# Patient Record
Sex: FEMALE | Race: WHITE | NOT HISPANIC OR LATINO | Employment: OTHER | URBAN - METROPOLITAN AREA
[De-identification: names, ages, dates, MRNs, and addresses within clinical notes are randomized per-mention and may not be internally consistent; named-entity substitution may affect disease eponyms.]

---

## 2017-01-05 ENCOUNTER — OFFICE VISIT (OUTPATIENT)
Dept: URGENT CARE | Facility: URGENT CARE | Age: 57
End: 2017-01-05
Payer: COMMERCIAL

## 2017-01-05 ENCOUNTER — RADIANT APPOINTMENT (OUTPATIENT)
Dept: GENERAL RADIOLOGY | Facility: CLINIC | Age: 57
End: 2017-01-05
Attending: FAMILY MEDICINE
Payer: COMMERCIAL

## 2017-01-05 VITALS
BODY MASS INDEX: 35.79 KG/M2 | HEART RATE: 75 BPM | WEIGHT: 194 LBS | TEMPERATURE: 98 F | OXYGEN SATURATION: 98 % | DIASTOLIC BLOOD PRESSURE: 85 MMHG | SYSTOLIC BLOOD PRESSURE: 149 MMHG

## 2017-01-05 DIAGNOSIS — M54.2 CERVICALGIA: ICD-10-CM

## 2017-01-05 DIAGNOSIS — Z87.01 HX OF BACTERIAL PNEUMONIA: ICD-10-CM

## 2017-01-05 DIAGNOSIS — R05.9 COUGH: Primary | ICD-10-CM

## 2017-01-05 PROCEDURE — 71020 XR CHEST 2 VW: CPT

## 2017-01-05 PROCEDURE — 93000 ELECTROCARDIOGRAM COMPLETE: CPT | Performed by: FAMILY MEDICINE

## 2017-01-05 PROCEDURE — 99214 OFFICE O/P EST MOD 30 MIN: CPT | Performed by: FAMILY MEDICINE

## 2017-01-05 RX ORDER — OXYCODONE AND ACETAMINOPHEN 5; 325 MG/1; MG/1
1-2 TABLET ORAL EVERY 4 HOURS PRN
Qty: 15 TABLET | Refills: 0 | Status: SHIPPED | OUTPATIENT
Start: 2017-01-05 | End: 2017-01-10

## 2017-01-05 NOTE — MR AVS SNAPSHOT
"              After Visit Summary   1/5/2017    Margo Inman    MRN: [id number]           Patient Information     Date Of Birth          1960        Visit Information        Provider Department      1/5/2017 3:30 PM Romeo Shelton MD [facility]        Today's Diagnoses     Cough    -  1     Hx of bacterial pneumonia         Cervicalgia           Care Instructions      Neck/Back Pain: General    Both neck and back pain are usually caused by injury to the muscles or ligaments of the spine. Sometimes the disks that separate each bone of the spine may cause pain by pressing on a nearby nerve. Back and neck pain may appear after a sudden twisting/bending force (such as in a car accident), or sometimes after a simple awkward movement. In either case, muscle spasm is often present and adds to the pain.  Acute neck and back pain usually gets better in 1 to 2 weeks. Pain related to disk disease, arthritis in the spinal joints or spinal stenosis (narrowing of the spinal canal) can become chronic and last for months or years.  Back and neck pain are common problems. Most people feel better in 1 or 2 weeks, and most of the rest in 1 to 2 months. Most people can remain active.  Symptoms  People experience and describe pain differently.    Pain can be sharp, stabbing, shooting, aching, cramping, or burning    Movement, standing, bending, lifting, sitting, or walking may worsen the pain    Pain can be localized to one spot or area, or it can be more generalized    Pain can spread or radiate upwards, downwards, to the front, or go down your arms    Muscle spasm may occur.  Cause  Most of the time \"mechanical problems\" with the muscles or spine cause the pain. it is usually caused by an injury, whether known or not, to the muscles or ligaments. While illnesses can cause back pain, it is usually not caused by a serious illness. Pain is usually related to physical activity, whether sports, " exercise, work, or normal activity. Sometimes it can occur without an identifiable cause. This can happen simply by stretching or moving wrong, without noting pain at the time. Other causes include:    Overexertion, lifting, pushing, pulling incorrectly or too aggressively.    Sudden twisting, bending or stretching from an accident (car or fall), or accidental movement.    Poor posture    Poor conditioning, lack of regular exercise    Spinal disc disease or arthritis    Stress    Pregnancy, or illness like appendicitis, bladder or kidney infection, pelvic infections   Home care    For neck pain: Use a comfortable pillow that supports the head and keeps the spine in a neutral position. The position of the head should not be tilted forward or backward.    When in bed, try to find a position of comfort. A firm mattress is best. Try lying flat on your back with pillows under your knees. You can also try lying on your side with your knees bent up towards your chest and a pillow between your knees.    At first, do not try to stretch out the sore spots. If there is a strain, it is not like the good soreness you get after exercising without an injury. In this case, stretching may make it worse.    Avoid prolonged sitting, long car rides or travel. This puts more stress on the lower back than standing or walking.    During the first 24 to 72 hours after an injury, apply an ice pack to the painful area for 20 minutes and then remove it for 20 minutes over a period of 60 to 90 minutes or several times a day. As a safety precaution, do not use a heating pad at bedtime. Sleeping with a heating pad can lead to skin burns or tissue damage.    Ice and heat therapies can be alternated. Talk with your health care provider about the best treatment for your back or neck pain.    Therapeutic massage can help relax the back and neck muscles without stretching them.    Be aware of safe lifting methods and do not lift anything over 15  pounds until all the pain is gone.  Medications  Talk to your health care provider before using medications, especially if you have other medical problems or are taking other medicines.    You may use acetaminophen (such as Tylenol) or ibuprofen (such as Advil or Motrin) to control pain, unless another pain medicine was prescribed. If you have chronic conditions like diabetes, liver or kidney disease, stomach ulcers,  gastrointestinal bleeding, or are taking blood thinner medications.    Be careful if you are given pain medicines, narcotics, or medication for muscle spasm. They can cause drowsiness, and can affect your coordination, reflexes, and judgment. Do not drive or operate heavy machinery.  Follow-up care  Follow up with your health care provider if your symptoms do not start to improve after one week. Physical therapy or further tests may be needed.  If X-rays were taken, they will be reviewed by a radiologist. You will be notified of any new findings that may affect your care.  Call 911  Seek emergency medical care if any of the following occur:    Trouble breathing    Confusion    Very drowsy or trouble awakening    Fainting or loss of consciousness    Rapid or very slow heart rate    Loss of bowel or bladder control  When to seek medical care  Get prompt medical attention if any of the following occur:    Pain becomes worse or spreads into your arms or legs    Weakness, numbness or pain in one or both arms or legs    Numbness in the groin area    Difficulty walking    Fever of 100.4 F (38 C) or higher, or as directed by your healthcare provider    0633-9993 [employer]. [address] [place] [address]. All rights reserved. This information is not intended as a substitute for professional medical care. Always follow your healthcare professional's instructions.        Pinched Nerve in the Neck  A pinched nerve in the neck (cervical radiculopathy) is caused when the nerve that goes  from the spinal cord to the arm is irritated or has pressure on it. This may be caused by a bulging spinal disk. A spinal disk is the cushion between each spinal bone. Or it may be caused by a narrowing of the spinal joint because of arthritis.    A pinched nerve can cause numbness, tingling, deep aching, or electrical shooting pain from the side of the neck all the way down to the fingers on one side.  A pinched nerve may begin after a sudden turning or bending force (such as in a car accident) or after a simple awkward movement. In either case, muscle spasm is commonly present and adds to the pain.  Home care  Follow these guidelines when caring for yourself at home:    Rest and relax the muscles. Use a comfortable pillow that supports your head and keeps your spine in a natural (neutral) position. Your head shouldn t be tilted forward or backward. A rolled-up towel may help for a custom fit. When standing or sitting, keep your neck in line with your body. Keep your head up and shoulders down. Stay away from activities that require you to move your neck a lot.    You can use heat and massage to help ease the pain. Take a hot shower or bath, or use a heating pad. You can also use a cold pack for relief. You can make a cold pack by wrapping a plastic bag of crushed or cubed ice in a thin towel. Try both heat and cold, and use the method that feels best. Do this for 20 minutes several times a day.    You may use acetaminophen or ibuprofen to control pain, unless another pain medicine was prescribed. If you have chronic liver or kidney disease, talk with your health care provider before using these medicines. Also talk with your provider if you ve had a stomach ulcer or GI bleeding.    Reduce stress. Stress can make it longer for your pain to go away.    Do any exercises or stretches that were given to you as part of your discharge plan.    Wear a soft collar, if prescribed.    You may need surgery for a more serious  injury.  Follow-up care  Follow up with your health care provider, or as advised, if you don t start to get better after 1 week. You may need more tests. Tell your provider about any fever, chills, or weight loss.  If X-rays were taken, a radiologist will look at them. You will be told of any new findings that may affect your care.  When to seek medical advice  Call your health care provider right away if any of these occur:    Pain becomes worse even after taking prescribed pain medicine    Weakness in the arm    Numbness in the arm gets worse    Trouble breathing or swallowing       4490-5991 [employer]. [address] [place] [address]. All rights reserved. This information is not intended as a substitute for professional medical care. Always follow your healthcare professional's instructions.              Follow-ups after your visit        Your next 10 appointments already scheduled     Rahul 10, 2017 11:40 AM   Office Visit with Jordana Naqvi PA-C   [facility]    [address] [place] [id number] [phone]           Bring a current list of meds and any records pertaining to this visit.  For Physicals, please bring immunization records and any forms needing to be filled out.  Please arrive 10 minutes early to complete paperwork.              Who to contact     If you have questions or need follow up information about today's clinic visit or your schedule please contact [facility] directly at [phone].  Normal or non-critical lab and imaging results will be communicated to you by MyChart, letter or phone within 4 business days after the clinic has received the results. If you do not hear from us within 7 days, please contact the clinic through MyChart or phone. If you have a critical or abnormal lab result, we will notify you by phone as soon as possible.  Submit refill requests  "through [employer] or call your pharmacy and they will forward the refill request to us. Please allow 3 business days for your refill to be completed.          Additional Information About Your Visit        [employer] Information     [employer] lets you send messages to your doctor, view your test results, renew your prescriptions, schedule appointments and more. To sign up, go to [email or web address] . Click on \"Log in\" on the left side of the screen, which will take you to the Welcome page. Then click on \"Sign up Now\" on the right side of the page.     You will be asked to enter the access code listed below, as well as some personal information. Please follow the directions to create your username and password.     Your access code is: [email or web address]  Expires: 2017 11:34 AM     Your access code will  in 90 days. If you need help or a new code, please call your [facility] clinic or [phone].        Care EveryWhere ID     This is your Care EveryWhere ID. This could be used by other organizations to access your [facility] medical records  [id number]        Your Vitals Were     Pulse Temperature Pulse Oximetry Last Period          75 98  F (36.7  C) (Oral) 98% 2011         Blood Pressure from Last 3 Encounters:   17 149/85   11/15/16 152/92   16 146/90    Weight from Last 3 Encounters:   17 194 lb (87.998 kg)   11/15/16 198 lb (89.812 kg)   16 200 lb (90.719 kg)              We Performed the Following     EKG 12-lead complete w/read - Clinics     XR Chest 2 Views          Today's Medication Changes          These changes are accurate as of: 17  5:25 PM.  If you have any questions, ask your nurse or doctor.               Start taking these medicines.        Dose/Directions    oxyCODONE-acetaminophen 5-325 MG per tablet   Commonly known as:  PERCOCET   Used for:  Cervicalgia   Started by:  Romeo Shelton MD        Dose:  1-2 tablet   Take 1-2 tablets by mouth every 4 hours " as needed for moderate to severe pain   Quantity:  15 tablet   Refills:  0            Where to get your medicines      Some of these will need a paper prescription and others can be bought over the counter.  Ask your nurse if you have questions.     Bring a paper prescription for each of these medications    - oxyCODONE-acetaminophen 5-325 MG per tablet             Primary Care Provider Office Phone # Fax #    Jordana Naqvi PA-C [phone]       [facility] [address]  [facility] [address]        Thank you!     Thank you for choosing [facility]  for your care. Our goal is always to provide you with excellent care. Hearing back from our patients is one way we can continue to improve our services. Please take a few minutes to complete the written survey that you may receive in the mail after your visit with us. Thank you!             Your Updated Medication List - Protect others around you: Learn how to safely use, store and throw away your medicines at www.disposemymeds.org.          This list is accurate as of: 1/5/17  5:25 PM.  Always use your most recent med list.                   Brand Name Dispense Instructions for use    acetaminophen 500 MG tablet    TYLENOL     Take 500-1,000 mg by mouth every 6 hours as needed.       atorvastatin 80 MG tablet    LIPITOR    90 tablet    TAKE 1 TABLET BY MOUTH ONCE DAILY       butalbital-acetaminophen-caffeine -40 MG per tablet    FIORICET/ESGIC    28 tablet    Take 1 tablet by mouth every 4 hours as needed       * cyclobenzaprine 10 MG tablet    FLEXERIL    90 tablet    TAKE 1 TABLET BY MOUTH AT BEDTIME AS NEEDED FOR MUSCLE SPASMS       * cyclobenzaprine 10 MG tablet    FLEXERIL    90 tablet    TAKE 1 TABLET BY MOUTH AT BEDTIME AS NEEDED FOR MUSCLE SPASMS       diclofenac 75 MG EC tablet    VOLTAREN    60 tablet    Take 1 tablet (75 mg) by mouth 2 times daily       oxybutynin 5 MG 24 hr tablet     DITROPAN-XL    30 tablet    Take 2 tablets (10 mg) by mouth daily       oxyCODONE-acetaminophen 5-325 MG per tablet    PERCOCET    15 tablet    Take 1-2 tablets by mouth every 4 hours as needed for moderate to severe pain       UNABLE TO FIND      MEDICATION NAME: e-cigarette       varenicline 0.5 MG X 11 & 1 MG X 42 tablet    CHANTIX STARTING MONTH EVANGELINA    53 tablet    Take 0.5 mg tab daily for 3 days, then 0.5 mg tab twice daily for 4 days, then 1 mg twice daily.       * Notice:  This list has 2 medication(s) that are the same as other medications prescribed for you. Read the directions carefully, and ask your doctor or other care provider to review them with you.

## 2017-01-05 NOTE — PROGRESS NOTES
Some of this note was populated by a medical assistant.      SUBJECTIVE:                                                    Margo Inman is a 56 year old female who presents to clinic today for the following health issues:    Musculoskeletal problem/pain      Duration: 2 days ago    Description  Location: Both shoulder blades and lower neck pain with motion.     Intensity:  8/10    Accompanying signs and symptoms: radiation of pain to right arm, numbness, tingling, warmth and swelling    History  Previous similar problem: no   Previous evaluation:  none    Precipitating or alleviating factors:  Trauma or overuse: no   Aggravating factors include: When moving    Therapies tried and outcome: Tylenol       Problem list and histories reviewed & adjusted, as indicated.  Additional history: as documented    Patient Active Problem List   Diagnosis     Hyperlipidemia LDL goal <130     Pseudogout     OA (OSTEOARTHRITIS) OF KNEE - bilateral     RLS (restless legs syndrome)     S/P total knee arthroplasty juan     Acute posthemorrhagic anemia     Muscle spasm     CTS (carpal tunnel syndrome) - bilateral     S/P carpal tunnel release     Trigger thumb     Elevated blood pressure reading without diagnosis of hypertension     Tobacco dependency     Microscopic hematuria     Neck pain     Tension headache     Past Surgical History   Procedure Laterality Date     Orthopedic surgery       Appendectomy       Gyn surgery       Ent surgery       Tonsils       Ectopic pregnancy surgery       Hc knee scope,med/lat menisectomy  9/16/11     left, with partial medial menisectomy ONLY     C part remv femur/prox tib/fib  9/16/11     left, open lateral patellar bone spur excision     Arthroscopy knee  9/16/2011     Procedure:ARTHROSCOPY KNEE; left knee arthroscopy with debridement, open lateral patellar spur excision; Surgeon:LUIS A MONTOYA; Location:[facility] OR     C total knee arthroplasty  1/17/14     Bilateral     Hc revise median  n/carpal tunnel surg Left 7/11/14     PRIMARY - not revision     Hc incision tendon sheath finger Left 7/11/14     Thumb TF release     Release carpal tunnel  7/11/2014     Procedure: RELEASE CARPAL TUNNEL;  Surgeon: Rg Franco MD;  Location: [facility] OR     Release trigger finger  7/11/2014     Procedure: RELEASE TRIGGER FINGER;  Surgeon: Rg Franco MD;  Location: [facility] OR     Release carpal tunnel Right 11/7/2014     Procedure: RELEASE CARPAL TUNNEL;  Surgeon: Rg Franco MD;  Location: [facility] OR     Release trigger finger Right 11/7/2014     Procedure: RELEASE TRIGGER FINGER;  Surgeon: Rg Franco MD;  Location: [facility] OR     Colonoscopy N/A 2/16/2016     Procedure: COLONOSCOPY;  Surgeon: Belem Khalil MD;  Location: [facility] OR     Colonoscopy with co2 insufflation N/A 2/16/2016     Procedure: COLONOSCOPY WITH CO2 INSUFFLATION;  Surgeon: Belem Khalil MD;  Location: [facility]     Colonoscopy N/A 2/16/2016     Procedure: COMBINED COLONOSCOPY, SINGLE OR MULTIPLE BIOPSY/POLYPECTOMY BY BIOPSY;  Surgeon: Belem Khalil MD;  Location: [facility] OR     Cystoscopy  2016     microscopic hematuria       Social History   Substance Use Topics     Smoking status: Current Every Day Smoker -- 1.00 packs/day for 15 years     Types: Cigarettes     Smokeless tobacco: Never Used     Alcohol Use: Yes      Comment: socially     Family History   Problem Relation Age of Onset     Breast Cancer Maternal Grandmother      Cancer - colorectal Maternal Grandfather      Colon Cancer Maternal Grandfather      C.A.D. Father      C.A.D. Paternal Grandfather      Ovarian Cancer Maternal Grandmother      Prostate Cancer Maternal Grandfather          Current Outpatient Prescriptions   Medication Sig Dispense Refill     cyclobenzaprine (FLEXERIL) 10 MG tablet TAKE 1 TABLET BY MOUTH AT BEDTIME AS NEEDED FOR MUSCLE SPASMS 90 tablet 0     butalbital-acetaminophen-caffeine (FIORICET, ESGIC)  -40 MG per tablet Take 1 tablet by mouth every 4 hours as needed 28 tablet 1     diclofenac (VOLTAREN) 75 MG EC tablet Take 1 tablet (75 mg) by mouth 2 times daily 60 tablet 11     oxybutynin (DITROPAN-XL) 5 MG 24 hr tablet Take 2 tablets (10 mg) by mouth daily 30 tablet 1     atorvastatin (LIPITOR) 80 MG tablet TAKE 1 TABLET BY MOUTH ONCE DAILY 90 tablet 3     cyclobenzaprine (FLEXERIL) 10 MG tablet TAKE 1 TABLET BY MOUTH AT BEDTIME AS NEEDED FOR MUSCLE SPASMS 90 tablet 0     varenicline (CHANTIX STARTING MONTH EVANGELINA) 0.5 MG X 11 & 1 MG X 42 tablet Take 0.5 mg tab daily for 3 days, then 0.5 mg tab twice daily for 4 days, then 1 mg twice daily. 53 tablet 0     UNABLE TO FIND MEDICATION NAME: e-cigarette       acetaminophen (TYLENOL) 500 MG tablet Take 500-1,000 mg by mouth every 6 hours as needed.       Allergies   Allergen Reactions     Bee Anaphylaxis     Erythromycin Hives     Wasps [Hornets] Anaphylaxis       ROS:  Constitutional, HEENT, cardiovascular, pulmonary, gi and gu systems are negative, except as otherwise noted.    OBJECTIVE:                                                    /85 mmHg  Pulse 75  Temp(Src) 98  F (36.7  C) (Oral)  Wt 194 lb (87.998 kg)  SpO2 98%  LMP 09/16/2011  Body mass index is 35.79 kg/(m^2).  GENERAL: healthy, alert and no distress  NECK: no adenopathy, no asymmetry, masses, or scars and thyroid normal to palpation  RESP: lungs clear to auscultation - no rales, rhonchi or wheezes  CV: regular rate and rhythm, normal S1 S2, no S3 or S4, no murmur, click or rub, no peripheral edema and peripheral pulses strong  ABDOMEN: soft, nontender, no hepatosplenomegaly, no masses and bowel sounds normal  MS: no gross musculoskeletal defects noted, noted slightly weaker right sided  strength and limited rotation of the neck in all planes including extension and flexion. Forward elevation and abduction limited to 90 degrees secondary to pain in the trapezius area and neck  posteriorly   CN 2-12 intact without focal deficits.     Results for orders placed or performed in visit on 01/05/17   XR Chest 2 Views    Narrative    CHEST TWO VIEWS 1/5/2017 5:05 PM     HISTORY: Cough, Personal history of pneumonia (recurrent)    COMPARISON: 11/3/2014    FINDINGS: There are no acute infiltrates. The cardiac silhouette is  not enlarged. Pulmonary vasculature is unremarkable.      Impression    IMPRESSION: No acute disease.    PO LOYD MD         EKG: normal axis and intervals. Rate 75. Without ST changes.  ASSESSMENT/PLAN:                                                        ICD-10-CM    1. Cough R05 EKG 12-lead complete w/read - Clinics     XR Chest 2 Views   2. Hx of bacterial pneumonia Z87.01 EKG 12-lead complete w/read - Clinics     XR Chest 2 Views   3. Cervicalgia M54.2 oxyCODONE-acetaminophen (PERCOCET) 5-325 MG per tablet      PLAN  Emphasized the importance of smoking cessation and association between smoking and neck and back pain.   Indications for immediate trip to the ER emphasized.   Patient educational/instructional material provided including reasons for follow-up   The patient indicates understanding of these issues and agrees with the plan.  Romeo Shelton MD      [facility]

## 2017-01-05 NOTE — PATIENT INSTRUCTIONS
"  Neck/Back Pain: General    Both neck and back pain are usually caused by injury to the muscles or ligaments of the spine. Sometimes the disks that separate each bone of the spine may cause pain by pressing on a nearby nerve. Back and neck pain may appear after a sudden twisting/bending force (such as in a car accident), or sometimes after a simple awkward movement. In either case, muscle spasm is often present and adds to the pain.  Acute neck and back pain usually gets better in 1 to 2 weeks. Pain related to disk disease, arthritis in the spinal joints or spinal stenosis (narrowing of the spinal canal) can become chronic and last for months or years.  Back and neck pain are common problems. Most people feel better in 1 or 2 weeks, and most of the rest in 1 to 2 months. Most people can remain active.  Symptoms  People experience and describe pain differently.    Pain can be sharp, stabbing, shooting, aching, cramping, or burning    Movement, standing, bending, lifting, sitting, or walking may worsen the pain    Pain can be localized to one spot or area, or it can be more generalized    Pain can spread or radiate upwards, downwards, to the front, or go down your arms    Muscle spasm may occur.  Cause  Most of the time \"mechanical problems\" with the muscles or spine cause the pain. it is usually caused by an injury, whether known or not, to the muscles or ligaments. While illnesses can cause back pain, it is usually not caused by a serious illness. Pain is usually related to physical activity, whether sports, exercise, work, or normal activity. Sometimes it can occur without an identifiable cause. This can happen simply by stretching or moving wrong, without noting pain at the time. Other causes include:    Overexertion, lifting, pushing, pulling incorrectly or too aggressively.    Sudden twisting, bending or stretching from an accident (car or fall), or accidental movement.    Poor posture    Poor conditioning, " lack of regular exercise    Spinal disc disease or arthritis    Stress    Pregnancy, or illness like appendicitis, bladder or kidney infection, pelvic infections   Home care    For neck pain: Use a comfortable pillow that supports the head and keeps the spine in a neutral position. The position of the head should not be tilted forward or backward.    When in bed, try to find a position of comfort. A firm mattress is best. Try lying flat on your back with pillows under your knees. You can also try lying on your side with your knees bent up towards your chest and a pillow between your knees.    At first, do not try to stretch out the sore spots. If there is a strain, it is not like the good soreness you get after exercising without an injury. In this case, stretching may make it worse.    Avoid prolonged sitting, long car rides or travel. This puts more stress on the lower back than standing or walking.    During the first 24 to 72 hours after an injury, apply an ice pack to the painful area for 20 minutes and then remove it for 20 minutes over a period of 60 to 90 minutes or several times a day. As a safety precaution, do not use a heating pad at bedtime. Sleeping with a heating pad can lead to skin burns or tissue damage.    Ice and heat therapies can be alternated. Talk with your health care provider about the best treatment for your back or neck pain.    Therapeutic massage can help relax the back and neck muscles without stretching them.    Be aware of safe lifting methods and do not lift anything over 15 pounds until all the pain is gone.  Medications  Talk to your health care provider before using medications, especially if you have other medical problems or are taking other medicines.    You may use acetaminophen (such as Tylenol) or ibuprofen (such as Advil or Motrin) to control pain, unless another pain medicine was prescribed. If you have chronic conditions like diabetes, liver or kidney disease, stomach  ulcers,  gastrointestinal bleeding, or are taking blood thinner medications.    Be careful if you are given pain medicines, narcotics, or medication for muscle spasm. They can cause drowsiness, and can affect your coordination, reflexes, and judgment. Do not drive or operate heavy machinery.  Follow-up care  Follow up with your health care provider if your symptoms do not start to improve after one week. Physical therapy or further tests may be needed.  If X-rays were taken, they will be reviewed by a radiologist. You will be notified of any new findings that may affect your care.  Call 911  Seek emergency medical care if any of the following occur:    Trouble breathing    Confusion    Very drowsy or trouble awakening    Fainting or loss of consciousness    Rapid or very slow heart rate    Loss of bowel or bladder control  When to seek medical care  Get prompt medical attention if any of the following occur:    Pain becomes worse or spreads into your arms or legs    Weakness, numbness or pain in one or both arms or legs    Numbness in the groin area    Difficulty walking    Fever of 100.4 F (38 C) or higher, or as directed by your healthcare provider    2101-1533 [employer]. [address] [place] [address]. All rights reserved. This information is not intended as a substitute for professional medical care. Always follow your healthcare professional's instructions.        Pinched Nerve in the Neck  A pinched nerve in the neck (cervical radiculopathy) is caused when the nerve that goes from the spinal cord to the arm is irritated or has pressure on it. This may be caused by a bulging spinal disk. A spinal disk is the cushion between each spinal bone. Or it may be caused by a narrowing of the spinal joint because of arthritis.    A pinched nerve can cause numbness, tingling, deep aching, or electrical shooting pain from the side of the neck all the way down to the fingers on one side.  A  pinched nerve may begin after a sudden turning or bending force (such as in a car accident) or after a simple awkward movement. In either case, muscle spasm is commonly present and adds to the pain.  Home care  Follow these guidelines when caring for yourself at home:    Rest and relax the muscles. Use a comfortable pillow that supports your head and keeps your spine in a natural (neutral) position. Your head shouldn t be tilted forward or backward. A rolled-up towel may help for a custom fit. When standing or sitting, keep your neck in line with your body. Keep your head up and shoulders down. Stay away from activities that require you to move your neck a lot.    You can use heat and massage to help ease the pain. Take a hot shower or bath, or use a heating pad. You can also use a cold pack for relief. You can make a cold pack by wrapping a plastic bag of crushed or cubed ice in a thin towel. Try both heat and cold, and use the method that feels best. Do this for 20 minutes several times a day.    You may use acetaminophen or ibuprofen to control pain, unless another pain medicine was prescribed. If you have chronic liver or kidney disease, talk with your health care provider before using these medicines. Also talk with your provider if you ve had a stomach ulcer or GI bleeding.    Reduce stress. Stress can make it longer for your pain to go away.    Do any exercises or stretches that were given to you as part of your discharge plan.    Wear a soft collar, if prescribed.    You may need surgery for a more serious injury.  Follow-up care  Follow up with your health care provider, or as advised, if you don t start to get better after 1 week. You may need more tests. Tell your provider about any fever, chills, or weight loss.  If X-rays were taken, a radiologist will look at them. You will be told of any new findings that may affect your care.  When to seek medical advice  Call your health care provider right away if  any of these occur:    Pain becomes worse even after taking prescribed pain medicine    Weakness in the arm    Numbness in the arm gets worse    Trouble breathing or swallowing       2955-8111 [employer]. [address] [place] [address], [place] [address]. All rights reserved. This information is not intended as a substitute for professional medical care. Always follow your healthcare professional's instructions.

## 2017-01-05 NOTE — NURSING NOTE
"Chief Complaint   Patient presents with     Shoulder Pain     Both shoulder blades radiate to the right arm        Initial /85 mmHg  Pulse 75  Temp(Src) 98  F (36.7  C) (Oral)  Wt 194 lb (87.998 kg)  SpO2 98%  LMP 09/16/2011 Estimated body mass index is 35.79 kg/(m^2) as calculated from the following:    Height as of 11/15/16: 5' 1.75\" (1.568 m).    Weight as of this encounter: 194 lb (87.998 kg).  BP completed using cuff size: regular  Deo Armando/MA        "

## 2017-01-10 ENCOUNTER — OFFICE VISIT (OUTPATIENT)
Dept: FAMILY MEDICINE | Facility: CLINIC | Age: 57
End: 2017-01-10
Payer: COMMERCIAL

## 2017-01-10 ENCOUNTER — RESULT FOLLOW UP (OUTPATIENT)
Dept: FAMILY MEDICINE | Facility: CLINIC | Age: 57
End: 2017-01-10

## 2017-01-10 VITALS
RESPIRATION RATE: 18 BRPM | HEART RATE: 84 BPM | SYSTOLIC BLOOD PRESSURE: 136 MMHG | DIASTOLIC BLOOD PRESSURE: 84 MMHG | WEIGHT: 198 LBS | OXYGEN SATURATION: 98 % | HEIGHT: 62 IN | BODY MASS INDEX: 36.44 KG/M2 | TEMPERATURE: 97 F

## 2017-01-10 DIAGNOSIS — G25.81 RESTLESS LEGS SYNDROME (RLS): ICD-10-CM

## 2017-01-10 DIAGNOSIS — Z00.01 ENCOUNTER FOR ROUTINE ADULT PHYSICAL EXAM WITH ABNORMAL FINDINGS: Primary | ICD-10-CM

## 2017-01-10 DIAGNOSIS — F17.200 TOBACCO DEPENDENCE SYNDROME: ICD-10-CM

## 2017-01-10 DIAGNOSIS — R87.810 CERVICAL HIGH RISK HPV (HUMAN PAPILLOMAVIRUS) TEST POSITIVE: Primary | ICD-10-CM

## 2017-01-10 DIAGNOSIS — M54.12 CERVICAL RADICULOPATHY: ICD-10-CM

## 2017-01-10 DIAGNOSIS — E78.5 HYPERLIPIDEMIA LDL GOAL <130: ICD-10-CM

## 2017-01-10 DIAGNOSIS — G44.209 TENSION HEADACHE: ICD-10-CM

## 2017-01-10 DIAGNOSIS — Z11.59 NEED FOR HEPATITIS C SCREENING TEST: ICD-10-CM

## 2017-01-10 LAB
ALBUMIN SERPL-MCNC: 4 G/DL (ref 3.4–5)
ALP SERPL-CCNC: 106 U/L (ref 40–150)
ALT SERPL W P-5'-P-CCNC: 46 U/L (ref 0–50)
ANION GAP SERPL CALCULATED.3IONS-SCNC: 6 MMOL/L (ref 3–14)
AST SERPL W P-5'-P-CCNC: 28 U/L (ref 0–45)
BILIRUB SERPL-MCNC: 0.2 MG/DL (ref 0.2–1.3)
BUN SERPL-MCNC: 11 MG/DL (ref 7–30)
CALCIUM SERPL-MCNC: 9.2 MG/DL (ref 8.5–10.1)
CHLORIDE SERPL-SCNC: 105 MMOL/L (ref 94–109)
CHOLEST SERPL-MCNC: 190 MG/DL
CO2 SERPL-SCNC: 29 MMOL/L (ref 20–32)
CREAT SERPL-MCNC: 0.56 MG/DL (ref 0.52–1.04)
ERYTHROCYTE [DISTWIDTH] IN BLOOD BY AUTOMATED COUNT: 12.9 % (ref 10–15)
GFR SERPL CREATININE-BSD FRML MDRD: ABNORMAL ML/MIN/1.7M2
GLUCOSE SERPL-MCNC: 106 MG/DL (ref 70–99)
HCT VFR BLD AUTO: 42.7 % (ref 35–47)
HDLC SERPL-MCNC: 46 MG/DL
HGB BLD-MCNC: 13.8 G/DL (ref 11.7–15.7)
LDLC SERPL CALC-MCNC: 102 MG/DL
MCH RBC QN AUTO: 31.7 PG (ref 26.5–33)
MCHC RBC AUTO-ENTMCNC: 32.3 G/DL (ref 31.5–36.5)
MCV RBC AUTO: 98 FL (ref 78–100)
NONHDLC SERPL-MCNC: 144 MG/DL
PLATELET # BLD AUTO: 277 10E9/L (ref 150–450)
POTASSIUM SERPL-SCNC: 4.3 MMOL/L (ref 3.4–5.3)
PROT SERPL-MCNC: 7.6 G/DL (ref 6.8–8.8)
RBC # BLD AUTO: 4.35 10E12/L (ref 3.8–5.2)
SODIUM SERPL-SCNC: 140 MMOL/L (ref 133–144)
TRIGL SERPL-MCNC: 210 MG/DL
TSH SERPL DL<=0.005 MIU/L-ACNC: 1.21 MU/L (ref 0.4–4)
WBC # BLD AUTO: 7.5 10E9/L (ref 4–11)

## 2017-01-10 PROCEDURE — 80053 COMPREHEN METABOLIC PANEL: CPT | Performed by: PHYSICIAN ASSISTANT

## 2017-01-10 PROCEDURE — 36415 COLL VENOUS BLD VENIPUNCTURE: CPT | Performed by: PHYSICIAN ASSISTANT

## 2017-01-10 PROCEDURE — G0145 SCR C/V CYTO,THINLAYER,RESCR: HCPCS | Performed by: PHYSICIAN ASSISTANT

## 2017-01-10 PROCEDURE — 86803 HEPATITIS C AB TEST: CPT | Performed by: PHYSICIAN ASSISTANT

## 2017-01-10 PROCEDURE — 99213 OFFICE O/P EST LOW 20 MIN: CPT | Mod: 25 | Performed by: PHYSICIAN ASSISTANT

## 2017-01-10 PROCEDURE — 80061 LIPID PANEL: CPT | Performed by: PHYSICIAN ASSISTANT

## 2017-01-10 PROCEDURE — 87624 HPV HI-RISK TYP POOLED RSLT: CPT | Performed by: PHYSICIAN ASSISTANT

## 2017-01-10 PROCEDURE — 99396 PREV VISIT EST AGE 40-64: CPT | Performed by: PHYSICIAN ASSISTANT

## 2017-01-10 PROCEDURE — 85027 COMPLETE CBC AUTOMATED: CPT | Performed by: PHYSICIAN ASSISTANT

## 2017-01-10 PROCEDURE — 84443 ASSAY THYROID STIM HORMONE: CPT | Performed by: PHYSICIAN ASSISTANT

## 2017-01-10 RX ORDER — BUTALBITAL, ACETAMINOPHEN AND CAFFEINE 50; 325; 40 MG/1; MG/1; MG/1
1 TABLET ORAL EVERY 4 HOURS PRN
Qty: 28 TABLET | Refills: 1 | Status: SHIPPED | OUTPATIENT
Start: 2017-01-10 | End: 2017-08-03

## 2017-01-10 RX ORDER — VARENICLINE TARTRATE 1 MG/1
1 TABLET, FILM COATED ORAL 2 TIMES DAILY
Qty: 56 TABLET | Refills: 4 | Status: SHIPPED | OUTPATIENT
Start: 2017-01-10 | End: 2018-06-27

## 2017-01-10 RX ORDER — OXYCODONE AND ACETAMINOPHEN 5; 325 MG/1; MG/1
1-2 TABLET ORAL EVERY 4 HOURS PRN
Qty: 30 TABLET | Refills: 0 | Status: SHIPPED | OUTPATIENT
Start: 2017-01-10 | End: 2017-01-26

## 2017-01-10 RX ORDER — CYCLOBENZAPRINE HCL 10 MG
TABLET ORAL
Qty: 90 TABLET | Refills: 0 | Status: ON HOLD | OUTPATIENT
Start: 2017-01-10 | End: 2017-04-05

## 2017-01-10 ASSESSMENT — PAIN SCALES - GENERAL: PAINLEVEL: SEVERE PAIN (7)

## 2017-01-10 NOTE — LETTER
December 28, 2017      Margo Tracy Storm  [address] [address] [facility] [address]    Dear ,      At [facility], your health and wellness is our primary concern. That is why we are following up on a positive high risk HPV test from 1/10/17. Your provider had recommended that you have a Pap smear and HPV test completed by 1/10/18. Our records do not show that this has been scheduled.     It is important to complete the follow up that your provider has suggested for you to ensure that there are no worsening changes which may, over time, develop into cancer.      Please contact our office at  [phone] to schedule an appointment for a Pap smear and HPV test at your earliest convenience. If you have questions or concerns, please call the clinic and we will be happy to assist you.    If you have completed the tests outside of [facility], please have the results forwarded to our office. We will update the chart for your primary Physician to review before your next annual physical.     Thank you for choosing [facility]!    Sincerely,      Jordana Naqvi PA-C/natanael

## 2017-01-10 NOTE — Clinical Note
[facility]  [address] [address] [place] [address]  [phone]             January 11, 2017    Margo Inman  [address] [address] [facility] [address]          Dear Margo,    Your recent labs were stable.  Your cholesterol levels were a little elevated but stable.  Blood sugar levels were also a little elevated, but this was not a fasting specimen.  No medications are needed at this time.  I encourage you to focus on smoking cessation as well as a healthy diet and regular exercise.  Please follow instructions as discussed at our last office visit.  If you have any questions, please feel free to contact our office. Enclosed are the results.  Results for orders placed or performed in visit on 01/10/17   Hepatitis C Screen Reflex to HCV RNA Quant and Genotype   Result Value Ref Range    Hepatitis C Antibody  NR     Nonreactive   Assay performance characteristics have not been established for newborns,   infants, and children     CBC with platelets   Result Value Ref Range    WBC 7.5 4.0 - 11.0 10e9/L    RBC Count 4.35 3.8 - 5.2 10e12/L    Hemoglobin 13.8 11.7 - 15.7 g/dL    Hematocrit 42.7 35.0 - 47.0 %    MCV 98 78 - 100 fl    MCH 31.7 26.5 - 33.0 pg    MCHC 32.3 31.5 - 36.5 g/dL    RDW 12.9 10.0 - 15.0 %    Platelet Count 277 150 - 450 10e9/L   Comprehensive metabolic panel   Result Value Ref Range    Sodium 140 133 - 144 mmol/L    Potassium 4.3 3.4 - 5.3 mmol/L    Chloride 105 94 - 109 mmol/L    Carbon Dioxide 29 20 - 32 mmol/L    Anion Gap 6 3 - 14 mmol/L    Glucose 106 (H) 70 - 99 mg/dL    Urea Nitrogen 11 7 - 30 mg/dL    Creatinine 0.56 0.52 - 1.04 mg/dL    GFR Estimate >90  Non  GFR Calc   >60 mL/min/1.7m2    GFR Estimate If Black >90   GFR Calc   >60 mL/min/1.7m2    Calcium 9.2 8.5 - 10.1 mg/dL    Bilirubin Total 0.2 0.2 - 1.3 mg/dL    Albumin 4.0 3.4 - 5.0 g/dL    Protein Total 7.6 6.8 - 8.8 g/dL    Alkaline Phosphatase 106  40 - 150 U/L    ALT 46 0 - 50 U/L    AST 28 0 - 45 U/L   TSH with free T4 reflex   Result Value Ref Range    TSH 1.21 0.40 - 4.00 mU/L   Lipid panel reflex to direct LDL   Result Value Ref Range    Cholesterol 190 <200 mg/dL    Triglycerides 210 (H) <150 mg/dL    HDL Cholesterol 46 (L) >49 mg/dL    LDL Cholesterol Calculated 102 (H) <100 mg/dL    Non HDL Cholesterol 144 (H) <130 mg/dL       Thanks,    Jordana Naqvi PA-C/wilbertxm

## 2017-01-10 NOTE — PROGRESS NOTES
SUBJECTIVE:     CC: Margo Inman is an 56 year old woman who presents for preventive health visit.     Healthy Habits:    Do you get at least three servings of calcium containing foods daily (dairy, green leafy vegetables, etc.)? yes    Amount of exercise or daily activities, outside of work: doing PT exercises    Problems taking medications regularly No    Medication side effects: No    Have you had an eye exam in the past two years? yes    Do you see a dentist twice per year? yes    Do you have sleep apnea, excessive snoring or daytime drowsiness?no      Neck and right arm pain - ongoing problem with mainly neck pain.  Diffuse right arm pain started last week and is worsening.  Feels weak, especially with  strength.  Notes tingling sensation throughout.  Previously has had CTS with release on the right side.      Tension headaches - fluctuates in severity, frequency.  Worse with recent worsening of neck pain.    Tobacco dependency - previously tried chantix but only used for month due to life changes that made quitting difficult.  She denies any side effects and would like to retry the medication.  Currently approx 1 ppd.    Hyperlipidemia - stable.  Tolerating meds without side effects.     RLS - stable with flexeril.      Today's PHQ-2 Score:   PHQ-2 ( 1999 Pfizer) 1/10/2017 5/26/2016   Q1: Little interest or pleasure in doing things 0 0   Q2: Feeling down, depressed or hopeless 0 0   PHQ-2 Score 0 0       Abuse: Current or Past(Physical, Sexual or Emotional)- No  Do you feel safe in your environment - Yes    Social History   Substance Use Topics     Smoking status: Current Every Day Smoker -- 1.00 packs/day for 15 years     Types: Cigarettes     Smokeless tobacco: Never Used     Alcohol Use: Yes      Comment: socially     The patient does not drink >3 drinks per day nor >7 drinks per week.    Recent Labs   Lab Test  05/26/16   1406  05/20/15   0804  03/06/15   1006   CHOL  189  167  274*   HDL   40*  53  61   LDL  108*  87  178*   TRIG  203*  134  176*   CHOLHDLRATIO   --   3.2  4.5   NHDL  149*   --    --        Reviewed orders with patient.  Reviewed health maintenance and updated orders accordingly - Yes    Mammo Decision Support:  Patient over age 50, mutual decision to screen reflected in health maintenance.  No breast changes  Pertinent mammograms are reviewed under the imaging tab.    Menstrual History  History of abnormal Pap smear: NO - age 30-65 PAP every 5 years with negative HPV co-testing recommended  LMP: postmenopausal  No vaginal complaints    All Histories reviewed and updated in Epic.      Patient Active Problem List   Diagnosis     Hyperlipidemia LDL goal <130     Pseudogout     OA (OSTEOARTHRITIS) OF KNEE - bilateral     RLS (restless legs syndrome)     S/P total knee arthroplasty juan     Acute posthemorrhagic anemia     Muscle spasm     CTS (carpal tunnel syndrome) - bilateral     S/P carpal tunnel release     Trigger thumb     Elevated blood pressure reading without diagnosis of hypertension     Tobacco dependency     Microscopic hematuria     Neck pain     Tension headache     Past Surgical History   Procedure Laterality Date     Orthopedic surgery       Appendectomy       Gyn surgery       Ent surgery       Tonsils       Ectopic pregnancy surgery       Hc knee scope,med/lat menisectomy  9/16/11     left, with partial medial menisectomy ONLY     C part remv femur/prox tib/fib  9/16/11     left, open lateral patellar bone spur excision     Arthroscopy knee  9/16/2011     Procedure:ARTHROSCOPY KNEE; left knee arthroscopy with debridement, open lateral patellar spur excision; Surgeon:LUIS A MONTOYA; Location:[facility] OR     C total knee arthroplasty  1/17/14     Bilateral     Hc revise median n/carpal tunnel surg Left 7/11/14     PRIMARY - not revision     Hc incision tendon sheath finger Left 7/11/14     Thumb TF release     Release carpal tunnel  7/11/2014     Procedure: RELEASE CARPAL  TUNNEL;  Surgeon: Rg Franco MD;  Location: [facility] OR     Release trigger finger  7/11/2014     Procedure: RELEASE TRIGGER FINGER;  Surgeon: Rg Franco MD;  Location: [facility] OR     Release carpal tunnel Right 11/7/2014     Procedure: RELEASE CARPAL TUNNEL;  Surgeon: Rg Fracno MD;  Location: [facility]     Release trigger finger Right 11/7/2014     Procedure: RELEASE TRIGGER FINGER;  Surgeon: Rg Franco MD;  Location: [facility] OR     Colonoscopy N/A 2/16/2016     Procedure: COLONOSCOPY;  Surgeon: Belem Khalil MD;  Location: [facility]     Colonoscopy with co2 insufflation N/A 2/16/2016     Procedure: COLONOSCOPY WITH CO2 INSUFFLATION;  Surgeon: Belem Khalil MD;  Location: [facility]     Colonoscopy N/A 2/16/2016     Procedure: COMBINED COLONOSCOPY, SINGLE OR MULTIPLE BIOPSY/POLYPECTOMY BY BIOPSY;  Surgeon: Belem Khalil MD;  Location: [facility]     Cystoscopy  2016     microscopic hematuria       Social History   Substance Use Topics     Smoking status: Current Every Day Smoker -- 1.00 packs/day for 15 years     Types: Cigarettes     Smokeless tobacco: Never Used     Alcohol Use: Yes      Comment: socially     Family History   Problem Relation Age of Onset     Breast Cancer Maternal Grandmother      Cancer - colorectal Maternal Grandfather      Colon Cancer Maternal Grandfather      C.A.D. Father      C.A.D. Paternal Grandfather      Ovarian Cancer Maternal Grandmother      Prostate Cancer Maternal Grandfather          Current Outpatient Prescriptions   Medication Sig Dispense Refill     butalbital-acetaminophen-caffeine (FIORICET/ESGIC) -40 MG per tablet Take 1 tablet by mouth every 4 hours as needed 28 tablet 1     cyclobenzaprine (FLEXERIL) 10 MG tablet TAKE 1 TABLET BY MOUTH AT BEDTIME AS NEEDED FOR MUSCLE SPASMS 90 tablet 0     varenicline (CHANTIX STARTING MONTH PAK) 0.5 MG X 11 & 1 MG X 42 tablet Take 0.5 mg tab daily for 3 days, then 0.5 mg  "tab twice daily for 4 days, then 1 mg twice daily. 53 tablet 0     varenicline (CHANTIX) 1 MG tablet Take 1 tablet (1 mg) by mouth 2 times daily 56 tablet 4     oxyCODONE-acetaminophen (PERCOCET) 5-325 MG per tablet Take 1-2 tablets by mouth every 4 hours as needed for moderate to severe pain 30 tablet 0     diclofenac (VOLTAREN) 75 MG EC tablet Take 1 tablet (75 mg) by mouth 2 times daily 60 tablet 11     oxybutynin (DITROPAN-XL) 5 MG 24 hr tablet Take 2 tablets (10 mg) by mouth daily 30 tablet 1     atorvastatin (LIPITOR) 80 MG tablet TAKE 1 TABLET BY MOUTH ONCE DAILY 90 tablet 3     UNABLE TO FIND MEDICATION NAME: e-cigarette       acetaminophen (TYLENOL) 500 MG tablet Take 500-1,000 mg by mouth every 6 hours as needed.       Allergies   Allergen Reactions     Bee Anaphylaxis     Erythromycin Hives     Wasps [Hornets] Anaphylaxis       ROS:  C: NEGATIVE for fever, chills, change in weight  I: NEGATIVE for worrisome rashes, moles or lesions  E: NEGATIVE for vision changes or irritation  ENT: NEGATIVE for ear, mouth and throat problems  R: NEGATIVE for significant cough or SOB  B: NEGATIVE for masses, tenderness or discharge  CV: NEGATIVE for chest pain, palpitations or peripheral edema  GI: NEGATIVE for nausea, abdominal pain, heartburn, or change in bowel habits  : NEGATIVE for unusual urinary or vaginal symptoms. No vaginal bleeding.  M: POSITIVE for neck pain with right sided radicular symptoms, RLS  N: NEGATIVE for weakness, dizziness or paresthesias.  POSITIVE for headaches  P: NEGATIVE for changes in mood or affect. POSITIVE for tobacco dependency    OBJECTIVE:     /84 mmHg  Pulse 84  Temp(Src) 97  F (36.1  C) (Oral)  Resp 18  Ht 5' 1.75\" (1.568 m)  Wt 198 lb (89.812 kg)  BMI 36.53 kg/m2  SpO2 98%  LMP 09/16/2011  Breastfeeding? No     EXAM:  GENERAL:  alert and no distress  EYES: Eyes grossly normal to inspection, PERRL and conjunctivae and sclerae normal  HENT: ear canals and TM's normal, " nose and mouth without ulcers or lesions  NECK: no adenopathy, no asymmetry, masses, or scars and thyroid normal to palpation  RESP: lungs clear to auscultation - no rales, rhonchi or wheezes  BREAST: normal without masses, tenderness or nipple discharge and no palpable axillary masses or adenopathy  CV: regular rate and rhythm, normal S1 S2, no S3 or S4, no murmur, click or rub, no peripheral edema and peripheral pulses strong  ABDOMEN: soft, nontender, no hepatosplenomegaly, no masses and bowel sounds normal   (female): normal female external genitalia, normal urethral meatus, vaginal mucosa pink, moist, well rugated, and normal cervix/adnexa/uterus without masses or discharge.  Pap collected.   MS: neck      No swelling, bruising, erythema, atrophy or deformity      Tender cervical spine and musculature on right      Range of motion of the cervical spine is slightly diminished throughout      Strength is 4+/5 without focal deficit on the right, 5/5 on the left      Distal motor and sensory exam is intact      Reflexes are 2+ and symmetrical      Pulses are 2+ and symmetrical      Spurling test negative  SKIN: no suspicious lesions or rashes  NEURO: Normal strength and tone, mentation intact and speech normal  PSYCH: mentation appears normal, affect normal/bright    ASSESSMENT/PLAN:     (Z00.01) Routine general medical examination at a health care facility with abnormal findings  (primary encounter diagnosis)  Plan: Pap imaged thin layer screen with HPV -         recommended age 30 - 65 years (select HPV order        below), HPV High Risk Types DNA Cervical, CBC         with platelets, Comprehensive metabolic panel,         TSH with free T4 reflex, Lipid panel reflex to         direct LDL        Health maintenance up-to-date.  Reviewed USPTF recommendations.    (M54.12) Cervical radiculopathy  Comment: right  Plan: MR Cervical Spine w/o Contrast,         oxyCODONE-acetaminophen (PERCOCET) 5-325 MG per         "Tablet - limit to bid          (G25.81) Restless legs syndrome (RLS)  Comment: stable  Plan: cyclobenzaprine (FLEXERIL) 10 MG tablet          (E78.5) Hyperlipidemia LDL goal <130  Comment: stable  Plan: Continue medications the same - has refills    (F17.200) Tobacco dependence syndrome  Plan: varenicline (CHANTIX STARTING MONTH EVANGELINA) 0.5 MG        X 11 & 1 MG X 42 tablet, varenicline (CHANTIX)         1 MG tablet        Reviewed risks and benefits.  Has tolerated meds in past.  No depression or suicidal attempts.    (G44.209) Tension headache  Comment: stable  Plan: butalbital-acetaminophen-caffeine         (FIORICET/ESGIC) -40 MG per tablet          (Z11.59) Need for hepatitis C screening test  Plan: Hepatitis C Screen Reflex to HCV RNA Quant and         Genotype      COUNSELING:   Reviewed preventive health counseling, as reflected in patient instructions  Special attention given to:        Regular exercise       Healthy diet/nutrition       Vision screening    BP Screening:   Last 3 BP Readings:    BP Readings from Last 3 Encounters:   01/10/17 136/84   01/05/17 149/85   11/15/16 152/92       The following was recommended to the patient:  Re-screen BP within a year and recommended lifestyle modifications       reports that she has been smoking Cigarettes.  She has a 15 pack-year smoking history. She has never used smokeless tobacco.    Estimated body mass index is 36.53 kg/(m^2) as calculated from the following:    Height as of this encounter: 5' 1.75\" (1.568 m).    Weight as of this encounter: 198 lb (89.812 kg).   Weight management plan: Discussed healthy diet and exercise guidelines and patient will follow up in 12 months in clinic to re-evaluate.    Counseling Resources:  ATP IV Guidelines  Pooled Cohorts Equation Calculator  Breast Cancer Risk Calculator  FRAX Risk Assessment  ICSI Preventive Guidelines  Dietary Guidelines for Americans, 2010  USDA's MyPlate  ASA Prophylaxis  Lung CA " Screening    Jordana Naqvi PA-C  [facility]

## 2017-01-10 NOTE — PATIENT INSTRUCTIONS
How to contact your care team: [phone] Pharmacy [phone]   OLIVIA RAMOS MD KATYA GEORGIEV, PA-C CHRIS JONES, PA-C NAM HO, MD JONATHAN BATES, MD ARVIN VOCAL, MD    Clinic hours M-Th 7am-7pm Fri 7am-5pm.   Urgent care M-F 11am-9pm  Sat/Sun 9am-5pm.   Pharmacy   Mon-Th:  8:00am-8pm   Fri:  8:00am-6:00pm  Sat/Sun  8:00am-5:00 pm       Preventive Health Recommendations  Female Ages 50 - 64    Yearly exam: See your health care provider every year in order to  o Review health changes.   o Discuss preventive care.    o Review your medicines if your doctor has prescribed any.      Get a Pap test every three years (unless you have an abnormal result and your provider advises testing more often).    If you get Pap tests with HPV test, you only need to test every 5 years, unless you have an abnormal result.     You do not need a Pap test if your uterus was removed (hysterectomy) and you have not had cancer.    You should be tested each year for STDs (sexually transmitted diseases) if you're at risk.     Have a mammogram every 1 to 2 years.    Have a colonoscopy at age 50, or have a yearly FIT test (stool test). These exams screen for colon cancer.      Have a cholesterol test every 5 years, or more often if advised.    Have a diabetes test (fasting glucose) every three years. If you are at risk for diabetes, you should have this test more often.     If you are at risk for osteoporosis (brittle bone disease), think about having a bone density scan (DEXA).    Shots: Get a flu shot each year. Get a tetanus shot every 10 years.    Nutrition:     Eat at least 5 servings of fruits and vegetables each day.    Eat whole-grain bread, whole-wheat pasta and brown rice instead of white grains and rice.    Talk to your provider about Calcium and Vitamin D.     Lifestyle    Exercise at least 150 minutes a week (30 minutes a day, 5 days a week). This will help you control your weight and prevent  disease.    Limit alcohol to one drink per day.    No smoking.     Wear sunscreen to prevent skin cancer.     See your dentist every six months for an exam and cleaning.    See your eye doctor every 1 to 2 years.

## 2017-01-10 NOTE — LETTER
October 4, 2018      Margo Tracy Storm  [address] [facility] [address]    Dear ,      At [facility], your health and wellness is our primary concern. That is why we are following up on a colposcopy from 4/18/18. Your provider had recommended that you have a Pap smear and HPV test completed by 10/18/18. Our records do not show that this has been scheduled.    It is important to complete the follow up that your provider has suggested for you to ensure that there are no worsening changes which may, over time, develop into cancer.      Please contact our office at  [phone] to schedule an appointment for a Pap smear and HPV test at your earliest convenience. If you have questions or concerns, please call the clinic and we will be happy to assist you.    If you have completed the tests outside of [facility], please have the results forwarded to our office. We will update the chart for your primary Physician to review before your next annual physical.     Thank you for choosing [facility]!    Sincerely,      Jordana Naqvi PA-C/natanael

## 2017-01-10 NOTE — NURSING NOTE
"Chief Complaint   Patient presents with     Physical       Initial /84 mmHg  Pulse 84  Temp(Src) 97  F (36.1  C) (Oral)  Resp 18  Ht 5' 1.75\" (1.568 m)  Wt 198 lb (89.812 kg)  BMI 36.53 kg/m2  SpO2 98%  LMP 09/16/2011  Breastfeeding? No Estimated body mass index is 36.53 kg/(m^2) as calculated from the following:    Height as of this encounter: 5' 1.75\" (1.568 m).    Weight as of this encounter: 198 lb (89.812 kg).  BP completed using cuff size: nasrin Rinaldi MA       "

## 2017-01-10 NOTE — MR AVS SNAPSHOT
After Visit Summary   1/10/2017    Margo Inman    MRN: [id number]           Patient Information     Date Of Birth          1960        Visit Information        Provider Department      1/10/2017 11:40 AM Jordana Naqvi PA-C [facility]        Today's Diagnoses     Routine general medical examination at a health care facility    -  1     Cervical radiculopathy         Restless legs syndrome (RLS)         Hyperlipidemia LDL goal <130         Tobacco dependence syndrome         Tension headache         Need for hepatitis C screening test           Care Instructions    How to contact your care team: [phone] Pharmacy [phone]   OLIVIA RAMOS MD KATYA GEORGIEV, PA-C CHRIS JONES, PA-C NAM HO, MD JONATHAN BATES, MD ARVIN VOCAL, MD    Clinic hours M-Th 7am-7pm Fri 7am-5pm.   Urgent care M-F 11am-9pm  Sat/Sun 9am-5pm.   Pharmacy   Mon-Th:  8:00am-8pm   Fri:  8:00am-6:00pm  Sat/Sun  8:00am-5:00 pm       Preventive Health Recommendations  Female Ages 50 - 64    Yearly exam: See your health care provider every year in order to  o Review health changes.   o Discuss preventive care.    o Review your medicines if your doctor has prescribed any.      Get a Pap test every three years (unless you have an abnormal result and your provider advises testing more often).    If you get Pap tests with HPV test, you only need to test every 5 years, unless you have an abnormal result.     You do not need a Pap test if your uterus was removed (hysterectomy) and you have not had cancer.    You should be tested each year for STDs (sexually transmitted diseases) if you're at risk.     Have a mammogram every 1 to 2 years.    Have a colonoscopy at age 50, or have a yearly FIT test (stool test). These exams screen for colon cancer.      Have a cholesterol test every 5 years, or more often if advised.    Have a diabetes test (fasting glucose) every three  years. If you are at risk for diabetes, you should have this test more often.     If you are at risk for osteoporosis (brittle bone disease), think about having a bone density scan (DEXA).    Shots: Get a flu shot each year. Get a tetanus shot every 10 years.    Nutrition:     Eat at least 5 servings of fruits and vegetables each day.    Eat whole-grain bread, whole-wheat pasta and brown rice instead of white grains and rice.    Talk to your provider about Calcium and Vitamin D.     Lifestyle    Exercise at least 150 minutes a week (30 minutes a day, 5 days a week). This will help you control your weight and prevent disease.    Limit alcohol to one drink per day.    No smoking.     Wear sunscreen to prevent skin cancer.     See your dentist every six months for an exam and cleaning.    See your eye doctor every 1 to 2 years.            Follow-ups after your visit        Future tests that were ordered for you today     Open Future Orders        Priority Expected Expires Ordered    MR Cervical Spine w/o Contrast Routine  1/10/2018 1/10/2017            Who to contact     If you have questions or need follow up information about today's clinic visit or your schedule please contact [facility] directly at [phone].  Normal or non-critical lab and imaging results will be communicated to you by MyChart, letter or phone within 4 business days after the clinic has received the results. If you do not hear from us within 7 days, please contact the clinic through [facility] or phone. If you have a critical or abnormal lab result, we will notify you by phone as soon as possible.  Submit refill requests through [facility] or call your pharmacy and they will forward the refill request to us. Please allow 3 business days for your refill to be completed.          Additional Information About Your Visit        [facility] Information     [facility] lets you send messages to your doctor, view your test results, renew your  "prescriptions, schedule appointments and more. To sign up, go to [email or web address] . Click on \"Log in\" on the left side of the screen, which will take you to the Welcome page. Then click on \"Sign up Now\" on the right side of the page.     You will be asked to enter the access code listed below, as well as some personal information. Please follow the directions to create your username and password.     Your access code is: [email or web address]  Expires: 2017 11:34 AM     Your access code will  in 90 days. If you need help or a new code, please call your [facility] clinic or [phone].        Care EveryWhere ID     This is your Care EveryWhere ID. This could be used by other organizations to access your [facility] medical records  [id number]        Your Vitals Were     Pulse Temperature Respirations    84 97  F (36.1  C) (Oral) 18    Height BMI (Body Mass Index) Pulse Oximetry    5' 1.75\" (1.568 m) 36.53 kg/m2 98%    Last Period Breastfeeding?       2011 No        Blood Pressure from Last 3 Encounters:   01/10/17 136/84   17 149/85   11/15/16 152/92    Weight from Last 3 Encounters:   01/10/17 198 lb (89.812 kg)   17 194 lb (87.998 kg)   11/15/16 198 lb (89.812 kg)              We Performed the Following     CBC with platelets     Comprehensive metabolic panel     Hepatitis C Screen Reflex to HCV RNA Quant and Genotype     HPV High Risk Types DNA Cervical     Lipid panel reflex to direct LDL     Pap imaged thin layer screen with HPV - recommended age 30 - 65 years (select HPV order below)     TSH with free T4 reflex          Today's Medication Changes          These changes are accurate as of: 1/10/17 12:21 PM.  If you have any questions, ask your nurse or doctor.               These medicines have changed or have updated prescriptions.        Dose/Directions    * varenicline 0.5 MG X 11 & 1 MG X 42 tablet   Commonly known as:  CHANTIX STARTING MONTH EVANGELINA   This may have changed:  Another " medication with the same name was added. Make sure you understand how and when to take each.   Used for:  Tobacco dependence syndrome   Changed by:  Jordana Naqvi PA-C        Take 0.5 mg tab daily for 3 days, then 0.5 mg tab twice daily for 4 days, then 1 mg twice daily.   Quantity:  53 tablet   Refills:  0       * varenicline 1 MG tablet   Commonly known as:  CHANTIX   This may have changed:  You were already taking a medication with the same name, and this prescription was added. Make sure you understand how and when to take each.   Used for:  Tobacco dependence syndrome   Changed by:  Jordana Naqvi PA-C        Dose:  1 mg   Take 1 tablet (1 mg) by mouth 2 times daily   Quantity:  56 tablet   Refills:  4       * Notice:  This list has 2 medication(s) that are the same as other medications prescribed for you. Read the directions carefully, and ask your doctor or other care provider to review them with you.         Where to get your medicines      These medications were sent to [facility] [address] [place] [address] [place]  [address] [place] AT [facility] [address] [place] [address]     Phone:  [phone]    - cyclobenzaprine 10 MG tablet  - varenicline 0.5 MG X 11 & 1 MG X 42 tablet  - varenicline 1 MG tablet      Some of these will need a paper prescription and others can be bought over the counter.  Ask your nurse if you have questions.     Bring a paper prescription for each of these medications    - butalbital-acetaminophen-caffeine -40 MG per tablet  - oxyCODONE-acetaminophen 5-325 MG per tablet             Primary Care Provider Office Phone # Fax #    Jordana Naqvi PA-C [phone]       [facility] [address] [place]  [facility] [address]        Thank you!     Thank you for choosing [facility]  for your care. Our goal is always to provide you with excellent care. Hearing back from our patients is  one way we can continue to improve our services. Please take a few minutes to complete the written survey that you may receive in the mail after your visit with us. Thank you!             Your Updated Medication List - Protect others around you: Learn how to safely use, store and throw away your medicines at www.disposemymeds.org.          This list is accurate as of: 1/10/17 12:21 PM.  Always use your most recent med list.                   Brand Name Dispense Instructions for use    acetaminophen 500 MG tablet    TYLENOL     Take 500-1,000 mg by mouth every 6 hours as needed.       atorvastatin 80 MG tablet    LIPITOR    90 tablet    TAKE 1 TABLET BY MOUTH ONCE DAILY       butalbital-acetaminophen-caffeine -40 MG per tablet    FIORICET/ESGIC    28 tablet    Take 1 tablet by mouth every 4 hours as needed       cyclobenzaprine 10 MG tablet    FLEXERIL    90 tablet    TAKE 1 TABLET BY MOUTH AT BEDTIME AS NEEDED FOR MUSCLE SPASMS       diclofenac 75 MG EC tablet    VOLTAREN    60 tablet    Take 1 tablet (75 mg) by mouth 2 times daily       oxybutynin 5 MG 24 hr tablet    DITROPAN-XL    30 tablet    Take 2 tablets (10 mg) by mouth daily       oxyCODONE-acetaminophen 5-325 MG per tablet    PERCOCET    30 tablet    Take 1-2 tablets by mouth every 4 hours as needed for moderate to severe pain       UNABLE TO FIND      MEDICATION NAME: e-cigarette       * varenicline 0.5 MG X 11 & 1 MG X 42 tablet    CHANTIX STARTING MONTH EVANGELINA    53 tablet    Take 0.5 mg tab daily for 3 days, then 0.5 mg tab twice daily for 4 days, then 1 mg twice daily.       * varenicline 1 MG tablet    CHANTIX    56 tablet    Take 1 tablet (1 mg) by mouth 2 times daily       * Notice:  This list has 2 medication(s) that are the same as other medications prescribed for you. Read the directions carefully, and ask your doctor or other care provider to review them with you.

## 2017-01-11 LAB — HCV AB SERPL QL IA: NORMAL

## 2017-01-11 NOTE — PROGRESS NOTES
Quick Note:    Letter sent to patients home address with results.  Lionel Block,  [facility]   ______

## 2017-01-11 NOTE — PROGRESS NOTES
Quick Note:    Please mail results to patient with the following message:    Dear Margo,    Your recent labs were stable. Your cholesterol levels were a little elevated but stable. Blood sugar levels were also a little elevated, but this was not a fasting specimen. No medications are needed at this time. I encourage you to focus on smoking cessation as well as a healthy diet and regular exercise. Please follow instructions as discussed at our last office visit. If you have any questions, please feel free to contact our office.    Thanks,    Jordana Naqvi PA-C    Your care team's suggested websites for health information:  Www.[employer].org : Up to date and easily searchable information on multiple topics.  Www.medlineplus.gov : medication info, interactive tutorials, watch real surgeries online    Www.familydoctor.org : good info from the Academy of Family Physicians  Www.cdc.gov : public health info, travel advisories, epidemics (H1N1)  Www.aap.org : children's health info, normal development, vaccinations  Www.health.[facility].[place].us : [place] dept of   jacob, public health issues in [place], N1N1    How to contact your care team: [facility] [phone] Pharmacy [phone]  Dr. Los Razo, Dr. Torres Wu, Dr. Brisa Brian, Dr. Nena Dudley, Jordana Naqvi PA-C.  Clinic hours  M-Th 7am-7pm  Fri 7am-6pm.   Urgent care M-F 12am-8pm,  Sat/sun 9am-5pm.  Pharmacy M-F 8:30am-8pm Sat/sun 9am-5pm.     ______

## 2017-01-12 ENCOUNTER — RADIANT APPOINTMENT (OUTPATIENT)
Dept: MRI IMAGING | Facility: CLINIC | Age: 57
End: 2017-01-12
Attending: PHYSICIAN ASSISTANT
Payer: COMMERCIAL

## 2017-01-12 DIAGNOSIS — M48.02 CERVICAL STENOSIS OF SPINAL CANAL: ICD-10-CM

## 2017-01-12 DIAGNOSIS — M54.12 CERVICAL RADICULOPATHY: Primary | ICD-10-CM

## 2017-01-12 LAB
COPATH REPORT: NORMAL
PAP: NORMAL

## 2017-01-12 PROCEDURE — 72141 MRI NECK SPINE W/O DYE: CPT | Performed by: RADIOLOGY

## 2017-01-16 NOTE — PROGRESS NOTES
Quick Note:    Please call the patient and discuss the folllowing test results. Findings at multiple cervical levels. I think it would be best for her to see a spine specialist to start off, since she is having some significant symptoms in her right arm and hand and she has such widespread findings, I'm not sure what level to start injecting or if this would even be the first best approach. Call: [phone] to schedule an appt.    Jordana Naqvi PA-C    ______

## 2017-01-17 ENCOUNTER — OFFICE VISIT (OUTPATIENT)
Dept: NEUROSURGERY | Facility: CLINIC | Age: 57
End: 2017-01-17
Payer: COMMERCIAL

## 2017-01-17 VITALS
HEIGHT: 62 IN | SYSTOLIC BLOOD PRESSURE: 166 MMHG | BODY MASS INDEX: 36.18 KG/M2 | TEMPERATURE: 97.8 F | WEIGHT: 196.6 LBS | HEART RATE: 84 BPM | DIASTOLIC BLOOD PRESSURE: 96 MMHG | OXYGEN SATURATION: 96 % | RESPIRATION RATE: 16 BRPM

## 2017-01-17 DIAGNOSIS — M54.12 CERVICAL RADICULOPATHY: Primary | ICD-10-CM

## 2017-01-17 PROCEDURE — 99203 OFFICE O/P NEW LOW 30 MIN: CPT | Performed by: NURSE PRACTITIONER

## 2017-01-17 RX ORDER — METHYLPREDNISOLONE 4 MG
TABLET, DOSE PACK ORAL
Qty: 21 TABLET | Refills: 0 | Status: SHIPPED | OUTPATIENT
Start: 2017-01-17 | End: 2017-02-16

## 2017-01-17 ASSESSMENT — PAIN SCALES - GENERAL: PAINLEVEL: SEVERE PAIN (6)

## 2017-01-17 NOTE — PROGRESS NOTES
"Dr. Joseph Klein  [facility]  Neurosurgery Clinic Visit        CC: Neck pain    Primary care Provider: Jordana Naqvi      Reason For Visit:   I was asked by Jordana Naqvi PA-C to consult on the patient for neck and arm pain.      HPI: Margo Inman is a 57 year old female with a history of neck pain for several years, \"Off and on.\"  She states the past three months the neck pain has increased and become more constant.  She states she started getting migraines, \"And I used to have them years ago.\"  She feels that the increased neck pain has triggered her migraines.  Her neck pain is a constant dull pain right>left.  She states she started having right arm pain about 2 weeks ago.  She has radiating into the right tricep and lateral arm and into the hand.  She has constant numbness and tingling in the entire right hand.  She states she has had weakness to the RUE and has dropped objects.  She states she has similar symptoms to the LUE, but states they are minimal compared to the right.  Her pain is aggravated with turning her head left and right, pushing and pulling or lifting with the RUE.  She takes Percocet with some relief. She has a h/o CTS with previous release surgery bilaterally.  She had PT for neck pain with minimal relief.  She has not had previous injections.  She denies changes to bowel or bladder.    Pain at its worst 9  Pain right now:  6    Past Medical History   Diagnosis Date     Arthritis        Past Medical History reviewed with patient during visit.    Past Surgical History   Procedure Laterality Date     Orthopedic surgery       Appendectomy       Gyn surgery       Ent surgery       Tonsils       Ectopic pregnancy surgery       Hc knee scope,med/lat menisectomy  9/16/11     left, with partial medial menisectomy ONLY     C part remv femur/prox tib/fib  9/16/11     left, open lateral patellar bone spur excision     Arthroscopy knee  9/16/2011     " Procedure:ARTHROSCOPY KNEE; left knee arthroscopy with debridement, open lateral patellar spur excision; Surgeon:RG MONTOYA; Location:[facility] OR     C total knee arthroplasty  1/17/14     Bilateral     Hc revise median n/carpal tunnel surg Left 7/11/14     PRIMARY - not revision     Hc incision tendon sheath finger Left 7/11/14     Thumb TF release     Release carpal tunnel  7/11/2014     Procedure: RELEASE CARPAL TUNNEL;  Surgeon: Rg Montoya MD;  Location: [facility] OR     Release trigger finger  7/11/2014     Procedure: RELEASE TRIGGER FINGER;  Surgeon: Rg Montoya MD;  Location: [facility]     Release carpal tunnel Right 11/7/2014     Procedure: RELEASE CARPAL TUNNEL;  Surgeon: Rg Montoya MD;  Location: [facility]     Release trigger finger Right 11/7/2014     Procedure: RELEASE TRIGGER FINGER;  Surgeon: Rg Motnoya MD;  Location: [facility]     Colonoscopy N/A 2/16/2016     Procedure: COLONOSCOPY;  Surgeon: Belem Khalil MD;  Location: [facility]     Colonoscopy with co2 insufflation N/A 2/16/2016     Procedure: COLONOSCOPY WITH CO2 INSUFFLATION;  Surgeon: Belem Khalil MD;  Location: [facility]     Colonoscopy N/A 2/16/2016     Procedure: COMBINED COLONOSCOPY, SINGLE OR MULTIPLE BIOPSY/POLYPECTOMY BY BIOPSY;  Surgeon: Belem Khalil MD;  Location: [facility] OR     Cystoscopy  2016     microscopic hematuria     Past Surgical History reviewed with patient during visit.    Current Outpatient Prescriptions   Medication     butalbital-acetaminophen-caffeine (FIORICET/ESGIC) -40 MG per tablet     cyclobenzaprine (FLEXERIL) 10 MG tablet     varenicline (CHANTIX STARTING MONTH PAK) 0.5 MG X 11 & 1 MG X 42 tablet     varenicline (CHANTIX) 1 MG tablet     oxyCODONE-acetaminophen (PERCOCET) 5-325 MG per tablet     diclofenac (VOLTAREN) 75 MG EC tablet     oxybutynin (DITROPAN-XL) 5 MG 24 hr tablet     atorvastatin (LIPITOR) 80 MG tablet     UNABLE TO FIND      "acetaminophen (TYLENOL) 500 MG tablet     No current facility-administered medications for this visit.       Allergies   Allergen Reactions     Bee Anaphylaxis     Erythromycin Hives     Wasps [Hornets] Anaphylaxis       Social History     Social History     Marital Status:      Spouse Name: N/A     Number of Children: N/A     Years of Education: N/A     Social History Main Topics     Smoking status: Current Every Day Smoker -- 1.00 packs/day for 15 years     Types: Cigarettes     Smokeless tobacco: Never Used     Alcohol Use: Yes      Comment: socially     Drug Use: No     Sexual Activity:     Partners: Male     Other Topics Concern     None     Social History Narrative       Family History   Problem Relation Age of Onset     Breast Cancer Maternal Grandmother      Cancer - colorectal Maternal Grandfather      Colon Cancer Maternal Grandfather      C.A.D. Father      C.A.D. Paternal Grandfather      Ovarian Cancer Maternal Grandmother      Prostate Cancer Maternal Grandfather          Review Of Systems  ROS: 10 point ROS neg other than the symptoms noted above in the HPI.    Vital Signs: /96 mmHg  Pulse 84  Temp(Src) 97.8  F (36.6  C) (Oral)  Resp 16  Ht 5' 1.75\" (1.568 m)  Wt 196 lb 9.6 oz (89.177 kg)  BMI 36.27 kg/m2  SpO2 96%  LMP 09/16/2011    Examination:  Constitutional:  Alert, well nourished, NAD.  HEENT: Normocephalic, atraumatic.   Pulm:  Without shortness of breath   CV:  No pitting edema of BLE.    Neurological:  Awake  Alert  Oriented x 3  Speech clear  Cranial nerves II - XII intact  PERRL  EOMI    Motor exam   Shoulder Abduction:  Right: 4/5   Left:  5/5  Biceps:                      Right:  4/5   Left:  5/5  Triceps:                     Right:  4/5   Left:  5/5  Wrist Extensors:       Right:  4/5   Left:  5/5  Wrist Flexors:           Right:  4/5   Left:  5/5  Intrinsics:                   Right:  4/5   Left:  5/5  Hip Flexor:                Right: 5/5  Left:  5/5  Hip Adductor: " "            Right:  5/5  Left:  5/5  Hip Abductor:             Right:  5/5  Left:  5/5  Gastroc Soleus:        Right:  5/5  Left:  5/5  Tib/Ant:                      Right:  5/5  Left:  5/5  EHL:                          Right:  5/5  Left:  5/5   Sensation normal to bilateral upper and lower extremities  Clonus negative bilaterally  DTRs 0 equally    Gait: Able to stand from a seated position. Normal non-antalgic, non-myelopathic gait.  Able to heel/toe walk without loss of balance  Cervical examination reveals limited ROM due to pain.  Tenderness to palpation of the cervical spine and paraspinous muscles bilaterally, right>left.    Lumbar examination reveals no tenderness of the spine or paraspinous muscles.  Hip height is symmetrical. Negative SI joint, sciatic notch or greater trochanteric tenderness to palpation bilaterally.  Straight leg raise is negative bilaterally.      Imaging: Cervical MRI 1/12/17:  C2-3: .No spinal canal or neural foraminal stenosis.     C3-4: Uncinate hypertrophy. Facet degenerative change. No spinal canal  narrowing. Mild to moderate bilateral neural foraminal stenosis     C4-5: Uncinate hypertrophy, facet degenerative change. No spinal canal  narrowing. Moderate right neuroforaminal stenosis and mild left neural  foraminal stenosis.     C5-6: Uncinate hypertrophy, facet degenerative changes. No spinalcanal  narrowing. Moderate left neural foraminal stenosis.     C6-7:  Uncinate hypertrophy, disc osteophytic complex bulge. Facet  degenerative changes. Mild spinal canal narrowing. Moderate left  neural foraminal  stenosis.     C7-T1: Uncinate hypertrophy. No spinal canal or neural foraminal  stenosis.      No abnormality of the paraspinous soft tissues.       Assessment/Plan:   Cervical DDD  Cervical Radiculopathy    Margo Inman is a 57 year old female with a history of neck pain for several years, \"Off and on.\"  She states the past three months the neck pain has increased and " "become more constant.  She states she started getting migraines, \"And I used to have them years ago.\"  She feels that the increased neck pain has triggered her migraines.  Her neck pain is a constant dull pain right>left.  She states she started having right arm pain about 2 weeks ago.  She has radiating into the right tricep and lateral arm and into the hand.  She has constant numbness and tingling in the entire right hand.  She states she notices weakness throughout the RUE, on exam 4/5 strength on the right.  She states she has similar left sided symptoms, however, not as pronounced.  We reviewed her MRI results today and discussed starting a medrol dose pack.  We also are going to order a DAV, C5/6 vs C6/7; not sure how soon she will be able to get in for that appointment.  She was advised that should her symptoms worsen, including weakness and/or pain she should contact us.  She is aggreeable.    Patient Instructions   Schedule cervical epidural steroid injection.  Someone will contact you.  Start medrol dose pack.  Please contact the clinic if pain persists at [phone].        Aline Kenyon [facility]  [facility]  [address] [address]    Tel [phone]  Pager [phone]      "
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
## 2017-01-17 NOTE — MR AVS SNAPSHOT
After Visit Summary   1/17/2017    Margo Inman    MRN: [id number]           Patient Information     Date Of Birth          1960        Visit Information        Provider Department      1/17/2017 9:40 AM Aline Kenyon NP [facility]        Today's Diagnoses     Cervical radiculopathy    -  1       Care Instructions    Schedule cervical epidural steroid injection.  Someone will contact you.  Start medrol dose pack.  Please contact the clinic if pain persists at [phone].          Follow-ups after your visit        Additional Services     PAIN MANAGEMENT CENTER ([place]) REFERRAL       Your provider has referred you to the [facility].    Reason for Referral: Procedure or injection only - patient will be contacted within 24 hrs to schedule: Epidural Steroid (interlaminar approach): Cervical, C6-7    Please complete the following questions:    What is your diagnosis for the patient's pain? Cervical radiculopathy    Do you have any specific questions for the pain specialist? No    Are there any red flags that may impact the assessment or management of the patient? None    **ANY DIAGNOSTIC TESTS THAT ARE NOT IN EPIC SHOULD BE SENT TO THE PAIN CENTER**    Please note the Pre-Op Pain Consult must be scheduled 2-3 weeks prior to the patient's surgery.  Patient's trying to schedule within 2 weeks of surgery may not be accommodated.     Pre-Op Pain Consults are only good for 30 days.    REGARDING OPIOID MEDICATIONS:  We will always address appropriateness of opioid pain medications, but we generally will not automatically take on a prescribing role. When we do take on prescribing of opioids for chronic pain, it is in collaboration with the referring physician for an intermediate period of time (months), with an expectation that the primary physician or provider will assume the prescribing role if medications are effective at stable doses with demonstrated  "compliance.  Therefore, please do not assume that your prescribing responsibilities end on the day of pain clinic consultation.  Is this agreeable to you? YES    For any questions, contact the [facility] at [phone].    Please be aware that coverage of these services is subject to the terms and limitations of your health insurance plan.  Call member services at your health plan with any benefit or coverage questions.      Please bring the following with you to your appointment:    (1) Any X-Rays, CTs or MRIs which have been performed.  Contact the facility where they were done to arrange for  prior to your scheduled appointment.    (2) List of current medications   (3) This referral request   (4) Any documents/labs given to you for this referral                  Who to contact     If you have questions or need follow up information about today's clinic visit or your schedule please contact [facility] directly at [phone].  Normal or non-critical lab and imaging results will be communicated to you by MyChart, letter or phone within 4 business days after the clinic has received the results. If you do not hear from us within 7 days, please contact the clinic through MyChart or phone. If you have a critical or abnormal lab result, we will notify you by phone as soon as possible.  Submit refill requests through [employer] or call your pharmacy and they will forward the refill request to us. Please allow 3 business days for your refill to be completed.          Additional Information About Your Visit        [employer] Information     [employer] lets you send messages to your doctor, view your test results, renew your prescriptions, schedule appointments and more. To sign up, go to [email or web address] . Click on \"Log in\" on the left side of the screen, which will take you to the Welcome page. Then click on \"Sign up Now\" on the right side of the page.     You will be asked to " "enter the access code listed below, as well as some personal information. Please follow the directions to create your username and password.     Your access code is: [email or web address]  Expires: 2017 11:34 AM     Your access code will  in 90 days. If you need help or a new code, please call your [facility] clinic or [phone].        Care EveryWhere ID     This is your Care EveryWhere ID. This could be used by other organizations to access your [facility] medical records  [id number]        Your Vitals Were     Pulse Temperature Respirations Height BMI (Body Mass Index) Pulse Oximetry    84 97.8  F (36.6  C) (Oral) 16 5' 1.75\" (1.568 m) 36.27 kg/m2 96%    Last Period                   2011            Blood Pressure from Last 3 Encounters:   17 166/96   01/10/17 136/84   17 149/85    Weight from Last 3 Encounters:   17 196 lb 9.6 oz (89.177 kg)   01/10/17 198 lb (89.812 kg)   17 194 lb (87.998 kg)              We Performed the Following     PAIN MANAGEMENT CENTER ([place]) REFERRAL          Today's Medication Changes          These changes are accurate as of: 17 10:06 AM.  If you have any questions, ask your nurse or doctor.               Start taking these medicines.        Dose/Directions    methylPREDNISolone 4 MG tablet   Commonly known as:  MEDROL DOSEPAK   Used for:  Cervical radiculopathy   Started by:  Aline Kenyon, NP        Follow package instructions   Quantity:  21 tablet   Refills:  0            Where to get your medicines      These medications were sent to [id number]  [address] [place] AT [facility] [address]  [address] [place] [address]     Phone:  [phone]    - methylPREDNISolone 4 MG tablet             Primary Care Provider Office Phone # Fax #    Jordana Naqvi PA-C [phone]       [facility] [address]  [facility] [address]        Thank you!     " Thank you for choosing [facility]  for your care. Our goal is always to provide you with excellent care. Hearing back from our patients is one way we can continue to improve our services. Please take a few minutes to complete the written survey that you may receive in the mail after your visit with us. Thank you!             Your Updated Medication List - Protect others around you: Learn how to safely use, store and throw away your medicines at www.disposemymeds.org.          This list is accurate as of: 1/17/17 10:06 AM.  Always use your most recent med list.                   Brand Name Dispense Instructions for use    acetaminophen 500 MG tablet    TYLENOL     Take 500-1,000 mg by mouth every 6 hours as needed.       atorvastatin 80 MG tablet    LIPITOR    90 tablet    TAKE 1 TABLET BY MOUTH ONCE DAILY       butalbital-acetaminophen-caffeine -40 MG per tablet    FIORICET/ESGIC    28 tablet    Take 1 tablet by mouth every 4 hours as needed       cyclobenzaprine 10 MG tablet    FLEXERIL    90 tablet    TAKE 1 TABLET BY MOUTH AT BEDTIME AS NEEDED FOR MUSCLE SPASMS       diclofenac 75 MG EC tablet    VOLTAREN    60 tablet    Take 1 tablet (75 mg) by mouth 2 times daily       methylPREDNISolone 4 MG tablet    MEDROL DOSEPAK    21 tablet    Follow package instructions       oxybutynin 5 MG 24 hr tablet    DITROPAN-XL    30 tablet    Take 2 tablets (10 mg) by mouth daily       oxyCODONE-acetaminophen 5-325 MG per tablet    PERCOCET    30 tablet    Take 1-2 tablets by mouth every 4 hours as needed for moderate to severe pain       UNABLE TO FIND      MEDICATION NAME: e-cigarette       * varenicline 0.5 MG X 11 & 1 MG X 42 tablet    CHANTIX STARTING MONTH EVANGELINA    53 tablet    Take 0.5 mg tab daily for 3 days, then 0.5 mg tab twice daily for 4 days, then 1 mg twice daily.       * varenicline 1 MG tablet    CHANTIX    56 tablet    Take 1 tablet (1 mg) by mouth 2 times daily       * Notice:  This list has  2 medication(s) that are the same as other medications prescribed for you. Read the directions carefully, and ask your doctor or other care provider to review them with you.

## 2017-01-17 NOTE — PATIENT INSTRUCTIONS
Schedule cervical epidural steroid injection.  Someone will contact you.  Start medrol dose pack.  Please contact the clinic if pain persists at [phone].

## 2017-01-18 ENCOUNTER — TELEPHONE (OUTPATIENT)
Dept: PALLIATIVE MEDICINE | Facility: CLINIC | Age: 57
End: 2017-01-18

## 2017-01-18 NOTE — TELEPHONE ENCOUNTER
Pre-screening questions for Radiology Injections:    Injection to be done at which interventional clinic site? [facility]    Procedure ordered by Dr. Kenyon    Procedure ordered? Cervical Epidural Steroid Injection    What insurance would patient like us to bill for this procedure? [employer]      [employer]'s comp-Any injection DO NOT SCHEDULE and route to Janae Mcdermott.      [employer] insurance - If scheduling an SI joint injection DO NOT SCHEDULE and route Janae Mcdermott.    [employer]- MBB's must be scheduled at LEAST two weeks apart      [employer] - Any injection besides hip/shoulder/knee joint DO NOT SCHEDULE and route to Janae Mcdermott. She will obtain PA and call pt back to schedule procedure or notify pt of denial.     Is an  needed? No     Patient has a drive home? (mandatory) YES:      Is patient taking any blood thinners (plavix, coumadin, jantoven, warfarin, heparin, pradaxa or dabigatran )? No   (If so, do not schedule, contact RN and/or MD)     Is patient taking any aspirin products? No   (If more than 325mg/day do not schedule; Contact RN/MD. For all non-cervical interventional procedures if patient is taking MORE than 325mg/day, limit aspirin to 81-325mg/day x 1 week. No hold required day of procedure.  For CERVICAL procedures, hold all aspirin products for 6 days.)      Does the patient have a bleeding or clotting disorder? No   (If yes, okay to schedule, but contact RN/MD).  **For any patients with platelet count <100, must be forwarded to provider**    Is patient diabetic?  No  If YES, have them bring their glucometer.    Does patient have an active infection or treated for one within the past week? No     Is patient currently taking any antibiotics?  No   For patients on chronic, preventative, or prophylactic antibiotics, procedures can be scheduled.   For patients on antibiotics for active or recent infection:  Shae Ramsey, [place]-antibiotic course  must have been completed for 4 days  Beti Hull-antibiotic course must have been completed for 7 days    Is patient currently taking any steroid medications? (i.e. Prednisone, Medrol)  Yes - Medrol Finish 1/23   For patients on steroid medications:  Shae Ramsey [place]-steroid course must have been completed for 4 days  Beti Hull-steroid course must have been completed for 7 days  Review with patient:  If you are started on any steroids or antibiotics between now and your appointment, you must contact us because it may affect our ability to perform your procedure Informed    Is patient actively being treated for cancer or immunocompromised, including the spleen having been removed? No  **For Dr. Olson patients without spleens should have the chart sent to her**  (If YES, do NOT schedule and route to RN)    Are you able to get on and off an exam table with minimal or no assistance? Yes  (If NO, do NOT schedule and route to RN)  Are you able to roll over and lay on your stomach with minimal or no assistance? Yes  (If NO, do NOT schedule and route to RN)         Any allergies to contrast dye, iodine, shellfish, or numbing and steroid medications? No  (If so, inform nursing and note in scheduling comments.)    Allergies: Bee; Erythromycin; and Wasps      Any chance of pregnancy?NO    Has the patient had a flu shot or any other vaccinations within 7 days before or after the procedure.  No       Does patient have an MRI/CT?  YES: MRI   (SI joint, hip injections, lumbar sympathetic blocks, and stellate ganglion blocks do not require an MRI)    If so, was it done at [facility]? Yes      If not, where was it done? N/A     Was the MRI done w/in the last 3 years?  Yes   If MRI was not done at [facility], [facility] or [facility] do NOT schedule. Route to nursing.  (If pt has disc the injection can be scheduled but pt has to bring disc to appt. If they show up w/out disc the injection cannot be  done)    Reminders (please tell patient if applicable):       Instructed pt to arrive 30 minutes early for IV start if this is for a cervical procedure, ALL sympathetic (stellate ganglion, hypogastric, or lumbar sympathetic block) and all sedation procedures (RFA, spinal cord stimulation trials).  YES: Informed    -IVs are not routinely placed for [place] and [place] cervical case       If NPO for sedation, informed patient that it is okay to take medications with sips of water (except if they are to hold blood thinners).  YES: Informed   *DO take blood pressure medication if it is prescribed*      If this is for a cervical MEENAKSHI, informed patient that aspirin needs to be held for 6 days.   YES: Informed      Do not schedule procedures requiring IV placement in the first appointment of the day or first appointment after lunch         For patients 85 or older we recommend having an adult stay w/ them for the remainder of the day.         Does the patient have any questions?  NO      Elsa Goff  [facility]

## 2017-01-18 NOTE — TELEPHONE ENCOUNTER
Left VM for patient to schedule a DAV.      Janae CHRISTIANSEN    [facility]

## 2017-01-19 PROBLEM — R87.810 CERVICAL HIGH RISK HPV (HUMAN PAPILLOMAVIRUS) TEST POSITIVE: Status: ACTIVE | Noted: 2017-01-10

## 2017-01-19 LAB
FINAL DIAGNOSIS: ABNORMAL
HPV HR 12 DNA CVX QL NAA+PROBE: POSITIVE
HPV16 DNA SPEC QL NAA+PROBE: NEGATIVE
HPV18 DNA SPEC QL NAA+PROBE: NEGATIVE
SPECIMEN DESCRIPTION: ABNORMAL

## 2017-01-19 NOTE — PROGRESS NOTES
2000, 2003, and 2009 NIL paps. in Care Everywhere.  9/5/13 NIL pap  1/10/17 NIL pap/+ HR HPV (not 16 or 18). Plan: cotest in 1 year, due by 1/10/18  12/28/17 Cotest reminder letter sent (rlm)  3/29/18 LSIL pap, + HR HPV (not 16 or 18). Plan: colp bef 6/29/18 4/9/18 Pt. Notified.   4/18/18 [place] bx: negative. Plan: cotest in 6 mo, per provider.   4/27/18 letter mailed by provider's team.   10/4/18 Cotest reminder letter sent (rlm)  10/25/18 Reminder call to pt, pt will call to schedule (rlm)  11/9/18 Patient is lost to follow-up. Routed to provider as FYI. (rlm)

## 2017-01-26 ENCOUNTER — TELEPHONE (OUTPATIENT)
Dept: FAMILY MEDICINE | Facility: CLINIC | Age: 57
End: 2017-01-26

## 2017-01-26 DIAGNOSIS — M54.12 CERVICAL RADICULOPATHY: Primary | ICD-10-CM

## 2017-01-26 RX ORDER — OXYCODONE AND ACETAMINOPHEN 5; 325 MG/1; MG/1
1-2 TABLET ORAL EVERY 4 HOURS PRN
Qty: 30 TABLET | Refills: 0 | Status: SHIPPED | OUTPATIENT
Start: 2017-01-26 | End: 2017-02-20

## 2017-01-26 NOTE — TELEPHONE ENCOUNTER
Reason for Call:  Other call back    Detailed comments: Patient called wanting to f/u on medication refill as she is leaving out of town tomorrow, Friday, 01/20/2017 @ 9:30am.//Informed patient that refill has been submitted, but we're still wanting for PCP to review the request and PCP isn't in office today, but will be back tomorrow.//Patient is asking if another physician can review refill request.//Please advise. Thank you.    Phone Number Patient can be reached at: Cell number on file:    Telephone Information:   Mobile [phone]       Best Time: Anytime    Can we leave a detailed message on this number? YES    Call taken on 1/26/2017 at 4:25 PM by Jb Galindo

## 2017-01-26 NOTE — TELEPHONE ENCOUNTER
Sent request to OLIVIA Naqvi; Roc GALVAN and now Dr Razo to advise.    Dana Begum RN, [facility]

## 2017-01-26 NOTE — TELEPHONE ENCOUNTER
Reason for Call:  Medication or medication refill:    Do you use a [facility]?  Name of the pharmacy and phone number for the current request:  [facility]     Name of the medication requested: Percocet 5-325 mg     Other request: Leaving town tomorrow and is down to 2 last pills so sending HIGH PRIORITY message    Can we leave a detailed message on this number? YES    Phone number patient can be reached at: [phone]    Best Time: any    Call taken on 1/26/2017 at 10:05 AM by Lauren Garcia

## 2017-01-26 NOTE — TELEPHONE ENCOUNTER
Percocet 5-325 mg      Last Written Prescription Date: 1/10/17  Last Fill Quantity: 30,  # refills: 0   Last Office Visit with [facility], [facility] or [facility] prescribing provider: 1/10/17    Dana Begum RN, [facility]

## 2017-01-29 NOTE — Clinical Note
[facility] Emergency Department    [address] [place] [address]    Phone:  [phone]    Fax:  [phone]                                       Otis Ramey   MRN: [id number]    Department:  [facility] Emergency Department   Date of Visit:  1/29/2017           After Visit Summary Signature Page     I have received my discharge instructions, and my questions have been answered. I have discussed any challenges I see with this plan with the nurse or doctor.    ..........................................................................................................................................  Patient/Patient Representative Signature      ..........................................................................................................................................  Patient Representative Print Name and Relationship to Patient    ..................................................               ................................................  Date                                            Time    ..........................................................................................................................................  Reviewed by Signature/Title    ...................................................              ..............................................  Date                                                            Time           [address] [address]  [phone]      January 20, 2017    Margo Inman  [address] [address]    Dear Margo,  This letter is in regards to the PAP smear you had done on 1/10/17. Your PAP test result is normal.  Your HPV (Human Papilloma Virus) result showed evidence of a high risk type of HPV. This strain of HPV can sometimes be linked with the possible future development of cervical cancer. Over 50% of the population has some form of HPV at any given time. Your own immune system will often clear this virus. Getting plenty of rest, eating a nutritious, well balanced diet and smoking cessation are all ways that can help boost your immune system and help clear HPV.  Please return in 1 year for your next pap smear & HPV test.  If you have additional questions regarding this result, please call me at [phone].    Sincerely,  Jordana Naqvi PA-C/natanael

## 2017-02-16 ENCOUNTER — RADIOLOGY INJECTION OFFICE VISIT (OUTPATIENT)
Dept: PALLIATIVE MEDICINE | Facility: CLINIC | Age: 57
End: 2017-02-16
Payer: COMMERCIAL

## 2017-02-16 ENCOUNTER — RADIANT APPOINTMENT (OUTPATIENT)
Dept: RADIOLOGY | Facility: CLINIC | Age: 57
End: 2017-02-16
Attending: ANESTHESIOLOGY
Payer: COMMERCIAL

## 2017-02-16 VITALS — DIASTOLIC BLOOD PRESSURE: 100 MMHG | SYSTOLIC BLOOD PRESSURE: 144 MMHG | OXYGEN SATURATION: 97 % | HEART RATE: 91 BPM

## 2017-02-16 DIAGNOSIS — M50.10 HERNIATION OF CERVICAL INTERVERTEBRAL DISC WITH RADICULOPATHY: ICD-10-CM

## 2017-02-16 DIAGNOSIS — M54.12 CERVICAL RADICULOPATHY: ICD-10-CM

## 2017-02-16 DIAGNOSIS — M54.12 CERVICAL RADICULOPATHY: Primary | ICD-10-CM

## 2017-02-16 PROCEDURE — 62321 NJX INTERLAMINAR CRV/THRC: CPT | Performed by: ANESTHESIOLOGY

## 2017-02-16 ASSESSMENT — PAIN SCALES - GENERAL: PAINLEVEL: SEVERE PAIN (7)

## 2017-02-16 NOTE — PROGRESS NOTES
Pre procedure Diagnosis: cervical radiculopathy, cervical disc herniation C6-7  Post procedure Diagnosis: Same  Procedure performed: cervical interlaminar epidural steroid injection C7-T1, fluoroscopically guided, contrast controlled  Indication:  therapeutic for pain  Anesthesia: local  Complications: none  Operators: Elbert Quijano MD     Indications:   Margo Inman is a 57 year old female was sent by JOSE R Breen for cervical epidural steroid injection.  The patient has a history of chronic neck pain with pain radiating down the right upper extremity associated with numbness, tingling, and weakness in the hand.  Examination shows cervical tenderness to palpation, decreased cervical range of motion and she holds her right arm in a splinted position at her side due to discomfort.  Spurling's was not performed.  She has tried conservative treatment including physical therapy, medications, activity modifications.    MRI cervical spine was done on 1/12/17 which showed   Findings:  The cervical vertebrae are normally aligned. Cervical straightening. Disc desiccation involving the cervical spine There is mild disc height narrowing at C6-C7. There is normal signal within and normal contour of the cervical spinal cord. Lying cerebellar tonsils. The findings on a level by level basis are as follows:     C2-3: .No spinal canal or neural foraminal stenosis.     C3-4: Uncinate hypertrophy. Facet degenerative change. No spinal canal narrowing. Mild to moderate bilateral neural foraminal stenosis     C4-5: Uncinate hypertrophy, facet degenerative change. No spinal canal narrowing. Moderate right neuroforaminal stenosis and mild left neural foraminal stenosis.     C5-6: Uncinate hypertrophy, facet degenerative changes. No spinalcanal narrowing. Moderate left neural foraminal stenosis.     C6-7: Uncinate hypertrophy, disc osteophytic complex bulge. Facet degenerative changes. Mild spinal canal narrowing.  Moderate left neural foraminal stenosis.     C7-T1: Uncinate hypertrophy. No spinal canal or neural foraminal stenosis.      No abnormality of the paraspinous soft tissues.        Impression:      Multilevel cervical spondylosis with uncinate hypertrophy, facet  degenerative change, neuroforaminal narrowing at multiple levels as described, most prominent at C6-C7 level where there is mild spinal canal stenosis.     Options/alternatives, benefits and risks were discussed with the patient including but not limited to bleeding, infection, no pain relief, tissue trauma, exposure to radiation, reaction to medications, spinal cord injury, dural puncture, weakness, numbness and headache.  Questions were answered to her satisfaction and she wishes to proceed. Voluntary informed consent was obtained and signed.     Vitals were reviewed: Yes  BP (!) 144/100  Pulse 91  LMP 09/16/2011  SpO2 97%  Allergies were reviewed:  Yes   Medications were reviewed:  Yes   Pre-procedure pain score: 8/10    Procedure:  The patient's medical history, medications, and allergies were reviewed and reconciled.  After obtaining signed informed consent, the patient was brought into the procedure suite and was placed in a prone position on the procedure table.   A Pause for the Cause was performed.  Patient was prepped and draped in the usual sterile fashion.     The C7-T1 interspace was identified with AP fluoroscopy.  A total of 3 ml of 1% lidocaine was used to anesthetize the skin and subcutaneous tissues for a midline interlaminar approach at C7-T1.    A 20 gauge 3.5 inch Touhy needle was advanced utilizing intermittent AP and Lateral fluoroscopy and saline for loss of resistance.  The epidural space was encountered on the first pass without difficulties.  Aspiration for blood and CSF was negative.  Needle position was verified by injecting 1.5 ml of Omnipaque 300 utilizing real-time fluoroscopy that showed good needle placement and epidural  spread without signs of intravascular or intrathecal uptake.  Omnipaque wasted:  8.5 ml.    Then, after repeated negative aspiration for blood or CSF, a test dose was performed by injecting 1 ml of Lidocaine 1% with epinephrine into the epidural space which was negative for heart rate of blood pressure changes, tinnitus, metallic taste, paresthesias, or other complaints.    Then, after repeated negative aspiration for blood or CSF, a combination of Depomedrol 40 mg, Dexamethasone 5 mg, Bupivicaine 0.5% 1 ml, diluted with 2.5 ml of normal saline to a total injectate volume of 5 ml was injected into the epidural space in a slow and incremental fashion and the needle was restyletted and withdrawn.  All injected medications were preservative free.    The injection site was cleaned and a sterile dressing was applied.    The patient tolerated the procedure well without complications and was taken to the recovery room for continued observation.    Images were saved to PACS.    Post-procedure pain score: 4/10  Follow-up includes:   -f/u phone call in one week  -f/u with referring provider    Elbert Quijano MD  [facility]

## 2017-02-16 NOTE — NURSING NOTE
"    Chief Complaint   Patient presents with     Pain       Initial BP (!) 145/93  Pulse 92  LMP 09/16/2011 Estimated body mass index is 36.25 kg/(m^2) as calculated from the following:    Height as of 1/17/17: 1.568 m (5' 1.75\").    Weight as of 1/17/17: 89.2 kg (196 lb 9.6 oz).  Medication Reconciliation: complete     Injection intake:    If this procedure is requiring IV sedation has patient been NPO for 6  Hours? NA    Is patient on coumadin, plavix or other prescribed blood thinner?   No    If patient is on coumadin was it held for 5 days?   NA    If patient is on plavix was it held for 7 days?    NA     Does patient take aspirin?  No    If this is for a cervical procedure and patient is on aspirin has it been held for 6 days?   NA    Any allergies to contrast dye, iodine, steroid and/or numbing medications?  NO    Is patient currently taking antibiotics or have an active infection?  NO    Does patient have a ? Yes       Is patient pregnant or breastfeeding?  NO    Are the vital signs normal?  Yes    She Ochoa CMA ([facility])        "

## 2017-02-16 NOTE — MR AVS SNAPSHOT
After Visit Summary   2/16/2017    Margo Inman    MRN: [id number]           Patient Information     Date Of Birth          1960        Visit Information        Provider Department      2/16/2017 1:30 PM Elbert Uribe MD [facility]        Care Instructions        [facility]   Procedure Discharge Instructions    Today you saw:    Dr. Elbert Quijano      You had an:  Epidural steroid injection   sacro-iliac joint injection   facet joint injection  hip injection  piriformis injection     Medications used:  Lidocaine   Bupivacaine   Dexamethasone Depo-medrol  Omnipaque  Ropivicaine   Kenalog   Omniscan   Normal saline          If you have received sedation before, during, or after your procedure, for the next 24 hours you shall NOT:   -Drive  -Operate machinery  -Drink alcohol  -Sign any legal documents    Be cautious when walking. Numbness and/or weakness in the lower extremities may occur up to 6-8 hours after the procedure due to effect of the local anesthetic    Do not drive for 6 hours. The effect of the local anesthetic could slow your reflexes.     You may resume your regular activities after 24 hours    Avoid strenuous activity for the first 24 hours    You may shower, however avoid swimming, tub baths or hot tubs for 24 hours following your procedure    You may have a mild to moderate increase in pain for several days following the injection.    It may take up to 14 days for the steroid medication to start working although you may feel the effect as early as a few days after the procedure.       You may use ice packs for 10-15 minutes, 3 to 4 times a day at the injection site for comfort    Do not use heat to painful areas for 6 to 8 hours. This will give the local anesthetic time to wear off and prevent you from accidentally burning your skin.     You may use anti-inflammatory medications (such as Ibuprofen or Aleve or Advil) or Tylenol  "for pain control if necessary    If you were fasting, you may resume your normal diet and medications after the procedure    If you have diabetes, check your blood sugar more frequently than usual as your blood sugar may be higher than normal for 10-14 days following a steroid injection. Contact your doctor who manages your diabetes if your blood sugar is higher than usual    If you experience any of the following, call the pain center nursing line during work hours at [phone] or the after hours provider line at [phone]:  -Fever over 100 degree F  -Swelling, bleeding, redness, drainage, warmth at the injection site  -Progressive weakness or numbness in your legs or arms  -Loss of bowel or bladder function  -Unusual headache that is not relieved by Tylenol  -Unusual new onset of pain that is not improving      Phone #s:  Appointment line: [phone];  Nurse line: [phone]            Follow-ups after your visit        Who to contact     If you have questions or need follow up information about today's clinic visit or your schedule please contact [facility] directly at [phone].  Normal or non-critical lab and imaging results will be communicated to you by [employer], letter or phone within 4 business days after the clinic has received the results. If you do not hear from us within 7 days, please contact the clinic through [employer] or phone. If you have a critical or abnormal lab result, we will notify you by phone as soon as possible.  Submit refill requests through [employer] or call your pharmacy and they will forward the refill request to us. Please allow 3 business days for your refill to be completed.          Additional Information About Your Visit        [employer] Information     [employer] lets you send messages to your doctor, view your test results, renew your prescriptions, schedule appointments and more. To sign up, go to [email or web address] . Click on \"Log in\" on the left side of " "the screen, which will take you to the Welcome page. Then click on \"Sign up Now\" on the right side of the page.     You will be asked to enter the access code listed below, as well as some personal information. Please follow the directions to create your username and password.     Your access code is: [email or web address]  Expires: 2017  2:23 PM     Your access code will  in 90 days. If you need help or a new code, please call your [place] clinic or [phone].        Care EveryWhere ID     This is your Care EveryWhere ID. This could be used by other organizations to access your [facility] [place] medical records  [id number]        Your Vitals Were     Pulse Last Period Pulse Oximetry             91 2011 97%          Blood Pressure from Last 3 Encounters:   17 (!) 144/100   17 (!) 166/96   01/10/17 136/84    Weight from Last 3 Encounters:   17 89.2 kg (196 lb 9.6 oz)   01/10/17 89.8 kg (198 lb)   17 88 kg (194 lb)              Today, you had the following     No orders found for display       Primary Care Provider Office Phone # Fax #    Jordana Naqvi PA-C [phone]       [facility] [address]  [place] [address]        Thank you!     Thank you for choosing [facility]  for your care. Our goal is always to provide you with excellent care. Hearing back from our patients is one way we can continue to improve our services. Please take a few minutes to complete the written survey that you may receive in the mail after your visit with us. Thank you!             Your Updated Medication List - Protect others around you: Learn how to safely use, store and throw away your medicines at www.disposemymeds.org.          This list is accurate as of: 17  2:23 PM.  Always use your most recent med list.                   Brand Name Dispense Instructions for use    acetaminophen 500 MG tablet    TYLENOL     Take 500-1,000 mg by mouth every 6 " hours as needed.       atorvastatin 80 MG tablet    LIPITOR    90 tablet    TAKE 1 TABLET BY MOUTH ONCE DAILY       butalbital-acetaminophen-caffeine -40 MG per tablet    FIORICET/ESGIC    28 tablet    Take 1 tablet by mouth every 4 hours as needed       cyclobenzaprine 10 MG tablet    FLEXERIL    90 tablet    TAKE 1 TABLET BY MOUTH AT BEDTIME AS NEEDED FOR MUSCLE SPASMS       diclofenac 75 MG EC tablet    VOLTAREN    60 tablet    Take 1 tablet (75 mg) by mouth 2 times daily       oxybutynin 5 MG 24 hr tablet    DITROPAN-XL    30 tablet    Take 2 tablets (10 mg) by mouth daily       oxyCODONE-acetaminophen 5-325 MG per tablet    PERCOCET    30 tablet    Take 1-2 tablets by mouth every 4 hours as needed for moderate to severe pain       UNABLE TO FIND      MEDICATION NAME: e-cigarette       * varenicline 0.5 MG X 11 & 1 MG X 42 tablet    CHANTIX STARTING MONTH EVANGELINA    53 tablet    Take 0.5 mg tab daily for 3 days, then 0.5 mg tab twice daily for 4 days, then 1 mg twice daily.       * varenicline 1 MG tablet    CHANTIX    56 tablet    Take 1 tablet (1 mg) by mouth 2 times daily       * Notice:  This list has 2 medication(s) that are the same as other medications prescribed for you. Read the directions carefully, and ask your doctor or other care provider to review them with you.

## 2017-02-16 NOTE — PATIENT INSTRUCTIONS
[facility]   Procedure Discharge Instructions    Today you saw:    Dr. Elbert Quijano      You had an:  Epidural steroid injection   sacro-iliac joint injection   facet joint injection  hip injection  piriformis injection     Medications used:  Lidocaine   Bupivacaine   Dexamethasone Depo-medrol  Omnipaque  Ropivicaine   Kenalog   Omniscan   Normal saline          If you have received sedation before, during, or after your procedure, for the next 24 hours you shall NOT:   -Drive  -Operate machinery  -Drink alcohol  -Sign any legal documents    Be cautious when walking. Numbness and/or weakness in the lower extremities may occur up to 6-8 hours after the procedure due to effect of the local anesthetic    Do not drive for 6 hours. The effect of the local anesthetic could slow your reflexes.     You may resume your regular activities after 24 hours    Avoid strenuous activity for the first 24 hours    You may shower, however avoid swimming, tub baths or hot tubs for 24 hours following your procedure    You may have a mild to moderate increase in pain for several days following the injection.    It may take up to 14 days for the steroid medication to start working although you may feel the effect as early as a few days after the procedure.       You may use ice packs for 10-15 minutes, 3 to 4 times a day at the injection site for comfort    Do not use heat to painful areas for 6 to 8 hours. This will give the local anesthetic time to wear off and prevent you from accidentally burning your skin.     You may use anti-inflammatory medications (such as Ibuprofen or Aleve or Advil) or Tylenol for pain control if necessary    If you were fasting, you may resume your normal diet and medications after the procedure    If you have diabetes, check your blood sugar more frequently than usual as your blood sugar may be higher than normal for 10-14 days following a steroid injection. Contact your doctor  who manages your diabetes if your blood sugar is higher than usual    If you experience any of the following, call the pain center nursing line during work hours at [phone] or the after hours provider line at [phone]:  -Fever over 100 degree F  -Swelling, bleeding, redness, drainage, warmth at the injection site  -Progressive weakness or numbness in your legs or arms  -Loss of bowel or bladder function  -Unusual headache that is not relieved by Tylenol  -Unusual new onset of pain that is not improving      Phone #s:  Appointment line: [phone];  Nurse line: [phone]

## 2017-02-20 DIAGNOSIS — M54.12 CERVICAL RADICULOPATHY: ICD-10-CM

## 2017-02-20 NOTE — TELEPHONE ENCOUNTER
Reason for Call:  Medication or medication refill:    Do you use a [facility]?  Name of the pharmacy and phone number for the current request:  Pt will come to clinic to  hard copy of Rx    Name of the medication requested: Oxycodone-acetaminophen (PERCOCET) 5-325 MG per tablet    Can we leave a detailed message on this number? YES    Phone number patient can be reached at: Home number on file [phone] (home)    Best Time: Anytime    Call taken on 2/20/2017 at 12:10 PM by Brett Acuña

## 2017-02-21 RX ORDER — OXYCODONE AND ACETAMINOPHEN 5; 325 MG/1; MG/1
1-2 TABLET ORAL EVERY 4 HOURS PRN
Qty: 30 TABLET | Refills: 0 | Status: SHIPPED | OUTPATIENT
Start: 2017-02-21 | End: 2017-03-14

## 2017-02-21 NOTE — TELEPHONE ENCOUNTER
Written rx will be deliver to the  this afternoon for pickup after 12:30p.  Lionel Block,  [facility]    Please call patient to let them know the above info.  And remind the person who is picking up to bring their photo ID.  Lionel Block,  [facility]     FYI:  Anything completed after 2:00p will not be delivered until the next business day after 11a.   Lionel Block,  [facility].

## 2017-02-21 NOTE — TELEPHONE ENCOUNTER
Percocet      Last Written Prescription Date: 01/26/17  Last Fill Quantity: 30,  # refills: 0   Last Office Visit with [facility], [facility] or [facility] prescribing provider: 01/10/17    Routing refill request to provider for review/approval because:  Drug not on the [facility] refill protocol   Patricia David RN

## 2017-02-24 ENCOUNTER — TELEPHONE (OUTPATIENT)
Dept: PALLIATIVE MEDICINE | Facility: CLINIC | Age: 57
End: 2017-02-24

## 2017-02-24 NOTE — TELEPHONE ENCOUNTER
Patient had a cervical interlaminar epidural steroid injection C7-T1, fluoroscopically guided, contrast controlled, on 2/19/17.  Called patient for an update.      Pt reported the following details:  Not experiencing as much improvement as hoped for, but pain level is now down to a five. Still experiencing numbness in arm, will follow up with primary care provider about this issue. Reported she is feeling well overall.  Told patient that the information will be forwarded to her provider.  Also explained that, if a steroid medication was used, it could take up to 14 days to feel the full effect and if pt has any further questions or concerns pt should call the nurse line at [phone].

## 2017-03-14 ENCOUNTER — OFFICE VISIT (OUTPATIENT)
Dept: NEUROSURGERY | Facility: CLINIC | Age: 57
End: 2017-03-14
Payer: COMMERCIAL

## 2017-03-14 VITALS
HEIGHT: 62 IN | DIASTOLIC BLOOD PRESSURE: 93 MMHG | BODY MASS INDEX: 35.7 KG/M2 | SYSTOLIC BLOOD PRESSURE: 159 MMHG | RESPIRATION RATE: 18 BRPM | WEIGHT: 194 LBS | OXYGEN SATURATION: 96 % | HEART RATE: 91 BPM

## 2017-03-14 DIAGNOSIS — M54.12 CERVICAL RADICULOPATHY: ICD-10-CM

## 2017-03-14 PROCEDURE — 99213 OFFICE O/P EST LOW 20 MIN: CPT | Performed by: NEUROLOGICAL SURGERY

## 2017-03-14 RX ORDER — OXYCODONE AND ACETAMINOPHEN 5; 325 MG/1; MG/1
1-2 TABLET ORAL EVERY 4 HOURS PRN
Qty: 50 TABLET | Refills: 0 | Status: ON HOLD | OUTPATIENT
Start: 2017-03-14 | End: 2017-04-05

## 2017-03-14 RX ORDER — OXYCODONE AND ACETAMINOPHEN 5; 325 MG/1; MG/1
1-2 TABLET ORAL EVERY 4 HOURS PRN
Qty: 50 TABLET | Refills: 0 | Status: SHIPPED | OUTPATIENT
Start: 2017-03-14 | End: 2017-03-14

## 2017-03-14 ASSESSMENT — PAIN SCALES - GENERAL: PAINLEVEL: MODERATE PAIN (5)

## 2017-03-14 NOTE — MR AVS SNAPSHOT
"              After Visit Summary   3/14/2017    Margo Inman    MRN: [id number]           Patient Information     Date Of Birth          1960        Visit Information        Provider Department      3/14/2017 3:00 PM Joseph Klein MD [facility]        Care Instructions    I recommend C6-7 ACDF surgery. You will be receiving a call from my RN, Mayra DUPONT, to give you instructions and education for the surgery. You will also receive a call to schedule the surgery.  Please call the clinic with any questions. [phone]          Follow-ups after your visit        Who to contact     If you have questions or need follow up information about today's clinic visit or your schedule please contact [facility] directly at [phone].  Normal or non-critical lab and imaging results will be communicated to you by MyChart, letter or phone within 4 business days after the clinic has received the results. If you do not hear from us within 7 days, please contact the clinic through MyChart or phone. If you have a critical or abnormal lab result, we will notify you by phone as soon as possible.  Submit refill requests through [employer] or call your pharmacy and they will forward the refill request to us. Please allow 3 business days for your refill to be completed.          Additional Information About Your Visit        MyChart Information     [employer] lets you send messages to your doctor, view your test results, renew your prescriptions, schedule appointments and more. To sign up, go to [email or web address] . Click on \"Log in\" on the left side of the screen, which will take you to the Welcome page. Then click on \"Sign up Now\" on the right side of the page.     You will be asked to enter the access code listed below, as well as some personal information. Please follow the directions to create your username and password.     Your access code is: [email or web address]  Expires: 5/17/2017  " "3:23 PM     Your access code will  in 90 days. If you need help or a new code, please call your [facility] or [phone].        Care EveryWhere ID     This is your Care EveryWhere ID. This could be used by other organizations to access your [facility] medical records  [id number]        Your Vitals Were     Pulse Respirations Height Last Period Pulse Oximetry BMI (Body Mass Index)    91 18 5' 1.75\" (1.568 m) 2011 96% 35.77 kg/m2       Blood Pressure from Last 3 Encounters:   17 (!) 159/93   17 (!) 144/100   17 (!) 166/96    Weight from Last 3 Encounters:   17 194 lb (88 kg)   17 196 lb 9.6 oz (89.2 kg)   01/10/17 198 lb (89.8 kg)              Today, you had the following     No orders found for display       Primary Care Provider Office Phone # Fax #    Jordana Naqvi PA-C [phone]       [facility] [address] [facility] [address]        Thank you!     Thank you for choosing [facility]  for your care. Our goal is always to provide you with excellent care. Hearing back from our patients is one way we can continue to improve our services. Please take a few minutes to complete the written survey that you may receive in the mail after your visit with us. Thank you!             Your Updated Medication List - Protect others around you: Learn how to safely use, store and throw away your medicines at www.disposemymeds.org.          This list is accurate as of: 3/14/17  3:45 PM.  Always use your most recent med list.                   Brand Name Dispense Instructions for use    acetaminophen 500 MG tablet    TYLENOL     Take 500-1,000 mg by mouth every 6 hours as needed.       atorvastatin 80 MG tablet    LIPITOR    90 tablet    TAKE 1 TABLET BY MOUTH ONCE DAILY       butalbital-acetaminophen-caffeine -40 MG per tablet    FIORICET/ESGIC    28 tablet    Take 1 tablet by mouth every 4 hours as needed       " cyclobenzaprine 10 MG tablet    FLEXERIL    90 tablet    TAKE 1 TABLET BY MOUTH AT BEDTIME AS NEEDED FOR MUSCLE SPASMS       diclofenac 75 MG EC tablet    VOLTAREN    60 tablet    Take 1 tablet (75 mg) by mouth 2 times daily       oxybutynin 5 MG 24 hr tablet    DITROPAN-XL    30 tablet    Take 2 tablets (10 mg) by mouth daily       oxyCODONE-acetaminophen 5-325 MG per tablet    PERCOCET    30 tablet    Take 1-2 tablets by mouth every 4 hours as needed for moderate to severe pain       * varenicline 0.5 MG X 11 & 1 MG X 42 tablet    CHANTIX STARTING MONTH EVANGELINA    53 tablet    Take 0.5 mg tab daily for 3 days, then 0.5 mg tab twice daily for 4 days, then 1 mg twice daily.       * varenicline 1 MG tablet    CHANTIX    56 tablet    Take 1 tablet (1 mg) by mouth 2 times daily       * Notice:  This list has 2 medication(s) that are the same as other medications prescribed for you. Read the directions carefully, and ask your doctor or other care provider to review them with you.

## 2017-03-14 NOTE — PROGRESS NOTES
Neurosurgery Follow Up Clinic Visit      Margo Inman returns for follow up visit regarding her neck and RUE pain and weakness    Currently the patient describes having increasing pain in her neck and RUE in the C7 distribution. She has failed PT and MEENAKSHI and has noticed increased weakness in her  and triceps on the right. She also has migraine headaches.    Exam is stable  She has a positive Spurling's sign and weakness in her right triceps and     We reviewed the MRI cervical spine and she has C6-7 stenosis      Plan:   I have recommended a C6-7 ACDF. I discussed with the patient the risk of surgery to include, but, not be limited to; dysphagia, hoarseness, nerve injury, pseudoarthrosis, failure of hardware, failure of improvement of symptoms, CSF leak,  infection, post op hematoma, the need for recurrent surgery, paralysis, coma and death.

## 2017-03-14 NOTE — NURSING NOTE
"Margo Inman is a 57 year old female who presents for:  Chief Complaint   Patient presents with     Neurologic Problem     neck pain, failed injection 2/16/17 MEENAKSHI cervical, MR cervical 1/12/17, numbness and tingling of right arm        Initial Vitals:  BP (!) 159/93 (BP Location: Right arm, Patient Position: Chair, Cuff Size: Adult Regular)  Pulse 91  Resp 18  Ht 5' 1.75\" (1.568 m)  Wt 194 lb (88 kg)  LMP 09/16/2011  SpO2 96%  BMI 35.77 kg/m2 Estimated body mass index is 35.77 kg/(m^2) as calculated from the following:    Height as of this encounter: 5' 1.75\" (1.568 m).    Weight as of this encounter: 194 lb (88 kg).. Body surface area is 1.96 meters squared. BP completed using cuff size: regular  Moderate Pain (5)    Do you feel safe in your environment?  Yes  Do you need any refills today? Yes    Nursing Comments: would like percocet refill      5 minutes nursing intake time  Rosalinda Dumont      "

## 2017-03-14 NOTE — PATIENT INSTRUCTIONS
I recommend C6-7 ACDF surgery. You will be receiving a call from my RN, Mayra DUPONT, to give you instructions and education for the surgery. You will also receive a call to schedule the surgery.  Please call the clinic with any questions. [phone]

## 2017-03-20 ENCOUNTER — OFFICE VISIT (OUTPATIENT)
Dept: FAMILY MEDICINE | Facility: CLINIC | Age: 57
End: 2017-03-20
Payer: COMMERCIAL

## 2017-03-20 VITALS
HEIGHT: 62 IN | TEMPERATURE: 97.8 F | HEART RATE: 86 BPM | WEIGHT: 193.4 LBS | SYSTOLIC BLOOD PRESSURE: 123 MMHG | DIASTOLIC BLOOD PRESSURE: 78 MMHG | OXYGEN SATURATION: 97 % | BODY MASS INDEX: 35.59 KG/M2

## 2017-03-20 DIAGNOSIS — M54.12 RADICULOPATHY OF CERVICAL SPINE: ICD-10-CM

## 2017-03-20 DIAGNOSIS — R03.0 ELEVATED BLOOD PRESSURE READING WITHOUT DIAGNOSIS OF HYPERTENSION: ICD-10-CM

## 2017-03-20 DIAGNOSIS — E78.5 HYPERLIPIDEMIA LDL GOAL <130: ICD-10-CM

## 2017-03-20 DIAGNOSIS — Z01.818 PREOP GENERAL PHYSICAL EXAM: Primary | ICD-10-CM

## 2017-03-20 DIAGNOSIS — F17.200 NEEDS SMOKING CESSATION EDUCATION: ICD-10-CM

## 2017-03-20 LAB
CREAT SERPL-MCNC: 0.58 MG/DL (ref 0.52–1.04)
GFR SERPL CREATININE-BSD FRML MDRD: NORMAL ML/MIN/1.7M2
HGB BLD-MCNC: 13.9 G/DL (ref 11.7–15.7)

## 2017-03-20 PROCEDURE — 99214 OFFICE O/P EST MOD 30 MIN: CPT | Performed by: NURSE PRACTITIONER

## 2017-03-20 PROCEDURE — 85018 HEMOGLOBIN: CPT | Performed by: NURSE PRACTITIONER

## 2017-03-20 PROCEDURE — 82565 ASSAY OF CREATININE: CPT | Performed by: NURSE PRACTITIONER

## 2017-03-20 PROCEDURE — 36415 COLL VENOUS BLD VENIPUNCTURE: CPT | Performed by: NURSE PRACTITIONER

## 2017-03-20 NOTE — PATIENT INSTRUCTIONS
Based on your medical history and these are the current health maintenance or preventive care services that you are due for (some may have been done at this visit)  Health Maintenance Due   Topic Date Due     ADVANCE DIRECTIVE PLANNING Q5 YRS (NO INBASKET)  01/12/1980         At [facility], we strive to deliver an exceptional experience to you, every time we see you.    If you receive a survey in the mail, please send us back your thoughts. We really do value your feedback.    Your care team's suggested websites for health information:  Www.[facility].org : Up to date and easily searchable information on multiple topics.  Www.medlineplus.gov : medication info, interactive tutorials, watch real surgeries online  Www.familydoctor.org : good info from the Academy of Family Physicians  Www.cdc.gov : public health info, travel advisories, epidemics (H1N1)  Www.aap.org : children's health info, normal development, vaccinations  Www.health.[facility].[place].us : [place] dept of health, public health issues in [place], N1N1    How to contact your care team:   Team Mary/Say [phone]         Pharmacy [phone]    Dr. Koehler, Sandrita Luna PA-C, Dr. Dudley, Nan ODELL CNP, Mei Castellon PA-C, Dr. Chavis, and JOSE R Yuen CNP    Team RNs: Shawna & Dana      Clinic hours  M-Th 7 am-7 pm   Fri 7 am-5 pm.   Urgent care M-F 11 am-9 pm,   Sat/Sun 9 am-5 pm.  Pharmacy M-Th 8 am-8 pm Fri 8 am-6 pm  Sat/Sun 9 am-5 pm.     All password changes, disabled accounts, or ID changes in [employer]/MyHealth will be done by our Access Services Department.    If you need help with your account or password, call: [phone]. Clinic staff no longer has the ability to change passwords.     Before Your Surgery      Call your surgeon if there is any change in your health. This includes signs of a cold or flu (such as a sore throat, runny nose, cough, rash or fever).    Do not smoke, drink alcohol or take  over the counter medicine (unless your surgeon or primary care doctor tells you to) for the 24 hours before and after surgery.    If you take prescribed drugs: Follow your doctor s orders about which medicines to take and which to stop until after surgery.    Eating and drinking prior to surgery: follow the instructions from your surgeon    Take a shower or bath the night before surgery. Use the soap your surgeon gave you to gently clean your skin. If you do not have soap from your surgeon, use your regular soap. Do not shave or scrub the surgery site.  Wear clean pajamas and have clean sheets on your bed.

## 2017-03-20 NOTE — PROGRESS NOTES
[address] [address]  [phone]  Dept: [phone]    PRE-OP EVALUATION:  Today's date: 3/20/2017    Margo Imnan (: 1960) presents for pre-operative evaluation assessment as requested by Joseph Best.  She requires evaluation and anesthesia risk assessment prior to undergoing surgery/procedure for treatment of C6-C7 anterior cervical discectomy and fusion .  Proposed procedure: COMBINED DECOMPRESSION, FUSION CERVICAL ANTERIOR ONE LEVEL    Date of Surgery/ Procedure: 2017  Time of Surgery/ Procedure: 1:50PM  Hospital/Surgical Facility: [facility]   Fax number for surgical facility: [phone]  Primary Physician: Jordana Naqvi  Type of Anesthesia Anticipated: General    Patient has a Health Care Directive or Living Will:  NO    1. NO - Do you have a history of heart attack, stroke, stent, bypass or surgery on an artery in the head, neck, heart or legs?  2. YES - Do you ever have any pain or discomfort in your chest? Has history of pleuritic chest pain worse with deep breath, cough, and movement, not currently having pain  3. NO - Do you have a history of  Heart Failure?  4. NO - Are you troubled by shortness of breath when: walking on the level, up a slight hill or at night?  5. NO - Do you currently have a cold, bronchitis or other respiratory infection?  6. NO - Do you have a cough, shortness of breath or wheezing?  7. YES- Do you sometimes get pains in the calves of your legs when you walk? Since she had knee replacements  8. Unknown - Do you or anyone in your family have previous history of blood clots?  9. NO - Do you or does anyone in your family have a serious bleeding problem such as prolonged bleeding following surgeries or cuts?  10. YES - Have you ever had problems with anemia or been told to take iron pills? Anemia when still menstruating  11. NO - Have you had any abnormal  blood loss such as black, tarry or bloody stools, or abnormal vaginal bleeding?  12. YES - Have you ever had a blood transfusion?  13. YES- Have you or any of your relatives ever had problems with anesthesia? Takes a long time to wake up/nausea  14. NO - Do you have sleep apnea, excessive snoring or daytime drowsiness?  15. NO - Do you have any prosthetic heart valves?  16. YES - Do you have prosthetic joints? Bilateral knees  17. NO - Is there any chance that you may be pregnant?      HPI:                                                      Brief HPI related to upcoming procedure: Patient has chronic neck pain, RUE pain and weakness.  She is scheduled for  C6-C7 anterior cervical discectomy and fusion 3/30/17.      See problem list for active medical problems.  Problems all longstanding and stable, except as noted/documented.  See ROS for pertinent symptoms related to these conditions.                                                                                                  .  HYPERLIPIDEMIA - Patient has a long history of significant Hyperlipidemia requiring medication for treatment with recent good control. Patient reports no problems or side effects with the medication.                                                                                                                                           SMOKER- patient currently smoking 1/2 ppd, started Chantix last week, is highly motivated to quit, 15 pk year history.           .    MEDICAL HISTORY:                                                      Patient Active Problem List    Diagnosis Date Noted     Radiculopathy of cervical spine 03/20/2017     Priority: Medium     Cervical high risk HPV (human papillomavirus) test positive 01/10/2017     Priority: Medium     2000, 2003, and 2009 NIL paps. in Care Everywhere.  9/5/13 NIL pap  1/10/17 NIL pap/+ HR HPV (not 16 or 18). Plan: cotest in 1 year, due by 1/10/18       Neck pain 12/09/2016      Priority: Medium     Tension headache 12/09/2016     Priority: Medium     Microscopic hematuria 06/02/2016     Priority: Medium     Recommend f/u with primary MD for UA, blood pressure, and urine cytology at 6, 12, 24, and 36 months from this time.  If negative for 3 years then no further monitoring needed.      Refer back to urology for any of the following:              -cytology is suspicious or positive              -gross hematuria              -irritative voiding symptoms with evidence of infection/ worsening dysuria or urgency.     If persistent microscopic hematuria WITH: hypertension, proteinuria, or glomerular/dysmorphic RBCs in urine then evaluate for primary renal disease/refer instead to nephrology.        Tobacco dependency 03/06/2015     Priority: Medium     Elevated blood pressure reading without diagnosis of hypertension 11/03/2014     Priority: Medium     S/P carpal tunnel release 07/22/2014     Priority: Medium     Trigger thumb 07/22/2014     Priority: Medium     CTS (carpal tunnel syndrome) - bilateral 05/27/2014     Priority: Medium     S/P total knee arthroplasty juan 01/23/2014     Priority: Medium     Acute posthemorrhagic anemia 01/23/2014     Priority: Medium     Muscle spasm 01/23/2014     Priority: Medium     RLS (restless legs syndrome) 01/22/2012     Priority: Medium     Pseudogout 05/05/2011     Priority: Medium     OA (OSTEOARTHRITIS) OF KNEE - bilateral 05/05/2011     Priority: Medium     Hyperlipidemia LDL goal <130 10/31/2010     Priority: Medium      Past Medical History   Diagnosis Date     Arthritis      Cervical high risk HPV (human papillomavirus) test positive 1/10/2017     1/10/17 NIL pap/+ HR HPV (not 16 or 18). Plan: cotest in 1 year, due by 1/10/18      Past Surgical History   Procedure Laterality Date     Orthopedic surgery       Appendectomy       Gyn surgery       Ent surgery       Tonsils       Ectopic pregnancy surgery       Hc knee scope,med/lat menisectomy  9/16/11      left, with partial medial menisectomy ONLY     C part remv femur/prox tib/fib  9/16/11     left, open lateral patellar bone spur excision     Arthroscopy knee  9/16/2011     Procedure:ARTHROSCOPY KNEE; left knee arthroscopy with debridement, open lateral patellar spur excision; Surgeon:RG MONTOYA; Location:[facility] OR     C total knee arthroplasty  1/17/14     Bilateral     Hc revise median n/carpal tunnel surg Left 7/11/14     PRIMARY - not revision     Hc incision tendon sheath finger Left 7/11/14     Thumb TF release     Release carpal tunnel  7/11/2014     Procedure: RELEASE CARPAL TUNNEL;  Surgeon: Rg Montoya MD;  Location: [facility] OR     Release trigger finger  7/11/2014     Procedure: RELEASE TRIGGER FINGER;  Surgeon: Rg Montoya MD;  Location: [facility] OR     Release carpal tunnel Right 11/7/2014     Procedure: RELEASE CARPAL TUNNEL;  Surgeon: Rg Montoya MD;  Location: [facility] OR     Release trigger finger Right 11/7/2014     Procedure: RELEASE TRIGGER FINGER;  Surgeon: Rg Montoya MD;  Location: [facility] OR     Colonoscopy N/A 2/16/2016     Procedure: COLONOSCOPY;  Surgeon: Belem Khalil MD;  Location: [facility] OR     Colonoscopy with co2 insufflation N/A 2/16/2016     Procedure: COLONOSCOPY WITH CO2 INSUFFLATION;  Surgeon: Belem Khalil MD;  Location: [facility] OR     Colonoscopy N/A 2/16/2016     Procedure: COMBINED COLONOSCOPY, SINGLE OR MULTIPLE BIOPSY/POLYPECTOMY BY BIOPSY;  Surgeon: Belem Khalil MD;  Location: [facility] OR     Cystoscopy  2016     microscopic hematuria     Current Outpatient Prescriptions   Medication Sig Dispense Refill     oxyCODONE-acetaminophen (PERCOCET) 5-325 MG per tablet Take 1-2 tablets by mouth every 4 hours as needed for moderate to severe pain 50 tablet 0     butalbital-acetaminophen-caffeine (FIORICET/ESGIC) -40 MG per tablet Take 1 tablet by mouth every 4 hours as needed 28 tablet 1     cyclobenzaprine  "(FLEXERIL) 10 MG tablet TAKE 1 TABLET BY MOUTH AT BEDTIME AS NEEDED FOR MUSCLE SPASMS 90 tablet 0     varenicline (CHANTIX STARTING MONTH EVANGELINA) 0.5 MG X 11 & 1 MG X 42 tablet Take 0.5 mg tab daily for 3 days, then 0.5 mg tab twice daily for 4 days, then 1 mg twice daily. 53 tablet 0     varenicline (CHANTIX) 1 MG tablet Take 1 tablet (1 mg) by mouth 2 times daily 56 tablet 4     diclofenac (VOLTAREN) 75 MG EC tablet Take 1 tablet (75 mg) by mouth 2 times daily 60 tablet 11     oxybutynin (DITROPAN-XL) 5 MG 24 hr tablet Take 2 tablets (10 mg) by mouth daily 30 tablet 1     atorvastatin (LIPITOR) 80 MG tablet TAKE 1 TABLET BY MOUTH ONCE DAILY 90 tablet 3     acetaminophen (TYLENOL) 500 MG tablet Take 500-1,000 mg by mouth every 6 hours as needed.       OTC products: None, except as noted above    Allergies   Allergen Reactions     Bee Anaphylaxis     Erythromycin Hives     Wasps [Hornets] Anaphylaxis      Latex Allergy: NO    Social History   Substance Use Topics     Smoking status: Current Every Day Smoker     Packs/day: 1.00     Years: 15.00     Types: Cigarettes     Smokeless tobacco: Never Used     Alcohol use Yes      Comment: socially     History   Drug Use No       REVIEW OF SYSTEMS:                                                    Constitutional, HEENT, cardiovascular, pulmonary, gi and gu systems are negative, except as otherwise noted.    EXAM:                                                    /78 (BP Location: Left arm, Patient Position: Chair, Cuff Size: Adult Regular)  Pulse 86  Temp 97.8  F (36.6  C) (Oral)  Ht 5' 1.75\" (1.568 m)  Wt 193 lb 6.4 oz (87.7 kg)  LMP 09/16/2011  SpO2 97%  BMI 35.66 kg/m2    GENERAL APPEARANCE: healthy, alert and no distress     EYES: EOMI, PERRL     HENT: ear canals and TM's normal and nose and mouth without ulcers or lesions     NECK: no adenopathy, no asymmetry, masses, or scars and thyroid normal to palpation     RESP: lungs clear to auscultation - no rales, " rhonchi or wheezes     CV: regular rates and rhythm, normal S1 S2, no S3 or S4 and no murmur, click or rub     ABDOMEN:  soft, nontender, no HSM or masses and bowel sounds normal     MS: extremities normal- no gross deformities noted, no evidence of inflammation in joints, FROM in all extremities.     SKIN: no suspicious lesions or rashes     NEURO: Normal strength and tone, sensory exam grossly normal, mentation intact and speech normal     PSYCH: mentation appears normal. and affect normal/bright     LYMPHATICS: No axillary, cervical, or supraclavicular nodes    DIAGNOSTICS:                                                    EKG done 1/5/17: appears normal, NSR, normal axis, normal intervals, no acute ST/T changes c/w ischemia, no LVH by voltage criteria, unchanged from previous tracings  Hemoglobin (indicated for history of anemia or procedure with significant blood loss such as tonsillectomy, major intraperitoneal surgery, vascular surgery, major spine surgery, total joint replacement)  Serum Creatinine    Recent Labs   Lab Test  01/10/17   1243  05/26/16   1406   01/10/14   1152   HGB  13.8  14.2   --   13.6   PLT  277  311   --   320   INR   --    --    --   0.90   NA  140  138   < >  138   POTASSIUM  4.3  4.0   < >  4.5   CR  0.56  0.58   < >  0.59    < > = values in this interval not displayed.      Component      Latest Ref Rng & Units 3/20/2017   Creatinine      0.52 - 1.04 mg/dL 0.58   GFR Estimate      >60 mL/min/1.7m2 >90 . . .   GFR Estimate If Black      >60 mL/min/1.7m2 >90 . . .   Hemoglobin      11.7 - 15.7 g/dL 13.9       IMPRESSION:                                                    Reason for surgery/procedure: chronic neck pain, RUE pain and weakness/Proposed procedure: COMBINED DECOMPRESSION, FUSION CERVICAL ANTERIOR ONE LEVEL  Diagnosis/reason for consult: preoperative physical    The proposed surgical procedure is considered INTERMEDIATE risk.    REVISED CARDIAC RISK INDEX  The patient has  the following serious cardiovascular risks for perioperative complications such as (MI, PE, VFib and 3  AV Block):  No serious cardiac risks  INTERPRETATION: 0 risks: Class I (very low risk - 0.4% complication rate)    The patient has the following additional risks for perioperative complications:  No identified additional risks  The 10-year ASCVD risk score (Christina CLARKE Jr., et al., 2013) is: 5%    Values used to calculate the score:      Age: 57 years      Sex: Female      Is Non- : No      Diabetic: No      Tobacco smoker: Yes      Systolic Blood Pressure: 123 mmHg      Is Prescribed Antihypertensives: No      HDL Cholesterol: 46 mg/dL      Total Cholesterol: 190 mg/dL      ICD-10-CM    1. Preop general physical exam Z01.818 Hemoglobin     Creatinine   2. Radiculopathy of cervical spine M54.12    3. Hyperlipidemia LDL goal <130 E78.5    4. Elevated blood pressure reading without diagnosis of hypertension R03.0    5. Needs smoking cessation education F17.200 NOVU Referral Smoking Cessation       RECOMMENDATIONS:                                                      --Consult hospital rounder / IM to assist post-op medical management if nexessary    --Patient is to take all scheduled medications on the day of surgery EXCEPT for modifications listed below.    APPROVAL GIVEN to proceed with proposed procedure, without further diagnostic evaluation       Signed Electronically by: JOSE R Sarabia CNP    Copy of this evaluation report is provided to requesting physician.    [facility] Preop Guidelines

## 2017-03-20 NOTE — NURSING NOTE
"Chief Complaint   Patient presents with     Pre-Op Exam       Initial /78 (BP Location: Left arm, Patient Position: Chair, Cuff Size: Adult Regular)  Pulse 86  Temp 97.8  F (36.6  C) (Oral)  Ht 5' 1.75\" (1.568 m)  Wt 193 lb 6.4 oz (87.7 kg)  LMP 09/16/2011  SpO2 97%  BMI 35.66 kg/m2 Estimated body mass index is 35.66 kg/(m^2) as calculated from the following:    Height as of this encounter: 5' 1.75\" (1.568 m).    Weight as of this encounter: 193 lb 6.4 oz (87.7 kg).  Medication Reconciliation: complete   Dory Myrick MA      "

## 2017-03-20 NOTE — MR AVS SNAPSHOT
After Visit Summary   3/20/2017    Margo Inman    MRN: [id number]           Patient Information     Date Of Birth          1960        Visit Information        Provider Department      3/20/2017 1:20 PM Belem Dumont APRN CNP [facility]        Today's Diagnoses     Preop general physical exam    -  1    Radiculopathy of cervical spine        Needs smoking cessation education          Care Instructions    Based on your medical history and these are the current health maintenance or preventive care services that you are due for (some may have been done at this visit)  Health Maintenance Due   Topic Date Due     ADVANCE DIRECTIVE PLANNING Q5 YRS (NO INBASKET)  01/12/1980         At [facility], we strive to deliver an exceptional experience to you, every time we see you.    If you receive a survey in the mail, please send us back your thoughts. We really do value your feedback.    Your care team's suggested websites for health information:  Www.TheStreet.MySiteApp : Up to date and easily searchable information on multiple topics.  Www.medlineplus.gov : medication info, interactive tutorials, watch real surgeries online  Www.familydoctor.org : good info from the Academy of Family Physicians  Www.cdc.gov : public health info, travel advisories, epidemics (H1N1)  Www.aap.org : children's health info, normal development, vaccinations  Www.health.[facility].[place].us : [place] dept of health, public health issues in [place], N1N1    How to contact your care team:   Team Mary/Say [phone]         Pharmacy [phone]    Dr. Koehler, Sandrita Luna PA-C, Dr. Dudley, Nan Kovacs APRN CNP, Mei Castellon PA-C, Dr. Chavis, and JOSE R Yuen CNP    Team RNs: Shawna & Dana      Clinic hours  M-Th 7 am-7 pm   Fri 7 am-5 pm.   Urgent care M-F 11 am-9 pm,   Sat/Sun 9 am-5 pm.  Pharmacy M-Th 8 am-8 pm Fri 8 am-6 pm  Sat/Sun 9 am-5 pm.     All password  changes, disabled accounts, or ID changes in [employer]/MyHealth will be done by our Access Services Department.    If you need help with your account or password, call: [phone]. Clinic staff no longer has the ability to change passwords.     Before Your Surgery      Call your surgeon if there is any change in your health. This includes signs of a cold or flu (such as a sore throat, runny nose, cough, rash or fever).    Do not smoke, drink alcohol or take over the counter medicine (unless your surgeon or primary care doctor tells you to) for the 24 hours before and after surgery.    If you take prescribed drugs: Follow your doctor s orders about which medicines to take and which to stop until after surgery.    Eating and drinking prior to surgery: follow the instructions from your surgeon    Take a shower or bath the night before surgery. Use the soap your surgeon gave you to gently clean your skin. If you do not have soap from your surgeon, use your regular soap. Do not shave or scrub the surgery site.  Wear clean pajamas and have clean sheets on your bed.         Follow-ups after your visit        Additional Services     [facility] Referral Smoking Cessation       [place] online at [email or web address]                  Your next 10 appointments already scheduled     Mar 30, 2017   Procedure with Joseph Klein MD   [facility] (--)    [address] [facility] [id number] [phone]              Who to contact     If you have questions or need follow up information about today's clinic visit or your schedule please contact [facility] FEMI LOPEZ directly at [phone].  Normal or non-critical lab and imaging results will be communicated to you by MyChart, letter or phone within 4 business days after the clinic has received the results. If you do not hear from us within 7 days, please contact the clinic through MyChart or phone. If you have a critical or  "abnormal lab result, we will notify you by phone as soon as possible.  Submit refill requests through [employer] or call your pharmacy and they will forward the refill request to us. Please allow 3 business days for your refill to be completed.          Additional Information About Your Visit        MyChart Information     [employer] lets you send messages to your doctor, view your test results, renew your prescriptions, schedule appointments and more. To sign up, go to [email or web address] . Click on \"Log in\" on the left side of the screen, which will take you to the Welcome page. Then click on \"Sign up Now\" on the right side of the page.     You will be asked to enter the access code listed below, as well as some personal information. Please follow the directions to create your username and password.     Your access code is: [email or web address]  Expires: 2017  3:23 PM     Your access code will  in 90 days. If you need help or a new code, please call your [facility] clinic or [phone].        Care EveryWhere ID     This is your Care EveryWhere ID. This could be used by other organizations to access your [facility] medical records  [id number]        Your Vitals Were     Pulse Temperature Height Last Period Pulse Oximetry BMI (Body Mass Index)    86 97.8  F (36.6  C) (Oral) 5' 1.75\" (1.568 m) 2011 97% 35.66 kg/m2       Blood Pressure from Last 3 Encounters:   17 123/78   17 (!) 159/93   17 (!) 144/100    Weight from Last 3 Encounters:   17 193 lb 6.4 oz (87.7 kg)   17 194 lb (88 kg)   17 196 lb 9.6 oz (89.2 kg)              We Performed the Following     Hemoglobin     [facility] Referral Smoking Cessation        Primary Care Provider Office Phone # Fax #    Jordana Naqvi PA-C [phone]       [facility] [address]  [facility] [address]        Thank you!     Thank you for choosing [facility]  for your care. Our " goal is always to provide you with excellent care. Hearing back from our patients is one way we can continue to improve our services. Please take a few minutes to complete the written survey that you may receive in the mail after your visit with us. Thank you!             Your Updated Medication List - Protect others around you: Learn how to safely use, store and throw away your medicines at www.disposemymeds.org.          This list is accurate as of: 3/20/17  2:06 PM.  Always use your most recent med list.                   Brand Name Dispense Instructions for use    acetaminophen 500 MG tablet    TYLENOL     Take 500-1,000 mg by mouth every 6 hours as needed.       atorvastatin 80 MG tablet    LIPITOR    90 tablet    TAKE 1 TABLET BY MOUTH ONCE DAILY       butalbital-acetaminophen-caffeine -40 MG per tablet    FIORICET/ESGIC    28 tablet    Take 1 tablet by mouth every 4 hours as needed       cyclobenzaprine 10 MG tablet    FLEXERIL    90 tablet    TAKE 1 TABLET BY MOUTH AT BEDTIME AS NEEDED FOR MUSCLE SPASMS       diclofenac 75 MG EC tablet    VOLTAREN    60 tablet    Take 1 tablet (75 mg) by mouth 2 times daily       oxybutynin 5 MG 24 hr tablet    DITROPAN-XL    30 tablet    Take 2 tablets (10 mg) by mouth daily       oxyCODONE-acetaminophen 5-325 MG per tablet    PERCOCET    50 tablet    Take 1-2 tablets by mouth every 4 hours as needed for moderate to severe pain       * varenicline 0.5 MG X 11 & 1 MG X 42 tablet    CHANTIX STARTING MONTH EVANGELINA    53 tablet    Take 0.5 mg tab daily for 3 days, then 0.5 mg tab twice daily for 4 days, then 1 mg twice daily.       * varenicline 1 MG tablet    CHANTIX    56 tablet    Take 1 tablet (1 mg) by mouth 2 times daily       * Notice:  This list has 2 medication(s) that are the same as other medications prescribed for you. Read the directions carefully, and ask your doctor or other care provider to review them with you.

## 2017-03-20 NOTE — LETTER
[facility]  [address] [address] [address]  [phone]    March 21, 2017    Margo Inman  [address] [address] [address]    Ephraim Aragon,     Porter had a normal hemoglobin and Creatinine.     Belem Dumont APRN, CNP/smj    Results for orders placed or performed in visit on 03/20/17   Hemoglobin   Result Value Ref Range    Hemoglobin 13.9 11.7 - 15.7 g/dL   Creatinine   Result Value Ref Range    Creatinine 0.58 0.52 - 1.04 mg/dL    GFR Estimate >90  Non  GFR Calc   >60 mL/min/1.7m2    GFR Estimate If Black >90   GFR Calc   >60 mL/min/1.7m2

## 2017-03-22 NOTE — PLAN OF CARE
Brianna in infection prevention called to report that pt has had 2 negative nasal swab for MRSA in 2006 and 2007. No need for contact isolation or Vancomycin.

## 2017-03-24 ENCOUNTER — TELEPHONE (OUTPATIENT)
Dept: NEUROSURGERY | Facility: CLINIC | Age: 57
End: 2017-03-24

## 2017-03-24 NOTE — PATIENT INSTRUCTIONS
-Surgery scheduled at [facility] on 3/30/17 for C6-7 ACDF (anterior cervical discectomy and fusion)  -Surgical risks: blood clots in the leg or lung, problems urinating, nerve damage, drainage from the incision, infection,stiffness  - Pre-operative physical with primary care physician within 30 days of surgical date. Pre op completed 3/20/17.   -1 night hospitalization.   -Stop all solid foods 8 hours before surgery.  -Keep drinking clear liquids until 4 hours before surgery. Clear liquids include water, clear juice, black coffee, or clear tea without milk, Gatorade, clear soda.   - Discontinue Aspirin, NSAIDs (Advil/Ibuprofen, Naproxen,Nuprin,Relafen/Nabumetone, Diclofenac,Meloxicam, Aleve, Celebrex) x 7 days prior to surgical date.  - May try tylenol for pain 1000 mg three times per day for pain  -Do not begin taking Non-Steroidal Anti-Inflammatory Drugs or NSAIDs (Advil/ibuprofen, Naproxen,Nuprin, Relafen/Nabumetone, Diclofenac,Meloxicam, Aleve, Celebrex, Aspirin, etc.) until 12 weeks after surgery. May cause bleeding and interfere with bone healing.  - Post operative incisional pain x 1-2 weeks which will require pain medications and muscle relaxants. You will receive medication upon discharge.  -Do NOT drive while taking narcotic pain medication.  -Post operative incision care- Watch for signs of infection: redness, swelling, warmth, drainage, and fever of 101 degrees or higher. [facility] [phone].  -No submerging incision in water such as pools, hot tubs,baths for at least 8 weeks or until incision is healed. Showers are fine.   - Post operative activity limitations: 6-8 weeks, no lifting > 10 pounds, no bending, twisting, or overhead reaching.  -Orthotics will fit you for a brace in the hospital.Cervical fusion: wear soft collar for up to 2 weeks as needed for comfort.  - Follow up appointments: 6 week post op follow up visit with Nurse practitioner and x-ray prior to appointment.Please  call to schedule follow up appointment at [phone].  -If you are currently employed, you will need to be off work for 4-6 weeks for post op recovery and healing.

## 2017-03-24 NOTE — PHARMACY-ADMISSION MEDICATION HISTORY
Med rec completed by pre admitting EDEN MONTEIRO      Prior to Admission medications    Medication Sig Last Dose Taking? Auth Provider   oxyCODONE-acetaminophen (PERCOCET) 5-325 MG per tablet Take 1-2 tablets by mouth every 4 hours as needed for moderate to severe pain  Yes Joseph Klein MD   butalbital-acetaminophen-caffeine (FIORICET/ESGIC) -40 MG per tablet Take 1 tablet by mouth every 4 hours as needed  Yes Jordana Naqvi PA-C   cyclobenzaprine (FLEXERIL) 10 MG tablet TAKE 1 TABLET BY MOUTH AT BEDTIME AS NEEDED FOR MUSCLE SPASMS  Yes Jordana Naqvi PA-C   varenicline (CHANTIX STARTING MONTH PAK) 0.5 MG X 11 & 1 MG X 42 tablet Take 0.5 mg tab daily for 3 days, then 0.5 mg tab twice daily for 4 days, then 1 mg twice daily.  Yes Jordana Naqvi PA-C   varenicline (CHANTIX) 1 MG tablet Take 1 tablet (1 mg) by mouth 2 times daily  Yes Jordana Naqvi PA-C   diclofenac (VOLTAREN) 75 MG EC tablet Take 1 tablet (75 mg) by mouth 2 times daily  Yes Rg Franco MD   oxybutynin (DITROPAN-XL) 5 MG 24 hr tablet Take 2 tablets (10 mg) by mouth daily  Yes Jordana Tavarez PA-C   atorvastatin (LIPITOR) 80 MG tablet TAKE 1 TABLET BY MOUTH ONCE DAILY  Yes Jordana Naqvi PA-C   acetaminophen (TYLENOL) 500 MG tablet Take 500-1,000 mg by mouth every 6 hours as needed.  Yes Reported, Patient

## 2017-03-24 NOTE — TELEPHONE ENCOUNTER
Pre-operative Education    Education included but not limited to:  - Pre-operative physical with primary care physician within 30 days of surgical date.Pre op completed 3/20/17 at [facility].    - Pre-operative clearance (cardiology, hematology, etc).   - Discontinue Aspirin, NSAIDs, Diclofenac x 7 days prior to surgical date.   -May try tylenol for pain 1000 mg three times per day for pain  -Do not begin taking Non-Steroidal Anti-Inflammatory Drugs or NSAIDs (Advil,Motrin, Ibuprofen,Nuprin, Diclofenac,Meloxicam, Aleve, Celebrex, Aspirin, etc.) until 12 weeks after surgery if you had a fusion. May cause bleeding and interfere with bone healing.  -Patient is a current smoker. She is actively trying to quit.Patient educated on the importance of smoking cessation and how smoking inhibits healing. Patient received handout of resources to quit smoking. Patient was offered prescription for smoking cessation. Patient is on chantix.  -Forms to be completed: none per patient   -Surgical risks: blood clots in the leg or lung, problems urinating, nerve damage, drainage from the incision, infection,stiffness  -Preparation timeline  - When to start NPO            -Special bathing procedure.Patient received Hibiclens and showering instruction sheet.   Hospital stay:  Checking in  The surgery itself   Recovery room  - Transition to the hospital room where you will begin your recovery  - Managing pain   -  1 night hospitalization  - Post operative incisional pain x 1-2 weeks which will require pain medications and muscle relaxants.   -Do NOT drive while taking narcotic pain medication.  -Post operative incision care-Keep your incision clean and dry at all times. OK to remove dressing on postop day 2. OK to shower on postop day 3 and allow water to run over incision, pat dry after shower. No bathing, swimming or submerging in water. Call immediately or come to ED if any drainage occurs, or you develop new pain.  Watch for signs of infection: redness, swelling, warmth, drainage, and fever of 101 degrees or higher. Notify clinic.   - Post operative activity limitations: 6-8 weeks, no lifting > 10 pounds, no bending, twisting, or overhead reaching.  -Orthotics will fit you for a brace in the hospital.Cervical fusion: wear soft collar for up to 2 weeks as needed for comfort.  - Follow up appointments in 6 weeks with Nurse Practitioner with X-ray prior. Please call to schedule follow up appointment at [phone].   - Spine Education book was also given to the patient for further review.  Patient instruction sheet emailed to patient at [email or web address] on 3/24/17.      Patient verbalized understanding of above instructions. All questions were answered to the best of my ability and the patient's satisfaction. Patient advised to call with any additional questions or concerns.  Mayra Gilliam RN

## 2017-03-27 NOTE — H&P (VIEW-ONLY)
[address] [address]  [phone]  Dept: [phone]    PRE-OP EVALUATION:  Today's date: 3/20/2017    Margo Inman (: 1960) presents for pre-operative evaluation assessment as requested by Joseph Best.  She requires evaluation and anesthesia risk assessment prior to undergoing surgery/procedure for treatment of C6-C7 anterior cervical discectomy and fusion .  Proposed procedure: COMBINED DECOMPRESSION, FUSION CERVICAL ANTERIOR ONE LEVEL    Date of Surgery/ Procedure: 2017  Time of Surgery/ Procedure: 1:50PM  Hospital/Surgical Facility: [facility]   Fax number for surgical facility: [phone]  Primary Physician: Jordana Naqvi  Type of Anesthesia Anticipated: General    Patient has a Health Care Directive or Living Will:  NO    1. NO - Do you have a history of heart attack, stroke, stent, bypass or surgery on an artery in the head, neck, heart or legs?  2. YES - Do you ever have any pain or discomfort in your chest? Has history of pleuritic chest pain worse with deep breath, cough, and movement, not currently having pain  3. NO - Do you have a history of  Heart Failure?  4. NO - Are you troubled by shortness of breath when: walking on the level, up a slight hill or at night?  5. NO - Do you currently have a cold, bronchitis or other respiratory infection?  6. NO - Do you have a cough, shortness of breath or wheezing?  7. YES- Do you sometimes get pains in the calves of your legs when you walk? Since she had knee replacements  8. Unknown - Do you or anyone in your family have previous history of blood clots?  9. NO - Do you or does anyone in your family have a serious bleeding problem such as prolonged bleeding following surgeries or cuts?  10. YES - Have you ever had problems with anemia or been told to take iron pills? Anemia when still menstruating  11. NO - Have you had any abnormal  blood loss such as black, tarry or bloody stools, or abnormal vaginal bleeding?  12. YES - Have you ever had a blood transfusion?  13. YES- Have you or any of your relatives ever had problems with anesthesia? Takes a long time to wake up/nausea  14. NO - Do you have sleep apnea, excessive snoring or daytime drowsiness?  15. NO - Do you have any prosthetic heart valves?  16. YES - Do you have prosthetic joints? Bilateral knees  17. NO - Is there any chance that you may be pregnant?      HPI:                                                      Brief HPI related to upcoming procedure: Patient has chronic neck pain, RUE pain and weakness.  She is scheduled for  C6-C7 anterior cervical discectomy and fusion 3/30/17.      See problem list for active medical problems.  Problems all longstanding and stable, except as noted/documented.  See ROS for pertinent symptoms related to these conditions.                                                                                                  .  HYPERLIPIDEMIA - Patient has a long history of significant Hyperlipidemia requiring medication for treatment with recent good control. Patient reports no problems or side effects with the medication.                                                                                                                                           SMOKER- patient currently smoking 1/2 ppd, started Chantix last week, is highly motivated to quit, 15 pk year history.           .    MEDICAL HISTORY:                                                      Patient Active Problem List    Diagnosis Date Noted     Radiculopathy of cervical spine 03/20/2017     Priority: Medium     Cervical high risk HPV (human papillomavirus) test positive 01/10/2017     Priority: Medium     2000, 2003, and 2009 NIL paps. in Care Everywhere.  9/5/13 NIL pap  1/10/17 NIL pap/+ HR HPV (not 16 or 18). Plan: cotest in 1 year, due by 1/10/18       Neck pain 12/09/2016      Priority: Medium     Tension headache 12/09/2016     Priority: Medium     Microscopic hematuria 06/02/2016     Priority: Medium     Recommend f/u with primary MD for UA, blood pressure, and urine cytology at 6, 12, 24, and 36 months from this time.  If negative for 3 years then no further monitoring needed.      Refer back to urology for any of the following:              -cytology is suspicious or positive              -gross hematuria              -irritative voiding symptoms with evidence of infection/ worsening dysuria or urgency.     If persistent microscopic hematuria WITH: hypertension, proteinuria, or glomerular/dysmorphic RBCs in urine then evaluate for primary renal disease/refer instead to nephrology.        Tobacco dependency 03/06/2015     Priority: Medium     Elevated blood pressure reading without diagnosis of hypertension 11/03/2014     Priority: Medium     S/P carpal tunnel release 07/22/2014     Priority: Medium     Trigger thumb 07/22/2014     Priority: Medium     CTS (carpal tunnel syndrome) - bilateral 05/27/2014     Priority: Medium     S/P total knee arthroplasty juan 01/23/2014     Priority: Medium     Acute posthemorrhagic anemia 01/23/2014     Priority: Medium     Muscle spasm 01/23/2014     Priority: Medium     RLS (restless legs syndrome) 01/22/2012     Priority: Medium     Pseudogout 05/05/2011     Priority: Medium     OA (OSTEOARTHRITIS) OF KNEE - bilateral 05/05/2011     Priority: Medium     Hyperlipidemia LDL goal <130 10/31/2010     Priority: Medium      Past Medical History   Diagnosis Date     Arthritis      Cervical high risk HPV (human papillomavirus) test positive 1/10/2017     1/10/17 NIL pap/+ HR HPV (not 16 or 18). Plan: cotest in 1 year, due by 1/10/18      Past Surgical History   Procedure Laterality Date     Orthopedic surgery       Appendectomy       Gyn surgery       Ent surgery       Tonsils       Ectopic pregnancy surgery       Hc knee scope,med/lat menisectomy  9/16/11      left, with partial medial menisectomy ONLY     C part remv femur/prox tib/fib  9/16/11     left, open lateral patellar bone spur excision     Arthroscopy knee  9/16/2011     Procedure:ARTHROSCOPY KNEE; left knee arthroscopy with debridement, open lateral patellar spur excision; Surgeon:RG MONTOYA; Location:[facility] OR     C total knee arthroplasty  1/17/14     Bilateral     Hc revise median n/carpal tunnel surg Left 7/11/14     PRIMARY - not revision     Hc incision tendon sheath finger Left 7/11/14     Thumb TF release     Release carpal tunnel  7/11/2014     Procedure: RELEASE CARPAL TUNNEL;  Surgeon: Rg Montoya MD;  Location: [facility]     Release trigger finger  7/11/2014     Procedure: RELEASE TRIGGER FINGER;  Surgeon: Rg Montoya MD;  Location: [facility]     Release carpal tunnel Right 11/7/2014     Procedure: RELEASE CARPAL TUNNEL;  Surgeon: Rg Montoya MD;  Location: [facility]     Release trigger finger Right 11/7/2014     Procedure: RELEASE TRIGGER FINGER;  Surgeon: Rg Montoya MD;  Location: [facility]     Colonoscopy N/A 2/16/2016     Procedure: COLONOSCOPY;  Surgeon: Belem Khalil MD;  Location: [facility]     Colonoscopy with co2 insufflation N/A 2/16/2016     Procedure: COLONOSCOPY WITH CO2 INSUFFLATION;  Surgeon: Belem Khalil MD;  Location: [facility]     Colonoscopy N/A 2/16/2016     Procedure: COMBINED COLONOSCOPY, SINGLE OR MULTIPLE BIOPSY/POLYPECTOMY BY BIOPSY;  Surgeon: Belem Khalil MD;  Location: [facility]     Cystoscopy  2016     microscopic hematuria     Current Outpatient Prescriptions   Medication Sig Dispense Refill     oxyCODONE-acetaminophen (PERCOCET) 5-325 MG per tablet Take 1-2 tablets by mouth every 4 hours as needed for moderate to severe pain 50 tablet 0     butalbital-acetaminophen-caffeine (FIORICET/ESGIC) -40 MG per tablet Take 1 tablet by mouth every 4 hours as needed 28 tablet 1     cyclobenzaprine  "(FLEXERIL) 10 MG tablet TAKE 1 TABLET BY MOUTH AT BEDTIME AS NEEDED FOR MUSCLE SPASMS 90 tablet 0     varenicline (CHANTIX STARTING MONTH EVANGELINA) 0.5 MG X 11 & 1 MG X 42 tablet Take 0.5 mg tab daily for 3 days, then 0.5 mg tab twice daily for 4 days, then 1 mg twice daily. 53 tablet 0     varenicline (CHANTIX) 1 MG tablet Take 1 tablet (1 mg) by mouth 2 times daily 56 tablet 4     diclofenac (VOLTAREN) 75 MG EC tablet Take 1 tablet (75 mg) by mouth 2 times daily 60 tablet 11     oxybutynin (DITROPAN-XL) 5 MG 24 hr tablet Take 2 tablets (10 mg) by mouth daily 30 tablet 1     atorvastatin (LIPITOR) 80 MG tablet TAKE 1 TABLET BY MOUTH ONCE DAILY 90 tablet 3     acetaminophen (TYLENOL) 500 MG tablet Take 500-1,000 mg by mouth every 6 hours as needed.       OTC products: None, except as noted above    Allergies   Allergen Reactions     Bee Anaphylaxis     Erythromycin Hives     Wasps [Hornets] Anaphylaxis      Latex Allergy: NO    Social History   Substance Use Topics     Smoking status: Current Every Day Smoker     Packs/day: 1.00     Years: 15.00     Types: Cigarettes     Smokeless tobacco: Never Used     Alcohol use Yes      Comment: socially     History   Drug Use No       REVIEW OF SYSTEMS:                                                    Constitutional, HEENT, cardiovascular, pulmonary, gi and gu systems are negative, except as otherwise noted.    EXAM:                                                    /78 (BP Location: Left arm, Patient Position: Chair, Cuff Size: Adult Regular)  Pulse 86  Temp 97.8  F (36.6  C) (Oral)  Ht 5' 1.75\" (1.568 m)  Wt 193 lb 6.4 oz (87.7 kg)  LMP 09/16/2011  SpO2 97%  BMI 35.66 kg/m2    GENERAL APPEARANCE: healthy, alert and no distress     EYES: EOMI, PERRL     HENT: ear canals and TM's normal and nose and mouth without ulcers or lesions     NECK: no adenopathy, no asymmetry, masses, or scars and thyroid normal to palpation     RESP: lungs clear to auscultation - no rales, " rhonchi or wheezes     CV: regular rates and rhythm, normal S1 S2, no S3 or S4 and no murmur, click or rub     ABDOMEN:  soft, nontender, no HSM or masses and bowel sounds normal     MS: extremities normal- no gross deformities noted, no evidence of inflammation in joints, FROM in all extremities.     SKIN: no suspicious lesions or rashes     NEURO: Normal strength and tone, sensory exam grossly normal, mentation intact and speech normal     PSYCH: mentation appears normal. and affect normal/bright     LYMPHATICS: No axillary, cervical, or supraclavicular nodes    DIAGNOSTICS:                                                    EKG done 1/5/17: appears normal, NSR, normal axis, normal intervals, no acute ST/T changes c/w ischemia, no LVH by voltage criteria, unchanged from previous tracings  Hemoglobin (indicated for history of anemia or procedure with significant blood loss such as tonsillectomy, major intraperitoneal surgery, vascular surgery, major spine surgery, total joint replacement)  Serum Creatinine    Recent Labs   Lab Test  01/10/17   1243  05/26/16   1406   01/10/14   1152   HGB  13.8  14.2   --   13.6   PLT  277  311   --   320   INR   --    --    --   0.90   NA  140  138   < >  138   POTASSIUM  4.3  4.0   < >  4.5   CR  0.56  0.58   < >  0.59    < > = values in this interval not displayed.      Component      Latest Ref Rng & Units 3/20/2017   Creatinine      0.52 - 1.04 mg/dL 0.58   GFR Estimate      >60 mL/min/1.7m2 >90 . . .   GFR Estimate If Black      >60 mL/min/1.7m2 >90 . . .   Hemoglobin      11.7 - 15.7 g/dL 13.9       IMPRESSION:                                                    Reason for surgery/procedure: chronic neck pain, RUE pain and weakness/Proposed procedure: COMBINED DECOMPRESSION, FUSION CERVICAL ANTERIOR ONE LEVEL  Diagnosis/reason for consult: preoperative physical    The proposed surgical procedure is considered INTERMEDIATE risk.    REVISED CARDIAC RISK INDEX  The patient has  the following serious cardiovascular risks for perioperative complications such as (MI, PE, VFib and 3  AV Block):  No serious cardiac risks  INTERPRETATION: 0 risks: Class I (very low risk - 0.4% complication rate)    The patient has the following additional risks for perioperative complications:  No identified additional risks  The 10-year ASCVD risk score (Christina CLARKE Jr., et al., 2013) is: 5%    Values used to calculate the score:      Age: 57 years      Sex: Female      Is Non- : No      Diabetic: No      Tobacco smoker: Yes      Systolic Blood Pressure: 123 mmHg      Is Prescribed Antihypertensives: No      HDL Cholesterol: 46 mg/dL      Total Cholesterol: 190 mg/dL      ICD-10-CM    1. Preop general physical exam Z01.818 Hemoglobin     Creatinine   2. Radiculopathy of cervical spine M54.12    3. Hyperlipidemia LDL goal <130 E78.5    4. Elevated blood pressure reading without diagnosis of hypertension R03.0    5. Needs smoking cessation education F17.200 NOVU Referral Smoking Cessation       RECOMMENDATIONS:                                                      --Consult hospital rounder / IM to assist post-op medical management if nexessary    --Patient is to take all scheduled medications on the day of surgery EXCEPT for modifications listed below.    APPROVAL GIVEN to proceed with proposed procedure, without further diagnostic evaluation       Signed Electronically by: JOSE R Sarabia CNP    Copy of this evaluation report is provided to requesting physician.    [facility] Preop Guidelines

## 2017-03-30 ENCOUNTER — ANESTHESIA (OUTPATIENT)
Dept: SURGERY | Facility: CLINIC | Age: 57
DRG: 028 | End: 2017-03-30
Payer: COMMERCIAL

## 2017-03-30 ENCOUNTER — SURGERY (OUTPATIENT)
Age: 57
End: 2017-03-30

## 2017-03-30 ENCOUNTER — APPOINTMENT (OUTPATIENT)
Dept: GENERAL RADIOLOGY | Facility: CLINIC | Age: 57
DRG: 028 | End: 2017-03-30
Attending: NEUROLOGICAL SURGERY
Payer: COMMERCIAL

## 2017-03-30 ENCOUNTER — ANESTHESIA EVENT (OUTPATIENT)
Dept: SURGERY | Facility: CLINIC | Age: 57
DRG: 028 | End: 2017-03-30
Payer: COMMERCIAL

## 2017-03-30 ENCOUNTER — HOSPITAL ENCOUNTER (INPATIENT)
Facility: CLINIC | Age: 57
LOS: 8 days | Discharge: HOME | DRG: 028 | End: 2017-04-07
Attending: NEUROLOGICAL SURGERY | Admitting: NEUROLOGICAL SURGERY
Payer: COMMERCIAL

## 2017-03-30 DIAGNOSIS — J40 BRONCHITIS: ICD-10-CM

## 2017-03-30 DIAGNOSIS — Z98.1 S/P CERVICAL SPINAL FUSION: Primary | ICD-10-CM

## 2017-03-30 LAB
ABO + RH BLD: NORMAL
ABO + RH BLD: NORMAL
BLD GP AB SCN SERPL QL: NORMAL
BLOOD BANK CMNT PATIENT-IMP: NORMAL
HGB BLD-MCNC: 13.6 G/DL (ref 11.7–15.7)
POTASSIUM SERPL-SCNC: 3.4 MMOL/L (ref 3.4–5.3)
SPECIMEN EXP DATE BLD: NORMAL

## 2017-03-30 PROCEDURE — 25000128 H RX IP 250 OP 636: Performed by: ANESTHESIOLOGY

## 2017-03-30 PROCEDURE — 25000300 ZZH OR RX SURGIFLO HEMOSTATIC MATRIX 10ML 199102S OPNP: Performed by: NEUROLOGICAL SURGERY

## 2017-03-30 PROCEDURE — 22853 INSJ BIOMECHANICAL DEVICE: CPT | Performed by: NEUROLOGICAL SURGERY

## 2017-03-30 PROCEDURE — 40000306 ZZH STATISTIC PRE PROC ASSESS II: Performed by: NEUROLOGICAL SURGERY

## 2017-03-30 PROCEDURE — C1762 CONN TISS, HUMAN(INC FASCIA): HCPCS | Performed by: NEUROLOGICAL SURGERY

## 2017-03-30 PROCEDURE — 86850 RBC ANTIBODY SCREEN: CPT | Performed by: ANESTHESIOLOGY

## 2017-03-30 PROCEDURE — 85018 HEMOGLOBIN: CPT | Performed by: ANESTHESIOLOGY

## 2017-03-30 PROCEDURE — 0RG10A0 FUSION OF CERVICAL VERTEBRAL JOINT WITH INTERBODY FUSION DEVICE, ANTERIOR APPROACH, ANTERIOR COLUMN, OPEN APPROACH: ICD-10-PCS | Performed by: NEUROLOGICAL SURGERY

## 2017-03-30 PROCEDURE — 25000125 ZZHC RX 250: Performed by: NURSE ANESTHETIST, CERTIFIED REGISTERED

## 2017-03-30 PROCEDURE — 86901 BLOOD TYPING SEROLOGIC RH(D): CPT | Performed by: ANESTHESIOLOGY

## 2017-03-30 PROCEDURE — 40000277 XR SURGERY CARM FLUORO LESS THAN 5 MIN W STILLS: Mod: TC

## 2017-03-30 PROCEDURE — 01N10ZZ RELEASE CERVICAL NERVE, OPEN APPROACH: ICD-10-PCS | Performed by: NEUROLOGICAL SURGERY

## 2017-03-30 PROCEDURE — 25000125 ZZHC RX 250: Performed by: ANESTHESIOLOGY

## 2017-03-30 PROCEDURE — 36000069 ZZH SURGERY LEVEL 5 EA 15 ADDTL MIN: Performed by: NEUROLOGICAL SURGERY

## 2017-03-30 PROCEDURE — 25000128 H RX IP 250 OP 636: Performed by: NEUROLOGICAL SURGERY

## 2017-03-30 PROCEDURE — 12000007 ZZH R&B INTERMEDIATE

## 2017-03-30 PROCEDURE — 25000132 ZZH RX MED GY IP 250 OP 250 PS 637: Performed by: HOSPITALIST

## 2017-03-30 PROCEDURE — 0RB30ZZ EXCISION OF CERVICAL VERTEBRAL DISC, OPEN APPROACH: ICD-10-PCS | Performed by: NEUROLOGICAL SURGERY

## 2017-03-30 PROCEDURE — 25000125 ZZHC RX 250: Performed by: NEUROLOGICAL SURGERY

## 2017-03-30 PROCEDURE — 25000132 ZZH RX MED GY IP 250 OP 250 PS 637: Performed by: NEUROLOGICAL SURGERY

## 2017-03-30 PROCEDURE — 27810322 ZZHC OR SPINE - CAGE/SPACER/DISK/CORD/CONNECTOR OPNP: Performed by: NEUROLOGICAL SURGERY

## 2017-03-30 PROCEDURE — 25000128 H RX IP 250 OP 636: Performed by: NURSE ANESTHETIST, CERTIFIED REGISTERED

## 2017-03-30 PROCEDURE — 84132 ASSAY OF SERUM POTASSIUM: CPT | Performed by: ANESTHESIOLOGY

## 2017-03-30 PROCEDURE — 37000009 ZZH ANESTHESIA TECHNICAL FEE, EACH ADDTL 15 MIN: Performed by: NEUROLOGICAL SURGERY

## 2017-03-30 PROCEDURE — 86900 BLOOD TYPING SEROLOGIC ABO: CPT | Performed by: ANESTHESIOLOGY

## 2017-03-30 PROCEDURE — 37000008 ZZH ANESTHESIA TECHNICAL FEE, 1ST 30 MIN: Performed by: NEUROLOGICAL SURGERY

## 2017-03-30 PROCEDURE — 71000013 ZZH RECOVERY PHASE 1 LEVEL 1 EA ADDTL HR: Performed by: NEUROLOGICAL SURGERY

## 2017-03-30 PROCEDURE — 36415 COLL VENOUS BLD VENIPUNCTURE: CPT | Performed by: ANESTHESIOLOGY

## 2017-03-30 PROCEDURE — 25800025 ZZH RX 258: Performed by: NEUROLOGICAL SURGERY

## 2017-03-30 PROCEDURE — 22845 INSERT SPINE FIXATION DEVICE: CPT | Performed by: NEUROLOGICAL SURGERY

## 2017-03-30 PROCEDURE — 25000566 ZZH SEVOFLURANE, EA 15 MIN: Performed by: NEUROLOGICAL SURGERY

## 2017-03-30 PROCEDURE — 27210794 ZZH OR GENERAL SUPPLY STERILE: Performed by: NEUROLOGICAL SURGERY

## 2017-03-30 PROCEDURE — 25800025 ZZH RX 258: Performed by: ANESTHESIOLOGY

## 2017-03-30 PROCEDURE — 36000071 ZZH SURGERY LEVEL 5 W FLUORO 1ST 30 MIN: Performed by: NEUROLOGICAL SURGERY

## 2017-03-30 PROCEDURE — 71000012 ZZH RECOVERY PHASE 1 LEVEL 1 FIRST HR: Performed by: NEUROLOGICAL SURGERY

## 2017-03-30 PROCEDURE — 27210995 ZZH RX 272: Performed by: NEUROLOGICAL SURGERY

## 2017-03-30 PROCEDURE — 22551 ARTHRD ANT NTRBDY CERVICAL: CPT | Performed by: NEUROLOGICAL SURGERY

## 2017-03-30 DEVICE — GRAFT BONE PUTTY DBX 05ML 038050: Type: IMPLANTABLE DEVICE | Site: SPINE CERVICAL | Status: FUNCTIONAL

## 2017-03-30 DEVICE — IMPLANTABLE DEVICE: Type: IMPLANTABLE DEVICE | Site: SPINE CERVICAL | Status: FUNCTIONAL

## 2017-03-30 RX ORDER — CEFAZOLIN SODIUM 1 G/3ML
1 INJECTION, POWDER, FOR SOLUTION INTRAMUSCULAR; INTRAVENOUS SEE ADMIN INSTRUCTIONS
Status: DISCONTINUED | OUTPATIENT
Start: 2017-03-30 | End: 2017-03-30 | Stop reason: HOSPADM

## 2017-03-30 RX ORDER — BUPIVACAINE HYDROCHLORIDE AND EPINEPHRINE 5; 5 MG/ML; UG/ML
INJECTION, SOLUTION PERINEURAL PRN
Status: DISCONTINUED | OUTPATIENT
Start: 2017-03-30 | End: 2017-03-30 | Stop reason: HOSPADM

## 2017-03-30 RX ORDER — ONDANSETRON 2 MG/ML
4 INJECTION INTRAMUSCULAR; INTRAVENOUS EVERY 30 MIN PRN
Status: DISCONTINUED | OUTPATIENT
Start: 2017-03-30 | End: 2017-03-30 | Stop reason: HOSPADM

## 2017-03-30 RX ORDER — HYDROMORPHONE HYDROCHLORIDE 2 MG/1
2-4 TABLET ORAL
Status: DISCONTINUED | OUTPATIENT
Start: 2017-03-30 | End: 2017-04-07 | Stop reason: HOSPADM

## 2017-03-30 RX ORDER — OXYBUTYNIN CHLORIDE 5 MG/1
10 TABLET, EXTENDED RELEASE ORAL DAILY
Status: DISCONTINUED | OUTPATIENT
Start: 2017-03-30 | End: 2017-04-07 | Stop reason: HOSPADM

## 2017-03-30 RX ORDER — GLYCOPYRROLATE 0.2 MG/ML
INJECTION, SOLUTION INTRAMUSCULAR; INTRAVENOUS PRN
Status: DISCONTINUED | OUTPATIENT
Start: 2017-03-30 | End: 2017-03-30

## 2017-03-30 RX ORDER — DIAZEPAM 5 MG
5 TABLET ORAL EVERY 6 HOURS PRN
Status: DISCONTINUED | OUTPATIENT
Start: 2017-03-30 | End: 2017-04-07 | Stop reason: HOSPADM

## 2017-03-30 RX ORDER — OXYCODONE HYDROCHLORIDE 5 MG/1
5-10 TABLET ORAL
Status: DISCONTINUED | OUTPATIENT
Start: 2017-03-30 | End: 2017-04-07 | Stop reason: HOSPADM

## 2017-03-30 RX ORDER — DEXAMETHASONE SODIUM PHOSPHATE 4 MG/ML
INJECTION, SOLUTION INTRA-ARTICULAR; INTRALESIONAL; INTRAMUSCULAR; INTRAVENOUS; SOFT TISSUE PRN
Status: DISCONTINUED | OUTPATIENT
Start: 2017-03-30 | End: 2017-03-30

## 2017-03-30 RX ORDER — METOCLOPRAMIDE 5 MG/1
10 TABLET ORAL EVERY 6 HOURS PRN
Status: DISCONTINUED | OUTPATIENT
Start: 2017-03-30 | End: 2017-04-07 | Stop reason: HOSPADM

## 2017-03-30 RX ORDER — ONDANSETRON 4 MG/1
4 TABLET, ORALLY DISINTEGRATING ORAL EVERY 30 MIN PRN
Status: DISCONTINUED | OUTPATIENT
Start: 2017-03-30 | End: 2017-03-30 | Stop reason: HOSPADM

## 2017-03-30 RX ORDER — OXYCODONE AND ACETAMINOPHEN 5; 325 MG/1; MG/1
1-2 TABLET ORAL EVERY 4 HOURS PRN
Status: DISCONTINUED | OUTPATIENT
Start: 2017-03-30 | End: 2017-04-07 | Stop reason: HOSPADM

## 2017-03-30 RX ORDER — HYDROMORPHONE HYDROCHLORIDE 1 MG/ML
.3-.5 INJECTION, SOLUTION INTRAMUSCULAR; INTRAVENOUS; SUBCUTANEOUS
Status: DISCONTINUED | OUTPATIENT
Start: 2017-03-30 | End: 2017-04-07 | Stop reason: HOSPADM

## 2017-03-30 RX ORDER — PROCHLORPERAZINE MALEATE 5 MG
5-10 TABLET ORAL EVERY 6 HOURS PRN
Status: DISCONTINUED | OUTPATIENT
Start: 2017-03-30 | End: 2017-04-07 | Stop reason: HOSPADM

## 2017-03-30 RX ORDER — ONDANSETRON 2 MG/ML
4 INJECTION INTRAMUSCULAR; INTRAVENOUS EVERY 6 HOURS PRN
Status: DISCONTINUED | OUTPATIENT
Start: 2017-03-30 | End: 2017-04-07 | Stop reason: HOSPADM

## 2017-03-30 RX ORDER — PROPOFOL 10 MG/ML
INJECTION, EMULSION INTRAVENOUS PRN
Status: DISCONTINUED | OUTPATIENT
Start: 2017-03-30 | End: 2017-03-30

## 2017-03-30 RX ORDER — DEXAMETHASONE SODIUM PHOSPHATE 4 MG/ML
4 INJECTION, SOLUTION INTRA-ARTICULAR; INTRALESIONAL; INTRAMUSCULAR; INTRAVENOUS; SOFT TISSUE EVERY 6 HOURS
Status: COMPLETED | OUTPATIENT
Start: 2017-03-30 | End: 2017-03-31

## 2017-03-30 RX ORDER — NALOXONE HYDROCHLORIDE 0.4 MG/ML
.1-.4 INJECTION, SOLUTION INTRAMUSCULAR; INTRAVENOUS; SUBCUTANEOUS
Status: DISCONTINUED | OUTPATIENT
Start: 2017-03-30 | End: 2017-04-07 | Stop reason: HOSPADM

## 2017-03-30 RX ORDER — ATORVASTATIN CALCIUM 40 MG/1
80 TABLET, FILM COATED ORAL DAILY
Status: DISCONTINUED | OUTPATIENT
Start: 2017-03-30 | End: 2017-04-07 | Stop reason: HOSPADM

## 2017-03-30 RX ORDER — NEOSTIGMINE METHYLSULFATE 1 MG/ML
VIAL (ML) INJECTION PRN
Status: DISCONTINUED | OUTPATIENT
Start: 2017-03-30 | End: 2017-03-30

## 2017-03-30 RX ORDER — SODIUM CHLORIDE, SODIUM LACTATE, POTASSIUM CHLORIDE, CALCIUM CHLORIDE 600; 310; 30; 20 MG/100ML; MG/100ML; MG/100ML; MG/100ML
INJECTION, SOLUTION INTRAVENOUS CONTINUOUS
Status: DISCONTINUED | OUTPATIENT
Start: 2017-03-30 | End: 2017-03-30 | Stop reason: HOSPADM

## 2017-03-30 RX ORDER — CEFAZOLIN SODIUM 2 G/100ML
2 INJECTION, SOLUTION INTRAVENOUS EVERY 8 HOURS
Status: DISCONTINUED | OUTPATIENT
Start: 2017-03-30 | End: 2017-03-31

## 2017-03-30 RX ORDER — ACETAMINOPHEN 325 MG/1
975 TABLET ORAL EVERY 8 HOURS
Status: COMPLETED | OUTPATIENT
Start: 2017-03-30 | End: 2017-04-02

## 2017-03-30 RX ORDER — PROPOFOL 10 MG/ML
INJECTION, EMULSION INTRAVENOUS CONTINUOUS PRN
Status: DISCONTINUED | OUTPATIENT
Start: 2017-03-30 | End: 2017-03-30

## 2017-03-30 RX ORDER — CEFAZOLIN SODIUM 2 G/100ML
2 INJECTION, SOLUTION INTRAVENOUS
Status: COMPLETED | OUTPATIENT
Start: 2017-03-30 | End: 2017-03-30

## 2017-03-30 RX ORDER — VARENICLINE TARTRATE 1 MG/1
1 TABLET, FILM COATED ORAL 2 TIMES DAILY
Status: DISCONTINUED | OUTPATIENT
Start: 2017-03-30 | End: 2017-04-07 | Stop reason: HOSPADM

## 2017-03-30 RX ORDER — FENTANYL CITRATE 50 UG/ML
INJECTION, SOLUTION INTRAMUSCULAR; INTRAVENOUS PRN
Status: DISCONTINUED | OUTPATIENT
Start: 2017-03-30 | End: 2017-03-30

## 2017-03-30 RX ORDER — LIDOCAINE 40 MG/G
CREAM TOPICAL
Status: DISCONTINUED | OUTPATIENT
Start: 2017-03-30 | End: 2017-04-07 | Stop reason: HOSPADM

## 2017-03-30 RX ORDER — SODIUM CHLORIDE AND POTASSIUM CHLORIDE 150; 900 MG/100ML; MG/100ML
INJECTION, SOLUTION INTRAVENOUS CONTINUOUS
Status: DISCONTINUED | OUTPATIENT
Start: 2017-03-30 | End: 2017-04-03

## 2017-03-30 RX ORDER — NALOXONE HYDROCHLORIDE 0.4 MG/ML
.1-.4 INJECTION, SOLUTION INTRAMUSCULAR; INTRAVENOUS; SUBCUTANEOUS
Status: DISCONTINUED | OUTPATIENT
Start: 2017-03-30 | End: 2017-03-30 | Stop reason: HOSPADM

## 2017-03-30 RX ORDER — FENTANYL CITRATE 50 UG/ML
25-50 INJECTION, SOLUTION INTRAMUSCULAR; INTRAVENOUS
Status: DISCONTINUED | OUTPATIENT
Start: 2017-03-30 | End: 2017-03-30 | Stop reason: HOSPADM

## 2017-03-30 RX ORDER — METOCLOPRAMIDE HYDROCHLORIDE 5 MG/ML
10 INJECTION INTRAMUSCULAR; INTRAVENOUS EVERY 6 HOURS PRN
Status: DISCONTINUED | OUTPATIENT
Start: 2017-03-30 | End: 2017-04-07 | Stop reason: HOSPADM

## 2017-03-30 RX ORDER — ACETAMINOPHEN 325 MG/1
650 TABLET ORAL EVERY 4 HOURS PRN
Status: DISCONTINUED | OUTPATIENT
Start: 2017-04-02 | End: 2017-03-31

## 2017-03-30 RX ORDER — ACETAMINOPHEN 325 MG/1
650 TABLET ORAL EVERY 4 HOURS PRN
Status: DISCONTINUED | OUTPATIENT
Start: 2017-04-02 | End: 2017-04-07 | Stop reason: HOSPADM

## 2017-03-30 RX ORDER — LIDOCAINE 40 MG/G
CREAM TOPICAL
Status: DISCONTINUED | OUTPATIENT
Start: 2017-03-30 | End: 2017-03-30 | Stop reason: HOSPADM

## 2017-03-30 RX ORDER — ONDANSETRON 2 MG/ML
INJECTION INTRAMUSCULAR; INTRAVENOUS PRN
Status: DISCONTINUED | OUTPATIENT
Start: 2017-03-30 | End: 2017-03-30

## 2017-03-30 RX ORDER — ONDANSETRON 4 MG/1
4 TABLET, ORALLY DISINTEGRATING ORAL EVERY 6 HOURS PRN
Status: DISCONTINUED | OUTPATIENT
Start: 2017-03-30 | End: 2017-04-07 | Stop reason: HOSPADM

## 2017-03-30 RX ORDER — LIDOCAINE HYDROCHLORIDE 10 MG/ML
INJECTION, SOLUTION INFILTRATION; PERINEURAL PRN
Status: DISCONTINUED | OUTPATIENT
Start: 2017-03-30 | End: 2017-03-30

## 2017-03-30 RX ORDER — GUAIFENESIN/DEXTROMETHORPHAN 100-10MG/5
10 SYRUP ORAL EVERY 6 HOURS PRN
Status: DISCONTINUED | OUTPATIENT
Start: 2017-03-30 | End: 2017-04-07 | Stop reason: HOSPADM

## 2017-03-30 RX ORDER — HYDROMORPHONE HYDROCHLORIDE 1 MG/ML
.3-.5 INJECTION, SOLUTION INTRAMUSCULAR; INTRAVENOUS; SUBCUTANEOUS EVERY 10 MIN PRN
Status: DISCONTINUED | OUTPATIENT
Start: 2017-03-30 | End: 2017-03-30 | Stop reason: HOSPADM

## 2017-03-30 RX ORDER — MEPERIDINE HYDROCHLORIDE 25 MG/ML
12.5 INJECTION INTRAMUSCULAR; INTRAVENOUS; SUBCUTANEOUS
Status: DISCONTINUED | OUTPATIENT
Start: 2017-03-30 | End: 2017-03-30 | Stop reason: HOSPADM

## 2017-03-30 RX ORDER — ACETAMINOPHEN 10 MG/ML
1000 INJECTION, SOLUTION INTRAVENOUS ONCE
Status: COMPLETED | OUTPATIENT
Start: 2017-03-30 | End: 2017-03-30

## 2017-03-30 RX ORDER — AMOXICILLIN 250 MG
1-2 CAPSULE ORAL 2 TIMES DAILY
Status: DISCONTINUED | OUTPATIENT
Start: 2017-03-30 | End: 2017-04-07 | Stop reason: HOSPADM

## 2017-03-30 RX ORDER — ACETAMINOPHEN 325 MG/1
975 TABLET ORAL EVERY 8 HOURS
Status: DISCONTINUED | OUTPATIENT
Start: 2017-03-30 | End: 2017-03-30

## 2017-03-30 RX ORDER — BUTALBITAL, ACETAMINOPHEN AND CAFFEINE 50; 325; 40 MG/1; MG/1; MG/1
1 TABLET ORAL EVERY 4 HOURS PRN
Status: DISCONTINUED | OUTPATIENT
Start: 2017-03-30 | End: 2017-04-07 | Stop reason: HOSPADM

## 2017-03-30 RX ADMIN — HYDROMORPHONE HYDROCHLORIDE 4 MG: 2 TABLET ORAL at 19:07

## 2017-03-30 RX ADMIN — DEXAMETHASONE SODIUM PHOSPHATE 4 MG: 4 INJECTION, SOLUTION INTRAMUSCULAR; INTRAVENOUS at 15:57

## 2017-03-30 RX ADMIN — BUPIVACAINE HYDROCHLORIDE AND EPINEPHRINE BITARTRATE 4 ML: 5; .005 INJECTION, SOLUTION EPIDURAL; INTRACAUDAL; PERINEURAL at 12:34

## 2017-03-30 RX ADMIN — ROCURONIUM BROMIDE 35 MG: 10 INJECTION INTRAVENOUS at 10:43

## 2017-03-30 RX ADMIN — OXYCODONE HYDROCHLORIDE 10 MG: 5 TABLET ORAL at 22:01

## 2017-03-30 RX ADMIN — ACETAMINOPHEN 975 MG: 325 TABLET, FILM COATED ORAL at 15:57

## 2017-03-30 RX ADMIN — ROCURONIUM BROMIDE 15 MG: 10 INJECTION INTRAVENOUS at 11:13

## 2017-03-30 RX ADMIN — FENTANYL CITRATE 100 MCG: 50 INJECTION, SOLUTION INTRAMUSCULAR; INTRAVENOUS at 11:18

## 2017-03-30 RX ADMIN — FENTANYL CITRATE 50 MCG: 50 INJECTION INTRAMUSCULAR; INTRAVENOUS at 13:08

## 2017-03-30 RX ADMIN — FENTANYL CITRATE 50 MCG: 50 INJECTION INTRAMUSCULAR; INTRAVENOUS at 13:47

## 2017-03-30 RX ADMIN — PHENYLEPHRINE HYDROCHLORIDE 200 MCG: 10 INJECTION, SOLUTION INTRAMUSCULAR; INTRAVENOUS; SUBCUTANEOUS at 11:09

## 2017-03-30 RX ADMIN — POTASSIUM CHLORIDE AND SODIUM CHLORIDE: 900; 150 INJECTION, SOLUTION INTRAVENOUS at 17:13

## 2017-03-30 RX ADMIN — PHENYLEPHRINE HYDROCHLORIDE 200 MCG: 10 INJECTION, SOLUTION INTRAMUSCULAR; INTRAVENOUS; SUBCUTANEOUS at 11:03

## 2017-03-30 RX ADMIN — LIDOCAINE HYDROCHLORIDE 50 MG: 10 INJECTION, SOLUTION INFILTRATION; PERINEURAL at 10:43

## 2017-03-30 RX ADMIN — PHENYLEPHRINE HYDROCHLORIDE 100 MCG: 10 INJECTION, SOLUTION INTRAMUSCULAR; INTRAVENOUS; SUBCUTANEOUS at 11:49

## 2017-03-30 RX ADMIN — GENTAMICIN SULFATE 500 ML: 40 INJECTION, SOLUTION INTRAMUSCULAR; INTRAVENOUS at 12:35

## 2017-03-30 RX ADMIN — VARENICLINE TARTRATE 1 MG: 1 TABLET, FILM COATED ORAL at 19:08

## 2017-03-30 RX ADMIN — SODIUM CHLORIDE, POTASSIUM CHLORIDE, SODIUM LACTATE AND CALCIUM CHLORIDE: 600; 310; 30; 20 INJECTION, SOLUTION INTRAVENOUS at 09:27

## 2017-03-30 RX ADMIN — THROMBIN, TOPICAL (BOVINE) 5000 UNITS: KIT at 12:34

## 2017-03-30 RX ADMIN — SENNOSIDES AND DOCUSATE SODIUM 1 TABLET: 8.6; 5 TABLET ORAL at 19:07

## 2017-03-30 RX ADMIN — PHENYLEPHRINE HYDROCHLORIDE 200 MCG: 10 INJECTION, SOLUTION INTRAMUSCULAR; INTRAVENOUS; SUBCUTANEOUS at 10:58

## 2017-03-30 RX ADMIN — HYDROMORPHONE HYDROCHLORIDE 0.5 MG: 1 INJECTION, SOLUTION INTRAMUSCULAR; INTRAVENOUS; SUBCUTANEOUS at 20:21

## 2017-03-30 RX ADMIN — CEFAZOLIN SODIUM 2 G: 2 INJECTION, SOLUTION INTRAVENOUS at 10:38

## 2017-03-30 RX ADMIN — HYDROMORPHONE HYDROCHLORIDE 4 MG: 2 TABLET ORAL at 15:57

## 2017-03-30 RX ADMIN — FENTANYL CITRATE 50 MCG: 50 INJECTION INTRAMUSCULAR; INTRAVENOUS at 14:24

## 2017-03-30 RX ADMIN — DEXAMETHASONE SODIUM PHOSPHATE 4 MG: 4 INJECTION, SOLUTION INTRAMUSCULAR; INTRAVENOUS at 20:20

## 2017-03-30 RX ADMIN — PROPOFOL 50 MCG/KG/MIN: 10 INJECTION, EMULSION INTRAVENOUS at 10:49

## 2017-03-30 RX ADMIN — Medication 3 MG: at 12:09

## 2017-03-30 RX ADMIN — Medication 2 LOZENGE: at 20:29

## 2017-03-30 RX ADMIN — SODIUM CHLORIDE, POTASSIUM CHLORIDE, SODIUM LACTATE AND CALCIUM CHLORIDE: 600; 310; 30; 20 INJECTION, SOLUTION INTRAVENOUS at 12:04

## 2017-03-30 RX ADMIN — HYDROMORPHONE HYDROCHLORIDE 0.5 MG: 1 INJECTION, SOLUTION INTRAMUSCULAR; INTRAVENOUS; SUBCUTANEOUS at 17:13

## 2017-03-30 RX ADMIN — FENTANYL CITRATE 150 MCG: 50 INJECTION, SOLUTION INTRAMUSCULAR; INTRAVENOUS at 10:43

## 2017-03-30 RX ADMIN — FENTANYL CITRATE 50 MCG: 50 INJECTION INTRAMUSCULAR; INTRAVENOUS at 13:14

## 2017-03-30 RX ADMIN — HYDROMORPHONE HYDROCHLORIDE 0.5 MG: 1 INJECTION, SOLUTION INTRAMUSCULAR; INTRAVENOUS; SUBCUTANEOUS at 13:15

## 2017-03-30 RX ADMIN — PHENYLEPHRINE HYDROCHLORIDE 100 MCG: 10 INJECTION, SOLUTION INTRAMUSCULAR; INTRAVENOUS; SUBCUTANEOUS at 12:08

## 2017-03-30 RX ADMIN — ONDANSETRON 4 MG: 2 INJECTION INTRAMUSCULAR; INTRAVENOUS at 12:05

## 2017-03-30 RX ADMIN — HYDROMORPHONE HYDROCHLORIDE 0.5 MG: 1 INJECTION, SOLUTION INTRAMUSCULAR; INTRAVENOUS; SUBCUTANEOUS at 13:33

## 2017-03-30 RX ADMIN — MIDAZOLAM HYDROCHLORIDE 2 MG: 1 INJECTION, SOLUTION INTRAMUSCULAR; INTRAVENOUS at 10:37

## 2017-03-30 RX ADMIN — CEFAZOLIN SODIUM 2 G: 2 INJECTION, SOLUTION INTRAVENOUS at 19:07

## 2017-03-30 RX ADMIN — THROMBIN HUMAN 12 ML: 2000 POWDER, FOR SOLUTION TOPICAL at 12:34

## 2017-03-30 RX ADMIN — DEXAMETHASONE SODIUM PHOSPHATE 8 MG: 4 INJECTION, SOLUTION INTRAMUSCULAR; INTRAVENOUS at 10:43

## 2017-03-30 RX ADMIN — GLYCOPYRROLATE 0.6 MG: 0.2 INJECTION, SOLUTION INTRAMUSCULAR; INTRAVENOUS at 12:09

## 2017-03-30 RX ADMIN — SODIUM CHLORIDE, POTASSIUM CHLORIDE, SODIUM LACTATE AND CALCIUM CHLORIDE: 600; 310; 30; 20 INJECTION, SOLUTION INTRAVENOUS at 11:08

## 2017-03-30 RX ADMIN — ATORVASTATIN CALCIUM 80 MG: 40 TABLET, FILM COATED ORAL at 17:13

## 2017-03-30 RX ADMIN — PROPOFOL 200 MG: 10 INJECTION, EMULSION INTRAVENOUS at 10:43

## 2017-03-30 RX ADMIN — HYDROMORPHONE HYDROCHLORIDE 0.5 MG: 1 INJECTION, SOLUTION INTRAMUSCULAR; INTRAVENOUS; SUBCUTANEOUS at 12:01

## 2017-03-30 RX ADMIN — GUAIFENESIN AND DEXTROMETHORPHAN 10 ML: 100; 10 SYRUP ORAL at 23:24

## 2017-03-30 RX ADMIN — PHENYLEPHRINE HYDROCHLORIDE 200 MCG: 10 INJECTION, SOLUTION INTRAMUSCULAR; INTRAVENOUS; SUBCUTANEOUS at 10:53

## 2017-03-30 RX ADMIN — ACETAMINOPHEN 1000 MG: 10 INJECTION, SOLUTION INTRAVENOUS at 13:40

## 2017-03-30 ASSESSMENT — ENCOUNTER SYMPTOMS
DYSRHYTHMIAS: 0
SEIZURES: 0

## 2017-03-30 ASSESSMENT — LIFESTYLE VARIABLES: TOBACCO_USE: 1

## 2017-03-30 NOTE — PLAN OF CARE
Dr. Samuel consulted on 101.2 temporal temperature.  Ofirmev administered.  Incentive spirometry ordered.  No other interventions at this time.

## 2017-03-30 NOTE — OP NOTE
OPERATIVE NOTE      Pre op Diagnosis: C6-7 stenosis with right C7 radiculopathy and triceps weakness    Post op Diagnosis: Same    Procedure:   1. C6-7 anterior cervical discectomy with arthrodesis and placement of a 8 mm Nuvasive Corent PEEK interbody graft with DBM putty placed centrally  2. Placement of a 24 mm Nuvasive Archon anterior cervical plate with four 15 mm screws  3. Use of C-arm and microscope    Surgeon: Joseph Klein MD    Assistant: Tab Emmanuel      Indication for procedure: The patient is a 57 year old female that presented with the above clinical and radiographic findings. After reviewing the patient's imaging studies and examination, the decision was made to proceed with the above procedure. The patient understood the risks and benefits of surgery and wanted to proceed.    Description of Procedure: The patient was seen in the pre op area and the procedure was discussed with the patient once again and all questions were answered. The consent was then signed and the patient's neck was marked with a marker. The patient was then transferred to the operating suite on a stretcher and received general endotracheal anesthesia. She was then placed on the operating table in the supine position with all pressure points padded. A small roll was placed under his shoulders for slight extension. Next, the C-arm fluoroscopy was brought in the operating room for localization of the C6-7 disk space. Next, the patient's neck was then prepped and draped in a normal sterile fashion and a transverse incision was marked along the C6-7 interspace. Next, local anesthesia was placed along the planned incision and a 10 blade scalpel was used to make the incision. The platysma muscle was then identified, undermined and incised with the Metzenbaum scissors. The Weitlaner retractors were used to retract the platysma muscle and the skin edges. A dissection plane was then made with both sharp and blunt dissection medical  to the sternocliedomastoid muscle and the carotid artery down to the prevertebral fascia. The esophagus and trachea were then retracted laterally with the Cloward retractor and the prevertebral fascia was clean with Kitners.  A spinal need was placed in the C6-7 interspace and verified with C-arm fluoroscopy. The longus coli muscles were then elevated with the bovie cautery and the  retractor was placed under the muscle. The C6-7 interspace was incised with the bovie cautery and the disk was then removed with the Midas Juan Francisco drill down to the posterior longitudinal ligament. The ligament was then penetrated with a microball hook and the Kerrison rongeur was used to remove the posterior longitudinal ligament. All foramen were decompressed and the exiting nerve roots were decompressed and verified by with the microball hook. A 8 mm trial was placed in the C6-7 interspace and noted to be the appropriate size, therefore, 8 mm PEEK interbody graft was placed in the C6-7 interspace under fluoroscopic guidance and noted to have an appropriate fit. Next, the size 24 mm anterior cervical plate was secured from C6 to C7 with four 15 mm screws which were locked in placed with the hand held locking bit. The wound was then copiously irrigated and hemostasis was obtained with bipolar cautery, gelfoam and Surgiflo. A PRATIBHA drain was placed in the operative bed and the retractors were removed and there was no bleeding or injury to the carotid artery. The wound was irrigated once again and the platysma muscle was closed with 2-0 vicryl and the skin edges were closed with 3-0 vicryl and dermabond. The wound was dressed appropriately and the patient was then extubated and transferred to recovery.    At the end of the case all counts were correct    Complications: none    Estimated blood loss: 10 ml    IV Fluid: See Anesthesia    The patient received Ancef preoperatively      Joseph Klein MD, MS, FAANS

## 2017-03-30 NOTE — LETTER
Hand-off for Care Transitions to Next Level of Care Provider  Name: Margo Inman  MRN #: [id number]  Reason for Hospitalization:  stenosis, upper extremity weakness  S/P cervical spinal fusion  Admit Date/Time: 3/30/2017  9:01 AM  Discharge Date: 4/7/2017    Reason for Communication Hand-off Referral: Other S/p Cervical Fusion developed post op INFLUENZA    Discharge Plan:  Discharged to: Home with support                   Patient agreeable to post-hospital support suggestions:  Yes  Discharge Plan:             Patient is on new medications:   Yes           MTM follow up recommended: No               Follow-up plan:   Follow-up and recommended labs and tests        [facility] 6 weeks for follow up. Please call the clinic at [phone] to schedule your appointment with Shahana Cantu CNP or Aline Kenyon CNP.   Follow up with PCP in 1 week.   Recommend outpatient PFT in 4 weeks after discussing with her PCP.                Any outstanding tests or procedures:        Radiology & Cardiology Orders     Future Labs/Procedures Complete By Expires    X-ray Cervical spine 2-3 vws  5/15/2017 (Approximate) 6/30/2017              Key Recommendations:  PT PREFERRED TO SCHEDULE HER OWN APPOINTment    PLEASE FOLLOW UP with pt to schedule PCP appointment       Post op cervical fusion developed INFLUENZA B following surgery.  There was concern for underlying COPD in this pt. She was near 02 needs on discharge but was weaned off. She was started on Levaquin PO 4/6 for suspected early PNA.  RECOMMENDATIONS TO FOLLOW UP WITH PCP IN 1 week to assess on going progress and improvement with lung status.   Recommend outpatient PFT in 4 weeks after discussing with her PCP  Conversation has been smoking as a potential set back with her lung status and to notify PCP before 1 week if increase in SOB.           Zahira Avalos

## 2017-03-30 NOTE — ANESTHESIA PREPROCEDURE EVALUATION
Anesthesia Evaluation     . Pt has had prior anesthetic. Type: General    History of anesthetic complications   - PONV        ROS/MED HX    ENT/Pulmonary:     (+)tobacco use, Current use , . .   (-) asthma, sleep apnea, KITTY risk factors and Other pulmonary disease   Neurologic:     (+)neuropathy migraines, other neuro RLS   (-) seizures, CVA and Dementia   Cardiovascular:     (+) Dyslipidemia, hypertension----. : . . . :. .      (-) CAD, arrhythmias and pulmonary hypertension   METS/Exercise Tolerance:     Hematologic:     (+) Anemia, -      Musculoskeletal:   (+) arthritis (S/P B TKA), , , -       GI/Hepatic:        (-) GERD, hiatal hernia and hepatitis   Renal/Genitourinary:      (-) renal disease   Endo:     (+) Obesity, .   (-) Type I DM, Type II DM, thyroid disease and chronic steroid usage   Psychiatric:  - neg psychiatric ROS       Infectious Disease:  - neg infectious disease ROS      (-) Recent Fever, SBE Prophylaxis and MRSA   Malignancy:      - no malignancy   Other:    (+) H/O Chronic Pain,                   Physical Exam      Airway   Mallampati: II  TM distance: >3 FB  Neck ROM: full    Dental     Cardiovascular   Rhythm and rate: regular and normal  (-) no murmur    Pulmonary    breath sounds clear to auscultation    Other findings: Lab Test        03/20/17     01/10/17     05/26/16     01/10/14                       1410          1243          1406          1152          WBC           --          7.5          8.6          9.8           HGB          13.9         13.8         14.2         13.6          MCV           --          98           97           96            PLT           --          277          311          320           INR           --           --           --          0.90           Lab Test        03/20/17     01/10/17     05/26/16     02/05/16                       1410          1243          1406          1445          NA            --          140          138          136            POTASSIUM     --          4.3          4.0          4.3           CHLORIDE      --          105          105          101           CO2           --          29           25           31            BUN           --          11           10           19            CR           0.58         0.56         0.58         0.63          ANIONGAP      --          6            8            4             DOMONIQUE           --          9.2          9.2          9.2           GLC           --          106*         103*         92                          Anesthesia Plan      History & Physical Review  History and physical reviewed and following examination; no interval change.    ASA Status:  3 .    NPO Status:  > 8 hours    Plan for General and ETT with Propofol induction. Maintenance will be Balanced.    PONV prophylaxis:  Ondansetron (or other 5HT-3) and Dexamethasone or Solumedrol  Additional equipment: Videolaryngoscope Propofol gtt + Sevo + 60% O2 please      Postoperative Care  Postoperative pain management:  IV analgesics and Oral pain medications.      Consents  Anesthetic plan, risks, benefits and alternatives discussed with:  Patient.  Use of blood products discussed: Yes.   Use of blood products discussed with Patient.  Consented to blood products.  .                          .

## 2017-03-30 NOTE — ANESTHESIA CARE TRANSFER NOTE
Patient: Margo Inman    Procedure(s):  Anterior cervical decompression and fusion C6-C7 - Wound Class: I-Clean    Diagnosis: stenosis, upper extremity weakness  Diagnosis Additional Information: No value filed.    Anesthesia Type:   General, ETT     Note:  Airway :Face Mask  Patient transferred to:PACU        Vitals: (Last set prior to Anesthesia Care Transfer)    CRNA VITALS  3/30/2017 1201 - 3/30/2017 1235      3/30/2017             Pulse: 94    SpO2: (!)  79 %    Resp Rate (observed): (!)  7                Electronically Signed By: JOSE R Gimenez CRNA  March 30, 2017  12:35 PM

## 2017-03-30 NOTE — IP AVS SNAPSHOT
[facility]    [address]    [facility] [address]    Phone:  [phone]    Fax:  [phone]                                       After Visit Summary   3/30/2017    Margo Inman    MRN: [id number]           After Visit Summary Signature Page     I have received my discharge instructions, and my questions have been answered. I have discussed any challenges I see with this plan with the nurse or doctor.    ..........................................................................................................................................  Patient/Patient Representative Signature      ..........................................................................................................................................  Patient Representative Print Name and Relationship to Patient    ..................................................               ................................................  Date                                            Time    ..........................................................................................................................................  Reviewed by Signature/Title    ...................................................              ..............................................  Date                                                            Time

## 2017-03-30 NOTE — IP AVS SNAPSHOT
` `     [facility]: [phone]            Medication Administration Report for Margo Inman as of 04/02/17 1637   Legend:    Given Hold Not Given Due Canceled Entry Other Actions    Time Time (Time) Time  Time-Action       Inactive    Active    Linked        Medications 03/27/17 03/28/17 03/29/17 03/30/17 03/31/17 04/01/17 04/02/17    0.9% sodium chloride + KCl 20 mEq/L infusion  Rate: 75 mL/hr Freq: CONTINUOUS Route: IV  Start: 03/30/17 1515   Admin Instructions: Change to saline lock when PO well tolerated.        1713 ( )-New Bag              0.9% sodium chloride infusion  Rate: 100 mL/hr Freq: CONTINUOUS Route: IV  Start: 04/02/17 1030          1123 ( )-New Bag           acetaminophen (TYLENOL) tablet 650 mg  Dose: 650 mg Freq: EVERY 4 HOURS PRN Route: PO  PRN Reason: other  PRN Comment: surgical pain  Start: 04/02/17 0000   Admin Instructions: May give first dose 4 hours after last scheduled dose of acetaminophen.  Maximum acetaminophen dose from all sources = 75 mg/kg/day not to exceed 4 grams/day.           1537 (650 mg)-Given           atorvastatin (LIPITOR) tablet 80 mg  Dose: 80 mg Freq: DAILY Route: PO  Start: 03/30/17 1515              1713 (80 mg)-Given        1717 (80 mg)-Given        1650 (80 mg)-Given               [ ] 1800           benzocaine-menthol (CHLORASEPTIC) 6-10 MG lozenge 1-2 lozenge  Dose: 1-2 lozenge Freq: EVERY 1 HOUR PRN Route: BU  PRN Reason: sore throat  Start: 03/30/17 1506   Admin Instructions: For sore throat without fever.        2029 (2 lozenge)-Given        1507 (2 lozenge)-Given             butalbital-acetaminophen-caffeine (FIORICET/ESGIC) per tablet 1 tablet  Dose: 1 tablet Freq: EVERY 4 HOURS PRN Route: PO  PRN Reason: migraine  Start: 03/30/17 1506              diazepam (VALIUM) tablet 5 mg  Dose: 5 mg Freq: EVERY 6 HOURS PRN Route: PO  PRN Reason: other  PRN Comment: muscle spasms  Start: 03/30/17 1506   Admin Instructions: Caution to be used  "when administering multiple CNS depressing meds within a short time frame.         1058 (5 mg)-Given       2018 (5 mg)-Given        0414 (5 mg)-Given       1051 (5 mg)-Given        0045 (5 mg)-Given       1330 (5 mg)-Given           guaiFENesin-dextromethorphan (ROBITUSSIN DM) 100-10 MG/5ML syrup 10 mL  Dose: 10 mL Freq: EVERY 6 HOURS PRN Route: PO  PRN Reason: cough  Start: 03/30/17 2308       2324 (10 mL)-Given        2017 (10 mL)-Given             HYDROmorphone (DILAUDID) tablet 2-4 mg  Dose: 2-4 mg Freq: EVERY 3 HOURS PRN Route: PO  PRN Reason: moderate to severe pain  Start: 03/30/17 1506   Admin Instructions: IF CrCl is UNKNOWN start at lowest end of dosing range. Hold while on PCA or with regular IV opioid dosing.        1557 (4 mg)-Given       1907 (4 mg)-Given        0851 (4 mg)-Given             HYDROmorphone (PF) (DILAUDID) injection 0.3-0.5 mg  Dose: 0.3-0.5 mg Freq: EVERY 1 HOUR PRN Route: IV  PRN Reasons: moderate to severe pain,severe pain,other  PRN Comment: May give for breakthrough pain  Start: 03/30/17 1506       1713 (0.5 mg)-Given       2021 (0.5 mg)-Given        1135 (0.5 mg)-Given             ipratropium - albuterol 0.5 mg/2.5 mg/3 mL (DUONEB) neb solution 3 mL  Dose: 3 mL Freq: EVERY 4 HOURS PRN Route: NEBULIZATION  PRN Reason: wheezing  Start: 03/31/17 0150        0157 (3 mL)-Given       1151 (3 mL)-Given       1721 (3 mL)-Given       2215 (3 mL)-Given        1739 (3 mL)-Given            lidocaine (LMX4) kit  Freq: EVERY 1 HOUR PRN Route: Top  PRN Reason: pain  PRN Comment: with VAD insertion or accessing implanted port.  Start: 03/30/17 1506   Admin Instructions: Do NOT give if patient has a history of allergy to any local anesthetic or any \"kyara\" product.   Apply 30 minutes prior to VAD insertion or port access.  MAX Dose:  2.5 g (  of 5 g tube)               lidocaine 1 % 1 mL  Dose: 1 mL Freq: EVERY 1 HOUR PRN Route: OTHER  PRN Comment: mild pain with VAD insertion or accessing " "implanted port  Start: 03/30/17 1506   Admin Instructions: Do NOT give if patient has a history of allergy to any local anesthetic or any \"kyara\" product. MAX dose 1 mL subcutaneous OR intradermal in divided doses.               metoclopramide (REGLAN) tablet 10 mg  Dose: 10 mg Freq: EVERY 6 HOURS PRN Route: PO  PRN Reasons: nausea,vomiting  Start: 03/30/17 1506   Admin Instructions: This is Step 3 of nausea and vomiting management.  Give if nausea not resolved 15 minutes after giving prochlorperazine (COMPAZINE).  If nausea not resolved in 15-30 minutes, Notify provider.              Or  metoclopramide (REGLAN) injection 10 mg  Dose: 10 mg Freq: EVERY 6 HOURS PRN Route: IV  PRN Reasons: nausea,vomiting  Start: 03/30/17 1506   Admin Instructions: This is Step 3 of nausea and vomiting management.  Give if nausea not resolved 15 minutes after giving prochlorperazine (COMPAZINE).  If nausea not resolved in 15-30 minutes, Notify provider.  Irritant.               naloxone (NARCAN) injection 0.1-0.4 mg  Dose: 0.1-0.4 mg Freq: EVERY 2 MIN PRN Route: IV  PRN Reason: opioid reversal  Start: 03/30/17 1506   Admin Instructions: For respiratory rate LESS than or EQUAL to 8.  Partial reversal dose:  0.1 mg titrated q 2 minutes for Analgesia Side Effects Monitoring Sedation Level of 3 (frequently drowsy, arousable, drifts to sleep during conversation).Full reversal dose:  0.4 mg bolus for Analgesia Side Effects Monitoring Sedation Level of 4 (somnolent, minimal or no response to stimulation).               ondansetron (ZOFRAN-ODT) ODT tab 4 mg  Dose: 4 mg Freq: EVERY 6 HOURS PRN Route: PO  PRN Reason: nausea  Start: 03/30/17 1506   Admin Instructions: This is Step 1 of nausea and vomiting management.  If nausea not resolved in 15 minutes, go to Step 2 prochlorperazine (COMPAZINE). Do not push through foil backing. Peel back foil and gently remove. Place on tongue immediately. Administration with liquid unnecessary         2357 " (4 mg)-Given        1051 (4 mg)-Given           Or  ondansetron (ZOFRAN) injection 4 mg  Dose: 4 mg Freq: EVERY 6 HOURS PRN Route: IV  PRN Reasons: nausea,vomiting  Start: 03/30/17 1506   Admin Instructions: This is Step 1 of nausea and vomiting management.  If nausea not resolved in 15 minutes, go to Step 2 prochlorperazine (COMPAZINE).  Irritant.                             oseltamivir (TAMIFLU) capsule 75 mg  Dose: 75 mg Freq: 2 TIMES DAILY Route: PO  Indications of Use: INFLUENZA  Start: 03/31/17 2000   End: 04/05/17 1959        2018 (75 mg)-Given        0809 (75 mg)-Given       1936 (75 mg)-Given        0825 (75 mg)-Given       [ ] 2000           oxybutynin (DITROPAN-XL) 24 hr tablet 10 mg  Dose: 10 mg Freq: DAILY Route: PO  Start: 03/30/17 1515   Admin Instructions: DO NOT CRUSH.        (1553)-Not Given        0851 (10 mg)-Given        0809 (10 mg)-Given        0825 (10 mg)-Given           oxyCODONE (ROXICODONE) IR tablet 5-10 mg  Dose: 5-10 mg Freq: EVERY 3 HOURS PRN Route: PO  PRN Reason: moderate to severe pain  Start: 03/30/17 1506   Admin Instructions: IF CrCl is UNKNOWN start at lowest end of dosing range.  Hold while on PCA or with regular IV opioid dosing.        2201 (10 mg)-Given        0051 (10 mg)-Given       0406 (10 mg)-Given       1214 (10 mg)-Given       1502 (10 mg)-Given       1827 (10 mg)-Given       2213 (10 mg)-Given        0122 (10 mg)-Given       0414 (10 mg)-Given       0809 (10 mg)-Given       1051 (10 mg)-Given       1411 (10 mg)-Given       1936 (10 mg)-Given       2241 (10 mg)-Given        0159 (10 mg)-Given       0601 (10 mg)-Given       0907 (10 mg)-Given       1212 (10 mg)-Given           oxyCODONE-acetaminophen (PERCOCET) 5-325 MG per tablet 1-2 tablet  Dose: 1-2 tablet Freq: EVERY 4 HOURS PRN Route: PO  PRN Reason: moderate to severe pain  Start: 03/30/17 1506   Admin Instructions: Maximum acetaminophen dose from all sources= 75 mg/kg/day not to exceed 4 grams                prochlorperazine (COMPAZINE) injection 5-10 mg  Dose: 5-10 mg Freq: EVERY 6 HOURS PRN Route: IV  PRN Reasons: nausea,vomiting  Start: 03/30/17 1506   Admin Instructions: This is Step 2 of nausea and vomiting management.   If nausea not resolved in 15 minutes, give metoclopramide (REGLAN), if ordered (step 3 of nausea and vomiting management)              Or  prochlorperazine (COMPAZINE) tablet 5-10 mg  Dose: 5-10 mg Freq: EVERY 6 HOURS PRN Route: PO  PRN Reasons: nausea,vomiting  Start: 03/30/17 1506   Admin Instructions: This is Step 2 of nausea and vomiting management.   If nausea not resolved in 15 minutes, give metoclopramide (REGLAN), if ordered (step 3 of nausea and vomiting management)               senna-docusate (SENOKOT-S;PERICOLACE) 8.6-50 MG per tablet 1-2 tablet  Dose: 1-2 tablet Freq: 2 TIMES DAILY Route: PO  Start: 03/30/17 2000   Admin Instructions: Start with 1 tablet PO BID, If no bowel movement in 24 hours, increase to 2 tablets PO BID.  Hold for loose stools.        1907 (1 tablet)-Given        0851 (2 tablet)-Given       2018 (2 tablet)-Given        0808 (2 tablet)-Given       1937 (1 tablet)-Given        0825 (2 tablet)-Given       [ ] 2000           sodium chloride (PF) 0.9% PF flush 3 mL  Dose: 3 mL Freq: EVERY 8 HOURS Route: IK  Start: 03/30/17 1515   Admin Instructions: And Q1H PRN, to lock peripheral IV dormant line.        (1549)-Not Given        0051 (3 mL)-Given       0852 (3 mL)-Given       1135 (3 mL)-Given       1717 (3 mL)-Given        0006 (3 mL)-Given       0809 (3 mL)-Given       1650 (3 mL)-Given        0032 (3 mL)-Given       0827 (3 mL)-Given       1537 (3 mL)-Given           sodium chloride (PF) 0.9% PF flush 3 mL  Dose: 3 mL Freq: EVERY 1 HOUR PRN Route: IK  PRN Reason: line flush  PRN Comment: for peripheral IV flush post IV meds  Start: 03/30/17 1506              varenicline (CHANTIX) tablet 1 mg  Dose: 1 mg Freq: 2 TIMES DAILY Route: PO  Start: 03/30/17 2000       1908 (1  mg)-Given        0851 (1 mg)-Given       2017 (1 mg)-Given        0809 (1 mg)-Given       1936 (1 mg)-Given        0824 (1 mg)-Given       [ ] 2000          Completed Medications  Medications 03/27/17 03/28/17 03/29/17 03/30/17 03/31/17 04/01/17 04/02/17         Dose: 975 mg Freq: EVERY 8 HOURS Route: PO  Start: 03/30/17 1515   End: 04/02/17 0824   Admin Instructions: Do not use if patient has an active opioid/acetaminophen analgesic order for pain  Maximum acetaminophen dose from all sources = 75 mg/kg/day not to exceed 4 grams/day.        1557 (975 mg)-Given        0051 (975 mg)-Given       0851 (975 mg)-Given       1717 (975 mg)-Given       2357 (975 mg)-Given        0809 (975 mg)-Given       1650 (975 mg)-Given        0045 (975 mg)-Given       0824 (975 mg)-Given             Dose: 2 g Freq: EVERY 8 HOURS Route: IV  Indications of Use: SURGICAL PROPHYLAXIS  Start: 03/30/17 1830   End: 03/31/17 1210   Admin Instructions: First post-op dose to be given 8 hours after last intra-op dose, see MAR.        1907 (2 g)-Given        0221 (2 g)-Given       1140 (2 g)-Given [C]               Dose: 4 mg Freq: EVERY 6 HOURS Route: IV  Start: 03/30/17 1515   End: 03/31/17 0315       1557 (4 mg)-Given       2020 (4 mg)-Given        0314 (4 mg)-Given [C]            Discontinued Medications  Medications 03/27/17 03/28/17 03/29/17 03/30/17 03/31/17 04/01/17 04/02/17         Dose: 650 mg Freq: EVERY 4 HOURS PRN Route: PO  PRN Reason: other  PRN Comment: surgical pain  Start: 04/02/17 0000   End: 03/31/17 1517   Admin Instructions: May give first dose 4 hours after last scheduled dose of acetaminophen.  Maximum acetaminophen dose from all sources = 75 mg/kg/day not to exceed 4 grams/day.         1517-Med Discontinued           Dose: 3 mL Freq: EVERY 4 HOURS PRN Route: NEBULIZATION  PRN Reason: wheezing  Start: 03/31/17 0800   End: 03/31/17 0150        0150-Med Discontinued           Dose: 3 mL Freq: 4 TIMES DAILY Route:  NEBULIZATION  Start: 03/31/17 0800   End: 03/31/17 0124        0124-Med Discontinued

## 2017-03-30 NOTE — IP AVS SNAPSHOT
MRN:[id number]                      After Visit Summary   3/30/2017    Margo Inman    MRN: [id number]           Thank you!     Thank you for choosing [facility] for your care. Our goal is always to provide you with excellent care. Hearing back from our patients is one way we can continue to improve our services. Please take a few minutes to complete the written survey that you may receive in the mail after you visit. If you would like to speak to someone directly about your visit please contact Patient Relations at [phone]. Thank you!          Patient Information     Date Of Birth          1960        Designated Caregiver       Most Recent Value    Caregiver    Will someone help with your care after discharge? no      About your hospital stay     You were admitted on:  March 30, 2017 You last received care in the:  [facility]    You were discharged on:  April 7, 2017        Reason for your hospital stay       You were in the hospital for cervical spine surgery, then developed bronchitis, possible undiagnosed COPD and hypoxia.                  Who to Call     For medical emergencies, please call 911.  For non-urgent questions about your medical care, please call your primary care provider or clinic, [phone]  For questions related to your surgery, please call your surgery clinic        Attending Provider     Provider Specialty    Joseph Klein MD Neurosurgery       Primary Care Provider Office Phone # Fax #    Jordana Naqvi PA-C [phone]       [place] [address]  [facility] [address]        After Care Instructions     Activity       Your activity upon discharge: Discharge instructions:  No lifting of more than 10 pounds until follow up visit.  Ok to shampoo hair, shower or bathe, but, do not scrub or submerge incision under water until evaluated post operatively in clinic.    Ok to  walk as tolerated, avoid bedrest.    No contact sports until after follow up visit  No high impact activities such as; running/jogging, snowmobile or 4 beyer riding or any other recreational vehicles    Call my office at [phone] for increasing redness, swelling or pus draining from the incision, increased pain or any other questions and concerns.    Go to ER with any seizure activity, mental status change (increasing confusion), difficulty with speech or increasing or acute weakness            Diet       Follow this diet upon discharge: Orders Placed This Encounter      Advance Diet as Tolerated: Regular Diet Adult; Mechanical/Dental Soft Diet            Wound care and dressings       Instructions to care for your wound at home: Keep your incision clean and dry at all times.  OK to remove dressing on postop day 2.  OK to shower on postop day 3 and allow water to run over incision, pat dry after shower.  No bathing swimming or submerging in water.  Call immediately or come to ED if any drainage occurs, or you develop new pain, redness, or swelling.    .                  Follow-up Appointments     Follow-up and recommended labs and tests        [facility] in 6 weeks for follow up. Please call the clinic at [phone] to schedule your appointment with Shahana Cantu CNP or Aline Kenyon CNP.  Follow up with PCP in 1 week.   Recommend outpatient PFT in 4 weeks after discussing with her PCP.                  Future tests that were ordered for you     X-ray Cervical spine 2-3 vws                 Further instructions from your care team       While on narcotic pain medication, to prevent constipation:  1. Drink plenty of water to keep well hydrated   2. May take over the counter Colace or Senna (follow instructions on label)    Call your physician if you experience:  1. Fever greater than 100 degrees with body chills or excessive sweating.  2. Increased redness, localized  "warmth, tenderness, drainage or swelling at incision site.  Opening or pulling apart of incision site.   3. Pain not controlled with oral pain medications, ice and rest.   4. No bowel movement in 3 days (may use Milk of Magnesia or other over the counter remedy).  5. Chest pain, shortness of breath, and/or calf pain with excessive swelling.  6. Generalized feeling of illness.  7. Any other questions or concerns related to your surgery/recovery.      Thank you for allowing [facility] to participate in your cares!!    Pending Results     No orders found from 3/28/2017 to 3/31/2017.            Statement of Approval     Ordered          17 1103  I have reviewed and agree with all the recommendations and orders detailed in this document.  EFFECTIVE NOW     Approved and electronically signed by:  Shahana Cantu APRN CNP             Admission Information     Date & Time Provider Department Dept. Phone    3/30/2017 Joseph Klein MD [facility] [phone]      Your Vitals Were     Blood Pressure Pulse Temperature Respirations Height Weight    121/50 (BP Location: Right arm) 82 98.4  F (36.9  C) (Oral) 18 1.549 m (5' 1\") 87.5 kg (193 lb)    Last Period Pulse Oximetry BMI (Body Mass Index)             2011 96% 36.47 kg/m2         [employer] Information     Reve[employer] lets you send messages to your doctor, view your test results, renew your prescriptions, schedule appointments and more. To sign up, go to [email or web address] . Click on \"Log in\" on the left side of the screen, which will take you to the Welcome page. Then click on \"Sign up Now\" on the right side of the page.     You will be asked to enter the access code listed below, as well as some personal information. Please follow the directions to create your username and password.     Your access code is: [email or web address]  Expires: 2017  3:23 PM     Your access code will  in 90 days. If you need help or a " new code, please call your [facility] or [phone].        Care EveryWhere ID     This is your Care EveryWhere ID. This could be used by other organizations to access your [place] medical records  [id number]           Review of your medicines      START taking        Dose / Directions    diazepam 5 MG tablet   Commonly known as:  VALIUM   Notes to Patient:  Next dose available to be taken         Dose:  5 mg   Take 1 tablet (5 mg) by mouth every 6 hours as needed for other (muscle spasms)   Quantity:  60 tablet   Refills:  0       guaiFENesin-dextromethorphan 100-10 MG/5ML syrup   Commonly known as:  ROBITUSSIN DM   Used for:  Bronchitis   Notes to Patient:  Next dose available to be taken         Dose:  10 mL   Take 10 mLs by mouth every 6 hours as needed for cough   Quantity:  560 mL   Refills:  0       levofloxacin 500 MG tablet   Commonly known as:  LEVAQUIN   Indication:  bronchitis   Used for:  Bronchitis   Notes to Patient:  Be sure to take all of antibiotics, even if feeling better        Dose:  500 mg   Take 1 tablet (500 mg) by mouth daily   Quantity:  5 tablet   Refills:  0       oxyCODONE 5 MG IR tablet   Commonly known as:  ROXICODONE   Notes to Patient:  Next dose available to be taken after         Dose:  5-10 mg   Take 1-2 tablets (5-10 mg) by mouth every 3 hours as needed for moderate to severe pain   Quantity:  75 tablet   Refills:  0       predniSONE 10 MG tablet   Commonly known as:  DELTASONE   Used for:  Bronchitis   Notes to Patient:  Be sure to take as scheduled per taper order        4 tabs daily for 2 days, then 3 tabs daily for 2 days, then 2 tabs daily for 2 days, then 1 tab daily for 2 days, then stop   Quantity:  20 tablet   Refills:  0         CONTINUE these medicines which have NOT CHANGED        Dose / Directions    acetaminophen 500 MG tablet   Commonly known as:  TYLENOL   Notes to Patient:  Next dose available to be taken after 2 pm        Dose:  500-1000 mg   Take  500-1,000 mg by mouth every 6 hours as needed.   Refills:  0       atorvastatin 80 MG tablet   Commonly known as:  LIPITOR   Used for:  Hyperlipidemia LDL goal <130   Notes to Patient:  Resume per home schedule        TAKE 1 TABLET BY MOUTH ONCE DAILY   Quantity:  90 tablet   Refills:  3       butalbital-acetaminophen-caffeine -40 MG per tablet   Commonly known as:  FIORICET/ESGIC   Used for:  Tension headache   Notes to Patient:  Resume per home schedule        Dose:  1 tablet   Take 1 tablet by mouth every 4 hours as needed   Quantity:  28 tablet   Refills:  1       diclofenac 75 MG EC tablet   Commonly known as:  VOLTAREN   Notes to Patient:  Resume per home schedule        Dose:  75 mg   Take 1 tablet (75 mg) by mouth 2 times daily   Quantity:  60 tablet   Refills:  11       oxybutynin 5 MG 24 hr tablet   Commonly known as:  DITROPAN-XL   Used for:  Urinary urgency, Mixed incontinence urge and stress (male)(female)   Notes to Patient:  Resume per home schedule        Dose:  10 mg   Take 2 tablets (10 mg) by mouth daily   Quantity:  30 tablet   Refills:  1       * varenicline 0.5 MG X 11 & 1 MG X 42 tablet   Commonly known as:  CHANTIX STARTING MONTH EVANGELINA   Used for:  Tobacco dependence syndrome        Take 0.5 mg tab daily for 3 days, then 0.5 mg tab twice daily for 4 days, then 1 mg twice daily.   Quantity:  53 tablet   Refills:  0       * varenicline 1 MG tablet   Commonly known as:  CHANTIX   Used for:  Tobacco dependence syndrome        Dose:  1 mg   Take 1 tablet (1 mg) by mouth 2 times daily   Quantity:  56 tablet   Refills:  4       * Notice:  This list has 2 medication(s) that are the same as other medications prescribed for you. Read the directions carefully, and ask your doctor or other care provider to review them with you.      STOP taking     cyclobenzaprine 10 MG tablet   Commonly known as:  FLEXERIL           oxyCODONE-acetaminophen 5-325 MG per tablet   Commonly known as:  PERCOCET                 Where to get your medicines      These medications were sent to [facility] [place], [place] - [address] [facility]  [address] [facility] [address]     Phone:  [phone]     guaiFENesin-dextromethorphan 100-10 MG/5ML syrup    levofloxacin 500 MG tablet    predniSONE 10 MG tablet         Some of these will need a paper prescription and others can be bought over the counter. Ask your nurse if you have questions.     Bring a paper prescription for each of these medications     diazepam 5 MG tablet    oxyCODONE 5 MG IR tablet                Protect others around you: Learn how to safely use, store and throw away your medicines at www.disposemymeds.org.             Medication List: This is a list of all your medications and when to take them. Check marks below indicate your daily home schedule. Keep this list as a reference.      Medications           Morning Afternoon Evening Bedtime As Needed    acetaminophen 500 MG tablet   Commonly known as:  TYLENOL   Take 500-1,000 mg by mouth every 6 hours as needed.   Last time this was given:  650 mg on 4/7/2017  8:23 AM   Notes to Patient:  Next dose available to be taken after 2 pm                            Next dose available to be taken after 2 pm       atorvastatin 80 MG tablet   Commonly known as:  LIPITOR   TAKE 1 TABLET BY MOUTH ONCE DAILY   Last time this was given:  80 mg on 4/6/2017  5:56 PM   Notes to Patient:  Resume per home schedule                                butalbital-acetaminophen-caffeine -40 MG per tablet   Commonly known as:  FIORICET/ESGIC   Take 1 tablet by mouth every 4 hours as needed   Notes to Patient:  Resume per home schedule                                diazepam 5 MG tablet   Commonly known as:  VALIUM   Take 1 tablet (5 mg) by mouth every 6 hours as needed for other (muscle spasms)   Last time this was given:  5 mg on 4/6/2017  1:08 AM   Notes to Patient:  Next dose available to be taken                              Next dose available to be taken       diclofenac 75 MG EC tablet   Commonly known as:  VOLTAREN   Take 1 tablet (75 mg) by mouth 2 times daily   Notes to Patient:  Resume per home schedule                                guaiFENesin-dextromethorphan 100-10 MG/5ML syrup   Commonly known as:  ROBITUSSIN DM   Take 10 mLs by mouth every 6 hours as needed for cough   Last time this was given:  10 mLs on 4/6/2017  4:29 PM   Notes to Patient:  Next dose available to be taken                             Next dose available to be taken       levofloxacin 500 MG tablet   Commonly known as:  LEVAQUIN   Take 1 tablet (500 mg) by mouth daily   Last time this was given:  500 mg on 4/7/2017  8:23 AM   Notes to Patient:  Be sure to take all of antibiotics, even if feeling better                                   oxybutynin 5 MG 24 hr tablet   Commonly known as:  DITROPAN-XL   Take 2 tablets (10 mg) by mouth daily   Last time this was given:  10 mg on 4/7/2017  8:23 AM   Notes to Patient:  Resume per home schedule                                oxyCODONE 5 MG IR tablet   Commonly known as:  ROXICODONE   Take 1-2 tablets (5-10 mg) by mouth every 3 hours as needed for moderate to severe pain   Last time this was given:  10 mg on 4/7/2017  8:24 AM   Notes to Patient:  Next dose available to be taken after                             Next dose available to be taken after        predniSONE 10 MG tablet   Commonly known as:  DELTASONE   4 tabs daily for 2 days, then 3 tabs daily for 2 days, then 2 tabs daily for 2 days, then 1 tab daily for 2 days, then stop   Last time this was given:  40 mg on 4/7/2017  8:23 AM   Notes to Patient:  Be sure to take as scheduled per taper order                                   * varenicline 0.5 MG X 11 & 1 MG X 42 tablet   Commonly known as:  CHANTIX STARTING MONTH EVANGELINA   Take 0.5 mg tab daily for 3 days, then 0.5 mg tab twice daily for 4 days, then 1 mg twice daily.                                 * varenicline 1 MG tablet   Commonly known as:  CHANTIX   Take 1 tablet (1 mg) by mouth 2 times daily   Last time this was given:  1 mg on 4/7/2017  8:23 AM                                      * Notice:  This list has 2 medication(s) that are the same as other medications prescribed for you. Read the directions carefully, and ask your doctor or other care provider to review them with you.              More Information        Influenza (Adult)    Influenza is also called the flu. It is a viral illness that affects the air passages of your lungs. It is different from the common cold. The flu can easily be passed from one to person to another. It may be spread through the air by coughing and sneezing. Or it can be spread by touching the sick person and then touching your own eyes, nose, or mouth.  The flu starts 1 to 3 days after you are exposed to the flu virus. It may last for 1 to 2 weeks. You usually don t need to take antibiotics unless you have a complication. This might be an ear or sinus infection or pneumonia.  Symptoms of the flu may be mild or severe. They can include extreme tiredness (wanting to stay in bed all day), chills, fevers, muscle aches, soreness with eye movement, headache, and a dry, hacking cough.  Home care  Follow these guidelines when caring for yourself at home:    Avoid being around cigarette smoke, whether yours or other people s.    Acetaminophen or ibuprofen will help ease your fever, muscle aches, and headache. Don t give aspirin to anyone younger than 18 who has the flu. Aspirin can harm the liver.    Nausea and loss of appetite are common with the flu. Eat light meals. Drink 6 to 8 glasses of liquids every day. Good choices are water, sport drinks, soft drinks without caffeine, juices, tea, and soup. Extra fluids will also help loosen secretions in your nose and lungs.    Over-the-counter cold medicines will not make the flu go away faster. But the medicines may help with  coughing, sore throat, and congestion in your nose and sinuses. Don t use a decongestant if you have high blood pressure.    Stay home until your fever has been gone for at least 24 hours without using medicine to reduce fever.  Follow-up care  Follow up with your health care provider, or as advised, if you are not getting better over the next week.  If you are 65 or older, talk with your provider about getting a pneumococcal vaccine every 5 years. You should also get this vaccine if you have chronic asthma or COPD. All adults should get a flu vaccine every fall. Ask your provider about this.  When to seek medical advice  Call your health care provider right away if any of these occur:    Cough with lots of colored sputum (mucus) or blood in your sputum    Chest pain, shortness of breath, wheezing, or difficulty breathing    Severe headache, or face, neck, or ear pain    New rash with fever    Fever of 101 F (38 C) oral or higher that doesn t get better with fever medicine    Confusion, behavior change, or seizure    Severe weakness or dizziness    You get a fever or cough after getting better for a few days       9656-4167 [employer]. [address] [place] [address]. All rights reserved. This information is not intended as a substitute for professional medical care. Always follow your healthcare professional's instructions.

## 2017-03-31 ENCOUNTER — APPOINTMENT (OUTPATIENT)
Dept: PHYSICAL THERAPY | Facility: CLINIC | Age: 57
DRG: 028 | End: 2017-03-31
Attending: NEUROLOGICAL SURGERY
Payer: COMMERCIAL

## 2017-03-31 ENCOUNTER — APPOINTMENT (OUTPATIENT)
Dept: GENERAL RADIOLOGY | Facility: CLINIC | Age: 57
DRG: 028 | End: 2017-03-31
Attending: PHYSICIAN ASSISTANT
Payer: COMMERCIAL

## 2017-03-31 ENCOUNTER — APPOINTMENT (OUTPATIENT)
Dept: GENERAL RADIOLOGY | Facility: CLINIC | Age: 57
DRG: 028 | End: 2017-03-31
Attending: NEUROLOGICAL SURGERY
Payer: COMMERCIAL

## 2017-03-31 LAB
ALBUMIN UR-MCNC: NEGATIVE MG/DL
ANION GAP SERPL CALCULATED.3IONS-SCNC: 9 MMOL/L (ref 3–14)
APPEARANCE UR: CLEAR
BASOPHILS # BLD AUTO: 0 10E9/L (ref 0–0.2)
BASOPHILS NFR BLD AUTO: 0.1 %
BILIRUB UR QL STRIP: NEGATIVE
BUN SERPL-MCNC: 8 MG/DL (ref 7–30)
CALCIUM SERPL-MCNC: 9.2 MG/DL (ref 8.5–10.1)
CHLORIDE SERPL-SCNC: 99 MMOL/L (ref 94–109)
CO2 SERPL-SCNC: 28 MMOL/L (ref 20–32)
COLOR UR AUTO: YELLOW
CREAT SERPL-MCNC: 0.5 MG/DL (ref 0.52–1.04)
DIFFERENTIAL METHOD BLD: ABNORMAL
EOSINOPHIL # BLD AUTO: 0 10E9/L (ref 0–0.7)
EOSINOPHIL NFR BLD AUTO: 0.1 %
ERYTHROCYTE [DISTWIDTH] IN BLOOD BY AUTOMATED COUNT: 13.1 % (ref 10–15)
FLUAV+FLUBV RNA SPEC QL NAA+PROBE: ABNORMAL
FLUAV+FLUBV RNA SPEC QL NAA+PROBE: ABNORMAL
GFR SERPL CREATININE-BSD FRML MDRD: ABNORMAL ML/MIN/1.7M2
GLUCOSE BLDC GLUCOMTR-MCNC: 160 MG/DL (ref 70–99)
GLUCOSE SERPL-MCNC: 154 MG/DL (ref 70–99)
GLUCOSE UR STRIP-MCNC: NEGATIVE MG/DL
HCT VFR BLD AUTO: 40.9 % (ref 35–47)
HGB BLD-MCNC: 13.4 G/DL (ref 11.7–15.7)
HGB UR QL STRIP: ABNORMAL
IMM GRANULOCYTES # BLD: 0.1 10E9/L (ref 0–0.4)
IMM GRANULOCYTES NFR BLD: 0.5 %
KETONES UR STRIP-MCNC: NEGATIVE MG/DL
LEUKOCYTE ESTERASE UR QL STRIP: ABNORMAL
LYMPHOCYTES # BLD AUTO: 1 10E9/L (ref 0.8–5.3)
LYMPHOCYTES NFR BLD AUTO: 8 %
MCH RBC QN AUTO: 31.5 PG (ref 26.5–33)
MCHC RBC AUTO-ENTMCNC: 32.8 G/DL (ref 31.5–36.5)
MCV RBC AUTO: 96 FL (ref 78–100)
MONOCYTES # BLD AUTO: 0.7 10E9/L (ref 0–1.3)
MONOCYTES NFR BLD AUTO: 6 %
MUCOUS THREADS #/AREA URNS LPF: PRESENT /LPF
NEUTROPHILS # BLD AUTO: 10.2 10E9/L (ref 1.6–8.3)
NEUTROPHILS NFR BLD AUTO: 85.3 %
NITRATE UR QL: NEGATIVE
NRBC # BLD AUTO: 0 10*3/UL
NRBC BLD AUTO-RTO: 0 /100
PH UR STRIP: 6 PH (ref 5–7)
PLATELET # BLD AUTO: 233 10E9/L (ref 150–450)
POTASSIUM SERPL-SCNC: 3.9 MMOL/L (ref 3.4–5.3)
PROCALCITONIN SERPL-MCNC: NORMAL NG/ML
RBC # BLD AUTO: 4.25 10E12/L (ref 3.8–5.2)
RBC #/AREA URNS AUTO: 12 /HPF (ref 0–2)
RSV RNA SPEC NAA+PROBE: ABNORMAL
SODIUM SERPL-SCNC: 136 MMOL/L (ref 133–144)
SP GR UR STRIP: 1.01 (ref 1–1.03)
SPECIMEN SOURCE: ABNORMAL
SQUAMOUS #/AREA URNS AUTO: 2 /HPF (ref 0–1)
URN SPEC COLLECT METH UR: ABNORMAL
UROBILINOGEN UR STRIP-MCNC: 0 MG/DL (ref 0–2)
WBC # BLD AUTO: 11.9 10E9/L (ref 4–11)
WBC #/AREA URNS AUTO: 2 /HPF (ref 0–2)

## 2017-03-31 PROCEDURE — 40000274 ZZH STATISTIC RCP CONSULT EA 30 MIN

## 2017-03-31 PROCEDURE — 40000193 ZZH STATISTIC PT WARD VISIT: Performed by: PHYSICAL THERAPIST

## 2017-03-31 PROCEDURE — 40000275 ZZH STATISTIC RCP TIME EA 10 MIN

## 2017-03-31 PROCEDURE — 97116 GAIT TRAINING THERAPY: CPT | Mod: GP | Performed by: PHYSICAL THERAPIST

## 2017-03-31 PROCEDURE — 85025 COMPLETE CBC W/AUTO DIFF WBC: CPT | Performed by: PHYSICIAN ASSISTANT

## 2017-03-31 PROCEDURE — 99222 1ST HOSP IP/OBS MODERATE 55: CPT | Performed by: PHYSICIAN ASSISTANT

## 2017-03-31 PROCEDURE — 81001 URINALYSIS AUTO W/SCOPE: CPT | Performed by: PHYSICIAN ASSISTANT

## 2017-03-31 PROCEDURE — 36415 COLL VENOUS BLD VENIPUNCTURE: CPT | Performed by: PHYSICIAN ASSISTANT

## 2017-03-31 PROCEDURE — 25000132 ZZH RX MED GY IP 250 OP 250 PS 637: Performed by: HOSPITALIST

## 2017-03-31 PROCEDURE — 94640 AIRWAY INHALATION TREATMENT: CPT

## 2017-03-31 PROCEDURE — 72020 X-RAY EXAM OF SPINE 1 VIEW: CPT

## 2017-03-31 PROCEDURE — 12000007 ZZH R&B INTERMEDIATE

## 2017-03-31 PROCEDURE — 87040 BLOOD CULTURE FOR BACTERIA: CPT | Performed by: PHYSICIAN ASSISTANT

## 2017-03-31 PROCEDURE — 87631 RESP VIRUS 3-5 TARGETS: CPT | Performed by: PHYSICIAN ASSISTANT

## 2017-03-31 PROCEDURE — 99207 ZZC CONSULT E&M CHANGED TO INITIAL LEVEL: CPT | Performed by: PHYSICIAN ASSISTANT

## 2017-03-31 PROCEDURE — 71020 XR CHEST 2 VW: CPT

## 2017-03-31 PROCEDURE — 00000146 ZZHCL STATISTIC GLUCOSE BY METER IP

## 2017-03-31 PROCEDURE — 84145 PROCALCITONIN (PCT): CPT | Performed by: PHYSICIAN ASSISTANT

## 2017-03-31 PROCEDURE — 94640 AIRWAY INHALATION TREATMENT: CPT | Mod: 76

## 2017-03-31 PROCEDURE — 25000132 ZZH RX MED GY IP 250 OP 250 PS 637: Performed by: INTERNAL MEDICINE

## 2017-03-31 PROCEDURE — 97530 THERAPEUTIC ACTIVITIES: CPT | Mod: GP | Performed by: PHYSICAL THERAPIST

## 2017-03-31 PROCEDURE — 25000132 ZZH RX MED GY IP 250 OP 250 PS 637: Performed by: NEUROLOGICAL SURGERY

## 2017-03-31 PROCEDURE — 97161 PT EVAL LOW COMPLEX 20 MIN: CPT | Mod: GP | Performed by: PHYSICAL THERAPIST

## 2017-03-31 PROCEDURE — 25000128 H RX IP 250 OP 636: Performed by: NEUROLOGICAL SURGERY

## 2017-03-31 PROCEDURE — 25000125 ZZHC RX 250: Performed by: NEUROLOGICAL SURGERY

## 2017-03-31 PROCEDURE — 80048 BASIC METABOLIC PNL TOTAL CA: CPT | Performed by: PHYSICIAN ASSISTANT

## 2017-03-31 RX ORDER — IPRATROPIUM BROMIDE AND ALBUTEROL SULFATE 2.5; .5 MG/3ML; MG/3ML
3 SOLUTION RESPIRATORY (INHALATION) EVERY 4 HOURS PRN
Status: DISCONTINUED | OUTPATIENT
Start: 2017-03-31 | End: 2017-03-31

## 2017-03-31 RX ORDER — IPRATROPIUM BROMIDE AND ALBUTEROL SULFATE 2.5; .5 MG/3ML; MG/3ML
3 SOLUTION RESPIRATORY (INHALATION) EVERY 4 HOURS PRN
Status: DISCONTINUED | OUTPATIENT
Start: 2017-03-31 | End: 2017-04-06

## 2017-03-31 RX ORDER — IPRATROPIUM BROMIDE AND ALBUTEROL SULFATE 2.5; .5 MG/3ML; MG/3ML
3 SOLUTION RESPIRATORY (INHALATION)
Status: DISCONTINUED | OUTPATIENT
Start: 2017-03-31 | End: 2017-03-31

## 2017-03-31 RX ORDER — OSELTAMIVIR PHOSPHATE 75 MG/1
75 CAPSULE ORAL 2 TIMES DAILY
Status: COMPLETED | OUTPATIENT
Start: 2017-03-31 | End: 2017-04-05

## 2017-03-31 RX ADMIN — IPRATROPIUM BROMIDE AND ALBUTEROL SULFATE 3 ML: .5; 3 SOLUTION RESPIRATORY (INHALATION) at 22:15

## 2017-03-31 RX ADMIN — ACETAMINOPHEN 975 MG: 325 TABLET, FILM COATED ORAL at 23:57

## 2017-03-31 RX ADMIN — OXYCODONE HYDROCHLORIDE 10 MG: 5 TABLET ORAL at 15:02

## 2017-03-31 RX ADMIN — CEFAZOLIN SODIUM 2 G: 2 INJECTION, SOLUTION INTRAVENOUS at 02:21

## 2017-03-31 RX ADMIN — ATORVASTATIN CALCIUM 80 MG: 40 TABLET, FILM COATED ORAL at 17:17

## 2017-03-31 RX ADMIN — IPRATROPIUM BROMIDE AND ALBUTEROL SULFATE 3 ML: .5; 3 SOLUTION RESPIRATORY (INHALATION) at 11:51

## 2017-03-31 RX ADMIN — Medication 2 LOZENGE: at 15:07

## 2017-03-31 RX ADMIN — OXYCODONE HYDROCHLORIDE 10 MG: 5 TABLET ORAL at 18:27

## 2017-03-31 RX ADMIN — HYDROMORPHONE HYDROCHLORIDE 0.5 MG: 1 INJECTION, SOLUTION INTRAMUSCULAR; INTRAVENOUS; SUBCUTANEOUS at 11:35

## 2017-03-31 RX ADMIN — CEFAZOLIN SODIUM 2 G: 2 INJECTION, SOLUTION INTRAVENOUS at 11:40

## 2017-03-31 RX ADMIN — DIAZEPAM 5 MG: 5 TABLET ORAL at 20:18

## 2017-03-31 RX ADMIN — VARENICLINE TARTRATE 1 MG: 1 TABLET, FILM COATED ORAL at 08:51

## 2017-03-31 RX ADMIN — HYDROMORPHONE HYDROCHLORIDE 4 MG: 2 TABLET ORAL at 08:51

## 2017-03-31 RX ADMIN — OXYCODONE HYDROCHLORIDE 10 MG: 5 TABLET ORAL at 22:13

## 2017-03-31 RX ADMIN — OXYBUTYNIN CHLORIDE 10 MG: 5 TABLET, EXTENDED RELEASE ORAL at 08:51

## 2017-03-31 RX ADMIN — IPRATROPIUM BROMIDE AND ALBUTEROL SULFATE 3 ML: .5; 3 SOLUTION RESPIRATORY (INHALATION) at 17:21

## 2017-03-31 RX ADMIN — OXYCODONE HYDROCHLORIDE 10 MG: 5 TABLET ORAL at 12:14

## 2017-03-31 RX ADMIN — OXYCODONE HYDROCHLORIDE 10 MG: 5 TABLET ORAL at 00:51

## 2017-03-31 RX ADMIN — IPRATROPIUM BROMIDE AND ALBUTEROL SULFATE 3 ML: .5; 3 SOLUTION RESPIRATORY (INHALATION) at 01:57

## 2017-03-31 RX ADMIN — DIAZEPAM 5 MG: 5 TABLET ORAL at 10:58

## 2017-03-31 RX ADMIN — DEXAMETHASONE SODIUM PHOSPHATE 4 MG: 4 INJECTION, SOLUTION INTRAMUSCULAR; INTRAVENOUS at 03:14

## 2017-03-31 RX ADMIN — ONDANSETRON 4 MG: 4 TABLET, ORALLY DISINTEGRATING ORAL at 23:57

## 2017-03-31 RX ADMIN — ACETAMINOPHEN 975 MG: 325 TABLET, FILM COATED ORAL at 08:51

## 2017-03-31 RX ADMIN — OSELTAMIVIR PHOSPHATE 75 MG: 75 CAPSULE ORAL at 20:18

## 2017-03-31 RX ADMIN — GUAIFENESIN AND DEXTROMETHORPHAN 10 ML: 100; 10 SYRUP ORAL at 20:17

## 2017-03-31 RX ADMIN — SENNOSIDES AND DOCUSATE SODIUM 2 TABLET: 8.6; 5 TABLET ORAL at 20:18

## 2017-03-31 RX ADMIN — ACETAMINOPHEN 975 MG: 325 TABLET, FILM COATED ORAL at 17:17

## 2017-03-31 RX ADMIN — VARENICLINE TARTRATE 1 MG: 1 TABLET, FILM COATED ORAL at 20:17

## 2017-03-31 RX ADMIN — SENNOSIDES AND DOCUSATE SODIUM 2 TABLET: 8.6; 5 TABLET ORAL at 08:51

## 2017-03-31 RX ADMIN — OXYCODONE HYDROCHLORIDE 10 MG: 5 TABLET ORAL at 04:06

## 2017-03-31 RX ADMIN — ACETAMINOPHEN 975 MG: 325 TABLET, FILM COATED ORAL at 00:51

## 2017-03-31 NOTE — PLAN OF CARE
Problem: Goal Outcome Summary  Goal: Goal Outcome Summary  Surgeon Discharge Plan: home soon     Current Functional Status: Pt still slow with mobility, attempting ambulation without O2. 89% at lowest with RA. Able to perform 450 feet 1 sitting rest break, SBA. 4 stairs, B rails. Pt tolerating fair.      Barriers to Plan/Home: Low sats

## 2017-03-31 NOTE — PLAN OF CARE
Problem: Goal Outcome Summary  Goal: Goal Outcome Summary  PT: PT fernandoal completed. Pt doing fair status post cervical fusion.  Surgeon Discharge Plan: none stated, per pt D/C home tomorrow.      Current Functional Status: Pt doing fair able to ambulate in hallway 200 feet x 2, but needing HHA, sats dropping to 88% on RA. Placed on 2 liters per RN.      Barriers to Plan/Home: decreased sats

## 2017-03-31 NOTE — PLAN OF CARE
Problem: Goal Outcome Summary  Goal: Goal Outcome Summary  Outcome: No Change  Day RN  Low grade temp 99.2 in am, other vss. CMS+ PRATIBHA drain dc today, incision is WDL  Pain 5-7 today tried oral dilaudid to control her pain felt oxycodone worked better so now taking that. Gave one dose of iv dilaudid for break thorugh  Pain after coughing lots in am and gave valium.  Up with SBA and brace  Lungs are course in bases and coughing is frequent. Chest xray was neg. Swabbed for flu due to  +. b cultures are pending. WBC 11.9  UA neg, voiding well.  Ate and drank well no issues with swallowing  Awake most of shift.

## 2017-03-31 NOTE — PROGRESS NOTES
"Date:3/31/2017  Admission Dx:Cervical Fusion  Pulmonary History: Sleep apnea, current smoker  Home Nebulizer/MDI Use: None  Home Oxygen: None  Acuity Level (RCAT flow sheet): level 4     Aerosol Therapy initiated: Duoneb Q4 prn    Pulmonary Hygiene initiated: Deep breathe and cough    Volume Expansion initiated: Incentive Spirometer    Current Oxygen Requirements: 3LPM Nc    Current SpO2: 96%    Re-evaluation date: 3 March 2017    Patient Education: Patient sleeping.    See \"RT Assessments\" flow sheet for patient assessment scoring and Acuity Level Details.       Saray Garcia RRT  3/31/2017      "

## 2017-03-31 NOTE — PLAN OF CARE
Problem: Goal Outcome Summary  Goal: Goal Outcome Summary  OT:  eval order received and chart reviewed.  Pt is status post anterior cervical surgery C6-7. Pt has PMH of B knee surgeries, HA, HTN, CTS, RLS, psuedogout, and OA.  Per PT, no skilled OT needs identified at this time with good family support in place.  Plan to complete order.

## 2017-03-31 NOTE — PROGRESS NOTES
03/31/17 1008   Quick Adds   Type of Visit Initial PT Evaluation   Living Environment   Lives With spouse   Living Arrangements house   Home Accessibility stairs to enter home;stairs within home   Number of Stairs to Enter Home 1   Number of Stairs Within Home 12   Transportation Available car;family or friend will provide   Living Environment Comment Pt lives with spouse in 2 level home with bed/bathroom on 2nd story. Pt spouse able to help once D/C home.   Self-Care   Usual Activity Tolerance excellent   Current Activity Tolerance moderate   Regular Exercise yes   Activity/Exercise Type biking;walking   Exercise Amount/Frequency daily   Equipment Currently Used at Home none   Functional Level Prior   Ambulation 0-->independent   Transferring 0-->independent   Toileting 0-->independent   Bathing 0-->independent   Dressing 0-->independent   Eating 0-->independent   Communication 0-->understands/communicates without difficulty   Swallowing 0-->swallows foods/liquids without difficulty   Cognition 0 - no cognition issues reported   Fall history within last six months no   Prior Functional Level Comment Pt does own IADLs.    General Information   Onset of Illness/Injury or Date of Surgery - Date 03/30/17   Referring Physician Dr. Klein   Patient/Family Goals Statement Pt hoping to D/C home today or tomorrow   Pertinent History of Current Problem (include personal factors and/or comorbidities that impact the POC) Pt is status post anterior cervical surgery C6-7. Pt has PMH of B knee surgeries, HA, HTN, CTS, RLS, psuedogout, and OA.   Precautions/Limitations spinal precautions   Cognitive Status Examination   Orientation orientation to person, place and time   Level of Consciousness alert   Follows Commands and Answers Questions 100% of the time;able to follow multistep instructions   Personal Safety and Judgment intact   Memory intact   Pain Assessment   Patient Currently in Pain Yes, see Vital Sign flowsheet  (5/10)  "  Posture    Posture Forward head position;Protracted shoulders   Range of Motion (ROM)   ROM Comment B LE WFL, limited cervical ROM due to precautions   Strength   Strength Comments B LE WFL   Bed Mobility   Bed Mobility Comments SBA, log rolling   Transfer Skills   Transfer Comments SBA, no AD   Gait   Gait Comments HHA and min A at waist, sats dropping to 88%. placed on 2 liters per RN. Slowed gait pattern   Balance   Balance Comments needing HHA currently   Modality Interventions   Planned Modality Interventions Cryotherapy   General Therapy Interventions   Planned Therapy Interventions balance training;bed mobility training;gait training;strengthening;transfer training;home program guidelines;progressive activity/exercise;risk factor education   Clinical Impression   Criteria for Skilled Therapeutic Intervention yes, treatment indicated   PT Diagnosis decreased functional mobility status post cervical surgery   Influenced by the following impairments pain, decreased ROM in cervical region, spinal precautions, balance impairment, low sats with activity   Functional limitations due to impairments decreased tolerance to ambulation   Clinical Presentation Stable/Uncomplicated   Clinical Presentation Rationale Pt improving post operatively   Clinical Decision Making (Complexity) Low complexity   Therapy Frequency` 2 times/day   Predicted Duration of Therapy Intervention (days/wks) 1-2 days   Anticipated Discharge Disposition Home with Assist   Risk & Benefits of therapy have been explained Yes   Patient, Family & other staff in agreement with plan of care Yes   [facility] [employer] TM \"6 Clicks\"   2016, Trustees of [facility], under license to [employer].  All rights reserved.   6 Clicks Short Forms Basic Mobility Inpatient Short Form   [facility] [employer]  \"6 Clicks\" V.2 Basic Mobility Inpatient Short Form   1. Turning from your back to your side while in a flat bed without using bedrails? 4 - " None   2. Moving from lying on your back to sitting on the side of a flat bed without using bedrails? 3 - A Little   3. Moving to and from a bed to a chair (including a wheelchair)? 3 - A Little   4. Standing up from a chair using your arms (e.g., wheelchair, or bedside chair)? 4 - None   5. To walk in hospital room? 3 - A Little   6. Climbing 3-5 steps with a railing? 3 - A Little   Basic Mobility Raw Score (Score out of 24.Lower scores equate to lower levels of function) 20   Total Evaluation Time   Total Evaluation Time (Minutes) 10

## 2017-03-31 NOTE — PROGRESS NOTES
[facility]    Neurosurgery Progress Note    Date of Service (when I saw the patient): 03/31/2017     Assessment & Plan   Margo Inman is a 57 year old female who was admitted on 3/30/2017. She presented with neck and RUE pain and weakness. She underwent anterior cervical decompression and fusion C6-C7 with Dr. Joseph Klein on 3/30/2017. Today she is seen sitting up in bed. She reports 5-7/10 incisional pain in her neck. She reports the tingling and weakness in her RUE seems to have improved already since surgery. She does say that she just stopped smoking 2 days ago and reports some coughing/wheezing. She is currently requiring oxygen 3LPM to maintain sats. Also had some low grade fevers overnight. RT is seeing her and administering nebs which she said are helping. I will order Hospitalist consult to help evaluate respiratory status given her history. Their recommendations are highly appreciated. We will have her ambulate with PT/OT today, continue oral pain meds and PRATIBHA drain may be removed by RN. Continue to monitor respiratory status and pain control and anticipate d/c home tomorrow if these improve. Patient does say that her  is currently sick at home with influenza- I recommended that she try to avoid exposure to this if at all possible. She is going to check to see if she might be able to stay with a friend or family member for a couple days until he is feeling better.     Active Problems:    S/P cervical spinal fusion    Assessment: Stable    Plan:   PT/OT and ambulation  Brace when out of bed   Continue oral analgesics   Encourage use of IS and deep breathing  Hospitalist consult- to evaluate respiratory status given coughing/wheezing and requiring O2   Ok for RN to remove PRATIBHA drain today        I have discussed the following assessment and plan with the Dr. Joseph Klein who is in agreement with initial plan and will follow up with further consultation  "recommendations.    Javier Kinney [facility]  [facility]  [facility]  [address]  [address]  [place] [address]    Tel [phone]  Pager [phone]      Interval History   Incisional neck pain. Coughing and requiring O2- hospitalist consult ordered.     Physical Exam   Temp: 99.2  F (37.3  C) Temp src: Oral BP: 138/72 Pulse: 85 Heart Rate: 82 Resp: 20 SpO2: 94 % O2 Device: Nasal cannula Oxygen Delivery: 3 LPM  Vitals:    03/30/17 0919   Weight: 87.5 kg (193 lb)     Vital Signs with Ranges  Temp:  [97.8  F (36.6  C)-101.2  F (38.4  C)] 99.2  F (37.3  C)  Pulse:  [85] 85  Heart Rate:  [77-98] 82  Resp:  [11-24] 20  BP: (113-171)/(52-89) 138/72  SpO2:  [88 %-98 %] 94 %  I/O last 3 completed shifts:  In: 4113 [P.O.:1350; I.V.:2763]  Out: 3230 [Urine:3200; Drains:20; Blood:10]    Heart Rate: 82, Blood pressure 138/72, pulse 85, temperature 99.2  F (37.3  C), temperature source Oral, resp. rate 20, height 1.549 m (5' 1\"), weight 87.5 kg (193 lb), last menstrual period 09/16/2011, SpO2 94 %, not currently breastfeeding.  193 lbs 0 oz  HEENT:  Normocephalic, atraumatic.  PERRLA.  EOM s intact.    Neck:  Supple, non-tender, without lymphadenopathy.  Heart:  No peripheral edema  Lungs:  Coughing.   Abdomen:  Soft, non-tender, non-distended.   Skin:  Warm and dry, good capillary refill.  Extremities:  Good radial and dorsalis pedis pulses bilaterally, no edema, cyanosis or clubbing.    NEUROLOGICAL EXAMINATION:   Mental status:  Alert and Oriented x 3, speech is fluent.  Cranial nerves:  II-XII intact.   Motor:  Strength is 5/5 throughout the upper extremities  Shoulder Abduction:  Right:  5/5   Left:  5/5  Biceps:                      Right:  5/5   Left:  5/5  Triceps:                     Right:  5/5   Left:  5/5  Wrist Extensors:       Right:  5/5   Left:  5/5  Wrist Flexors:           Right:  5/5   Left:  5/5    Sensation:  intact  Reflexes:  Negative Babinski.  Negative Clonus.  "   Gait:  Deferred    Medications     0.9% sodium chloride + KCl 20 mEq/L 75 mL/hr at 03/30/17 1713       atorvastatin  80 mg Oral Daily     oxybutynin  10 mg Oral Daily     varenicline  1 mg Oral BID     sodium chloride (PF)  3 mL Intracatheter Q8H     ceFAZolin  2 g Intravenous Q8H     acetaminophen  975 mg Oral Q8H     senna-docusate  1-2 tablet Oral BID       Data     All new lab and imaging data was personally reviewed by me.  CBC RESULTS:   Recent Labs   Lab Test  03/30/17   0946   01/10/17   1243   WBC   --    --   7.5   RBC   --    --   4.35   HGB  13.6   < >  13.8   HCT   --    --   42.7   MCV   --    --   98   MCH   --    --   31.7   MCHC   --    --   32.3   RDW   --    --   12.9   PLT   --    --   277    < > = values in this interval not displayed.     Basic Metabolic Panel:  Lab Results   Component Value Date     01/10/2017      Lab Results   Component Value Date    POTASSIUM 3.4 03/30/2017     Lab Results   Component Value Date    CHLORIDE 105 01/10/2017     Lab Results   Component Value Date    DOMONIQUE 9.2 01/10/2017     Lab Results   Component Value Date    CO2 29 01/10/2017     Lab Results   Component Value Date    BUN 11 01/10/2017     Lab Results   Component Value Date    CR 0.58 03/20/2017     Lab Results   Component Value Date     01/10/2017     INR:  Lab Results   Component Value Date    INR 0.90 01/10/2014

## 2017-03-31 NOTE — CONSULTS
Hospitalist Consultation      Margo Inman MRN# [id number]   YOB: 1960 Age: 57 year old   Date of Admission: 3/30/2017     Requesting Physician:  Dr. Klein  Reason for consult: Post-op fever, hypoxia, cough           Assessment and Plan:   This patient is a 57 year old female with a PMH significant for tobacco abuse (quit 2 days ago, on Chantix for ~1 month) who is POD 1 s/p C6-7 anterior cervical discectomy and overnight had a fever of 101.2 along with shortness of breath and productive non-bloody cough.    1. Post-op fever: Patient has fever, sob, and cough.   has had a cough for a few days and patient thinks he might have influenza.  She has a history of post-op pneumonia. Could be pneumonia vs viral bronchitis, possibly influenza. Has neck pain and sore upper abdomen from coughing.  No pleuritic pain or lower extremity pain/swelling. No suprapubic pain, dysuria, or urinary urgency/frequency. No wounds or skin changes suggestive of cellulitis/abscess.    -Obtain CXR  -Lab workup including CBC w/ diff, BMP, UA, procalcitonin, influenza A & B PCR, blood cultures x 2  -Patient currently on Ancef per primary service for post-op care  -Continue prn O2 and wean down as able  -Continue incentive spirometry  -Will schedule her duoneb treatments as the prn nebs have improved her symptoms.  2. S/p C6-7 anterior cervical discectomy: Doing well, cont PT/OT and pain control as per primary. DVT Prophylaxis: on PCDs as per primary. D/C planning: To home, possibly with home health aide.  3. Tobacco use: Patient quit smoking 2 days prior to surgery.  She has been on Chantix for about a month.  Continue pta Chantix as well as incentive spirometry.  Encourage continue smoking cessation.  4. HLD: Continue atorvastatin 80 mg daily.             History of Present Illness:   This patient is a 57 year old female who is POD 1 s/p C6-7 anterior cervical discectomy and developed a productive cough and fever  when she came up to the inpatient floor.  Her temp maxed around 101.2.  She has been desatting on room air down to 88%, currently on 3 LPM via nasal cannula and satting in the mid-90s. She denies feeling ill prior to admission, but she does note that her  has been coughing and feeling generally unwell the past few days.  She is concerned he may have influenza.  No other known contacts and she is not currently working.  She reports a productive, non-bloody cough since she woke up from surgery.  She has been feeling warm, denies chills.  She feels short of breath and has some general upper abdominal soreness from coughing as well as some throat irritation. Denies nasal congestion or sinus pressure. She denies pleuritic chest pain as well as lower extremity pain or swelling. She has been wearing PCDs in bed and ambulating without difficulty today. She has been tolerating po intake today and is off IVF.    Intra-op report reviewed and showed no intra-op complications.   I/o's reviewed, Currently net +943 with good UOP since OR. Hgb stable this am at 13.4.  Currently, francia diet, pain mostly managed though she does note all the coughing this morning is making her surgical site sore. No n/v.   O/w other medical problems have been stable.              Past Medical History:     Past Medical History:   Diagnosis Date     Arthritis      Cervical high risk HPV (human papillomavirus) test positive 1/10/2017    1/10/17 NIL pap/+ HR HPV (not 16 or 18). Plan: cotest in 1 year, due by 1/10/18      Chronic infection     HX of MRSA. 2 neg swabs at [facility] in 2006 and 2007.     History of blood transfusion      Numbness and tingling     Right arm     PONV (postoperative nausea and vomiting)           Past Surgical History:     Past Surgical History:   Procedure Laterality Date     APPENDECTOMY       ARTHROSCOPY KNEE  9/16/2011    Procedure:ARTHROSCOPY KNEE; left knee arthroscopy with debridement, open lateral patellar spur  excision; Surgeon:RG MONTOYA; Location:[facility] OR     C PART REMV FEMUR/PROX TIB/FIB  9/16/11    left, open lateral patellar bone spur excision     C TOTAL KNEE ARTHROPLASTY  1/17/14    Bilateral     COLONOSCOPY N/A 2/16/2016    Procedure: COLONOSCOPY;  Surgeon: Belem Khalil MD;  Location: [facility] OR     COLONOSCOPY N/A 2/16/2016    Procedure: COMBINED COLONOSCOPY, SINGLE OR MULTIPLE BIOPSY/POLYPECTOMY BY BIOPSY;  Surgeon: Belem Khalil MD;  Location: [facility] OR     COLONOSCOPY WITH CO2 INSUFFLATION N/A 2/16/2016    Procedure: COLONOSCOPY WITH CO2 INSUFFLATION;  Surgeon: Belem Khalil MD;  Location: [facility] OR     CYSTOSCOPY  2016    microscopic hematuria     ECTOPIC PREGNANCY SURGERY       ENT SURGERY       GYN SURGERY       HC INCISION TENDON SHEATH FINGER Left 7/11/14    Thumb TF release     HC KNEE SCOPE,MED/LAT MENISECTOMY  9/16/11    left, with partial medial menisectomy ONLY     HC REVISE MEDIAN N/CARPAL TUNNEL SURG Left 7/11/14    PRIMARY - not revision     ORTHOPEDIC SURGERY       RELEASE CARPAL TUNNEL  7/11/2014    Procedure: RELEASE CARPAL TUNNEL;  Surgeon: Rg Montoya MD;  Location: [facility] OR     RELEASE CARPAL TUNNEL Right 11/7/2014    Procedure: RELEASE CARPAL TUNNEL;  Surgeon: Rg Montoya MD;  Location: [facility] OR     RELEASE TRIGGER FINGER  7/11/2014    Procedure: RELEASE TRIGGER FINGER;  Surgeon: Rg Montoya MD;  Location: [facility]     RELEASE TRIGGER FINGER Right 11/7/2014    Procedure: RELEASE TRIGGER FINGER;  Surgeon: Rg Montoya MD;  Location: [facility] OR     tonsils            Social History:     Social History   Substance Use Topics     Smoking status: Current Every Day Smoker     Packs/day: 1.00     Years: 15.00     Types: Cigarettes     Smokeless tobacco: Never Used      Comment: Started Chantix     Alcohol use Yes      Comment: socially          Family History:     family history includes Breast Cancer in her maternal grandmother;  C.A.D. in her father and paternal grandfather; Cancer - colorectal in her maternal grandfather; Colon Cancer in her maternal grandfather; Ovarian Cancer in her maternal grandmother; Prostate Cancer in her maternal grandfather.  Family Hx fully reviewed and is non contributory to this admission.             Allergies:     Allergies   Allergen Reactions     Bee Anaphylaxis     Erythromycin Hives     Wasps [Hornets] Anaphylaxis             Medications:     Prior to Admission medications    Medication Sig Last Dose Taking? Auth Provider   oxyCODONE-acetaminophen (PERCOCET) 5-325 MG per tablet Take 1-2 tablets by mouth every 4 hours as needed for moderate to severe pain 3/30/2017 at 0700 Yes Joseph Klein MD   butalbital-acetaminophen-caffeine (FIORICET/ESGIC) -40 MG per tablet Take 1 tablet by mouth every 4 hours as needed 3/29/2017 at Unknown time Yes Jordana Naqvi PA-C   cyclobenzaprine (FLEXERIL) 10 MG tablet TAKE 1 TABLET BY MOUTH AT BEDTIME AS NEEDED FOR MUSCLE SPASMS 3/29/2017 at Unknown time Yes Jordana Naqvi PA-C   varenicline (CHANTIX STARTING MONTH PAK) 0.5 MG X 11 & 1 MG X 42 tablet Take 0.5 mg tab daily for 3 days, then 0.5 mg tab twice daily for 4 days, then 1 mg twice daily. 3/29/2017 at Unknown time Yes Jordana Naqvi PA-C   varenicline (CHANTIX) 1 MG tablet Take 1 tablet (1 mg) by mouth 2 times daily 3/29/2017 at Unknown time Yes Jordana Naqvi PA-C   diclofenac (VOLTAREN) 75 MG EC tablet Take 1 tablet (75 mg) by mouth 2 times daily Past Month at Unknown time Yes Rg Franco MD   oxybutynin (DITROPAN-XL) 5 MG 24 hr tablet Take 2 tablets (10 mg) by mouth daily Past Week at Unknown time Yes Jordana Tavarez PA-C   atorvastatin (LIPITOR) 80 MG tablet TAKE 1 TABLET BY MOUTH ONCE DAILY Past Week at Unknown time Yes Jordana Naqvi PA-C   acetaminophen (TYLENOL) 500 MG tablet Take 500-1,000 mg by mouth every 6 hours as needed. 3/30/2017  "at Unknown time Yes Reported, Patient               Review of Systems:   A comprehensive greater than 10 system review of systems was carried out.  Pertinent positives and negatives are noted above.  Otherwise negative for contributory info.            Physical Exam:   Vitals were reviewed  Blood pressure 138/72, pulse 85, temperature 99.2  F (37.3  C), temperature source Oral, resp. rate 20, height 1.549 m (5' 1\"), weight 87.5 kg (193 lb), last menstrual period 09/16/2011, SpO2 94 %, not currently breastfeeding.  Exam:    GENERAL:  Comfortable.  PSYCH: pleasant, oriented, No acute distress.  HEENT:  PERRLA. Normal conjunctiva, normal hearing, nasal mucosa and Oropharynx are normal.  NECK:  Supple, no neck vein distention, adenopathy or bruits, normal thyroid.  HEART:  Normal S1, S2 with no murmur, no pericardial rub, S3 or S4.  LUNGS:  Clear to auscultation, normal Respiratory effort.  ABDOMEN:  Soft, no hepatosplenomegaly, normal bowel sounds.  EXTREMITIES:  No pedal edema, +2 pulses bilateral and equal. Cervical dressing c/d/i.  SKIN:  Dry to touch, No rash, wound or ulcerations.  NEUROLOGIC:  Nonfocal with normal cranial nerve and motor power and sensation.          Data:   Past 24 hours labs, studies, and imaging were reviewed.  Results for orders placed or performed during the hospital encounter of 03/30/17 (from the past 24 hour(s))   Glucose by meter   Result Value Ref Range    Glucose 160 (H) 70 - 99 mg/dL   CBC with platelets differential   Result Value Ref Range    WBC 11.9 (H) 4.0 - 11.0 10e9/L    RBC Count 4.25 3.8 - 5.2 10e12/L    Hemoglobin 13.4 11.7 - 15.7 g/dL    Hematocrit 40.9 35.0 - 47.0 %    MCV 96 78 - 100 fl    MCH 31.5 26.5 - 33.0 pg    MCHC 32.8 31.5 - 36.5 g/dL    RDW 13.1 10.0 - 15.0 %    Platelet Count 233 150 - 450 10e9/L    Diff Method Automated Method     % Neutrophils 85.3 %    % Lymphocytes 8.0 %    % Monocytes 6.0 %    % Eosinophils 0.1 %    % Basophils 0.1 %    % Immature " Granulocytes 0.5 %    Nucleated RBCs 0 0 /100    Absolute Neutrophil 10.2 (H) 1.6 - 8.3 10e9/L    Absolute Lymphocytes 1.0 0.8 - 5.3 10e9/L    Absolute Monocytes 0.7 0.0 - 1.3 10e9/L    Absolute Eosinophils 0.0 0.0 - 0.7 10e9/L    Absolute Basophils 0.0 0.0 - 0.2 10e9/L    Abs Immature Granulocytes 0.1 0 - 0.4 10e9/L    Absolute Nucleated RBC 0.0    Basic metabolic panel   Result Value Ref Range    Sodium 136 133 - 144 mmol/L    Potassium 3.9 3.4 - 5.3 mmol/L    Chloride 99 94 - 109 mmol/L    Carbon Dioxide 28 20 - 32 mmol/L    Anion Gap 9 3 - 14 mmol/L    Glucose 154 (H) 70 - 99 mg/dL    Urea Nitrogen 8 7 - 30 mg/dL    Creatinine 0.50 (L) 0.52 - 1.04 mg/dL    GFR Estimate >90  Non  GFR Calc   >60 mL/min/1.7m2    GFR Estimate If Black >90   GFR Calc   >60 mL/min/1.7m2    Calcium 9.2 8.5 - 10.1 mg/dL   XR Chest 2 Views    Narrative    XR CHEST 2 VW 3/31/2017 11:20 AM    HISTORY: Postoperative fever and cough.    COMPARISON: 1/5/2017    FINDINGS: No airspace consolidation, pleural effusion or pneumothorax.  Normal heart size.      Impression    IMPRESSION: No acute cardiopulmonary abnormality.    TRINITY BURTON MD   UA with Microscopic reflex to Culture   Result Value Ref Range    Color Urine Yellow     Appearance Urine Clear     Glucose Urine Negative NEG mg/dL    Bilirubin Urine Negative NEG    Ketones Urine Negative NEG mg/dL    Specific Gravity Urine 1.010 1.003 - 1.035    Blood Urine Moderate (A) NEG    pH Urine 6.0 5.0 - 7.0 pH    Protein Albumin Urine Negative NEG mg/dL    Urobilinogen mg/dL 0.0 0.0 - 2.0 mg/dL    Nitrite Urine Negative NEG    Leukocyte Esterase Urine Trace (A) NEG    Source Midstream Urine     WBC Urine 2 0 - 2 /HPF    RBC Urine 12 (H) 0 - 2 /HPF    Squamous Epithelial /HPF Urine 2 (H) 0 - 1 /HPF    Mucous Urine Present (A) NEG /LPF     Sandy Macias PA-C    Pt discussed with Dr. Kauffman who agrees with the care as discussed above.

## 2017-04-01 ENCOUNTER — APPOINTMENT (OUTPATIENT)
Dept: PHYSICAL THERAPY | Facility: CLINIC | Age: 57
DRG: 028 | End: 2017-04-01
Attending: NEUROLOGICAL SURGERY
Payer: COMMERCIAL

## 2017-04-01 PROCEDURE — 40000275 ZZH STATISTIC RCP TIME EA 10 MIN

## 2017-04-01 PROCEDURE — 99233 SBSQ HOSP IP/OBS HIGH 50: CPT | Performed by: INTERNAL MEDICINE

## 2017-04-01 PROCEDURE — 40000193 ZZH STATISTIC PT WARD VISIT: Performed by: PHYSICAL THERAPIST

## 2017-04-01 PROCEDURE — 97116 GAIT TRAINING THERAPY: CPT | Mod: GP | Performed by: PHYSICAL THERAPIST

## 2017-04-01 PROCEDURE — 25000132 ZZH RX MED GY IP 250 OP 250 PS 637: Performed by: NEUROLOGICAL SURGERY

## 2017-04-01 PROCEDURE — 12000007 ZZH R&B INTERMEDIATE

## 2017-04-01 PROCEDURE — 25000125 ZZHC RX 250: Performed by: NEUROLOGICAL SURGERY

## 2017-04-01 PROCEDURE — 94640 AIRWAY INHALATION TREATMENT: CPT

## 2017-04-01 PROCEDURE — 25000132 ZZH RX MED GY IP 250 OP 250 PS 637: Performed by: INTERNAL MEDICINE

## 2017-04-01 PROCEDURE — 97530 THERAPEUTIC ACTIVITIES: CPT | Mod: GP | Performed by: PHYSICAL THERAPIST

## 2017-04-01 RX ADMIN — VARENICLINE TARTRATE 1 MG: 1 TABLET, FILM COATED ORAL at 19:36

## 2017-04-01 RX ADMIN — ACETAMINOPHEN 975 MG: 325 TABLET, FILM COATED ORAL at 16:50

## 2017-04-01 RX ADMIN — OXYCODONE HYDROCHLORIDE 10 MG: 5 TABLET ORAL at 04:14

## 2017-04-01 RX ADMIN — OXYCODONE HYDROCHLORIDE 10 MG: 5 TABLET ORAL at 22:41

## 2017-04-01 RX ADMIN — ATORVASTATIN CALCIUM 80 MG: 40 TABLET, FILM COATED ORAL at 16:50

## 2017-04-01 RX ADMIN — DIAZEPAM 5 MG: 5 TABLET ORAL at 10:51

## 2017-04-01 RX ADMIN — IPRATROPIUM BROMIDE AND ALBUTEROL SULFATE 3 ML: .5; 3 SOLUTION RESPIRATORY (INHALATION) at 17:39

## 2017-04-01 RX ADMIN — OXYCODONE HYDROCHLORIDE 10 MG: 5 TABLET ORAL at 14:11

## 2017-04-01 RX ADMIN — OXYCODONE HYDROCHLORIDE 10 MG: 5 TABLET ORAL at 01:22

## 2017-04-01 RX ADMIN — VARENICLINE TARTRATE 1 MG: 1 TABLET, FILM COATED ORAL at 08:09

## 2017-04-01 RX ADMIN — SENNOSIDES AND DOCUSATE SODIUM 1 TABLET: 8.6; 5 TABLET ORAL at 19:37

## 2017-04-01 RX ADMIN — SENNOSIDES AND DOCUSATE SODIUM 2 TABLET: 8.6; 5 TABLET ORAL at 08:08

## 2017-04-01 RX ADMIN — OSELTAMIVIR PHOSPHATE 75 MG: 75 CAPSULE ORAL at 08:09

## 2017-04-01 RX ADMIN — DIAZEPAM 5 MG: 5 TABLET ORAL at 04:14

## 2017-04-01 RX ADMIN — OXYCODONE HYDROCHLORIDE 10 MG: 5 TABLET ORAL at 08:09

## 2017-04-01 RX ADMIN — ONDANSETRON 4 MG: 4 TABLET, ORALLY DISINTEGRATING ORAL at 10:51

## 2017-04-01 RX ADMIN — ACETAMINOPHEN 975 MG: 325 TABLET, FILM COATED ORAL at 08:09

## 2017-04-01 RX ADMIN — OXYCODONE HYDROCHLORIDE 10 MG: 5 TABLET ORAL at 19:36

## 2017-04-01 RX ADMIN — OSELTAMIVIR PHOSPHATE 75 MG: 75 CAPSULE ORAL at 19:36

## 2017-04-01 RX ADMIN — OXYCODONE HYDROCHLORIDE 10 MG: 5 TABLET ORAL at 10:51

## 2017-04-01 RX ADMIN — OXYBUTYNIN CHLORIDE 10 MG: 5 TABLET, EXTENDED RELEASE ORAL at 08:09

## 2017-04-01 NOTE — PROGRESS NOTES
[facility]    Neurosurgery Progress Note    Date of Service (when I saw the patient): 04/01/2017     Assessment & Plan   Margo Inman is a 57 year old female who was admitted on 3/30/2017. She presented with neck and RUE pain and weakness. She underwent anterior cervical decompression and fusion C6-C7 with Dr. Joseph Klein on 3/30/2017. Today she is sitting upright in bed. She states she is very hot and continues to have hot and cold spells. She continues to run a fever, 99.6 this AM and given acetaminophen. States tingling and weakness in her RUE seems to have improved already since surgery. Continues to have some difficulties with breathing and is on NC 2LPM. Plan to d/c home in 1-2 days.    Active Problems:  S/P cervical spinal fusion  Assessment: Stable  Plan:   -Continue PT/OT and ambulation  -Brace when out of bed   -Continue oral analgesics   -Encourage use of IS and deep breathing  -Plan to d/c home in 1-2 days      I have discussed the following assessment and plan with Dr. Klein who is in agreement with initial plan and will follow up with further consultation recommendations.    Ligia Jett PA-C  [facility]  [place] [address]    Tel [phone]  Pager [phone]      Interval History   Stable. Continue to run fevers.    Physical Exam   Temp: 101.6  F (38.7  C) Temp src: Oral BP: 104/47   Heart Rate: 110 Resp: 18 SpO2: 92 % O2 Device: Nasal cannula Oxygen Delivery: 2 LPM  Vitals:    03/30/17 0919   Weight: 193 lb (87.5 kg)     Vital Signs with Ranges  Temp:  [98.7  F (37.1  C)-101.6  F (38.7  C)] 101.6  F (38.7  C)  Heart Rate:  [] 110  Resp:  [16-20] 18  BP: (104-158)/(47-72) 104/47  SpO2:  [65 %-97 %] 92 %  I/O last 3 completed shifts:  In: 2310 [P.O.:2310]  Out: 1410 [Urine:1400; Drains:10]    Heart Rate: 110, Blood pressure 104/47, pulse 85, temperature 101.6  F (38.7  C), temperature source  "Oral, resp. rate 18, height 5' 1\" (1.549 m), weight 193 lb (87.5 kg), last menstrual period 09/16/2011, SpO2 92 %, not currently breastfeeding.  193 lbs 0 oz  HEENT:  Normocephalic, atraumatic.     Neck:  Supple, non-tender. Cervical collar in place.   Heart:  No peripheral edema  Lungs:  No SOB  Skin:  Warm and dry. Incision covered with dressing is clean, dry, and intact  Extremities:  Good radial and dorsalis pedis pulses bilaterally, no edema, cyanosis or clubbing.    NEUROLOGICAL EXAMINATION:   Mental status:  Alert and Oriented x 3, speech is fluent.  Cranial nerves:  II-XII intact.   Motor:  Strength is 5/5 throughout the upper and lower extremities  Sensation:  intact  Reflexes:  Negative Babinski.  Negative Clonus.    Gait:  Deferred.     Medications     0.9% sodium chloride + KCl 20 mEq/L 75 mL/hr at 03/30/17 1713       oseltamivir  75 mg Oral BID     atorvastatin  80 mg Oral Daily     oxybutynin  10 mg Oral Daily     varenicline  1 mg Oral BID     sodium chloride (PF)  3 mL Intracatheter Q8H     acetaminophen  975 mg Oral Q8H     senna-docusate  1-2 tablet Oral BID       Data   CBC RESULTS:   Recent Labs   Lab Test  03/31/17   1052   WBC  11.9*   RBC  4.25   HGB  13.4   HCT  40.9   MCV  96   MCH  31.5   MCHC  32.8   RDW  13.1   PLT  233     Basic Metabolic Panel:  Lab Results   Component Value Date     03/31/2017      Lab Results   Component Value Date    POTASSIUM 3.9 03/31/2017     Lab Results   Component Value Date    CHLORIDE 99 03/31/2017     Lab Results   Component Value Date    DOMONIQUE 9.2 03/31/2017     Lab Results   Component Value Date    CO2 28 03/31/2017     Lab Results   Component Value Date    BUN 8 03/31/2017     Lab Results   Component Value Date    CR 0.50 03/31/2017     Lab Results   Component Value Date     03/31/2017     INR:  Lab Results   Component Value Date    INR 0.90 01/10/2014         "

## 2017-04-01 NOTE — PLAN OF CARE
Problem: Goal Outcome Summary  Goal: Goal Outcome Summary  Outcome: Improving  Vital signs monitored, low grade temp.  Lung sounds coarse, diminished. Frequent, productive cough. Pt feels PRN nebulizers are helping with the cough.  Soft collar in place. CMS intact.   Pain managed with 10mg oxycodone PRN.   Pt positive for influenza B, hospitalist aware. Started on Tamiflu this evening.   Up with assist x1, voiding in bathroom.   Tolerating mechanical soft diet.

## 2017-04-01 NOTE — PLAN OF CARE
Problem: Goal Outcome Summary  Goal: Goal Outcome Summary  PT:     Surgeon Discharge Plan: home with assist     Current Functional Status: Pt able to ambulate 200' with CGA and on RA. Pt with one minor LOB require minAx1 to recover. Pt reports SOB with walking secondary to mask. Pt's O2 sats at 88% upon completion. O2 does not recover until pt places 1LO2 on, then O2 rises to 95%. Pt able to complete sit<>stand and sit<>supine with SBA.      Barriers to Plan/Home: Pt is most limited by O2 requirements and SOB. Pt with stairs to complete at discharge.      Continue to rec home with assist at [place].

## 2017-04-01 NOTE — PLAN OF CARE
Problem: Laminectomy/Foraminotomy/Discectomy (Adult)  Goal: Signs and Symptoms of Listed Potential Problems Will be Absent or Manageable (Laminectomy/Foraminotomy/Discectomy)  Signs and symptoms of listed potential problems will be absent or manageable by discharge/transition of care (reference Laminectomy/Foraminotomy/Discectomy (Adult) CPG).   Outcome: Improving  A&O x4. Temp of 99.6, acetaminophen given. All other VSS. O2 via NC on at 2LPM. LS diminished but clear. No SOB this shift. BS hypoactive, passing flatus.  Dressing to neck is CDI, cervical collar on, no skin breakdown noted.  francia PO mechanical soft well, up with SBA, PO valium and oxycodone managing pain, voiding in good amts, plans to dc home in 1-2 days

## 2017-04-01 NOTE — PLAN OF CARE
Problem: Goal Outcome Summary  Goal: Goal Outcome Summary  Outcome: No Change  Day RN  A/O.  Max temp 102.1, relieved with tylenol.  Tachycardic at times.  All other VSS. 2L oxygen via NC.  Pain managed with PO oxycodone and valium.  Dressing CDI.  CMS intact. Infrequent, productive cough (declined nebulizer treatments or robitussin). Up with SBA and soft cervical collar to and from restroom.  Voiding adequately.  Minimal appetite this shift.  Plan is to DC home in 1-2 days.

## 2017-04-01 NOTE — PLAN OF CARE
Problem: Goal Outcome Summary  Goal: Goal Outcome Summary  Outcome: No Change  [id number] Text page to MD: Pt requesting neb tx. Thank you    VSS, temp elevated, up to 101.9 this evening, on tylenol  q8h. AAOx4.  Pt on droplet precautions, positive influenza B, pt on tamiflu BID.  Pt on soft diet.  SBA. Moderate hand , strong dorsi/plantarflexion. Unable to wean off O2 at this time. Discharge 1-2 days pending progress.

## 2017-04-01 NOTE — PLAN OF CARE
Problem: Goal Outcome Summary  Goal: Goal Outcome Summary  PT: Pt approached for treatment this AM. Pt sleeping upon entering room, but wakes to name. Pt declines therapy at this time reporting nausea and fatigue. Pt agreeable to attempt session this PM. Pt also encouraged to get up to chair with nursing this AM. Nursing updated.

## 2017-04-01 NOTE — PROGRESS NOTES
"  [facility]  Hospitalist Progress Note  Glenn Kauffman MD 04/01/2017    Reason for Stay (Diagnosis): s/p elective c-spine surgery, acute Influenza B         Assessment and Plan:      Summary of Stay: Margo Inman is a 57 year old female with a PMH significant for tobacco abuse (quit 2 days ago, on Chantix for ~1 month) who is POD 2 s/p C6-7 anterior cervical discectomy.      Post-op course complicated by fever/SOB/cough and work up revealed that pt has acute influenza.  She is receiving Tamiflu     1. Acute Influenza B:   - continue Tamiflu  - CXR negative  -Continue prn O2 and wean down as able  -Continue incentive spirometry  -Duoneb treatments   2. S/p C6-7 anterior cervical discectomy:   cont PT/OT and pain control as per primary. DVT Prophylaxis: on PCDs as per primary. D/C planning: To home, possibly with home health aide.  3. Tobacco use: Patient quit smoking 2 days prior to surgery. She has been on Chantix for about a month. Continue pta Chantix as well as incentive spirometry. Encourage continue smoking cessation.  4. HLD: Continue atorvastatin 80 mg daily.    DVT ppx:  SCDs  Code:  Full  Dispo:  Home expected in 1-2 days pending progress        Interval History (Subjective):      Continues to have cough.  Coughing exacerbates post-op pain                  Physical Exam:      Last Vital Signs:  /59 (BP Location: Right arm)  Pulse 85  Temp 99.8  F (37.7  C) (Oral)  Resp 16  Ht 1.549 m (5' 1\")  Wt 87.5 kg (193 lb)  LMP 09/16/2011  SpO2 96%  BMI 36.47 kg/m2      Intake/Output Summary (Last 24 hours) at 04/01/17 1424  Last data filed at 04/01/17 1322   Gross per 24 hour   Intake             1830 ml   Output             1800 ml   Net               30 ml       Constitutional: Awake, alert, cooperative, no apparent distress.  Soft neck collar on   Respiratory: Clear to auscultation bilaterally, no crackles or wheezing   Cardiovascular: Regular rate and rhythm, normal S1 " and S2, and no murmur noted   Abdomen: Normal bowel sounds, soft, non-distended, non-tender   Skin: No rashes, no cyanosis, dry to touch   Neuro: Alert and oriented x3, no weakness, numbness, memory loss   Extremities: No edema, normal range of motion   Other(s):        All other systems: Negative          Medications:      All current medications were reviewed with changes reflected in problem list.         Data:      All new lab and imaging data was reviewed.   Labs:    Recent Labs  Lab 03/31/17  1052 03/30/17  0946   WBC 11.9*  --    HGB 13.4 13.6   HCT 40.9  --    MCV 96  --      --       Imaging:   No results found for this or any previous visit (from the past 24 hour(s)).

## 2017-04-02 ENCOUNTER — APPOINTMENT (OUTPATIENT)
Dept: PHYSICAL THERAPY | Facility: CLINIC | Age: 57
DRG: 028 | End: 2017-04-02
Attending: NEUROLOGICAL SURGERY
Payer: COMMERCIAL

## 2017-04-02 LAB
ANION GAP SERPL CALCULATED.3IONS-SCNC: 4 MMOL/L (ref 3–14)
BUN SERPL-MCNC: 8 MG/DL (ref 7–30)
CALCIUM SERPL-MCNC: 8.8 MG/DL (ref 8.5–10.1)
CHLORIDE SERPL-SCNC: 94 MMOL/L (ref 94–109)
CO2 SERPL-SCNC: 36 MMOL/L (ref 20–32)
CREAT SERPL-MCNC: 0.61 MG/DL (ref 0.52–1.04)
GFR SERPL CREATININE-BSD FRML MDRD: ABNORMAL ML/MIN/1.7M2
GLUCOSE SERPL-MCNC: 106 MG/DL (ref 70–99)
POTASSIUM SERPL-SCNC: 3.7 MMOL/L (ref 3.4–5.3)
SODIUM SERPL-SCNC: 134 MMOL/L (ref 133–144)

## 2017-04-02 PROCEDURE — 12000007 ZZH R&B INTERMEDIATE

## 2017-04-02 PROCEDURE — 97116 GAIT TRAINING THERAPY: CPT | Mod: GP | Performed by: PHYSICAL THERAPIST

## 2017-04-02 PROCEDURE — 25000132 ZZH RX MED GY IP 250 OP 250 PS 637: Performed by: NEUROLOGICAL SURGERY

## 2017-04-02 PROCEDURE — 99232 SBSQ HOSP IP/OBS MODERATE 35: CPT | Performed by: INTERNAL MEDICINE

## 2017-04-02 PROCEDURE — 97530 THERAPEUTIC ACTIVITIES: CPT | Mod: GP | Performed by: PHYSICAL THERAPIST

## 2017-04-02 PROCEDURE — 36415 COLL VENOUS BLD VENIPUNCTURE: CPT | Performed by: INTERNAL MEDICINE

## 2017-04-02 PROCEDURE — 40000193 ZZH STATISTIC PT WARD VISIT: Performed by: PHYSICAL THERAPIST

## 2017-04-02 PROCEDURE — 25000128 H RX IP 250 OP 636: Performed by: INTERNAL MEDICINE

## 2017-04-02 PROCEDURE — 25000132 ZZH RX MED GY IP 250 OP 250 PS 637: Performed by: INTERNAL MEDICINE

## 2017-04-02 PROCEDURE — 80048 BASIC METABOLIC PNL TOTAL CA: CPT | Performed by: INTERNAL MEDICINE

## 2017-04-02 RX ORDER — SODIUM CHLORIDE 9 MG/ML
INJECTION, SOLUTION INTRAVENOUS CONTINUOUS
Status: DISCONTINUED | OUTPATIENT
Start: 2017-04-02 | End: 2017-04-04

## 2017-04-02 RX ADMIN — Medication 1 LOZENGE: at 18:45

## 2017-04-02 RX ADMIN — OXYBUTYNIN CHLORIDE 10 MG: 5 TABLET, EXTENDED RELEASE ORAL at 08:25

## 2017-04-02 RX ADMIN — OXYCODONE HYDROCHLORIDE 10 MG: 5 TABLET ORAL at 12:12

## 2017-04-02 RX ADMIN — ACETAMINOPHEN 975 MG: 325 TABLET, FILM COATED ORAL at 08:24

## 2017-04-02 RX ADMIN — ACETAMINOPHEN 650 MG: 325 TABLET, FILM COATED ORAL at 15:37

## 2017-04-02 RX ADMIN — ACETAMINOPHEN 650 MG: 325 TABLET, FILM COATED ORAL at 19:31

## 2017-04-02 RX ADMIN — OXYCODONE HYDROCHLORIDE 10 MG: 5 TABLET ORAL at 22:38

## 2017-04-02 RX ADMIN — OSELTAMIVIR PHOSPHATE 75 MG: 75 CAPSULE ORAL at 08:25

## 2017-04-02 RX ADMIN — OXYCODONE HYDROCHLORIDE 10 MG: 5 TABLET ORAL at 16:59

## 2017-04-02 RX ADMIN — OXYCODONE HYDROCHLORIDE 10 MG: 5 TABLET ORAL at 06:01

## 2017-04-02 RX ADMIN — SENNOSIDES AND DOCUSATE SODIUM 1 TABLET: 8.6; 5 TABLET ORAL at 19:32

## 2017-04-02 RX ADMIN — ATORVASTATIN CALCIUM 80 MG: 40 TABLET, FILM COATED ORAL at 17:00

## 2017-04-02 RX ADMIN — SENNOSIDES AND DOCUSATE SODIUM 2 TABLET: 8.6; 5 TABLET ORAL at 08:25

## 2017-04-02 RX ADMIN — VARENICLINE TARTRATE 1 MG: 1 TABLET, FILM COATED ORAL at 19:31

## 2017-04-02 RX ADMIN — SODIUM CHLORIDE: 9 INJECTION, SOLUTION INTRAVENOUS at 11:23

## 2017-04-02 RX ADMIN — DIAZEPAM 5 MG: 5 TABLET ORAL at 19:31

## 2017-04-02 RX ADMIN — DIAZEPAM 5 MG: 5 TABLET ORAL at 13:30

## 2017-04-02 RX ADMIN — VARENICLINE TARTRATE 1 MG: 1 TABLET, FILM COATED ORAL at 08:24

## 2017-04-02 RX ADMIN — SENNOSIDES AND DOCUSATE SODIUM 1 TABLET: 8.6; 5 TABLET ORAL at 19:38

## 2017-04-02 RX ADMIN — OXYCODONE HYDROCHLORIDE 10 MG: 5 TABLET ORAL at 09:07

## 2017-04-02 RX ADMIN — OSELTAMIVIR PHOSPHATE 75 MG: 75 CAPSULE ORAL at 19:31

## 2017-04-02 RX ADMIN — DIAZEPAM 5 MG: 5 TABLET ORAL at 00:45

## 2017-04-02 RX ADMIN — ACETAMINOPHEN 975 MG: 325 TABLET, FILM COATED ORAL at 00:45

## 2017-04-02 RX ADMIN — OXYCODONE HYDROCHLORIDE 10 MG: 5 TABLET ORAL at 01:59

## 2017-04-02 ASSESSMENT — PAIN DESCRIPTION - DESCRIPTORS
DESCRIPTORS: ACHING
DESCRIPTORS: SHARP
DESCRIPTORS: STABBING

## 2017-04-02 NOTE — PLAN OF CARE
Problem: Individualization  Goal: Patient Preferences  Outcome: Improving  RN:pt vss, running temp today did come down on recheck, plan tcu Tuesday.   Lungs: diminished , 2 L 02 stable, encouraged IS hourly  BS:    Active    Diet: regular soft, but no appetite   CMS +, numbnes gone per pt, encouraged D/P flexion,  and PCD's are in place  Dressing: CDI  Activity: pt log rolled q2 hours, up with SBA with pt  Pain: controlled with oxycodone and valium   Output: adequate   Non productive cough

## 2017-04-02 NOTE — PLAN OF CARE
Problem: Goal Outcome Summary  Goal: Goal Outcome Summary  PT: Pt attempted at 0830. Pt declines secondary to back pain. Agreeable to attempt later this AM. Will check back this AM as time/schedule allows, otherwise pt will be seen for scheduled PM session. Pt in agreement.

## 2017-04-02 NOTE — PROGRESS NOTES
[facility]    Hospitalist Progress Note  Name: Margo Inman    MRN: [id number]  Provider:  Zeeshan Rose DO, FHM (Text Page)    Assessment & Plan   Summary of Stay: Margo Inman is a 57 year old female with a PMH significant for tobacco abuse (quit a few days ago, on Chantix for ~1 month) who is post-op s/p C6-7 anterior cervical discectomy.      Post-op course complicated by fever/SOB/cough and work up revealed that pt has acute influenza. She is receiving Tamiflu.     1. Acute Influenza B:   - continue Tamiflu, 5 days total course planned  - CXR negative for pneumonia  -Continue prn O2 and wean down as able  -Continue incentive spirometry  -Duoneb treatments   -  Ongoing fevers and unwell feelings not surprising for acute influenza  -  Oral intake poor with reduced appetite likely related to influenza  -  I will continue some IVF given the incidental losses with all her fevers    2. S/p C6-7 anterior cervical discectomy:    -  Post-op site cares, activity restrictions, pain medications, DVT proph per surgical team.    3. Tobacco use: Patient quit smoking just prior to surgery. She has been on Chantix for about a month. Continue pta Chantix as well as incentive spirometry. Encourage continue smoking cessation.    4. Hyperlipidemia: Continue atorvastatin 80 mg daily.     DVT ppx: SCDs  Code: Full  Dispo:  Likely home eventually but therapy/recovery slowed by acute influenza.  Given ongoing fevers and unwell feelings I suspect she needs at least one more, if not 2 more days in the hospital.    Interval History   Assumed consultative care, history reviewed.  Ms. Inman continues having fevers/chills with a cough.  She has some rib/back pain with coughing.  No chest pain at rest.  No nausea today but has a poor appetite and reports she hasn't eaten much.  She has a mild headache with some general body aches.  She is trying to do therapy but feels it is hard with feeling unwell from the  flu.  No BM yet but some flatus.    -Data reviewed today: I reviewed all new labs and imaging reports over the last 24 hours. I personally reviewed no images or EKG's today.    Physical Exam   Temp: 101.8  F (38.8  C) Temp src: Oral BP: 115/57 Pulse: 94 Heart Rate: 94 Resp: 16 SpO2: 93 % O2 Device: Nasal cannula Oxygen Delivery: 2 LPM  Vitals:    03/30/17 0919   Weight: 87.5 kg (193 lb)     Vital Signs with Ranges  Temp:  [99.5  F (37.5  C)-102.1  F (38.9  C)] 101.8  F (38.8  C)  Pulse:  [94] 94  Heart Rate:  [92-94] 94  Resp:  [16-18] 16  BP: (106-125)/(57-62) 115/57  SpO2:  [79 %-96 %] 93 %  I/O last 3 completed shifts:  In: 365 [P.O.:365]  Out: 1250 [Urine:1250]    GEN:  Alert, oriented x 3, appears ill but comfortable.  HEENT:  Normocephalic/atraumatic, no scleral icterus, no nasal discharge, mouth moist.  Dressing dry.  CV:  Regular rate and rhythm, no murmur or JVD.  S1 + S2 noted, no S3 or S4.  LUNGS:  Clear to auscultation anterior/laterally without rales/rhonchi/wheezing/retractions.  Symmetric chest rise on inhalation noted.  ABD:  Active bowel sounds, soft, non-tender, mildly distended.  No rebound/guarding/rigidity.  EXT:  No edema.  No cyanosis.  No acute joint synovitis noted.  Moving arms/legs well on general exam.  SKIN:  Dry to touch, no exanthems noted in the visualized areas.    Medications     0.9% sodium chloride + KCl 20 mEq/L 75 mL/hr at 03/30/17 1713       oseltamivir  75 mg Oral BID     atorvastatin  80 mg Oral Daily     oxybutynin  10 mg Oral Daily     varenicline  1 mg Oral BID     sodium chloride (PF)  3 mL Intracatheter Q8H     senna-docusate  1-2 tablet Oral BID     Data       Recent Labs  Lab 03/31/17  1052 03/30/17  0946   WBC 11.9*  --    HGB 13.4 13.6   HCT 40.9  --    MCV 96  --      --        Recent Labs  Lab 03/31/17  1058 03/31/17  1052   CULT No growth after 2 days No growth after 2 days       Recent Labs  Lab 03/31/17  1052 03/30/17  0946     --    POTASSIUM 3.9  3.4   CHLORIDE 99  --    CO2 28  --    ANIONGAP 9  --    *  --    BUN 8  --    CR 0.50*  --    GFRESTIMATED >90Non  GFR Calc  --    GFRESTBLACK >90African American GFR Calc  --    DOMONIQUE 9.2  --      No results found for this or any previous visit (from the past 24 hour(s)).

## 2017-04-02 NOTE — PLAN OF CARE
Problem: Laminectomy/Foraminotomy/Discectomy (Adult)  Goal: Signs and Symptoms of Listed Potential Problems Will be Absent or Manageable (Laminectomy/Foraminotomy/Discectomy)  Signs and symptoms of listed potential problems will be absent or manageable by discharge/transition of care (reference Laminectomy/Foraminotomy/Discectomy (Adult) CPG).   Outcome: Improving  A&O x4. Had temp of 100.9 that came down to 99.8. All other VSS. LS diminished. On 2LPM of O2 via NC. On droplet precautions for influenza B, has productive infrequent cough with small to moderate amount of tan, thick sputum. Dressing to ant neck is CDI, soft collar worn at all times, no breakdown under collar, CMS intact with moderate hand . francia PO well, up with A1 and gait belt. PO oxycodone managing pain, voiding in good amts, plans to dc in 1-2 days.

## 2017-04-02 NOTE — PROGRESS NOTES
"[facility]    Neurosurgery Progress Note    Date of Service (when I saw the patient): 04/02/2017     Assessment & Plan   Margo Inman is a 57 year old female who was admitted on 3/30/2017. She presented with neck and RUE pain and weakness. She underwent anterior cervical decompression and fusion C6-C7 with Dr. Joseph Klein on 3/30/2017.  Following surgery the patient developed fever/cough/SOB and revealed acute influenza.  She is followed and managed by hospitalist, on Tamiflu and receiving IV fluids; appreciate services.  She states she continues to have generalized body aches, and describes pain at incision site, a \"burning\" sensation.  She finds relief with pain medication.  She is sitting upright in chair.    Active Problems:    S/P cervical spinal fusion    Assessment: S/p C6-7 ACDF    Plan:   Pain control  Management of influenza per hospitalist      I have discussed the following assessment and plan with Dr. Klein who is in agreement with initial plan and will follow up with further consultation recommendations.    [facility]  [facility]  [place] [address]    Tel [phone]  Pager [phone]      Interval History   Stable, Day 3 post-op with influenza    Physical Exam   Temp: 101.8  F (38.8  C) Temp src: Oral BP: 115/57 Pulse: 94 Heart Rate: 94 Resp: 16 SpO2: 93 % O2 Device: Nasal cannula Oxygen Delivery: 2 LPM  Vitals:    03/30/17 0919   Weight: 193 lb (87.5 kg)     Vital Signs with Ranges  Temp:  [99.5  F (37.5  C)-101.9  F (38.8  C)] 101.8  F (38.8  C)  Pulse:  [94] 94  Heart Rate:  [93-94] 94  Resp:  [16-18] 16  BP: (115-125)/(57-62) 115/57  SpO2:  [79 %-96 %] 93 %  I/O last 3 completed shifts:  In: 365 [P.O.:365]  Out: 1250 [Urine:1250]    Heart Rate: 94, Blood pressure 115/57, pulse 94, temperature 101.8  F (38.8  C), temperature source Oral, resp. rate 16, height 5' 1\" (1.549 m), weight 193 " lb (87.5 kg), last menstrual period 09/16/2011, SpO2 93 %, not currently breastfeeding.  193 lbs 0 oz  HEENT:  Normocephalic, atraumatic.    Heart:  No peripheral edema  Lungs:  No SOB  Skin:  Warm and dry.  Incision intact and covered with dry dressing; soft cervical collar on  Extremities:  Good radial and dorsalis pedis pulses bilaterally, no edema, cyanosis or clubbing.    NEUROLOGICAL EXAMINATION:     Mental status:  Alert and Oriented x 3, speech is fluent.  Cranial nerves:  II-XII intact.   Motor:  Strength is 5/5 throughout the upper and lower extremities  Sensation:  intact  Gait: Deferred; upright in chair      Medications     NaCl 100 mL/hr at 04/02/17 1123     0.9% sodium chloride + KCl 20 mEq/L 75 mL/hr at 03/30/17 1713       oseltamivir  75 mg Oral BID     atorvastatin  80 mg Oral Daily     oxybutynin  10 mg Oral Daily     varenicline  1 mg Oral BID     sodium chloride (PF)  3 mL Intracatheter Q8H     senna-docusate  1-2 tablet Oral BID       Data     CBC RESULTS:   Recent Labs   Lab Test  03/31/17   1052   WBC  11.9*   RBC  4.25   HGB  13.4   HCT  40.9   MCV  96   MCH  31.5   MCHC  32.8   RDW  13.1   PLT  233     Basic Metabolic Panel:  Lab Results   Component Value Date     04/02/2017      Lab Results   Component Value Date    POTASSIUM 3.7 04/02/2017     Lab Results   Component Value Date    CHLORIDE 94 04/02/2017     Lab Results   Component Value Date    DOMONIQUE 8.8 04/02/2017     Lab Results   Component Value Date    CO2 36 04/02/2017     Lab Results   Component Value Date    BUN 8 04/02/2017     Lab Results   Component Value Date    CR 0.61 04/02/2017     Lab Results   Component Value Date     04/02/2017     INR:  Lab Results   Component Value Date    INR 0.90 01/10/2014

## 2017-04-02 NOTE — PLAN OF CARE
Problem: Goal Outcome Summary  Goal: Goal Outcome Summary  PT:      Surgeon Discharge Plan: Home with assist     Current Functional Status: Pt able to complete 200' ambulation with CGA and no AD. Pt ambulates with no O2 and saO2 at  Completion of ambulation is 89%. Pt quickly rises to above 90% with rest and 2LO2 placed. Pt without complaints of SOB during treatment.         Barriers to Plan/Home: stairs. Pt is most limited by O2 requirement and SOB.      Continue to rec pt discharge home with assist.

## 2017-04-03 ENCOUNTER — APPOINTMENT (OUTPATIENT)
Dept: PHYSICAL THERAPY | Facility: CLINIC | Age: 57
DRG: 028 | End: 2017-04-03
Attending: NEUROLOGICAL SURGERY
Payer: COMMERCIAL

## 2017-04-03 LAB
GLUCOSE BLDC GLUCOMTR-MCNC: 123 MG/DL (ref 70–99)
GLUCOSE BLDC GLUCOMTR-MCNC: 241 MG/DL (ref 70–99)

## 2017-04-03 PROCEDURE — 94640 AIRWAY INHALATION TREATMENT: CPT

## 2017-04-03 PROCEDURE — 25000128 H RX IP 250 OP 636: Performed by: INTERNAL MEDICINE

## 2017-04-03 PROCEDURE — 97116 GAIT TRAINING THERAPY: CPT | Mod: GP | Performed by: PHYSICAL THERAPY ASSISTANT

## 2017-04-03 PROCEDURE — 12000000 ZZH R&B MED SURG/OB

## 2017-04-03 PROCEDURE — 25000125 ZZHC RX 250: Performed by: NEUROLOGICAL SURGERY

## 2017-04-03 PROCEDURE — 25000132 ZZH RX MED GY IP 250 OP 250 PS 637: Performed by: INTERNAL MEDICINE

## 2017-04-03 PROCEDURE — 25000132 ZZH RX MED GY IP 250 OP 250 PS 637: Performed by: NEUROLOGICAL SURGERY

## 2017-04-03 PROCEDURE — 25000132 ZZH RX MED GY IP 250 OP 250 PS 637: Performed by: HOSPITALIST

## 2017-04-03 PROCEDURE — 40000275 ZZH STATISTIC RCP TIME EA 10 MIN

## 2017-04-03 PROCEDURE — 25000125 ZZHC RX 250: Performed by: INTERNAL MEDICINE

## 2017-04-03 PROCEDURE — 94640 AIRWAY INHALATION TREATMENT: CPT | Mod: 76

## 2017-04-03 PROCEDURE — 99232 SBSQ HOSP IP/OBS MODERATE 35: CPT | Performed by: INTERNAL MEDICINE

## 2017-04-03 PROCEDURE — 40000193 ZZH STATISTIC PT WARD VISIT: Performed by: PHYSICAL THERAPY ASSISTANT

## 2017-04-03 PROCEDURE — 00000146 ZZHCL STATISTIC GLUCOSE BY METER IP

## 2017-04-03 PROCEDURE — 97530 THERAPEUTIC ACTIVITIES: CPT | Mod: GP | Performed by: PHYSICAL THERAPY ASSISTANT

## 2017-04-03 RX ORDER — IPRATROPIUM BROMIDE AND ALBUTEROL SULFATE 2.5; .5 MG/3ML; MG/3ML
3 SOLUTION RESPIRATORY (INHALATION)
Status: DISCONTINUED | OUTPATIENT
Start: 2017-04-03 | End: 2017-04-07 | Stop reason: HOSPADM

## 2017-04-03 RX ORDER — PREDNISONE 20 MG/1
40 TABLET ORAL DAILY
Status: DISCONTINUED | OUTPATIENT
Start: 2017-04-03 | End: 2017-04-07 | Stop reason: HOSPADM

## 2017-04-03 RX ORDER — NICOTINE POLACRILEX 4 MG
15-30 LOZENGE BUCCAL
Status: DISCONTINUED | OUTPATIENT
Start: 2017-04-03 | End: 2017-04-07 | Stop reason: HOSPADM

## 2017-04-03 RX ORDER — DEXTROSE MONOHYDRATE 25 G/50ML
25-50 INJECTION, SOLUTION INTRAVENOUS
Status: DISCONTINUED | OUTPATIENT
Start: 2017-04-03 | End: 2017-04-07 | Stop reason: HOSPADM

## 2017-04-03 RX ORDER — CALCIUM CARBONATE 500 MG/1
500-1000 TABLET, CHEWABLE ORAL
Status: DISCONTINUED | OUTPATIENT
Start: 2017-04-03 | End: 2017-04-07 | Stop reason: HOSPADM

## 2017-04-03 RX ADMIN — SENNOSIDES AND DOCUSATE SODIUM 2 TABLET: 8.6; 5 TABLET ORAL at 08:18

## 2017-04-03 RX ADMIN — ACETAMINOPHEN 650 MG: 325 TABLET, FILM COATED ORAL at 00:44

## 2017-04-03 RX ADMIN — ACETAMINOPHEN 650 MG: 325 TABLET, FILM COATED ORAL at 10:30

## 2017-04-03 RX ADMIN — IPRATROPIUM BROMIDE AND ALBUTEROL SULFATE 3 ML: .5; 3 SOLUTION RESPIRATORY (INHALATION) at 08:38

## 2017-04-03 RX ADMIN — GUAIFENESIN AND DEXTROMETHORPHAN 10 ML: 100; 10 SYRUP ORAL at 20:23

## 2017-04-03 RX ADMIN — ACETAMINOPHEN 650 MG: 325 TABLET, FILM COATED ORAL at 15:54

## 2017-04-03 RX ADMIN — OXYCODONE HYDROCHLORIDE 10 MG: 5 TABLET ORAL at 12:30

## 2017-04-03 RX ADMIN — DIAZEPAM 5 MG: 5 TABLET ORAL at 14:25

## 2017-04-03 RX ADMIN — OXYCODONE HYDROCHLORIDE 10 MG: 5 TABLET ORAL at 23:29

## 2017-04-03 RX ADMIN — SENNOSIDES AND DOCUSATE SODIUM 2 TABLET: 8.6; 5 TABLET ORAL at 20:19

## 2017-04-03 RX ADMIN — OXYCODONE HYDROCHLORIDE 10 MG: 5 TABLET ORAL at 20:18

## 2017-04-03 RX ADMIN — OXYCODONE HYDROCHLORIDE 10 MG: 5 TABLET ORAL at 08:17

## 2017-04-03 RX ADMIN — SODIUM CHLORIDE: 9 INJECTION, SOLUTION INTRAVENOUS at 15:58

## 2017-04-03 RX ADMIN — ACETAMINOPHEN 650 MG: 325 TABLET, FILM COATED ORAL at 04:38

## 2017-04-03 RX ADMIN — DIAZEPAM 5 MG: 5 TABLET ORAL at 08:18

## 2017-04-03 RX ADMIN — IPRATROPIUM BROMIDE AND ALBUTEROL SULFATE 3 ML: .5; 3 SOLUTION RESPIRATORY (INHALATION) at 15:41

## 2017-04-03 RX ADMIN — Medication 2 LOZENGE: at 22:42

## 2017-04-03 RX ADMIN — VARENICLINE TARTRATE 1 MG: 1 TABLET, FILM COATED ORAL at 08:18

## 2017-04-03 RX ADMIN — OSELTAMIVIR PHOSPHATE 75 MG: 75 CAPSULE ORAL at 20:19

## 2017-04-03 RX ADMIN — PREDNISONE 40 MG: 20 TABLET ORAL at 10:24

## 2017-04-03 RX ADMIN — VARENICLINE TARTRATE 1 MG: 1 TABLET, FILM COATED ORAL at 20:18

## 2017-04-03 RX ADMIN — DIAZEPAM 5 MG: 5 TABLET ORAL at 20:31

## 2017-04-03 RX ADMIN — OXYCODONE HYDROCHLORIDE 10 MG: 5 TABLET ORAL at 01:36

## 2017-04-03 RX ADMIN — CALCIUM CARBONATE (ANTACID) CHEW TAB 500 MG 1000 MG: 500 CHEW TAB at 01:43

## 2017-04-03 RX ADMIN — OXYCODONE HYDROCHLORIDE 10 MG: 5 TABLET ORAL at 04:38

## 2017-04-03 RX ADMIN — ATORVASTATIN CALCIUM 80 MG: 40 TABLET, FILM COATED ORAL at 20:19

## 2017-04-03 RX ADMIN — DIAZEPAM 5 MG: 5 TABLET ORAL at 01:37

## 2017-04-03 RX ADMIN — SODIUM CHLORIDE: 9 INJECTION, SOLUTION INTRAVENOUS at 12:05

## 2017-04-03 RX ADMIN — IPRATROPIUM BROMIDE AND ALBUTEROL SULFATE 3 ML: .5; 3 SOLUTION RESPIRATORY (INHALATION) at 19:17

## 2017-04-03 RX ADMIN — SODIUM CHLORIDE: 9 INJECTION, SOLUTION INTRAVENOUS at 00:46

## 2017-04-03 RX ADMIN — ACETAMINOPHEN 650 MG: 325 TABLET, FILM COATED ORAL at 20:32

## 2017-04-03 RX ADMIN — IPRATROPIUM BROMIDE AND ALBUTEROL SULFATE 3 ML: .5; 3 SOLUTION RESPIRATORY (INHALATION) at 12:15

## 2017-04-03 RX ADMIN — OXYBUTYNIN CHLORIDE 10 MG: 5 TABLET, EXTENDED RELEASE ORAL at 08:18

## 2017-04-03 RX ADMIN — OSELTAMIVIR PHOSPHATE 75 MG: 75 CAPSULE ORAL at 08:17

## 2017-04-03 RX ADMIN — OXYCODONE HYDROCHLORIDE 10 MG: 5 TABLET ORAL at 15:54

## 2017-04-03 NOTE — PLAN OF CARE
Problem: Goal Outcome Summary  Goal: Goal Outcome Summary  Outcome: Improving  DAy RN  Temps today highest 101.2, lungs are dim and has upper exp wheezing at times, nebs x2 today now scheduled. Feels SOB when walking. 02 dips to mid 80's on RA to and from the bathroom, stable on 2L.  Encouraged to to deep breathing every hour.  Walking with pt and with us. Collar on at all times.incision WDL  CMS+ oxycodone prn for neck pain and rib cage.  Slept on and off. Stated still feels tired.

## 2017-04-03 NOTE — PLAN OF CARE
Problem: Goal Outcome Summary  Goal: Goal Outcome Summary  Surgeon Discharge Plan: Home with assist     Current Functional Status: Pt is indep with supine to sit with HOB elevated. Pt is also indep with sit to/from stand and supervision to indep with bed <> bathroom with pushing IV pole. Pt indep with hygiene. Pt amb 180' with cervical collar on and with pushing IV pole with supervision to indep. Pt required 3 standing rest breaks. Note O2 sats @ beginning to be 93% with RA and following amb sats decreased to 79% and only able to increase to 83% with RA and PLB. Applied 2L of O2 with sats returning to 90% within 1 min 30 sec.   P.M. Session - Pt is indep with supine to sit with HOB elevated and indep with sit to/from stand. Pt transfers bed to bathroom to chair without AD with supervision to indep.  Pt amb 225' without AD with supervision to indep with 1 brief standing rest break. Note O2 sats @ beginning to be 94% with RA.     Barriers to Plan/Home: stairs. Pt is most limited by O2 requirement and SOB.

## 2017-04-03 NOTE — PROGRESS NOTES
Infection Prevention:    Patient requires Droplet precautions because of Influenza. Please contact Infection Prevention with any questions/concerns at *[phone].    Jackie Bundy, ICP

## 2017-04-03 NOTE — PLAN OF CARE
Problem: Goal Outcome Summary  Goal: Goal Outcome Summary  Outcome: Improving  Vital signs monitored, febrile, 101.9 and 101.3. Gave PRN Tylenol.   87% on room air, pt on 2L oxygen.  Lung sounds diminished, frequent cough, productive. Declined any PRN nebulizers tonight.  Bowel sounds normoactive, nausea improved today, pt reports actually feeling hungry for dinner. Ate 50% dinner.  Dressing to neck is c/d/i, soft collar in place. CMS intact.  Pain managed with Valium and Oxycodone. Pain in rib cage as well from coughing.   Up with SBA, voiding in bathroom. Pt reports not feeling like she is emptying when voiding; post-void residual showed 120cc.

## 2017-04-03 NOTE — PROGRESS NOTES
[facility]    Hospitalist Progress Note  Name: Margo Inman    MRN: [id number]  Provider:  Tom Reese MD, [facility]      Assessment & Plan   Summary of Stay: Margo Inman is a 57 year old female with a PMH significant for tobacco abuse (quit a few days ago, on Chantix for ~1 month) who is post-op s/p C6-7 anterior cervical discectomy.      Post-op course complicated by fever/SOB/cough and work up revealed that pt has acute influenza. She is receiving Tamiflu.     1. Acute Influenza B while in the hospital for cervical discectomy/fusion, concern for undiagnosed COPD with long history of smoking especially with the degree of hypoxia and significant wheezes:   - continue Tamiflu, 5 days total course planned  - CXR negative for pneumonia  - O2 and wean down as able  -Continue incentive spirometry  -Scheduled DuoNeb  Prednisone 40 mg daily for possible COPD exacerbation  Monitor blood sugar were on prednisone and keep on diabetic diet blood sugar are borderline    2. S/p C6-7 anterior cervical discectomy fusion:    -  Post-op site cares, activity restrictions, pain medications, DVT proph per surgical team.    3. Tobacco use: Patient quit smoking just prior to surgery. She has been on Chantix for about a month. Continue pta Chantix as well as incentive spirometry. Encourage continue smoking cessation.  Should have PFTs when she improves    4. Hyperlipidemia: Continue atorvastatin 80 mg daily.     DVT ppx: SCDs  Code: Full  Dispo:  She is still having severe hypoxia with oxygen saturation down to 73% of supplementation, she needs to be above 90% on room air and fever improves, likely 2-3 days.  Hopefully she will improve with prednisone I believe there is an element of COPD exacerbation here        Interval History     Continues to have significant shortness of breath and hypoxia was minimal activity also having fever  Discussed with a nurse      Physical Exam   Temp: 100.7  F (38.2  C) Temp src:  Oral BP: 112/62 Pulse: 77 Heart Rate: 97 Resp: 16 SpO2: 92 % O2 Device: Nasal cannula Oxygen Delivery: 2 LPM  Vitals:    03/30/17 0919   Weight: 87.5 kg (193 lb)     Vital Signs with Ranges  Temp:  [98.9  F (37.2  C)-101.9  F (38.8  C)] 100.7  F (38.2  C)  Pulse:  [77] 77  Heart Rate:  [84-97] 97  Resp:  [16-20] 16  BP: (105-141)/(53-65) 112/62  SpO2:  [73 %-96 %] 92 %  I/O last 3 completed shifts:  In: 2266 [P.O.:900; I.V.:1366]  Out: 1350 [Urine:1350]    GEN:  Alert, oriented x 3, appears ill but comfortable.  HEENT:  Normocephalic/atraumatic, no scleral icterus, no nasal discharge, mouth moist.  Dressing dry.  CV:  Regular rate and rhythm, no murmur or JVD.  S1 + S2 noted, no S3 or S4.  LUNGS:  B rhonchi/wheezing.  Symmetric chest rise on inhalation noted. Normal effort  ABD:  Active bowel sounds, soft, non-tender, mildly distended.  No rebound/guarding/rigidity.  EXT:  No edema.  No cyanosis.  No acute joint synovitis noted.  Moving arms/legs well on general exam.  SKIN:  Dry to touch, no exanthems noted in the visualized areas.    Medications     NaCl 100 mL/hr at 04/03/17 0046       ipratropium - albuterol 0.5 mg/2.5 mg/3 mL  3 mL Nebulization 4x daily     predniSONE  40 mg Oral Daily     oseltamivir  75 mg Oral BID     atorvastatin  80 mg Oral Daily     oxybutynin  10 mg Oral Daily     varenicline  1 mg Oral BID     sodium chloride (PF)  3 mL Intracatheter Q8H     senna-docusate  1-2 tablet Oral BID     Data       Recent Labs  Lab 03/31/17  1052 03/30/17  0946   WBC 11.9*  --    HGB 13.4 13.6   HCT 40.9  --    MCV 96  --      --        Recent Labs  Lab 03/31/17  1058 03/31/17  1052   CULT No growth after 3 days No growth after 3 days       Recent Labs  Lab 04/02/17  1105 03/31/17  1052 03/30/17  0946    136  --    POTASSIUM 3.7 3.9 3.4   CHLORIDE 94 99  --    CO2 36* 28  --    ANIONGAP 4 9  --    * 154*  --    BUN 8 8  --    CR 0.61 0.50*  --    GFRESTIMATED >90Non  GFR  Calc >90Non  GFR Calc  --    GFRESTBLACK >90African American GFR Calc >90African American GFR Calc  --    DOMONIQUE 8.8 9.2  --      No results found for this or any previous visit (from the past 24 hour(s)).

## 2017-04-03 NOTE — PROGRESS NOTES
[facility]    Neurosurgery Progress Note    Date of Service (when I saw the patient): 04/03/2017     Assessment & Plan    Margo Inman is a 57 year old female who was admitted on 3/30/2017. She presented with neck and RUE pain and weakness. She underwent anterior cervical decompression and fusion C6-C7 with Dr. Joseph Klein on 3/30/2017. Following surgery the patient developed fever/cough/SOB and revealed acute influenza. She is followed and managed by hospitalist, on Tamiflu and receiving IV fluids; appreciate services. She continues to have flu like symptoms including fever and chills.  From a NSG standpoint she is stable to DC. She continues to need O2 via NC. Will continue to monitor.  Cervical incision clean and dry and left open to air.  Cervical collar as needed for 2 weeks post op.        Active Problems:  S/P cervical spinal fusion  Assessment: S/p C6-7 ACDF  Plan:   Pain control  Management of influenza per hospitalist       I have discussed the following assessment and plan Dr. Joseph Klein  who is in agreement with initial plan and will follow up with further consultation recommendations.    Shahana Cantu [facility]  [facility]  [place] [address]    Tel [phone]  Pager [phone]      Interval History   Stable with flu like symptoms    Physical Exam   Temp: 100.7  F (38.2  C) Temp src: Oral BP: 112/62 Pulse: 77 Heart Rate: 97 Resp: 16 SpO2: 92 % O2 Device: Nasal cannula Oxygen Delivery: 2 LPM  Vitals:    03/30/17 0919   Weight: 193 lb (87.5 kg)     Vital Signs with Ranges  Temp:  [98.9  F (37.2  C)-101.9  F (38.8  C)] 100.7  F (38.2  C)  Pulse:  [77] 77  Heart Rate:  [84-97] 97  Resp:  [16-20] 16  BP: (105-141)/(53-65) 112/62  SpO2:  [73 %-96 %] 92 %  I/O last 3 completed shifts:  In: 2266 [P.O.:900; I.V.:1366]  Out: 1350 [Urine:1350]    Heart Rate: 97, Blood pressure 112/62, pulse 77, temperature  "100.7  F (38.2  C), temperature source Oral, resp. rate 16, height 5' 1\" (1.549 m), weight 193 lb (87.5 kg), last menstrual period 09/16/2011, SpO2 92 %, not currently breastfeeding.  193 lbs 0 oz  HEENT:  Normocephalic, atraumatic.  PERRLA.  EOM s intact.    Neck:  Supple, non-tender, without lymphadenopathy.  Heart:  No peripheral edema  Lungs:  No SOB  Abdomen:  Soft, non-tender, non-distended.   Skin:  Warm and dry, good capillary refill.  Extremities:  Good radial and dorsalis pedis pulses bilaterally, no edema, cyanosis or clubbing.    NEUROLOGICAL EXAMINATION:     Mental status:  Alert and Oriented x 3, speech is fluent.  Cranial nerves:  II-XII intact.   Motor:  Strength is 5/5 throughout the upper and lower extremities  Shoulder Abduction:  Right:  5/5   Left:  5/5  Biceps:                      Right:  5/5   Left:  5/5  Triceps:                     Right:  5/5   Left:  5/5  Wrist Extensors:       Right:  5/5   Left:  5/5  Wrist Flexors:           Right:  5/5   Left:  5/5  interosseus :            Right:  5/5   Left:  5/5   Hip Flexor:                Right: 5/5  Left:  5/5  Hip Adductor:             Right:  5/5  Left:  5/5  Hip Abductor:             Right:  5/5  Left:  5/5  Gastroc Soleus:        Right:  5/5  Left:  5/5  Tib/Ant:                      Right:  5/5  Left:  5/5  EHL:                     Right:  5/5  Left:  5/5  Sensation:  intact  Gait:  Deferred      Medications     NaCl 100 mL/hr at 04/03/17 0046     0.9% sodium chloride + KCl 20 mEq/L 75 mL/hr at 03/30/17 1713       oseltamivir  75 mg Oral BID     atorvastatin  80 mg Oral Daily     oxybutynin  10 mg Oral Daily     varenicline  1 mg Oral BID     sodium chloride (PF)  3 mL Intracatheter Q8H     senna-docusate  1-2 tablet Oral BID       Data     All new lab and imaging data was personally reviewed by me.  CBC RESULTS:   Recent Labs   Lab Test  03/31/17   1052   WBC  11.9*   RBC  4.25   HGB  13.4   HCT  40.9   MCV  96   MCH  31.5   MCHC  32.8 "   RDW  13.1   PLT  233     Basic Metabolic Panel:  Lab Results   Component Value Date     04/02/2017      Lab Results   Component Value Date    POTASSIUM 3.7 04/02/2017     Lab Results   Component Value Date    CHLORIDE 94 04/02/2017     Lab Results   Component Value Date    DOMONIQUE 8.8 04/02/2017     Lab Results   Component Value Date    CO2 36 04/02/2017     Lab Results   Component Value Date    BUN 8 04/02/2017     Lab Results   Component Value Date    CR 0.61 04/02/2017     Lab Results   Component Value Date     04/02/2017     INR:  Lab Results   Component Value Date    INR 0.90 01/10/2014     35 minutes were spent with patient and on the floor.

## 2017-04-03 NOTE — PLAN OF CARE
Problem: Laminectomy/Foraminotomy/Discectomy (Adult)  Goal: Signs and Symptoms of Listed Potential Problems Will be Absent or Manageable (Laminectomy/Foraminotomy/Discectomy)  Signs and symptoms of listed potential problems will be absent or manageable by discharge/transition of care (reference Laminectomy/Foraminotomy/Discectomy (Adult) CPG).   Outcome: Improving  A&O x4. Temp of 101.3, tylenol given. All other VSS. Reports body aches. Oxycodone and valium given for pain. LS diminished but clear on 2LPM of O2 via NC. SOB on excertion. Cough upon position changes with small amounts light green, thick sputum. BS active x4, passing gas. Dressing to ant neck is CDI, soft collar worn at all times, no skin breakdown noted. CMS is intact. francia PO well, up with SBA. Voided 425mL this shift. plans to dc to go home on discharge with assist. Will continue to monitor at this time due to influenza causing weakness, on droplet precautions.

## 2017-04-04 ENCOUNTER — APPOINTMENT (OUTPATIENT)
Dept: PHYSICAL THERAPY | Facility: CLINIC | Age: 57
DRG: 028 | End: 2017-04-04
Attending: NEUROLOGICAL SURGERY
Payer: COMMERCIAL

## 2017-04-04 LAB
GLUCOSE BLDC GLUCOMTR-MCNC: 117 MG/DL (ref 70–99)
GLUCOSE BLDC GLUCOMTR-MCNC: 128 MG/DL (ref 70–99)
GLUCOSE BLDC GLUCOMTR-MCNC: 136 MG/DL (ref 70–99)
GLUCOSE BLDC GLUCOMTR-MCNC: 139 MG/DL (ref 70–99)
GLUCOSE BLDC GLUCOMTR-MCNC: 155 MG/DL (ref 70–99)
HBA1C MFR BLD: 5.9 % (ref 4.3–6)
MAGNESIUM SERPL-MCNC: 2.2 MG/DL (ref 1.6–2.3)
POTASSIUM SERPL-SCNC: 3.2 MMOL/L (ref 3.4–5.3)

## 2017-04-04 PROCEDURE — 83735 ASSAY OF MAGNESIUM: CPT | Performed by: INTERNAL MEDICINE

## 2017-04-04 PROCEDURE — 25000125 ZZHC RX 250: Performed by: INTERNAL MEDICINE

## 2017-04-04 PROCEDURE — 25000132 ZZH RX MED GY IP 250 OP 250 PS 637: Performed by: NEUROLOGICAL SURGERY

## 2017-04-04 PROCEDURE — 94640 AIRWAY INHALATION TREATMENT: CPT

## 2017-04-04 PROCEDURE — 84132 ASSAY OF SERUM POTASSIUM: CPT | Performed by: INTERNAL MEDICINE

## 2017-04-04 PROCEDURE — 25000132 ZZH RX MED GY IP 250 OP 250 PS 637: Performed by: INTERNAL MEDICINE

## 2017-04-04 PROCEDURE — 25000132 ZZH RX MED GY IP 250 OP 250 PS 637: Performed by: HOSPITALIST

## 2017-04-04 PROCEDURE — 25000132 ZZH RX MED GY IP 250 OP 250 PS 637: Performed by: NURSE PRACTITIONER

## 2017-04-04 PROCEDURE — 94640 AIRWAY INHALATION TREATMENT: CPT | Mod: 76

## 2017-04-04 PROCEDURE — 40000193 ZZH STATISTIC PT WARD VISIT: Performed by: PHYSICAL THERAPY ASSISTANT

## 2017-04-04 PROCEDURE — 40000275 ZZH STATISTIC RCP TIME EA 10 MIN

## 2017-04-04 PROCEDURE — 00000146 ZZHCL STATISTIC GLUCOSE BY METER IP

## 2017-04-04 PROCEDURE — 25000131 ZZH RX MED GY IP 250 OP 636 PS 637: Performed by: INTERNAL MEDICINE

## 2017-04-04 PROCEDURE — 99233 SBSQ HOSP IP/OBS HIGH 50: CPT | Performed by: INTERNAL MEDICINE

## 2017-04-04 PROCEDURE — 12000000 ZZH R&B MED SURG/OB

## 2017-04-04 PROCEDURE — 36415 COLL VENOUS BLD VENIPUNCTURE: CPT | Performed by: INTERNAL MEDICINE

## 2017-04-04 PROCEDURE — 83036 HEMOGLOBIN GLYCOSYLATED A1C: CPT | Performed by: INTERNAL MEDICINE

## 2017-04-04 PROCEDURE — 97530 THERAPEUTIC ACTIVITIES: CPT | Mod: GP | Performed by: PHYSICAL THERAPY ASSISTANT

## 2017-04-04 PROCEDURE — 25000128 H RX IP 250 OP 636: Performed by: INTERNAL MEDICINE

## 2017-04-04 PROCEDURE — 97116 GAIT TRAINING THERAPY: CPT | Mod: GP | Performed by: PHYSICAL THERAPY ASSISTANT

## 2017-04-04 RX ORDER — MAGNESIUM SULFATE HEPTAHYDRATE 40 MG/ML
4 INJECTION, SOLUTION INTRAVENOUS EVERY 4 HOURS PRN
Status: DISCONTINUED | OUTPATIENT
Start: 2017-04-04 | End: 2017-04-07 | Stop reason: HOSPADM

## 2017-04-04 RX ORDER — POTASSIUM CHLORIDE 1500 MG/1
20-40 TABLET, EXTENDED RELEASE ORAL
Status: DISCONTINUED | OUTPATIENT
Start: 2017-04-04 | End: 2017-04-07 | Stop reason: HOSPADM

## 2017-04-04 RX ORDER — POTASSIUM CHLORIDE 29.8 MG/ML
20 INJECTION INTRAVENOUS
Status: DISCONTINUED | OUTPATIENT
Start: 2017-04-04 | End: 2017-04-07 | Stop reason: HOSPADM

## 2017-04-04 RX ORDER — POTASSIUM CHLORIDE 7.45 MG/ML
10 INJECTION INTRAVENOUS
Status: DISCONTINUED | OUTPATIENT
Start: 2017-04-04 | End: 2017-04-07 | Stop reason: HOSPADM

## 2017-04-04 RX ORDER — POLYETHYLENE GLYCOL 3350 17 G/17G
17 POWDER, FOR SOLUTION ORAL DAILY PRN
Status: DISCONTINUED | OUTPATIENT
Start: 2017-04-04 | End: 2017-04-07 | Stop reason: HOSPADM

## 2017-04-04 RX ORDER — BISACODYL 10 MG
10 SUPPOSITORY, RECTAL RECTAL DAILY PRN
Status: DISCONTINUED | OUTPATIENT
Start: 2017-04-04 | End: 2017-04-07 | Stop reason: HOSPADM

## 2017-04-04 RX ORDER — POTASSIUM CHLORIDE 1.5 G/1.58G
20-40 POWDER, FOR SOLUTION ORAL
Status: DISCONTINUED | OUTPATIENT
Start: 2017-04-04 | End: 2017-04-07 | Stop reason: HOSPADM

## 2017-04-04 RX ADMIN — IPRATROPIUM BROMIDE AND ALBUTEROL SULFATE 3 ML: .5; 3 SOLUTION RESPIRATORY (INHALATION) at 07:12

## 2017-04-04 RX ADMIN — OSELTAMIVIR PHOSPHATE 75 MG: 75 CAPSULE ORAL at 22:11

## 2017-04-04 RX ADMIN — OXYBUTYNIN CHLORIDE 10 MG: 5 TABLET, EXTENDED RELEASE ORAL at 07:51

## 2017-04-04 RX ADMIN — CALCIUM CARBONATE (ANTACID) CHEW TAB 500 MG 1000 MG: 500 CHEW TAB at 22:54

## 2017-04-04 RX ADMIN — IPRATROPIUM BROMIDE AND ALBUTEROL SULFATE 3 ML: .5; 3 SOLUTION RESPIRATORY (INHALATION) at 11:09

## 2017-04-04 RX ADMIN — ATORVASTATIN CALCIUM 80 MG: 40 TABLET, FILM COATED ORAL at 17:01

## 2017-04-04 RX ADMIN — OXYCODONE HYDROCHLORIDE 10 MG: 5 TABLET ORAL at 08:55

## 2017-04-04 RX ADMIN — INSULIN ASPART 1 UNITS: 100 INJECTION, SOLUTION INTRAVENOUS; SUBCUTANEOUS at 19:27

## 2017-04-04 RX ADMIN — ACETAMINOPHEN 650 MG: 325 TABLET, FILM COATED ORAL at 21:32

## 2017-04-04 RX ADMIN — OXYCODONE HYDROCHLORIDE 10 MG: 5 TABLET ORAL at 05:36

## 2017-04-04 RX ADMIN — VARENICLINE TARTRATE 1 MG: 1 TABLET, FILM COATED ORAL at 07:52

## 2017-04-04 RX ADMIN — IPRATROPIUM BROMIDE AND ALBUTEROL SULFATE 3 ML: .5; 3 SOLUTION RESPIRATORY (INHALATION) at 16:17

## 2017-04-04 RX ADMIN — POLYETHYLENE GLYCOL 3350 17 G: 17 POWDER, FOR SOLUTION ORAL at 09:03

## 2017-04-04 RX ADMIN — OXYCODONE HYDROCHLORIDE 10 MG: 5 TABLET ORAL at 21:32

## 2017-04-04 RX ADMIN — SODIUM CHLORIDE: 9 INJECTION, SOLUTION INTRAVENOUS at 05:28

## 2017-04-04 RX ADMIN — ACETAMINOPHEN 650 MG: 325 TABLET, FILM COATED ORAL at 01:47

## 2017-04-04 RX ADMIN — OXYCODONE HYDROCHLORIDE 10 MG: 5 TABLET ORAL at 14:58

## 2017-04-04 RX ADMIN — Medication 2 LOZENGE: at 17:01

## 2017-04-04 RX ADMIN — PREDNISONE 40 MG: 20 TABLET ORAL at 07:51

## 2017-04-04 RX ADMIN — IPRATROPIUM BROMIDE AND ALBUTEROL SULFATE 3 ML: .5; 3 SOLUTION RESPIRATORY (INHALATION) at 20:32

## 2017-04-04 RX ADMIN — POTASSIUM CHLORIDE 40 MEQ: 1500 TABLET, EXTENDED RELEASE ORAL at 14:59

## 2017-04-04 RX ADMIN — BISACODYL 10 MG: 10 SUPPOSITORY RECTAL at 21:29

## 2017-04-04 RX ADMIN — GUAIFENESIN AND DEXTROMETHORPHAN 10 ML: 100; 10 SYRUP ORAL at 21:32

## 2017-04-04 RX ADMIN — SENNOSIDES AND DOCUSATE SODIUM 2 TABLET: 8.6; 5 TABLET ORAL at 21:31

## 2017-04-04 RX ADMIN — ACETAMINOPHEN 650 MG: 325 TABLET, FILM COATED ORAL at 12:07

## 2017-04-04 RX ADMIN — OSELTAMIVIR PHOSPHATE 75 MG: 75 CAPSULE ORAL at 07:51

## 2017-04-04 RX ADMIN — OXYCODONE HYDROCHLORIDE 10 MG: 5 TABLET ORAL at 18:25

## 2017-04-04 RX ADMIN — POTASSIUM CHLORIDE 20 MEQ: 1.5 POWDER, FOR SOLUTION ORAL at 22:54

## 2017-04-04 RX ADMIN — ACETAMINOPHEN 650 MG: 325 TABLET, FILM COATED ORAL at 17:01

## 2017-04-04 RX ADMIN — SENNOSIDES AND DOCUSATE SODIUM 2 TABLET: 8.6; 5 TABLET ORAL at 07:51

## 2017-04-04 RX ADMIN — OXYCODONE HYDROCHLORIDE 10 MG: 5 TABLET ORAL at 02:26

## 2017-04-04 RX ADMIN — VARENICLINE TARTRATE 1 MG: 1 TABLET, FILM COATED ORAL at 21:31

## 2017-04-04 RX ADMIN — DIAZEPAM 5 MG: 5 TABLET ORAL at 02:26

## 2017-04-04 RX ADMIN — OXYCODONE HYDROCHLORIDE 10 MG: 5 TABLET ORAL at 12:01

## 2017-04-04 NOTE — PROGRESS NOTES
X-Cover    Notified about elevated sugars, pt just started on prednisone. Will add medium SSI, consider adding low dose NPH in morning depending on sugar trend, will defer to rounding provider

## 2017-04-04 NOTE — PROGRESS NOTES
[facility]  Hospitalist Progress Note  Name: Margo Inman    MRN: [id number]  Provider:  Jose Maria Augustin MD.      Assessment & Plan   Summary of Stay: Margo Inman is a 57 year old female with a PMH significant for tobacco abuse (quit a few days ago, on Chantix for ~1 month) who is post-op s/p C6-7 anterior cervical discectomy.      Post-op course complicated by fever/SOB/cough and work up revealed that pt has acute influenza. She is receiving Tamiflu.     1. Acute Influenza B while in the hospital for cervical discectomy/fusion, concern for undiagnosed COPD with long history of smoking especially with the degree of hypoxia and significant wheezes:  Patient said her husbandhas had flu as well few days prior earlier and she mostly likely acquired it from him.   - Continue Tamiflu, 5 days total course.  - CXR negative for pneumonia  - O2 NC and wean down as able  - Continue incentive spirometry  - Scheduled and PRN DuoNeb  - Prednisone 40 mg daily for possible COPD exacerbation will treat for total of 7 days and stop.  Monitor blood sugar were on prednisone and keep on diabetic diet blood sugar are borderline    2. S/p C6-7 anterior cervical discectomy fusion:    -  Post-op site cares, activity restrictions, pain medications, DVT proph per surgical team.    3. Tobacco use disorder: Patient quit smoking just prior to surgery. She has been on Chantix for about a month. Continue pta Chantix as well as incentive spirometry. Encouraged to continue smoking cessation. She will need PFT as an out patient when acute condition resolves.    4. Hyperlipidemia: Continue atorvastatin 80 mg daily.     DVT ppx: SCDs  Code: Full  Dispo:  She is still having hypoxia with oxygen saturation in the 80's%, when armand supplementation, she needs to be above 90% on room air and fever improves, likely 2 day.        Interval History   Patient seen and examined, assumed care today, feels better, denied N/V, no chest  pain.  SOB better, still has hypoxia was minimal activity.  Discussed with a nurse      Physical Exam   Temp: 98.2  F (36.8  C) Temp src: Axillary BP: 103/56 Pulse: 72 Heart Rate: 82 Resp: 20 SpO2: 94 % O2 Device: Nasal cannula Oxygen Delivery: 2 LPM  Vitals:    03/30/17 0919   Weight: 87.5 kg (193 lb)     Vital Signs with Ranges  Temp:  [96.9  F (36.1  C)-99.6  F (37.6  C)] 98.2  F (36.8  C)  Pulse:  [72-84] 72  Heart Rate:  [82] 82  Resp:  [16-20] 20  BP: (103-140)/(53-63) 103/56  SpO2:  [88 %-99 %] 94 %  I/O last 3 completed shifts:  In: 2619 [P.O.:1320; I.V.:1299]  Out: 2510 [Urine:2510]    GEN:  Alert, oriented x 3, appears ill but comfortable.  HEENT:  Normocephalic/atraumatic, no scleral icterus, no nasal discharge, mouth moist.  Dressing dry.  CV:  Regular rate and rhythm, no murmur or JVD.  S1 + S2 noted, no S3 or S4.  LUNGS:  B rhonchi/wheezing.  Symmetric chest rise on inhalation noted. Normal effort  ABD:  Active bowel sounds, soft, non-tender, mildly distended.  No rebound/guarding/rigidity.  EXT:  No edema.  No cyanosis.  No acute joint synovitis noted.  Moving arms/legs well on general exam.  SKIN:  Dry to touch, no exanthems noted in the visualized areas.    Medications        ipratropium - albuterol 0.5 mg/2.5 mg/3 mL  3 mL Nebulization 4x daily     predniSONE  40 mg Oral Daily     insulin aspart  1-7 Units Subcutaneous TID AC     insulin aspart  1-5 Units Subcutaneous At Bedtime     oseltamivir  75 mg Oral BID     atorvastatin  80 mg Oral Daily     oxybutynin  10 mg Oral Daily     varenicline  1 mg Oral BID     sodium chloride (PF)  3 mL Intracatheter Q8H     senna-docusate  1-2 tablet Oral BID     Data       Recent Labs  Lab 03/31/17  1052 03/30/17  0946   WBC 11.9*  --    HGB 13.4 13.6   HCT 40.9  --    MCV 96  --      --        Recent Labs  Lab 03/31/17  1058 03/31/17  1052   CULT No growth after 4 days No growth after 4 days       Recent Labs  Lab 04/04/17  0618 04/02/17  1105  03/31/17  1052   NA  --  134 136   POTASSIUM 3.2* 3.7 3.9   CHLORIDE  --  94 99   CO2  --  36* 28   ANIONGAP  --  4 9   GLC  --  106* 154*   BUN  --  8 8   CR  --  0.61 0.50*   GFRESTIMATED  --  >90Non  GFR Calc >90Non  GFR Calc   GFRESTBLACK  --  >90African American GFR Calc >90African American GFR Calc   DOMONIQUE  --  8.8 9.2     No results found for this or any previous visit (from the past 24 hour(s)).

## 2017-04-04 NOTE — PLAN OF CARE
Problem: Goal Outcome Summary  Goal: Goal Outcome Summary  Surgeon Discharge Plan: Primary signed off ok for home defer to Hospitialist due to Influenza B     Current Functional Status: PT goals all met with exception of stairs asalimited by IV needs. Will do tomorrow. Plan to change to daily x 1 day than discharge from PT services. Was able to amb 200 feet with no AD slower place and lower O2      Barriers to Plan/Home: none to home

## 2017-04-04 NOTE — PROGRESS NOTES
[facility]    Neurosurgery Progress Note    Date of Service (when I saw the patient): 04/04/2017     Assessment & Plan   Margo Inman is a 57 year old female who was admitted on 3/30/2017. She presented with neck and RUE pain and weakness. She underwent anterior cervical decompression and fusion C6-C7 with Dr. Joseph Klein on 3/30/2017. Following surgery the patient developed fever/cough/SOB and was found to have acute influenza. She is being followed and managed by hospitalist, on Tamiflu and receiving IV fluids; their services are greatly appreciated. Today she reports that her fever and breathing seems to have improved a little. She reports constipation with no BM for several days- I have ordered a suppository and Miralax for this. From a NSG standpoint she is stable to DC, reports pain is manageable. She has been attempting to wean off O2. Will continue to monitor. Cervical incision clean and dry and left open to air. Cervical collar as needed for 2 weeks post op.     Active Problems:    S/P cervical spinal fusion    Assessment: Stable    Plan: Pain control. Management of influenza per hospitalist. Ok to d/c from NSG perspective when cleared by hospitalist.       I have discussed the following assessment and plan with the Dr. Joseph Klein who is in agreement with initial plan and will follow up with further consultation recommendations.    Javier Kinney [facility]  [facility]  [place] [address]    Tel [phone]  Pager [phone]      Interval History   Pain well-controlled. Ambulating. Attempting to wean off O2.     Physical Exam   Temp: 96.9  F (36.1  C) Temp src: Axillary BP: 103/56 Pulse: 72 Heart Rate: 82 Resp: 20 SpO2: 91 % O2 Device: None (Room air) Oxygen Delivery: 1 LPM  Vitals:    03/30/17 0919   Weight: 87.5 kg (193 lb)     Vital Signs with Ranges  Temp:  [96.9  F (36.1  C)-99.6  F (37.6  C)] 96.9  F  "(36.1  C)  Pulse:  [72-91] 72  Heart Rate:  [82] 82  Resp:  [16-20] 20  BP: (103-140)/(53-63) 103/56  SpO2:  [84 %-99 %] 91 %  I/O last 3 completed shifts:  In: 2619 [P.O.:1320; I.V.:1299]  Out: 2510 [Urine:2510]    Heart Rate: 82, Blood pressure 103/56, pulse 72, temperature 96.9  F (36.1  C), temperature source Axillary, resp. rate 20, height 1.549 m (5' 1\"), weight 87.5 kg (193 lb), last menstrual period 09/16/2011, SpO2 91 %, not currently breastfeeding.  193 lbs 0 oz  HEENT:  Normocephalic, atraumatic.  PERRLA.  EOM s intact.    Neck:  Supple, non-tender, without lymphadenopathy.  Heart:  No peripheral edema  Lungs:  No SOB  Abdomen:  Soft, non-tender, non-distended.   Skin:  Warm and dry, good capillary refill.  Extremities:  Good radial and dorsalis pedis pulses bilaterally, no edema, cyanosis or clubbing.    NEUROLOGICAL EXAMINATION:   Mental status:  Alert and Oriented x 3, speech is fluent.  Cranial nerves:  II-XII intact.   Motor:  Strength is 5/5 throughout the upper extremities  Shoulder Abduction:  Right:  5/5   Left:  5/5  Biceps:                      Right:  5/5   Left:  5/5  Triceps:                     Right:  5/5   Left:  5/5  Wrist Extensors:       Right:  5/5   Left:  5/5  Wrist Flexors:           Right:  5/5   Left:  5/5    Sensation:  intact  Reflexes:  Negative Babinski.  Negative Clonus.    Gait:  Stable, no difficulty with heel or toe walking.       Medications        ipratropium - albuterol 0.5 mg/2.5 mg/3 mL  3 mL Nebulization 4x daily     predniSONE  40 mg Oral Daily     insulin aspart  1-7 Units Subcutaneous TID AC     insulin aspart  1-5 Units Subcutaneous At Bedtime     oseltamivir  75 mg Oral BID     atorvastatin  80 mg Oral Daily     oxybutynin  10 mg Oral Daily     varenicline  1 mg Oral BID     sodium chloride (PF)  3 mL Intracatheter Q8H     senna-docusate  1-2 tablet Oral BID       Data     All new lab and imaging data was personally reviewed by me.  CBC RESULTS:   Recent Labs "   Lab Test  03/31/17   1052   WBC  11.9*   RBC  4.25   HGB  13.4   HCT  40.9   MCV  96   MCH  31.5   MCHC  32.8   RDW  13.1   PLT  233     Basic Metabolic Panel:  Lab Results   Component Value Date     04/02/2017      Lab Results   Component Value Date    POTASSIUM 3.2 04/04/2017     Lab Results   Component Value Date    CHLORIDE 94 04/02/2017     Lab Results   Component Value Date    DOMONIQUE 8.8 04/02/2017     Lab Results   Component Value Date    CO2 36 04/02/2017     Lab Results   Component Value Date    BUN 8 04/02/2017     Lab Results   Component Value Date    CR 0.61 04/02/2017     Lab Results   Component Value Date     04/02/2017

## 2017-04-04 NOTE — PLAN OF CARE
Problem: Goal Outcome Summary  Goal: Goal Outcome Summary  Outcome: Improving  Day RN  Vss, highest temp today was 99.2. Lungs are dim frequent at times productive cough(brown/yellow), ease of breathing is better. Scheduled nebs. Encouraged hourly IS use. POWELL present. 02 90%  When walking, dips to 86% on RA when sleeping,s table on 2L     CMS+ neck incision is WDL besides minor bruising. Brace on.  Voiding well. No post op bm yet gave senna and mirliax declined supp at this time, eating small amounts. Drinking well.   Up indep in room. Walked with pt and us. In chair for meals.  BG's stable on prednisone

## 2017-04-04 NOTE — PLAN OF CARE
Problem: Goal Outcome Summary  Goal: Goal Outcome Summary  Outcome: Improving  A&O.  Afebrile.  Incision is cdi. Soft collar on. SBA. Pain managed with PO medications.

## 2017-04-04 NOTE — PLAN OF CARE
Problem: Goal Outcome Summary  Goal: Goal Outcome Summary  Outcome: No Change  Lindsey RN  VS monitored, RT weaned to 1L NC, low grade temp, tylenol given and independently doing IS, up w/SBA and soft collar, incision JOSÉ ANTONIO, voiding, CMS+, Tylenol/Oxycodone/Valium for pain, Tamiflu cont, scheduled nebs and Prednisone,  tonight, SSI initiated, droplet ISO, LS diminished w/exp wheezes, frequent productive cough, Robitussin/lozenges given, home in 2-3 days, will cont to monitor.

## 2017-04-04 NOTE — PLAN OF CARE
Problem: Goal Outcome Summary  Goal: Goal Outcome Summary  Outcome: Improving  DAy RN  Vss, no temps today. CMS+ incision is JOSÉ ANTONIO   WDL besides scant bruising.   Collar in place up with SBA walked in layne with us and pt. In chiar for meals  Appetite is ok drinking well. Denied nausea. No post bm yet. Gave senna and mirilax, passing gas  Lungs are dim, wheezing improved. 02 when in chair and walking 90% and 86% on RA when in bed. Doing hourly IS  Oxycodone for pain q3 hours. Chest muscles are sore from coughing.

## 2017-04-05 ENCOUNTER — APPOINTMENT (OUTPATIENT)
Dept: PHYSICAL THERAPY | Facility: CLINIC | Age: 57
DRG: 028 | End: 2017-04-05
Attending: NEUROLOGICAL SURGERY
Payer: COMMERCIAL

## 2017-04-05 LAB
GLUCOSE BLDC GLUCOMTR-MCNC: 116 MG/DL (ref 70–99)
GLUCOSE BLDC GLUCOMTR-MCNC: 145 MG/DL (ref 70–99)
GLUCOSE BLDC GLUCOMTR-MCNC: 155 MG/DL (ref 70–99)
GLUCOSE BLDC GLUCOMTR-MCNC: 163 MG/DL (ref 70–99)
GLUCOSE BLDC GLUCOMTR-MCNC: 90 MG/DL (ref 70–99)
MAGNESIUM SERPL-MCNC: 2.2 MG/DL (ref 1.6–2.3)
POTASSIUM SERPL-SCNC: 3.5 MMOL/L (ref 3.4–5.3)

## 2017-04-05 PROCEDURE — 83735 ASSAY OF MAGNESIUM: CPT | Performed by: INTERNAL MEDICINE

## 2017-04-05 PROCEDURE — 94640 AIRWAY INHALATION TREATMENT: CPT

## 2017-04-05 PROCEDURE — 25000132 ZZH RX MED GY IP 250 OP 250 PS 637: Performed by: NURSE PRACTITIONER

## 2017-04-05 PROCEDURE — 94640 AIRWAY INHALATION TREATMENT: CPT | Mod: 76

## 2017-04-05 PROCEDURE — 40000193 ZZH STATISTIC PT WARD VISIT: Performed by: PHYSICAL THERAPY ASSISTANT

## 2017-04-05 PROCEDURE — 25000132 ZZH RX MED GY IP 250 OP 250 PS 637: Performed by: INTERNAL MEDICINE

## 2017-04-05 PROCEDURE — 25000125 ZZHC RX 250: Performed by: INTERNAL MEDICINE

## 2017-04-05 PROCEDURE — 36415 COLL VENOUS BLD VENIPUNCTURE: CPT | Performed by: INTERNAL MEDICINE

## 2017-04-05 PROCEDURE — 25000132 ZZH RX MED GY IP 250 OP 250 PS 637: Performed by: NEUROLOGICAL SURGERY

## 2017-04-05 PROCEDURE — 84132 ASSAY OF SERUM POTASSIUM: CPT | Performed by: INTERNAL MEDICINE

## 2017-04-05 PROCEDURE — 12000000 ZZH R&B MED SURG/OB

## 2017-04-05 PROCEDURE — 25000132 ZZH RX MED GY IP 250 OP 250 PS 637: Performed by: HOSPITALIST

## 2017-04-05 PROCEDURE — 97116 GAIT TRAINING THERAPY: CPT | Mod: GP | Performed by: PHYSICAL THERAPY ASSISTANT

## 2017-04-05 PROCEDURE — 99232 SBSQ HOSP IP/OBS MODERATE 35: CPT | Performed by: INTERNAL MEDICINE

## 2017-04-05 PROCEDURE — 00000146 ZZHCL STATISTIC GLUCOSE BY METER IP

## 2017-04-05 PROCEDURE — 40000275 ZZH STATISTIC RCP TIME EA 10 MIN

## 2017-04-05 RX ORDER — OXYCODONE HYDROCHLORIDE 5 MG/1
5-10 TABLET ORAL
Qty: 75 TABLET | Refills: 0 | Status: SHIPPED | OUTPATIENT
Start: 2017-04-05 | End: 2017-05-09

## 2017-04-05 RX ORDER — DIAZEPAM 5 MG
5 TABLET ORAL EVERY 6 HOURS PRN
Qty: 60 TABLET | Refills: 0 | Status: SHIPPED | OUTPATIENT
Start: 2017-04-05 | End: 2017-05-09

## 2017-04-05 RX ADMIN — IPRATROPIUM BROMIDE AND ALBUTEROL SULFATE 3 ML: .5; 3 SOLUTION RESPIRATORY (INHALATION) at 16:40

## 2017-04-05 RX ADMIN — VARENICLINE TARTRATE 1 MG: 1 TABLET, FILM COATED ORAL at 08:38

## 2017-04-05 RX ADMIN — SENNOSIDES AND DOCUSATE SODIUM 2 TABLET: 8.6; 5 TABLET ORAL at 19:22

## 2017-04-05 RX ADMIN — INSULIN ASPART 1 UNITS: 100 INJECTION, SOLUTION INTRAVENOUS; SUBCUTANEOUS at 12:45

## 2017-04-05 RX ADMIN — OXYCODONE HYDROCHLORIDE 10 MG: 5 TABLET ORAL at 12:44

## 2017-04-05 RX ADMIN — GUAIFENESIN AND DEXTROMETHORPHAN 10 ML: 100; 10 SYRUP ORAL at 13:01

## 2017-04-05 RX ADMIN — OXYCODONE HYDROCHLORIDE 10 MG: 5 TABLET ORAL at 00:30

## 2017-04-05 RX ADMIN — GUAIFENESIN AND DEXTROMETHORPHAN 10 ML: 100; 10 SYRUP ORAL at 19:18

## 2017-04-05 RX ADMIN — OXYBUTYNIN CHLORIDE 10 MG: 5 TABLET, EXTENDED RELEASE ORAL at 08:38

## 2017-04-05 RX ADMIN — GUAIFENESIN AND DEXTROMETHORPHAN 10 ML: 100; 10 SYRUP ORAL at 03:33

## 2017-04-05 RX ADMIN — IPRATROPIUM BROMIDE AND ALBUTEROL SULFATE 3 ML: .5; 3 SOLUTION RESPIRATORY (INHALATION) at 09:00

## 2017-04-05 RX ADMIN — OXYCODONE HYDROCHLORIDE 10 MG: 5 TABLET ORAL at 08:38

## 2017-04-05 RX ADMIN — ATORVASTATIN CALCIUM 80 MG: 40 TABLET, FILM COATED ORAL at 19:19

## 2017-04-05 RX ADMIN — OXYCODONE HYDROCHLORIDE 10 MG: 5 TABLET ORAL at 16:03

## 2017-04-05 RX ADMIN — IPRATROPIUM BROMIDE AND ALBUTEROL SULFATE 3 ML: .5; 3 SOLUTION RESPIRATORY (INHALATION) at 19:23

## 2017-04-05 RX ADMIN — PREDNISONE 40 MG: 20 TABLET ORAL at 08:38

## 2017-04-05 RX ADMIN — VARENICLINE TARTRATE 1 MG: 1 TABLET, FILM COATED ORAL at 19:22

## 2017-04-05 RX ADMIN — SENNOSIDES AND DOCUSATE SODIUM 1 TABLET: 8.6; 5 TABLET ORAL at 08:38

## 2017-04-05 RX ADMIN — IPRATROPIUM BROMIDE AND ALBUTEROL SULFATE 3 ML: .5; 3 SOLUTION RESPIRATORY (INHALATION) at 11:49

## 2017-04-05 RX ADMIN — POLYETHYLENE GLYCOL 3350 17 G: 17 POWDER, FOR SOLUTION ORAL at 08:49

## 2017-04-05 RX ADMIN — OXYCODONE HYDROCHLORIDE 10 MG: 5 TABLET ORAL at 03:32

## 2017-04-05 RX ADMIN — OXYCODONE HYDROCHLORIDE 10 MG: 5 TABLET ORAL at 19:18

## 2017-04-05 RX ADMIN — OSELTAMIVIR PHOSPHATE 75 MG: 75 CAPSULE ORAL at 08:38

## 2017-04-05 RX ADMIN — OXYCODONE HYDROCHLORIDE 10 MG: 5 TABLET ORAL at 22:17

## 2017-04-05 RX ADMIN — DIAZEPAM 5 MG: 5 TABLET ORAL at 19:22

## 2017-04-05 NOTE — PROGRESS NOTES
[facility]    Neurosurgery Progress Note    Date of Service (when I saw the patient): 04/05/2017     Assessment & Plan   Margo Inman is a 57 year old female who was admitted on 3/30/2017. She presented with neck and RUE pain and weakness. She underwent anterior cervical decompression and fusion C6-C7 with Dr. Joseph Klein on 3/30/2017. Following surgery the patient developed fever/cough/SOB and was found to have acute influenza. She is being followed and managed by hospitalist, on Tamiflu and receiving IV fluids; their services are greatly appreciated. Pt is lying in bed during visit today.  She has been up independently in the room. She feels her pain is being well controlled with oral opioids as well.  Pt continues to require O2 via NC and has a productive cough.  We will wait on clearance from hospital service for pt to DC home. She reports that her spouse is at home to care for her. She did have a BM yesterday but remains concerned about constipation. Discussed the effects of decreased activity and opioids on constipation as well.   Her potassium was 3.5 today.          Active Problems:  S/P cervical spinal fusion  Assessment: Stable  Plan: Pain control. Management of influenza per hospitalist. Ok to d/c from NSG perspective when cleared by hospitalist.       I have discussed the following assessment and plan Dr. Joseph Klein  who is in agreement with initial plan and will follow up with further consultation recommendations.    Shahana Cantu [facility]  [facility]  [address] [address]    Tel [phone]  Pager [phone]      Interval History   Stable     Physical Exam   Temp: 96.9  F (36.1  C) Temp src: Axillary BP: 119/68 Pulse: 77 Heart Rate: 83 Resp: 20 SpO2: 97 % O2 Device: Nasal cannula with humidification Oxygen Delivery: 2 LPM  Vitals:    03/30/17 0919   Weight: 193 lb (87.5 kg)     Vital Signs with  "Ranges  Temp:  [96.9  F (36.1  C)-98.9  F (37.2  C)] 96.9  F (36.1  C)  Pulse:  [77-82] 77  Heart Rate:  [83] 83  Resp:  [20] 20  BP: (107-119)/(68-77) 119/68  SpO2:  [88 %-97 %] 97 %  I/O last 3 completed shifts:  In: 1730 [P.O.:1730]  Out: 1850 [Urine:1850]    Heart Rate: 83, Blood pressure 119/68, pulse 77, temperature 96.9  F (36.1  C), temperature source Axillary, resp. rate 20, height 5' 1\" (1.549 m), weight 193 lb (87.5 kg), last menstrual period 09/16/2011, SpO2 97 %, not currently breastfeeding.  193 lbs 0 oz  HEENT:  Normocephalic, atraumatic.  PERRLA.  EOM s intact.    Neck:  Supple, non-tender, without lymphadenopathy.  Heart:  No peripheral edema  Lungs:  No SOB  Abdomen:  Soft, non-tender, non-distended.   Skin:  Warm and dry, good capillary refill.  Extremities:  Good radial and dorsalis pedis pulses bilaterally, no edema, cyanosis or clubbing.    NEUROLOGICAL EXAMINATION:     Mental status:  Alert and Oriented x 3, speech is fluent.  Cranial nerves:  II-XII intact.   Motor:  Strength is 5/5 throughout the upper and lower extremities  Shoulder Abduction:  Right:  5/5   Left:  5/5  Biceps:                      Right:  5/5   Left:  5/5  Triceps:                     Right:  5/5   Left:  5/5  Wrist Extensors:       Right:  5/5   Left:  5/5  Wrist Flexors:           Right:  5/5   Left:  5/5  interosseus :            Right:  5/5   Left:  5/5   Hip Flexor:                Right: 5/5  Left:  5/5  Hip Adductor:             Right:  5/5  Left:  5/5  Hip Abductor:             Right:  5/5  Left:  5/5  Gastroc Soleus:        Right:  5/5  Left:  5/5  Tib/Ant:                      Right:  5/5  Left:  5/5  EHL:                     Right:  5/5  Left:  5/5  Sensation:  intact  Reflexes:  Negative Babinski.  Negative Clonus.    Gait:  Deferred      Medications        ipratropium - albuterol 0.5 mg/2.5 mg/3 mL  3 mL Nebulization 4x daily     predniSONE  40 mg Oral Daily     insulin aspart  1-7 Units Subcutaneous TID AC     " insulin aspart  1-5 Units Subcutaneous At Bedtime     oseltamivir  75 mg Oral BID     atorvastatin  80 mg Oral Daily     oxybutynin  10 mg Oral Daily     varenicline  1 mg Oral BID     sodium chloride (PF)  3 mL Intracatheter Q8H     senna-docusate  1-2 tablet Oral BID       Data     All new lab and imaging data was personally reviewed by me.  CBC RESULTS:   Recent Labs   Lab Test  03/31/17   1052   WBC  11.9*   RBC  4.25   HGB  13.4   HCT  40.9   MCV  96   MCH  31.5   MCHC  32.8   RDW  13.1   PLT  233     Basic Metabolic Panel:  Lab Results   Component Value Date     04/02/2017      Lab Results   Component Value Date    POTASSIUM 3.5 04/05/2017     Lab Results   Component Value Date    CHLORIDE 94 04/02/2017     Lab Results   Component Value Date    DOMONIQUE 8.8 04/02/2017     Lab Results   Component Value Date    CO2 36 04/02/2017     Lab Results   Component Value Date    BUN 8 04/02/2017     Lab Results   Component Value Date    CR 0.61 04/02/2017     Lab Results   Component Value Date     04/02/2017     INR:  Lab Results   Component Value Date    INR 0.90 01/10/2014     35 minutes were spent with patient and on the floor.

## 2017-04-05 NOTE — PLAN OF CARE
Problem: Goal Outcome Summary  Goal: Goal Outcome Summary  Outcome: Improving  Pain controlled with po meds. Up indep in room & layne. Wearing soft collar. stil POWELL- resp following- sched nebs. Lungs sounds improving. freq harsh nonprod cough. K+ replaced recheck in am. Small BM after bowel meds & supp- pt states feels some relief with abd fullness.

## 2017-04-05 NOTE — PLAN OF CARE
Problem: Goal Outcome Summary  Goal: Goal Outcome Summary  Outcome: No Change  Areas of concern: patient reports still having difficulty breathing today. Respirations appear unlabored. Lungs are coarse and she is coughing frequently. She still reports pain in her left rib cage and right lower abdomen from coughing. O2 sats still desat on room air.      Areas of improvement: patient continues to walk frequently independently. Blood sugars are stable.      Plan: Wean O2 if able, possible patient will need O2 at home. She is currently on 2 L NC. Continues with scheduled nebs, Tamiflu, glucose monitoring d/t prednisone. Oxycodone for pain. Home per Hospitalist, anticipate tomorrow.

## 2017-04-05 NOTE — PLAN OF CARE
"Problem: Goal Outcome Summary  Goal: Goal Outcome Summary  PT- attempted to see declined as not \"breating well today\" is Up Ind in room and hallways. Goals all met with exception of stairs. Will try later today or tomorrow, do not anticipate being barirer to return home with family.      "

## 2017-04-05 NOTE — PLAN OF CARE
Problem: Goal Outcome Summary  Goal: Goal Outcome Summary  Surgeon Discharge Plan: home once able awaiting IM clearance     Current Functional Status: goals all met Ind with all transfers, bed mobility and gait without AD ( 300 feet), possible home tomorrow. Up and down 4 steps x 2.     Barriers to Plan/Home: none to home.

## 2017-04-05 NOTE — PROGRESS NOTES
[facility]  Hospitalist Progress Note  Name: Margo Inman    MRN: [id number]  Provider:  Jose Maria Augustin MD.      Assessment & Plan   Summary of Stay: Margo Inman is a 57 year old female with a PMH significant for tobacco abuse (quit a few days ago, on Chantix for ~1 month) who is post-op s/p C6-7 anterior cervical discectomy.      Post-op course complicated by fever/SOB/cough and work up revealed that pt has acute influenza. She is receiving Tamiflu.     1. Acute Influenza B while in the hospital for cervical discectomy/fusion, definitely there is a component of undiagnosed COPD with long history of smoking especially with the degree of hypoxia and significant wheezes:  Patient said her husbandhas had flu as well few days prior  and she mostly likely acquired it from him.   - Continue Tamiflu, 5 days total course.  - CXR negative for pneumonia, but she continued to have coughing and congestion.  - O2 NC and wean down as able  - Continue incentive spirometry  - Scheduled and PRN DuoNeb  - Prednisone 40 mg daily for possible COPD exacerbation will treat for total of 7 days and stop.  - Monitor blood sugar as she is on prednisone and keep on diabetic diet blood sugar are borderline    2. S/p C6-7 anterior cervical discectomy fusion:    -  Post-op site cares, activity restrictions, pain medications, DVT proph per surgical team.    3. Tobacco use disorder: Patient quit smoking just prior to surgery. She has been on Chantix for about a month. Continue pta Chantix as well as incentive spirometry. Encouraged to continue smoking cessation. She will need PFT as an out patient when acute condition resolves.    4. Hyperlipidemia: Continue atorvastatin 80 mg daily.     DVT ppx: SCDs  Code: Full  Dispo:  She is a little better today, but has congestion and intermittently hypoxic and requiring oxygen, saturating high 80's to 90's.. She needs to be above 90% on room air, when ambulating in 1-2 days. If  unable to wean her off oxygen will consider discharging her on home oxygen.       Interval History   Patient seen and examined, assumed care today, feels better, denied N/V, no chest pain.  SOB better, still has hypoxia was minimal activity.  Discussed with a nursing staff.    Physical Exam   Temp: 99.1  F (37.3  C) Temp src: Oral BP: 122/64 Pulse: 77 Heart Rate: 81 Resp: 18 SpO2: 96 % O2 Device: Nasal cannula with humidification Oxygen Delivery: 2.5 LPM  Vitals:    03/30/17 0919   Weight: 87.5 kg (193 lb)     Vital Signs with Ranges  Temp:  [96.9  F (36.1  C)-99.1  F (37.3  C)] 99.1  F (37.3  C)  Pulse:  [77-82] 77  Heart Rate:  [81-83] 81  Resp:  [18-20] 18  BP: (107-122)/(64-77) 122/64  SpO2:  [86 %-97 %] 96 %  I/O last 3 completed shifts:  In: 1730 [P.O.:1730]  Out: 1850 [Urine:1850]    GEN:  Alert, oriented x 3, appears ill but comfortable.  HEENT:  Normocephalic/atraumatic, no scleral icterus, no nasal discharge, mouth moist.  Dressing dry.  CV:  Regular rate and rhythm, no murmur or JVD.  S1 + S2 noted, no S3 or S4.  LUNGS:  B rhonchi/wheezing.  Symmetric chest rise on inhalation noted. Normal effort  ABD:  Active bowel sounds, soft, non-tender, mildly distended.  No rebound/guarding/rigidity.  EXT:  No edema.  No cyanosis.  No acute joint synovitis noted.  Moving arms/legs well on general exam.  SKIN:  Dry to touch, no exanthems noted in the visualized areas.    Medications        ipratropium - albuterol 0.5 mg/2.5 mg/3 mL  3 mL Nebulization 4x daily     predniSONE  40 mg Oral Daily     insulin aspart  1-7 Units Subcutaneous TID AC     insulin aspart  1-5 Units Subcutaneous At Bedtime     atorvastatin  80 mg Oral Daily     oxybutynin  10 mg Oral Daily     varenicline  1 mg Oral BID     sodium chloride (PF)  3 mL Intracatheter Q8H     senna-docusate  1-2 tablet Oral BID     Data       Recent Labs  Lab 03/31/17  1052 03/30/17  0946   WBC 11.9*  --    HGB 13.4 13.6   HCT 40.9  --    MCV 96  --      --         Recent Labs  Lab 03/31/17  1058 03/31/17  1052   CULT No growth after 5 days No growth after 5 days       Recent Labs  Lab 04/05/17  0627 04/04/17  0618 04/02/17  1105 03/31/17  1052   NA  --   --  134 136   POTASSIUM 3.5 3.2* 3.7 3.9   CHLORIDE  --   --  94 99   CO2  --   --  36* 28   ANIONGAP  --   --  4 9   GLC  --   --  106* 154*   BUN  --   --  8 8   CR  --   --  0.61 0.50*   GFRESTIMATED  --   --  >90Non  GFR Calc >90Non  GFR Calc   GFRESTBLACK  --   --  >90African American GFR Calc >90African American GFR Calc   DOMONIQUE  --   --  8.8 9.2   MAG 2.2 2.2  --   --      No results found for this or any previous visit (from the past 24 hour(s)).

## 2017-04-05 NOTE — PLAN OF CARE
Problem: Goal Outcome Summary  Goal: Goal Outcome Summary  Physical Therapy Discharge Summary     Reason for therapy discharge:    All goals and outcomes met, no further needs identified.     Progress towards therapy goal(s). See goals on Care Plan in [facility] electronic health record for goal details.  Goals met     Therapy recommendation(s):    Continue home exercise program.   Patient not seen by documenting therapist, information obtained from previous therapy notes.  goals all met Ind with all transfers, bed mobility and gait without AD ( 300 feet). Up and down 4 steps x 2.

## 2017-04-06 ENCOUNTER — APPOINTMENT (OUTPATIENT)
Dept: GENERAL RADIOLOGY | Facility: CLINIC | Age: 57
DRG: 028 | End: 2017-04-06
Attending: INTERNAL MEDICINE
Payer: COMMERCIAL

## 2017-04-06 ENCOUNTER — DOCUMENTATION ONLY (OUTPATIENT)
Dept: SLEEP MEDICINE | Facility: CLINIC | Age: 57
End: 2017-04-06

## 2017-04-06 LAB
BACTERIA SPEC CULT: NO GROWTH
BACTERIA SPEC CULT: NO GROWTH
GLUCOSE BLDC GLUCOMTR-MCNC: 103 MG/DL (ref 70–99)
GLUCOSE BLDC GLUCOMTR-MCNC: 148 MG/DL (ref 70–99)
GLUCOSE BLDC GLUCOMTR-MCNC: 156 MG/DL (ref 70–99)
GLUCOSE BLDC GLUCOMTR-MCNC: 91 MG/DL (ref 70–99)
Lab: NORMAL
Lab: NORMAL
MAGNESIUM SERPL-MCNC: 2.3 MG/DL (ref 1.6–2.3)
MICRO REPORT STATUS: NORMAL
MICRO REPORT STATUS: NORMAL
POTASSIUM SERPL-SCNC: 3.7 MMOL/L (ref 3.4–5.3)
SPECIMEN SOURCE: NORMAL
SPECIMEN SOURCE: NORMAL

## 2017-04-06 PROCEDURE — 99207 ZZC CDG-MDM COMPONENT: MEETS HIGH - UP CODED: CPT | Performed by: INTERNAL MEDICINE

## 2017-04-06 PROCEDURE — 25000132 ZZH RX MED GY IP 250 OP 250 PS 637: Performed by: HOSPITALIST

## 2017-04-06 PROCEDURE — 00000146 ZZHCL STATISTIC GLUCOSE BY METER IP

## 2017-04-06 PROCEDURE — 25000132 ZZH RX MED GY IP 250 OP 250 PS 637: Performed by: NEUROLOGICAL SURGERY

## 2017-04-06 PROCEDURE — 36415 COLL VENOUS BLD VENIPUNCTURE: CPT | Performed by: INTERNAL MEDICINE

## 2017-04-06 PROCEDURE — 94640 AIRWAY INHALATION TREATMENT: CPT | Mod: 76

## 2017-04-06 PROCEDURE — 71020 XR CHEST 2 VW: CPT

## 2017-04-06 PROCEDURE — 25000125 ZZHC RX 250: Performed by: INTERNAL MEDICINE

## 2017-04-06 PROCEDURE — 94640 AIRWAY INHALATION TREATMENT: CPT

## 2017-04-06 PROCEDURE — 12000000 ZZH R&B MED SURG/OB

## 2017-04-06 PROCEDURE — 40000275 ZZH STATISTIC RCP TIME EA 10 MIN

## 2017-04-06 PROCEDURE — 99233 SBSQ HOSP IP/OBS HIGH 50: CPT | Performed by: INTERNAL MEDICINE

## 2017-04-06 PROCEDURE — 83735 ASSAY OF MAGNESIUM: CPT | Performed by: INTERNAL MEDICINE

## 2017-04-06 PROCEDURE — 84132 ASSAY OF SERUM POTASSIUM: CPT | Performed by: INTERNAL MEDICINE

## 2017-04-06 PROCEDURE — 25000132 ZZH RX MED GY IP 250 OP 250 PS 637: Performed by: INTERNAL MEDICINE

## 2017-04-06 RX ORDER — LEVOFLOXACIN 500 MG/1
500 TABLET, FILM COATED ORAL DAILY
Status: DISCONTINUED | OUTPATIENT
Start: 2017-04-06 | End: 2017-04-07 | Stop reason: HOSPADM

## 2017-04-06 RX ORDER — FUROSEMIDE 40 MG
40 TABLET ORAL ONCE
Status: COMPLETED | OUTPATIENT
Start: 2017-04-06 | End: 2017-04-06

## 2017-04-06 RX ORDER — IPRATROPIUM BROMIDE AND ALBUTEROL SULFATE 2.5; .5 MG/3ML; MG/3ML
3 SOLUTION RESPIRATORY (INHALATION)
Status: DISCONTINUED | OUTPATIENT
Start: 2017-04-06 | End: 2017-04-06

## 2017-04-06 RX ORDER — IPRATROPIUM BROMIDE AND ALBUTEROL SULFATE 2.5; .5 MG/3ML; MG/3ML
3 SOLUTION RESPIRATORY (INHALATION) EVERY 4 HOURS PRN
Status: DISCONTINUED | OUTPATIENT
Start: 2017-04-06 | End: 2017-04-07 | Stop reason: HOSPADM

## 2017-04-06 RX ADMIN — FUROSEMIDE 40 MG: 40 TABLET ORAL at 16:29

## 2017-04-06 RX ADMIN — PREDNISONE 40 MG: 20 TABLET ORAL at 08:22

## 2017-04-06 RX ADMIN — LEVOFLOXACIN 500 MG: 500 TABLET, FILM COATED ORAL at 12:51

## 2017-04-06 RX ADMIN — OXYCODONE HYDROCHLORIDE 10 MG: 5 TABLET ORAL at 05:05

## 2017-04-06 RX ADMIN — GUAIFENESIN AND DEXTROMETHORPHAN 10 ML: 100; 10 SYRUP ORAL at 08:21

## 2017-04-06 RX ADMIN — DIAZEPAM 5 MG: 5 TABLET ORAL at 01:08

## 2017-04-06 RX ADMIN — IPRATROPIUM BROMIDE AND ALBUTEROL SULFATE 3 ML: .5; 3 SOLUTION RESPIRATORY (INHALATION) at 11:43

## 2017-04-06 RX ADMIN — OXYCODONE HYDROCHLORIDE 10 MG: 5 TABLET ORAL at 21:32

## 2017-04-06 RX ADMIN — VARENICLINE TARTRATE 1 MG: 1 TABLET, FILM COATED ORAL at 08:22

## 2017-04-06 RX ADMIN — ACETAMINOPHEN 650 MG: 325 TABLET, FILM COATED ORAL at 23:38

## 2017-04-06 RX ADMIN — INSULIN ASPART 1 UNITS: 100 INJECTION, SOLUTION INTRAVENOUS; SUBCUTANEOUS at 17:56

## 2017-04-06 RX ADMIN — OXYCODONE HYDROCHLORIDE 10 MG: 5 TABLET ORAL at 12:53

## 2017-04-06 RX ADMIN — IPRATROPIUM BROMIDE AND ALBUTEROL SULFATE 3 ML: .5; 3 SOLUTION RESPIRATORY (INHALATION) at 20:17

## 2017-04-06 RX ADMIN — OXYCODONE HYDROCHLORIDE 10 MG: 5 TABLET ORAL at 16:29

## 2017-04-06 RX ADMIN — SENNOSIDES AND DOCUSATE SODIUM 2 TABLET: 8.6; 5 TABLET ORAL at 08:21

## 2017-04-06 RX ADMIN — VARENICLINE TARTRATE 1 MG: 1 TABLET, FILM COATED ORAL at 21:31

## 2017-04-06 RX ADMIN — IPRATROPIUM BROMIDE AND ALBUTEROL SULFATE 3 ML: .5; 3 SOLUTION RESPIRATORY (INHALATION) at 07:45

## 2017-04-06 RX ADMIN — SENNOSIDES AND DOCUSATE SODIUM 2 TABLET: 8.6; 5 TABLET ORAL at 21:31

## 2017-04-06 RX ADMIN — GUAIFENESIN AND DEXTROMETHORPHAN 10 ML: 100; 10 SYRUP ORAL at 01:08

## 2017-04-06 RX ADMIN — GUAIFENESIN AND DEXTROMETHORPHAN 10 ML: 100; 10 SYRUP ORAL at 16:29

## 2017-04-06 RX ADMIN — OXYBUTYNIN CHLORIDE 10 MG: 5 TABLET, EXTENDED RELEASE ORAL at 08:22

## 2017-04-06 RX ADMIN — OXYCODONE HYDROCHLORIDE 10 MG: 5 TABLET ORAL at 01:08

## 2017-04-06 RX ADMIN — OXYCODONE HYDROCHLORIDE 10 MG: 5 TABLET ORAL at 08:21

## 2017-04-06 RX ADMIN — ATORVASTATIN CALCIUM 80 MG: 40 TABLET, FILM COATED ORAL at 17:56

## 2017-04-06 NOTE — PROGRESS NOTES
Care Transitions Team: Following for CC, discharge planning, and disposition.        Per conversation with bedside RN pt will be needing 02 on discharge.  Web based Hospitalist to address for orders.  Also does pt need a nebulizer as well?   Referral sent to [facility] in anticipation of orders will wait for direction from MD.    Zahira Avalos RN BSN [facility]  Care Transitions Team  [phone]

## 2017-04-06 NOTE — PROGRESS NOTES
Patient has been assessed for Home Oxygen needs.  Oxygen readings:   *   RA - at rest  Pulse oximetry SPO2 84 %  *   RA - during activity/with exercise SPO2 81 %  *   O2 at  2 liters/minute (at rest) ...SPO2 94 %  *   O2 at 2 liters/minute (during activity/with exercise) ...SPO2 91 %

## 2017-04-06 NOTE — PROGRESS NOTES
2:55PM Rec'd callback from Zahira (care coordinator) that patient will not be discharging today. They will call [facility] tomorrow if home oxygen is still needed. Zahira will call the [place] office when patient is discharging and if order for home oxygen is placed.

## 2017-04-06 NOTE — PROGRESS NOTES
1:54pm Rec'd call from Zahira (care coordinator) that patient is discharging home on oxygen today. See that SATs are entered, however order is awaiting MD signature. Advised Zahira that patient would need to sign a non-coverage form in the event that insurance does not cover the oxygen based on the diagnosis.   2:04p Called patient to offer choice of oxygen vendor. Patient would like to use [employer] [facility] [employer] for oxygen services. Advised patient that she will need to sign a non-coverage based on the diagnosis. Told her the monthly rental cost and patient stated that she would sign the non-coverage in hopes she won't be on oxygen very long. Advised patient that oxygen would be delivered to her hospital room as soon as the order was signed by the Physician. She was okay with this timeframe.  2:07p Called Zahira back to let her know the patient agreed to sign the non-coverage form and stated delivery would be as soon as the Physician signs the order. Zahira stated they are also waiting to hear from the Physician whether or not they will be ordering a nebulizer as patient is still receiving neb treatments in the hospital.

## 2017-04-06 NOTE — PROGRESS NOTES
Pt doing well up independently in the room without SOB. She is not needing O2 supplementation at this time. Overall she is feeling much better. From a NSG standpoint ready to DC home. Xray showed possible small infiltrate only.  Would really appreciate hospitalist input on DC meds.        Shahana Cantu [facility]  [facility]  [address] [place] [address].  [id number]  Tel. [phone]  Fax [phone]

## 2017-04-06 NOTE — PLAN OF CARE
Problem: Goal Outcome Summary  Goal: Goal Outcome Summary  Outcome: No Change  Lindsey RN  VS monitored, low grade temp, 2L NC, up independently, incision JOSÉ ANTONIO and C/DI, CMS+, voiding, flatus+, LS coarse/diminshed, using IS independently, frequent/productive cough, BG checks QID, Oxycodone/Robitussin for pain in neck and rib cage, francia mech soft/mod CHO diet, no swallowing issues noted, plan is home upon d/c, will cont to monitor.

## 2017-04-06 NOTE — PROGRESS NOTES
[facility]  Hospitalist Progress Note  Name: Margo Inman    MRN: [id number]  Provider:  Jose Maria Augustin MD.      Assessment & Plan   Summary of Stay: Margo Inman is a 57 year old female with a PMH significant for tobacco abuse (quit a few days ago, on Chantix for ~1 month) who is post-op s/p C6-7 anterior cervical discectomy.      Post-op course complicated by fever/SOB/cough and work up revealed that pt has acute influenza. She is receiving Tamiflu.     1. Acute Influenza B while in the hospital for cervical discectomy/fusion, definitely there is a component of undiagnosed COPD with long history of smoking especially with the degree of hypoxia and significant wheezes:  Patient said her husbandhas had flu as well few days prior  and she mostly likely acquired it from him.   - Continue Tamiflu, 5 days total course.  - CXR negative for pneumonia, but she continued to have coughing and congestion.  - O2 NC and wean down as able  - Continue incentive spirometry  - Scheduled and PRN DuoNeb  - Prednisone 40 mg daily for possible COPD exacerbation will treat for total of 7 days and stop.  - Monitor blood sugar as she is on prednisone and keep on diabetic diet blood sugar are borderline.  2. Acute hypoxic respiratory failure- Patient continued to require oxygen.CXR repeated today showed basal infiltrates, possible atlectasis vs early pneumonia. Patient could not be weaned off oxygen.   - scheduled duo neb fr today.  - Levaquin 500 mg PO daily started 04/06. Will treat her for total of 7 days.  - Lasix 40 mg x 1 today.  - try to wean her off oxygen, if not possible consider discharging her with it tomorrow.  - I discussed with patient and her  the risk of oxygen use and smoking at length, both agreed. Patient said her  continued to smoke.    2. S/p C6-7 anterior cervical discectomy fusion:    -  Post-op site cares, activity restrictions, pain medications, DVT proph per surgical  team.    3. Tobacco use disorder: Patient quit smoking just prior to surgery. She has been on Chantix for about a month. Continue pta Chantix as well as incentive spirometry. Encouraged to continue smoking cessation. She will need PFT as an out patient when acute condition resolves.    4. Hyperlipidemia: Continue Atorvastatin 80 mg daily.     DVT ppx: SCDs  Code: Full  Dispo:  She is still hypoxic with activity when off oxygen. She is on antibiotics and started on diuretics today, if no improvement bu tomorrow and unable to wean her off oxygen will discharge her with home oxygen in Am.       Interval History   Patient seen and examined, denied N/V, no chest pain.  SOB better, she still has hypoxia with minimal activity.  Discussed with a nursing staff.  Discussed with patient and her .  Physical Exam   Temp: 98.4  F (36.9  C) Temp src: Oral BP: 117/61 Pulse: 76 Heart Rate: 82 Resp: 10 SpO2: 94 % O2 Device: None (Room air) Oxygen Delivery: 2 LPM  Vitals:    03/30/17 0919   Weight: 87.5 kg (193 lb)     Vital Signs with Ranges  Temp:  [98.4  F (36.9  C)-99.6  F (37.6  C)] 98.4  F (36.9  C)  Pulse:  [76] 76  Heart Rate:  [78-93] 82  Resp:  [10-20] 10  BP: (116-132)/(61-65) 117/61  SpO2:  [86 %-99 %] 94 %  I/O last 3 completed shifts:  In: 860 [P.O.:860]  Out: -     GEN:  Alert, oriented x 3, appears ill but comfortable.  HEENT:  Normocephalic/atraumatic, no scleral icterus, no nasal discharge, mouth moist.  Dressing dry.  CV:  Regular rate and rhythm, no murmur or JVD.  S1 + S2 noted, no S3 or S4.  LUNGS:  B rhonchi/wheezing.  Symmetric chest rise on inhalation noted. Normal effort  ABD:  Active bowel sounds, soft, non-tender, mildly distended.  No rebound/guarding/rigidity.  EXT:  No edema.  No cyanosis.  No acute joint synovitis noted.  Moving arms/legs well on general exam.  SKIN:  Dry to touch, no exanthems noted in the visualized areas.    Medications        levofloxacin  500 mg Oral Daily     furosemide  40  mg Oral Once     ipratropium - albuterol 0.5 mg/2.5 mg/3 mL  3 mL Nebulization Q4H While awake     ipratropium - albuterol 0.5 mg/2.5 mg/3 mL  3 mL Nebulization 4x daily     predniSONE  40 mg Oral Daily     insulin aspart  1-7 Units Subcutaneous TID AC     insulin aspart  1-5 Units Subcutaneous At Bedtime     atorvastatin  80 mg Oral Daily     oxybutynin  10 mg Oral Daily     varenicline  1 mg Oral BID     sodium chloride (PF)  3 mL Intracatheter Q8H     senna-docusate  1-2 tablet Oral BID     Data       Recent Labs  Lab 03/31/17  1052   WBC 11.9*   HGB 13.4   HCT 40.9   MCV 96          Recent Labs  Lab 03/31/17  1058 03/31/17  1052   CULT No growth No growth       Recent Labs  Lab 04/06/17  0627 04/05/17  0627 04/04/17  0618 04/02/17  1105 03/31/17  1052   NA  --   --   --  134 136   POTASSIUM 3.7 3.5 3.2* 3.7 3.9   CHLORIDE  --   --   --  94 99   CO2  --   --   --  36* 28   ANIONGAP  --   --   --  4 9   GLC  --   --   --  106* 154*   BUN  --   --   --  8 8   CR  --   --   --  0.61 0.50*   GFRESTIMATED  --   --   --  >90Non  GFR Calc >90Non  GFR Calc   GFRESTBLACK  --   --   --  >90African American GFR Calc >90African American GFR Calc   DOMONIQUE  --   --   --  8.8 9.2   MAG 2.3 2.2 2.2  --   --      Recent Results (from the past 24 hour(s))   XR Chest 2 Views    Narrative    CHEST TWO VIEWS April 6, 2017 10:31 AM     HISTORY: Hypoxia.    COMPARISON: 3/31/2017.    FINDINGS: Fusion hardware lower cervical spine. Mild patchy opacity  right base posteriorly could be a small amount of infiltrate. It was  not present on 3/31/2017.      Impression    IMPRESSION: Mild opacity right base posteriorly could be a small  amount of infiltrate.    ADY GUZMAN MD

## 2017-04-06 NOTE — CONSULTS
"BRIEF NUTRITION ASSESSMENT      REASON FOR ASSESSMENT:  LOS    NUTRITION HISTORY:  - Unable to obtain from patient despite multiple attempts.   - ?Regular diet PTA.     CURRENT DIET AND INTAKE:  Diet:  High CHO       100% intake documented for meals since admission.      ANTHROPOMETRICS:  Height: 5' 1\"  Weight: 193#  BMI: 36 kg/m2  IBW: 105#  Weight Status: Obesity Grade II BMI 35-39.9  %IBW: 184%  Weight History:  Wt Readings from Last 10 Encounters:   03/30/17 87.5 kg (193 lb)   03/20/17 87.7 kg (193 lb 6.4 oz)   03/14/17 88 kg (194 lb)   01/17/17 89.2 kg (196 lb 9.6 oz)   01/10/17 89.8 kg (198 lb)   01/05/17 88 kg (194 lb)   11/15/16 89.8 kg (198 lb)   06/29/16 90.7 kg (200 lb)   06/10/16 92.5 kg (204 lb)   05/26/16 92.5 kg (204 lb)   - Wt stable over the past 2 months.  Slight wt loss of 11# (5.3%) x 10 months.     LABS:  Labs noted    MALNUTRITION:  Patient does not meet two of the following criteria necessary for diagnosing malnutrition: significant weight loss, reduced intake, subcutaneous fat loss, muscle loss or fluid retention    NUTRITION INTERVENTION:  Nutrition Diagnosis:  No nutrition diagnosis at this time.    Implementation:  Nutrition Education: Per Provider order if indicated.  FOLLOW UP/MONITORING:   Will re-evaluate in 7 - 10 days, or sooner, if re-consulted.      Kayla Driver RD, LD  Clinical Dietitian  3rd floor/ICU: [phone]  All other floors: [phone]  Weekend/holiday: [phone]  "

## 2017-04-06 NOTE — DISCHARGE SUMMARY
[facility]    Discharge Summary  Neurosurgery    Date of Admission:  3/30/2017  Date of Discharge:  4/6/2017  Discharging Provider: Shahana Cantu  Date of Service (when I saw the patient): 04/06/17    Discharge Diagnoses   Active Problems:    S/P cervical spinal fusion      History of Present Illness   Margo Inman is a 57 year old female who was admitted on 3/30/2017. She presented with neck and RUE pain and weakness. She underwent anterior cervical decompression and fusion C6-C7 with Dr. Joseph Klein on 3/30/2017. Following surgery the patient developed fever/cough/SOB and was found to have acute influenza. Pt is doing very well up in the room not needing O2 supplementation.  Xray shows only a small infiltrate.  Ok to DC home from a NSG perspective if cleared by hospital service. Would recommend follow up with PCP within 3 days o DC as well.       Hospital Course   Margo Inman was admitted on 3/30/2017.  The following problems were addressed during her hospitalization:    Active Problems:    S/P cervical spinal fusion    Assessment: stable     Plan: DC home         I have discussed the following assessment and plan with Dr. Joseph Klein  who is in agreement with initial plan and will follow up with further consultation recommendations.    Shahana Cantu [facility]  [facility]  [place] [address]    Tel [phone]  Pager [phone]        Significant Results and Procedures   Post cervical fusion and influenza B    Pending Results     Unresulted Labs Ordered in the Past 30 Days of this Admission     No orders found from 1/29/2017 to 3/31/2017.          Code Status   Full Code    Primary Care Physician   Jordana Naqvi    Physical Exam   Temp: 98.4  F (36.9  C) Temp src: Oral BP: 117/61 Pulse: 76 Heart Rate: 82 Resp: 20 SpO2: (!) 86 % O2 Device: None (Room air) Oxygen Delivery: 2 LPM  Vitals:     03/30/17 0919   Weight: 193 lb (87.5 kg)     Vital Signs with Ranges  Temp:  [98.4  F (36.9  C)-99.6  F (37.6  C)] 98.4  F (36.9  C)  Pulse:  [76] 76  Heart Rate:  [78-93] 82  Resp:  [16-20] 20  BP: (116-132)/(61-65) 117/61  SpO2:  [86 %-99 %] 86 %  I/O last 3 completed shifts:  In: 620 [P.O.:620]  Out: -     Constitutional: Awake, alert, cooperative, no apparent distress, and appears stated age.  Eyes: Lids and lashes normal, pupils equal, round and reactive to light, extra ocular muscles intact  ENT: Normocephalic, without obvious abnormality, atraumatic  Respiratory: No increased work of breathing  Skin: No bruising or bleeding, normal skin color, texture, turgor, no redness, warmth, or swelling.  Incision intact, minimal drainage, open to air.  Musculoskeletal: There is no redness, warmth, or swelling of the joints.  Full range of motion noted.  Motor strength is 5 out of 5 all extremities bilaterally.  Tone is normal.  Neurologic: Awake, alert, oriented to name, place and time.  Cranial nerves II-XII are grossly intact.  Motor is 5 out of 5 bilaterally.     Neuropsychiatric: Calm, normal eye contact, alert, normal affect, oriented to self, place, time and situation, memory for past and recent events intact and thought process normal.       Time Spent on this Encounter   I, Shahana Cantu, personally saw the patient today and spent greater than 30 minutes discharging this patient.    Discharge Disposition   Discharged to home  Condition at discharge: Stable    Consultations This Hospital Stay   OCCUPATIONAL THERAPY ADULT IP CONSULT  PHYSICAL THERAPY ADULT IP CONSULT  HOSPITALIST IP CONSULT    Discharge Orders     X-ray Cervical spine 2-3 vws     Reason for your hospital stay   You were in the hospital for cervical spine surgery.     Follow-up and recommended labs and tests    Please follow up at the [facility] in 6 weeks for follow up. Please call the clinic at [phone] to schedule your  appointment with Shahana Cantu CNP or Aline Kenyon CNP.     Activity   Your activity upon discharge: Discharge instructions:  No lifting of more than 10 pounds until follow up visit.  Ok to shampoo hair, shower or bathe, but, do not scrub or submerge incision under water until evaluated post operatively in clinic.    Ok to walk as tolerated, avoid bedrest.    No contact sports until after follow up visit  No high impact activities such as; running/jogging, snowmobile or 4 beyer riding or any other recreational vehicles    Call my office at [phone] for increasing redness, swelling or pus draining from the incision, increased pain or any other questions and concerns.    Go to ER with any seizure activity, mental status change (increasing confusion), difficulty with speech or increasing or acute weakness     Wound care and dressings   Instructions to care for your wound at home: Keep your incision clean and dry at all times.  OK to remove dressing on postop day 2.  OK to shower on postop day 3 and allow water to run over incision, pat dry after shower.  No bathing swimming or submerging in water.  Call immediately or come to ED if any drainage occurs, or you develop new pain, redness, or swelling.    .     Full Code     Diet   Follow this diet upon discharge: Orders Placed This Encounter     Advance Diet as Tolerated: Regular Diet Adult; Mechanical/Dental Soft Diet       Discharge Medications   Current Discharge Medication List      START taking these medications    Details   oxyCODONE (ROXICODONE) 5 MG IR tablet Take 1-2 tablets (5-10 mg) by mouth every 3 hours as needed for moderate to severe pain  Qty: 75 tablet, Refills: 0    Associated Diagnoses: S/P cervical spinal fusion      diazepam (VALIUM) 5 MG tablet Take 1 tablet (5 mg) by mouth every 6 hours as needed for other (muscle spasms)  Qty: 60 tablet, Refills: 0    Associated Diagnoses: S/P cervical spinal fusion         CONTINUE these medications  which have NOT CHANGED    Details   butalbital-acetaminophen-caffeine (FIORICET/ESGIC) -40 MG per tablet Take 1 tablet by mouth every 4 hours as needed  Qty: 28 tablet, Refills: 1    Associated Diagnoses: Tension headache      varenicline (CHANTIX STARTING MONTH PAK) 0.5 MG X 11 & 1 MG X 42 tablet Take 0.5 mg tab daily for 3 days, then 0.5 mg tab twice daily for 4 days, then 1 mg twice daily.  Qty: 53 tablet, Refills: 0    Associated Diagnoses: Tobacco dependence syndrome      varenicline (CHANTIX) 1 MG tablet Take 1 tablet (1 mg) by mouth 2 times daily  Qty: 56 tablet, Refills: 4    Associated Diagnoses: Tobacco dependence syndrome      diclofenac (VOLTAREN) 75 MG EC tablet Take 1 tablet (75 mg) by mouth 2 times daily  Qty: 60 tablet, Refills: 11      oxybutynin (DITROPAN-XL) 5 MG 24 hr tablet Take 2 tablets (10 mg) by mouth daily  Qty: 30 tablet, Refills: 1    Associated Diagnoses: Urinary urgency; Mixed incontinence urge and stress (male)(female)      atorvastatin (LIPITOR) 80 MG tablet TAKE 1 TABLET BY MOUTH ONCE DAILY  Qty: 90 tablet, Refills: 3    Associated Diagnoses: Hyperlipidemia LDL goal <130      acetaminophen (TYLENOL) 500 MG tablet Take 500-1,000 mg by mouth every 6 hours as needed.         STOP taking these medications       oxyCODONE-acetaminophen (PERCOCET) 5-325 MG per tablet Comments:   Reason for Stopping:         cyclobenzaprine (FLEXERIL) 10 MG tablet Comments:   Reason for Stopping:             Allergies   Allergies   Allergen Reactions     Bee Anaphylaxis     Erythromycin Hives     Wasps [Hornets] Anaphylaxis

## 2017-04-06 NOTE — PROGRESS NOTES
"/64 (BP Location: Right arm)  Pulse 77  Temp 99.6  F (37.6  C) (Oral)  Resp 18  Ht 1.549 m (5' 1\")  Wt 87.5 kg (193 lb)  LMP 09/16/2011  SpO2 95%  BMI 36.47 kg/m2   Admitted-3/30 post-op s/p C6-7 anterior cervical discectomy  Hx-Acute Influenza B while in the hospital for cervical discectomy/fusion, concern for undiagnosed COPD with long history of smoking especially with the degree of hypoxia and significant wheezes, Hyperlipidemia  Code- Full  Isolation- droplet for Influenza B got Tamiflu  A/O x4  LS- diminished but clear on RA went for a walk and O2 checked after and was at 99 %   Heart tones- WNL  CMS-WNL  GI- BS-hypoactive flatus- no nausea- no BM 4/4 after enema was given  -  Voiding well  Tolerating Regular Diet  Dressing- liquid adhesive and C collar on when moving around  Pain Management- getting oxycodone 10 mg q3 valium q6 and robitussin q6  Anticoagulation- none  Activity- IND  Immobilizers or Braces- c collar  Labs-mg 2.4 and k 3.5   Services-none  DC plan- dc home 4/6      "

## 2017-04-06 NOTE — PROGRESS NOTES
Pt is to dc today to home with O2. CMS intact. Besides fluctuations in SpO2 VSS. Pt up independently, voiding in BR. Education completed, pt verbalizes understanding. Waiting to speak to MD regarding when pt should make appt with primary physician? Does pt need antibiotic for home? pts want MD to explain results of chest xray. Will continue to monitor.

## 2017-04-06 NOTE — PLAN OF CARE
Problem: Goal Outcome Summary  Goal: Goal Outcome Summary  Outcome: Improving  Pt to stay overnight d/t results of xray. Pt now on PO lasix, antibiotic. Goal is for pt to no longer need O2 for home when discharging. Upon dc pt will also get rx for steroid. Encourage pt to get up and walk and to use IS often to expand lungs. Will continue to monitor.

## 2017-04-07 VITALS
DIASTOLIC BLOOD PRESSURE: 50 MMHG | OXYGEN SATURATION: 96 % | BODY MASS INDEX: 36.44 KG/M2 | HEIGHT: 61 IN | RESPIRATION RATE: 18 BRPM | TEMPERATURE: 98.4 F | SYSTOLIC BLOOD PRESSURE: 121 MMHG | WEIGHT: 193 LBS | HEART RATE: 82 BPM

## 2017-04-07 LAB
GLUCOSE BLDC GLUCOMTR-MCNC: 163 MG/DL (ref 70–99)
GLUCOSE BLDC GLUCOMTR-MCNC: 98 MG/DL (ref 70–99)
MAGNESIUM SERPL-MCNC: 2.3 MG/DL (ref 1.6–2.3)
POTASSIUM SERPL-SCNC: 3.3 MMOL/L (ref 3.4–5.3)

## 2017-04-07 PROCEDURE — 25000125 ZZHC RX 250: Performed by: INTERNAL MEDICINE

## 2017-04-07 PROCEDURE — 83735 ASSAY OF MAGNESIUM: CPT | Performed by: NEUROLOGICAL SURGERY

## 2017-04-07 PROCEDURE — 40000275 ZZH STATISTIC RCP TIME EA 10 MIN

## 2017-04-07 PROCEDURE — 84132 ASSAY OF SERUM POTASSIUM: CPT | Performed by: NEUROLOGICAL SURGERY

## 2017-04-07 PROCEDURE — 99233 SBSQ HOSP IP/OBS HIGH 50: CPT | Performed by: INTERNAL MEDICINE

## 2017-04-07 PROCEDURE — 94640 AIRWAY INHALATION TREATMENT: CPT

## 2017-04-07 PROCEDURE — 36415 COLL VENOUS BLD VENIPUNCTURE: CPT | Performed by: NEUROLOGICAL SURGERY

## 2017-04-07 PROCEDURE — 99207 ZZC CDG-MDM COMPONENT: MEETS HIGH - UP CODED: CPT | Performed by: INTERNAL MEDICINE

## 2017-04-07 PROCEDURE — 25000132 ZZH RX MED GY IP 250 OP 250 PS 637: Performed by: NEUROLOGICAL SURGERY

## 2017-04-07 PROCEDURE — 25000132 ZZH RX MED GY IP 250 OP 250 PS 637: Performed by: INTERNAL MEDICINE

## 2017-04-07 PROCEDURE — 94640 AIRWAY INHALATION TREATMENT: CPT | Mod: 76

## 2017-04-07 PROCEDURE — 00000146 ZZHCL STATISTIC GLUCOSE BY METER IP

## 2017-04-07 RX ORDER — POTASSIUM CHLORIDE 1.5 G/1.58G
40 POWDER, FOR SOLUTION ORAL ONCE
Status: DISCONTINUED | OUTPATIENT
Start: 2017-04-07 | End: 2017-04-07 | Stop reason: HOSPADM

## 2017-04-07 RX ORDER — GUAIFENESIN/DEXTROMETHORPHAN 100-10MG/5
10 SYRUP ORAL EVERY 6 HOURS PRN
Qty: 560 ML | Refills: 0 | Status: SHIPPED | OUTPATIENT
Start: 2017-04-07 | End: 2017-05-09

## 2017-04-07 RX ORDER — PREDNISONE 10 MG/1
TABLET ORAL
Qty: 20 TABLET | Refills: 0 | Status: SHIPPED | OUTPATIENT
Start: 2017-04-07 | End: 2017-05-09

## 2017-04-07 RX ORDER — LEVOFLOXACIN 500 MG/1
500 TABLET, FILM COATED ORAL DAILY
Qty: 5 TABLET | Refills: 0 | Status: SHIPPED | OUTPATIENT
Start: 2017-04-07 | End: 2017-05-09

## 2017-04-07 RX ORDER — FUROSEMIDE 40 MG
40 TABLET ORAL ONCE
Status: COMPLETED | OUTPATIENT
Start: 2017-04-07 | End: 2017-04-07

## 2017-04-07 RX ADMIN — POTASSIUM CHLORIDE 40 MEQ: 1500 TABLET, EXTENDED RELEASE ORAL at 09:36

## 2017-04-07 RX ADMIN — FUROSEMIDE 40 MG: 40 TABLET ORAL at 09:36

## 2017-04-07 RX ADMIN — OXYCODONE HYDROCHLORIDE 10 MG: 5 TABLET ORAL at 13:18

## 2017-04-07 RX ADMIN — SENNOSIDES AND DOCUSATE SODIUM 2 TABLET: 8.6; 5 TABLET ORAL at 08:23

## 2017-04-07 RX ADMIN — ACETAMINOPHEN 650 MG: 325 TABLET, FILM COATED ORAL at 08:23

## 2017-04-07 RX ADMIN — LEVOFLOXACIN 500 MG: 500 TABLET, FILM COATED ORAL at 08:23

## 2017-04-07 RX ADMIN — POTASSIUM CHLORIDE 20 MEQ: 1500 TABLET, EXTENDED RELEASE ORAL at 11:35

## 2017-04-07 RX ADMIN — OXYCODONE HYDROCHLORIDE 10 MG: 5 TABLET ORAL at 08:24

## 2017-04-07 RX ADMIN — IPRATROPIUM BROMIDE AND ALBUTEROL SULFATE 3 ML: .5; 3 SOLUTION RESPIRATORY (INHALATION) at 07:54

## 2017-04-07 RX ADMIN — PREDNISONE 40 MG: 20 TABLET ORAL at 08:23

## 2017-04-07 RX ADMIN — IPRATROPIUM BROMIDE AND ALBUTEROL SULFATE 3 ML: .5; 3 SOLUTION RESPIRATORY (INHALATION) at 11:42

## 2017-04-07 RX ADMIN — OXYBUTYNIN CHLORIDE 10 MG: 5 TABLET, EXTENDED RELEASE ORAL at 08:23

## 2017-04-07 RX ADMIN — VARENICLINE TARTRATE 1 MG: 1 TABLET, FILM COATED ORAL at 08:23

## 2017-04-07 NOTE — PLAN OF CARE
Problem: Goal Outcome Summary  Goal: Goal Outcome Summary  Outcome: Improving  Vss. Low grade temp 99 to 99.4 for shift. Lungs diminished/Coarse-O2 1.5L mid 90s. Frequent-good-productive cough-small amounts thick yellow phlegm. +bs/+gas, no nausea. Tolerating diet. Last bm 04/04/17. Voiding. No iv access. Skin intact except for little bruising/healing incision. Incision-derma bonded-no drainage. Cms wnl. Pain controlled with Oxycodone. Tolerating ice. Ambulating and repositioning self. No other significant issues noted this evening.

## 2017-04-07 NOTE — PLAN OF CARE
Problem: Discharge Planning  Goal: Discharge Planning (Adult, OB, Behavioral, Peds)  Outcome: Improving  A&O x4. VSS. On 1.5LPM of O2 via NC sating at 92%. LS diminished and coarse. Infrequent, loose, productive cough. BS hypoactive. Incision to ant neck is CDI and JOSÉ ANTONIO. Soft cervical collar worn at all times. CMS intact. On droplet precautions for flu B, on Tamiflu. Mae mech soft diet well. Voiding in good amounts. Up independently. PO oxycodone and acetaminophen managing pain. Will discharge home today, may be sent with home O2 if unable to wean off O2 this AM.

## 2017-04-07 NOTE — PROGRESS NOTES
Care Transitions Team: Following for CC, discharge planning, and disposition.      Per rounding MD pt will be discharged today, no home 02 indicated.    Spoke to pt. at bedside to encourage PCP follow up as indicated to ensure continued improvement of lung status, explained smoking will not help--    Explained risk of secondary infection after INFLUENZA and to follow up with PCP sooner if experiencing increased SOB.     PCP handoff to be completed on discharge.     Zahira Avalos RN BSN CTS  Care Transitions Team  [phone]

## 2017-04-07 NOTE — PROGRESS NOTES
Reviewed discharge instructions and medications with patient and . Questions answered. Patient discharged to home with , discharge instructions, medications (Oxycodone, Valium, Prednisone, Lexoquin, Robitussin DM), and belongings at this time.

## 2017-04-07 NOTE — PROGRESS NOTES
[facility]  Hospitalist Progress Note  Name: Margo Inman    MRN: [id number]  Provider:  Jose Maria Augustin MD.      Assessment & Plan   Summary of Stay: Margo Inman is a 57 year old female with a PMH significant for tobacco abuse (quit a few days ago, on Chantix for ~1 month) who is post-op s/p C6-7 anterior cervical discectomy.      Post-op course complicated by fever/SOB/cough and work up revealed that pt has acute influenza, and was on Tamiflu.     1. Acute Influenza B while in the hospital for cervical discectomy/fusion, definitely there is a component of undiagnosed COPD with long history of smoking especially with the degree of hypoxia and significant wheezes:  Patient said her husbandhas had flu as well few days prior  and she mostly likely acquired it from him.   - Completed Tamiflu, 5 days course.  - CXR negative for pneumonia, but she continued to have coughing and congestion.  - Weaned her off oxygen.  - Continue incentive spirometry at home  - Guaifenesin ordered for her  - Prednisone 40 mg daily for possible COPD exacerbation, placed on taper dose on discharge.  - Monitor blood sugar as she is on prednisone and keep on diabetic diet blood sugar are borderline.    2. Acute hypoxic respiratory failure- Patient continued to require oxygen.CXR repeated today showed basal infiltrates, possible atlectasis vs early pneumonia. Patient could not be weaned off oxygen.   - scheduled duo neb fr today.  - Levaquin 500 mg PO daily started 04/06 for 5 more days.  - Lasix 40 mg x 2 doses give on 04/06 and today. Diuresed well and her oxygenation improved.  - Wean her off oxygen, no need for home oxygen.  - I discussed with patient at length the plan of care and discharge.  - Follow up with PCP in 1 week.  - Needs PFT in 1 month when the acute condition resolves.  - Reinforced smoking cessation.    2. S/p C6-7 anterior cervical discectomy fusion:    -  Post-op site cares, activity  restrictions, pain medications, DVT proph per surgical team.    3. Tobacco use disorder: Patient quit smoking just prior to surgery. She has been on Chantix for about a month. Continue pta Chantix as well as incentive spirometry. Encouraged on smoking cessation.    4. Hyperlipidemia: Continue Atorvastatin 80 mg daily.    5. Hypokalemia- likely due to Lasix. Replaced. No need to recheck K or discharge on KCl as she won't be on Lasix at home.     DVT ppx: SCDs  Code: Full  Dispo:  She is still hypoxic with activity when off oxygen. She is on antibiotics and started on diuretics today, if no improvement bu tomorrow and unable to wean her off oxygen will discharge her with home oxygen in Am.       Interval History   Patient seen and examined, denied N/V, no chest pain.  SOB better, she is off oxygen saturating in around 97% on RA.  Discussed with a nursing staff.  Discussed with patient at length.  Physical Exam   Temp: 98.4  F (36.9  C) Temp src: Oral BP: 121/50 Pulse: 82 Heart Rate: 82 Resp: 18 SpO2: 96 % O2 Device: None (Room air) Oxygen Delivery: 1.5 LPM  Vitals:    03/30/17 0919   Weight: 87.5 kg (193 lb)     Vital Signs with Ranges  Temp:  [98  F (36.7  C)-99.4  F (37.4  C)] 98.4  F (36.9  C)  Pulse:  [80-82] 82  Heart Rate:  [82] 82  Resp:  [16-18] 18  BP: (121-149)/(50-73) 121/50  SpO2:  [90 %-96 %] 96 %  I/O last 3 completed shifts:  In: 1240 [P.O.:1240]  Out: -     GENERAL:  Alert, oriented x 3, appears ill but comfortable.  HEENT:  Normocephalic/atraumatic, no scleral icterus, no nasal discharge, mouth moist.  Dressing dry.  CVS:  Regular rate and rhythm, no murmur or JVD.  S1 + S2 noted, no S3 or S4.  CHEEST:  Rhonchi/wheezing much better today.  Symmetric chest rise on inhalation noted. Normal effort  ABD:  Active bowel sounds, soft, non-tender, mildly distended.  No rebound/guarding/rigidity.  EXT:  No edema.  No cyanosis.  No acute joint synovitis noted.  Moving arms/legs well on general exam.  SKIN:  Dry  to touch, no exanthems noted in the visualized areas.    Medications        levofloxacin  500 mg Oral Daily     ipratropium - albuterol 0.5 mg/2.5 mg/3 mL  3 mL Nebulization 4x daily     predniSONE  40 mg Oral Daily     insulin aspart  1-7 Units Subcutaneous TID AC     insulin aspart  1-5 Units Subcutaneous At Bedtime     atorvastatin  80 mg Oral Daily     oxybutynin  10 mg Oral Daily     varenicline  1 mg Oral BID     senna-docusate  1-2 tablet Oral BID     Data     No results for input(s): WBC, HGB, HCT, MCV, PLT in the last 168 hours.  No results for input(s): CULT in the last 168 hours.    Recent Labs  Lab 04/07/17  0838 04/06/17  0627 04/05/17  0627  04/02/17  1105   NA  --   --   --   --  134   POTASSIUM 3.3* 3.7 3.5  < > 3.7   CHLORIDE  --   --   --   --  94   CO2  --   --   --   --  36*   ANIONGAP  --   --   --   --  4   GLC  --   --   --   --  106*   BUN  --   --   --   --  8   CR  --   --   --   --  0.61   GFRESTIMATED  --   --   --   --  >90Non  GFR Calc   GFRESTBLACK  --   --   --   --  >90African American GFR Calc   DOMONIQUE  --   --   --   --  8.8   MAG 2.3 2.3 2.2  < >  --    < > = values in this interval not displayed.  No results found for this or any previous visit (from the past 24 hour(s)).

## 2017-04-07 NOTE — PROGRESS NOTES
[facility]    Neurosurgery Progress Note    Date of Service (when I saw the patient): 04/07/2017     Assessment & Plan   Margo Inman is a 57 year old female who was admitted on 3/30/2017. She presented with neck and RUE pain and weakness. She underwent anterior cervical decompression and fusion C6-C7 with Dr. Joseph Klein on 3/30/2017. Following surgery she developed fever/cough/SOB and was found to have acute influenza. Today she is seen sitting up in bed. Reports mild incisional pain in her neck but denies any radicular pain, weakness, numbness or tingling in her bilateral UE. Pt is moving very well and has passed PT. However, she was unable to wean off O2 yesterday, chest Xray showed small infiltrate- now being treated with Levaquin. Per hospitalist note- she will again attempt to wean off O2 today, if unable they may send her home with home O2. Hospitalist recommendations are greatly appreciated. Ok to DC home from a NSG perspective if cleared by hospitalist service. Would recommend follow up with PCP within 3 days of DC as well. Discussed this with patient and she will make an appointment next week.     Active Problems:    S/P cervical spinal fusion    Assessment: Stable     Plan: D/C home if cleared by hospitalist       I have discussed the following assessment and plan with the Dr. Joseph Klein who is in agreement with initial plan and will follow up with further consultation recommendations.    [facility]  [address] [address]    Tel [phone]  Pager [phone]      Interval History   Post cervical fusion and influenza B. Attempting to wean off O2 supplementation. Moving well, pain controlled.     Physical Exam   Temp: 98.4  F (36.9  C) Temp src: Oral BP: 121/50 Pulse: 82 Heart Rate: 82 Resp: 18 SpO2: 91 % O2 Device: Nasal cannula with humidification Oxygen Delivery: 1.5  "[facility]  Vitals:    03/30/17 0919   Weight: 87.5 kg (193 lb)     Vital Signs with Ranges  Temp:  [98  F (36.7  C)-99.4  F (37.4  C)] 98.4  F (36.9  C)  Pulse:  [80-82] 82  Heart Rate:  [82] 82  Resp:  [10-18] 18  BP: (121-149)/(50-73) 121/50  SpO2:  [86 %-95 %] 91 %  I/O last 3 completed shifts:  In: 1240 [P.O.:1240]  Out: -     Heart Rate: 82, Blood pressure 121/50, pulse 82, temperature 98.4  F (36.9  C), temperature source Oral, resp. rate 18, height 1.549 m (5' 1\"), weight 87.5 kg (193 lb), last menstrual period 09/16/2011, SpO2 91 %, not currently breastfeeding.  193 lbs 0 oz  HEENT:  Normocephalic, atraumatic.  PERRLA.  EOM s intact.    Neck:  Supple, non-tender, without lymphadenopathy.  Heart:  No peripheral edema  Lungs:  Attempting to wean off O2 supplementation  Abdomen:  Soft, non-tender, non-distended.   Skin:  Warm and dry, good capillary refill.  Extremities:  Good radial and dorsalis pedis pulses bilaterally, no edema, cyanosis or clubbing.    NEUROLOGICAL EXAMINATION:   Mental status:  Alert and Oriented x 3, speech is fluent.  Cranial nerves:  II-XII intact.   Motor:  Strength is 5/5 throughout the upper and lower extremities  Shoulder Abduction:  Right:  5/5   Left:  5/5  Biceps:                      Right:  5/5   Left:  5/5  Triceps:                     Right:  5/5   Left:  5/5  Wrist Extensors:       Right:  5/5   Left:  5/5  Wrist Flexors:           Right:  5/5   Left:  5/5  interosseus :            Right:  5/5   Left:  5/5   Hip Flexor:                Right: 5/5  Left:  5/5  Hip Adductor:             Right:  5/5  Left:  5/5  Hip Abductor:             Right:  5/5  Left:  5/5  Gastroc Soleus:        Right:  5/5  Left:  5/5  Tib/Ant:                      Right:  5/5  Left:  5/5  EHL:                     Right:  5/5  Left:  5/5  Sensation:  intact  Reflexes:  Negative Babinski.  Negative Clonus.    Gait:  Deferred        Medications        levofloxacin  500 mg Oral Daily     ipratropium - albuterol " 0.5 mg/2.5 mg/3 mL  3 mL Nebulization 4x daily     predniSONE  40 mg Oral Daily     insulin aspart  1-7 Units Subcutaneous TID AC     insulin aspart  1-5 Units Subcutaneous At Bedtime     atorvastatin  80 mg Oral Daily     oxybutynin  10 mg Oral Daily     varenicline  1 mg Oral BID     senna-docusate  1-2 tablet Oral BID       Data     All new lab and imaging data was personally reviewed by me.    CHEST TWO VIEWS April 6, 2017 10:31 AM      HISTORY: Hypoxia.     COMPARISON: 3/31/2017.     FINDINGS: Fusion hardware lower cervical spine. Mild patchy opacity  right base posteriorly could be a small amount of infiltrate. It was  not present on 3/31/2017.         IMPRESSION: Mild opacity right base posteriorly could be a small  amount of infiltrate.     ADY GUZMAN MD    CBC RESULTS:   Recent Labs   Lab Test  03/31/17   1052   WBC  11.9*   RBC  4.25   HGB  13.4   HCT  40.9   MCV  96   MCH  31.5   MCHC  32.8   RDW  13.1   PLT  233     Basic Metabolic Panel:  Lab Results   Component Value Date     04/02/2017      Lab Results   Component Value Date    POTASSIUM 3.7 04/06/2017     Lab Results   Component Value Date    CHLORIDE 94 04/02/2017     Lab Results   Component Value Date    DOMONIQUE 8.8 04/02/2017     Lab Results   Component Value Date    CO2 36 04/02/2017     Lab Results   Component Value Date    BUN 8 04/02/2017     Lab Results   Component Value Date    CR 0.61 04/02/2017     Lab Results   Component Value Date     04/02/2017

## 2017-04-10 ENCOUNTER — TELEPHONE (OUTPATIENT)
Dept: FAMILY MEDICINE | Facility: CLINIC | Age: 57
End: 2017-04-10

## 2017-04-10 ENCOUNTER — CARE COORDINATION (OUTPATIENT)
Dept: CARE COORDINATION | Facility: CLINIC | Age: 57
End: 2017-04-10

## 2017-04-10 NOTE — LETTER
[facility]  Complex Care Plan  About Me  Patient Name:  Margo Sweet    YOB: 1960  Age:   57 year old   Natalya MRN: [id number] Telephone Information:     Home Phone [phone]   Mobile [phone]       Address:    [address] [address] [address] Email address:  [email or web address]      Emergency Contact(s)  Name Relationship Lgl Grd Work Phone Home Phone Mobile Phone   1. RICKI SWEET Spouse No none [phone]   2. DESIRAE GAONA Daughter No [phone]           Primary language:  English     needed? No   [employer]:  [phone] op. 1  Other communication barriers: No  Preferred Method of Communication:  Phone  Current living arrangement: I live in a private home with spouse  Mobility Status/ Medical Equipment: Independent  Other information to know about me:    Health Maintenance  Health Maintenance Reviewed: Up to date    My Access Plan  Medical Emergency 911   Primary Clinic Line [facility]- [phone]   24 Hour Appointment Line [phone] or  [phone]) (toll-free)   24 Hour Nurse Line [phone] (toll-free)   Preferred Urgent Care     Preferred Hospital [facility]  [phone]   Preferred Pharmacy [facility] [place] [address] [facility] [address]     Behavioral Health Crisis Line [facility], [phone] or 911     My Care Team Members  Patient Care Team       Relationship Specialty Notifications Start End    Jordana Naqvi PA-C PCP - General Family Practice  9/5/13     Phone: [phone] Fax: [phone]         [facility] [address] [facility] [address]    Behl, Melissa K, RN Clinic Care Coordinator Nurse Admissions 4/7/17     Comment:  Phone: [phone]         My Care Plans  Self Management  and Treatment Plan  Goals and (Comments)  Goal #1: I will finish my antibiotics      80% of goal reached    Goal #2: I will remain smoke free      50% of goal reached    Action Plans on File:    Advance Care Plans/Directives Type:        My Medical and Care Information  Problem List   Patient Active Problem List   Diagnosis     Hyperlipidemia LDL goal <130     Pseudogout     OA (OSTEOARTHRITIS) OF KNEE - bilateral     RLS (restless legs syndrome)     S/P total knee arthroplasty juan     Acute posthemorrhagic anemia     Muscle spasm     CTS (carpal tunnel syndrome) - bilateral     S/P carpal tunnel release     Trigger thumb     Elevated blood pressure reading without diagnosis of hypertension     Tobacco dependency     Microscopic hematuria     Neck pain     Tension headache     Cervical high risk HPV (human papillomavirus) test positive     Radiculopathy of cervical spine     S/P cervical spinal fusion      Current Medications and Allergies:  See printed Medication Report.    Care Coordination Start Date: 04/10/17   Frequency of Care Coordination: weekly   Form Last Updated: 04/10/2017

## 2017-04-10 NOTE — TELEPHONE ENCOUNTER
See 4/10/17 care coordination encounter.    Melissa Behl BSN, RN, PHN  [facility] Care Coordinator  [phone]  [email or web address]

## 2017-04-10 NOTE — PROGRESS NOTES
Clinic Care Coordination Contact  OUTREACH    Referral Information:  Referral Source: CTS  Reason for Contact: RN CC call to patient for hospital discharge follow up.  Care Conference: No     Universal Utilization:   ED Visits in last year: 0  Hospital visits in last year: 1  Last PCP appointment: 03/20/17  Missed Appointments: 0     Multiple Providers or Specialists: neurosurgery    Clinical Concerns:  Current Medical Concerns: Patient was inpatient at [facility] 3/30/17-4/7/17 cervical spinal fusion and was then diagnosed with influenza.  Patient reports she is doing better since home, but continues to have occasional shortness of breath, orthopnea requiring sleeping on 3 pillows and occasional waking with shortness of breath.  Patient states she is fine once she wakes up.  Patient continues to experience coughing and states she takes her cough medicine 2 times/day.  Patient reports the coughing exacerbates her incisional pain, and is taking her pain medication 2 times/day.  She denies fever, drainage, redness and swelling of her incision.  Patient states she began eating and drinking again yesterday.  Patient states she is able to perform her ADLs independently if she takes her time.  Patient has been smoke free since 2 days prior to hospital admission and made a goal of remaining smoke free.    Current Behavioral Concerns: none noted    Education Provided to patient: RN CC reviewed discharge instructions and educated patient on the importance of protein and vitamin C for healing, when to seek care, importance of remaining tobacco free and completing her antibiotics.      Clinical Pathway: None    Medication Management:  Patient has a pill box that she sets up weekly.  Patient takes medications 2 times/day.     Functional Status:  Mobility Status: Independent  Equipment Currently Used at Home: none  Transportation: Patient was driving prior to surgery, but has her  driving while she recovers.            Psychosocial:  Current living arrangement: lives in a private home with spouse  Financial/Insurance: Patient denies any concerns at this time.  Patient states she has a friend who can also help as needed.     Resources and Interventions:  Current Resources: Care Coordination information, Complex Care Plan        Advanced Care Plans/Directives on file: No        Goals:   Goal 1 Statement: I will finish my antibiotics  Goal 1 Progression Percent: 80%  Goal 1 Progression Date: 04/10/17  Goal 2 Statement: I will remain smoke free  Goal 2 Progression Percent: 50%  Goal 2 Progression Date: 04/10/17            Barriers: Patient reports a previous unsuccessful attempt in quitting smoking.  Strengths: Patient states having influenza has motivated her to remain smoke free.  Patient has a reliable method of taking her medications.  Patient/Caregiver understanding: Patient verbalizes dietary recommendations to promote healing, importance of remaining smoke free and completing her antibiotics.  Patient had not yet scheduled a hospital follow up visit.  Appointment scheduled for patient 4/13/17.  Patient agrees to be active in care coordination.  Frequency of Care Coordination: weekly  Upcoming appointment: 04/13/17     Plan:   Patient will continue to follow treatment plan as directed and follow up with PCP with concerns ongoing.   Patient will complete her course of antibiotics.  Patient will remain smoke free.  RN CC will mail care coordination letter and complex care plan.  RN CC will follow up with patient next week.

## 2017-04-10 NOTE — TELEPHONE ENCOUNTER
Patient discharged from [facility] IP  ( Inpatient or ER).    Discharge location: [facility]  Discharge date: 4/7/17  Diagnosis: S/P Cervical Spinal Fusion, Stenosis, Upper Extremity Weakness  Patient has been in the ER/IP 0/1 times.  Care Coord:  NA  Please follow up as appropriate. If no follow up required, please close encounter.

## 2017-04-10 NOTE — LETTER
[address] [address]       April 10, 2017      Margo Toroe Storm  [address] [address]    Dear Margo,  I am one of the Clinic Care Coordinators that works with your primary care provider's clinic. I wanted to thank you for spending the time to talk with me today.  Below is a description of what Clinic Care Coordination is and how I can further assist you.     The Clinic Care Coordinator role is a Registered Nurse and/or  who understands the health care system. The goal of Clinic Care Coordination is to help you manage your health and improve access to the [facility] in the most efficient manner.  The Registered Nurse can assist you in meeting your health care goals by providing education, coordinating services, and strengthening the communication among your providers. The  can assist you with financial, behavioral, psychosocial, and chemical dependency and counseling/psychiatric resources.    Please feel free to keep this letter and contact information to contact me at [phone] with any further questions or concerns that may arise. We at [facility] are focused on providing you with the highest-quality healthcare experience possible and that all starts with you.       Sincerely,     Melissa Behl BSN, RN, PHN  [facility] Care Coordinator  [phone]  [email or web address]      Enclosed: I have enclosed a copy of the Complex Care Plan. This has helpful information and goals that we have talked about. Please keep this in an easy to access place to use as needed.

## 2017-04-10 NOTE — TELEPHONE ENCOUNTER
ED / Discharge Outreach Protocol    Patient Contact    Attempt # 1    Was call answered?  No.  Left message on voicemail with information to call me back.    Patricia David RN

## 2017-04-13 ENCOUNTER — OFFICE VISIT (OUTPATIENT)
Dept: FAMILY MEDICINE | Facility: CLINIC | Age: 57
End: 2017-04-13
Payer: COMMERCIAL

## 2017-04-13 VITALS
OXYGEN SATURATION: 95 % | TEMPERATURE: 96.7 F | BODY MASS INDEX: 34.71 KG/M2 | HEART RATE: 89 BPM | SYSTOLIC BLOOD PRESSURE: 130 MMHG | WEIGHT: 188.6 LBS | DIASTOLIC BLOOD PRESSURE: 89 MMHG | HEIGHT: 62 IN

## 2017-04-13 DIAGNOSIS — Z98.1 S/P CERVICAL SPINAL FUSION: ICD-10-CM

## 2017-04-13 DIAGNOSIS — J10.1 INFLUENZA B: Primary | ICD-10-CM

## 2017-04-13 PROCEDURE — 99213 OFFICE O/P EST LOW 20 MIN: CPT | Performed by: NURSE PRACTITIONER

## 2017-04-13 RX ORDER — BENZONATATE 200 MG/1
200 CAPSULE ORAL 3 TIMES DAILY PRN
Qty: 21 CAPSULE | Refills: 0 | Status: SHIPPED | OUTPATIENT
Start: 2017-04-13 | End: 2018-03-29

## 2017-04-13 NOTE — MR AVS SNAPSHOT
After Visit Summary   4/13/2017    Margo Inman    MRN: [id number]           Patient Information     Date Of Birth          1960        Visit Information        Provider Department      4/13/2017 11:20 AM Belem Dumont APRN CNP [facility]        Today's Diagnoses     Influenza B    -  1      Care Instructions    Based on your medical history and these are the current health maintenance or preventive care services that you are due for (some may have been done at this visit)  Health Maintenance Due   Topic Date Due     ADVANCE DIRECTIVE PLANNING Q5 YRS (NO INBASKET)  01/12/1980         At [facility], we strive to deliver an exceptional experience to you, every time we see you.    If you receive a survey in the mail, please send us back your thoughts. We really do value your feedback.    Your care team's suggested websites for health information:  Www.[employer] : Up to date and easily searchable information on multiple topics.  Www.medlineplus.gov : medication info, interactive tutorials, watch real surgeries online  Www.familydoctor.org : good info from the Academy of Family Physicians  Www.cdc.gov : public health info, travel advisories, epidemics (H1N1)  Www.aap.org : children's health info, normal development, vaccinations  Www.health.[facility].[place].us : [place] dept of health, public health issues in [place], N1N1    How to contact your care team:   Team Mary/Say [phone]         Pharmacy [phone]    Dr. Koehler, Sandrita Luna PA-C, Dr. Dudley, Nan ODELL CNP, Mei Castellon PA-C, Dr. Chavis, and JOSE R Yuen CNP    Team RNs: Shawna & Dana      Clinic hours  M-Th 7 am-7 pm   Fri 7 am-5 pm.   Urgent care M-F 11 am-9 pm,   Sat/Sun 9 am-5 pm.  Pharmacy M-Th 8 am-8 pm Fri 8 am-6 pm  Sat/Sun 9 am-5 pm.     All password changes, disabled accounts, or ID changes in [employer]/MyHealth will be done by our Access Services  Department.    If you need help with your account or password, call: [phone]. Clinic staff no longer has the ability to change passwords.     Influenza (Adult)    Influenza is also called the flu. It is a viral illness that affects the air passages of your lungs. It is different from the common cold. The flu can easily be passed from one to person to another. It may be spread through the air by coughing and sneezing. Or it can be spread by touching the sick person and then touching your own eyes, nose, or mouth.  The flu starts 1 to 3 days after you are exposed to the flu virus. It may last for 1 to 2 weeks. You usually don t need to take antibiotics unless you have a complication. This might be an ear or sinus infection or pneumonia.  Symptoms of the flu may be mild or severe. They can include extreme tiredness (wanting to stay in bed all day), chills, fevers, muscle aches, soreness with eye movement, headache, and a dry, hacking cough.  Home care  Follow these guidelines when caring for yourself at home:    Avoid being around cigarette smoke, whether yours or other people s.    Acetaminophen or ibuprofen will help ease your fever, muscle aches, and headache. Don t give aspirin to anyone younger than 18 who has the flu. Aspirin can harm the liver.    Nausea and loss of appetite are common with the flu. Eat light meals. Drink 6 to 8 glasses of liquids every day. Good choices are water, sport drinks, soft drinks without caffeine, juices, tea, and soup. Extra fluids will also help loosen secretions in your nose and lungs.    Over-the-counter cold medicines will not make the flu go away faster. But the medicines may help with coughing, sore throat, and congestion in your nose and sinuses. Don t use a decongestant if you have high blood pressure.    Stay home until your fever has been gone for at least 24 hours without using medicine to reduce fever.  Follow-up care  Follow up with your health care provider, or  as advised, if you are not getting better over the next week.  If you are 65 or older, talk with your provider about getting a pneumococcal vaccine every 5 years. You should also get this vaccine if you have chronic asthma or COPD. All adults should get a flu vaccine every fall. Ask your provider about this.  When to seek medical advice  Call your health care provider right away if any of these occur:    Cough with lots of colored sputum (mucus) or blood in your sputum    Chest pain, shortness of breath, wheezing, or difficulty breathing    Severe headache, or face, neck, or ear pain    New rash with fever    Fever of 101 F (38 C) oral or higher that doesn t get better with fever medicine    Confusion, behavior change, or seizure    Severe weakness or dizziness    You get a fever or cough after getting better for a few days       4384-3507 [employer]. [address] [place] [address]. All rights reserved. This information is not intended as a substitute for professional medical care. Always follow your healthcare professional's instructions.              Follow-ups after your visit        Who to contact     If you have questions or need follow up information about today's clinic visit or your schedule please contact [facility] directly at [phone].  Normal or non-critical lab and imaging results will be communicated to you by MyChart, letter or phone within 4 business days after the clinic has received the results. If you do not hear from us within 7 days, please contact the clinic through MyChart or phone. If you have a critical or abnormal lab result, we will notify you by phone as soon as possible.  Submit refill requests through [employer] or call your pharmacy and they will forward the refill request to us. Please allow 3 business days for your refill to be completed.          Additional Information About Your Visit        [employer] Information     M[employer] lets you  "send messages to your doctor, view your test results, renew your prescriptions, schedule appointments and more. To sign up, go to [email or web address] . Click on \"Log in\" on the left side of the screen, which will take you to the Welcome page. Then click on \"Sign up Now\" on the right side of the page.     You will be asked to enter the access code listed below, as well as some personal information. Please follow the directions to create your username and password.     Your access code is: [email or web address]  Expires: 2017  3:23 PM     Your access code will  in 90 days. If you need help or a new code, please call your [facility] clinic or [phone].        Care EveryWhere ID     This is your Care EveryWhere ID. This could be used by other organizations to access your [facility] medical records  [id number]        Your Vitals Were     Pulse Temperature Height Last Period Pulse Oximetry BMI (Body Mass Index)    89 96.7  F (35.9  C) (Oral) 5' 1.75\" (1.568 m) 2011 95% 34.78 kg/m2       Blood Pressure from Last 3 Encounters:   17 130/89   17 121/50   17 123/78    Weight from Last 3 Encounters:   17 188 lb 9.6 oz (85.5 kg)   17 193 lb (87.5 kg)   17 193 lb 6.4 oz (87.7 kg)              Today, you had the following     No orders found for display         Today's Medication Changes          These changes are accurate as of: 17 12:15 PM.  If you have any questions, ask your nurse or doctor.               Start taking these medicines.        Dose/Directions    benzonatate 200 MG capsule   Commonly known as:  TESSALON   Used for:  Influenza B   Started by:  Belem Dumont APRN CNP        Dose:  200 mg   Take 1 capsule (200 mg) by mouth 3 times daily as needed for cough   Quantity:  21 capsule   Refills:  0            Where to get your medicines      These medications were sent to [facility] [address] [place] [address] [address] AT [facility]  " [address] [facility] [place] [address]     Phone:  [phone]     benzonatate 200 MG capsule                Primary Care Provider Office Phone # Fax #    Jordana Naqvi PA-C [phone]       [facility] [address]  [facility] [address]        Thank you!     Thank you for choosing [facility]  for your care. Our goal is always to provide you with excellent care. Hearing back from our patients is one way we can continue to improve our services. Please take a few minutes to complete the written survey that you may receive in the mail after your visit with us. Thank you!             Your Updated Medication List - Protect others around you: Learn how to safely use, store and throw away your medicines at www.disposemymeds.org.          This list is accurate as of: 4/13/17 12:15 PM.  Always use your most recent med list.                   Brand Name Dispense Instructions for use    acetaminophen 500 MG tablet    TYLENOL     Take 500-1,000 mg by mouth every 6 hours as needed.       atorvastatin 80 MG tablet    LIPITOR    90 tablet    TAKE 1 TABLET BY MOUTH ONCE DAILY       benzonatate 200 MG capsule    TESSALON    21 capsule    Take 1 capsule (200 mg) by mouth 3 times daily as needed for cough       butalbital-acetaminophen-caffeine -40 MG per tablet    FIORICET/ESGIC    28 tablet    Take 1 tablet by mouth every 4 hours as needed       diazepam 5 MG tablet    VALIUM    60 tablet    Take 1 tablet (5 mg) by mouth every 6 hours as needed for other (muscle spasms)       diclofenac 75 MG EC tablet    VOLTAREN    60 tablet    Take 1 tablet (75 mg) by mouth 2 times daily       guaiFENesin-dextromethorphan 100-10 MG/5ML syrup    ROBITUSSIN DM    560 mL    Take 10 mLs by mouth every 6 hours as needed for cough       levofloxacin 500 MG tablet    LEVAQUIN    5 tablet    Take 1 tablet (500 mg) by mouth daily       oxybutynin 5 MG 24 hr tablet    DITROPAN-XL     30 tablet    Take 2 tablets (10 mg) by mouth daily       oxyCODONE 5 MG IR tablet    ROXICODONE    75 tablet    Take 1-2 tablets (5-10 mg) by mouth every 3 hours as needed for moderate to severe pain       predniSONE 10 MG tablet    DELTASONE    20 tablet    4 tabs daily for 2 days, then 3 tabs daily for 2 days, then 2 tabs daily for 2 days, then 1 tab daily for 2 days, then stop       * varenicline 0.5 MG X 11 & 1 MG X 42 tablet    CHANTIX STARTING MONTH EVANGELINA    53 tablet    Take 0.5 mg tab daily for 3 days, then 0.5 mg tab twice daily for 4 days, then 1 mg twice daily.       * varenicline 1 MG tablet    CHANTIX    56 tablet    Take 1 tablet (1 mg) by mouth 2 times daily       * Notice:  This list has 2 medication(s) that are the same as other medications prescribed for you. Read the directions carefully, and ask your doctor or other care provider to review them with you.

## 2017-04-13 NOTE — NURSING NOTE
"Chief Complaint   Patient presents with     Surgical Followup     Hospital F/U       Initial /89 (BP Location: Left arm, Patient Position: Chair, Cuff Size: Adult Regular)  Pulse 89  Temp 96.7  F (35.9  C) (Oral)  Ht 5' 1.75\" (1.568 m)  Wt 188 lb 9.6 oz (85.5 kg)  LMP 09/16/2011  SpO2 95%  BMI 34.78 kg/m2 Estimated body mass index is 34.78 kg/(m^2) as calculated from the following:    Height as of this encounter: 5' 1.75\" (1.568 m).    Weight as of this encounter: 188 lb 9.6 oz (85.5 kg).  Medication Reconciliation: complete   Dory Myrick MA      "

## 2017-04-13 NOTE — LETTER
[address] [address]  [phone]    April 13, 2017        Margo Tracy Inman  [address] [facility] [address]          To whom it may concern:    This patient had anterior cervical fusion on 3/30/17.  She is still recovering from surgery and has not been at the gym for the last 2 months due to continued neck pain.  Please put her membership on hold until she has recovered enough to  return to a regular exercise program.     Please contact me for questions or concerns.        Sincerely,        Belem ODELL, CNP

## 2017-04-13 NOTE — PROGRESS NOTES
SUBJECTIVE:                                                    Margo Inman is a 57 year old female who presents to clinic today for the following health issues:          Hospital Follow-up Visit:    Hospital/Nursing Home/IP Rehab Facility: [facility]  Date of Admission: 03/30/2017  Date of Discharge: 04/07/2017  Reason(s) for Admission:Pt had cervical fusion and them developed Influenza B following surgery.  She was discharged on 4/7/17.  She received 5 day course of Levaquin (finished last dose today), Prednisone taper which she continues for another 2-3 days, and took Tamiflu as well.  She reports that she is feeling improved but continues to have a loose cough that worsens her post op pain and causes her not to sleep well. She is no longer having chest tightness and states breathing is much easier.  She continues on Robitussin but states the liquid makes her feel like vomiting.            Problems taking medications regularly:  None       Medication changes since discharge: None       Problems adhering to non-medication therapy:  None    Summary of hospitalization:  [facility] discharge summary reviewed  Diagnostic Tests/Treatments reviewed.  Follow up needed: Has post op appt for her neck scheduled  Other Healthcare Providers Involved in Patient s Care:         Surgical follow-up appointment - .  Update since discharge: improved.     Post Discharge Medication Reconciliation: discharge medications reconciled, continue medications without change.  Plan of care communicated with patient     Coding guidelines for this visit:  Type of Medical   Decision Making Face-to-Face Visit       within 7 Days of discharge Face-to-Face Visit        within 14 days of discharge   Moderate Complexity 35522 [id number]   High Complexity 85626 81791          PACS Images   Show images for XR Chest 2 Views   Study Result   CHEST TWO VIEWS April 6, 2017 10:31 AM      HISTORY: Hypoxia.     COMPARISON:  "3/31/2017.     FINDINGS: Fusion hardware lower cervical spine. Mild patchy opacity  right base posteriorly could be a small amount of infiltrate. It was  not present on 3/31/2017.         IMPRESSION: Mild opacity right base posteriorly could be a small  amount of infiltrate.     ADY GUZMAN MD       Problem list and histories reviewed & adjusted, as indicated.  Additional history: as documented    BP Readings from Last 3 Encounters:   04/13/17 130/89   04/07/17 121/50   03/20/17 123/78    Wt Readings from Last 3 Encounters:   04/13/17 188 lb 9.6 oz (85.5 kg)   03/30/17 193 lb (87.5 kg)   03/20/17 193 lb 6.4 oz (87.7 kg)                  Labs reviewed in EPIC    Reviewed and updated as needed this visit by clinical staff       Reviewed and updated as needed this visit by Provider         ROS:  Constitutional, HEENT, cardiovascular, pulmonary, gi and gu systems are negative, except as otherwise noted.    OBJECTIVE:                                                    /89 (BP Location: Left arm, Patient Position: Chair, Cuff Size: Adult Regular)  Pulse 89  Temp 96.7  F (35.9  C) (Oral)  Ht 5' 1.75\" (1.568 m)  Wt 188 lb 9.6 oz (85.5 kg)  LMP 09/16/2011  SpO2 95%  BMI 34.78 kg/m2  Body mass index is 34.78 kg/(m^2).  GENERAL: healthy, alert and no distress  EYES: Eyes grossly normal to inspection, PERRL and conjunctivae and sclerae normal  HENT: ear canals and TM's normal, nose and mouth without ulcers or lesions  NECK: no adenopathy, no asymmetry, masses, or scars and thyroid normal to palpation  RESP: Loose, nonproductive cough, no increased work of breathing, lungs clear to auscultation - no rales, rhonchi or wheezes  CV: regular rate and rhythm, normal S1 S2, no S3 or S4, no murmur, click or rub, no peripheral edema and peripheral pulses strong  ABDOMEN: soft, nontender, no hepatosplenomegaly, no masses and bowel sounds normal  MS: no gross musculoskeletal defects noted, no edema    Diagnostic Test " "Results:  none      ASSESSMENT/PLAN:                                                        BP Screening:   Last 3 BP Readings:    BP Readings from Last 3 Encounters:   04/13/17 130/89   04/07/17 121/50   03/20/17 123/78       The following was recommended to the patient:  Re-screen BP within a year and recommended lifestyle modifications  BMI:   Estimated body mass index is 34.78 kg/(m^2) as calculated from the following:    Height as of this encounter: 5' 1.75\" (1.568 m).    Weight as of this encounter: 188 lb 9.6 oz (85.5 kg).   Weight management plan: not discussed      1. Influenza B  Symptoms resolving nicely.  Will change from Robitussin to Tessalon as the Robitussin causes nausea.  - benzonatate (TESSALON) 200 MG capsule; Take 1 capsule (200 mg) by mouth 3 times daily as needed for cough  Dispense: 21 capsule; Refill: 0    2.  S/P cervical  Spinal fusion  Follow with surgery as scheduled.  Seems to be doing well following surgery.  See Patient Instructions    JOSE R [facility]    "

## 2017-04-13 NOTE — PATIENT INSTRUCTIONS
Based on your medical history and these are the current health maintenance or preventive care services that you are due for (some may have been done at this visit)  Health Maintenance Due   Topic Date Due     ADVANCE DIRECTIVE PLANNING Q5 YRS (NO INBASKET)  01/12/1980         At [facility], we strive to deliver an exceptional experience to you, every time we see you.    If you receive a survey in the mail, please send us back your thoughts. We really do value your feedback.    Your care team's suggested websites for health information:  Www.[facility].org : Up to date and easily searchable information on multiple topics.  Www.medlineplus.gov : medication info, interactive tutorials, watch real surgeries online  Www.familydoctor.org : good info from the Academy of Family Physicians  Www.cdc.gov : public health info, travel advisories, epidemics (H1N1)  Www.aap.org : children's health info, normal development, vaccinations  Www.health.[facility].[place].us : [place] dept of health, public health issues in [place], N1N1    How to contact your care team:   Team Mary/Say [phone]         Pharmacy [phone]    Dr. Koehler, Sandrita Luna PA-C, Dr. Dudley, Nan ODELL CNP, Mei Castellon PA-C, Dr. Chavis, and JOSE R Yuen CNP    Team RNs: Shawna & Dana      Clinic hours  M-Th 7 am-7 pm   Fri 7 am-5 pm.   Urgent care M-F 11 am-9 pm,   Sat/Sun 9 am-5 pm.  Pharmacy M-Th 8 am-8 pm Fri 8 am-6 pm  Sat/Sun 9 am-5 pm.     All password changes, disabled accounts, or ID changes in [employer]/MyHealth will be done by our Access Services Department.    If you need help with your account or password, call: [phone]. Clinic staff no longer has the ability to change passwords.     Influenza (Adult)    Influenza is also called the flu. It is a viral illness that affects the air passages of your lungs. It is different from the common cold. The flu can easily be passed from one to person to  another. It may be spread through the air by coughing and sneezing. Or it can be spread by touching the sick person and then touching your own eyes, nose, or mouth.  The flu starts 1 to 3 days after you are exposed to the flu virus. It may last for 1 to 2 weeks. You usually don t need to take antibiotics unless you have a complication. This might be an ear or sinus infection or pneumonia.  Symptoms of the flu may be mild or severe. They can include extreme tiredness (wanting to stay in bed all day), chills, fevers, muscle aches, soreness with eye movement, headache, and a dry, hacking cough.  Home care  Follow these guidelines when caring for yourself at home:    Avoid being around cigarette smoke, whether yours or other people s.    Acetaminophen or ibuprofen will help ease your fever, muscle aches, and headache. Don t give aspirin to anyone younger than 18 who has the flu. Aspirin can harm the liver.    Nausea and loss of appetite are common with the flu. Eat light meals. Drink 6 to 8 glasses of liquids every day. Good choices are water, sport drinks, soft drinks without caffeine, juices, tea, and soup. Extra fluids will also help loosen secretions in your nose and lungs.    Over-the-counter cold medicines will not make the flu go away faster. But the medicines may help with coughing, sore throat, and congestion in your nose and sinuses. Don t use a decongestant if you have high blood pressure.    Stay home until your fever has been gone for at least 24 hours without using medicine to reduce fever.  Follow-up care  Follow up with your health care provider, or as advised, if you are not getting better over the next week.  If you are 65 or older, talk with your provider about getting a pneumococcal vaccine every 5 years. You should also get this vaccine if you have chronic asthma or COPD. All adults should get a flu vaccine every fall. Ask your provider about this.  When to seek medical advice  Call your health care  provider right away if any of these occur:    Cough with lots of colored sputum (mucus) or blood in your sputum    Chest pain, shortness of breath, wheezing, or difficulty breathing    Severe headache, or face, neck, or ear pain    New rash with fever    Fever of 101 F (38 C) oral or higher that doesn t get better with fever medicine    Confusion, behavior change, or seizure    Severe weakness or dizziness    You get a fever or cough after getting better for a few days       0510-4275 [employer]. [address] [place] [address]. All rights reserved. This information is not intended as a substitute for professional medical care. Always follow your healthcare professional's instructions.

## 2017-04-17 ENCOUNTER — CARE COORDINATION (OUTPATIENT)
Dept: CARE COORDINATION | Facility: CLINIC | Age: 57
End: 2017-04-17

## 2017-04-17 NOTE — PROGRESS NOTES
Clinic Care Coordination Contact  OUTREACH    Referral Information:  Referral Source: CTS  Reason for Contact: RN CC call to patient  Care Conference: No     Universal Utilization:   ED Visits in last year: 0  Hospital visits in last year: 1  Last PCP appointment: 03/20/17  Missed Appointments: 0     Multiple Providers or Specialists: neurosurgery    Clinical Concerns:  Current Medical Concerns: Patient reports she continues to improve slightly from influenza.  She was seen by Valentine LOJA 4/13/17 and her Robitussin was changed to Tessalon.  Patient reports this helped her cough, especially at night, however, she does continue to wake up at times coughing and short of breath.  Cough is dry.  She also began using a humidifier last night and states this helped her cough even more so.  Patient is drinking plenty of tea and water throughout the day.  She does admit to overexerting herself this past holiday weekend and experienced some lightheadedness, but does state that this resolved when she allowed herself to rest.  Patient denies any signs of symptoms of infection at her incision site.  She has not scheduled a follow up appointment with her neurosurgeon and made a goal to do so.      Current Behavioral Concerns: none noted.    Education Provided to patient: Importance of remaining smoke free and scheduling her neurosurgery follow up appointment.  RN CC reviewed when to seek care for new or worsening symptoms.      Clinical Pathway: None    Medication Management:  Patient has a pill box that she sets up weekly. Patient takes medications 2 times/day.      Functional Status:  Mobility Status: Independent  Equipment Currently Used at Home: none  Transportation: Family provides during her recovery           Psychosocial:  Current living arrangement:: I live in a private home with spouse  Financial/Insurance: denies any concerns       Resources and Interventions:  Current Resources: Complex Care Plan        Advanced  Care Plans/Directives on file: No        Goals:   Goal 1 Statement: I will finish my antibiotics  Goal 1 Progression Percent: 100%  Goal 1 Progression Date: 04/17/17  Goal 2 Statement: I will remain smoke free  Goal 2 Progression Percent: 60%  Goal 2 Progression Date: 04/17/17  Goal 3 Statement: I will schedule an appointment with my neurosurgeon.  Goal 3 Progression Percent: 0%  Goal 3 Progression Date: 04/17/17              Barriers: Patient fatigued from illness.  Strengths: Patient continues to acknowledge importance of quitting smoking for her health.  Patient/Caregiver understanding: Patient verbalizes understanding of when to seek care and need for a follow up appointment with her neurosurgeon.  Frequency of Care Coordination: weekly  Upcoming appointment: 04/13/17     Plan:   Patient will continue to follow treatment plan as directed and follow up with PCP with concerns ongoing.   Patient will remain smoke free.  Patient will schedule an appointment with her neurosurgeon.  Patient will contact clinic or seek care for new or worsening symptoms.  RN CC will follow up with patient in 1 week.    Melissa Behl BSN, RN, PHN  [facility] Care Coordinator  [phone]  [email or web address]

## 2017-04-27 ENCOUNTER — CARE COORDINATION (OUTPATIENT)
Dept: CARE COORDINATION | Facility: CLINIC | Age: 57
End: 2017-04-27

## 2017-04-27 DIAGNOSIS — R05.9 COUGH: Primary | ICD-10-CM

## 2017-04-27 RX ORDER — BENZONATATE 200 MG/1
200 CAPSULE ORAL 3 TIMES DAILY PRN
Qty: 21 CAPSULE | Refills: 0 | Status: SHIPPED | OUTPATIENT
Start: 2017-04-27 | End: 2018-03-29

## 2017-04-27 NOTE — PROGRESS NOTES
Clinic Care Coordination Contact  OUTREACH    Referral Information:  Referral Source: CTS  Reason for Contact: RN CC call to patient for follow up  Care Conference: No     Universal Utilization:   ED Visits in last year: 0  Hospital visits in last year: 1  Last PCP appointment: 03/20/17  Missed Appointments: 0     Multiple Providers or Specialists: neurosurgery    Clinical Concerns:  Current Medical Concerns: Patient with history of influenza treated when inpatient at [facility] 3/30/17-4/7/17 s/p cervical spinal fusion.  Patient reports her cough has worsened since running out of Tessalon 4/21/17.  Patient coughed during the majority of the conversation and stated that talking made her cough worse.  Cough is also worse at night.  Patient is currently taking Robitussin DM at night time for her cough and notes that it does not help.  Patient continues to report shortness of breath with coughing or high levels of exertion, such as going for walks outside.  Patient denies fever.  Patient is inquiring if she can have more Tessalon, as this helped her cough.  RN CC will inquire with provider patient last saw, Valentine LOJA.  Patient states her incision from her cervical spinal fusion has healed, the glue is all come off and she only experiences pain with her severe coughing episodes.  She denies any signs or symptoms of infection of the incision.  Patient has scheduled an appointment with her neurosurgeon for 5/9/17.  She no longer has any narcotic pain medication and takes Tylenol as needed for pain.       Current Behavioral Concerns: n/a    Education Provided to patient: RN CC educated on importance of follow up if cough/shortness of breath are not improving.      Clinical Pathway: None    Medication Management:  Patient has a pill box that she sets up weekly. Patient takes medications 2 times/day.       Functional Status:  Mobility Status: Independent  Equipment Currently Used at Home: none  Transportation:  Patient's family provides.           Psychosocial:  Current living arrangement:: I live in a private home with spouse  Financial/Insurance: No concerns at this time.       Resources and Interventions:  Current Resources: Complex care plan        Advanced Care Plans/Directives on file:: No        Goals:   Goal 1 Progression Date: 04/27/17  Goal 2 Statement: I will remain smoke free  Goal 2 Progression Percent: 100%  Goal 2 Progression Date: 04/27/17  Goal 3 Statement: I will schedule an appointment with my neurosurgeon.  Goal 3 Progression Percent: 100%  Goal 3 Progression Date: 04/27/17              Barriers: ongoing illness  Strengths: motivated to remain smoke free, has followed through on all goals.  Patient/Caregiver understanding: Patient verbalized understanding of being seen if her cough/shortness of breath persist or worsen.  Frequency of Care Coordination: weekly  Upcoming appointment: 04/13/17     Plan:   Patient will continue to follow treatment plan as directed and follow up with PCP with concerns ongoing.   RN CC will inquire if she can have a refill of her Tessalon.  RN CC will update patient on provider response.    Melissa Behl BSN, RN, PHN  [facility] Care Coordinator  [phone]  [email or web address]

## 2017-05-03 ENCOUNTER — CARE COORDINATION (OUTPATIENT)
Dept: CARE COORDINATION | Facility: CLINIC | Age: 57
End: 2017-05-03

## 2017-05-03 NOTE — PROGRESS NOTES
Clinic Care Coordination Contact  OUTREACH    Referral Information:  Referral Source: CTS  Reason for Contact: RN CC call to patient for follow up  Care Conference: No     Universal Utilization:   ED Visits in last year: 0  Hospital visits in last year: 1  Last PCP appointment: 03/20/17  Missed Appointments: 0     Multiple Providers or Specialists: neurosurgery    Clinical Concerns:  Current Medical Concerns: Patient with ongoing cough following influenza diagnosis while inpatient at [facility] 3/30/17-4/7/17.  Patient was prescribed an additional prescription for benzonatate 4/27/17.  Patient reports taking this 2-3 times/day and notes her cough has improved and when she does cough it is productive and she can feel her chest clearing.  Patient continues to drink water and tea throughout the day.  Patient states yesterday was the 1st day she was able to get out of her home and run errands.  Patient is hopeful that she continues to feel better.    Current Behavioral Concerns: n/a    Education Provided to patient: RN CC educated patient on importance of continuing to drink plenty of fluids while on benzonatate to help liquify her secretions.  Patient verbalized understanding.      Clinical Pathway: None    Medication Management:  Patient has a pill box that she sets up weekly. Patient takes medications 2 times/day.       Functional Status:  Mobility Status: Independent  Equipment Currently Used at Home: none  Transportation: Patient's family provides.           Psychosocial:  Current living arrangement:: I live in a private home with spouse  Financial/Insurance: No concerns.       Resources and Interventions:  Current Resources:  ;          Advanced Care Plans/Directives on file:: No        Goals:   Goal 2 Statement: I will remain smoke free  Goal 2 Progression Percent: 100%  Goal 2 Progression Date: 05/03/17              Barriers: previous long time smoker  Strengths: Patient reports desire to remain smoke free  following hospitalization.  Patient/Caregiver understanding: Patient verbalized understanding of how to take benzonatate and the importance of adequate fluid intake.  Patient has 3 tablets left of benzonatate.  Frequency of Care Coordination: weekly  Upcoming appointment: 05/09/17 (neurosurgery)     Plan:   Patient will continue to follow treatment plan as directed and follow up with PCP with concerns ongoing.   Patient will remain smoke free.  RN CC will follow up in 2 days after she has run out of benzonatate to assess her cough.    Melissa Behl BSN, RN, PHN  [facility] Care Coordinator  [phone]  [email or web address]

## 2017-05-05 ENCOUNTER — TELEPHONE (OUTPATIENT)
Dept: FAMILY MEDICINE | Facility: CLINIC | Age: 57
End: 2017-05-05

## 2017-05-05 DIAGNOSIS — J10.1 INFLUENZA B: Primary | ICD-10-CM

## 2017-05-05 RX ORDER — BENZONATATE 200 MG/1
200 CAPSULE ORAL 3 TIMES DAILY PRN
Qty: 21 CAPSULE | Refills: 0 | Status: SHIPPED | OUTPATIENT
Start: 2017-05-05 | End: 2018-03-29

## 2017-05-05 NOTE — TELEPHONE ENCOUNTER
Clinic Care Coordination Contact  Patient ran out of her Tessalon yesterday morning and notes she has an increase in her cough since running out. Cough is productive with yellow/brown sputum and worse at night when trying to sleep or with talking. She does experience some shortness of breath at times with the coughing, but this is not any worse than previously reported. Patient denies fever or chills. Overall, patient states her cough is improving, but notes it is more improved when she takes the Tessalon 3 times/day.     Patient is requesting a refill of Tessalon. Please advise.     Melissa Behl BSN, RN, PHN  [facility] Care Coordinator  [phone]  [email or web address]         Refilled Tesslon- sent to [facility].  Belem ODELL, CNP

## 2017-05-05 NOTE — PROGRESS NOTES
Clinic Care Coordination Contact  Patient ran out of her Tessalon yesterday morning and notes she has an increase in her cough since running out.  Cough is productive with yellow/brown sputum and worse at night when trying to sleep or with talking.  She does experience some shortness of breath at times with the coughing, but this is not any worse than previously reported.  Patient denies fever or chills.  Overall, patient states her cough is improving, but notes it is more improved when she takes the Tessalon 3 times/day.    Patient is requesting a refill of Tessalon.  Please advise.    Melissa Behl BSN, RN, PHN  [facility] Care Coordinator  [phone]  [email or web address]

## 2017-05-05 NOTE — TELEPHONE ENCOUNTER
Called and let patient know that her prescription was sent the [facility].    Tiny De La Paz CMA

## 2017-05-05 NOTE — TELEPHONE ENCOUNTER
RN CC informed patient of refill.    Melissa Behl BSN, RN, PHN  [facility] Care Coordinator  [phone]  [email or web address]

## 2017-05-05 NOTE — PROGRESS NOTES
Noted Valentine AKINS-NP sent a refill of Tessalon to the pharmacy.  RN CC notified patient who verbalized understanding.    Melissa Behl BSN, RN, N  [facility] Care Coordinator  [phone]  [email or web address]

## 2017-05-09 ENCOUNTER — OFFICE VISIT (OUTPATIENT)
Dept: NEUROSURGERY | Facility: CLINIC | Age: 57
End: 2017-05-09
Payer: COMMERCIAL

## 2017-05-09 ENCOUNTER — RADIANT APPOINTMENT (OUTPATIENT)
Dept: GENERAL RADIOLOGY | Facility: CLINIC | Age: 57
End: 2017-05-09
Attending: NURSE PRACTITIONER
Payer: COMMERCIAL

## 2017-05-09 VITALS
DIASTOLIC BLOOD PRESSURE: 101 MMHG | OXYGEN SATURATION: 95 % | SYSTOLIC BLOOD PRESSURE: 177 MMHG | RESPIRATION RATE: 18 BRPM | BODY MASS INDEX: 36.51 KG/M2 | WEIGHT: 198 LBS | HEART RATE: 86 BPM

## 2017-05-09 DIAGNOSIS — Z98.1 S/P CERVICAL SPINAL FUSION: ICD-10-CM

## 2017-05-09 PROCEDURE — 72040 X-RAY EXAM NECK SPINE 2-3 VW: CPT

## 2017-05-09 PROCEDURE — 99024 POSTOP FOLLOW-UP VISIT: CPT | Performed by: NURSE PRACTITIONER

## 2017-05-09 RX ORDER — OXYCODONE HYDROCHLORIDE 5 MG/1
5-10 TABLET ORAL EVERY 6 HOURS PRN
Qty: 65 TABLET | Refills: 0 | Status: SHIPPED | OUTPATIENT
Start: 2017-05-09 | End: 2017-06-02

## 2017-05-09 RX ORDER — DIAZEPAM 5 MG
5 TABLET ORAL EVERY 12 HOURS PRN
Qty: 60 TABLET | Refills: 0 | Status: SHIPPED | OUTPATIENT
Start: 2017-05-09 | End: 2017-06-02 | Stop reason: ALTCHOICE

## 2017-05-09 ASSESSMENT — PAIN SCALES - GENERAL: PAINLEVEL: MODERATE PAIN (4)

## 2017-05-09 NOTE — PROGRESS NOTES
"[facility]  Neurosurgery followup:    HPI: Margo Inman is a 57 year old female who with a history of neck and RUE pain and weakness. She underwent anterior cervical decompression and fusion C6-C7 with Dr. Joseph Klein on 3/30/2017.  She is here for her six week post-op appointment.  Following surgery she developed acute influenza and had an extended hospital stay due to treatment.  She has been dealing with continued respiratory illness with current cough.  She has found a recent prescription for Tessalon pearls helpful.  She notes that with coughing her incision pain becomes irritated, \"But it's getting better.\"  She continues to take Oxycodone prn, along with Valium.  Valium especially helps at night for sleeping due to frequent coughing.  She rates her pain 4/10 and states the N/T in the right hand has resolved.  She denies weakness to extremities or change in bowel or bladder.    Exam:  Constitutional:  Alert, well nourished, NAD.  HEENT: Normocephalic, atraumatic.   Pulm:  Without shortness of breath   CV:  No pitting edema of BLE.     Neurological:  Awake  Alert  Oriented x 3  Motor exam:     Shoulder Abduction:  Right:  5/5    Left:  5/5  Biceps:                      Right:  5/5    Left:  5/5  Triceps:                     Right:  5/5    Left:  5/5  Wrist Extensors:       Right:  5/5    Left:  5/5  Wrist Flexors:           Right:  5/5    Left:  5/5  Intrinsics:                  Right:  5/5    Left:  5/5     Able to spontaneously move U/E bilaterally  Sensation intact throughout all U/E dermatomes  Incision: Clean, dry and intact.  Imaging:  Per Xray fusion intact and stable    A/P: S/p C6-7 ACDF    The patient states she is starting to feel better, aside from current cough and respiratory illness for which she has followed up with PCP.  We discuss normal expectations post-fusion.  She was given refill of Oxycodone prn and Valium prn, both ad decreased frequency.  She will decrease over " the next few weeks as able.    - May increase lifting restriction to 20  pounds    - followup in 6 weeks  with xray prior     - Call the clinic at [phone] for increasing redness, swelling or pus draining from the incision, increased pain or any other questions and concerns.        Aline Kenyon [facility]  Sp[facility] Clinic  [address] [address]    Tel [phone]  Pager [phone]

## 2017-05-09 NOTE — NURSING NOTE
"Margo Inman is a 57 year old female who presents for:  Chief Complaint   Patient presents with     Neurologic Problem     follow up C6-7 anterior cervical discectomy and fusion 3/30/17, MEENAKSHI 2/16/17, MRI 1/12/17, XR 5/9/17        Initial Vitals:  BP (!) 177/101 (BP Location: Right arm, Patient Position: Chair, Cuff Size: Adult Regular)  Pulse 86  Resp 18  Wt 198 lb (89.8 kg)  LMP 09/16/2011  SpO2 95%  BMI 36.51 kg/m2 Estimated body mass index is 36.51 kg/(m^2) as calculated from the following:    Height as of 4/13/17: 5' 1.75\" (1.568 m).    Weight as of this encounter: 198 lb (89.8 kg).. Body surface area is 1.98 meters squared. BP completed using cuff size: regular  Moderate Pain (4)    Do you feel safe in your environment?  Yes  Do you need any refills today? Yes    Nursing Comments: would like to discuss pain and refills        Rosalinda Dumont"

## 2017-05-09 NOTE — MR AVS SNAPSHOT
"              After Visit Summary   5/9/2017    Margo Inman    MRN: [id number]           Patient Information     Date Of Birth          1960        Visit Information        Provider Department      5/9/2017 10:20 AM Aline Kenyon NP [facility]        Today's Diagnoses     S/P cervical spinal fusion          Care Instructions    - May increase lifting restriction to 20  pounds    - followup in 6 weeks  with xray prior     - Call the clinic at [phone] for increasing redness, swelling or pus draining from the incision, increased pain or any other questions and concerns.                Follow-ups after your visit        Future tests that were ordered for you today     Open Future Orders        Priority Expected Expires Ordered    XR Cervical Spine 2/3 Views Routine 6/20/2017 5/9/2018 5/9/2017            Who to contact     If you have questions or need follow up information about today's clinic visit or your schedule please contact [facility] directly at [phone].  Normal or non-critical lab and imaging results will be communicated to you by Social Realityhart, letter or phone within 4 business days after the clinic has received the results. If you do not hear from us within 7 days, please contact the clinic through Corporamat or phone. If you have a critical or abnormal lab result, we will notify you by phone as soon as possible.  Submit refill requests through Aveksa or call your pharmacy and they will forward the refill request to us. Please allow 3 business days for your refill to be completed.          Additional Information About Your Visit        Social Realityhart Information     Aveksa lets you send messages to your doctor, view your test results, renew your prescriptions, schedule appointments and more. To sign up, go to [email or web address] . Click on \"Log in\" on the left side of the screen, which will take you to the Welcome page. Then click on \"Sign up Now\" on the right " side of the page.     You will be asked to enter the access code listed below, as well as some personal information. Please follow the directions to create your username and password.     Your access code is: [email or web address]  Expires: 2017  3:23 PM     Your access code will  in 90 days. If you need help or a new code, please call your [place] clinic or [phone].        Care EveryWhere ID     This is your Care EveryWhere ID. This could be used by other organizations to access your [place] medical records  [id number]        Your Vitals Were     Pulse Respirations Last Period Pulse Oximetry BMI (Body Mass Index)       86 18 2011 95% 36.51 kg/m2        Blood Pressure from Last 3 Encounters:   17 (!) 177/101   17 130/89   17 121/50    Weight from Last 3 Encounters:   17 198 lb (89.8 kg)   17 188 lb 9.6 oz (85.5 kg)   17 193 lb (87.5 kg)                 Today's Medication Changes          These changes are accurate as of: 17 10:25 AM.  If you have any questions, ask your nurse or doctor.               These medicines have changed or have updated prescriptions.        Dose/Directions    diazepam 5 MG tablet   Commonly known as:  VALIUM   This may have changed:  when to take this   Used for:  S/P cervical spinal fusion   Changed by:  Aline Kenyon, NP        Dose:  5 mg   Take 1 tablet (5 mg) by mouth every 12 hours as needed for other (muscle spasms)   Quantity:  60 tablet   Refills:  0       oxyCODONE 5 MG IR tablet   Commonly known as:  ROXICODONE   This may have changed:  when to take this   Used for:  S/P cervical spinal fusion   Changed by:  Aline Kenyon, NP        Dose:  5-10 mg   Take 1-2 tablets (5-10 mg) by mouth every 6 hours as needed for moderate to severe pain   Quantity:  65 tablet   Refills:  0            Where to get your medicines      Some of these will need a paper prescription and others can be bought over the counter.  Ask your  nurse if you have questions.     Bring a paper prescription for each of these medications     diazepam 5 MG tablet    oxyCODONE 5 MG IR tablet                Primary Care Provider Office Phone # Fax #    Jordana Naqvi PA-C [phone]       [facility] [address]  [facility] [address]        Thank you!     Thank you for choosing [facility]  for your care. Our goal is always to provide you with excellent care. Hearing back from our patients is one way we can continue to improve our services. Please take a few minutes to complete the written survey that you may receive in the mail after your visit with us. Thank you!             Your Updated Medication List - Protect others around you: Learn how to safely use, store and throw away your medicines at www.disposemymeds.org.          This list is accurate as of: 5/9/17 10:25 AM.  Always use your most recent med list.                   Brand Name Dispense Instructions for use    acetaminophen 500 MG tablet    TYLENOL     Take 500-1,000 mg by mouth every 6 hours as needed.       atorvastatin 80 MG tablet    LIPITOR    90 tablet    TAKE 1 TABLET BY MOUTH ONCE DAILY       * benzonatate 200 MG capsule    TESSALON    21 capsule    Take 1 capsule (200 mg) by mouth 3 times daily as needed for cough       * benzonatate 200 MG capsule    TESSALON    21 capsule    Take 1 capsule (200 mg) by mouth 3 times daily as needed for cough       * benzonatate 200 MG capsule    TESSALON    21 capsule    Take 1 capsule (200 mg) by mouth 3 times daily as needed for cough       butalbital-acetaminophen-caffeine -40 MG per tablet    FIORICET/ESGIC    28 tablet    Take 1 tablet by mouth every 4 hours as needed       diazepam 5 MG tablet    VALIUM    60 tablet    Take 1 tablet (5 mg) by mouth every 12 hours as needed for other (muscle spasms)       diclofenac 75 MG EC tablet    VOLTAREN    60 tablet    Take 1 tablet (75 mg) by mouth 2  times daily       oxyCODONE 5 MG IR tablet    ROXICODONE    65 tablet    Take 1-2 tablets (5-10 mg) by mouth every 6 hours as needed for moderate to severe pain       * varenicline 0.5 MG X 11 & 1 MG X 42 tablet    CHANTIX STARTING MONTH EVANGELINA    53 tablet    Take 0.5 mg tab daily for 3 days, then 0.5 mg tab twice daily for 4 days, then 1 mg twice daily.       * varenicline 1 MG tablet    CHANTIX    56 tablet    Take 1 tablet (1 mg) by mouth 2 times daily       * Notice:  This list has 5 medication(s) that are the same as other medications prescribed for you. Read the directions carefully, and ask your doctor or other care provider to review them with you.

## 2017-05-09 NOTE — PATIENT INSTRUCTIONS
- May increase lifting restriction to 20  pounds    - followup in 6 weeks  with xray prior     - Call the clinic at [phone] for increasing redness, swelling or pus draining from the incision, increased pain or any other questions and concerns.

## 2017-05-11 ENCOUNTER — CARE COORDINATION (OUTPATIENT)
Dept: CARE COORDINATION | Facility: CLINIC | Age: 57
End: 2017-05-11

## 2017-05-11 DIAGNOSIS — J10.1 INFLUENZA B: ICD-10-CM

## 2017-05-11 RX ORDER — BENZONATATE 200 MG/1
200 CAPSULE ORAL 3 TIMES DAILY PRN
Qty: 21 CAPSULE | Refills: 0 | OUTPATIENT
Start: 2017-05-11

## 2017-05-11 NOTE — TELEPHONE ENCOUNTER
Patient reports cough is improving and she is now taking Tessalon 2 times/day.  Patient continues to have productive cough at times with yellow/brown phlegm.  She reports occasional shortness of breath with activity, but states this is improving as well.  Patient has 5 capsules of Tessalon left and inquires if Valentine Dumont C-NP would refill this so she does not run out of the weekend.  Patient continues to push fluids and is up to walking around the block daily.  She reports feeling flushed and chilled recently, but states it hasn't happened for 2 days.  She does not own a thermometer, so was unable to check her temperature.  Patient denies any new or worsening symptoms.    Please advise if patient may have a refill of Tessalon as requested.    Melissa Behl BSN, RN, PHN  [facility] Care Coordinator  [phone]  [email or web address]

## 2017-05-11 NOTE — PROGRESS NOTES
Clinic Care Coordination Contact  OUTREACH    Referral Information:  Referral Source: CTS  Reason for Contact: RN CC call to patient for follow up  Care Conference: No     Universal Utilization:   ED Visits in last year: 0  Hospital visits in last year: 1  Last PCP appointment: 03/20/17  Missed Appointments: 0     Multiple Providers or Specialists: neurosurgery    Clinical Concerns:  Current Medical Concerns: Patient reports cough is improving and she is now taking Tessalon 2 times/day. Patient continues to have productive cough at times with yellow/brown phlegm. She reports occasional shortness of breath with activity, but states this is improving as well. Patient has 5 capsules of Tessalon left and inquires if Valentine Dumont C-NP would refill this so she does not run out of the weekend. Patient continues to push fluids and is up to walking around the block daily. She reports feeling flushed and chilled recently, but states it hasn't happened for 2 days. She does not own a thermometer, so was unable to check her temperature. Patient denies any new or worsening symptoms.  Patient inquires if she can have an additional refill of Tessalon so she does not run out over the weekend.  RN CC sent a refill encounter to PCP.  Current Behavioral Concerns: n/a    Education Provided to patient: RN CC educated patient on importance of continuing to drink adequate fluids and perform daily breathing exercises.      Clinical Pathway: None    Medication Management:  Patient has a pill box that she sets up weekly. Patient takes medications 2 times/day.       Functional Status:  Mobility Status: Independent  Equipment Currently Used at Home: none  Transportation: Family provides           Psychosocial:  Current living arrangement:: I live in a private home with spouse  Financial/Insurance: n/a       Resources and Interventions:  Current Resources:  ;          Advanced Care Plans/Directives on file:: No        Goals:   Goal 2 Statement: I  will remain smoke free  Goal 2 Progression Percent: 100%  Goal 2 Progression Date: 05/11/17              Barriers: previous long time smoker  Strengths: Patient reports desire to remain smoke free following hospitalization.  Patient/Caregiver understanding: Patient verbalizes importance of remaining smoke free, continuing to drink adequate fluids and performing daily breathing exercises.  Frequency of Care Coordination: weekly  Upcoming appointment:  (none scheduled)     Plan:   Patient will continue to follow treatment plan as directed and follow up with PCP with concerns ongoing.   RN CC will forward patient's request for Tessalon refill to provider.  RN CC will follow up with patient regarding provider's response.    Melissa Behl BSN, RN, PHN  [facility] Care Coordinator  [phone]  [email or web address]

## 2017-05-12 ENCOUNTER — OFFICE VISIT (OUTPATIENT)
Dept: FAMILY MEDICINE | Facility: CLINIC | Age: 57
End: 2017-05-12
Payer: COMMERCIAL

## 2017-05-12 VITALS
TEMPERATURE: 97.4 F | HEART RATE: 104 BPM | BODY MASS INDEX: 36.51 KG/M2 | OXYGEN SATURATION: 98 % | DIASTOLIC BLOOD PRESSURE: 88 MMHG | WEIGHT: 198 LBS | SYSTOLIC BLOOD PRESSURE: 136 MMHG

## 2017-05-12 DIAGNOSIS — J01.90 ACUTE SINUSITIS WITH SYMPTOMS > 10 DAYS: Primary | ICD-10-CM

## 2017-05-12 PROCEDURE — 99213 OFFICE O/P EST LOW 20 MIN: CPT | Performed by: NURSE PRACTITIONER

## 2017-05-12 RX ORDER — DOXYCYCLINE HYCLATE 100 MG
100 TABLET ORAL 2 TIMES DAILY
Qty: 20 TABLET | Refills: 0 | Status: SHIPPED | OUTPATIENT
Start: 2017-05-12 | End: 2018-03-29

## 2017-05-12 RX ORDER — BENZONATATE 200 MG/1
200 CAPSULE ORAL 3 TIMES DAILY PRN
Qty: 21 CAPSULE | Refills: 0 | Status: SHIPPED | OUTPATIENT
Start: 2017-05-12 | End: 2018-03-29

## 2017-05-12 ASSESSMENT — PAIN SCALES - GENERAL: PAINLEVEL: NO PAIN (0)

## 2017-05-12 NOTE — NURSING NOTE
"Chief Complaint   Patient presents with     Recheck Medication     Cough       Initial /89  Pulse 104  Temp 97.4  F (36.3  C) (Oral)  Wt 198 lb (89.8 kg)  LMP 09/16/2011  SpO2 98%  Breastfeeding? No  BMI 36.51 kg/m2 Estimated body mass index is 36.51 kg/(m^2) as calculated from the following:    Height as of 4/13/17: 5' 1.75\" (1.568 m).    Weight as of this encounter: 198 lb (89.8 kg).  Medication Reconciliation: complete   Brendan TEIXEIRA        "

## 2017-05-12 NOTE — PROGRESS NOTES
Clinic Care Coordination Contact  Care Team Conversations    RN CC noted per 5/11/17 refill encounter that Valentine LOJA would like to see patient for further refills of Tessalon.  RN CC spoke with patient who has scheduled an appointment with Valentine LOJA today.    RN CC will follow up with patient next week as planned.    Melissa Behl BSN, RN, N  [facility] Care Coordinator  [phone]  [email or web address]

## 2017-05-12 NOTE — PROGRESS NOTES
"  SUBJECTIVE:                                                    Margo Inman is a 57 year old female who presents to clinic today for the following health issues:      Medication Followup of Tessalon    Taking Medication as prescribed: yes    Side Effects:  None    Medication Helping Symptoms:  yes     Patient was diagnosed with Influenza B 4/1/17 just after she'd had cervical fusion.  She was given Tamiflu and 5 day course of Levaquin while hospitalized but continues to have cough, expectoration yellow/brown sputum and complains of fatigue, \"gets winded easily,\"  Often feels flushed followed by feeling cold (does not have a fever), cough is worst at night, sometimes has posttussive emesis.    Problem list and histories reviewed & adjusted, as indicated.  Additional history: as documented    BP Readings from Last 3 Encounters:   05/12/17 136/88   05/09/17 (!) 177/101   04/13/17 130/89    Wt Readings from Last 3 Encounters:   05/12/17 198 lb (89.8 kg)   05/09/17 198 lb (89.8 kg)   04/13/17 188 lb 9.6 oz (85.5 kg)                  Labs reviewed in EPIC    Reviewed and updated as needed this visit by clinical staff  Tobacco  Allergies  Meds       Reviewed and updated as needed this visit by Provider         ROS:  Constitutional, HEENT, cardiovascular, pulmonary, gi and gu systems are negative, except as otherwise noted.    OBJECTIVE:                                                    /88  Pulse 104  Temp 97.4  F (36.3  C) (Oral)  Wt 198 lb (89.8 kg)  LMP 09/16/2011  SpO2 98%  Breastfeeding? No  BMI 36.51 kg/m2  Body mass index is 36.51 kg/(m^2).  GENERAL: healthy, alert and no distress  EYES: Eyes grossly normal to inspection, PERRL and conjunctivae and sclerae normal  HENT: ear canals and TM's normal, nose and mouth without ulcers or lesions  NECK: no adenopathy, no asymmetry, masses, or scars and thyroid normal to palpation  RESP: lungs clear to auscultation - no rales, rhonchi or wheezes  CV: " "regular rate and rhythm, normal S1 S2, no S3 or S4, no murmur, click or rub, no peripheral edema and peripheral pulses strong  ABDOMEN: soft, nontender, no hepatosplenomegaly, no masses and bowel sounds normal  MS: no gross musculoskeletal defects noted, no edema  SKIN: no suspicious lesions or rashes  PSYCH: mentation appears normal, affect normal/bright    Diagnostic Test Results:  none      ASSESSMENT/PLAN:                                                        BP Screening:   Last 3 BP Readings:    BP Readings from Last 3 Encounters:   05/12/17 136/88   05/09/17 (!) 177/101   04/13/17 130/89       The following was recommended to the patient:  Re-screen BP within a year and recommended lifestyle modifications  BMI:   Estimated body mass index is 36.51 kg/(m^2) as calculated from the following:    Height as of 4/13/17: 5' 1.75\" (1.568 m).    Weight as of this encounter: 198 lb (89.8 kg).   Weight management plan: Discussed healthy diet and exercise guidelines and patient will follow up in 12 months in clinic to re-evaluate.      1. Acute sinusitis with symptoms > 10 days     Antibiotics indicated, see orders.  We discussed the pathophysiology of sinus pressure/sinusitis and treatment options with emphasis on healthy lifestyle and opening up natural drainage passage, discussed the risks and benefits of various over the counter and prescription treatments including short courses of decongestant nasal sprays.  If new, worsening or persistent symptoms, the patient is to call or return for a recheck.    - doxycycline (VIBRA-TABS) 100 MG tablet; Take 1 tablet (100 mg) by mouth 2 times daily  Dispense: 20 tablet; Refill: 0  - benzonatate (TESSALON) 200 MG capsule; Take 1 capsule (200 mg) by mouth 3 times daily as needed for cough  Dispense: 21 capsule; Refill: 0    See Patient Instructions    JOSE R Sarabia [facility]    "

## 2017-05-12 NOTE — PATIENT INSTRUCTIONS
At [facility], we strive to deliver an exceptional experience to you, every time we see you.    If you receive a survey in the mail, please send us back your thoughts. We really do value your feedback.    Thank you for visiting [facility]    Normal or non-critical lab and imaging results will be communicated to you by MyChart, letter or phone within 7 days.  If you do not hear from us within 10 days, please call the clinic. If you have a critical or abnormal lab result, we will notify you by phone as soon as possible.     If you have any questions regarding your visit please contact:     Team [facility]  Clinic Hours Telephone Number   Dr. Zakia Kovacs   7am-7pm  Monday through Thursday  7am-5pm Friday [phone]  Shawna ARAGON RN   Pharmacy 8:30am-9pm Monday-Friday    9am-5pm Saturday-Sunday [phone]   Urgent Care 11am-9pm Monday-Friday        9am-5pm Saturday-Sunday [phone]     After hours, weekend or if you need to make an appointment with your primary provider please call [phone].   After Hours nurse advise: call [facility]: [phone]    Use [employer] (secure email communication and access to your chart) to send your primary care provider a message or make an appointment. Ask someone on your Team how to sign up for [employer]. To log on to [employer] or for more information in [employer] please visit the website at [email or web address].   As of October 8, 2013, all password changes, disabled accounts, or ID changes in [employer]/MyHealth will be done by our Access Services Department.   If you need help with your account or password, call: [phone]. Clinic staff no longer has the ability to change passwords.     Sinusitis (Antibiotic Treatment)    The sinuses are air-filled spaces within the bones of the face. They connect to the  inside of the nose. Sinusitis is an inflammation of the tissue lining the sinus cavity. Sinus inflammation can occur during a cold. It can also be due to allergies to pollens and other particles in the air. Sinusitis can cause symptoms of sinus congestion and fullness. A sinus infection causes fever, headache and facial pain. There is often green or yellow drainage from the nose or into the back of the throat (post-nasal drip). You have been given antibiotics to treat this condition.  Home care:    Take the full course of antibiotics as instructed. Do not stop taking them, even if you feel better.    Drink plenty of water, hot tea, and other liquids. This may help thin mucus. It also may promote sinus drainage.    Heat may help soothe painful areas of the face. Use a towel soaked in hot water. Or,  the shower and direct the hot spray onto your face. Using a vaporizer along with a menthol rub at night may also help.     An expectorant containing guaifenesin may help thin the mucus and promote drainage from the sinuses.    Over-the-counter decongestants may be used unless a similar medicine was prescribed. Nasal sprays work the fastest. Use one that contains phenylephrine or oxymetazoline. First blow the nose gently. Then use the spray. Do not use these medicines more often than directed on the label or symptoms may get worse. You may also use tablets containing pseudoephedrine. Avoid products that combine ingredients, because side effects may be increased. Read labels. You can also ask the pharmacist for help. (NOTE: Persons with high blood pressure should not use decongestants. They can raise blood pressure.)    Over-the-counter antihistamines may help if allergies contributed to your sinusitis.      Do not use nasal rinses or irrigation during an acute sinus infection, unless told to by your health care provider. Rinsing may spread the infection to other sinuses.    Use acetaminophen or ibuprofen to control  pain, unless another pain medicine was prescribed. (If you have chronic liver or kidney disease or ever had a stomach ulcer, talk with your doctor before using these medicines. Aspirin should never be used in anyone under 18 years of age who is ill with a fever. It may cause severe liver damage.)    Don't smoke. This can worsen symptoms.  Follow-up care  Follow up with your healthcare provider or our staff if you are not improving within the next week.  When to seek medical advice  Call your healthcare provider if any of these occur:    Facial pain or headache becoming more severe    Stiff neck    Unusual drowsiness or confusion    Swelling of the forehead or eyelids    Vision problems, including blurred or double vision    Fever of 100.4 F (38 C) or higher, or as directed by your healthcare provider    Seizure    Breathing problems    Symptoms not resolving within 10 days    8745-1299 [email or web address]. [address] [place] [address], [place] [address]. All rights reserved. This information is not intended as a substitute for professional medical care. Always follow your healthcare professional's instructions.

## 2017-05-12 NOTE — MR AVS SNAPSHOT
After Visit Summary   5/12/2017    Margo Inman    MRN: [id number]           Patient Information     Date Of Birth          1960        Visit Information        Provider Department      5/12/2017 2:00 PM Belem Dumont APRN CNP [facility]        Today's Diagnoses     Acute sinusitis with symptoms > 10 days    -  1      Care Instructions    At [facility], we strive to deliver an exceptional experience to you, every time we see you.    If you receive a survey in the mail, please send us back your thoughts. We really do value your feedback.    Thank you for visiting [facility]    Normal or non-critical lab and imaging results will be communicated to you by MyChart, letter or phone within 7 days.  If you do not hear from us within 10 days, please call the clinic. If you have a critical or abnormal lab result, we will notify you by phone as soon as possible.     If you have any questions regarding your visit please contact:     Team Mary/Spirit  Clinic Hours Telephone Number   Dr. Zakia Montana Massimnadiya   7am-7pm  Monday through Thursday  7am-5pm Friday [phone]  Shawna ARAGON RN   Pharmacy 8:30am-9pm Monday-Friday    9am-5pm Saturday-Sunday [phone]   Urgent Care 11am-9pm Monday-Friday        9am-5pm Saturday-Sunday [phone]     After hours, weekend or if you need to make an appointment with your primary provider please call [phone].   After Hours nurse advise: call [facility]: [phone]    Use SergeMDhart (secure email communication and access to your chart) to send your primary care provider a message or make an appointment. Ask someone on your Team how to sign up for Sherpaa. To log on to Contour or for more information in [employer] please visit the website at [email or web address].   As of October  8, 2013, all password changes, disabled accounts, or ID changes in [employer]/MyHealth will be done by our Access Services Department.   If you need help with your account or password, call: [phone]. Clinic staff no longer has the ability to change passwords.     Sinusitis (Antibiotic Treatment)    The sinuses are air-filled spaces within the bones of the face. They connect to the inside of the nose. Sinusitis is an inflammation of the tissue lining the sinus cavity. Sinus inflammation can occur during a cold. It can also be due to allergies to pollens and other particles in the air. Sinusitis can cause symptoms of sinus congestion and fullness. A sinus infection causes fever, headache and facial pain. There is often green or yellow drainage from the nose or into the back of the throat (post-nasal drip). You have been given antibiotics to treat this condition.  Home care:    Take the full course of antibiotics as instructed. Do not stop taking them, even if you feel better.    Drink plenty of water, hot tea, and other liquids. This may help thin mucus. It also may promote sinus drainage.    Heat may help soothe painful areas of the face. Use a towel soaked in hot water. Or,  the shower and direct the hot spray onto your face. Using a vaporizer along with a menthol rub at night may also help.     An expectorant containing guaifenesin may help thin the mucus and promote drainage from the sinuses.    Over-the-counter decongestants may be used unless a similar medicine was prescribed. Nasal sprays work the fastest. Use one that contains phenylephrine or oxymetazoline. First blow the nose gently. Then use the spray. Do not use these medicines more often than directed on the label or symptoms may get worse. You may also use tablets containing pseudoephedrine. Avoid products that combine ingredients, because side effects may be increased. Read labels. You can also ask the pharmacist for help. (NOTE: Persons  with high blood pressure should not use decongestants. They can raise blood pressure.)    Over-the-counter antihistamines may help if allergies contributed to your sinusitis.      Do not use nasal rinses or irrigation during an acute sinus infection, unless told to by your health care provider. Rinsing may spread the infection to other sinuses.    Use acetaminophen or ibuprofen to control pain, unless another pain medicine was prescribed. (If you have chronic liver or kidney disease or ever had a stomach ulcer, talk with your doctor before using these medicines. Aspirin should never be used in anyone under 18 years of age who is ill with a fever. It may cause severe liver damage.)    Don't smoke. This can worsen symptoms.  Follow-up care  Follow up with your healthcare provider or our staff if you are not improving within the next week.  When to seek medical advice  Call your healthcare provider if any of these occur:    Facial pain or headache becoming more severe    Stiff neck    Unusual drowsiness or confusion    Swelling of the forehead or eyelids    Vision problems, including blurred or double vision    Fever of 100.4 F (38 C) or higher, or as directed by your healthcare provider    Seizure    Breathing problems    Symptoms not resolving within 10 days    0325-5988 [employer]. [address] [place] [address]. All rights reserved. This information is not intended as a substitute for professional medical care. Always follow your healthcare professional's instructions.              Follow-ups after your visit        Who to contact     If you have questions or need follow up information about today's clinic visit or your schedule please contact [facility] directly at [phone].  Normal or non-critical lab and imaging results will be communicated to you by MyChart, letter or phone within 4 business days after the clinic has received the results. If you do not hear from  "us within 7 days, please contact the clinic through I-frontdesk or phone. If you have a critical or abnormal lab result, we will notify you by phone as soon as possible.  Submit refill requests through I-frontdesk or call your pharmacy and they will forward the refill request to us. Please allow 3 business days for your refill to be completed.          Additional Information About Your Visit        I-frontdesk Information     I-frontdesk lets you send messages to your doctor, view your test results, renew your prescriptions, schedule appointments and more. To sign up, go to [email or web address] . Click on \"Log in\" on the left side of the screen, which will take you to the Welcome page. Then click on \"Sign up Now\" on the right side of the page.     You will be asked to enter the access code listed below, as well as some personal information. Please follow the directions to create your username and password.     Your access code is: [email or web address]  Expires: 2017  3:23 PM     Your access code will  in 90 days. If you need help or a new code, please call your [facility] clinic or [phone].        Care EveryWhere ID     This is your Care EveryWhere ID. This could be used by other organizations to access your [facility] medical records  [id number]        Your Vitals Were     Pulse Temperature Last Period Pulse Oximetry Breastfeeding? BMI (Body Mass Index)    104 97.4  F (36.3  C) (Oral) 2011 98% No 36.51 kg/m2       Blood Pressure from Last 3 Encounters:   17 136/88   17 (!) 177/101   17 130/89    Weight from Last 3 Encounters:   17 198 lb (89.8 kg)   17 198 lb (89.8 kg)   17 188 lb 9.6 oz (85.5 kg)              Today, you had the following     No orders found for display         Today's Medication Changes          These changes are accurate as of: 17  2:23 PM.  If you have any questions, ask your nurse or doctor.               Start taking these medicines.        " Dose/Directions    doxycycline 100 MG tablet   Commonly known as:  VIBRA-TABS   Used for:  Acute sinusitis with symptoms > 10 days   Started by:  Belem Dumont APRN CNP        Dose:  100 mg   Take 1 tablet (100 mg) by mouth 2 times daily   Quantity:  20 tablet   Refills:  0         These medicines have changed or have updated prescriptions.        Dose/Directions    * benzonatate 200 MG capsule   Commonly known as:  TESSALON   This may have changed:  Another medication with the same name was added. Make sure you understand how and when to take each.   Used for:  Influenza B   Changed by:  Belem Dumont APRN CNP        Dose:  200 mg   Take 1 capsule (200 mg) by mouth 3 times daily as needed for cough   Quantity:  21 capsule   Refills:  0       * benzonatate 200 MG capsule   Commonly known as:  TESSALON   This may have changed:  Another medication with the same name was added. Make sure you understand how and when to take each.   Used for:  Cough   Changed by:  Jordana Naqvi PA-C        Dose:  200 mg   Take 1 capsule (200 mg) by mouth 3 times daily as needed for cough   Quantity:  21 capsule   Refills:  0       * benzonatate 200 MG capsule   Commonly known as:  TESSALON   This may have changed:  Another medication with the same name was added. Make sure you understand how and when to take each.   Used for:  Influenza B   Changed by:  Belem Dumont APRN CNP        Dose:  200 mg   Take 1 capsule (200 mg) by mouth 3 times daily as needed for cough   Quantity:  21 capsule   Refills:  0       * benzonatate 200 MG capsule   Commonly known as:  TESSALON   This may have changed:  You were already taking a medication with the same name, and this prescription was added. Make sure you understand how and when to take each.   Used for:  Acute sinusitis with symptoms > 10 days   Changed by:  Belem Dumont APRN CNP        Dose:  200 mg   Take 1 capsule (200 mg) by mouth 3 times daily as needed for cough    Quantity:  21 capsule   Refills:  0       * Notice:  This list has 4 medication(s) that are the same as other medications prescribed for you. Read the directions carefully, and ask your doctor or other care provider to review them with you.         Where to get your medicines      These medications were sent to [id number] [place]  [address] [place] [address]  [address] [place] [address]     Phone:  [phone]     benzonatate 200 MG capsule    doxycycline 100 MG tablet                Primary Care Provider Office Phone # Fax #    Jordana Naqvi PA-C [phone]       [facility] [address]  [facility] [address]        Thank you!     Thank you for choosing [facility]  for your care. Our goal is always to provide you with excellent care. Hearing back from our patients is one way we can continue to improve our services. Please take a few minutes to complete the written survey that you may receive in the mail after your visit with us. Thank you!             Your Updated Medication List - Protect others around you: Learn how to safely use, store and throw away your medicines at www.disposemymeds.org.          This list is accurate as of: 5/12/17  2:23 PM.  Always use your most recent med list.                   Brand Name Dispense Instructions for use    acetaminophen 500 MG tablet    TYLENOL     Take 500-1,000 mg by mouth every 6 hours as needed.       atorvastatin 80 MG tablet    LIPITOR    90 tablet    TAKE 1 TABLET BY MOUTH ONCE DAILY       * benzonatate 200 MG capsule    TESSALON    21 capsule    Take 1 capsule (200 mg) by mouth 3 times daily as needed for cough       * benzonatate 200 MG capsule    TESSALON    21 capsule    Take 1 capsule (200 mg) by mouth 3 times daily as needed for cough       * benzonatate 200 MG capsule    TESSALON    21 capsule    Take 1 capsule (200 mg) by mouth 3 times  daily as needed for cough       * benzonatate 200 MG capsule    TESSALON    21 capsule    Take 1 capsule (200 mg) by mouth 3 times daily as needed for cough       butalbital-acetaminophen-caffeine -40 MG per tablet    FIORICET/ESGIC    28 tablet    Take 1 tablet by mouth every 4 hours as needed       diazepam 5 MG tablet    VALIUM    60 tablet    Take 1 tablet (5 mg) by mouth every 12 hours as needed for other (muscle spasms)       diclofenac 75 MG EC tablet    VOLTAREN    60 tablet    Take 1 tablet (75 mg) by mouth 2 times daily       doxycycline 100 MG tablet    VIBRA-TABS    20 tablet    Take 1 tablet (100 mg) by mouth 2 times daily       oxyCODONE 5 MG IR tablet    ROXICODONE    65 tablet    Take 1-2 tablets (5-10 mg) by mouth every 6 hours as needed for moderate to severe pain       * varenicline 0.5 MG X 11 & 1 MG X 42 tablet    CHANTIX STARTING MONTH EVANGELINA    53 tablet    Take 0.5 mg tab daily for 3 days, then 0.5 mg tab twice daily for 4 days, then 1 mg twice daily.       * varenicline 1 MG tablet    CHANTIX    56 tablet    Take 1 tablet (1 mg) by mouth 2 times daily       * Notice:  This list has 6 medication(s) that are the same as other medications prescribed for you. Read the directions carefully, and ask your doctor or other care provider to review them with you.

## 2017-05-19 ENCOUNTER — CARE COORDINATION (OUTPATIENT)
Dept: CARE COORDINATION | Facility: CLINIC | Age: 57
End: 2017-05-19

## 2017-05-19 NOTE — PROGRESS NOTES
Clinic Care Coordination Contact  OUTREACH    Referral Information:  Referral Source: CTS  Reason for Contact: RN CC follow up  Care Conference: No     Universal Utilization:   ED Visits in last year: 0  Hospital visits in last year: 1  Last PCP appointment: 03/20/17  Missed Appointments: 0     Multiple Providers or Specialists: neurosurgery    Clinical Concerns:  Current Medical Concerns: Patient was seen 5/12/17 and diagnosed with acute sinusitis and was prescribed antibiotics.  Patient reports 50% improvement in her cough and shortness of breath.  Patient states she is short of breath when climbing 2 flights of stairs.  Patient continues to take Tessalon 2 times/day for her cough.  She notes her cough is worse in the afternoon into the night.  She sleeps reclined on pillows to help lessen her cough at night.  She notes increased energy since starting antibiotics and her sinus pressure has improved.    Current Behavioral Concerns: n/a    Education Provided to patient: RN CC reinforced education of increased fluids, pacing activities and calling for new or worse symptoms.      Clinical Pathway: None    Medication Management:  Patient independent in medication management and verbalizes adherence and understanding of medication regimen.  Patient is taking Tessalon 2 times/day and has 2.5 days left of her antibiotic course.        Functional Status:  Mobility Status: Independent  Equipment Currently Used at Home: none  Transportation: Pt has family and friends to provide transportation.           Psychosocial:  Current living arrangement:: I live in a private home with spouse  Financial/Insurance: Non concerns       Resources and Interventions:  Current Resources:  ;          Advanced Care Plans/Directives on file:: No        Goals:   Goal 1 Statement: I will finish my antibiotics as prescribed.  Goal 1 Progression Percent: 70%  Goal 1 Progression Date: 05/19/17  Goal 2 Statement: I will remain smoke free  Goal 2  Progression Percent: 100%  Goal 2 Progression Date: 05/19/17              Barriers: none known.  Strengths: Pt has support system, engaged with care coordination.  Patient/Caregiver understanding: Patient verbalizes understanding of completing antibiotic course, increase in fluid intake and continuing to monitor symptoms and calling for new, worsening symptoms.  Frequency of Care Coordination: weekly  Upcoming appointment:  (none scheduled)     Plan:   Patient will continue to follow treatment plan as directed and follow up with PCP with concerns ongoing.   Patient will complete antibiotic course.  Patient will remain smoke free.  Patient will call for new or worsening symptoms.  RN CC will follow up with patient next week.    Melissa Behl BSN, RN, PHN  [facility] Care Coordinator  [phone]  [email or web address]

## 2017-05-22 DIAGNOSIS — G25.81 RESTLESS LEGS SYNDROME (RLS): ICD-10-CM

## 2017-05-22 RX ORDER — CYCLOBENZAPRINE HCL 10 MG
TABLET ORAL
Qty: 30 TABLET | Refills: 1 | Status: SHIPPED | OUTPATIENT
Start: 2017-05-22 | End: 2017-07-19

## 2017-05-22 NOTE — TELEPHONE ENCOUNTER
cyclobenzaprine (FLEXERIL) 10 MG tablet (Discontinued)      Last Written Prescription Date:  1/10/17  Last Fill Quantity: 90,   # refills: 0  Last Office Visit with [facility], [facility] or [facility] prescribing provider: 05/12/17  Future Office visit:       Routing refill request to provider for review/approval because:  Drug not on the [facility], [facility] or [facility] refill protocol or controlled substance  Drug not active on patient's medication list      [facility]

## 2017-05-26 ENCOUNTER — CARE COORDINATION (OUTPATIENT)
Dept: CARE COORDINATION | Facility: CLINIC | Age: 57
End: 2017-05-26

## 2017-06-01 ENCOUNTER — TELEPHONE (OUTPATIENT)
Dept: NEUROSURGERY | Facility: CLINIC | Age: 57
End: 2017-06-01

## 2017-06-01 DIAGNOSIS — Z98.1 S/P CERVICAL SPINAL FUSION: ICD-10-CM

## 2017-06-01 NOTE — TELEPHONE ENCOUNTER
Pt states she is running low on Oxycodone and Diazepam. If approved, states she would like to  script in [place] if before Tuesday 6/6/17. Otherwise she would like to pick it up in [place] on Tuesday 6/6/17.

## 2017-06-02 DIAGNOSIS — Z98.1 S/P CERVICAL SPINAL FUSION: ICD-10-CM

## 2017-06-02 RX ORDER — OXYCODONE HYDROCHLORIDE 5 MG/1
5 TABLET ORAL 2 TIMES DAILY
Qty: 30 TABLET | Refills: 0 | Status: SHIPPED | OUTPATIENT
Start: 2017-06-02 | End: 2017-07-06

## 2017-06-02 RX ORDER — OXYCODONE HYDROCHLORIDE 5 MG/1
5 TABLET ORAL 2 TIMES DAILY
Qty: 30 TABLET | Refills: 0 | Status: CANCELLED | OUTPATIENT
Start: 2017-06-02

## 2017-06-02 NOTE — TELEPHONE ENCOUNTER
Patient is s/p ACDF on 3/30/17. Requesting refills for oxycodone and valium. Shahana Cantu CNP approves oxycodone, but recc pt continue with flexeril instead of valium. Patient voiced agreement with this, and she will  script at [facility].

## 2017-06-10 DIAGNOSIS — E78.5 HYPERLIPIDEMIA LDL GOAL <130: ICD-10-CM

## 2017-06-10 NOTE — TELEPHONE ENCOUNTER
atorvastatin (LIPITOR) 80 MG tablet     Last Written Prescription Date: 6/3/16  Last Fill Quantity: 90, # refills: 3  Last Office Visit with [facility], [facility] or [facility] prescribing provider: 5/12/17  Next 5 appointments (look out 90 days)     Jul 06, 2017 10:40 AM CDT   [facility] Visit with Aline Kenyon NP   [facility]    [address] [place] [id number]   [phone]                   Lab Results   Component Value Date    CHOL 190 01/10/2017     Lab Results   Component Value Date    HDL 46 01/10/2017     Lab Results   Component Value Date     01/10/2017     Lab Results   Component Value Date    TRIG 210 01/10/2017     Lab Results   Component Value Date    CHOLHDLRATIO 3.2 05/20/2015           Shiv Faarax  [facility]

## 2017-06-14 RX ORDER — ATORVASTATIN CALCIUM 80 MG/1
TABLET, FILM COATED ORAL
Qty: 90 TABLET | Refills: 1 | Status: SHIPPED | OUTPATIENT
Start: 2017-06-14 | End: 2017-12-11

## 2017-06-14 NOTE — TELEPHONE ENCOUNTER
Prescription approved per [facility] Refill Protocol.  VELVET Smith, Clinical RN Lianna Fisher.

## 2017-06-21 ENCOUNTER — CARE COORDINATION (OUTPATIENT)
Dept: CARE COORDINATION | Facility: CLINIC | Age: 57
End: 2017-06-21

## 2017-06-21 NOTE — PROGRESS NOTES
Clinic Care Coordination Contact  OUTREACH    Referral Information:  Referral Source: CTS  Reason for Contact: RN CC call to patient for follow up  Care Conference: No     Universal Utilization:   ED Visits in last year: 0  Hospital visits in last year: 1  Last PCP appointment: 05/12/17  Missed Appointments: 0     Multiple Providers or Specialists: neurosurgery    Clinical Concerns:  Current Medical Concerns: Patient reports she feels she has recovered from influenza.  She reports only an occasional cough and denies shortness of breath.  Patient state she continues to be a little weak and experiences pain from her surgery site, but has been working with neurosurgery for this recovery.  She is gradually starting to increase in activity and has been able to garden.  Patient continues to work on pain management at night, as she reports being a stomach sleeper, but is unable to sleep in this position since surgery.  Patient reports she has remained smoke free since hospitalization and uses nicotine prn for cravings.  Patient denies needing any further assistance from care coordination.    Current Behavioral Concerns: n/a    Education Provided to patient: RN CC reinforced patient may call care coordination in the future for any concerns or needs.   Clinical Pathway Name: None    Medication Management:  Patient independent in medication management and verbalizes adherence and understanding of medication regimen.        Functional Status:  Mobility Status: Independent  Equipment Currently Used at Home: none  Transportation: Patient drives.           Psychosocial:  Current living arrangement:: I live in a private home with spouse  Financial/Insurance: No concerns.       Resources and Interventions:  Current Resources:  ;          Advanced Care Plans/Directives on file:: No        Goals:   Goal 1 Statement: I will finish my antibiotics as prescribed.  Goal 2 Statement: I will remain smoke free              Barriers:  n/a  Strengths: Patient has met goals.  Patient/Caregiver understanding: Patient states she no longer needs care coordination, she has recovered from influenza and is working with neurosurgery on post-op recovery.  Patient will contact care coordination in the future for any questions or concerns.     Upcoming appointment: 07/06/17 (neurosurgery)     Plan:   Patient will continue to follow treatment plan as directed and follow up with PCP with concerns ongoing.   RN CC will make no further outreaches, as patient denies any further needs from care coordination.    Melissa Behl BSN, RN, N  [facility] Care Coordinator  [phone]  [email or web address]

## 2017-07-06 ENCOUNTER — OFFICE VISIT (OUTPATIENT)
Dept: NEUROSURGERY | Facility: CLINIC | Age: 57
End: 2017-07-06
Payer: COMMERCIAL

## 2017-07-06 ENCOUNTER — RADIANT APPOINTMENT (OUTPATIENT)
Dept: GENERAL RADIOLOGY | Facility: CLINIC | Age: 57
End: 2017-07-06
Attending: NURSE PRACTITIONER
Payer: COMMERCIAL

## 2017-07-06 VITALS
HEART RATE: 99 BPM | OXYGEN SATURATION: 99 % | DIASTOLIC BLOOD PRESSURE: 96 MMHG | RESPIRATION RATE: 16 BRPM | TEMPERATURE: 97.2 F | WEIGHT: 201.8 LBS | HEIGHT: 62 IN | BODY MASS INDEX: 37.13 KG/M2 | SYSTOLIC BLOOD PRESSURE: 170 MMHG

## 2017-07-06 DIAGNOSIS — Z98.1 S/P CERVICAL SPINAL FUSION: ICD-10-CM

## 2017-07-06 DIAGNOSIS — Z98.1 S/P CERVICAL SPINAL FUSION: Primary | ICD-10-CM

## 2017-07-06 PROCEDURE — 99024 POSTOP FOLLOW-UP VISIT: CPT | Performed by: NURSE PRACTITIONER

## 2017-07-06 PROCEDURE — 72040 X-RAY EXAM NECK SPINE 2-3 VW: CPT

## 2017-07-06 ASSESSMENT — PAIN SCALES - GENERAL: PAINLEVEL: MODERATE PAIN (4)

## 2017-07-06 NOTE — PATIENT INSTRUCTIONS
- May ease into normal activities  - followup in  3 months  with xray prior     - Call the clinic at [phone] for increasing redness, swelling or pus draining from the incision, increased pain or any other questions and concerns.

## 2017-07-06 NOTE — PROGRESS NOTES
"[facility]  Neurosurgery followup:    HPI: Margo Inman is a 57 year old female who with a history of neck and RUE pain and weakness. She underwent anterior cervical decompression and fusion C6-C7 with Dr. Joseph Klein on 3/30/2017.  She is here for her 3 month follow up appointment. She states she continues to have right sided neck pain when she turns to the right, and has stiffness mostly at night.  She has been taking Flexeril at night for restless leg syndrome and is unsure if that helps her neck.  She has been walking regularly without difficulties and is anxious to get back into the gym.  She denies changes to bowel or bladder.  She does note occasional \"locking\" of the left 4th digit, \"It just happens out of nowhere.\"  She denies dropping objects and states her arm pain is gone.  Exam:  Constitutional:  Alert, well nourished, NAD.  HEENT: Normocephalic, atraumatic.   Pulm:  Without shortness of breath   CV:  No pitting edema of BLE.     Neurological:  Awake  Alert  Oriented x 3  Motor exam:     Shoulder Abduction:  Right:  5/5    Left:  5/5  Biceps:                      Right:  5/5    Left:  5/5  Triceps:                     Right:  5/5    Left:  5/5  Wrist Extensors:       Right:  5/5    Left:  5/5  Wrist Flexors:           Right:  5/5    Left:  5/5  Intrinsics:                  Right:  5/5    Left:  5/5     Able to spontaneously move U/E bilaterally  Sensation intact throughout all U/E dermatomes  Incision: Clean, dry and intact  Imaging:  Per Xray fusion intact and stable    A/P: S/p C6-7 ACDF  Margo is doing well 3 month post-op with intermittent right sided neck pain with movements.  She is eager to start exercising again and we discussed easing into normal activities.  She does states her arms feel weak, although on exam strength is 5/5.  We discussed PT however, she would like to first start working with her  at the gym.  If she feels she needs PT she will contact " us.    Patient Instructions   - May ease into normal activities  - followup in  3 months  with xray prior     - Call the clinic at [phone] for increasing redness, swelling or pus draining from the incision, increased pain or any other questions and concerns.    Aline Kenyon [facility]  [facility]  [address] [address]    Tel [phone]  Pager [phone]

## 2017-07-06 NOTE — MR AVS SNAPSHOT
"              After Visit Summary   7/6/2017    Margo Inman    MRN: [id number]           Patient Information     Date Of Birth          1960        Visit Information        Provider Department      7/6/2017 10:40 AM Aline Kenyon NP [facility]        Care Instructions    - May ease into normal activities  - followup in  3 months  with xray prior     - Call the clinic at [phone] for increasing redness, swelling or pus draining from the incision, increased pain or any other questions and concerns.          Follow-ups after your visit        Who to contact     If you have questions or need follow up information about today's clinic visit or your schedule please contact [facility] directly at [phone].  Normal or non-critical lab and imaging results will be communicated to you by MyChart, letter or phone within 4 business days after the clinic has received the results. If you do not hear from us within 7 days, please contact the clinic through MyChart or phone. If you have a critical or abnormal lab result, we will notify you by phone as soon as possible.  Submit refill requests through [employer] or call your pharmacy and they will forward the refill request to us. Please allow 3 business days for your refill to be completed.          Additional Information About Your Visit        MyCharANPI Information     [employer] lets you send messages to your doctor, view your test results, renew your prescriptions, schedule appointments and more. To sign up, go to [email or web address] . Click on \"Log in\" on the left side of the screen, which will take you to the Welcome page. Then click on \"Sign up Now\" on the right side of the page.     You will be asked to enter the access code listed below, as well as some personal information. Please follow the directions to create your username and password.     Your access code is: [email or web address]  Expires: 10/4/2017 10:43 AM     Your access " "code will  in 90 days. If you need help or a new code, please call your [facility] clinic or [phone].        Care EveryWhere ID     This is your Care EveryWhere ID. This could be used by other organizations to access your [facility] medical records  [id number]        Your Vitals Were     Pulse Temperature Respirations Height Last Period Pulse Oximetry    99 97.2  F (36.2  C) (Oral) 16 5' 1.75\" (1.568 m) 2011 99%    BMI (Body Mass Index)                   37.21 kg/m2            Blood Pressure from Last 3 Encounters:   17 (!) 170/96   17 136/88   17 (!) 177/101    Weight from Last 3 Encounters:   17 201 lb 12.8 oz (91.5 kg)   17 198 lb (89.8 kg)   17 198 lb (89.8 kg)              Today, you had the following     No orders found for display       Primary Care Provider Office Phone # Fax #    JOSE R Henson [phone]       [facility] [address]  [facility] [address]        Equal Access to Services     KISHA CLAYTON : Hadii aicha ku hadasho Soomaali, waaxda luqadaha, qaybta kaalmada adeegyada, james mccullough . So [facility] [phone].    ATENCIÓN: Si habla español, tiene a alberto disposición servicios gratuitos de asistencia lingüística. Llame al [phone].    We comply with applicable federal civil rights laws and [place] laws. We do not discriminate on the basis of race, color, national origin, age, disability sex, sexual orientation or gender identity.            Thank you!     Thank you for choosing [facility]  for your care. Our goal is always to provide you with excellent care. Hearing back from our patients is one way we can continue to improve our services. Please take a few minutes to complete the written survey that you may receive in the mail after your visit with us. Thank you!             Your Updated Medication List - Protect others around you: Learn how to safely use, store and " throw away your medicines at www.disposemymeds.org.          This list is accurate as of: 7/6/17 10:43 AM.  Always use your most recent med list.                   Brand Name Dispense Instructions for use Diagnosis    acetaminophen 500 MG tablet    TYLENOL     Take 500-1,000 mg by mouth every 6 hours as needed.        atorvastatin 80 MG tablet    LIPITOR    90 tablet    TAKE 1 TABLET BY MOUTH EVERY DAY    Hyperlipidemia LDL goal <130       * benzonatate 200 MG capsule    TESSALON    21 capsule    Take 1 capsule (200 mg) by mouth 3 times daily as needed for cough    Influenza B       * benzonatate 200 MG capsule    TESSALON    21 capsule    Take 1 capsule (200 mg) by mouth 3 times daily as needed for cough    Cough       * benzonatate 200 MG capsule    TESSALON    21 capsule    Take 1 capsule (200 mg) by mouth 3 times daily as needed for cough    Influenza B       * benzonatate 200 MG capsule    TESSALON    21 capsule    Take 1 capsule (200 mg) by mouth 3 times daily as needed for cough    Acute sinusitis with symptoms > 10 days       butalbital-acetaminophen-caffeine -40 MG per tablet    FIORICET/ESGIC    28 tablet    Take 1 tablet by mouth every 4 hours as needed    Tension headache       cyclobenzaprine 10 MG tablet    FLEXERIL    30 tablet    TAKE 1 TABLET BY MOUTH AT BEDTIME AS NEEDED FOR MUSCLE SPASMS    Restless legs syndrome (RLS)       diclofenac 75 MG EC tablet    VOLTAREN    60 tablet    Take 1 tablet (75 mg) by mouth 2 times daily        doxycycline 100 MG tablet    VIBRA-TABS    20 tablet    Take 1 tablet (100 mg) by mouth 2 times daily    Acute sinusitis with symptoms > 10 days       * varenicline 0.5 MG X 11 & 1 MG X 42 tablet    CHANTIX STARTING MONTH EVANGELINA    53 tablet    Take 0.5 mg tab daily for 3 days, then 0.5 mg tab twice daily for 4 days, then 1 mg twice daily.    Tobacco dependence syndrome       * varenicline 1 MG tablet    CHANTIX    56 tablet    Take 1 tablet (1 mg) by mouth 2 times  daily    Tobacco dependence syndrome       * Notice:  This list has 6 medication(s) that are the same as other medications prescribed for you. Read the directions carefully, and ask your doctor or other care provider to review them with you.

## 2017-07-06 NOTE — NURSING NOTE
"Margo Inman is a 57 year old female who presents for:  Chief Complaint   Patient presents with     Surgical Followup     C6-7 ACDF. DOS 3/30/17. X-ray today. Patient states her neck is \"not bad.\" When driving she still has pain turning to her right. Every once in a while she has a burning at the end of her scar for about 5-10 mins and then goes away. She is still getting headaches, daily. Still has some trouble swallowing.         Initial Vitals:  Pulse 99  Temp 97.2  F (36.2  C) (Oral)  Resp 16  Ht 5' 1.75\" (1.568 m)  Wt 201 lb 12.8 oz (91.5 kg)  LMP 09/16/2011  SpO2 99%  BMI 37.21 kg/m2 Estimated body mass index is 37.21 kg/(m^2) as calculated from the following:    Height as of this encounter: 5' 1.75\" (1.568 m).    Weight as of this encounter: 201 lb 12.8 oz (91.5 kg).. Body surface area is 2 meters squared. BP completed using cuff size: large  Moderate Pain (4)   Blood Pressure: 170/96      Do you feel safe in your environment?  Yes  Do you need any refills today? No    Nursing Comments: C6-7 ACDF. DOS 3/30/17. X-ray today. Patient states her neck is \"not bad.\" When driving she still has pain turning to her right. Every once in a while she has a burning at the end of her scar for about 5-10 mins and then goes away. She is still getting headaches, daily. Still has some trouble swallowing.         Vi Wilkes    "

## 2017-07-19 DIAGNOSIS — Z98.1 S/P CERVICAL SPINAL FUSION: Primary | ICD-10-CM

## 2017-07-19 RX ORDER — CYCLOBENZAPRINE HCL 10 MG
TABLET ORAL
Qty: 30 TABLET | Refills: 0 | Status: SHIPPED | OUTPATIENT
Start: 2017-07-19 | End: 2017-10-11

## 2017-07-19 NOTE — TELEPHONE ENCOUNTER
cyclobenzaprine (FLEXERIL) 10 MG tablet      Last Written Prescription Date:  05/22/17  Last Fill Quantity: 30,   # refills: 1  Last Office Visit with [facility] or [facility] prescribing provider: 05/12/17  Future Office visit:       Routing refill request to provider for review/approval because:  Drug not on the [facility], [facility] or [facility] refill protocol or controlled substance      [facility]

## 2017-08-03 DIAGNOSIS — G44.209 TENSION HEADACHE: ICD-10-CM

## 2017-08-03 NOTE — TELEPHONE ENCOUNTER
butalbital-acetaminophen-caffeine (FIORICET/ESGIC) -40 MG per tablet      Last Written Prescription Date:  01/10/17  Last Fill Quantity: 28,   # refills: 1  Last Office Visit with [facility] prescribing provider: 05/12/17  Future Office visit:       Routing refill request to provider for review/approval because:  Drug not on the [facility] refill protocol or controlled substance      Kasandra Barnes  [facility]

## 2017-08-04 RX ORDER — BUTALBITAL, ACETAMINOPHEN AND CAFFEINE 50; 325; 40 MG/1; MG/1; MG/1
TABLET ORAL
Qty: 28 TABLET | Refills: 0 | Status: SHIPPED | OUTPATIENT
Start: 2017-08-04 | End: 2022-01-12

## 2017-08-04 NOTE — TELEPHONE ENCOUNTER
Faxed Rx for the Fioricet/Esgic to Phan [facility],  Right fax confirmed at 10:31 am today.  Teri Olvera MA/  For [facility]

## 2017-10-11 DIAGNOSIS — Z98.1 S/P CERVICAL SPINAL FUSION: ICD-10-CM

## 2017-10-11 NOTE — TELEPHONE ENCOUNTER
Flexeril      Last Written Prescription Date: 07/19/17  Last Fill Quantity: 30,  # refills: 0   Last Office Visit with [facility], [facility] or [facility] prescribing provider: 05/12/17    Routing refill request to provider for review/approval because:  Drug not on the [facility] refill protocol   Patricia David RN

## 2017-10-12 RX ORDER — CYCLOBENZAPRINE HCL 10 MG
TABLET ORAL
Qty: 30 TABLET | Refills: 0 | Status: SHIPPED | OUTPATIENT
Start: 2017-10-12 | End: 2017-11-07

## 2017-11-07 DIAGNOSIS — Z98.1 S/P CERVICAL SPINAL FUSION: ICD-10-CM

## 2017-11-07 NOTE — TELEPHONE ENCOUNTER
Requested Prescriptions   Pending Prescriptions Disp Refills     cyclobenzaprine (FLEXERIL) 10 MG tablet [Pharmacy Med Name: CYCLOBENZAPRINE 10MG TABLETS] 30 tablet 0     Sig: TAKE 1 TABLET BY MOUTH AT BEDTIME AS NEEDED FOR MUSCLE SPASMS    There is no refill protocol information for this order        5/12/2017            Shiv Faarax  [facility]

## 2017-11-08 RX ORDER — CYCLOBENZAPRINE HCL 10 MG
TABLET ORAL
Qty: 30 TABLET | Refills: 0 | Status: SHIPPED | OUTPATIENT
Start: 2017-11-08 | End: 2017-12-12

## 2017-11-16 ENCOUNTER — TRANSFERRED RECORDS (OUTPATIENT)
Dept: HEALTH INFORMATION MANAGEMENT | Facility: CLINIC | Age: 57
End: 2017-11-16

## 2017-12-11 DIAGNOSIS — E78.5 HYPERLIPIDEMIA LDL GOAL <130: ICD-10-CM

## 2017-12-11 NOTE — TELEPHONE ENCOUNTER
Requested Prescriptions   Pending Prescriptions Disp Refills     atorvastatin (LIPITOR) 80 MG tablet [Pharmacy Med Name: ATORVASTATIN 80MG TABLETS] 90 tablet 0    Last Written Prescription Date:  6/14/17  Last Fill Quantity: 90,  # refills: 1   Last Office Visit with [facility], [facility] or [facility] prescribing provider:  5/12/2017     Future Office Visit:      Sig: TAKE 1 TABLET BY MOUTH EVERY DAY    Statins Protocol Passed    12/11/2017  2:31 PM       Passed - LDL on file in past 12 months    Recent Labs   Lab Test  01/10/17   1243   LDL  102*            Passed - No abnormal creatine kinase in past 12 months    No lab results found.         Passed - Recent or future visit with authorizing provider    Patient had office visit in the last year or has a visit in the next 30 days with authorizing provider.  See chart review.              Passed - Patient is age 18 or older       Passed - No active pregnancy on record       Passed - No positive pregnancy test in past 12 months

## 2017-12-12 DIAGNOSIS — Z98.1 S/P CERVICAL SPINAL FUSION: ICD-10-CM

## 2017-12-12 NOTE — TELEPHONE ENCOUNTER
Requested Prescriptions   Pending Prescriptions Disp Refills     cyclobenzaprine (FLEXERIL) 10 MG tablet [Pharmacy Med Name: CYCLOBENZAPRINE 10MG TABLETS]  Last Written Prescription Date:  11/08/17  Last Fill Quantity: 30,  # refills: 0   Last Office Visit with [facility], [facility] or [facility] prescribing provider:  05/12/17   Future Office Visit:    30 tablet 0     Sig: TAKE 1 TABLET BY MOUTH AT BEDTIME AS NEEDED FOR MUSCLE SPASMS    There is no refill protocol information for this order

## 2017-12-12 NOTE — TELEPHONE ENCOUNTER
cyclobenzaprine (FLEXERIL) 10 MG tablet 30 tablet 0 11/8/2017  No   Sig: TAKE 1 TABLET BY MOUTH AT BEDTIME AS NEEDED FOR MUSCLE SPASMS     Last Office Visit: 5/12/17  Future Office visit:       Routing refill request to provider for review/approval because:  Drug not on the [facility], [facility] or [facility] refill protocol or controlled substance    Irene Saini RN

## 2017-12-13 RX ORDER — CYCLOBENZAPRINE HCL 10 MG
TABLET ORAL
Qty: 30 TABLET | Refills: 0 | Status: SHIPPED | OUTPATIENT
Start: 2017-12-13 | End: 2018-01-17

## 2017-12-14 RX ORDER — ATORVASTATIN CALCIUM 80 MG/1
TABLET, FILM COATED ORAL
Qty: 90 TABLET | Refills: 0 | Status: SHIPPED | OUTPATIENT
Start: 2017-12-14 | End: 2018-03-17

## 2017-12-14 NOTE — TELEPHONE ENCOUNTER
Medication is being filled for 1 time refill only due to:  Patient needs to be seen because due for annual exam.   Aline Mahmood RN

## 2017-12-15 ENCOUNTER — DOCUMENTATION ONLY (OUTPATIENT)
Dept: FAMILY MEDICINE | Facility: CLINIC | Age: 57
End: 2017-12-15

## 2017-12-15 NOTE — PROGRESS NOTES
Received Medical clearance form from [facility]. Provider states that patient  Needs to be seen before she can sign off. Called [facility] and left voicemail  Message for the RN that we are unable to sign, patient needs to be seen. Keeping  Formin TC copy basket.  Teri Olvera MA/  For [facility]

## 2017-12-15 NOTE — LETTER
12/15/2017        RE: Margo Inman  [address] [address] [facility] [address]      Received medical clearance form from [facility] yesterday afternoon for patient to undergo body liposuction today. Patient reported to them that she has history of angina but I do not see that oin her history here.  I declined to clear her without a preop evaluation as I have not seen her since 4/2017.  I spoke with the RN at [facility] who told me the patient really didn't need clearance and that they were simply notifying me of what patient is doing for our records.  I again stated I would not clear her for the procedure without an office visit.  She thanked me for contacting her.    Belem ODELL, CNP

## 2017-12-15 NOTE — LETTER
December 15, 2017      Margo Rossana Storm  [address] [facility] [address]        {Comm Man dear:[id number]}          Sincerely,        JOSE R Sarabia CNP

## 2018-01-17 DIAGNOSIS — Z98.1 S/P CERVICAL SPINAL FUSION: ICD-10-CM

## 2018-01-17 RX ORDER — CYCLOBENZAPRINE HCL 10 MG
TABLET ORAL
Qty: 30 TABLET | Refills: 0 | Status: SHIPPED | OUTPATIENT
Start: 2018-01-17 | End: 2018-02-13

## 2018-01-17 NOTE — TELEPHONE ENCOUNTER
Requested Prescriptions   Pending Prescriptions Disp Refills     cyclobenzaprine (FLEXERIL) 10 MG tablet [Pharmacy Med Name: CYCLOBENZAPRINE 10MG TABLETS]    Last Written Prescription Date:  12/13/17  Last Fill Quantity: 30,  # refills: 0   Last Office Visit with [facility], [facility] or [facility] prescribing provider:  5/12/17   Future Office Visit:      30 tablet 0     Sig: TAKE 1 TABLET BY MOUTH AT BEDTIME AS NEEDED FOR MUSCLE SPASMS    There is no refill protocol information for this order              Shiv Faarax  [facility]

## 2018-01-17 NOTE — TELEPHONE ENCOUNTER
Routing refill request to provider for review/approval because:  Drug not on the [facility] refill protocol     Annmarie Leblanc RN   [facility]

## 2018-02-10 ENCOUNTER — HEALTH MAINTENANCE LETTER (OUTPATIENT)
Age: 58
End: 2018-02-10

## 2018-02-13 DIAGNOSIS — Z98.1 S/P CERVICAL SPINAL FUSION: ICD-10-CM

## 2018-02-14 NOTE — TELEPHONE ENCOUNTER
Requested Prescriptions   Pending Prescriptions Disp Refills     cyclobenzaprine (FLEXERIL) 10 MG tablet [Pharmacy Med Name: CYCLOBENZAPRINE 10MG TABLETS]    Last Written Prescription Date:  1/17/18  Last Fill Quantity: 30,  # refills: 0   Last Office Visit with [facility], [facility] or [facility] prescribing provider:  5/12/17   Future Office Visit:      30 tablet 0     Sig: TAKE 1 TABLET BY MOUTH AT BEDTIME AS NEEDED FOR MUSCLE SPASMS    There is no refill protocol information for this order              Shiv Faarax  [facility]

## 2018-02-15 RX ORDER — CYCLOBENZAPRINE HCL 10 MG
TABLET ORAL
Qty: 30 TABLET | Refills: 0 | Status: SHIPPED | OUTPATIENT
Start: 2018-02-15 | End: 2018-03-17

## 2018-03-10 ENCOUNTER — HEALTH MAINTENANCE LETTER (OUTPATIENT)
Age: 58
End: 2018-03-10

## 2018-03-17 DIAGNOSIS — Z12.31 ENCOUNTER FOR SCREENING MAMMOGRAM FOR BREAST CANCER: Primary | ICD-10-CM

## 2018-03-17 DIAGNOSIS — E78.5 HYPERLIPIDEMIA LDL GOAL <130: ICD-10-CM

## 2018-03-17 NOTE — TELEPHONE ENCOUNTER
"Requested Prescriptions   Pending Prescriptions Disp Refills     atorvastatin (LIPITOR) 80 MG tablet [Pharmacy Med Name: ATORVASTATIN 80MG TABLETS]    Last Written Prescription Date:  12/14/17  Last Fill Quantity: 90,  # refills: 0   Last Office Visit with [facility] or [facility] prescribing provider:  5/12/17   Future Office Visit:      90 tablet 0     Sig: TAKE 1 TABLET BY MOUTH EVERY DAY    Statins Protocol Failed    3/17/2018 10:22 AM       Failed - LDL on file in past 12 months    Recent Labs   Lab Test  01/10/17   1243   LDL  102*            Passed - No abnormal creatine kinase in past 12 months    Recent Labs   Lab Test  07/12/12   1418   CKT  61               Passed - Recent (12 mo) or future (30 days) visit within the authorizing provider's specialty    Patient had office visit in the last 12 months or has a visit in the next 30 days with authorizing provider or within the authorizing provider's specialty.  See \"Patient Info\" tab in inbasket, or \"Choose Columns\" in Meds & Orders section of the refill encounter.           Passed - Patient is age 18 or older       Passed - No active pregnancy on record       Passed - No positive pregnancy test in past 12 months              Shiv Faarax  [facility]  "

## 2018-03-20 RX ORDER — ATORVASTATIN CALCIUM 80 MG/1
TABLET, FILM COATED ORAL
Qty: 30 TABLET | Refills: 0 | Status: SHIPPED | OUTPATIENT
Start: 2018-03-20 | End: 2018-03-26

## 2018-03-20 NOTE — TELEPHONE ENCOUNTER
Called and spoke to the patient and gave her Valentine's message.  Patient understands and scheduled patient a fasting, instructions given,  Lab only appt 3/26/18 and a Physical appt on 3/29/18.  Teri Olvera MA/  For [facility]

## 2018-03-20 NOTE — TELEPHONE ENCOUNTER
Labs ordered.  Patient is due for mammo (ordered) and also for pap.  Pls have her schedule to be seen for this.  Belem ODELL, CNP

## 2018-03-20 NOTE — TELEPHONE ENCOUNTER
Routing refill request to provider for review/approval because:  Labs not current:  LDL    Mary Juarez RN, BSN

## 2018-03-26 DIAGNOSIS — E78.5 HYPERLIPIDEMIA LDL GOAL <130: ICD-10-CM

## 2018-03-26 LAB
ALT SERPL W P-5'-P-CCNC: 40 U/L (ref 0–50)
AST SERPL W P-5'-P-CCNC: 20 U/L (ref 0–45)
CHOLEST SERPL-MCNC: 202 MG/DL
HDLC SERPL-MCNC: 45 MG/DL
LDLC SERPL CALC-MCNC: 103 MG/DL
NONHDLC SERPL-MCNC: 157 MG/DL
TRIGL SERPL-MCNC: 270 MG/DL

## 2018-03-26 PROCEDURE — 84460 ALANINE AMINO (ALT) (SGPT): CPT | Performed by: NURSE PRACTITIONER

## 2018-03-26 PROCEDURE — 84450 TRANSFERASE (AST) (SGOT): CPT | Performed by: NURSE PRACTITIONER

## 2018-03-26 PROCEDURE — 36415 COLL VENOUS BLD VENIPUNCTURE: CPT | Performed by: NURSE PRACTITIONER

## 2018-03-26 PROCEDURE — 80061 LIPID PANEL: CPT | Performed by: NURSE PRACTITIONER

## 2018-03-26 RX ORDER — ATORVASTATIN CALCIUM 80 MG/1
80 TABLET, FILM COATED ORAL DAILY
Qty: 90 TABLET | Refills: 1 | Status: SHIPPED | OUTPATIENT
Start: 2018-03-26 | End: 2018-11-27

## 2018-03-29 ENCOUNTER — OFFICE VISIT (OUTPATIENT)
Dept: FAMILY MEDICINE | Facility: CLINIC | Age: 58
End: 2018-03-29
Payer: COMMERCIAL

## 2018-03-29 VITALS
HEIGHT: 61 IN | WEIGHT: 201.8 LBS | HEART RATE: 89 BPM | SYSTOLIC BLOOD PRESSURE: 166 MMHG | OXYGEN SATURATION: 98 % | TEMPERATURE: 96.4 F | BODY MASS INDEX: 38.1 KG/M2 | DIASTOLIC BLOOD PRESSURE: 98 MMHG

## 2018-03-29 DIAGNOSIS — B96.89 BV (BACTERIAL VAGINOSIS): ICD-10-CM

## 2018-03-29 DIAGNOSIS — Z12.4 SCREENING FOR MALIGNANT NEOPLASM OF CERVIX: ICD-10-CM

## 2018-03-29 DIAGNOSIS — Z72.0 TOBACCO ABUSE: ICD-10-CM

## 2018-03-29 DIAGNOSIS — F43.23 ADJUSTMENT DISORDER WITH MIXED ANXIETY AND DEPRESSED MOOD: ICD-10-CM

## 2018-03-29 DIAGNOSIS — N89.8 VAGINAL ODOR: ICD-10-CM

## 2018-03-29 DIAGNOSIS — Z00.00 ROUTINE HISTORY AND PHYSICAL EXAMINATION OF ADULT: Primary | ICD-10-CM

## 2018-03-29 DIAGNOSIS — Z71.6 TOBACCO ABUSE COUNSELING: ICD-10-CM

## 2018-03-29 DIAGNOSIS — I10 ESSENTIAL HYPERTENSION: ICD-10-CM

## 2018-03-29 DIAGNOSIS — N76.0 BV (BACTERIAL VAGINOSIS): ICD-10-CM

## 2018-03-29 DIAGNOSIS — M65.30 TRIGGER FINGER, ACQUIRED: ICD-10-CM

## 2018-03-29 DIAGNOSIS — Z12.31 VISIT FOR SCREENING MAMMOGRAM: ICD-10-CM

## 2018-03-29 LAB
SPECIMEN SOURCE: ABNORMAL
WET PREP SPEC: ABNORMAL

## 2018-03-29 PROCEDURE — G0145 SCR C/V CYTO,THINLAYER,RESCR: HCPCS | Performed by: NURSE PRACTITIONER

## 2018-03-29 PROCEDURE — 99213 OFFICE O/P EST LOW 20 MIN: CPT | Mod: 25 | Performed by: NURSE PRACTITIONER

## 2018-03-29 PROCEDURE — 87210 SMEAR WET MOUNT SALINE/INK: CPT | Performed by: NURSE PRACTITIONER

## 2018-03-29 PROCEDURE — 87624 HPV HI-RISK TYP POOLED RSLT: CPT | Performed by: NURSE PRACTITIONER

## 2018-03-29 PROCEDURE — 99396 PREV VISIT EST AGE 40-64: CPT | Performed by: NURSE PRACTITIONER

## 2018-03-29 PROCEDURE — G0124 SCREEN C/V THIN LAYER BY MD: HCPCS | Performed by: NURSE PRACTITIONER

## 2018-03-29 RX ORDER — METRONIDAZOLE 500 MG/1
500 TABLET ORAL 2 TIMES DAILY
Qty: 14 TABLET | Refills: 0 | Status: SHIPPED | OUTPATIENT
Start: 2018-03-29 | End: 2018-04-18

## 2018-03-29 RX ORDER — LISINOPRIL 10 MG/1
10 TABLET ORAL DAILY
Qty: 30 TABLET | Refills: 1 | Status: SHIPPED | OUTPATIENT
Start: 2018-03-29 | End: 2018-03-29

## 2018-03-29 ASSESSMENT — ANXIETY QUESTIONNAIRES
GAD7 TOTAL SCORE: 12
2. NOT BEING ABLE TO STOP OR CONTROL WORRYING: MORE THAN HALF THE DAYS
5. BEING SO RESTLESS THAT IT IS HARD TO SIT STILL: MORE THAN HALF THE DAYS
7. FEELING AFRAID AS IF SOMETHING AWFUL MIGHT HAPPEN: SEVERAL DAYS
6. BECOMING EASILY ANNOYED OR IRRITABLE: MORE THAN HALF THE DAYS
1. FEELING NERVOUS, ANXIOUS, OR ON EDGE: SEVERAL DAYS
3. WORRYING TOO MUCH ABOUT DIFFERENT THINGS: MORE THAN HALF THE DAYS

## 2018-03-29 ASSESSMENT — PATIENT HEALTH QUESTIONNAIRE - PHQ9: 5. POOR APPETITE OR OVEREATING: MORE THAN HALF THE DAYS

## 2018-03-29 NOTE — PROGRESS NOTES
SUBJECTIVE:   CC: Margo Inman is an 58 year old woman who presents for preventive health visit.     Healthy Habits:    Do you get at least three servings of calcium containing foods daily (dairy, green leafy vegetables, etc.)? yes    Amount of exercise or daily activities, outside of work: 7 day(s) per week    Problems taking medications regularly No    Medication side effects: No    Have you had an eye exam in the past two years? yes    Do you see a dentist twice per year? yes    Do you have sleep apnea, excessive snoring or daytime drowsiness?no    Trigger finger- right 3rd finger.  Patient mentioned it to Orthopedics at her last visit and notes that it triggers 10+ times/day and is becoming quite bothersome.  This started just after she had her neck surgery last summer.  She requests Orthopedics referral for this.    BP remains elevated.  She endorses poor diet, little exercise over the winter months and has been under a lot of stress. She's had 4 friends recently diagnosed with cancer, and 2 have recently .     Patient continues to smoke 1/201 ppd, started smoking 5 months ago when her friends were diagnosed with cancer and she was feeling stressed.  She is now ready to quit again, has Chantix starter pack  from prior prescription that she didn't use and plans on starting it after . She did have vivid dreams on it but denies SI/HI.  She is open to Quit plan support.    Today's PHQ-2 Score:   PHQ-2 (  [employer]) 2017   Q1: Little interest or pleasure in doing things 0 0   Q2: Feeling down, depressed or hopeless 0 0   PHQ-2 Score 0 0       Abuse: Current or Past(Physical, Sexual or Emotional)- No  Do you feel safe in your environment - Yes    Social History   Substance Use Topics     Smoking status: Current Every Day Smoker     Packs/day: 1.00     Years: 15.00     Types: Cigarettes     Smokeless tobacco: Never Used      Comment: Started Chantix     Alcohol use Yes      Comment:  socially     If you drink alcohol do you typically have >3 drinks per day or >7 drinks per week? No                     Reviewed orders with patient.  Reviewed health maintenance and updated orders accordingly - Yes  Labs reviewed in EPIC  BP Readings from Last 3 Encounters:   03/29/18 (!) 166/98   07/06/17 (!) 170/96   05/12/17 136/88    Wt Readings from Last 3 Encounters:   03/29/18 201 lb 12.8 oz (91.5 kg)   07/06/17 201 lb 12.8 oz (91.5 kg)   05/12/17 198 lb (89.8 kg)                  Patient Active Problem List   Diagnosis     Hyperlipidemia LDL goal <130     Pseudogout     OA (OSTEOARTHRITIS) OF KNEE - bilateral     RLS (restless legs syndrome)     S/P total knee arthroplasty juan     Acute posthemorrhagic anemia     Muscle spasm     CTS (carpal tunnel syndrome) - bilateral     S/P carpal tunnel release     Trigger thumb     Elevated blood pressure reading without diagnosis of hypertension     Tobacco dependency     Microscopic hematuria     Cervical high risk HPV (human papillomavirus) test positive     Radiculopathy of cervical spine     S/P cervical spinal fusion     Past Surgical History:   Procedure Laterality Date     APPENDECTOMY       ARTHROSCOPY KNEE  9/16/2011    Procedure:ARTHROSCOPY KNEE; left knee arthroscopy with debridement, open lateral patellar spur excision; Surgeon:LUIS A MONTOYA; Location:[facility]     C PART REMV FEMUR/PROX TIB/FIB  9/16/11    left, open lateral patellar bone spur excision     C TOTAL KNEE ARTHROPLASTY  1/17/14    Bilateral     COLONOSCOPY N/A 2/16/2016    Procedure: COLONOSCOPY;  Surgeon: Belem Khalil MD;  Location: [facility]     COLONOSCOPY N/A 2/16/2016    Procedure: COMBINED COLONOSCOPY, SINGLE OR MULTIPLE BIOPSY/POLYPECTOMY BY BIOPSY;  Surgeon: Belem Khalil MD;  Location: [facility]     COLONOSCOPY WITH CO2 INSUFFLATION N/A 2/16/2016    Procedure: COLONOSCOPY WITH CO2 INSUFFLATION;  Surgeon: Belem Khalil MD;  Location: [facility]      CYSTOSCOPY  2016    microscopic hematuria     DECOMPRESSION, FUSION CERVICAL ANTERIOR ONE LEVEL, COMBINED N/A 3/30/2017    Procedure: COMBINED DECOMPRESSION, FUSION CERVICAL ANTERIOR ONE LEVEL;  Surgeon: Joseph Klein MD;  Location: [facility]     ECTOPIC PREGNANCY SURGERY       ENT SURGERY       GYN SURGERY       HC INCISION TENDON SHEATH FINGER Left 7/11/14    Thumb TF release     HC KNEE SCOPE,MED/LAT MENISECTOMY  9/16/11    left, with partial medial menisectomy ONLY     HC REVISE MEDIAN N/CARPAL TUNNEL SURG Left 7/11/14    PRIMARY - not revision     ORTHOPEDIC SURGERY       RELEASE CARPAL TUNNEL  7/11/2014    Procedure: RELEASE CARPAL TUNNEL;  Surgeon: Rg Franco MD;  Location: [facility]     RELEASE CARPAL TUNNEL Right 11/7/2014    Procedure: RELEASE CARPAL TUNNEL;  Surgeon: Rg Franco MD;  [facility]     RELEASE TRIGGER FINGER  7/11/2014    Procedure: RELEASE TRIGGER FINGER;  Surgeon: Rg Franco MD;  [facility]     RELEASE TRIGGER FINGER Right 11/7/2014    Procedure: RELEASE TRIGGER FINGER;  Surgeon: Rg Franco MD;  Location: [facility]     tonsils         Social History   Substance Use Topics     Smoking status: Current Every Day Smoker     Packs/day: 1.00     Years: 15.00     Types: Cigarettes     Smokeless tobacco: Never Used      Comment: Started Chantix     Alcohol use Yes      Comment: socially     Family History   Problem Relation Age of Onset     Breast Cancer Maternal Grandmother      Ovarian Cancer Maternal Grandmother      Cancer - colorectal Maternal Grandfather      Colon Cancer Maternal Grandfather      Prostate Cancer Maternal Grandfather      C.A.D. Father      C.A.D. Paternal Grandfather            Patient over age 50, mutual decision to screen reflected in health maintenance.    Pertinent mammograms are reviewed under the imaging tab.  History of abnormal Pap smear:   YES - updated in Problem List and Health Maintenance accordingly  Last 3  Pap Results:   PAP (no units)   Date Value   01/10/2017 NIL   09/05/2013 NIL       Reviewed and updated as needed this visit by clinical staff  Tobacco  Allergies  Meds  Problems  Med Hx  Surg Hx  Fam Hx  Soc Hx          Reviewed and updated as needed this visit by Provider  Tobacco  Allergies  Meds  Problems  Med Hx  Surg Hx  Fam Hx  Soc Hx         Past Medical History:   Diagnosis Date     Arthritis      Cervical high risk HPV (human papillomavirus) test positive 1/10/2017    1/10/17 NIL pap/+ HR HPV (not 16 or 18). Plan: cotest in 1 year, due by 1/10/18      Chronic infection     HX of MRSA. 2 neg swabs at [facility] in 2006 and 2007.     History of blood transfusion      Numbness and tingling     Right arm     PONV (postoperative nausea and vomiting)       Past Surgical History:   Procedure Laterality Date     APPENDECTOMY       ARTHROSCOPY KNEE  9/16/2011    Procedure:ARTHROSCOPY KNEE; left knee arthroscopy with debridement, open lateral patellar spur excision; Surgeon:LUIS A MONTOYA; [facility]     C PART REMV FEMUR/PROX TIB/FIB  9/16/11    left, open lateral patellar bone spur excision     C TOTAL KNEE ARTHROPLASTY  1/17/14    Bilateral     COLONOSCOPY N/A 2/16/2016    Procedure: COLONOSCOPY;  Surgeon: Belem Khalil MD;  [facility]     COLONOSCOPY N/A 2/16/2016    Procedure: COMBINED COLONOSCOPY, SINGLE OR MULTIPLE BIOPSY/POLYPECTOMY BY BIOPSY;  Surgeon: Belem Khalil MD;  [facility]     COLONOSCOPY WITH CO2 INSUFFLATION N/A 2/16/2016    Procedure: COLONOSCOPY WITH CO2 INSUFFLATION;  Surgeon: Belem Khalil MD;  [facility]: [facility]     CYSTOSCOPY  2016    microscopic hematuria     DECOMPRESSION, FUSION CERVICAL ANTERIOR ONE LEVEL, COMBINED N/A 3/30/2017    Procedure: COMBINED DECOMPRESSION, FUSION CERVICAL ANTERIOR ONE LEVEL;  Surgeon: Joseph Klein MD;  Location: [facility]     ECTOPIC PREGNANCY SURGERY       ENT SURGERY    "    GYN SURGERY       HC INCISION TENDON SHEATH FINGER Left 7/11/14    Thumb TF release     HC KNEE SCOPE,MED/LAT MENISECTOMY  9/16/11    left, with partial medial menisectomy ONLY     HC REVISE MEDIAN N/CARPAL TUNNEL SURG Left 7/11/14    PRIMARY - not revision     ORTHOPEDIC SURGERY       RELEASE CARPAL TUNNEL  7/11/2014    Procedure: RELEASE CARPAL TUNNEL;  Surgeon: Rg Franco MD;  [facility]     RELEASE CARPAL TUNNEL Right 11/7/2014    Procedure: RELEASE CARPAL TUNNEL;  Surgeon: Rg Franco MD;  [facility]     RELEASE TRIGGER FINGER  7/11/2014    Procedure: RELEASE TRIGGER FINGER;  Surgeon: Rg Franco MD;  [facility]     RELEASE TRIGGER FINGER Right 11/7/2014    Procedure: RELEASE TRIGGER FINGER;  Surgeon: Rg Franco MD;  Location: [facility]     tonsils         ROS:  C: NEGATIVE for fever, chills, change in weight  I: NEGATIVE for worrisome rashes, moles or lesions  E: NEGATIVE for vision changes or irritation  ENT: NEGATIVE for ear, mouth and throat problems  R: NEGATIVE for significant cough or SOB  B: NEGATIVE for masses, tenderness or discharge  CV: NEGATIVE for chest pain, palpitations or peripheral edema  GI: NEGATIVE for nausea, abdominal pain, heartburn, or change in bowel habits  : NEGATIVE for unusual urinary or vaginal symptoms. No vaginal bleeding.  M: NEGATIVE for significant arthralgias or myalgia  N: NEGATIVE for weakness, dizziness or paresthesias  E: NEGATIVE for temperature intolerance, skin/hair changes  P: NEGATIVE for changes in mood or affect     OBJECTIVE:   BP (!) 166/98  Pulse 89  Temp 96.4  F (35.8  C) (Tympanic)  Ht 5' 1.42\" (1.56 m)  Wt 201 lb 12.8 oz (91.5 kg)  LMP 09/16/2011  SpO2 98%  BMI 37.61 kg/m2  EXAM:  GENERAL: healthy, alert and no distress  EYES: Eyes grossly normal to inspection, PERRL and conjunctivae and sclerae normal  HENT: ear canals and TM's normal, nose and mouth without ulcers or lesions  NECK: no " adenopathy, no asymmetry, masses, or scars and thyroid normal to palpation  RESP: lungs clear to auscultation - no rales, rhonchi or wheezes  BREAST: normal without masses, tenderness or nipple discharge and no palpable axillary masses or adenopathy  CV: regular rate and rhythm, normal S1 S2, no S3 or S4, no murmur, click or rub, no peripheral edema and peripheral pulses strong  ABDOMEN: soft, nontender, no hepatosplenomegaly, no masses and bowel sounds normal   (female): normal female external genitalia, normal urethral meatus, vaginal mucosa pink, moist, well rugated, and normal cervix/adnexa/uterus without masses. Scant clear discharge + fishy odor, pap/HPV, wet prep obtained.  MS: right 3rd finger with trigger finger, no gross musculoskeletal defects noted, no edema  SKIN: no suspicious lesions or rashes  NEURO: Normal strength and tone, mentation intact and speech normal  PSYCH: mentation appears normal, affect normal/bright  LYMPH: no cervical, supraclavicular, axillary, or inguinal adenopathy    ASSESSMENT/PLAN:   1. Routine history and physical examination of adult    - Glucose    2. Visit for screening mammogram  Scheduled for next week.    3. Screening for malignant neoplasm of cervix    - Pap imaged thin layer screen with HPV - recommended age 30 - 65 years (select HPV order below)  - HPV High Risk Types DNA Cervical    4. Essential hypertension  Patient is candidate for PGEN study- briefly discussed the study and she is interested in participating in it.  Unable to stay to get consented today but will forward her name on to our  to schedule PGEN visit.  - order for DME; Equipment being ordered: BP cuff/machine  Dispense: 1 Device; Refill: 0    5. Trigger finger, acquired    - ORTHO  REFERRAL    6. Vaginal odor  + for clue cells  - Wet prep    7. Tobacco abuse  Patient has set quit date as after easter.  - Tobacco Cessation - Order to Satisfy Health Maintenance    8. Tobacco  "abuse counseling  Open to quit plan, referral placed.    9. BV (bacterial vaginosis)     Medication started today, see epic for orders.  Patient is to avoid alcohol while on Flagyl.  I briefly discussed the pathophysiology of bacterial vaginosis and outlined the expected course.  I discussed the warning symptoms and signs that indicate an atypical course that would need urgent follow up.  Patient education materials given during examination today.    - metroNIDAZOLE (FLAGYL) 500 MG tablet; Take 1 tablet (500 mg) by mouth 2 times daily  Dispense: 14 tablet; Refill: 0    10 Adjustment disorder with mixed anxiety and depressed mood  PHQ-910, ANITA-7=12 today.  Patient feels that her current symptoms are episodic and will improve as she finishes cleaning out one friends home.  She has supportive family/friends and has a friend who is a therapist that she talks to as well.  She declines Mental health referral at this time and declines medication as well.  COUNSELING:   Reviewed preventive health counseling, as reflected in patient instructions       Regular exercise       Healthy diet/nutrition       Vision screening       Osteoporosis Prevention/Bone Health       Safe sex practices/STD prevention       (Tea)menopause management         reports that she has been smoking Cigarettes.  She has a 15.00 pack-year smoking history. She has never used smokeless tobacco.  Tobacco Cessation Action Plan: Phone counseling: Place order for QuitPlan (Tobacco Cessation [facility] Referral [id number])  Pharmacotherapies : Chantix  Self help information given to patient  Estimated body mass index is 37.61 kg/(m^2) as calculated from the following:    Height as of this encounter: 5' 1.42\" (1.56 m).    Weight as of this encounter: 201 lb 12.8 oz (91.5 kg).   Weight management plan: Discussed healthy diet and exercise guidelines and patient will follow up in 6 months in clinic to re-evaluate.    Counseling Resources:  ATP IV Guidelines  Pooled Cohorts " Equation Calculator  Breast Cancer Risk Calculator  FRAX Risk Assessment  ICSI Preventive Guidelines  Dietary Guidelines for Americans, 2010  USDA's MyPlate  ASA Prophylaxis  Lung CA Screening    JOSE R Sarabia CNP  [facility]

## 2018-03-29 NOTE — PATIENT INSTRUCTIONS
At [facility], we strive to deliver an exceptional experience to you, every time we see you.  If you receive a survey in the mail, please send us back your thoughts. We really do value your feedback.    Based on your medical history, these are the current health maintenance/preventive care services that you are due for (some may have been done at this visit.)  Health Maintenance Due   Topic Date Due     TOBACCO CESSATION COUNSELING Q1 YR  1960     ADVANCE DIRECTIVE PLANNING Q5 YRS  01/12/2017     HPV Q1 Year  01/10/2018     PAP Q1 YR DIAGNOSTIC  01/10/2018     MAMMO SCREEN Q2 YR (SYSTEM ASSIGNED)  02/11/2018         Suggested websites for health information:  Www.[email or web address].org : Up to date and easily searchable information on multiple topics.  Www.medlineplus.gov : medication info, interactive tutorials, watch real surgeries online  Www.familydoctor.org : good info from the Academy of Family Physicians  Www.cdc.gov : public health info, travel advisories, epidemics (H1N1)  Www.aap.org : children's health info, normal development, vaccinations  Www.health.[facility].[place].us : [place] dept of health, public health issues in [place], N1N1    Your care team:                            Family Medicine Internal Medicine   MD David Wagoner MD Shantel Branch-Fleming, MD Katya Georgiev PA-C Nam Ho, MD Pediatrics   OLIVIA Lucia, MD Alexus Solorzano CNP, MD Deborah Mielke, MD Kim Thein, APRN [facility]      Clinic hours: Monday - Thursday 7 am-7 pm; Fridays 7 am-5 pm.   Urgent care: Monday - Friday 11 am-9 pm; Saturday and Sunday 9 am-5 pm.  Pharmacy : Monday -Thursday 8 am-8 pm; Friday 8 am-6 pm; Saturday and Sunday 9 am-5 pm.     Clinic: [phone]   Pharmacy: [phone]    Preventive Health Recommendations  Female Ages 50 - 64    Yearly exam: See your health care provider every year in order to  o Review health changes.    o Discuss preventive care.    o Review your medicines if your doctor has prescribed any.      Get a Pap test every three years (unless you have an abnormal result and your provider advises testing more often).    If you get Pap tests with HPV test, you only need to test every 5 years, unless you have an abnormal result.     You do not need a Pap test if your uterus was removed (hysterectomy) and you have not had cancer.    You should be tested each year for STDs (sexually transmitted diseases) if you're at risk.     Have a mammogram every 1 to 2 years.    Have a colonoscopy at age 50, or have a yearly FIT test (stool test). These exams screen for colon cancer.      Have a cholesterol test every 5 years, or more often if advised.    Have a diabetes test (fasting glucose) every three years. If you are at risk for diabetes, you should have this test more often.     If you are at risk for osteoporosis (brittle bone disease), think about having a bone density scan (DEXA).    Shots: Get a flu shot each year. Get a tetanus shot every 10 years.    Nutrition:     Eat at least 5 servings of fruits and vegetables each day.    Eat whole-grain bread, whole-wheat pasta and brown rice instead of white grains and rice.    Talk to your provider about Calcium and Vitamin D.     Lifestyle    Exercise at least 150 minutes a week (30 minutes a day, 5 days a week). This will help you control your weight and prevent disease.    Limit alcohol to one drink per day.    No smoking.     Wear sunscreen to prevent skin cancer.     See your dentist every six months for an exam and cleaning.    See your eye doctor every 1 to 2 years.      HOW TO QUIT SMOKING  Smoking is one of the hardest habits to break. About half of all those who have ever smoked have been able to quit, and most of those (about 70%) who still smoke want to quit. Here are some of the best ways to stop smoking.     KEEP TRYING:  It takes most smokers about 8 tries before they  are finally able to fully quit. So, the more often you try and fail, the better your chance of quitting the next time! So, don't give up!    GO COLD TURKEY:  Most ex-smokers quit cold turkey. Trying to cut back gradually doesn't seem to work as well, perhaps because it continues the smoking habit. Also, it is possible to fool yourself by inhaling more while smoking fewer cigarettes. This results in the same amount of nicotine in your body!    GET SUPPORT:  Support programs can make an important difference, especially for the heavy smoker. These groups offer lectures, methods to change your behavior and peer support. Call the free national Quitline for more information. 800-QUIT-NOW (216-446-6957). Low-cost or free programs are offered by many hospitals, local chapters of the American Lung Association (770-840-7163) and the American Cancer Society (201-311-3270). Support at home is important too. Non-smokers can help by offering praise and encouragement. If the smoker fails to quit, encourage them to try again!    OVER-THE-COUNTER MEDICINES:  For those who can't quit on their own, Nicotine Replacement Therapy (NRT) may make quitting much easier. Certain aids such as the nicotine patch, gum and lozenge are available without a prescription. However, it is best to use these under the guidance of your doctor. The skin patch provides a steady supply of nicotine to the body. Nicotine gum and lozenge gives temporary bursts of low levels of nicotine. Both methods take the edge off the craving for cigarettes. WARNING: If you feel symptoms of nicotine overdose, such as nausea, vomiting, dizziness, weakness, or fast heartbeat, stop using these and see your doctor.    PRESCRIPTION MEDICINES:  After evaluating your smoking patterns and prior attempts at quitting, your doctor may offer a prescription medicine such as bupropion (Zyban, Wellbutrin), varenicline (Chantix, Champix), a niocotine inhaler or nasal spray. Each has its  unique advantage and side effects which your doctor can review with you.    HEALTH BENEFITS OF QUITTING:  The benefits of quitting start right away and keep improving the longer you go without smokin minutes: blood pressure and pulse return to normal  8 hours: oxygen levels return to normal  2 days: ability to smell and taste begins to improve as damaged nerves start to regrow  2-3 weeks: circulation and lung function improves  1-9 months: decreased cough, congestion and shortness of breath; less tired  1 year: risk of heart attack decreases by half  5 years: risk of lung cancer decreases by half; risk of stroke becomes the same as a non-smoker  For information about how to quit smoking, visit the following links:  National Cancer [place] ,   Clearing the Air, Quit Smoking Today   - an online booklet. http://www.smokefree.gov/pubs/clearing_the_air.pdf  Smokefree.gov http://smokefree.gov/  QuitNet http://www.quitnet.com/    5718-2724 [facility], [address] [place] [address]. All rights reserved. This information is not intended as a substitute for professional medical care. Always follow your healthcare professional's instructions.    The Benefits of Living Smoke Free  What do you want to gain from quitting? Check off some reasons to quit.  Health Benefits  ___ Reduce my risk of lung cancer, heart disease, chronic lung disease  ___ Have fewer wrinkles and softer skin  ___ Improve my sense of taste and smell  ___ For pregnant women reduce the risk of having a miscarriage, stillbirth, premature birth, or low-birth-weight baby  Personal Benefits  ___ Feel more in control of my life  ___ Have better-smelling hair, breath, clothes, home, and car  ___ Save time by not having to take smoke breaks, buy cigarettes, or hunt for a light  ___ Have whiter teeth  Family Benefits  ___ Reduce my children s respiratory tract infections  ___ Set a good example for my children  ___ Reduce my family s cancer  risk  Financial Benefits  ___ Save hundreds of dollars each year that would be spent on cigarettes  ___ Save money on medical bills  ___ Save on life, health, and car insurance premiums    Those Dollars Add Up!  Cigarettes are expensive, and getting more expensive all the time. Do you realize how much money you are spending on cigarettes per year? What is the average amount you spend on a pack of cigarettes? What is the average number of packs that you smoke per day? Using your answers to these questions, fill in this formula to help you find out:  ($ _____ per pack) ×  ( _____ number of packs per day) × (365 days) =  $ _____ yearly cost of smoking  Besides tobacco, there are other costs, including extra cleaning bills and replacement costs for clothing and furniture; medical expenses for smoking-related illnesses; and higher health, life, and car insurance premiums.    Cigars and Pipes Count Too!  Cigars and pipes are also dangerous. So are smokeless (chewing) tobacco and snuff. All of these products contain nicotine, a highly addictive substance that has harmful effects on your body. Quitting smoking means giving up all tobacco products.      9391-6985 [facility], [address] [place] [address], [place] [address]. All rights reserved. This information is not intended as a substitute for professional medical care. Always follow your healthcare professional's instructions.  Treating Trigger Finger     The tendon sheath is opened to release the tendon. Once the tendon can move freely again, the finger can bend and straighten more normally.     Trigger finger occurs when the tissue inside your finger or thumb becomes inflamed. Mild cases can be treated without surgery. If the problem is severe, surgery may be needed. Your doctor will discuss your options with you.  Nonsurgical treatment  For mild symptoms, your doctor may have you rest the finger or thumb. You may also be told to take anti-inflammatory medicines. These  include ibuprofen or aspirin. You may be given an injection of medicine in the base of the finger or thumb. This typically is a steroid, such as cortisone.  Surgery  If nonsurgical treatments don t ease your symptoms, surgery may be recommended. A tendon is a cordlike fiber that attaches muscle to bone and allows joints to bend. The tendon is surrounded by a protective cover called a sheath. During surgery, the sheath in your finger or thumb is opened to enlarge the space and release the swollen tendon. This allows the finger or thumb to bend and straighten normally. Surgery takes about 20 minutes. It can often be done using a local anesthetic. You may be able to go home the same day. Your hand will be wrapped in a soft bandage. You may need to wear a plaster splint for a short time to keep the finger or thumb still as it heals. The stitches will be removed in about 2 weeks. Your doctor can discuss the risks and benefits of surgery with you.  Date Last Reviewed: 9/21/2015 2000-2017 [employer]. [address] [place] [address], [place] [address]. All rights reserved. This information is not intended as a substitute for professional medical care. Always follow your healthcare professional's instructions.

## 2018-03-29 NOTE — MR AVS SNAPSHOT
After Visit Summary   3/29/2018    Margo Inman    MRN: [id number]           Patient Information     Date Of Birth          1960        Visit Information        Provider Department      3/29/2018 10:20 AM Belem Dumont APRN CNP [facility]        Today's Diagnoses     Routine history and physical examination of adult    -  1    Visit for screening mammogram        Screening for malignant neoplasm of cervix        Essential hypertension        Tobacco abuse        Tobacco abuse counseling        Trigger finger, acquired          Care Instructions    At [facility], we strive to deliver an exceptional experience to you, every time we see you.  If you receive a survey in the mail, please send us back your thoughts. We really do value your feedback.    Based on your medical history, these are the current health maintenance/preventive care services that you are due for (some may have been done at this visit.)  Health Maintenance Due   Topic Date Due     TOBACCO CESSATION COUNSELING Q1 YR  1960     ADVANCE DIRECTIVE PLANNING Q5 YRS  01/12/2017     HPV Q1 Year  01/10/2018     PAP Q1 YR DIAGNOSTIC  01/10/2018     MAMMO SCREEN Q2 YR (SYSTEM ASSIGNED)  02/11/2018         Suggested websites for health information:  Www.[employer] : Up to date and easily searchable information on multiple topics.  Www.medlineplus.gov : medication info, interactive tutorials, watch real surgeries online  Www.familydoctor.org : good info from the Academy of Family Physicians  Www.cdc.gov : public health info, travel advisories, epidemics (H1N1)  Www.aap.org : children's health info, normal development, vaccinations  Www.health.state.[place].us : [place] dept of health, public health issues in [place], N1N1    Your care team:                            Family Medicine Internal Medicine   MD David Wagoner MD Shantel Branch-Fleming, MD Katya Georgiev PA-C Nam Ho,  MD Pediatrics   OLIVIA Lucia, JAIME Kovacs APRMD Alexus Christensen CNP, MD Deborah Mielke, MD Kim Thein, APRN CNP      Clinic hours: Monday - Thursday 7 am-7 pm; Fridays 7 am-5 pm.   Urgent care: Monday - Friday 11 am-9 pm; Saturday and Sunday 9 am-5 pm.  Pharmacy : Monday -Thursday 8 am-8 pm; Friday 8 am-6 pm; Saturday and Sunday 9 am-5 pm.     Clinic: [phone]   Pharmacy: [phone]    Preventive Health Recommendations  Female Ages 50 - 64    Yearly exam: See your health care provider every year in order to  o Review health changes.   o Discuss preventive care.    o Review your medicines if your doctor has prescribed any.      Get a Pap test every three years (unless you have an abnormal result and your provider advises testing more often).    If you get Pap tests with HPV test, you only need to test every 5 years, unless you have an abnormal result.     You do not need a Pap test if your uterus was removed (hysterectomy) and you have not had cancer.    You should be tested each year for STDs (sexually transmitted diseases) if you're at risk.     Have a mammogram every 1 to 2 years.    Have a colonoscopy at age 50, or have a yearly FIT test (stool test). These exams screen for colon cancer.      Have a cholesterol test every 5 years, or more often if advised.    Have a diabetes test (fasting glucose) every three years. If you are at risk for diabetes, you should have this test more often.     If you are at risk for osteoporosis (brittle bone disease), think about having a bone density scan (DEXA).    Shots: Get a flu shot each year. Get a tetanus shot every 10 years.    Nutrition:     Eat at least 5 servings of fruits and vegetables each day.    Eat whole-grain bread, whole-wheat pasta and brown rice instead of white grains and rice.    Talk to your provider about Calcium and Vitamin D.     Lifestyle    Exercise at least 150 minutes a week (30 minutes a  day, 5 days a week). This will help you control your weight and prevent disease.    Limit alcohol to one drink per day.    No smoking.     Wear sunscreen to prevent skin cancer.     See your dentist every six months for an exam and cleaning.    See your eye doctor every 1 to 2 years.      HOW TO QUIT SMOKING  Smoking is one of the hardest habits to break. About half of all those who have ever smoked have been able to quit, and most of those (about 70%) who still smoke want to quit. Here are some of the best ways to stop smoking.     KEEP TRYING:  It takes most smokers about 8 tries before they are finally able to fully quit. So, the more often you try and fail, the better your chance of quitting the next time! So, don't give up!    GO COLD TURKEY:  Most ex-smokers quit cold turkey. Trying to cut back gradually doesn't seem to work as well, perhaps because it continues the smoking habit. Also, it is possible to fool yourself by inhaling more while smoking fewer cigarettes. This results in the same amount of nicotine in your body!    GET SUPPORT:  Support programs can make an important difference, especially for the heavy smoker. These groups offer lectures, methods to change your behavior and peer support. Call the free national Quitline for more information. 800-QUIT-NOW (471-962-2812). Low-cost or free programs are offered by many hospitals, local chapters of the American Lung Association (862-987-9879) and the American Cancer Society (052-516-7911). Support at home is important too. Non-smokers can help by offering praise and encouragement. If the smoker fails to quit, encourage them to try again!    OVER-THE-COUNTER MEDICINES:  For those who can't quit on their own, Nicotine Replacement Therapy (NRT) may make quitting much easier. Certain aids such as the nicotine patch, gum and lozenge are available without a prescription. However, it is best to use these under the guidance of your doctor. The skin patch  provides a steady supply of nicotine to the body. Nicotine gum and lozenge gives temporary bursts of low levels of nicotine. Both methods take the edge off the craving for cigarettes. WARNING: If you feel symptoms of nicotine overdose, such as nausea, vomiting, dizziness, weakness, or fast heartbeat, stop using these and see your doctor.    PRESCRIPTION MEDICINES:  After evaluating your smoking patterns and prior attempts at quitting, your doctor may offer a prescription medicine such as bupropion (Zyban, Wellbutrin), varenicline (Chantix, Champix), a niocotine inhaler or nasal spray. Each has its unique advantage and side effects which your doctor can review with you.    HEALTH BENEFITS OF QUITTING:  The benefits of quitting start right away and keep improving the longer you go without smokin minutes: blood pressure and pulse return to normal  8 hours: oxygen levels return to normal  2 days: ability to smell and taste begins to improve as damaged nerves start to regrow  2-3 weeks: circulation and lung function improves  1-9 months: decreased cough, congestion and shortness of breath; less tired  1 year: risk of heart attack decreases by half  5 years: risk of lung cancer decreases by half; risk of stroke becomes the same as a non-smoker  For information about how to quit smoking, visit the following links:  National Cancer [employer] ,   Clearing the Air, Quit Smoking Today   - an online booklet. http://www.smokefree.gov/pubs/clearing_the_air.pdf  Smokefree.gov http://smokefree.gov/  QuitNet http://www.quitnet.com/    4340-1569 Shara Fajardo, [address] [place] [address], [place] [address]. All rights reserved. This information is not intended as a substitute for professional medical care. Always follow your healthcare professional's instructions.    The Benefits of Living Smoke Free  What do you want to gain from quitting? Check off some reasons to quit.  Health Benefits  ___ Reduce my risk of lung cancer, heart  disease, chronic lung disease  ___ Have fewer wrinkles and softer skin  ___ Improve my sense of taste and smell  ___ For pregnant women--reduce the risk of having a miscarriage, stillbirth, premature birth, or low-birth-weight baby  Personal Benefits  ___ Feel more in control of my life  ___ Have better-smelling hair, breath, clothes, home, and car  ___ Save time by not having to take smoke breaks, buy cigarettes, or hunt for a light  ___ Have whiter teeth  Family Benefits  ___ Reduce my children s respiratory tract infections  ___ Set a good example for my children  ___ Reduce my family s cancer risk  Financial Benefits  ___ Save hundreds of dollars each year that would be spent on cigarettes  ___ Save money on medical bills  ___ Save on life, health, and car insurance premiums    Those Dollars Add Up!  Cigarettes are expensive, and getting more expensive all the time. Do you realize how much money you are spending on cigarettes per year? What is the average amount you spend on a pack of cigarettes? What is the average number of packs that you smoke per day? Using your answers to these questions, fill in this formula to help you find out:  ($ _____ per pack) ×  ( _____ number of packs per day) × (365 days) =  $ _____ yearly cost of smoking  Besides tobacco, there are other costs, including extra cleaning bills and replacement costs for clothing and furniture; medical expenses for smoking-related illnesses; and higher health, life, and car insurance premiums.    Cigars and Pipes Count Too!  Cigars and pipes are also dangerous. So are smokeless (chewing) tobacco and snuff. All of these products contain nicotine, a highly addictive substance that has harmful effects on your body. Quitting smoking means giving up all tobacco products.      5967-0664 Shara Fajardo, [address], [place] [address]. All rights reserved. This information is not intended as a substitute for professional medical care. Always  follow your healthcare professional's instructions.  Treating Trigger Finger     The tendon sheath is opened to release the tendon. Once the tendon can move freely again, the finger can bend and straighten more normally.     Trigger finger occurs when the tissue inside your finger or thumb becomes inflamed. Mild cases can be treated without surgery. If the problem is severe, surgery may be needed. Your doctor will discuss your options with you.  Nonsurgical treatment  For mild symptoms, your doctor may have you rest the finger or thumb. You may also be told to take anti-inflammatory medicines. These include ibuprofen or aspirin. You may be given an injection of medicine in the base of the finger or thumb. This typically is a steroid, such as cortisone.  Surgery  If nonsurgical treatments don t ease your symptoms, surgery may be recommended. A tendon is a cordlike fiber that attaches muscle to bone and allows joints to bend. The tendon is surrounded by a protective cover called a sheath. During surgery, the sheath in your finger or thumb is opened to enlarge the space and release the swollen tendon. This allows the finger or thumb to bend and straighten normally. Surgery takes about 20 minutes. It can often be done using a local anesthetic. You may be able to go home the same day. Your hand will be wrapped in a soft bandage. You may need to wear a plaster splint for a short time to keep the finger or thumb still as it heals. The stitches will be removed in about 2 weeks. Your doctor can discuss the risks and benefits of surgery with you.  Date Last Reviewed: 9/21/2015 2000-2017 [employer]. [address] [place] [address], [place] [address]. All rights reserved. This information is not intended as a substitute for professional medical care. Always follow your healthcare professional's instructions.                Follow-ups after your visit        Additional Services     ORTHO  REFERRAL        [facility] is referring you to the Orthopedic  Services at [facility].       The  Representative will assist you in the coordination of your Orthopedic and Musculoskeletal Care as prescribed by your physician.    The  Representative will call you within 1 business day to help schedule your appointment, or you may contact the  Representative at:    All areas ~ [phone]     Type of Referral : Non Surgical       Timeframe requested: Routine    Coverage of these services is subject to the terms and limitations of your health insurance plan.  Please call member services at your health plan with any benefit or coverage questions.      If X-rays, CT or MRI's have been performed, please contact the facility where they were done to arrange for , prior to your scheduled appointment.  Please bring this referral request to your appointment and present it to your specialist.            QUITPLAN  Referral       [place] TOBACCO QUITLINES FAX FORM  Fax form to: 1 [phone]    The clinic will facilitate the referral to the quitline.    Provider Information:  ===============================================================  JOSE R Sarabia CNP  ID#: [id number] - [facility]: [facility] [phone] Fax: [phone]   [email or web address]/  Payor: NATALIIA / Plan: NATALIIA [place] / Product Type: HMO /   ===============================================================    The Public Health Service Guideline does not recommend providing over-the-counter nicotine replacement therapy products without physician authorization to patients with the following conditions: pregnancy, uncontrolled high blood pressure, or cardiovascular diseases.     I authorize the [place] Tobacco Quitlines to provide over-the-counter nicotine replacement products for the patient listed below if the patient's  health plan benefits cover NRT or if the patient is eligible for QUITPLAN services.    Patient Consented to:  ===============================================================  - YES - I am ready to quit tobacco and request the above information be given to the quitline so they may contact me.  I understand that one of [place]'s Tobacco Quitlines will inform my provider about my participation.  ===============================================================  Please check the BEST 3-hour call window for them to reach you: 2pm - 5pm  May we leave a message?  YES  Language Preference:  English  Phone Number: Home Phone      [phone]  Mobile          [phone]     E-mail Address: [email or web address]    ========================================================================  FOR QUITLINE USE ONLY:  THIS INFORMATION WILL BE PROVIDED BACK TO THE PROVIDER  Contact date: __/ __/__ or ____ Did not reach after three attempts.    Outcome:  __ Enrolled in telephone counseling program  __ Declined  __ Not Reached    Stage of readiness: _______________________  Planned Quit Date: ___/ ___/ ___  Comments:      2011 [facility]   This message funded by [employer] [place], an independent licensee of the Blue Cross and Blue Shield Association. Rev. 11/1/12                  Your next 10 appointments already scheduled     Mar 30, 2018  2:15 PM CDT   (Arrive by 2:00 PM)   MA NOEL DIGITAL BILATERAL [id number]    [address] [facility] [address] [phone]           Do not use any powder, lotion or deodorant under your arms or on your breast. If you do, we will ask you to remove it before your exam.  Wear comfortable, two-piece clothing.  If you have any allergies, tell your care team.  Bring any previous mammograms from other facilities or have them mailed to the breast center. Three-dimensional  "(3D) mammograms are available at [place] locations in [facility], [place], [place], [facility], [place], [place], and [place]. [facility] locations include [place] and [facility] in [place]. Benefits of 3D mammograms include: - Improved rate of cancer detection - Decreases your chance of having to go back for more tests, which means fewer: - \"False-positive\" results (This means that there is an abnormal area but it isn't cancer.) - Invasive testing procedures, such as a biopsy or surgery - Can provide clearer images of the breast if you have dense breast tissue. 3D mammography is an optional exam that anyone can have with a 2D mammogram. It doesn't replace or take the place of a 2D mammogram. 2D mammograms remain an effective screening test for all women.  Not all insurance companies cover the cost of a 3D mammogram. Check with your insurance.              Future tests that were ordered for you today     Open Future Orders        Priority Expected Expires Ordered    QUITPLAN  Referral Routine  5/28/2018 3/29/2018            Who to contact     If you have questions or need follow up information about today's clinic visit or your schedule please contact [facility] directly at [phone].  Normal or non-critical lab and imaging results will be communicated to you by [employer], letter or phone within 4 business days after the clinic has received the results. If you do not hear from us within 7 days, please contact the clinic through [employer] or phone. If you have a critical or abnormal lab result, we will notify you by phone as soon as possible.  Submit refill requests through [employer] or call your pharmacy and they will forward the refill request to us. Please allow 3 business days for your refill to be completed.          Additional Information About Your Visit        [employer] Information     [employer] lets you send messages to your doctor, view your test results, " "renew your prescriptions, schedule appointments and more. To sign up, go to [email or web address] . Click on \"Log in\" on the left side of the screen, which will take you to the Welcome page. Then click on \"Sign up Now\" on the right side of the page.     You will be asked to enter the access code listed below, as well as some personal information. Please follow the directions to create your username and password.     Your access code is: [email or web address]  Expires: 2018 11:20 AM     Your access code will  in 90 days. If you need help or a new code, please call your [facility] clinic or [phone].        Care EveryWhere ID     This is your Care EveryWhere ID. This could be used by other organizations to access your [facility] medical records  [id number]        Your Vitals Were     Pulse Temperature Height Last Period Pulse Oximetry BMI (Body Mass Index)    89 96.4  F (35.8  C) (Tympanic) 5' 1.42\" (1.56 m) 2011 98% 37.61 kg/m2       Blood Pressure from Last 3 Encounters:   18 (!) 166/98   17 (!) 170/96   17 136/88    Weight from Last 3 Encounters:   18 201 lb 12.8 oz (91.5 kg)   17 201 lb 12.8 oz (91.5 kg)   17 198 lb (89.8 kg)              We Performed the Following     Glucose     HPV High Risk Types DNA Cervical     ORTHO  REFERRAL     Pap imaged thin layer screen with HPV - recommended age 30 - 65 years (select HPV order below)     Tobacco Cessation - Order to Satisfy Health Maintenance     Wet prep          Today's Medication Changes          These changes are accurate as of 3/29/18 11:20 AM.  If you have any questions, ask your nurse or doctor.               Start taking these medicines.        Dose/Directions    lisinopril 10 MG tablet   Commonly known as:  PRINIVIL/ZESTRIL   Used for:  Essential hypertension   Started by:  Belem Dumont APRN CNP        Dose:  10 mg   Take 1 tablet (10 mg) by mouth daily   Quantity:  30 tablet   Refills:  1       " order for DME   Used for:  Essential hypertension   Started by:  Belem Dumont APRN CNP        Equipment being ordered: BP cuff/machine   Quantity:  1 Device   Refills:  0         Stop taking these medicines if you haven't already. Please contact your care team if you have questions.     benzonatate 200 MG capsule   Commonly known as:  TESSALON   Stopped by:  Belem Dumont APRN CNP                Where to get your medicines      These medications were sent to [facility] [address] [place] [address] [place] [address] [place] AT [facility] [address]  [address] [facility] [place] [address]     Phone:  [phone]     lisinopril 10 MG tablet         Some of these will need a paper prescription and others can be bought over the counter.  Ask your nurse if you have questions.     Bring a paper prescription for each of these medications     order for DME                Primary Care Provider Office Phone # Fax #    JOSE R Henson [phone]       [place] [address]  [facility] [address]        Equal Access to Services     [facility]: Hadii aad ku hadasho Soomaali, waaxda luqadaha, qaybta kaalmada adeegyada, waxay jerod mccullough . So [facility] [phone].    ATENCIÓN: Si habla español, tiene a alberto disposición servicios gratuitos de asistencia lingüística. Llame al [phone].    We comply with applicable federal civil rights laws and [place] laws. We do not discriminate on the basis of race, color, national origin, age, disability, sex, sexual orientation, or gender identity.            Thank you!     Thank you for choosing [facility]  for your care. Our goal is always to provide you with excellent care. Hearing back from our patients is one way we can continue to improve our services. Please take a few minutes to complete the written survey that you may receive in the mail after your visit with us. Thank you!              Your Updated Medication List - Protect others around you: Learn how to safely use, store and throw away your medicines at www.disposemymeds.org.          This list is accurate as of 3/29/18 11:20 AM.  Always use your most recent med list.                   Brand Name Dispense Instructions for use Diagnosis    acetaminophen 500 MG tablet    TYLENOL     Take 500-1,000 mg by mouth every 6 hours as needed.        atorvastatin 80 MG tablet    LIPITOR    90 tablet    Take 1 tablet (80 mg) by mouth daily    Hyperlipidemia LDL goal <130       butalbital-acetaminophen-caffeine -40 MG per tablet    FIORICET/ESGIC    28 tablet    TAKE 1 TABLET BY MOUTH EVERY 4 HOURS AS NEEDED    Tension headache       cyclobenzaprine 10 MG tablet    FLEXERIL    30 tablet    TAKE 1 TABLET BY MOUTH AT BEDTIME AS NEEDED FOR MUSCLE SPASMS    S/P cervical spinal fusion       diclofenac 75 MG EC tablet    VOLTAREN    60 tablet    Take 1 tablet (75 mg) by mouth 2 times daily        lisinopril 10 MG tablet    PRINIVIL/ZESTRIL    30 tablet    Take 1 tablet (10 mg) by mouth daily    Essential hypertension       order for DME     1 Device    Equipment being ordered: BP cuff/machine    Essential hypertension       * varenicline 0.5 MG X 11 & 1 MG X 42 tablet    CHANTIX STARTING MONTH EVANGELINA    53 tablet    Take 0.5 mg tab daily for 3 days, then 0.5 mg tab twice daily for 4 days, then 1 mg twice daily.    Tobacco dependence syndrome       * varenicline 1 MG tablet    CHANTIX    56 tablet    Take 1 tablet (1 mg) by mouth 2 times daily    Tobacco dependence syndrome       * Notice:  This list has 2 medication(s) that are the same as other medications prescribed for you. Read the directions carefully, and ask your doctor or other care provider to review them with you.

## 2018-03-29 NOTE — LETTER
March 29, 2018      Margo Inman  [address] [address]

## 2018-03-30 ENCOUNTER — RADIANT APPOINTMENT (OUTPATIENT)
Dept: MAMMOGRAPHY | Facility: CLINIC | Age: 58
End: 2018-03-30
Attending: NURSE PRACTITIONER
Payer: COMMERCIAL

## 2018-03-30 DIAGNOSIS — Z12.31 ENCOUNTER FOR SCREENING MAMMOGRAM FOR BREAST CANCER: ICD-10-CM

## 2018-03-30 PROCEDURE — 77067 SCR MAMMO BI INCL CAD: CPT | Mod: TC

## 2018-03-30 ASSESSMENT — PATIENT HEALTH QUESTIONNAIRE - PHQ9: SUM OF ALL RESPONSES TO PHQ QUESTIONS 1-9: 10

## 2018-03-30 ASSESSMENT — ANXIETY QUESTIONNAIRES: GAD7 TOTAL SCORE: 12

## 2018-04-02 ENCOUNTER — OFFICE VISIT (OUTPATIENT)
Dept: ORTHOPEDICS | Facility: CLINIC | Age: 58
End: 2018-04-02
Payer: COMMERCIAL

## 2018-04-02 VITALS
BODY MASS INDEX: 37.95 KG/M2 | OXYGEN SATURATION: 95 % | WEIGHT: 201 LBS | HEIGHT: 61 IN | HEART RATE: 97 BPM | SYSTOLIC BLOOD PRESSURE: 159 MMHG | DIASTOLIC BLOOD PRESSURE: 89 MMHG

## 2018-04-02 DIAGNOSIS — M77.12 LATERAL EPICONDYLITIS OF LEFT ELBOW: ICD-10-CM

## 2018-04-02 DIAGNOSIS — M77.02 MEDIAL EPICONDYLITIS OF LEFT ELBOW: ICD-10-CM

## 2018-04-02 DIAGNOSIS — M65.332 TRIGGER MIDDLE FINGER OF LEFT HAND: Primary | ICD-10-CM

## 2018-04-02 PROCEDURE — 99213 OFFICE O/P EST LOW 20 MIN: CPT | Performed by: FAMILY MEDICINE

## 2018-04-02 RX ORDER — PREDNISONE 20 MG/1
40 TABLET ORAL DAILY
Qty: 10 TABLET | Refills: 0 | Status: SHIPPED | OUTPATIENT
Start: 2018-04-02 | End: 2018-04-07

## 2018-04-02 ASSESSMENT — PAIN SCALES - GENERAL: PAINLEVEL: MODERATE PAIN (4)

## 2018-04-02 NOTE — PROGRESS NOTES
CHIEF COMPLAINT:  No chief complaint on file.       HISTORY OF PRESENT ILLNESS  Ms. Inman is a pleasant 58 year old year old female who presents to clinic today with a finger issue.  Margo is seen at the request of Valentine Dumont.    Margo had a neck fusion about a year ago.  Shortly afterwards she developed a left middle finger locking and catching.  This has gotten worse over the past few months, particularly.  Will lock multiple times an hour.  She has a history of bilateral trigger thumb, she's had surgery on each.  She also had a left carpal tunnel surgery.    She also has left elbow pain.  Points to her medial epicondyle.  Lateral epicondyle hurts as well, medial more so.  Pain with gripping objects, lifting them, sometimes drops objects due to the pain.  Denies weakness.    Additional history: as documented    MEDICAL HISTORY  Patient Active Problem List   Diagnosis     Hyperlipidemia LDL goal <130     Pseudogout     OA (OSTEOARTHRITIS) OF KNEE - bilateral     RLS (restless legs syndrome)     S/P total knee arthroplasty juan     Acute posthemorrhagic anemia     Muscle spasm     CTS (carpal tunnel syndrome) - bilateral     S/P carpal tunnel release     Trigger thumb     Essential hypertension     Tobacco dependency     Microscopic hematuria     Cervical high risk HPV (human papillomavirus) test positive     Radiculopathy of cervical spine     S/P cervical spinal fusion     Adjustment disorder with mixed anxiety and depressed mood       Current Outpatient Prescriptions   Medication Sig Dispense Refill     order for DME Equipment being ordered: BP cuff/machine 1 Device 0     metroNIDAZOLE (FLAGYL) 500 MG tablet Take 1 tablet (500 mg) by mouth 2 times daily 14 tablet 0     atorvastatin (LIPITOR) 80 MG tablet Take 1 tablet (80 mg) by mouth daily 90 tablet 1     cyclobenzaprine (FLEXERIL) 10 MG tablet TAKE 1 TABLET BY MOUTH AT BEDTIME AS NEEDED FOR MUSCLE SPASMS 30 tablet 0     butalbital-acetaminophen-caffeine  "(FIORICET/ESGIC) -40 MG per tablet TAKE 1 TABLET BY MOUTH EVERY 4 HOURS AS NEEDED 28 tablet 0     varenicline (CHANTIX STARTING MONTH EVANGELINA) 0.5 MG X 11 & 1 MG X 42 tablet Take 0.5 mg tab daily for 3 days, then 0.5 mg tab twice daily for 4 days, then 1 mg twice daily. 53 tablet 0     varenicline (CHANTIX) 1 MG tablet Take 1 tablet (1 mg) by mouth 2 times daily 56 tablet 4     diclofenac (VOLTAREN) 75 MG EC tablet Take 1 tablet (75 mg) by mouth 2 times daily 60 tablet 11     acetaminophen (TYLENOL) 500 MG tablet Take 500-1,000 mg by mouth every 6 hours as needed.         Allergies   Allergen Reactions     Bee Anaphylaxis     Erythromycin Hives     Wasps [Hornets] Anaphylaxis       Family History   Problem Relation Age of Onset     Breast Cancer Maternal Grandmother      Ovarian Cancer Maternal Grandmother      Cancer - colorectal Maternal Grandfather      Colon Cancer Maternal Grandfather      Prostate Cancer Maternal Grandfather      C.A.D. Father      C.A.D. Paternal Grandfather        Additional medical/Social/Surgical histories reviewed in [facility] and updated as appropriate.     REVIEW OF SYSTEMS (4/2/2018)  CONSTITUTIONAL: Denies fever and weight loss  EYES: Denies acute vision changes  ENT: Denies hearing changes or difficulty swallowing  CARDIAC: Denies chest pain or edema  RESPIRATORY: Denies dyspnea, cough or wheeze  GASTROINTESTINAL: Denies abdominal pain, nausea, vomiting  MUSCULOSKELETAL: See HPI  SKIN: Denies any recent rash or lesion  NEUROLOGICAL: Denies numbness or focal weakness  PSYCHIATRIC: No history of psychiatric symptoms or problems  ENDOCRINE:    Lab Results   Component Value Date    A1C 5.9 04/04/2017     HEMATOLOGY: Denies episodes of easy bleeding      PHYSICAL EXAM    Vitals:    04/02/18 0820   BP: 159/89   Pulse: 97   SpO2: 95%   Weight: 91.2 kg (201 lb)   Height: 1.549 m (5' 1\")     General  - normal appearance, in no obvious distress  CV  - normal radial pulse  Pulm  - normal " respiratory pattern, non-labored  Musculoskeletal - left elbow  - inspection: normal joint alignment, no obvious deformity, mild soft tissue swelling medially  - palpation: tender at the origin of the common flexor tendon AND common extensor tendon  - ROM:  160 deg flexion   0 deg extension   90 deg pronation   90 deg supination  - strength: 5/5 wrist flexion with elbow flexed, 4/5 with elbow extended, painful resisted flexion of fingers with elbow flexed, worse with extension, 5/5  strength  - special tests:  (-) varus  (-) valgus  (-) Tinel's    Musculoskeletal - left middle finger  - inspection: no atrophy, normal joint alignment, no swelling  - palpation: no bony or soft tissue tenderness, no tenderness at the anatomical snuffbox  - ROM:  MCP 90 deg flexion   0 deg extension    deg flexion   0 deg extension   DIP 80 deg flexion   0 deg extension   palpable snap at the A1 pulley with active flexion, reproducible  - strength: 5/5  strength, 5/5 wrist abduction, 5/5 flexion, extension, pronation, supination, adduction  - special tests:  (-) varus  (-) valgus    Neuro  - no sensory or motor deficit, grossly normal coordination, normal muscle tone  Skin  - no ecchymosis, erythema, warmth, or induration, no obvious rash  Psych  - interactive, appropriate, normal mood and affect             ASSESSMENT & PLAN  Ms. Inman is a 58 year old year old female who presents to clinic today with trigger finger, as well as medial and lateral epicondylitis of the left elbow.    I gave Margo a universal wrist brace to wear at night and throughout the day as able.  I'm also referring her to hand therapy.    She does have trigger finger of the left middle finger as well.  We discussed conservative care with immobilization, therapy, and an injection.  She's less interested in an injection today.  I'm referring her to hand therapy for this issue as well.    Thank you for allowing me to participate in Margo's  care.    Rg Khan DO, CAQSM  Primary Care Sports Medicine

## 2018-04-02 NOTE — NURSING NOTE
"Margo Inman's goals for this visit include: evaluate left elbow pain and left long trigger finger   She requests these members of her care team be copied on today's visit information: yes    PCP: Belem Dumont    Referring Provider:  JOSE R Henson [facility]  [address] [place] [address]    Chief Complaint   Patient presents with     Consult     left arm pain from elbow down X 4 weeks. left long finger trigger pain X1year.        Initial /89  Pulse 97  Ht 1.549 m (5' 1\")  Wt 91.2 kg (201 lb)  LMP 09/16/2011  SpO2 95%  BMI 37.98 kg/m2 Estimated body mass index is 37.98 kg/(m^2) as calculated from the following:    Height as of this encounter: 1.549 m (5' 1\").    Weight as of this encounter: 91.2 kg (201 lb).  Medication Reconciliation: complete    "

## 2018-04-02 NOTE — MR AVS SNAPSHOT
After Visit Summary   2018    Margo Inman    MRN: [id number]           Patient Information     Date Of Birth          1960        Visit Information        Provider Department      2018 8:20 AM Rg Khan DO [facility]        Today's Diagnoses     Trigger middle finger of left hand    -  1    Medial epicondylitis of left elbow        Lateral epicondylitis of left elbow          Care Instructions    Thanks for coming today.  Ortho/Sports Medicine Clinic  [address] [place] [address]    To schedule future appointments in Ortho Clinic, you may call [phone].    To schedule ordered imaging by your provider:   Call Central Imaging [phone]    To schedule an injection ordered by your provider:  Call Central Imaging Injection scheduling line: [phone]  Online Prasadhart available online at:  [email or web address]    Please call if any further questions or concerns ([phone]).  Clinic hours 8 am to 5 pm.    Return to clinic (call) if symptoms worsen or fail to improve.            Follow-ups after your visit        Additional Services     [facility] PT, HAND, AND CHIROPRACTIC REFERRAL       Hand therapy    Please eval and treat for medial > lateral epicondylitis            [facility] HAND, [facility] CHIROPRACTIC REFERRAL       Hand therapy    Please eval and treat for trigger finger, can make custom splint if needed                  Your next 10 appointments already scheduled     2018  1:40 PM CDT   Nurse Only with JOSE R Henson CNP   [facility]    [address] [id number] [phone]            2018  2:00 PM CDT   (Arrive by 1:45 PM)   [facility] Hand with Blossom Starkey OT   [facility] ([facility]    [address]  [address]  [place] [id number] [phone]              Who to contact     If you have  "questions or need follow up information about today's clinic visit or your schedule please contact [facility] directly at [phone].  Normal or non-critical lab and imaging results will be communicated to you by MyChart, letter or phone within 4 business days after the clinic has received the results. If you do not hear from us within 7 days, please contact the clinic through Quottehart or phone. If you have a critical or abnormal lab result, we will notify you by phone as soon as possible.  Submit refill requests through [employer] or call your pharmacy and they will forward the refill request to us. Please allow 3 business days for your refill to be completed.          Additional Information About Your Visit        [employer] Information     [employer] is an electronic gateway that provides easy, online access to your medical records. With [employer] you can request a clinic appointment, read your test results, renew a prescription or communicate with your care team.     To sign up for [employer] visit the website at [email or web address]   You will be asked to enter the access code listed below, as well as some personal information. Please follow the directions to create your username and password.     Your access code is: [email or web address]  Expires: 2018 11:20 AM     Your access code will  in 90 days. If you need help or a new code, please contact your [facility] or call [phone] for assistance.        Care EveryWhere ID     This is your Care EveryWhere ID. This could be used by other organizations to access your [place] medical records  [id number]        Your Vitals Were     Pulse Height Last Period Pulse Oximetry BMI (Body Mass Index)       97 1.549 m (5' 1\") 2011 95% 37.98 kg/m2        Blood Pressure from Last 3 Encounters:   18 159/89   18 (!) 166/98   17 (!) 170/96    Weight from Last 3 Encounters:   18 91.2 kg (201 lb) "   03/29/18 91.5 kg (201 lb 12.8 oz)   07/06/17 91.5 kg (201 lb 12.8 oz)              We Performed the Following     [facility] HAND, AND CHIROPRACTIC REFERRAL     [facility], [facility] CHIROPRACTIC REFERRAL          Today's Medication Changes          These changes are accurate as of 4/2/18  8:48 AM.  If you have any questions, ask your nurse or doctor.               Start taking these medicines.        Dose/Directions    predniSONE 20 MG tablet   Commonly known as:  DELTASONE   Used for:  Trigger middle finger of left hand, Medial epicondylitis of left elbow   Started by:  Rg Khan, DO        Dose:  40 mg   Take 2 tablets (40 mg) by mouth daily for 5 days   Quantity:  10 tablet   Refills:  0            Where to get your medicines      These medications were sent to [facility] [id number]  [place] - [address] [place] AT [facility] [place] [address]  [address] [place] [address]     Phone:  [phone]     predniSONE 20 MG tablet                Primary Care Provider Office Phone # Fax #    Belem CJ Dumont, JOSE R [phone]       [facility] [address]  [facility] [address]        Equal Access to Services     FLAKITA CLAYTON AH: Hadii aad ku hadasho Soomaali, waaxda luqadaha, qaybta kaalmada adeegyada, waxay idiin haythomasn adeflorentino yun. So [facility] [place] [phone].    ATENCIÓN: Si habla español, tiene a alberto disposición servicios gratuitos de asistencia lingüística. Llame al [phone].    We comply with applicable federal civil rights laws and [place] laws. We do not discriminate on the basis of race, color, national origin, age, disability, sex, sexual orientation, or gender identity.            Thank you!     Thank you for choosing [facility]  for your care. Our goal is always to provide you with excellent care. Hearing back from our patients is one way we can continue to improve our services. Please take a few minutes to complete the written  survey that you may receive in the mail after your visit with us. Thank you!             Your Updated Medication List - Protect others around you: Learn how to safely use, store and throw away your medicines at www.disposemymeds.org.          This list is accurate as of 4/2/18  8:48 AM.  Always use your most recent med list.                   Brand Name Dispense Instructions for use Diagnosis    acetaminophen 500 MG tablet    TYLENOL     Take 500-1,000 mg by mouth every 6 hours as needed.        atorvastatin 80 MG tablet    LIPITOR    90 tablet    Take 1 tablet (80 mg) by mouth daily    Hyperlipidemia LDL goal <130       butalbital-acetaminophen-caffeine -40 MG per tablet    FIORICET/ESGIC    28 tablet    TAKE 1 TABLET BY MOUTH EVERY 4 HOURS AS NEEDED    Tension headache       cyclobenzaprine 10 MG tablet    FLEXERIL    30 tablet    TAKE 1 TABLET BY MOUTH AT BEDTIME AS NEEDED FOR MUSCLE SPASMS    S/P cervical spinal fusion       diclofenac 75 MG EC tablet    VOLTAREN    60 tablet    Take 1 tablet (75 mg) by mouth 2 times daily        metroNIDAZOLE 500 MG tablet    FLAGYL    14 tablet    Take 1 tablet (500 mg) by mouth 2 times daily    BV (bacterial vaginosis)       order for DME     1 Device    Equipment being ordered: BP cuff/machine    Essential hypertension       predniSONE 20 MG tablet    DELTASONE    10 tablet    Take 2 tablets (40 mg) by mouth daily for 5 days    Trigger middle finger of left hand, Medial epicondylitis of left elbow       * varenicline 0.5 MG X 11 & 1 MG X 42 tablet    CHANTIX STARTING MONTH EVANGELINA    53 tablet    Take 0.5 mg tab daily for 3 days, then 0.5 mg tab twice daily for 4 days, then 1 mg twice daily.    Tobacco dependence syndrome       * varenicline 1 MG tablet    CHANTIX    56 tablet    Take 1 tablet (1 mg) by mouth 2 times daily    Tobacco dependence syndrome       * Notice:  This list has 2 medication(s) that are the same as other medications prescribed for you. Read the  directions carefully, and ask your doctor or other care provider to review them with you.

## 2018-04-02 NOTE — PATIENT INSTRUCTIONS
Thanks for coming today.  Ortho/Sports Medicine Clinic  [address] [place] [address]    To schedule future appointments in Ortho Clinic, you may call [phone].    To schedule ordered imaging by your provider:   Call Central Imaging [phone]    To schedule an injection ordered by your provider:  Call Central Imaging Injection scheduling line: [phone]  [facility] available online at:  [email or web address]    Please call if any further questions or concerns ([phone]).  Clinic hours 8 am to 5 pm.    Return to clinic (call) if symptoms worsen or fail to improve.

## 2018-04-02 NOTE — LETTER
4/2/2018         RE: Margo Inman  [address] [address]        Dear Colleague,    Thank you for referring your patient, Margo Inman, to the [facility] [place]. Please see a copy of my visit note below.    CHIEF COMPLAINT:  No chief complaint on file.       HISTORY OF PRESENT ILLNESS  Ms. Inman is a pleasant 58 year old year old female who presents to clinic today with a finger issue.  Margo is seen at the request of Valentine Dumont.    Margo had a neck fusion about a year ago.  Shortly afterwards she developed a left middle finger locking and catching.  This has gotten worse over the past few months, particularly.  Will lock multiple times an hour.  She has a history of bilateral trigger thumb, she's had surgery on each.  She also had a left carpal tunnel surgery.    She also has left elbow pain.  Points to her medial epicondyle.  Lateral epicondyle hurts as well, medial more so.  Pain with gripping objects, lifting them, sometimes drops objects due to the pain.  Denies weakness.    Additional history: as documented    MEDICAL HISTORY  Patient Active Problem List   Diagnosis     Hyperlipidemia LDL goal <130     Pseudogout     OA (OSTEOARTHRITIS) OF KNEE - bilateral     RLS (restless legs syndrome)     S/P total knee arthroplasty juan     Acute posthemorrhagic anemia     Muscle spasm     CTS (carpal tunnel syndrome) - bilateral     S/P carpal tunnel release     Trigger thumb     Essential hypertension     Tobacco dependency     Microscopic hematuria     Cervical high risk HPV (human papillomavirus) test positive     Radiculopathy of cervical spine     S/P cervical spinal fusion     Adjustment disorder with mixed anxiety and depressed mood       Current Outpatient Prescriptions   Medication Sig Dispense Refill     order for DME Equipment being ordered: BP cuff/machine 1 Device 0     metroNIDAZOLE (FLAGYL) 500 MG tablet Take 1 tablet (500 mg) by mouth 2 times  daily 14 tablet 0     atorvastatin (LIPITOR) 80 MG tablet Take 1 tablet (80 mg) by mouth daily 90 tablet 1     cyclobenzaprine (FLEXERIL) 10 MG tablet TAKE 1 TABLET BY MOUTH AT BEDTIME AS NEEDED FOR MUSCLE SPASMS 30 tablet 0     butalbital-acetaminophen-caffeine (FIORICET/ESGIC) -40 MG per tablet TAKE 1 TABLET BY MOUTH EVERY 4 HOURS AS NEEDED 28 tablet 0     varenicline (CHANTIX STARTING MONTH EVANGELINA) 0.5 MG X 11 & 1 MG X 42 tablet Take 0.5 mg tab daily for 3 days, then 0.5 mg tab twice daily for 4 days, then 1 mg twice daily. 53 tablet 0     varenicline (CHANTIX) 1 MG tablet Take 1 tablet (1 mg) by mouth 2 times daily 56 tablet 4     diclofenac (VOLTAREN) 75 MG EC tablet Take 1 tablet (75 mg) by mouth 2 times daily 60 tablet 11     acetaminophen (TYLENOL) 500 MG tablet Take 500-1,000 mg by mouth every 6 hours as needed.         Allergies   Allergen Reactions     Bee Anaphylaxis     Erythromycin Hives     Wasps [Hornets] Anaphylaxis       Family History   Problem Relation Age of Onset     Breast Cancer Maternal Grandmother      Ovarian Cancer Maternal Grandmother      Cancer - colorectal Maternal Grandfather      Colon Cancer Maternal Grandfather      Prostate Cancer Maternal Grandfather      C.A.D. Father      C.A.D. Paternal Grandfather        Additional medical/Social/Surgical histories reviewed in [facility] and updated as appropriate.     REVIEW OF SYSTEMS (4/2/2018)  CONSTITUTIONAL: Denies fever and weight loss  EYES: Denies acute vision changes  ENT: Denies hearing changes or difficulty swallowing  CARDIAC: Denies chest pain or edema  RESPIRATORY: Denies dyspnea, cough or wheeze  GASTROINTESTINAL: Denies abdominal pain, nausea, vomiting  MUSCULOSKELETAL: See HPI  SKIN: Denies any recent rash or lesion  NEUROLOGICAL: Denies numbness or focal weakness  PSYCHIATRIC: No history of psychiatric symptoms or problems  ENDOCRINE:    Lab Results   Component Value Date    A1C 5.9 04/04/2017     HEMATOLOGY: Denies episodes  "of easy bleeding      PHYSICAL EXAM    Vitals:    04/02/18 0820   BP: 159/89   Pulse: 97   SpO2: 95%   Weight: 91.2 kg (201 lb)   Height: 1.549 m (5' 1\")     General  - normal appearance, in no obvious distress  CV  - normal radial pulse  Pulm  - normal respiratory pattern, non-labored  Musculoskeletal - left elbow  - inspection: normal joint alignment, no obvious deformity, mild soft tissue swelling medially  - palpation: tender at the origin of the common flexor tendon AND common extensor tendon  - ROM:  160 deg flexion   0 deg extension   90 deg pronation   90 deg supination  - strength: 5/5 wrist flexion with elbow flexed, 4/5 with elbow extended, painful resisted flexion of fingers with elbow flexed, worse with extension, 5/5  strength  - special tests:  (-) varus  (-) valgus  (-) Tinel's    Musculoskeletal - left middle finger  - inspection: no atrophy, normal joint alignment, no swelling  - palpation: no bony or soft tissue tenderness, no tenderness at the anatomical snuffbox  - ROM:  MCP 90 deg flexion   0 deg extension    deg flexion   0 deg extension   DIP 80 deg flexion   0 deg extension   palpable snap at the A1 pulley with active flexion, reproducible  - strength: 5/5  strength, 5/5 wrist abduction, 5/5 flexion, extension, pronation, supination, adduction  - special tests:  (-) varus  (-) valgus    Neuro  - no sensory or motor deficit, grossly normal coordination, normal muscle tone  Skin  - no ecchymosis, erythema, warmth, or induration, no obvious rash  Psych  - interactive, appropriate, normal mood and affect             ASSESSMENT & PLAN  Ms. Inman is a 58 year old year old female who presents to clinic today with trigger finger, as well as medial and lateral epicondylitis of the left elbow.    I gave Margo a universal wrist brace to wear at night and throughout the day as able.  I'm also referring her to hand therapy.    She does have trigger finger of the left middle finger as " well.  We discussed conservative care with immobilization, therapy, and an injection.  She's less interested in an injection today.  I'm referring her to hand therapy for this issue as well.    Thank you for allowing me to participate in Margo's care.    Rg Khan DO, [facility]  Primary Care Sports Medicine           Again, thank you for allowing me to participate in the care of your patient.        Sincerely,        Rg Khan DO

## 2018-04-04 ENCOUNTER — ALLIED HEALTH/NURSE VISIT (OUTPATIENT)
Dept: FAMILY MEDICINE | Facility: CLINIC | Age: 58
End: 2018-04-04
Payer: COMMERCIAL

## 2018-04-04 VITALS
HEART RATE: 97 BPM | HEIGHT: 61 IN | WEIGHT: 203.6 LBS | TEMPERATURE: 98 F | SYSTOLIC BLOOD PRESSURE: 166 MMHG | BODY MASS INDEX: 38.44 KG/M2 | DIASTOLIC BLOOD PRESSURE: 98 MMHG | OXYGEN SATURATION: 97 %

## 2018-04-04 DIAGNOSIS — Z00.6 RESEARCH STUDY PATIENT: ICD-10-CM

## 2018-04-04 DIAGNOSIS — I10 ESSENTIAL HYPERTENSION: Primary | ICD-10-CM

## 2018-04-04 LAB
COPATH REPORT: ABNORMAL
PAP: ABNORMAL

## 2018-04-04 NOTE — PATIENT INSTRUCTIONS
At [facility], we strive to deliver an exceptional experience to you, every time we see you.  If you receive a survey in the mail, please send us back your thoughts. We really do value your feedback.    Based on your medical history, these are the current health maintenance/preventive care services that you are due for (some may have been done at this visit.)  Health Maintenance Due   Topic Date Due     DEPRESSION ACTION PLAN Q1 YR  01/12/1980     ADVANCE DIRECTIVE PLANNING Q5 YRS  01/12/2017         Suggested websites for health information:  Www.WorldOne.StemCells : Up to date and easily searchable information on multiple topics.  Www.medlineplus.gov : medication info, interactive tutorials, watch real surgeries online  Www.familydoctor.org : good info from the Academy of Family Physicians  Www.cdc.gov : public health info, travel advisories, epidemics (H1N1)  Www.aap.org : children's health info, normal development, vaccinations  Www.health.[facility].[place].us : [place] dept of health, public health issues in [place], N1N1    Your care team:                            Family Medicine Internal Medicine   MD David Wagoner MD Shantel Branch-Fleming, MD Katya Georgiev PA-C Nam Ho, MD Pediatrics   OLIVIA Lucia, JAIME Kovacs APRN CNP   MD Alexus Alejo MD Deborah Mielke, MD Kim Thein, APRN CNP      Clinic hours: Monday - Thursday 7 am-7 pm; Fridays 7 am-5 pm.   Urgent care: Monday - Friday 11 am-9 pm; Saturday and Sunday 9 am-5 pm.  Pharmacy : Monday -Thursday 8 am-8 pm; Friday 8 am-6 pm; Saturday and Sunday 9 am-5 pm.     Clinic: [phone]   Pharmacy: [phone]

## 2018-04-04 NOTE — MR AVS SNAPSHOT
After Visit Summary   4/4/2018    Margo Inman    MRN: [id number]           Patient Information     Date Of Birth          1960        Visit Information        Provider Department      4/4/2018 1:40 PM Belem Dumont APRN CNP [facility]        Today's Diagnoses     Essential hypertension    -  1    Research study patient          Care Instructions    At [facility], we strive to deliver an exceptional experience to you, every time we see you.  If you receive a survey in the mail, please send us back your thoughts. We really do value your feedback.    Based on your medical history, these are the current health maintenance/preventive care services that you are due for (some may have been done at this visit.)  Health Maintenance Due   Topic Date Due     DEPRESSION ACTION PLAN Q1 YR  01/12/1980     ADVANCE DIRECTIVE PLANNING Q5 YRS  01/12/2017         Suggested websites for health information:  Www.Agenus : Up to date and easily searchable information on multiple topics.  Www.[employer].gov : medication info, interactive tutorials, watch real surgeries online  Www.familydoctor.org : good info from the Academy of Family Physicians  Www.cdc.gov : public health info, travel advisories, epidemics (H1N1)  Www.aap.org : children's health info, normal development, vaccinations  Www.health.state.[place].us : [place] dept of health, public health issues in [place], N1N1    Your care team:                            Family Medicine Internal Medicine   MD David Wagoner MD Shantel Branch-Fleming, MD Katya Georgiev PA-C Nam Ho, MD Pediatrics   OLIVIA Lucia CNP Amelia Massimini APRN CNP Shaista Malik, MD Bethany Templen, MD Deborah Mielke, MD Kim Thein, APRN CNP      Clinic hours: Monday - Thursday 7 am-7 pm; Fridays 7 am-5 pm.   Urgent care: Monday - Friday 11 am-9 pm; Saturday and Sunday 9 am-5 pm.  Pharmacy : Monday  "-Thursday 8 am-8 pm; Friday 8 am-6 pm; Saturday and Sunday 9 am-5 pm.     Clinic: [phone]   Pharmacy: [phone]            Follow-ups after your visit        Follow-up notes from your care team     Return in about 4 weeks (around 5/2/2018), or if symptoms worsen or fail to improve, for BP Recheck.      Your next 10 appointments already scheduled     Apr 16, 2018  2:00 PM CDT   (Arrive by 1:45 PM)   SIMON [place] with Blossom Starkey, OT   [facility] ([facility]    [address]  [address]  [place] [address] [phone]              Future tests that were ordered for you today     Open Future Orders        Priority Expected Expires Ordered    **Basic metabolic panel FUTURE anytime Routine 4/4/2018 4/4/2019 4/4/2018            Who to contact     If you have questions or need follow up information about today's clinic visit or your schedule please contact [facility] directly at [phone].  Normal or non-critical lab and imaging results will be communicated to you by MyChart, letter or phone within 4 business days after the clinic has received the results. If you do not hear from us within 7 days, please contact the clinic through [employer] or phone. If you have a critical or abnormal lab result, we will notify you by phone as soon as possible.  Submit refill requests through [employer] or call your pharmacy and they will forward the refill request to us. Please allow 3 business days for your refill to be completed.          Additional Information About Your Visit        MyChart Information     [employer] lets you send messages to your doctor, view your test results, renew your prescriptions, schedule appointments and more. To sign up, go to [email or web address] . Click on \"Log in\" on the left side of the screen, which will take you to the Welcome page. Then click on \"Sign up Now\" on the right side of the page.     You will be asked to enter the access " "code listed below, as well as some personal information. Please follow the directions to create your username and password.     Your access code is: [email or web address]  Expires: 2018 11:20 AM     Your access code will  in 90 days. If you need help or a new code, please call your [facility] or [phone].        Care EveryWhere ID     This is your Care EveryWhere ID. This could be used by other organizations to access your [place] medical records  [id number]        Your Vitals Were     Pulse Temperature Height Last Period Pulse Oximetry BMI (Body Mass Index)    97 98  F (36.7  C) (Oral) 5' 1\" (1.549 m) 2011 97% 38.47 kg/m2       Blood Pressure from Last 3 Encounters:   18 (!) 166/98   18 159/89   18 (!) 166/98    Weight from Last 3 Encounters:   18 203 lb 9.6 oz (92.4 kg)   18 201 lb (91.2 kg)   18 201 lb 12.8 oz (91.5 kg)               Primary Care Provider Office Phone # Fax #    JOES R Henson [phone]       [facility] [address]  [facility] [address]        Equal Access to Services     FLAKITA CLAYTON AH: Hadii aad ku hadasho Soomaali, waaxda luqadaha, qaybta kaalmada adeegyada, james mccullough . So [facility] [phone].    ATENCIÓN: Si habla español, tiene a alberto disposición servicios gratuitos de asistencia lingüística. Llame al [phone].    We comply with applicable federal civil rights laws and [place] laws. We do not discriminate on the basis of race, color, national origin, age, disability, sex, sexual orientation, or gender identity.            Thank you!     Thank you for choosing [facility]  for your care. Our goal is always to provide you with excellent care. Hearing back from our patients is one way we can continue to improve our services. Please take a few minutes to complete the written survey that you may receive in the mail after your visit with us. Thank you!   "           Your Updated Medication List - Protect others around you: Learn how to safely use, store and throw away your medicines at www.disposemymeds.org.          This list is accurate as of 4/4/18  4:38 PM.  Always use your most recent med list.                   Brand Name Dispense Instructions for use Diagnosis    acetaminophen 500 MG tablet    TYLENOL     Take 500-1,000 mg by mouth every 6 hours as needed.        atorvastatin 80 MG tablet    LIPITOR    90 tablet    Take 1 tablet (80 mg) by mouth daily    Hyperlipidemia LDL goal <130       butalbital-acetaminophen-caffeine -40 MG per tablet    FIORICET/ESGIC    28 tablet    TAKE 1 TABLET BY MOUTH EVERY 4 HOURS AS NEEDED    Tension headache       cyclobenzaprine 10 MG tablet    FLEXERIL    30 tablet    TAKE 1 TABLET BY MOUTH AT BEDTIME AS NEEDED FOR MUSCLE SPASMS    S/P cervical spinal fusion       diclofenac 75 MG EC tablet    VOLTAREN    60 tablet    Take 1 tablet (75 mg) by mouth 2 times daily        metroNIDAZOLE 500 MG tablet    FLAGYL    14 tablet    Take 1 tablet (500 mg) by mouth 2 times daily    BV (bacterial vaginosis)       order for DME     1 Device    Equipment being ordered: BP cuff/machine    Essential hypertension       predniSONE 20 MG tablet    DELTASONE    10 tablet    Take 2 tablets (40 mg) by mouth daily for 5 days    Trigger middle finger of left hand, Medial epicondylitis of left elbow       * varenicline 0.5 MG X 11 & 1 MG X 42 tablet    CHANTIX STARTING MONTH EVANGELINA    53 tablet    Take 0.5 mg tab daily for 3 days, then 0.5 mg tab twice daily for 4 days, then 1 mg twice daily.    Tobacco dependence syndrome       * varenicline 1 MG tablet    CHANTIX    56 tablet    Take 1 tablet (1 mg) by mouth 2 times daily    Tobacco dependence syndrome       * Notice:  This list has 2 medication(s) that are the same as other medications prescribed for you. Read the directions carefully, and ask your doctor or other care provider to review them with  you.

## 2018-04-04 NOTE — PROGRESS NOTES
PGEN study visit 1 NEW HYPERTENSION diagnosis (enrollment visit note)  [id number]      SUBJECTIVE:  [facility] Please refer to research tab in the header and today's flow sheet for further details. Patient has new onset HYPERTENSION and has a goal of 140/90 (per Problem list target chosen by PCP)     PCP note reviewed. Patient is not on blood pressure medication(s) at this time.      Current diet & lifestyle: using Mrs Dash, low salt, not exercising regularly  Smoking: starting Chantix on  4/9/18, currently smoking 1/2-3/4 ppd X 30 years  Alcohol consumption rarely- 7/7  Other pertinent history       BP Readings from Last 3 Encounters:   04/02/18 159/89   03/29/18 (!) 166/98   07/06/17 (!) 170/96       Current Outpatient Prescriptions   Medication Sig Dispense Refill     predniSONE (DELTASONE) 20 MG tablet Take 2 tablets (40 mg) by mouth daily for 5 days 10 tablet 0     order for DME Equipment being ordered: BP cuff/machine 1 Device 0     metroNIDAZOLE (FLAGYL) 500 MG tablet Take 1 tablet (500 mg) by mouth 2 times daily 14 tablet 0     atorvastatin (LIPITOR) 80 MG tablet Take 1 tablet (80 mg) by mouth daily 90 tablet 1     cyclobenzaprine (FLEXERIL) 10 MG tablet TAKE 1 TABLET BY MOUTH AT BEDTIME AS NEEDED FOR MUSCLE SPASMS 30 tablet 0     butalbital-acetaminophen-caffeine (FIORICET/ESGIC) -40 MG per tablet TAKE 1 TABLET BY MOUTH EVERY 4 HOURS AS NEEDED 28 tablet 0     varenicline (CHANTIX STARTING MONTH PAK) 0.5 MG X 11 & 1 MG X 42 tablet Take 0.5 mg tab daily for 3 days, then 0.5 mg tab twice daily for 4 days, then 1 mg twice daily. 53 tablet 0     varenicline (CHANTIX) 1 MG tablet Take 1 tablet (1 mg) by mouth 2 times daily 56 tablet 4     diclofenac (VOLTAREN) 75 MG EC tablet Take 1 tablet (75 mg) by mouth 2 times daily 60 tablet 11     acetaminophen (TYLENOL) 500 MG tablet Take 500-1,000 mg by mouth every 6 hours as needed.       Potassium   Date Value Ref Range Status  "  04/07/2017 3.3 (L) 3.4 - 5.3 mmol/L Final     Creatinine   Date Value Ref Range Status   04/02/2017 0.61 0.52 - 1.04 mg/dL Final     Urea Nitrogen   Date Value Ref Range Status   04/02/2017 8 7 - 30 mg/dL Final     GFR Estimate   Date Value Ref Range Status   04/02/2017 >90  Non  GFR Calc   >60 mL/min/1.7m2 Final      A baseline potassium, creatinine, BUN, GFR has not been done within past 12 months      OBJECTIVE:  Patient in in no apparent distress and able to provide full history for today's encounter. she denies pain or any current illness   Vitals: LMP 09/16/2011  Today's BP completed using cuff size: regular on left side  arm.      Pulse Readings from Last 1 Encounters:   04/02/18 97     Is pulse 55 or greater? - Yes    BMI= There is no height or weight on file to calculate BMI.  See PGEN flow sheet for other exam findings.       ASSESSMENT/PLAN  (I10) Hypertension goal BP (blood pressure) < 140/90 (primary encounter diagnosis)  Waist circumference 41\", [id number]    Conducted first visit according to protocol. Explained research study and provided consent and HIPPA forms.  Patient consented to study (please see signed consent)    Genetic samples using standard PGEN protocol were obtained and left our local lab to be picked up by .     Medications were NOT prescribed today. We will advise Rx via MyChart or phone call in 2 weeks when result and genetic profile/randomization is complete.  Rx will be chosen based on genetic profile if patient is randomized to intervention group.  If patient is randomized to control group Rx will be chosen based on JNC 8 guidelines.     Patient should NOT be provided genetic profile results until the end of the study (this is a single blinded study.  Clinicians will use genetic profile (see media tab) results to chose order of blood pressure medications in patients randomized to the intervention arm.   Patients will remain blinded to results until the end of " the study)    Full research packet also provide to patient .  Our research team will reach out to patient to schedule follow up visit as well.     Patient was counseled regarding the lifestyle changes (listed below)  to help with BP management Yes  Lifestyle changes that can help control high blood pressure:  Even though PGEN is a study to test effectiveness of genetically guided medications for managing high blood pressure, there are several things you can do to ensure your blood pressure stays in good control:    Maintain a healthy weight (BMI<26). A modest amount of weight loss can be helpful    Limit salt intake to under 2400mg daily    Follow the DASH diet (lean meats, low salt, whole grains, lots of fruits/vegies)    Stay active, try to get in 30 minutes of exercise daily.    Manage your daily stress.    Do not smoke cigarettes (or cut back)    Limit alcohol (2 drinks/day for men, 1 drink/day for women)  Was AVS  provided to patient with the relevant <dot>PGENPI dot phrase pulled into patient instructions Yes    Patient was given an opportunity to ask questions.  Patient verbalized understanding of this plan and is agreeable to continuing with this research study    Belem SPEARS, CNP

## 2018-04-05 LAB
FINAL DIAGNOSIS: ABNORMAL
HPV HR 12 DNA CVX QL NAA+PROBE: POSITIVE
HPV16 DNA SPEC QL NAA+PROBE: NEGATIVE
HPV18 DNA SPEC QL NAA+PROBE: NEGATIVE
SPECIMEN DESCRIPTION: ABNORMAL
SPECIMEN SOURCE CVX/VAG CYTO: ABNORMAL

## 2018-04-10 ENCOUNTER — THERAPY VISIT (OUTPATIENT)
Dept: OCCUPATIONAL THERAPY | Facility: CLINIC | Age: 58
End: 2018-04-10
Payer: COMMERCIAL

## 2018-04-10 DIAGNOSIS — M25.522 LEFT ELBOW PAIN: Primary | ICD-10-CM

## 2018-04-10 DIAGNOSIS — M65.30 TRIGGER FINGER, ACQUIRED: ICD-10-CM

## 2018-04-10 PROCEDURE — G8984 CARRY CURRENT STATUS: HCPCS | Mod: GO | Performed by: OCCUPATIONAL THERAPIST

## 2018-04-10 PROCEDURE — 97535 SELF CARE MNGMENT TRAINING: CPT | Mod: GO | Performed by: OCCUPATIONAL THERAPIST

## 2018-04-10 PROCEDURE — G8985 CARRY GOAL STATUS: HCPCS | Mod: GO | Performed by: OCCUPATIONAL THERAPIST

## 2018-04-10 PROCEDURE — 97165 OT EVAL LOW COMPLEX 30 MIN: CPT | Mod: GO | Performed by: OCCUPATIONAL THERAPIST

## 2018-04-10 PROCEDURE — 97112 NEUROMUSCULAR REEDUCATION: CPT | Mod: GO | Performed by: OCCUPATIONAL THERAPIST

## 2018-04-10 NOTE — MR AVS SNAPSHOT
After Visit Summary   4/10/2018    Margo Inman    MRN: [id number]           Patient Information     Date Of Birth          1960        Visit Information        Provider Department      4/10/2018 11:00 AM Irene Redding OT [facility]        Today's Diagnoses     Left elbow pain    -  1    Trigger finger, acquired           Follow-ups after your visit        Your next 10 appointments already scheduled     Apr 16, 2018  2:00 PM CDT   SIMON Hand with Blossom Starkey OT   [facility]    [address]  [address]  [place] [address]   [phone]            Apr 18, 2018  1:00 PM CDT   Colposcopy with FEMI Gouldbusk COLP/LEEP ROOM   [facility]    [address] [address]   [phone]            Apr 18, 2018  1:20 PM CDT   Colposcopy with Nadine De Los Santos MD   [facility]    [address] [address]   [phone]            Apr 23, 2018  4:00 PM CDT   SIMON Hand with Blossom Starkey OT   [facility]    [address]  [address]  [place] [address] [phone]              Who to contact     If you have questions or need follow up information about today's clinic visit or your schedule please contact [facility] directly at [phone].  Normal or non-critical lab and imaging results will be communicated to you by MyChart, letter or phone within 4 business days after the clinic has received the results. If you do not hear from us within 7 days, please contact the clinic through MyChart or phone. If you have a critical or abnormal lab result, we will notify you by phone as soon as possible.  Submit refill requests through [employer] or call your pharmacy and they will forward the refill request to us.  "Please allow 3 business days for your refill to be completed.          Additional Information About Your Visit        MyChart Information     [employer] lets you send messages to your doctor, view your test results, renew your prescriptions, schedule appointments and more. To sign up, go to [email or web address] . Click on \"Log in\" on the left side of the screen, which will take you to the Welcome page. Then click on \"Sign up Now\" on the right side of the page.     You will be asked to enter the access code listed below, as well as some personal information. Please follow the directions to create your username and password.     Your access code is: [email or web address]  Expires: 2018 11:20 AM     Your access code will  in 90 days. If you need help or a new code, please call your [place] clinic or [phone].        Care EveryWhere ID     This is your [facility] EveryWhere ID. This could be used by other organizations to access your [place] medical records  [id number]        Your Vitals Were     Last Period                   2011            Blood Pressure from Last 3 Encounters:   18 (!) 166/98   18 159/89   18 (!) 166/98    Weight from Last 3 Encounters:   18 92.4 kg (203 lb 9.6 oz)   18 91.2 kg (201 lb)   18 91.5 kg (201 lb 12.8 oz)              We Performed the Following     HC OT EVAL, LOW COMPLEXITY     NEUROMUSCULAR RE-EDUCATION     SELF CARE MNGMENT TRAINING        Primary Care Provider Office Phone # Fax #    JOSE R Henson [phone]       [facility] [address]  [facility] [address]        Equal Access to Services     FLAKITA CLAYTON : Hadii aicha Taylor, wameredithda luqadaha, qaybta kaalmada adeegyada, james yun. So [facility] [phone].    ATENCIÓN: Si habla español, tiene a alberto disposición servicios gratuitos de asistencia lingüística. Llame al [phone].    We comply with applicable " federal civil rights laws and [place] laws. We do not discriminate on the basis of race, color, national origin, age, disability, sex, sexual orientation, or gender identity.            Thank you!     Thank you for choosing [facility]  for your care. Our goal is always to provide you with excellent care. Hearing back from our patients is one way we can continue to improve our services. Please take a few minutes to complete the written survey that you may receive in the mail after your visit with us. Thank you!             Your Updated Medication List - Protect others around you: Learn how to safely use, store and throw away your medicines at www.disposemymeds.org.          This list is accurate as of 4/10/18  1:01 PM.  Always use your most recent med list.                   Brand Name Dispense Instructions for use Diagnosis    acetaminophen 500 MG tablet    TYLENOL     Take 500-1,000 mg by mouth every 6 hours as needed.        atorvastatin 80 MG tablet    LIPITOR    90 tablet    Take 1 tablet (80 mg) by mouth daily    Hyperlipidemia LDL goal <130       butalbital-acetaminophen-caffeine -40 MG per tablet    FIORICET/ESGIC    28 tablet    TAKE 1 TABLET BY MOUTH EVERY 4 HOURS AS NEEDED    Tension headache       cyclobenzaprine 10 MG tablet    FLEXERIL    30 tablet    TAKE 1 TABLET BY MOUTH AT BEDTIME AS NEEDED FOR MUSCLE SPASMS    S/P cervical spinal fusion       diclofenac 75 MG EC tablet    VOLTAREN    60 tablet    Take 1 tablet (75 mg) by mouth 2 times daily        metroNIDAZOLE 500 MG tablet    FLAGYL    14 tablet    Take 1 tablet (500 mg) by mouth 2 times daily    BV (bacterial vaginosis)       order for DME     1 Device    Equipment being ordered: BP cuff/machine    Essential hypertension       * varenicline 0.5 MG X 11 & 1 MG X 42 tablet    CHANTIX STARTING MONTH EVANGELINA    53 tablet    Take 0.5 mg tab daily for 3 days, then 0.5 mg tab twice daily for 4 days, then 1 mg twice daily.    Tobacco  dependence syndrome       * varenicline 1 MG tablet    CHANTIX    56 tablet    Take 1 tablet (1 mg) by mouth 2 times daily    Tobacco dependence syndrome       * Notice:  This list has 2 medication(s) that are the same as other medications prescribed for you. Read the directions carefully, and ask your doctor or other care provider to review them with you.

## 2018-04-10 NOTE — PROGRESS NOTES
Hand Therapy Initial Evaluation    Current Date:  4/10/2018     Diagnosis:  Medial Left  elbow pain (also lateral elbow pain), paresthesias and L long finger trigger finger     Date of onset:  Trigger finger: about a year, elbow pain: about 1 month ago  Date of hand therapy orders: 4/2/18. Per MD Dx: Lateral / medial L elbow epicondylitis and trigger finger  Referring MD: Rg Khan, DO     Next MD visit: Pt is scheduling f/u with Dr. Khan and may also see her chiropractor for her neck.    Precautions: neck fusion on 3/31/17  Patient goals for therapy: decrease pain    Subjective:  Margo Inman is a 58 year old R hand dominant female.    Patient reports symptoms of pain, weakness/loss of strength, edema and tingling  of the left elbow and trigger finger of the L LF which occurred due to insidious onset. Since onset symptoms are Gradually getting worse.  Special tests:  none.  Previous treatment: icyhot, OTC brace provided by MD  4/2/18. Also has hotpack for neck.    General health as reported by patient is good.  Pertinent medical history includes: Pt notes she did have a hairline L elbow fracture about 6 years ago. overweight, high blood pressure, depression , smoking, menopausal  Medical allergies: erythromycin.  Surgical history: 10 knee surgeries, neck fusion 2017, B CTR, B trigger thumb releases.  Medication history: anti-inflammatory, Flexeril for restless leg syndrome.    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   Currently working in normal job without restrictions  Job Tasks: computer work  Prior functional level:  no limitations  Barriers include:none  Mobility: uses no device but h/o many knee surgeries  Transportation: drives     Functional Outcome Measure:  Please refer to flow sheet.    Objective:  Pain Level Report  VAS(0-10) 4/10/2018   At Rest: 4   At worst: 6-7        Report of Pain:  Location: L elbow - mainly medially and at cubital tunnel / just distal, also lateral FA, and long finger at A1 pulley  Pain Quality:  Burning and Sharp  Frequency: constant    Pain is worst:  daytime or nighttime  Exacerbated by:  overuse  Relieved by:  cold, heat, rest and straightening the arm  Progression:  Worsening / not improving  Stage of Stenosing Tenosynovitis (SST):   4/10/2018   Triggering of Left long finger 3-4   Stage 1:  Normal  Stage 2:  Uneven motion of tendon  Stage 3:  Triggering, clicking, catching  Stage 4:  Locking in extension or flexion; unlocked by active motion  Stage 5:  Locking in extension or flexion; unlocked by passive motion  Stage 6:  Finger locked in extension or flexion  ROM:  Pain Report:  - none    + mild    ++ moderate    +++ severe   Elbow  4/10/2018   AROM(PROM) R L   Ext WFL WFL   Flex WFL WFL   ++   Sup WFL WFL   Pron WFL WFL     Per pt, h/o hairline fx to L elbow, several years ago     Wrist  4/10/2018   AROM(PROM) R L   Ext WFL WFL   Flex WFL WFL   RD WFL WFL   UD WFL WFL     Resisted Testing  : deferred but highly suspicious for medial and lateral epicondyle tendon tissue irritability based on functional observations    Strength:  Deferred due to pain    Edema: on visual inspection and light palpation the entire L elbow appears mildly edematous compared to the Right.     Palpation: Radial Nerve - potential neural compression areas    Pain Report:  - none    + mild    ++ moderate    +++ severe     4/10/2018 4/10/2018   RADIAL NERVE: (C5-T1)   Right Left   Scalenes + +   Supraclavicular fossa + ++   Anterior to the radiocapitellar joint (midportion of the joint)   - ++   POSTERIOR INTEROSSEOUS NERVE       Proximal Supinator edge (arcade of Frohse - slightly distal/ lateral to radial head) - ++   Distal Supinator edge   (lateral /posterior aspect of the proximal/mid radius) - +   DORSAL RADIAL SENSORY NERVE       Under brachioradialis muscle - +    Tendon of brachioradialis and tendon of ECRL (about 5cm proximal to radial styloid)  - +   Dorsum of thumb, 1st webspace, hand - -                Palpation / Special Tests: MN/UN/RN Wrist & Elbow   Pain Report:    + mild    ++ moderate    +++ severe       4/10/2018  4/10/2018      R L   Pressure at flexor/pronator junction - +        Median nerve compression at pronator  - +   pressure at cubital tunnel + ++   pressure at [place] - -   Froments sign NT NT   MN: Fuller test for lumbrical incursion (fist x 30 secs) NT NT   Paresthesias none Ulnar nerve at elbow/hand, radial nerve at FA and into hand at times   Elbow Flexion test 60' - Ache to anterior elbow and tingling feeling   Wrist       1st DC palpation - -         Pain Report:  - none    + mild    ++ moderate    +++ severe   Palpation:    4/10/2018 4/10/2018    Right Left   Extensors  - ++   Dorsal hand  - -   radiall wrist  - sore   MEP  + ++   Flexors  - +   LEP - ++                Assessment:  Patient presents with symptoms consistent with diagnosis of Medial and Lateral Epicondylitis, with conservative intervention. She also presents with symptoms of ulnar nerve irritation on the L at the elbow, and radial nerve irritation on the L especially at the lateral FA. There may be a double crush phenomenon present, as pt has tightness of the proximal musculature, especially the levator scapula and periscapular muscles, and cervical muscle tightness on the L. She also presents with a L LF trigger finger that clicks with full flexion, and is relieved when the DIP is not allowed to flex as she fists.    Patient's limitations or Problem List includes:  Pain, Decreased ROM/motion, Weakness, paresthesias , trigger finger, and Tightness in musculature of the left elbow and long finger which interferes with the patient's ability to perform Self Care Tasks (dressing), Work Tasks, Sleep Patterns, Recreational Activities, Household Chores and Driving  as compared to  previous level of function.    Rehab Potential:  Goodt - Return to full activity, mild limitations    Patient will benefit from skilled Occupational Therapy to increase ROM, flexibility,  strength and forearm strength and decrease pain and paresthesias and triggering of the finger, to return to previous activity level and resume normal daily tasks and to reach their rehab potential.    Barriers to Learning:  No barrier    Communication Issues:  Patient appears to be able to clearly communicate and understand verbal and written communication and follow directions correctly.    Chart Review: Brief history including review of medical and/or therapy records relating to the presenting problem and Simple history review with patient    Identified Performance Deficits: dressing, hygiene and grooming, driving and community mobility, home establishment and management, meal preparation and cleanup, shopping, sleep, work and leisure activities    Assessment of Occupational Performance:  5 or more Performance Deficits    Clinical Decision Making (Complexity): Low complexity    Treatment Explanation:  The following has been discussed with the patient:    RX ordered/plan of care  Anticipated outcomes  Possible risks and side effects    Plan:  Frequency:  1 X week, once daily  Duration:  for 6 weeks    Treatment Plan:    Modalities:  US, Iontophoresis, Fluidotherapy and Paraffin   Therapeutic Exercise: AROM of elbow and wrist, PROM with stretch to wrist extensors and flexors,  ECRL strengthening progressing to ECRB strengthening (eccentric progressing to concentric ). Trigger finger exercises, tendon gliding  Self care: Positioning techniques to decrease nerve pain  Manual Techniques:  myofascial release  Neuromuscular:  Nerve gliding, K taping  Orthoses:  Wrist cock up splint, arm band , trigger finger orthosis    Discharge Plan:    Achieve all LTG.  Independent in home treatment program.  Reach maximal therapeutic  benefit.    Home Program:   Warmth for stiffness at L side of neck  Ice to L elbow  Elbow muff to the L elbow at night  Prop L UE on pillows when at rest  PROM with gentle stretch to wrist extensors and flexors  Very gentle MFR to flexors / extensors  Wrist cock up orthosis per comfort (wearing most of the time)  Avoid activities that exacerbate pain in the elbow, no pressure on elbow, avoid elbow flexion for long periods  Very simple neve glide (wrist flex/ext with head tip towards/neutral)  Trigger finger: DIP circumferential orthosis to stop triggering - full time if able     PTRx exercises on phone    Next Visit:  See if pt has MD f/u and chiro care  Very gentle MFR to FA flexors/extensors and upper arm, upper traps  Check trigger finger - massage to A1 pulley (?)  Heat to neck but cold seems to be better to elbow  Address possible edema at elbow  Check HEP  Progress nerve glides?  KT for RN/UN?  Scapular exercises / may benefit from PT eval for cervical/proximal components that may be contributing

## 2018-04-16 ENCOUNTER — TELEPHONE (OUTPATIENT)
Dept: FAMILY MEDICINE | Facility: CLINIC | Age: 58
End: 2018-04-16

## 2018-04-16 DIAGNOSIS — I10 HYPERTENSION GOAL BP (BLOOD PRESSURE) < 140/90: Primary | ICD-10-CM

## 2018-04-16 RX ORDER — CHLORTHALIDONE 25 MG/1
12.5 TABLET ORAL DAILY
Qty: 15 TABLET | Refills: 1 | Status: SHIPPED | OUTPATIENT
Start: 2018-04-16 | End: 2018-04-18

## 2018-04-16 NOTE — TELEPHONE ENCOUNTER
Patient is due to be prescribed medication on April 18th based on JNC-8 guidelines. The preferred pharmacy is [facility] on [address]     As a reminder, please place the standing order in addition to prescribing medication.

## 2018-04-16 NOTE — TELEPHONE ENCOUNTER
Hi    Your patient is a participant in the PGEN blood pressure study that [facility] is conducting.     she is scheduled to be started on medications based on PGEN protocol.    she has been randomized to JNC8 standard of care arm.  Please also do NOT share which group she has been randomized to since this is a single blinded study design.     I have placed the standing order as well as the initial blood pressure medication according to our study protocol.  Please refer to those orders for more details.       If you do NOT agree with the standing order, please route back to me with the changes you would like to see and I can then modify the order to reflect your adjustment based on clinical judgement.    More details about this study can be found by going to [email or web address].  Or typing <dot>[email or web address]>  In Epic        Study team:   Please reach out and advise prescription for chlorthalidone 12.5 mg daily was ordered.    Please advise patient of these common side effects potentially associated with this prescription : increased urination; electrolyte imbalance.    Pharmacist will also provide more medication related education.    Please also arrange follow-up per protocol.   Please also advise that our study team advises all patients to establish/maintain an ongoing primary care relationship since that is so important to help better manage her ongoing health care needs.    Amarilis Barclay MD  [facility] study

## 2018-04-17 NOTE — TELEPHONE ENCOUNTER
Spoke with patient. She will  medication on 4/17 and start taking on 4/18. Visit #2 scheduled as  BP Check with Lianna Fisher MA (Valentine Dumont out of town) on 5/2. Contact study team at [phone] if needed

## 2018-04-18 ENCOUNTER — OFFICE VISIT (OUTPATIENT)
Dept: FAMILY MEDICINE | Facility: CLINIC | Age: 58
End: 2018-04-18
Payer: COMMERCIAL

## 2018-04-18 ENCOUNTER — APPOINTMENT (OUTPATIENT)
Dept: OBGYN | Facility: CLINIC | Age: 58
End: 2018-04-18
Payer: COMMERCIAL

## 2018-04-18 ENCOUNTER — TRANSFERRED RECORDS (OUTPATIENT)
Dept: HEALTH INFORMATION MANAGEMENT | Facility: CLINIC | Age: 58
End: 2018-04-18

## 2018-04-18 VITALS
HEIGHT: 61 IN | WEIGHT: 203 LBS | SYSTOLIC BLOOD PRESSURE: 140 MMHG | DIASTOLIC BLOOD PRESSURE: 77 MMHG | RESPIRATION RATE: 20 BRPM | TEMPERATURE: 98.4 F | HEART RATE: 93 BPM | BODY MASS INDEX: 38.33 KG/M2 | OXYGEN SATURATION: 97 %

## 2018-04-18 DIAGNOSIS — R87.810 CERVICAL HIGH RISK HPV (HUMAN PAPILLOMAVIRUS) TEST POSITIVE: ICD-10-CM

## 2018-04-18 DIAGNOSIS — R87.612 PAPANICOLAOU SMEAR OF CERVIX WITH LOW GRADE SQUAMOUS INTRAEPITHELIAL LESION (LGSIL): Primary | ICD-10-CM

## 2018-04-18 PROCEDURE — 88342 IMHCHEM/IMCYTCHM 1ST ANTB: CPT | Performed by: FAMILY MEDICINE

## 2018-04-18 PROCEDURE — 57455 BIOPSY OF CERVIX W/SCOPE: CPT | Performed by: FAMILY MEDICINE

## 2018-04-18 PROCEDURE — 88305 TISSUE EXAM BY PATHOLOGIST: CPT | Performed by: FAMILY MEDICINE

## 2018-04-18 PROCEDURE — 99207 ZZC DROP WITH A PROCEDURE: CPT | Performed by: FAMILY MEDICINE

## 2018-04-18 ASSESSMENT — PAIN SCALES - GENERAL: PAINLEVEL: NO PAIN (0)

## 2018-04-18 NOTE — MR AVS SNAPSHOT
After Visit Summary   4/18/2018    Margo Inman    MRN: [id number]           Patient Information     Date Of Birth          1960        Visit Information        Provider Department      4/18/2018 1:20 PM Nadine Stanton MD [facility]        Today's Diagnoses     Papanicolaou smear of cervix with low grade squamous intraepithelial lesion (LGSIL)    -  1    Cervical high risk HPV (human papillomavirus) test positive           Follow-ups after your visit        Your next 10 appointments already scheduled     Apr 23, 2018  4:00 PM CDT   [facility] with Blossom Starkey OT   [facility]    [address]  [address]  [place] [address] [phone]            May 02, 2018 10:40 AM CDT   Nurse Only with BK ANCILLARY   [facility] ([facility]    [address] [facility] [address] [phone]              Who to contact     If you have questions or need follow up information about today's clinic visit or your schedule please contact [facility] directly at [phone].  Normal or non-critical lab and imaging results will be communicated to you by MyChart, letter or phone within 4 business days after the clinic has received the results. If you do not hear from us within 7 days, please contact the clinic through MyChart or phone. If you have a critical or abnormal lab result, we will notify you by phone as soon as possible.  Submit refill requests through OmPrompt or call your pharmacy and they will forward the refill request to us. Please allow 3 business days for your refill to be completed.          Additional Information About Your Visit        GogiroharfsboWOW Information     OmPrompt lets you send messages to your doctor, view your test results, renew your prescriptions, schedule appointments and more. To sign up, go to  [email or web address] . Click on \"Log in\" on the left side of the screen, which will take you to the Welcome page. Then click on \"Sign up Now\" on the right side of the page.     You will be asked to enter the access code listed below, as well as some personal information. Please follow the directions to create your username and password.     Your access code is: [email or web address]  Expires: 2018 11:20 AM     Your access code will  in 90 days. If you need help or a new code, please call your [facility] clinic or [phone].        Care EveryWhere ID     This is your Care EveryWhere ID. This could be used by other organizations to access your [facility] medical records  [id number]        Your Vitals Were     Pulse Temperature Respirations Height Last Period Pulse Oximetry    93 98.4  F (36.9  C) (Oral) 20 5' 1\" (1.549 m) 2011 97%    Breastfeeding? BMI (Body Mass Index)                No 38.36 kg/m2           Blood Pressure from Last 3 Encounters:   18 140/77   18 (!) 166/98   18 159/89    Weight from Last 3 Encounters:   18 203 lb (92.1 kg)   18 203 lb 9.6 oz (92.4 kg)   18 201 lb (91.2 kg)              We Performed the Following     COLP CERVIX/UPPER VAGINA W BX CERVIX     DEPRESSION ACTION PLAN (DAP)     Surgical pathology exam        Primary Care Provider Office Phone # Fax #    JOSE R Henson [phone]       [facility] [address]  [facility] [address]        Equal Access to Services     [facility] : Hadii aicha Taylor, wameredithda lujeffadaha, qaparisa kaalmada ava, james yun. So [facility] [phone].    ATENCIÓN: Si habla español, tiene a alberto disposición servicios gratuitos de asistencia lingüística. Llame al [phone].    We comply with applicable federal civil rights laws and [place] laws. We do not discriminate on the basis of race, color, national origin, age, disability, sex, " sexual orientation, or gender identity.            Thank you!     Thank you for choosing [facility]  for your care. Our goal is always to provide you with excellent care. Hearing back from our patients is one way we can continue to improve our services. Please take a few minutes to complete the written survey that you may receive in the mail after your visit with us. Thank you!             Your Updated Medication List - Protect others around you: Learn how to safely use, store and throw away your medicines at www.disposemymeds.org.          This list is accurate as of 4/18/18  2:15 PM.  Always use your most recent med list.                   Brand Name Dispense Instructions for use Diagnosis    acetaminophen 500 MG tablet    TYLENOL     Take 500-1,000 mg by mouth every 6 hours as needed.        atorvastatin 80 MG tablet    LIPITOR    90 tablet    Take 1 tablet (80 mg) by mouth daily    Hyperlipidemia LDL goal <130       butalbital-acetaminophen-caffeine -40 MG per tablet    FIORICET/ESGIC    28 tablet    TAKE 1 TABLET BY MOUTH EVERY 4 HOURS AS NEEDED    Tension headache       cyclobenzaprine 10 MG tablet    FLEXERIL    30 tablet    TAKE 1 TABLET BY MOUTH AT BEDTIME AS NEEDED FOR MUSCLE SPASMS    S/P cervical spinal fusion       diclofenac 75 MG EC tablet    VOLTAREN    60 tablet    Take 1 tablet (75 mg) by mouth 2 times daily        order for DME     1 Device    Equipment being ordered: BP cuff/machine    Essential hypertension       * varenicline 0.5 MG X 11 & 1 MG X 42 tablet    CHANTIX STARTING MONTH EVANGELINA    53 tablet    Take 0.5 mg tab daily for 3 days, then 0.5 mg tab twice daily for 4 days, then 1 mg twice daily.    Tobacco dependence syndrome       * varenicline 1 MG tablet    CHANTIX    56 tablet    Take 1 tablet (1 mg) by mouth 2 times daily    Tobacco dependence syndrome       * Notice:  This list has 2 medication(s) that are the same as other medications prescribed for you. Read  the directions carefully, and ask your doctor or other care provider to review them with you.

## 2018-04-18 NOTE — LETTER
"April 27, 2018      Sariah Sweet  [address] [facility] [address]        Ms. Sweet,    Your colposcopy biopsy did not show any cancer.  Please follow up in 6 months for pap with HPV testing.    Please contact the clinic if you have additional questions.  Thank you.        Resulted Orders   Surgical pathology exam   Result Value Ref Range    Copath Report       Patient Name: SARIAH SWEET  MR#: [id number]  Specimen #: [id number]  Collected: 4/18/2018  Received: 4/19/2018  Reported: 4/26/2018 15:31  Ordering Phy(s): GERALD BATRES    For improved result formatting, select 'View Enhanced Report Format' under   Linked Documents section.    SPECIMEN(S):  Cervical biopsy, 5 o'clock    FINAL DIAGNOSIS:  Cervical biopsy, 5 o'clock:  - Benign squamous epithelium with reactive atypia.  - Negative for viral cytopathic change him dysplasia or malignancy.    COMMENT:  The patient's prior Pap smear from 3/30/18 is reviewed and the original   diagnosis of a \"low grade squamous  intraepithelial lesion\" confirmed ([id number]). Concurrent molecular   testing performed 3/29/18 was positive for  high risk HPV DNA type other than 16 and 18.    Electronically signed out by:    CJ Luna M.D.    CLINICAL HISTORY:  58-year-old female with LSIL Pap smear and high risk HPV positive    GROSS:  A single specimen container with formalin is recei day labeled with the   patient's name, date of birth, and  medical record number. Information on the requisition slip, container, and   associated labels is confirmed.    The specimen is designated \"cervical biopsy 5:00\". The specimen consists   of four pale tan soft tissue  fragments measuring up to 0.2 cm in greatest dimension. The specimen is   entirely submitted in one cassette.  (Dictated by: Josephine Simmons 4/19/2018 01:02 PM)    MICROSCOPIC:  The sections show portions of cervical tissue with reactive squamous   atypia and chronic " inflammation.  No  viral atypia is identified on H&E or immunohistochemical stain sections to   Ki-67 and P16.    The immunohistochemical stain controls were reviewed showing appropriate   reaction patterns for interpretation  of the respective antibodies. (Dictated by: СВЕТЛАНА Luna MD   04/25/2018)    CPT Codes:  A: 92993-EY8, 16221-OZW, 89971-QEE    TESTING LAB LOCATION:  [address] [id number] [phone]    COLLECTION SITE:  Client: General acute hospital  Location: [facility] (B)         If you have any questions or concerns, please call the clinic at the number listed above.       Sincerely,        Nadine De Los Santos MD/JOHN

## 2018-04-18 NOTE — PROGRESS NOTES
SUBJECTIVE:     Margo Inman is a 58 year old female who presents for initial colposcopy. Pap smear 1 months ago showed: normal. The prior pap showed normal.     Past Medical History:   Diagnosis Date     Arthritis      Cervical high risk HPV (human papillomavirus) test positive 1/10/2017    1/10/17 NIL pap/+ HR HPV (not 16 or 18). Plan: cotest in 1 year, due by 1/10/18      Chronic infection     HX of MRSA. 2 neg swabs at [facility] in 2006 and 2007.     History of blood transfusion      Numbness and tingling     Right arm     PONV (postoperative nausea and vomiting)      Family History   Problem Relation Age of Onset     Breast Cancer Maternal Grandmother      Ovarian Cancer Maternal Grandmother      Cancer - colorectal Maternal Grandfather      Colon Cancer Maternal Grandfather      Prostate Cancer Maternal Grandfather      C.A.D. Father      C.A.D. Paternal Grandfather        Previous history of abnormal paps?: No  History of cryotherapy (freezing)?: : No  History of veneral diseases: : No  Do you desire testing for any of these diseases? : No  History of genital warts:  No  Visible warts now?:  No  Previously treated? If so, how?:  No     Patient's last menstrual period was 09/16/2011.  Type of contraception: post menopausal status    History   Smoking Status     Current Every Day Smoker     Packs/day: 1.00     Years: 15.00     Types: Cigarettes   Smokeless Tobacco     Never Used     Comment: Started Chantix     History of sexual abuse:  No  Allergies as of 04/18/2018 - Review Complete 04/18/2018   Allergen Reaction Noted     Bee Anaphylaxis 08/07/2010     Erythromycin Hives 08/07/2010     Wasps [hornets] Anaphylaxis 08/07/2010        PROCEDURE:  Before the procedure, it was ensured that the patient was educated regarding the nature of her findings to date, the implications of them, and what was to be done. She has been made aware of the role of HPV, the natural history of infection, ways to  minimize her future risk, the effect of HPV on the cervix, and treatment options available should they be indicated. The   pathophysiology of the cervix, including a discussion of squamous vs. endometrial cells, and squamous metaplasia have all been reviewed, using illustrations and sketches. The details of the colposcopic procedure were reviewed, as well as the risks of missed diagnoses, pain, infection and bleeding. All questions were answered before proceeding, and informed consent was therefore obtained.    Bimanual examination: was performed and was unremarkable.  Unenhanced examination of the cervix was normal without lesions.  Pap smear and endocervical sampling not obtained due to:    not due  Please refer to images section for details!  Pap repeated?:  No  SCJ seen?:  no  Endocervical speculum needed?:  No  ECC done?:  No  Lugol's solution used?:  Yes decreased uptake at 5:00 and lower cervix  Satisfactory examination?:  yes    Vaginal vault: normal to cursory inspection   Urethra normal?:  yes  Labia normal?:  yes  Perineum normal?:  yes  Rectum normal?:  yes    FINDINGS:       Cervix: acetowhite area(s) at 5:00  Procedure: biopsies taken (not including ECC): 1.    Procedure summary: Patient tolerated procedure well     Assessment: MADALYN 1    Plan: Specimens labelled and sent to pathology., Will base further treatment on pathology findings., treatment options discussed with patient, post biopsy instructions given to patient and call to discuss Pathology results     Nadine De Los Santos MD  [facility]

## 2018-04-19 ASSESSMENT — PATIENT HEALTH QUESTIONNAIRE - PHQ9: SUM OF ALL RESPONSES TO PHQ QUESTIONS 1-9: 5

## 2018-04-25 ENCOUNTER — THERAPY VISIT (OUTPATIENT)
Dept: OCCUPATIONAL THERAPY | Facility: CLINIC | Age: 58
End: 2018-04-25
Payer: COMMERCIAL

## 2018-04-25 DIAGNOSIS — M65.30 TRIGGER FINGER, ACQUIRED: ICD-10-CM

## 2018-04-25 DIAGNOSIS — M25.522 LEFT ELBOW PAIN: ICD-10-CM

## 2018-04-25 PROCEDURE — 97112 NEUROMUSCULAR REEDUCATION: CPT | Mod: GO | Performed by: OCCUPATIONAL THERAPIST

## 2018-04-25 PROCEDURE — 97035 APP MDLTY 1+ULTRASOUND EA 15: CPT | Mod: GO | Performed by: OCCUPATIONAL THERAPIST

## 2018-04-25 PROCEDURE — 97140 MANUAL THERAPY 1/> REGIONS: CPT | Mod: GO | Performed by: OCCUPATIONAL THERAPIST

## 2018-04-25 NOTE — PROGRESS NOTES
SOAP Note - Hand Therapy - Objective Information    Current Date:  4/25/2018  Diagnosis:  Medial Left  elbow pain (also lateral elbow pain), paresthesias and L long finger trigger finger     Date of onset:  Trigger finger: about a year, elbow pain: about 1 month ago  Date of hand therapy orders: 4/2/18. Per MD Dx: Lateral / medial L elbow epicondylitis and trigger finger  Referring MD: Rg Khan, DO     Next MD visit: Pt is scheduling f/u with Dr. Khan and may also see her chiropractor for her neck.    Precautions: neck fusion on 3/31/17  Patient goals for therapy: decrease pain    Subjective:  Margo Inman is a 58 year old R hand dominant female.    Patient reports symptoms of pain, weakness/loss of strength, edema and tingling  of the left elbow and trigger finger of the L LF which occurred due to insidious onset. Since onset symptoms are Gradually getting worse.  Special tests:  none.  Previous treatment: icyhot, OTC brace provided by MD  4/2/18. Also has hotpack for neck.    General health as reported by patient is good.  Pertinent medical history includes: Pt notes she did have a hairline L elbow fracture about 6 years ago. overweight, high blood pressure, depression , smoking, menopausal  Medical allergies: erythromycin.  Surgical history: 10 knee surgeries, neck fusion 2017, B CTR, B trigger thumb releases.  Medication history: anti-inflammatory, Flexeril for restless leg syndrome.    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    S:  Subjective changes as noted by patient: I am going to the chiropractor for my neck and it has helped with my shoulder pain but not the elbow pain.  I lost my splint.  Functional changes noted by patient: no changes      O:  Pain Level Report  VAS(0-10) 4/10/2018 4/25/18   At Rest: 4 4/10   At worst: 6-7 7/10       Report of Pain:  Location: L elbow  - mainly medially and at cubital tunnel / just distal, also lateral FA, and long finger at A1 pulley  Pain Quality:  Burning and Sharp  Frequency: constant    Pain is worst:  daytime or nighttime  Exacerbated by:  overuse  Relieved by:  cold, heat, rest and straightening the arm  Progression:  Worsening / not improving  Stage of Stenosing Tenosynovitis (SST):   4/10/2018   Triggering of Left long finger 3-4   Stage 1:  Normal  Stage 2:  Uneven motion of tendon  Stage 3:  Triggering, clicking, catching  Stage 4:  Locking in extension or flexion; unlocked by active motion  Stage 5:  Locking in extension or flexion; unlocked by passive motion  Stage 6:  Finger locked in extension or flexion  ROM:  Pain Report:  - none    + mild    ++ moderate    +++ severe   Elbow  4/10/2018   AROM(PROM) R L   Ext WFL WFL   Flex WFL WFL   ++   Sup WFL WFL   Pron WFL WFL     Per pt, h/o hairline fx to L elbow, several years ago     Wrist  4/10/2018   AROM(PROM) R L   Ext WFL WFL   Flex WFL WFL   RD WFL WFL   UD WFL WFL     Resisted Testing  : deferred but highly suspicious for medial and lateral epicondyle tendon tissue irritability based on functional observations    Strength:  Deferred due to pain    Edema: on visual inspection and light palpation the entire L elbow appears mildly edematous compared to the Right.     Palpation: Radial Nerve - potential neural compression areas    Pain Report:  - none    + mild    ++ moderate    +++ severe     4/10/2018 4/10/2018   RADIAL NERVE: (C5-T1)   Right Left   Scalenes + +   Supraclavicular fossa + ++   Anterior to the radiocapitellar joint (midportion of the joint)   - ++   POSTERIOR INTEROSSEOUS NERVE       Proximal Supinator edge (arcade of Frohse - slightly distal/ lateral to radial head) - ++   Distal Supinator edge   (lateral /posterior aspect of the proximal/mid radius) - +   DORSAL RADIAL SENSORY NERVE       Under brachioradialis muscle - +   Tendon of brachioradialis and tendon of ECRL  (about 5cm proximal to radial styloid)  - +   Dorsum of thumb, 1st webspace, hand - -                Palpation / Special Tests: [place]/UN/RN Wrist & Elbow   Pain Report:    + mild    ++ moderate    +++ severe       4/10/2018  4/10/2018      R L   Pressure at flexor/pronator junction - +        Median nerve compression at pronator  - +   pressure at cubital tunnel + ++   pressure at [place] - -   Froments sign NT NT   MN: Fuller test for lumbrical incursion (fist x 30 secs) NT NT   Paresthesias none Ulnar nerve at elbow/hand, radial nerve at FA and into hand at times   Elbow Flexion test 60' - Ache to anterior elbow and tingling feeling   Wrist       1st DC palpation - -         Pain Report:  - none    + mild    ++ moderate    +++ severe   Palpation:    4/10/2018 4/10/2018    Right Left   Extensors  - ++   Dorsal hand  - -   radial wrist  - sore   MEP  + ++   Flexors  - +   LEP - ++                Please refer to the daily flowsheet for treatment provided today.     Home Program:   Warmth for stiffness at L side of neck  Ice to L elbow  Elbow muff to the L elbow at night  Prop L UE on pillows when at rest  PROM with gentle stretch to wrist extensors and flexors  Very gentle MFR to flexors / extensors  Wrist cock up orthosis per comfort (wearing most of the time)  Avoid activities that exacerbate pain in the elbow, no pressure on elbow, avoid elbow flexion for long periods  Very simple neve glide (wrist flex/ext with head tip towards/neutral)  Trigger finger: DIP circumferential orthosis to stop triggering - full time if able  Stretches to flexors  Trial of K-tape   PTRx exercises on phone    Next Visit:  See if pt has MD f/u   Very gentle MFR to FA flexors/extensors and upper arm, upper traps  Check trigger finger - massage to A1 pulley (?)  Heat to neck but cold seems to be better to elbow  Address possible edema at elbow  Check HEP  Progress nerve glides?    Scapular exercises / may benefit from PT eval for  cervical/proximal components that may be contributing

## 2018-04-25 NOTE — MR AVS SNAPSHOT
After Visit Summary   4/25/2018    Margo Inman    MRN: [id number]           Patient Information     Date Of Birth          1960        Visit Information        Provider Department      4/25/2018 11:00 AM Blossom Starkey OT [facility]        Today's Diagnoses     Trigger finger, acquired        Left elbow pain           Follow-ups after your visit        Your next 10 appointments already scheduled     May 01, 2018  1:00 PM CDT   SIMON Hand with Irene Redding OT   [facility] ([place] [facility])    [address]  [address]  [place] [address]   [phone]            May 03, 2018 10:40 AM CDT   [facility]    [address] [place] [id number] [phone]            May 09, 2018  1:00 PM CDT   SIMON Hand with Blossom Starkey OT   [facility]    [address]  [address]  [place] [address] [phone]            May 16, 2018  1:00 PM CDT   SIMON Hand with Blossom Starkey OT   [facility] ([facility])    [address]  [address]  [place] [address] [phone]              Who to contact     If you have questions or need follow up information about today's clinic visit or your schedule please contact [facility] directly at [phone].  Normal or non-critical lab and imaging results will be communicated to you by MyChart, letter or phone within 4 business days after the clinic has received the results. If you do not hear from us within 7 days, please contact the clinic through MyChart or phone. If you have a critical or abnormal lab result, we will notify you by phone as soon as possible.  Submit refill requests through [employer] or call your pharmacy and they will forward the refill request to us. Please allow 3 business days for your refill  "to be completed.          Additional Information About Your Visit        [employer] Information     [employer] lets you send messages to your doctor, view your test results, renew your prescriptions, schedule appointments and more. To sign up, go to [email or web address] . Click on \"Log in\" on the left side of the screen, which will take you to the Welcome page. Then click on \"Sign up Now\" on the right side of the page.     You will be asked to enter the access code listed below, as well as some personal information. Please follow the directions to create your username and password.     Your access code is: [email or web address]  Expires: 2018 11:20 AM     Your access code will  in 90 days. If you need help or a new code, please call your [place] clinic or [phone].        Care EveryWhere ID     This is your Care EveryWhere ID. This could be used by other organizations to access your [place] medical records  [id number]        Your Vitals Were     Last Period                   2011            Blood Pressure from Last 3 Encounters:   18 140/77   18 (!) 166/98   18 159/89    Weight from Last 3 Encounters:   18 92.1 kg (203 lb)   18 92.4 kg (203 lb 9.6 oz)   18 91.2 kg (201 lb)              We Performed the Following     MANUAL THER TECH,1+REGIONS,EA 15 MIN     NEUROMUSCULAR RE-EDUCATION     ULTRASOUND THERAPY        Primary Care Provider Office Phone # Fax #    JOSE R Henson [phone]       [facility] [address]  [facility] [address]        Equal Access to Services     KISHA CLAYTON : Hadii aicha cortez hadasho Soomaali, waaxda luqadaha, qaybta kaalmada adeegyada, james yun. So [facility] [phone].    ATENCIÓN: Si habla español, tiene a alberto disposición servicios gratuitos de asistencia lingüística. Llame al [phone].    We comply with applicable federal civil rights laws and [place] laws. We do not " discriminate on the basis of race, color, national origin, age, disability, sex, sexual orientation, or gender identity.            Thank you!     Thank you for choosing [facility]  for your care. Our goal is always to provide you with excellent care. Hearing back from our patients is one way we can continue to improve our services. Please take a few minutes to complete the written survey that you may receive in the mail after your visit with us. Thank you!             Your Updated Medication List - Protect others around you: Learn how to safely use, store and throw away your medicines at www.disposemymeds.org.          This list is accurate as of 4/25/18  6:28 PM.  Always use your most recent med list.                   Brand Name Dispense Instructions for use Diagnosis    acetaminophen 500 MG tablet    TYLENOL     Take 500-1,000 mg by mouth every 6 hours as needed.        atorvastatin 80 MG tablet    LIPITOR    90 tablet    Take 1 tablet (80 mg) by mouth daily    Hyperlipidemia LDL goal <130       butalbital-acetaminophen-caffeine -40 MG per tablet    FIORICET/ESGIC    28 tablet    TAKE 1 TABLET BY MOUTH EVERY 4 HOURS AS NEEDED    Tension headache       cyclobenzaprine 10 MG tablet    FLEXERIL    30 tablet    TAKE 1 TABLET BY MOUTH AT BEDTIME AS NEEDED FOR MUSCLE SPASMS    S/P cervical spinal fusion       diclofenac 75 MG EC tablet    VOLTAREN    60 tablet    Take 1 tablet (75 mg) by mouth 2 times daily        order for DME     1 Device    Equipment being ordered: BP cuff/machine    Essential hypertension       * varenicline 0.5 MG X 11 & 1 MG X 42 tablet    CHANTIX STARTING MONTH EVANGELINA    53 tablet    Take 0.5 mg tab daily for 3 days, then 0.5 mg tab twice daily for 4 days, then 1 mg twice daily.    Tobacco dependence syndrome       * varenicline 1 MG tablet    CHANTIX    56 tablet    Take 1 tablet (1 mg) by mouth 2 times daily    Tobacco dependence syndrome       * Notice:  This list has 2  medication(s) that are the same as other medications prescribed for you. Read the directions carefully, and ask your doctor or other care provider to review them with you.

## 2018-04-26 LAB — COPATH REPORT: NORMAL

## 2018-04-27 NOTE — PROGRESS NOTES
Ms. Inman,    Your colposcopy biopsy did not show any cancer.  Please follow up in 6 months for pap with HPV testing.    Please contact the clinic if you have additional questions.  Thank you.    Sincerely,    Nadine De Los Santos

## 2018-05-01 ENCOUNTER — THERAPY VISIT (OUTPATIENT)
Dept: OCCUPATIONAL THERAPY | Facility: CLINIC | Age: 58
End: 2018-05-01
Payer: COMMERCIAL

## 2018-05-01 DIAGNOSIS — M25.522 LEFT ELBOW PAIN: Primary | ICD-10-CM

## 2018-05-01 DIAGNOSIS — M65.30 TRIGGER FINGER, ACQUIRED: ICD-10-CM

## 2018-05-01 PROCEDURE — 97035 APP MDLTY 1+ULTRASOUND EA 15: CPT | Mod: GO | Performed by: OCCUPATIONAL THERAPIST

## 2018-05-01 PROCEDURE — 97140 MANUAL THERAPY 1/> REGIONS: CPT | Mod: GO | Performed by: OCCUPATIONAL THERAPIST

## 2018-05-01 PROCEDURE — 97112 NEUROMUSCULAR REEDUCATION: CPT | Mod: GO | Performed by: OCCUPATIONAL THERAPIST

## 2018-05-01 NOTE — PROGRESS NOTES
Please refer to the daily flowsheet for treatment today, total treatment time and time spent performing 1:1 timed codes.         Home Program:   Warmth for stiffness at L side of neck  Ice to L elbow  Elbow muff to the L elbow at night  Prop L UE on pillows when at rest  PROM with gentle stretch to wrist extensors and flexors  Very gentle MFR to flexors / extensors  Wrist cock up orthosis per comfort (wearing most of the time)  Avoid activities that exacerbate pain in the elbow, no pressure on elbow, avoid elbow flexion for long periods  Very simple neve glide (wrist flex/ext with head tip towards/neutral)  Trigger finger: DIP circumferential orthosis to stop triggering - full time if able  Stretches to flexors   K-tape (helps)   PTRx exercises on phone  Nerve glides upgraded: bring arm up overhead (support with other hand at wrist) and bend wrist back/forth

## 2018-05-01 NOTE — MR AVS SNAPSHOT
After Visit Summary   5/1/2018    Margo Inman    MRN: [id number]           Patient Information     Date Of Birth          1960        Visit Information        Provider Department      5/1/2018 1:00 PM Irene Redding OT [facility]        Today's Diagnoses     Left elbow pain    -  1    Trigger finger, acquired           Follow-ups after your visit        Your next 10 appointments already scheduled     May 03, 2018 10:40 AM CDT   SHORT with Torres Wu MD   [facility] ([facility])    [address] [place] [address]   [phone]            May 09, 2018  1:00 PM CDT   SIMON Hand with Blossom Starkey OT   [facility] ([facility])    [address]  [address]  [place] [id number] [phone]            May 16, 2018  1:00 PM CDT   SIMON Hand with Blossom Starkey OT   [facility] ([facility])    [address]  [address]  [place] [address] [phone]              Who to contact     If you have questions or need follow up information about today's clinic visit or your schedule please contact [facility] directly at [phone].  Normal or non-critical lab and imaging results will be communicated to you by [employer], letter or phone within 4 business days after the clinic has received the results. If you do not hear from us within 7 days, please contact the clinic through [employer] or phone. If you have a critical or abnormal lab result, we will notify you by phone as soon as possible.  Submit refill requests through [employer] or call your pharmacy and they will forward the refill request to us. Please allow 3 business days for your refill to be completed.          Additional Information About Your Visit        [employer] Information     [employer] lets you send messages to your doctor, view your test results, renew your prescriptions,  "schedule appointments and more. To sign up, go to [email or web address] . Click on \"Log in\" on the left side of the screen, which will take you to the Welcome page. Then click on \"Sign up Now\" on the right side of the page.     You will be asked to enter the access code listed below, as well as some personal information. Please follow the directions to create your username and password.     Your access code is: [email or web address]  Expires: 2018 11:20 AM     Your access code will  in 90 days. If you need help or a new code, please call your [place] clinic or [phone].        Care EveryWhere ID     This is your Care EveryWhere ID. This could be used by other organizations to access your [place] medical records  [id number]        Your Vitals Were     Last Period                   2011            Blood Pressure from Last 3 Encounters:   18 140/77   18 (!) 166/98   18 159/89    Weight from Last 3 Encounters:   18 92.1 kg (203 lb)   18 92.4 kg (203 lb 9.6 oz)   18 91.2 kg (201 lb)              We Performed the Following     MANUAL THER TECH,1+REGIONS,EA 15 MIN     NEUROMUSCULAR RE-EDUCATION     ULTRASOUND THERAPY        Primary Care Provider Office Phone # Fax #    [place] JOSE R Santoyo [phone]       [facility] [address]  [facility] [address]        Equal Access to Services     FLAKITA CLAYTON : Hadii aicha cortez hadasho Soomaali, waaxda luqadaha, qaybta kaalmada adeegyada, james yun. So [facility] [phone].    ATENCIÓN: Si habla español, tiene a alberto disposición servicios gratuitos de asistencia lingüística. Llame al [phone].    We comply with applicable federal civil rights laws and [place] laws. We do not discriminate on the basis of race, color, national origin, age, disability, sex, sexual orientation, or gender identity.            Thank you!     Thank you for choosing [facility]  for " your care. Our goal is always to provide you with excellent care. Hearing back from our patients is one way we can continue to improve our services. Please take a few minutes to complete the written survey that you may receive in the mail after your visit with us. Thank you!             Your Updated Medication List - Protect others around you: Learn how to safely use, store and throw away your medicines at www.disposemymeds.org.          This list is accurate as of 5/1/18  3:47 PM.  Always use your most recent med list.                   Brand Name Dispense Instructions for use Diagnosis    acetaminophen 500 MG tablet    TYLENOL     Take 500-1,000 mg by mouth every 6 hours as needed.        atorvastatin 80 MG tablet    LIPITOR    90 tablet    Take 1 tablet (80 mg) by mouth daily    Hyperlipidemia LDL goal <130       butalbital-acetaminophen-caffeine -40 MG per tablet    FIORICET/ESGIC    28 tablet    TAKE 1 TABLET BY MOUTH EVERY 4 HOURS AS NEEDED    Tension headache       cyclobenzaprine 10 MG tablet    FLEXERIL    30 tablet    TAKE 1 TABLET BY MOUTH AT BEDTIME AS NEEDED FOR MUSCLE SPASMS    S/P cervical spinal fusion       diclofenac 75 MG EC tablet    VOLTAREN    60 tablet    Take 1 tablet (75 mg) by mouth 2 times daily        order for DME     1 Device    Equipment being ordered: BP cuff/machine    Essential hypertension       * varenicline 0.5 MG X 11 & 1 MG X 42 tablet    CHANTIX STARTING MONTH EVANGELINA    53 tablet    Take 0.5 mg tab daily for 3 days, then 0.5 mg tab twice daily for 4 days, then 1 mg twice daily.    Tobacco dependence syndrome       * varenicline 1 MG tablet    CHANTIX    56 tablet    Take 1 tablet (1 mg) by mouth 2 times daily    Tobacco dependence syndrome       * Notice:  This list has 2 medication(s) that are the same as other medications prescribed for you. Read the directions carefully, and ask your doctor or other care provider to review them with you.

## 2018-05-03 ENCOUNTER — OFFICE VISIT (OUTPATIENT)
Dept: FAMILY MEDICINE | Facility: CLINIC | Age: 58
End: 2018-05-03
Payer: COMMERCIAL

## 2018-05-03 VITALS
TEMPERATURE: 97.9 F | HEIGHT: 61 IN | HEART RATE: 92 BPM | SYSTOLIC BLOOD PRESSURE: 156 MMHG | DIASTOLIC BLOOD PRESSURE: 90 MMHG | BODY MASS INDEX: 38.33 KG/M2 | OXYGEN SATURATION: 95 % | WEIGHT: 203 LBS

## 2018-05-03 DIAGNOSIS — Z00.00 ROUTINE HISTORY AND PHYSICAL EXAMINATION OF ADULT: ICD-10-CM

## 2018-05-03 DIAGNOSIS — I10 ESSENTIAL HYPERTENSION: ICD-10-CM

## 2018-05-03 DIAGNOSIS — I10 ESSENTIAL HYPERTENSION WITH GOAL BLOOD PRESSURE LESS THAN 140/90: Primary | ICD-10-CM

## 2018-05-03 LAB
ANION GAP SERPL CALCULATED.3IONS-SCNC: 7 MMOL/L (ref 3–14)
BUN SERPL-MCNC: 13 MG/DL (ref 7–30)
CALCIUM SERPL-MCNC: 9.9 MG/DL (ref 8.5–10.1)
CHLORIDE SERPL-SCNC: 101 MMOL/L (ref 94–109)
CO2 SERPL-SCNC: 30 MMOL/L (ref 20–32)
CREAT SERPL-MCNC: 0.59 MG/DL (ref 0.52–1.04)
GFR SERPL CREATININE-BSD FRML MDRD: >90 ML/MIN/1.7M2
GLUCOSE SERPL-MCNC: 106 MG/DL (ref 70–99)
POTASSIUM SERPL-SCNC: 3.9 MMOL/L (ref 3.4–5.3)
SODIUM SERPL-SCNC: 138 MMOL/L (ref 133–144)

## 2018-05-03 PROCEDURE — 80048 BASIC METABOLIC PNL TOTAL CA: CPT | Performed by: NURSE PRACTITIONER

## 2018-05-03 PROCEDURE — 99207 ZZC NO CHARGE NURSE ONLY: CPT | Performed by: FAMILY MEDICINE

## 2018-05-03 PROCEDURE — 36415 COLL VENOUS BLD VENIPUNCTURE: CPT | Performed by: NURSE PRACTITIONER

## 2018-05-03 RX ORDER — CHLORTHALIDONE 25 MG/1
25 TABLET ORAL DAILY
Qty: 30 TABLET | Refills: 1 | Status: SHIPPED | OUTPATIENT
Start: 2018-05-03 | End: 2018-05-30 | Stop reason: SINTOL

## 2018-05-03 RX ORDER — CHLORTHALIDONE 25 MG/1
12.5 TABLET ORAL DAILY
COMMUNITY
End: 2018-05-03

## 2018-05-03 ASSESSMENT — PAIN SCALES - GENERAL: PAINLEVEL: MILD PAIN (3)

## 2018-05-03 NOTE — PROGRESS NOTES
PGEN study visit  NEW HYPERTENSION diagnosis visit 2    SUBJECTIVE:  Margo is here for 2nd PGEN research study visit. Please refer to research tab in the header and today's flow sheet for further details. Patient had new onset hypertension at enrollment and has a goal of <  140/90 (per Problem list target chosen by PCP)     Prior research note reviewed. Patient is on blood pressure medication(s) at this time.  she has been taking chlorthalidone 12.5 mg QD for 13 days and is not tolerating the medication well.  She notes that when she stands up, she gets very dizzy; this symptom started after she started the medication.     Current diet & lifestyle: eating organically (grass-fed meat, fresh caught fish), Mrs. Arias, no added salt, walks every day, getting back into her exercise routine (limited by her current wrist problems)  Smokin.5 ppd, on Chantix, started ~1 week ago (had some success quitting for 6 months with previous use of Chantix). She is hoping to quit within 1 month.  Alcohol consumption: socially, infrequent  Other pertinent history:      BP Readings from Last 3 Encounters:   18 156/90   18 140/77   18 (!) 166/98       Current Outpatient Prescriptions   Medication Sig Dispense Refill     acetaminophen (TYLENOL) 500 MG tablet Take 500-1,000 mg by mouth every 6 hours as needed.       atorvastatin (LIPITOR) 80 MG tablet Take 1 tablet (80 mg) by mouth daily 90 tablet 1     butalbital-acetaminophen-caffeine (FIORICET/ESGIC) -40 MG per tablet TAKE 1 TABLET BY MOUTH EVERY 4 HOURS AS NEEDED 28 tablet 0     chlorthalidone (HYGROTON) 25 MG tablet Take 12.5 mg by mouth daily for blood pressure.       cyclobenzaprine (FLEXERIL) 10 MG tablet TAKE 1 TABLET BY MOUTH AT BEDTIME AS NEEDED FOR MUSCLE SPASMS 30 tablet 3     diclofenac (VOLTAREN) 75 MG EC tablet Take 1 tablet (75 mg) by mouth 2 times daily 60 tablet 11     order for DME Equipment being ordered: BP cuff/machine 1 Device 0      "varenicline (CHANTIX STARTING MONTH PAK) 0.5 MG X 11 & 1 MG X 42 tablet Take 0.5 mg tab daily for 3 days, then 0.5 mg tab twice daily for 4 days, then 1 mg twice daily. 53 tablet 0     varenicline (CHANTIX) 1 MG tablet Take 1 tablet (1 mg) by mouth 2 times daily 56 tablet 4     Potassium   Date Value Ref Range Status   04/07/2017 3.3 (L) 3.4 - 5.3 mmol/L Final     Creatinine   Date Value Ref Range Status   04/02/2017 0.61 0.52 - 1.04 mg/dL Final     Urea Nitrogen   Date Value Ref Range Status   04/02/2017 8 7 - 30 mg/dL Final     GFR Estimate   Date Value Ref Range Status   04/02/2017 >90  Non  GFR Calc   >60 mL/min/1.7m2 Final      A baseline potassium, creatinine, BUN, GFR has not been done within past 12 months    This document serves as a record of the services and decisions personally performed and made by Dr. Wu. It was created on his behalf by Kathy Leung, a trained medical scribe. The creation of this document is based the provider's statements to the medical scribe.  Kathy Leung May 3, 2018 11:15 AM     OBJECTIVE:  Patient in in no apparent distress and able to provide full history for today's encounter. she denies pain or any current illness   Vitals: /90  Pulse 92  Temp 97.9  F (36.6  C) (Oral)  Ht 1.549 m (5' 1\")  Wt 92.1 kg (203 lb)  LMP 09/16/2011  SpO2 95%  BMI 38.36 kg/m2  Today's BP completed using cuff size: large on left side  arm.      Pulse Readings from Last 1 Encounters:   05/03/18 92     Is pulse 55 or greater? - Yes    BMI= Body mass index is 38.36 kg/(m^2).  See PGEN flow sheet for other exam findings.       ASSESSMENT/PLAN:   Blood pressure reading today is not at the provider specified goal of <140/90.      PGEN Flowsheet has been  'filed' ) for this visit (this saves flowsheet data)      1.  Based on PGEN RN HTN MGMT standing order the patient will be advised dosage change: increase chlorthalidone to 25 mg QD.     2.  We will be checking a " metabolic lab panel today. Will recheck BMP at next visit.      3.  Follow up instructions include:     Next Provider visit:  will contact to schedule. Follow up in roughly 4 weeks.    See avs for more details.  Full research packet also provided to patient previously  Our research team will reach out to patient to schedule follow up visit as well.     Patient was counseled regarding the lifestyle changes (listed below)  to help with BP management Yes  Lifestyle changes that can help control high blood pressure:  Even though PGEN is a study to test effectiveness of genetically guided medications for managing high blood pressure, there are several things you can do to ensure your blood pressure stays in good control:    Maintain a healthy weight (BMI<26). A modest amount of weight loss can be helpful    Limit salt intake to under 2400mg daily    Follow the DASH diet (lean meats, low salt, whole grains, lots of fruits/vegies)    Stay active, try to get in 30 minutes of exercise daily.    Manage your daily stress.    Do not smoke cigarettes (or cut back)    Limit alcohol (2 drinks/day for men, 1 drink/day for women)  Was AVS  provided to patient with the relevant <dot>PGENPI dot phrase pulled into patient instructions Yes    The information in this document, created by the medical scribe for me, accurately reflects the services I personally performed and the decisions made by me. I have reviewed and approved this document for accuracy prior to leaving the patient care area. May 3, 2018 11:15 AM    Torres Wu MD

## 2018-05-03 NOTE — PATIENT INSTRUCTIONS
At [facility], we strive to deliver an exceptional experience to you, every time we see you.  If you receive a survey in the mail, please send us back your thoughts. We really do value your feedback.    Based on your medical history, these are the current health maintenance/preventive care services that you are due for (some may have been done at this visit.)  Health Maintenance Due   Topic Date Due     HIV SCREEN (SYSTEM ASSIGNED)  01/12/1980     ADVANCE DIRECTIVE PLANNING Q5 YRS  01/12/2017       Suggested websites for health information:  Www.Tunespeak."[employer] : Up to date and easily searchable information on multiple topics.  Www.Adaptics.gov : medication info, interactive tutorials, watch real surgeries online  Www.familydoctor.org : good info from the Academy of Family Physicians  Www.cdc.gov : public health info, travel advisories, epidemics (H1N1)  Www.aap.org : children's health info, normal development, vaccinations  Www.health.[facility].[place].us : [place] dept of health, public health issues in [place], N1N1    Your care team:                            Family Medicine Internal Medicine   MD David Wagoner MD Shantel Branch-Fleming, MD Katya Georgiev PA-C Megan Hill, APRN CNP    Los Razo MD Pediatrics   Desean Francis, PAADEN Murillo, CNP MD Nan Minor APRN CNP   MD Alexus Alejo MD Deborah Mielke, MD Kim Thein, APRN [facility]      Clinic hours: Monday - Thursday 7 am-7 pm; Fridays 7 am-5 pm.   Urgent care: Monday - Friday 11 am-9 pm; Saturday and Sunday 9 am-5 pm.  Pharmacy : Monday -Thursday 8 am-8 pm; Friday 8 am-6 pm; Saturday and Sunday 9 am-5 pm.     Clinic: [phone]   Pharmacy: [phone]    [facility] Hypertension Study  Visit 2     Increase your chlorthalidone dose to 25 mg daily (whole pill). Follow up in 4 weeks. We will do lab work at that appointment as well.    Thank you for your continued participation in the hypertension  study!  Please continue taking your hypertension medications as directed. Your next visit will be in four weeks and will be scheduled for you in the coming days. After it is scheduled, be sure to let the research coordinator know as soon as possible if you cannot make it so that the visit can be rescheduled for you.     Please contact the research coordinator if you receive care for your hypertension outside of your study visits.    If you have any questions, please contact the research coordinator at [phone]. If you experience any symptoms please call the [employer] 24/7 triage number at [phone]. If your phone number, email or address changes please alert the research coordinator.     In the meantime, we ask that you complete the online surveys emailed out to you, if completing online, or mailed out to you, if completing paper surveys, before your next visit.

## 2018-05-03 NOTE — PROGRESS NOTES
SUBJECTIVE:   Margo Inman is a 58 year old female who presents to clinic today for the following health issues:      Hypertension Follow-up      Outpatient blood pressures are not being checked.    Low Salt Diet: no added salt      Amount of exercise or physical activity: walk everyday    Problems taking medications regularly: No    Medication side effects: yes dizzy when she stands    Diet: regular (no restrictions)        {additional problems for provider to add:836743}    Problem list and histories reviewed & adjusted, as indicated.  Additional history: as documented    {HIST REVIEW/ LINKS 2:071124}    Reviewed and updated as needed this visit by clinical staff  Tobacco  Allergies  Meds  Med Hx  Surg Hx  Fam Hx  Soc Hx      Reviewed and updated as needed this visit by Provider         {PROVIDER CHARTING PREFERENCE:394382}

## 2018-05-03 NOTE — MR AVS SNAPSHOT
After Visit Summary   5/3/2018    Margo Inman    MRN: [id number]           Patient Information     Date Of Birth          1960        Visit Information        Provider Department      5/3/2018 10:40 AM Torres Wu MD [facility]        Today's Diagnoses     Essential hypertension with goal blood pressure less than 140/90    -  1      Care Instructions    At [facility], we strive to deliver an exceptional experience to you, every time we see you.  If you receive a survey in the mail, please send us back your thoughts. We really do value your feedback.    Based on your medical history, these are the current health maintenance/preventive care services that you are due for (some may have been done at this visit.)  Health Maintenance Due   Topic Date Due     HIV SCREEN (SYSTEM ASSIGNED)  01/12/1980     ADVANCE DIRECTIVE PLANNING Q5 YRS  01/12/2017       Suggested websites for health information:  Www.eventblimp : Up to date and easily searchable information on multiple topics.  Www.medlineplus.gov : medication info, interactive tutorials, watch real surgeries online  Www.familydoctor.org : good info from the Academy of Family Physicians  Www.cdc.gov : public health info, travel advisories, epidemics (H1N1)  Www.aap.org : children's health info, normal development, vaccinations  Www.health.state[place]us : [place] dept of health, public health issues in [place], N1N1    Your care team:                            Family Medicine Internal Medicine   MD David Wagoner MD Shantel Branch-Fleming, MD Katya Georgiev PA-C Megan Hill, JOSE R Razo MD Pediatrics   OLIVIA Lucia, MD Nan Hebert APRN CNP   MD Alexus Alejo MD Deborah Mielke, MD Kim Thein, APRN [facility]      Clinic hours: Monday - Thursday 7 am-7 pm; Fridays 7 am-5 pm.   Urgent care: Monday - Friday 11 am-9 pm;  Saturday and Sunday 9 am-5 pm.  Pharmacy : Monday -Thursday 8 am-8 pm; Friday 8 am-6 pm; Saturday and Sunday 9 am-5 pm.     Clinic: [phone]   Pharmacy: [phone]    PGen Hypertension Study  Visit 2     Increase your chlorthalidone dose to 25 mg daily (whole pill). Follow up in 4 weeks. We will do lab work at that appointment as well.    Thank you for your continued participation in the hypertension study!  Please continue taking your hypertension medications as directed. Your next visit will be in four weeks and will be scheduled for you in the coming days. After it is scheduled, be sure to let the research coordinator know as soon as possible if you cannot make it so that the visit can be rescheduled for you.     Please contact the research coordinator if you receive care for your hypertension outside of your study visits.    If you have any questions, please contact the research coordinator at [phone]. If you experience any symptoms please call the [employer] 24/7 triage number at [phone]. If your phone number, email or address changes please alert the research coordinator.     In the meantime, we ask that you complete the online surveys emailed out to you, if completing online, or mailed out to you, if completing paper surveys, before your next visit.            Follow-ups after your visit        Follow-up notes from your care team     Return in about 4 weeks (around 5/31/2018) for PGen.      Your next 10 appointments already scheduled     May 09, 2018  1:00 PM CDT   SIMON [facility] Blossom Starkey, OT   [facility]    [address]  [address]  [place] [address] [phone]            May 16, 2018  1:00 PM CDT   SIMON Hand with Blossom Starkey, OT   [facility]    [address]  [address]  [place] [address] [phone]              Who to contact     If you have questions or need follow up  "information about today's clinic visit or your schedule please contact [facility] directly at [phone].  Normal or non-critical lab and imaging results will be communicated to you by [employer], letter or phone within 4 business days after the clinic has received the results. If you do not hear from us within 7 days, please contact the clinic through [employer] or phone. If you have a critical or abnormal lab result, we will notify you by phone as soon as possible.  Submit refill requests through Dashbook or call your pharmacy and they will forward the refill request to us. Please allow 3 business days for your refill to be completed.          Additional Information About Your Visit        [employer] EnergyhariSpye Information     Dashbook lets you send messages to your doctor, view your test results, renew your prescriptions, schedule appointments and more. To sign up, go to [email or web address] . Click on \"Log in\" on the left side of the screen, which will take you to the Welcome page. Then click on \"Sign up Now\" on the right side of the page.     You will be asked to enter the access code listed below, as well as some personal information. Please follow the directions to create your username and password.     Your access code is: [email or web address]  Expires: 2018 11:20 AM     Your access code will  in 90 days. If you need help or a new code, please call your [facility] clinic or [phone].        Care EveryWhere ID     This is your Care EveryWhere ID. This could be used by other organizations to access your [facility] medical records  [id number]        Your Vitals Were     Pulse Temperature Height Last Period Pulse Oximetry BMI (Body Mass Index)    92 97.9  F (36.6  C) (Oral) 1.549 m (5' 1\") 2011 95% 38.36 kg/m2       Blood Pressure from Last 3 Encounters:   18 156/90   18 140/77   18 (!) 166/98    Weight from Last 3 Encounters:   18 92.1 kg (203 lb)   18 92.1 kg (203 " lb)   04/04/18 92.4 kg (203 lb 9.6 oz)              Today, you had the following     No orders found for display         Today's Medication Changes          These changes are accurate as of 5/3/18 11:42 AM.  If you have any questions, ask your nurse or doctor.               These medicines have changed or have updated prescriptions.        Dose/Directions    chlorthalidone 25 MG tablet   Commonly known as:  HYGROTON   This may have changed:  how much to take   Used for:  Essential hypertension with goal blood pressure less than 140/90   Changed by:  Torres Wu MD        Dose:  25 mg   Take 1 tablet (25 mg) by mouth daily for blood pressure.   Quantity:  30 tablet   Refills:  1            Where to get your medicines      These medications were sent to [facility] [address] [place], [place] [address] AT [facility] [address]  [address] [address] [place] [address]     Phone:  [phone]     chlorthalidone 25 MG tablet                Primary Care Provider Office Phone # Fax #    JOSE R Henson [phone]       [address] [place] [address]        Equal Access to Services     [facility] AH: Hadii aad ku hadasho Soomaali, waaxda luqadaha, qaybta kaalmada adeegyada, waxay idiin hayaan adeflorentino kharash jamel ah. So [facility] [phone].    ATENCIÓN: Si habla español, tiene a alberto disposición servicios gratuitos de asistencia lingüística. Llame al [phone].    We comply with applicable federal civil rights laws and [place] laws. We do not discriminate on the basis of race, color, national origin, age, disability, sex, sexual orientation, or gender identity.            Thank you!     Thank you for choosing [facility]  for your care. Our goal is always to provide you with excellent care. Hearing back from our patients is one way we can continue to improve our services. Please take a few minutes to complete the written survey that  you may receive in the mail after your visit with us. Thank you!             Your Updated Medication List - Protect others around you: Learn how to safely use, store and throw away your medicines at www.disposemymeds.org.          This list is accurate as of 5/3/18 11:42 AM.  Always use your most recent med list.                   Brand Name Dispense Instructions for use Diagnosis    acetaminophen 500 MG tablet    TYLENOL     Take 500-1,000 mg by mouth every 6 hours as needed.        atorvastatin 80 MG tablet    LIPITOR    90 tablet    Take 1 tablet (80 mg) by mouth daily    Hyperlipidemia LDL goal <130       butalbital-acetaminophen-caffeine -40 MG per tablet    FIORICET/ESGIC    28 tablet    TAKE 1 TABLET BY MOUTH EVERY 4 HOURS AS NEEDED    Tension headache       chlorthalidone 25 MG tablet    HYGROTON    30 tablet    Take 1 tablet (25 mg) by mouth daily for blood pressure.    Essential hypertension with goal blood pressure less than 140/90       cyclobenzaprine 10 MG tablet    FLEXERIL    30 tablet    TAKE 1 TABLET BY MOUTH AT BEDTIME AS NEEDED FOR MUSCLE SPASMS    S/P cervical spinal fusion       diclofenac 75 MG EC tablet    VOLTAREN    60 tablet    Take 1 tablet (75 mg) by mouth 2 times daily        order for DME     1 Device    Equipment being ordered: BP cuff/machine    Essential hypertension       * varenicline 0.5 MG X 11 & 1 MG X 42 tablet    CHANTIX STARTING MONTH EVANGELINA    53 tablet    Take 0.5 mg tab daily for 3 days, then 0.5 mg tab twice daily for 4 days, then 1 mg twice daily.    Tobacco dependence syndrome       * varenicline 1 MG tablet    CHANTIX    56 tablet    Take 1 tablet (1 mg) by mouth 2 times daily    Tobacco dependence syndrome       * Notice:  This list has 2 medication(s) that are the same as other medications prescribed for you. Read the directions carefully, and ask your doctor or other care provider to review them with you.

## 2018-05-30 ENCOUNTER — OFFICE VISIT (OUTPATIENT)
Dept: FAMILY MEDICINE | Facility: CLINIC | Age: 58
End: 2018-05-30
Payer: COMMERCIAL

## 2018-05-30 VITALS
SYSTOLIC BLOOD PRESSURE: 138 MMHG | HEIGHT: 61 IN | TEMPERATURE: 97.8 F | OXYGEN SATURATION: 98 % | BODY MASS INDEX: 37.34 KG/M2 | HEART RATE: 88 BPM | WEIGHT: 197.8 LBS | DIASTOLIC BLOOD PRESSURE: 83 MMHG

## 2018-05-30 DIAGNOSIS — I10 ESSENTIAL HYPERTENSION WITH GOAL BLOOD PRESSURE LESS THAN 140/90: Primary | ICD-10-CM

## 2018-05-30 PROCEDURE — 99213 OFFICE O/P EST LOW 20 MIN: CPT | Performed by: NURSE PRACTITIONER

## 2018-05-30 RX ORDER — LISINOPRIL 10 MG/1
10 TABLET ORAL DAILY
Qty: 30 TABLET | Refills: 1 | Status: SHIPPED | OUTPATIENT
Start: 2018-05-30 | End: 2018-08-10

## 2018-05-30 RX ORDER — LISINOPRIL 10 MG/1
TABLET ORAL
Refills: 1 | COMMUNITY
Start: 2018-04-26 | End: 2018-05-30

## 2018-05-30 NOTE — PATIENT INSTRUCTIONS
At [facility], we strive to deliver an exceptional experience to you, every time we see you.  If you receive a survey in the mail, please send us back your thoughts. We really do value your feedback.    Based on your medical history, these are the current health maintenance/preventive care services that you are due for (some may have been done at this visit.)  Health Maintenance Due   Topic Date Due     HIV SCREEN (SYSTEM ASSIGNED)  01/12/1980     ADVANCE DIRECTIVE PLANNING Q5 YRS  01/12/2017         Suggested websites for health information:  Www.[employer] : Up to date and easily searchable information on multiple topics.  Www.Kaeuferportal.gov : medication info, interactive tutorials, watch real surgeries online  Www.familydoctor.org : good info from the Academy of Family Physicians  Www.cdc.gov : public health info, travel advisories, epidemics (H1N1)  Www.aap.org : children's health info, normal development, vaccinations  Www.health.[facility].[place].us : [place] dept of health, public health issues in [place], N1N1    Your care team:                            Family Medicine Internal Medicine   MD David Wagoner MD Shantel Branch-Fleming, MD Katya Georgiev PA-C Nam Ho, MD Pediatrics   OLIVIA Lucia, JAIME Kovacs APRN CNP   MD Alexus Alejo MD Deborah Mielke, MD Kim Thein, APRN [facility]      Clinic hours: Monday - Thursday 7 am-7 pm; Fridays 7 am-5 pm.   Urgent care: Monday - Friday 11 am-9 pm; Saturday and Sunday 9 am-5 pm.  Pharmacy : Monday -Thursday 8 am-8 pm; Friday 8 am-6 pm; Saturday and Sunday 9 am-5 pm.     Clinic: [phone]   Pharmacy: [phone]  [facility] Hypertension Study   Visit 3     Thank you for your continued participation in the hypertension study!  Please continue taking your hypertension medication as directed. Your next visit will be in four weeks and will be scheduled for you in the coming days. After scheduled,  be sure to let the research coordinator know as soon as possible if you cannot make it so that the visit can be rescheduled for you.     Please contact the research coordinator if you receive care for your hypertension outside of your study visits.    If you have any questions, please contact the research coordinator at [phone]. If you experience any symptoms please call the [employer] 24/7 triage number at [phone]. If your phone number, email or address changes please alert the research coordinator.     In the meantime, we ask that you complete the online surveys emailed out to  you, if completing online, or mailed out to you, if completing paper surveys,  before your next visit.    Lifestyle changes that can help control high blood pressure:  Even though PGEN is a study to test effectiveness of genetically guided medications for managing high blood pressure, there are several things you can do to ensure your blood pressure stays in good control:    Maintain a healthy weight (BMI<26). A modest amount of weight loss can be helpful    Limit salt intake to under 2400mg daily    Follow the DASH diet (lean meats, low salt, whole grains, lots of fruits/vegies)    Stay active, try to get in 30 minutes of exercise daily.    Manage your daily stress.    Do not smoke cigarettes (or cut back)    Limit alcohol (2 drinks/day for men, 1 drink/day for women)

## 2018-05-30 NOTE — MR AVS SNAPSHOT
After Visit Summary   5/30/2018    Margo Inman    MRN: [id number]           Patient Information     Date Of Birth          1960        Visit Information        Provider Department      5/30/2018 11:40 AM Belem Dumont APRN CNP [facility]        Today's Diagnoses     Essential hypertension with goal blood pressure less than 140/90    -  1      Care Instructions    At [facility], we strive to deliver an exceptional experience to you, every time we see you.  If you receive a survey in the mail, please send us back your thoughts. We really do value your feedback.    Based on your medical history, these are the current health maintenance/preventive care services that you are due for (some may have been done at this visit.)  Health Maintenance Due   Topic Date Due     HIV SCREEN (SYSTEM ASSIGNED)  01/12/1980     ADVANCE DIRECTIVE PLANNING Q5 YRS  01/12/2017         Suggested websites for health information:  Www.Overdog : Up to date and easily searchable information on multiple topics.  Www.medlineplus.gov : medication info, interactive tutorials, watch real surgeries online  Www.familydoctor.org : good info from the Academy of Family Physicians  Www.cdc.gov : public health info, travel advisories, epidemics (H1N1)  Www.aap.org : children's health info, normal development, vaccinations  Www.health[place]state[place] : [place] dept of health, public health issues in [place], N1N1    Your care team:                            Family Medicine Internal Medicine   MD David Wagoner MD Shantel Branch-Fleming, MD Katya Georgiev PA-C Nam Ho, MD Pediatrics   OLIVIA Lucia CNP Amelia Massimini APRN CNP Shaista Malik, MD Bethany Templen, MD Deborah Mielke, MD Kim Thein, APRN CNP      Clinic hours: Monday - Thursday 7 am-7 pm; Fridays 7 am-5 pm.   Urgent care: Monday - Friday 11 am-9 pm; Saturday and Sunday 9 am-5  pm.  Pharmacy : Monday -Thursday 8 am-8 pm; Friday 8 am-6 pm; Saturday and Sunday 9 am-5 pm.     Clinic: [phone]   Pharmacy: [phone]  [facility] Hypertension Study   Visit 3     Thank you for your continued participation in the hypertension study!  Please continue taking your hypertension medication as directed. Your next visit will be in four weeks and will be scheduled for you in the coming days. After scheduled, be sure to let the research coordinator know as soon as possible if you cannot make it so that the visit can be rescheduled for you.     Please contact the research coordinator if you receive care for your hypertension outside of your study visits.    If you have any questions, please contact the research coordinator at [phone]. If you experience any symptoms please call the [facility] 24/7 triage number at [phone]. If your phone number, email or address changes please alert the research coordinator.     In the meantime, we ask that you complete the online surveys emailed out to  you, if completing online, or mailed out to you, if completing paper surveys,  before your next visit.    Lifestyle changes that can help control high blood pressure:  Even though [facility] is a study to test effectiveness of genetically guided medications for managing high blood pressure, there are several things you can do to ensure your blood pressure stays in good control:    Maintain a healthy weight (BMI<26). A modest amount of weight loss can be helpful    Limit salt intake to under 2400mg daily    Follow the DASH diet (lean meats, low salt, whole grains, lots of fruits/vegies)    Stay active, try to get in 30 minutes of exercise daily.    Manage your daily stress.    Do not smoke cigarettes (or cut back)    Limit alcohol (2 drinks/day for men, 1 drink/day for women)            Follow-ups after your visit        Follow-up notes from your care team     Return in about 4 weeks (around 6/27/2018), or if symptoms  "worsen or fail to improve, for BP Recheck.      Who to contact     If you have questions or need follow up information about today's clinic visit or your schedule please contact [facility] directly at [phone].  Normal or non-critical lab and imaging results will be communicated to you by MyChart, letter or phone within 4 business days after the clinic has received the results. If you do not hear from us within 7 days, please contact the clinic through SuperMam[employer] or phone. If you have a critical or abnormal lab result, we will notify you by phone as soon as possible.  Submit refill requests through [employer]o or call your pharmacy and they will forward the refill request to us. Please allow 3 business days for your refill to be completed.          Additional Information About Your Visit        SuperMamahar[employer] cell Storage Information     [employer]o lets you send messages to your doctor, view your test results, renew your prescriptions, schedule appointments and more. To sign up, go to [email or web address] . Click on \"Log in\" on the left side of the screen, which will take you to the Welcome page. Then click on \"Sign up Now\" on the right side of the page.     You will be asked to enter the access code listed below, as well as some personal information. Please follow the directions to create your username and password.     Your access code is: [email or web address]  Expires: 2018 11:20 AM     Your access code will  in 90 days. If you need help or a new code, please call your [place] clinic or [phone].        Care EveryWhere ID     This is your Care EveryWhere ID. This could be used by other organizations to access your [place] medical records  [id number]        Your Vitals Were     Pulse Temperature Height Last Period Pulse Oximetry BMI (Body Mass Index)    88 97.8  F (36.6  C) (Oral) 5' 1\" (1.549 m) 2011 98% 37.37 kg/m2       Blood Pressure from Last 3 Encounters:   18 138/83   18 156/90 "   04/18/18 140/77    Weight from Last 3 Encounters:   05/30/18 197 lb 12.8 oz (89.7 kg)   05/03/18 203 lb (92.1 kg)   04/18/18 203 lb (92.1 kg)              Today, you had the following     No orders found for display         Today's Medication Changes          These changes are accurate as of 5/30/18 12:13 PM.  If you have any questions, ask your nurse or doctor.               These medicines have changed or have updated prescriptions.        Dose/Directions    lisinopril 10 MG tablet   Commonly known as:  PRINIVIL/ZESTRIL   This may have changed:    - how much to take  - how to take this  - when to take this   Used for:  Essential hypertension with goal blood pressure less than 140/90   Changed by:  Belem Dumont APRN CNP        Dose:  10 mg   Take 1 tablet (10 mg) by mouth daily   Quantity:  30 tablet   Refills:  1         Stop taking these medicines if you haven't already. Please contact your care team if you have questions.     chlorthalidone 25 MG tablet   Commonly known as:  HYGROTON   Stopped by:  Belem Dumont APRN CNP                Where to get your medicines      These medications were sent to [facility] [address] [place] [address] [address] AT [facility] [address]  [address] [address] [place] [facility] [address]     Phone:  [phone]     lisinopril 10 MG tablet                Primary Care Provider Office Phone # Fax #    JOSE R Henson [phone]       [address] [place] [id number]        Equal Access to Services     [facility]: Hadii aad ku hadasho Soomaali, waaxda luqadaha, qaybta kaalmada adeegyada, james idiin hayaan adeeg kharash la'aan . So [facility] [phone].    ATENCIÓN: Si habla español, tiene a alberto disposición servicios gratuitos de asistencia lingüística. Chante al [phone].    We comply with applicable federal civil rights laws and [place] laws. We do not discriminate on the basis of race, color, national  origin, age, disability, sex, sexual orientation, or gender identity.            Thank you!     Thank you for choosing [facility]  for your care. Our goal is always to provide you with excellent care. Hearing back from our patients is one way we can continue to improve our services. Please take a few minutes to complete the written survey that you may receive in the mail after your visit with us. Thank you!             Your Updated Medication List - Protect others around you: Learn how to safely use, store and throw away your medicines at www.disposemymeds.org.          This list is accurate as of 5/30/18 12:13 PM.  Always use your most recent med list.                   Brand Name Dispense Instructions for use Diagnosis    acetaminophen 500 MG tablet    TYLENOL     Take 500-1,000 mg by mouth every 6 hours as needed.        atorvastatin 80 MG tablet    LIPITOR    90 tablet    Take 1 tablet (80 mg) by mouth daily    Hyperlipidemia LDL goal <130       butalbital-acetaminophen-caffeine -40 MG per tablet    FIORICET/ESGIC    28 tablet    TAKE 1 TABLET BY MOUTH EVERY 4 HOURS AS NEEDED    Tension headache       cyclobenzaprine 10 MG tablet    FLEXERIL    30 tablet    TAKE 1 TABLET BY MOUTH AT BEDTIME AS NEEDED FOR MUSCLE SPASMS    S/P cervical spinal fusion       diclofenac 75 MG EC tablet    VOLTAREN    60 tablet    Take 1 tablet (75 mg) by mouth 2 times daily        lisinopril 10 MG tablet    PRINIVIL/ZESTRIL    30 tablet    Take 1 tablet (10 mg) by mouth daily    Essential hypertension with goal blood pressure less than 140/90       order for DME     1 Device    Equipment being ordered: BP cuff/machine    Essential hypertension       * varenicline 0.5 MG X 11 & 1 MG X 42 tablet    CHANTIX STARTING MONTH EVANGELINA    53 tablet    Take 0.5 mg tab daily for 3 days, then 0.5 mg tab twice daily for 4 days, then 1 mg twice daily.    Tobacco dependence syndrome       * varenicline 1 MG tablet    CHANTIX     56 tablet    Take 1 tablet (1 mg) by mouth 2 times daily    Tobacco dependence syndrome       * Notice:  This list has 2 medication(s) that are the same as other medications prescribed for you. Read the directions carefully, and ask your doctor or other care provider to review them with you.

## 2018-05-30 NOTE — PROGRESS NOTES
PGEN study visit  NEW HYPERTENSION diagnosis visit 3    SUBJECTIVE:  Margo is here for 3rd PGEN research study visit. Please refer to research tab in the header and today's flow sheet for further details. Patient had new onset hypertension at enrollment and has a goal of <  140/90 (per Problem list target chosen by PCP)     Prior research note reviewed. Patient is on blood pressure medication(s) at this time.  she has been taking Chlorthalidone 25 mg daily and is not tolerating the medication well.    She gets dizzy after taking it and has nausea as well.  No cp, shortness of breath, palpitations, or weakness.      Current diet & lifestyle: GEOVANI, using Mrs Arias, eats organic foods, grass fed meat.  Smokin.5 ppd, on Chantix.  Her quit date is set for 18.  Alcohol consumption rarely  Other pertinent history      BP Readings from Last 3 Encounters:   18 138/83   18 156/90   18 140/77       Current Outpatient Prescriptions   Medication Sig Dispense Refill     acetaminophen (TYLENOL) 500 MG tablet Take 500-1,000 mg by mouth every 6 hours as needed.       atorvastatin (LIPITOR) 80 MG tablet Take 1 tablet (80 mg) by mouth daily 90 tablet 1     butalbital-acetaminophen-caffeine (FIORICET/ESGIC) -40 MG per tablet TAKE 1 TABLET BY MOUTH EVERY 4 HOURS AS NEEDED 28 tablet 0     chlorthalidone (HYGROTON) 25 MG tablet Take 1 tablet (25 mg) by mouth daily for blood pressure. 30 tablet 1     cyclobenzaprine (FLEXERIL) 10 MG tablet TAKE 1 TABLET BY MOUTH AT BEDTIME AS NEEDED FOR MUSCLE SPASMS 30 tablet 3     diclofenac (VOLTAREN) 75 MG EC tablet Take 1 tablet (75 mg) by mouth 2 times daily 60 tablet 11     order for DME Equipment being ordered: BP cuff/machine 1 Device 0     varenicline (CHANTIX STARTING MONTH EVANGELINA) 0.5 MG X 11 & 1 MG X 42 tablet Take 0.5 mg tab daily for 3 days, then 0.5 mg tab twice daily for 4 days, then 1 mg twice daily. 53 tablet 0     varenicline (CHANTIX) 1 MG tablet Take 1  "tablet (1 mg) by mouth 2 times daily 56 tablet 4     lisinopril (PRINIVIL/ZESTRIL) 10 MG tablet   1     Potassium   Date Value Ref Range Status   05/03/2018 3.9 3.4 - 5.3 mmol/L Final     Creatinine   Date Value Ref Range Status   05/03/2018 0.59 0.52 - 1.04 mg/dL Final     Urea Nitrogen   Date Value Ref Range Status   05/03/2018 13 7 - 30 mg/dL Final     GFR Estimate   Date Value Ref Range Status   05/03/2018 >90 >60 mL/min/1.7m2 Final     Comment:     Non  GFR Calc      A baseline potassium, creatinine, BUN, GFR has been done within past 12 months      OBJECTIVE:  Patient in in no apparent distress and able to provide full history for today's encounter. she denies pain or any current illness   Vitals: /83 (BP Location: Left arm, Patient Position: Chair, Cuff Size: Adult Large)  Pulse 88  Temp 97.8  F (36.6  C) (Oral)  Ht 5' 1\" (1.549 m)  Wt 197 lb 12.8 oz (89.7 kg)  LMP 09/16/2011  SpO2 98%  BMI 37.37 kg/m2  Today's BP completed using cuff size: large on left side  arm.      Pulse Readings from Last 1 Encounters:   05/30/18 88     Is pulse 55 or greater? - Yes    BMI= Body mass index is 37.37 kg/(m^2).  See PGEN flow sheet for other exam findings.       ASSESSMENT/PLAN:   Blood pressure reading today is at the provider specified goal of <140/90.      PGEN Flowsheet has been  'filed' ) for this visit (this saves flowsheet data)      1.  Based on PGEN RN HTN MGMT standing order the patient will be advised discontinue: chlorthalidone and begin: Lisinopril 10 mg daily  .     2.  We will not be checking a metabolic lab panel today.      3.  Follow up instructions include:     Next Nurse visit: 1 month.    See avs for more details.  Full research packet also provided to patient previously  Our research team will reach out to patient to schedule follow up visit as well.     Patient was counseled regarding the lifestyle changes (listed below)  to help with BP management Yes  Lifestyle changes " that can help control high blood pressure:  Even though PGEN is a study to test effectiveness of genetically guided medications for managing high blood pressure, there are several things you can do to ensure your blood pressure stays in good control:    Maintain a healthy weight (BMI<26). A modest amount of weight loss can be helpful    Limit salt intake to under 2400mg daily    Follow the DASH diet (lean meats, low salt, whole grains, lots of fruits/vegies)    Stay active, try to get in 30 minutes of exercise daily.    Manage your daily stress.    Do not smoke cigarettes (or cut back)    Limit alcohol (2 drinks/day for men, 1 drink/day for women)  Was AVS  provided to patient with the relevant <dot>PGENPI dot phrase pulled into patient instructions Yes    Belem ODELL, CNP

## 2018-06-06 DIAGNOSIS — E78.5 HYPERLIPIDEMIA LDL GOAL <130: ICD-10-CM

## 2018-06-06 RX ORDER — ATORVASTATIN CALCIUM 80 MG/1
TABLET, FILM COATED ORAL
Qty: 30 TABLET | Refills: 0 | OUTPATIENT
Start: 2018-06-06

## 2018-06-27 ENCOUNTER — OFFICE VISIT (OUTPATIENT)
Dept: FAMILY MEDICINE | Facility: CLINIC | Age: 58
End: 2018-06-27
Payer: COMMERCIAL

## 2018-06-27 VITALS
HEIGHT: 61 IN | BODY MASS INDEX: 37 KG/M2 | SYSTOLIC BLOOD PRESSURE: 126 MMHG | WEIGHT: 196 LBS | HEART RATE: 88 BPM | OXYGEN SATURATION: 99 % | DIASTOLIC BLOOD PRESSURE: 78 MMHG | TEMPERATURE: 97.7 F

## 2018-06-27 DIAGNOSIS — F17.200 TOBACCO DEPENDENCE SYNDROME: ICD-10-CM

## 2018-06-27 DIAGNOSIS — I10 ESSENTIAL HYPERTENSION WITH GOAL BLOOD PRESSURE LESS THAN 140/90: Primary | ICD-10-CM

## 2018-06-27 PROCEDURE — 99213 OFFICE O/P EST LOW 20 MIN: CPT | Performed by: NURSE PRACTITIONER

## 2018-06-27 RX ORDER — VARENICLINE TARTRATE 1 MG/1
1 TABLET, FILM COATED ORAL 2 TIMES DAILY
Qty: 56 TABLET | Refills: 4 | Status: SHIPPED | OUTPATIENT
Start: 2018-06-27 | End: 2020-05-06

## 2018-06-27 NOTE — PATIENT INSTRUCTIONS
At [facility], we strive to deliver an exceptional experience to you, every time we see you.  If you receive a survey in the mail, please send us back your thoughts. We really do value your feedback.    Based on your medical history, these are the current health maintenance/preventive care services that you are due for (some may have been done at this visit.)  Health Maintenance Due   Topic Date Due     HIV SCREEN (SYSTEM ASSIGNED)  01/12/1980     ADVANCE DIRECTIVE PLANNING Q5 YRS  01/12/2017       Suggested websites for health information:  Www.HSystem.[employer] : Up to date and easily searchable information on multiple topics.  Www.[employer].gov : medication info, interactive tutorials, watch real surgeries online  Www.familydoctor.org : good info from the Academy of Family Physicians  Www.cdc.gov : public health info, travel advisories, epidemics (H1N1)  Www.aap.org : children's health info, normal development, vaccinations  Www.health.[facility].[place].us : [place] dept of health, public health issues in [place], N1N1    Your care team:                            Family Medicine Internal Medicine   MD David Wagoner MD Shantel Branch-Fleming, MD Katya Georgiev PA-C Megan Hill, APRN CNP    [place] MD Pediatrics   Desean Francis, PAADEN Murillo, CNP MD Nan Minor APRN CNP   MD Alexus Alejo MD Deborah Mielke, MD Kim Thein, APRN [facility]      Clinic hours: Monday - Thursday 7 am-7 pm; Fridays 7 am-5 pm.   Urgent care: Monday - Friday 11 am-9 pm; Saturday and Sunday 9 am-5 pm.  Pharmacy : Monday -Thursday 8 am-8 pm; Friday 8 am-6 pm; Saturday and Sunday 9 am-5 pm.     Clinic: [phone]   Pharmacy: [phone]    [facility] Hy[facility]   Visit 4     Thank you for your continued participation in the hypertension study!  Please continue taking your hypertension medications as directed. Your next visit will be in one and a half months and will be  scheduled for you in the coming days.     After it is scheduled, be sure to let the research coordinator know as soon as possible if you cannot make it so that the visit can be rescheduled for you.     Please contact the research coordinator if you receive care for your hypertension outside of your study visits.    If you have any questions, please contact the research coordinator at [phone]. If you experience any symptoms please call the [facility] 24/7 triage number at [phone]. If your phone number, email or address changes please alert the research coordinator.     In the meantime, we ask that you complete the online surveys emailed out to  you, if completing online, or mailed out to you, if completing paper surveys,  before your next visit.    Lifestyle changes that can help control high blood pressure:  Even though PGEN is a study to test effectiveness of genetically guided medications for managing high blood pressure, there are several things you can do to ensure your blood pressure stays in good control:    Maintain a healthy weight (BMI<26). A modest amount of weight loss can be helpful    Limit salt intake to under 2400mg daily    Follow the DASH diet (lean meats, low salt, whole grains, lots of fruits/vegies)    Stay active, try to get in 30 minutes of exercise daily.    Manage your daily stress.    Do not smoke cigarettes (or cut back)    Limit alcohol (2 drinks/day for men, 1 drink/day for women)

## 2018-06-27 NOTE — MR AVS SNAPSHOT
After Visit Summary   6/27/2018    Margo Inman    MRN: [id number]           Patient Information     Date Of Birth          1960        Visit Information        Provider Department      6/27/2018 11:20 AM Belem Dumont APRN CNP [facility]        Today's Diagnoses     Essential hypertension with goal blood pressure less than 140/90    -  1      Care Instructions    At [facility], we strive to deliver an exceptional experience to you, every time we see you.  If you receive a survey in the mail, please send us back your thoughts. We really do value your feedback.    Based on your medical history, these are the current health maintenance/preventive care services that you are due for (some may have been done at this visit.)  Health Maintenance Due   Topic Date Due     HIV SCREEN (SYSTEM ASSIGNED)  01/12/1980     ADVANCE DIRECTIVE PLANNING Q5 YRS  01/12/2017       Suggested websites for health information:  Www.P2 Science : Up to date and easily searchable information on multiple topics.  Www.medlineplus.gov : medication info, interactive tutorials, watch real surgeries online  Www.familydoctor.org : good info from the Academy of Family Physicians  Www.cdc.gov : public health info, travel advisories, epidemics (H1N1)  Www.aap.org : children's health info, normal development, vaccinations  Www.health[place]state[place] : [place] dept of health, public health issues in [place], N1N1    Your care team:                            Family Medicine Internal Medicine   MD David Wagoner MD Shantel Branch-Fleming, MD Katya Georgiev PA-C Megan Hill, APRN CNP Nam Ho, MD Pediatrics   OLIVIA Lucia CNP Paula Brito, MD Amelia Massimini APRN CNP Shaista Malik, MD Bethany Templen, MD Deborah Mielke, MD Kim Thein, APRN CNP      Clinic hours: Monday - Thursday 7 am-7 pm; Fridays 7 am-5 pm.   Urgent care: Monday - Friday 11 am-9  pm; Saturday and Sunday 9 am-5 pm.  Pharmacy : Monday -Thursday 8 am-8 pm; Friday 8 am-6 pm; Saturday and Sunday 9 am-5 pm.     Clinic: [phone]   Pharmacy: [phone]    [facility] Hypertension Study   Visit 4     Thank you for your continued participation in the hypertension study!  Please continue taking your hypertension medications as directed. Your next visit will be in one and a half months and will be scheduled for you in the coming days.     After it is scheduled, be sure to let the research coordinator know as soon as possible if you cannot make it so that the visit can be rescheduled for you.     Please contact the research coordinator if you receive care for your hypertension outside of your study visits.    If you have any questions, please contact the research coordinator at [phone]. If you experience any symptoms please call the [employer] 24/7 triage number at [phone]. If your phone number, email or address changes please alert the research coordinator.     In the meantime, we ask that you complete the online surveys emailed out to  you, if completing online, or mailed out to you, if completing paper surveys,  before your next visit.    Lifestyle changes that can help control high blood pressure:  Even though [facility] is a study to test effectiveness of genetically guided medications for managing high blood pressure, there are several things you can do to ensure your blood pressure stays in good control:    Maintain a healthy weight (BMI<26). A modest amount of weight loss can be helpful    Limit salt intake to under 2400mg daily    Follow the DASH diet (lean meats, low salt, whole grains, lots of fruits/vegies)    Stay active, try to get in 30 minutes of exercise daily.    Manage your daily stress.    Do not smoke cigarettes (or cut back)    Limit alcohol (2 drinks/day for men, 1 drink/day for women)            Follow-ups after your visit        Who to contact     If you have questions or  "need follow up information about today's clinic visit or your schedule please contact [facility] directly at [phone].  Normal or non-critical lab and imaging results will be communicated to you by MyChart, letter or phone within 4 business days after the clinic has received the results. If you do not hear from us within 7 days, please contact the clinic through [employer] or phone. If you have a critical or abnormal lab result, we will notify you by phone as soon as possible.  Submit refill requests through [employer] or call your pharmacy and they will forward the refill request to us. Please allow 3 business days for your refill to be completed.          Additional Information About Your Visit        [employer] Information     [employer] lets you send messages to your doctor, view your test results, renew your prescriptions, schedule appointments and more. To sign up, go to [email or web address] . Click on \"Log in\" on the left side of the screen, which will take you to the Welcome page. Then click on \"Sign up Now\" on the right side of the page.     You will be asked to enter the access code listed below, as well as some personal information. Please follow the directions to create your username and password.     Your access code is: [email or web address]  Expires: 2018 12:00 PM     Your access code will  in 90 days. If you need help or a new code, please call your [place] clinic or [phone].        Care EveryWhere ID     This is your Care EveryWhere ID. This could be used by other organizations to access your [place] medical records  [id number]        Your Vitals Were     Pulse Temperature Height Last Period Pulse Oximetry BMI (Body Mass Index)    88 97.7  F (36.5  C) (Oral) 5' 1\" (1.549 m) 2011 99% 37.03 kg/m2       Blood Pressure from Last 3 Encounters:   18 126/78   18 138/83   18 156/90    Weight from Last 3 Encounters:   18 196 lb (88.9 kg)   18 197 " lb 12.8 oz (89.7 kg)   05/03/18 203 lb (92.1 kg)              Today, you had the following     No orders found for display       Primary Care Provider Office Phone # Fax #    JOSE R Henson [phone] [address] [facility] [address]        Equal Access to Services     [facility] FORREST : Hadii aad ku hadasho Soomaali, waaxda luqadaha, qaybta kaalmada adeegyada, waxay idiin hayaan adeeg kharash la'aan . So [facility] [phone].    ATENCIÓN: Si habla español, tiene a alberto disposición servicios gratuitos de asistencia lingüística. Llame al [phone].    We comply with applicable federal civil rights laws and [place] laws. We do not discriminate on the basis of race, color, national origin, age, disability, sex, sexual orientation, or gender identity.            Thank you!     Thank you for choosing [facility]  for your care. Our goal is always to provide you with excellent care. Hearing back from our patients is one way we can continue to improve our services. Please take a few minutes to complete the written survey that you may receive in the mail after your visit with us. Thank you!             Your Updated Medication List - Protect others around you: Learn how to safely use, store and throw away your medicines at www.disposemymeds.org.          This list is accurate as of 6/27/18 12:00 PM.  Always use your most recent med list.                   Brand Name Dispense Instructions for use Diagnosis    acetaminophen 500 MG tablet    TYLENOL     Take 500-1,000 mg by mouth every 6 hours as needed.        atorvastatin 80 MG tablet    LIPITOR    90 tablet    Take 1 tablet (80 mg) by mouth daily    Hyperlipidemia LDL goal <130       butalbital-acetaminophen-caffeine -40 MG per tablet    FIORICET/ESGIC    28 tablet    TAKE 1 TABLET BY MOUTH EVERY 4 HOURS AS NEEDED    Tension headache       cyclobenzaprine 10 MG tablet    FLEXERIL    30 tablet    TAKE 1  TABLET BY MOUTH AT BEDTIME AS NEEDED FOR MUSCLE SPASMS    S/P cervical spinal fusion       diclofenac 75 MG EC tablet    VOLTAREN    60 tablet    Take 1 tablet (75 mg) by mouth 2 times daily        lisinopril 10 MG tablet    PRINIVIL/ZESTRIL    30 tablet    Take 1 tablet (10 mg) by mouth daily    Essential hypertension with goal blood pressure less than 140/90       order for DME     1 Device    Equipment being ordered: BP cuff/machine    Essential hypertension       order for DME     1 Device    Equipment being ordered: Blood pressure cuff/machine    Essential hypertension with goal blood pressure less than 140/90       * varenicline 0.5 MG X 11 & 1 MG X 42 tablet    CHANTIX STARTING MONTH EVANGELINA    53 tablet    Take 0.5 mg tab daily for 3 days, then 0.5 mg tab twice daily for 4 days, then 1 mg twice daily.    Tobacco dependence syndrome       * varenicline 1 MG tablet    CHANTIX    56 tablet    Take 1 tablet (1 mg) by mouth 2 times daily    Tobacco dependence syndrome       * Notice:  This list has 2 medication(s) that are the same as other medications prescribed for you. Read the directions carefully, and ask your doctor or other care provider to review them with you.

## 2018-08-10 ENCOUNTER — OFFICE VISIT (OUTPATIENT)
Dept: FAMILY MEDICINE | Facility: CLINIC | Age: 58
End: 2018-08-10
Payer: COMMERCIAL

## 2018-08-10 VITALS
BODY MASS INDEX: 38.33 KG/M2 | TEMPERATURE: 97.8 F | WEIGHT: 203 LBS | HEART RATE: 84 BPM | SYSTOLIC BLOOD PRESSURE: 126 MMHG | HEIGHT: 61 IN | OXYGEN SATURATION: 97 % | DIASTOLIC BLOOD PRESSURE: 70 MMHG

## 2018-08-10 DIAGNOSIS — I10 ESSENTIAL HYPERTENSION WITH GOAL BLOOD PRESSURE LESS THAN 140/90: Primary | ICD-10-CM

## 2018-08-10 PROCEDURE — 99214 OFFICE O/P EST MOD 30 MIN: CPT | Performed by: NURSE PRACTITIONER

## 2018-08-10 RX ORDER — LISINOPRIL 10 MG/1
10 TABLET ORAL DAILY
Qty: 90 TABLET | Refills: 3 | Status: SHIPPED | OUTPATIENT
Start: 2018-08-10 | End: 2019-07-27

## 2018-08-10 NOTE — PATIENT INSTRUCTIONS
At [facility], we strive to deliver an exceptional experience to you, every time we see you.  If you receive a survey in the mail, please send us back your thoughts. We really do value your feedback.    Based on your medical history, these are the current health maintenance/preventive care services that you are due for (some may have been done at this visit.)  Health Maintenance Due   Topic Date Due     HIV SCREEN (SYSTEM ASSIGNED)  01/12/1980     ADVANCE DIRECTIVE PLANNING Q5 YRS  01/12/2017       Suggested websites for health information:  Www.Ingenico.QuantaLife : Up to date and easily searchable information on multiple topics.  Www.nuvoTV.gov : medication info, interactive tutorials, watch real surgeries online  Www.familydoctor.org : good info from the Academy of Family Physicians  Www.cdc.gov : public health info, travel advisories, epidemics (H1N1)  Www.aap.org : children's health info, normal development, vaccinations  Www.health.[facility].[place] : [place] dept of health, public health issues in [place], N1N1    Your care team:                            Family Medicine Internal Medicine   MD David Wagoner MD Shantel Branch-Fleming, MD Katya Georgiev PA-C Megan Hill, APRN CNP    [place] MD Pediatrics   Desean Francis, PAADEN Murillo, CNP MD Nan Minor APRN CNP   MD Alexus Alejo MD Deborah Mielke, MD Kim Thein, APRN [facility]      Clinic hours: Monday - Thursday 7 am-7 pm; Fridays 7 am-5 pm.   Urgent care: Monday - Friday 11 am-9 pm; Saturday and Sunday 9 am-5 pm.  Pharmacy : Monday -Thursday 8 am-8 pm; Friday 8 am-6 pm; Saturday and Sunday 9 am-5 pm.     Clinic: [phone]   Pharmacy: [phone]    [facility]  Visit 5    Thank you for your continued participation in the hypertension study!  Please continue taking your hypertension medications as directed. Your next visit will be in one and a half months and will be  scheduled for you in the coming days. After it is scheduled, be sure to let the research coordinator know as soon as possible if you cannot make it so that the visit can be rescheduled for you.     Please contact the research coordinator if you receive care for your hypertension outside of your study visits.    If you have any questions, please contact the research coordinator at [phone]. If you experience any symptoms please call the [employer] 24/7 triage number at [phone]. If your phone number, email or address changes please alert the research coordinator.     In the meantime, we ask that you complete the online surveys emailed out to  you, if completing online, or mailed out to you, if completing paper surveys,  before your next visit.    Lifestyle changes that can help control high blood pressure:  Even though PGEN is a study to test effectiveness of genetically guided medications for managing high blood pressure, there are several things you can do to ensure your blood pressure stays in good control:    Maintain a healthy weight (BMI<26). A modest amount of weight loss can be helpful    Limit salt intake to under 2400mg daily    Follow the DASH diet (lean meats, low salt, whole grains, lots of fruits/vegies)    Stay active, try to get in 30 minutes of exercise daily.    Manage your daily stress.    Do not smoke cigarettes (or cut back)    Limit alcohol (2 drinks/day for men, 1 drink/day for women)

## 2018-08-10 NOTE — PROGRESS NOTES
PGEN study visit  NEW HYPERTENSION diagnosis visit 5    SUBJECTIVE:  Margo is here for 5th PGEN research study visit. Please refer to research tab in the header and today's flow sheet for further details. Patient had new onset hypertension at enrollment and has a goal of <  140/90 (per Problem list target chosen by PCP)     Prior research note reviewed. Patient is on blood pressure medication(s) at this time.  she has been taking Lisinopril 10 mg daily and is tolerating the medication well.      Patient has been under more stress in the last 5-6 weeks.  She's had 4 dear friends die.    Current diet & lifestyle:: GEOVANI, using Mrs Arias, eats organic foods, grass fed meat.  Smokin-7 cigarettes/day, on Chantix.  Her quit date is set for 18.  Alcohol consumption rarely   Other pertinent history:  Pt continues to have allergy issues, has nighttime cough     BP Readings from Last 3 Encounters:   08/10/18 126/70   18 126/78   18 138/83       Current Outpatient Prescriptions   Medication Sig Dispense Refill     acetaminophen (TYLENOL) 500 MG tablet Take 500-1,000 mg by mouth every 6 hours as needed.       atorvastatin (LIPITOR) 80 MG tablet Take 1 tablet (80 mg) by mouth daily 90 tablet 1     butalbital-acetaminophen-caffeine (FIORICET/ESGIC) -40 MG per tablet TAKE 1 TABLET BY MOUTH EVERY 4 HOURS AS NEEDED 28 tablet 0     cyclobenzaprine (FLEXERIL) 10 MG tablet TAKE 1 TABLET BY MOUTH AT BEDTIME AS NEEDED FOR MUSCLE SPASMS 30 tablet 3     diclofenac (VOLTAREN) 75 MG EC tablet Take 1 tablet (75 mg) by mouth 2 times daily 60 tablet 11     lisinopril (PRINIVIL/ZESTRIL) 10 MG tablet Take 1 tablet (10 mg) by mouth daily 30 tablet 1     order for DME Equipment being ordered: Blood pressure cuff/machine 1 Device 0     order for DME Equipment being ordered: BP cuff/machine 1 Device 0     varenicline (CHANTIX STARTING MONTH ) 0.5 MG X 11 & 1 MG X 42 tablet Take 0.5 mg tab daily for 3 days, then 0.5 mg tab  "twice daily for 4 days, then 1 mg twice daily. 53 tablet 0     varenicline (CHANTIX) 1 MG tablet Take 1 tablet (1 mg) by mouth 2 times daily 56 tablet 4     Potassium   Date Value Ref Range Status   05/03/2018 3.9 3.4 - 5.3 mmol/L Final     Creatinine   Date Value Ref Range Status   05/03/2018 0.59 0.52 - 1.04 mg/dL Final     Urea Nitrogen   Date Value Ref Range Status   05/03/2018 13 7 - 30 mg/dL Final     GFR Estimate   Date Value Ref Range Status   05/03/2018 >90 >60 mL/min/1.7m2 Final     Comment:     Non  GFR Calc      A baseline potassium, creatinine, BUN, GFR has been done within past 12 months      OBJECTIVE:  Patient in in no apparent distress and able to provide full history for today's encounter. she denies pain or any current illness   Vitals: /70 (BP Location: Left arm, Patient Position: Chair, Cuff Size: Adult Large)  Pulse 84  Temp 97.8  F (36.6  C) (Oral)  Ht 5' 1\" (1.549 m)  Wt 203 lb (92.1 kg)  LMP 09/16/2011  SpO2 97%  BMI 38.36 kg/m2  Today's BP completed using cuff size: regular on right side arm.      Pulse Readings from Last 1 Encounters:   08/10/18 84     Is pulse 55 or greater? - No    BMI= Body mass index is 38.36 kg/(m^2).  See PGEN flow sheet for other exam findings.   GENERAL APPEARANCE: healthy, alert and no distress     EYES: EOMI, PERRL     NECK: no adenopathy, no asymmetry, masses, or scars and thyroid normal to palpation, no bruits     RESP: lungs clear to auscultation - no rales, rhonchi or wheezes     CV: regular rates and rhythm, normal S1 S2, no S3 or S4 and no murmur, click or rub    ASSESSMENT/PLAN:   Blood pressure reading today is at the provider specified goal of <140/90.      [facility]  [facility] ) [facility] this [facility] (this saves flowsheet data)      1.  Based on PGEBAILEE HARMON HTN MGMT standing order the patient will be advised continue current medication regimen unchanged.     2.  We will not be checking a metabolic lab panel today.      3.  " Follow up instructions include:     Next Nurse visit: 6 weeks.    See avs for more details.  Full research packet also provided to patient previously  Our research team will reach out to patient to schedule follow up visit as well.     Patient was counseled regarding the lifestyle changes (listed below)  to help with BP management Yes  Lifestyle changes that can help control high blood pressure:  Even though PGEN is a study to test effectiveness of genetically guided medications for managing high blood pressure, there are several things you can do to ensure your blood pressure stays in good control:    Maintain a healthy weight (BMI<26). A modest amount of weight loss can be helpful    Limit salt intake to under 2400mg daily    Follow the DASH diet (lean meats, low salt, whole grains, lots of fruits/vegies)    Stay active, try to get in 30 minutes of exercise daily.    Manage your daily stress.    Do not smoke cigarettes (or cut back)    Limit alcohol (2 drinks/day for men, 1 drink/day for women)  Was AVS  provided to patient with the relevant <dot>PGENPI dot phrase pulled into patient instructions Yes    Belem ODELL, CNP

## 2018-09-11 DIAGNOSIS — Z98.1 S/P CERVICAL SPINAL FUSION: ICD-10-CM

## 2018-09-11 NOTE — TELEPHONE ENCOUNTER
Routing refill request to provider for review/approval because:  Drug not on the [facility] refill protocol   Yaima Frey RN

## 2018-09-13 RX ORDER — CYCLOBENZAPRINE HCL 10 MG
TABLET ORAL
Qty: 30 TABLET | Refills: 0 | Status: SHIPPED | OUTPATIENT
Start: 2018-09-13 | End: 2018-11-02

## 2018-09-21 ENCOUNTER — TELEPHONE (OUTPATIENT)
Dept: FAMILY MEDICINE | Facility: CLINIC | Age: 58
End: 2018-09-21

## 2018-09-21 ENCOUNTER — OFFICE VISIT (OUTPATIENT)
Dept: FAMILY MEDICINE | Facility: CLINIC | Age: 58
End: 2018-09-21
Payer: COMMERCIAL

## 2018-09-21 VITALS
OXYGEN SATURATION: 99 % | WEIGHT: 201.6 LBS | BODY MASS INDEX: 38.06 KG/M2 | SYSTOLIC BLOOD PRESSURE: 127 MMHG | TEMPERATURE: 97.7 F | DIASTOLIC BLOOD PRESSURE: 84 MMHG | HEART RATE: 91 BPM | HEIGHT: 61 IN

## 2018-09-21 DIAGNOSIS — Z98.1 S/P CERVICAL SPINAL FUSION: Primary | ICD-10-CM

## 2018-09-21 DIAGNOSIS — Z96.653 STATUS POST TOTAL BILATERAL KNEE REPLACEMENT: ICD-10-CM

## 2018-09-21 DIAGNOSIS — I10 ESSENTIAL HYPERTENSION WITH GOAL BLOOD PRESSURE LESS THAN 140/90: Primary | ICD-10-CM

## 2018-09-21 PROCEDURE — 99213 OFFICE O/P EST LOW 20 MIN: CPT | Performed by: NURSE PRACTITIONER

## 2018-09-21 RX ORDER — DICLOFENAC SODIUM 75 MG/1
75 TABLET, DELAYED RELEASE ORAL 2 TIMES DAILY
Qty: 60 TABLET | Refills: 11 | Status: SHIPPED | OUTPATIENT
Start: 2018-09-21 | End: 2019-09-11

## 2018-09-21 RX ORDER — DICLOFENAC SODIUM 75 MG/1
75 TABLET, DELAYED RELEASE ORAL 2 TIMES DAILY
Qty: 60 TABLET | Refills: 11 | Status: CANCELLED | OUTPATIENT
Start: 2018-09-21

## 2018-09-21 NOTE — MR AVS SNAPSHOT
After Visit Summary   9/21/2018    Margo Inman    MRN: [id number]           Patient Information     Date Of Birth          1960        Visit Information        Provider Department      9/21/2018 1:20 PM Belem Dumont APRN CNP [facility]        Today's Diagnoses     Essential hypertension with goal blood pressure less than 140/90    -  1      Care Instructions    At [facility], we strive to deliver an exceptional experience to you, every time we see you.  If you receive a survey in the mail, please send us back your thoughts. We really do value your feedback.    Your care team:                            Family Medicine Internal Medicine   MD David Wagoner MD Shantel Branch-Fleming, MD Katya Georgiev PA-C Megan Hill, APRN CNP Nam Ho, MD Pediatrics   Desean Francis, OLIVIA Murillo, MD Nan Hebert APRN MD Alexus White MD Deborah Mielke, MD Kim Thein, APRN CNP      Clinic hours: Monday - Thursday 7 am-7 pm; Fridays 7 am-5 pm.   Urgent care: Monday - Friday 11 am-9 pm; Saturday and Sunday 9 am-5 pm.  Pharmacy : Monday -Thursday 8 am-8 pm; Friday 8 am-6 pm; Saturday and Sunday 9 am-5 pm.     Clinic: [phone]   Pharmacy: [phone]      [facility] Hypertension Study  Visit 6     Thank you for your continued participation in the hypertension study!  Please continue taking your hypertension medications as directed. Your next visit will be in three months and will be scheduled for you in the coming days. After it is scheduled, be sure to let the research coordinator know as soon as possible if you cannot make it so that the visit can be rescheduled for you.     Please contact the research coordinator if you receive care for your hypertension outside of your study visits.    If you have any questions, please contact the research coordinator at [phone]. If you  experience any symptoms please call the [place] 24/7 triage number at [phone]. If your phone number, email or address changes please alert the research coordinator.     In the meantime, we ask that you complete the online surveys emailed out to  you, if completing online, or mailed out to you, if completing paper surveys,  before your next visit.    Lifestyle changes that can help control high blood pressure:  Even though PGEN is a study to test effectiveness of genetically guided medications for managing high blood pressure, there are several things you can do to ensure your blood pressure stays in good control:    Maintain a healthy weight (BMI<26). A modest amount of weight loss can be helpful    Limit salt intake to under 2400mg daily    Follow the DASH diet (lean meats, low salt, whole grains, lots of fruits/vegies)    Stay active, try to get in 30 minutes of exercise daily.    Manage your daily stress.    Do not smoke cigarettes (or cut back)    Limit alcohol (2 drinks/day for men, 1 drink/day for women)            Follow-ups after your visit        Follow-up notes from your care team     Return in about 12 weeks (around 12/14/2018), or if symptoms worsen or fail to improve, for BP Recheck.      Who to contact     If you have questions or need follow up information about today's clinic visit or your schedule please contact [facility] directly at [phone].  Normal or non-critical lab and imaging results will be communicated to you by MyChart, letter or phone within 4 business days after the clinic has received the results. If you do not hear from us within 7 days, please contact the clinic through MyChart or phone. If you have a critical or abnormal lab result, we will notify you by phone as soon as possible.  Submit refill requests through [employer] or call your pharmacy and they will forward the refill request to us. Please allow 3 business days for your refill to be completed.     "      Additional Information About Your Visit        Care EveryWhere ID     This is your Care EveryWhere ID. This could be used by other organizations to access your [place] medical records  [id number]        Your Vitals Were     Pulse Temperature Height Last Period Pulse Oximetry BMI (Body Mass Index)    91 97.7  F (36.5  C) (Oral) 5' 1\" (1.549 m) 09/16/2011 99% 38.09 kg/m2       Blood Pressure from Last 3 Encounters:   09/21/18 127/84   08/10/18 126/70   06/27/18 126/78    Weight from Last 3 Encounters:   09/21/18 201 lb 9.6 oz (91.4 kg)   08/10/18 203 lb (92.1 kg)   06/27/18 196 lb (88.9 kg)              Today, you had the following     No orders found for display         Where to get your medicines      These medications were sent to [facility] [address] [place] [address] [id number] [place] AT [facility] [address]  [address] [facility] [address]     Phone:  [phone]     diclofenac 75 MG EC tablet          Primary Care Provider Office Phone # Fax #    Belem JOSE R Santoyo [phone]       [address] [place] [address]        Equal Access to Services     FLAKITA CLAYTON : Hadii aad ku hadasho Soomaali, waaxda luqadaha, qaybta kaalmada adeegyada, waxay idiin hayaan piyush mccullough . So [facility] [phone].    ATENCIÓN: Si habla español, tiene a alberto disposición servicios gratuitos de asistencia lingüística. Llame al [phone].    We comply with applicable federal civil rights laws and [place] laws. We do not discriminate on the basis of race, color, national origin, age, disability, sex, sexual orientation, or gender identity.            Thank you!     Thank you for choosing [facility]  for your care. Our goal is always to provide you with excellent care. Hearing back from our patients is one way we can continue to improve our services. Please take a few minutes to complete the written survey that you may receive in the " mail after your visit with us. Thank you!             Your Updated Medication List - Protect others around you: Learn how to safely use, store and throw away your medicines at www.disposemymeds.org.          This list is accurate as of 9/21/18  1:50 PM.  Always use your most recent med list.                   Brand Name Dispense Instructions for use Diagnosis    acetaminophen 500 MG tablet    TYLENOL     Take 500-1,000 mg by mouth every 6 hours as needed.        atorvastatin 80 MG tablet    LIPITOR    90 tablet    Take 1 tablet (80 mg) by mouth daily    Hyperlipidemia LDL goal <130       butalbital-acetaminophen-caffeine -40 MG per tablet    FIORICET/ESGIC    28 tablet    TAKE 1 TABLET BY MOUTH EVERY 4 HOURS AS NEEDED    Tension headache       cyclobenzaprine 10 MG tablet    FLEXERIL    30 tablet    TAKE 1 TABLET BY MOUTH AT BEDTIME AS NEEDED FOR MUSCLE SPASMS    S/P cervical spinal fusion       diclofenac 75 MG EC tablet    VOLTAREN    60 tablet    Take 1 tablet (75 mg) by mouth 2 times daily    Status post total bilateral knee replacement, S/P cervical spinal fusion       lisinopril 10 MG tablet    PRINIVIL/ZESTRIL    90 tablet    Take 1 tablet (10 mg) by mouth daily    Essential hypertension with goal blood pressure less than 140/90       order for DME     1 Device    Equipment being ordered: BP cuff/machine    Essential hypertension       order for DME     1 Device    Equipment being ordered: Blood pressure cuff/machine    Essential hypertension with goal blood pressure less than 140/90       * varenicline 0.5 MG X 11 & 1 MG X 42 tablet    CHANTIX STARTING MONTH EVANGELINA    53 tablet    Take 0.5 mg tab daily for 3 days, then 0.5 mg tab twice daily for 4 days, then 1 mg twice daily.    Tobacco dependence syndrome       * varenicline 1 MG tablet    CHANTIX    56 tablet    Take 1 tablet (1 mg) by mouth 2 times daily    Tobacco dependence syndrome       * Notice:  This list has 2 medication(s) that are the same as  other medications prescribed for you. Read the directions carefully, and ask your doctor or other care provider to review them with you.

## 2018-09-21 NOTE — PATIENT INSTRUCTIONS
At [facility], we strive to deliver an exceptional experience to you, every time we see you.  If you receive a survey in the mail, please send us back your thoughts. We really do value your feedback.    Your care team:                            Family Medicine Internal Medicine   MD David Wagoner MD Shantel Branch-Fleming, MD Katya Georgiev PA-C Megan Hill, APRN CNP    [place], MD Pediatrics   Desean Francis, OLIVIA Murillo, MD Nan Hebert APRN CNP   MD Alexus Alejo MD Deborah Mielke, MD Valentine Dumont, APRN [facility]      Clinic hours: Monday - Thursday 7 am-7 pm; Fridays 7 am-5 pm.   Urgent care: Monday - Friday 11 am-9 pm; Saturday and Sunday 9 am-5 pm.  Pharmacy : Monday -Thursday 8 am-8 pm; Friday 8 am-6 pm; Saturday and Sunday 9 am-5 pm.     Clinic: [phone]   Pharmacy: [phone]      [facility] Hypertension Study  Visit 6     Thank you for your continued participation in the hypertension study!  Please continue taking your hypertension medications as directed. Your next visit will be in three months and will be scheduled for you in the coming days. After it is scheduled, be sure to let the research coordinator know as soon as possible if you cannot make it so that the visit can be rescheduled for you.     Please contact the research coordinator if you receive care for your hypertension outside of your study visits.    If you have any questions, please contact the research coordinator at [phone]. If you experience any symptoms please call the [place] 24/7 triage number at [phone]. If your phone number, email or address changes please alert the research coordinator.     In the meantime, we ask that you complete the online surveys emailed out to  you, if completing online, or mailed out to you, if completing paper surveys,  before your next visit.    Lifestyle changes that can help control high blood  pressure:  Even though PGEN is a study to test effectiveness of genetically guided medications for managing high blood pressure, there are several things you can do to ensure your blood pressure stays in good control:    Maintain a healthy weight (BMI<26). A modest amount of weight loss can be helpful    Limit salt intake to under 2400mg daily    Follow the DASH diet (lean meats, low salt, whole grains, lots of fruits/vegies)    Stay active, try to get in 30 minutes of exercise daily.    Manage your daily stress.    Do not smoke cigarettes (or cut back)    Limit alcohol (2 drinks/day for men, 1 drink/day for women)

## 2018-09-21 NOTE — PROGRESS NOTES
PGEN study visit  NEW HYPERTENSION diagnosis visit 6    SUBJECTIVE:  Margo is here for 6th PGEN research study visit. Please refer to research tab in the header and today's flow sheet for further details. Patient had new onset hypertension at enrollment and has a goal of <  140/90 (per Problem list target chosen by PCP)     Prior research note reviewed. Patient is on blood pressure medication(s) at this time.  she has been taking Lisinopril 10 mg daily  and is tolerating the medication well.      Current diet & lifestyle:: GEOVANI, using Mrs Arias, eats organic foods, grass fed meat.  Smokin-7 cigarettes/day, on Chantix.  Her quit date is set for 18.  Alcohol consumption rarely   Other pertinent history:  Pt continues to have allergy issues, has nighttime cough     BP Readings from Last 3 Encounters:   18 127/84   08/10/18 126/70   18 126/78       Current Outpatient Prescriptions   Medication Sig Dispense Refill     acetaminophen (TYLENOL) 500 MG tablet Take 500-1,000 mg by mouth every 6 hours as needed.       atorvastatin (LIPITOR) 80 MG tablet Take 1 tablet (80 mg) by mouth daily 90 tablet 1     butalbital-acetaminophen-caffeine (FIORICET/ESGIC) -40 MG per tablet TAKE 1 TABLET BY MOUTH EVERY 4 HOURS AS NEEDED 28 tablet 0     cyclobenzaprine (FLEXERIL) 10 MG tablet TAKE 1 TABLET BY MOUTH AT BEDTIME AS NEEDED FOR MUSCLE SPASMS 30 tablet 0     diclofenac (VOLTAREN) 75 MG EC tablet Take 1 tablet (75 mg) by mouth 2 times daily 60 tablet 11     lisinopril (PRINIVIL/ZESTRIL) 10 MG tablet Take 1 tablet (10 mg) by mouth daily 90 tablet 3     order for DME Equipment being ordered: Blood pressure cuff/machine 1 Device 0     order for DME Equipment being ordered: BP cuff/machine 1 Device 0     varenicline (CHANTIX STARTING MONTH ) 0.5 MG X 11 & 1 MG X 42 tablet Take 0.5 mg tab daily for 3 days, then 0.5 mg tab twice daily for 4 days, then 1 mg twice daily. 53 tablet 0     varenicline (CHANTIX) 1 MG  "tablet Take 1 tablet (1 mg) by mouth 2 times daily 56 tablet 4     Potassium   Date Value Ref Range Status   05/03/2018 3.9 3.4 - 5.3 mmol/L Final     Creatinine   Date Value Ref Range Status   05/03/2018 0.59 0.52 - 1.04 mg/dL Final     Urea Nitrogen   Date Value Ref Range Status   05/03/2018 13 7 - 30 mg/dL Final     GFR Estimate   Date Value Ref Range Status   05/03/2018 >90 >60 mL/min/1.7m2 Final     Comment:     Non  GFR Calc      A baseline potassium, creatinine, BUN, GFR has been done within past 12 months      OBJECTIVE:  Patient in in no apparent distress and able to provide full history for today's encounter. she denies pain or any current illness   Vitals: /84 (BP Location: Left arm, Patient Position: Chair, Cuff Size: Adult Large)  Pulse 91  Temp 97.7  F (36.5  C) (Oral)  Ht 5' 1\" (1.549 m)  Wt 201 lb 9.6 oz (91.4 kg)  LMP 09/16/2011  SpO2 99%  BMI 38.09 kg/m2  Today's BP completed using cuff size: regular on left side  arm.      Pulse Readings from Last 1 Encounters:   09/21/18 91     Is pulse 55 or greater? - Yes    BMI= Body mass index is 38.09 kg/(m^2).  See PGEN flow sheet for other exam findings.       ASSESSMENT/PLAN:   Blood pressure reading today is at the provider specified goal of <140/90.      PGEN Flowsheet has been  'filed' ) for this visit (this saves flowsheet data)      1.  Based on PGEN RN HTN MGMT standing order the patient will be advised continue current medication regimen unchanged.     2.  We will not be checking a metabolic lab panel today.      3.  Follow up instructions include:     Next Nurse visit: 12 weeks.    See avs for more details.  Full research packet also provided to patient previously  Our research team will reach out to patient to schedule follow up visit as well.     Patient was counseled regarding the lifestyle changes (listed below)  to help with BP management Yes  Lifestyle changes that can help control high blood pressure:  Even " though PGEN is a study to test effectiveness of genetically guided medications for managing high blood pressure, there are several things you can do to ensure your blood pressure stays in good control:    Maintain a healthy weight (BMI<26). A modest amount of weight loss can be helpful    Limit salt intake to under 2400mg daily    Follow the DASH diet (lean meats, low salt, whole grains, lots of fruits/vegies)    Stay active, try to get in 30 minutes of exercise daily.    Manage your daily stress.    Do not smoke cigarettes (or cut back)    Limit alcohol (2 drinks/day for men, 1 drink/day for women)  Was AVS  provided to patient with the relevant <dot>PGENPI dot phrase pulled into patient instructions Yes    Belem ODELL, CNP

## 2018-10-25 ENCOUNTER — TELEPHONE (OUTPATIENT)
Dept: FAMILY MEDICINE | Facility: CLINIC | Age: 58
End: 2018-10-25

## 2018-10-29 PROBLEM — M25.522 LEFT ELBOW PAIN: Status: RESOLVED | Noted: 2018-04-10 | Resolved: 2018-10-29

## 2018-10-29 PROBLEM — M65.30 TRIGGER FINGER, ACQUIRED: Status: RESOLVED | Noted: 2018-04-10 | Resolved: 2018-10-29

## 2018-11-02 DIAGNOSIS — Z98.1 S/P CERVICAL SPINAL FUSION: ICD-10-CM

## 2018-11-02 NOTE — TELEPHONE ENCOUNTER
Requested Prescriptions   Pending Prescriptions Disp Refills     cyclobenzaprine (FLEXERIL) 10 MG tablet [Pharmacy Med Name: CYCLOBENZAPRINE 10MG TABLETS]  Last Written Prescription Date:  9/13/18  Last Fill Quantity: 30,  # refills: 0   Last office visit: 9/21/2018 with prescribing provider:  Julia   Future Office Visit:     30 tablet 0     Sig: TAKE 1 TABLET BY MOUTH AT BEDTIME AS NEEDED FOR MUSCLE SPASMS    There is no refill protocol information for this order

## 2018-11-05 RX ORDER — CYCLOBENZAPRINE HCL 10 MG
TABLET ORAL
Qty: 30 TABLET | Refills: 0 | Status: SHIPPED | OUTPATIENT
Start: 2018-11-05 | End: 2018-11-27

## 2018-11-27 DIAGNOSIS — E78.5 HYPERLIPIDEMIA LDL GOAL <130: ICD-10-CM

## 2018-11-27 DIAGNOSIS — Z98.1 S/P CERVICAL SPINAL FUSION: ICD-10-CM

## 2018-11-27 NOTE — TELEPHONE ENCOUNTER
"Requested Prescriptions   Pending Prescriptions Disp Refills     cyclobenzaprine (FLEXERIL) 10 MG tablet [Pharmacy Med Name: CYCLOBENZAPRINE 10MG TABLETS]    Last Written Prescription Date:  11/5/18  Last Fill Quantity: 30,  # refills: 0   Last Office Visit with [facility] or [facility] prescribing provider:  9/21/18   Future Office Visit:      30 tablet 0     Sig: TAKE 1 TABLET BY MOUTH AT BEDTIME AS NEEDED FOR MUSCLE SPASMS    There is no refill protocol information for this order        atorvastatin (LIPITOR) 80 MG tablet [Pharmacy Med Name: ATORVASTATIN 80MG TABLETS]    Last Written Prescription Date:  3/26/18  Last Fill Quantity: 90,  # refills: 1   Last Office Visit with [facility] or [facility] prescribing provider:  9/21/18   Future Office Visit:      90 tablet 0     Sig: TAKE 1 TABLET(80 MG) BY MOUTH DAILY    Statins Protocol Passed    11/27/2018 12:51 PM       Passed - LDL on file in past 12 months    Recent Labs   Lab Test  03/26/18   0900   LDL  103*            Passed - No abnormal creatine kinase in past 12 months    Recent Labs   Lab Test  07/12/12   1418   CKT  61               Passed - Recent (12 mo) or future (30 days) visit within the authorizing provider's specialty    Patient had office visit in the last 12 months or has a visit in the next 30 days with authorizing provider or within the authorizing provider's specialty.  See \"Patient Info\" tab in inbasket, or \"Choose Columns\" in Meds & Orders section of the refill encounter.             Passed - Patient is age 18 or older       Passed - No active pregnancy on record       Passed - No positive pregnancy test in past 12 months              Shiv Faarax  [facility]  "

## 2018-11-29 RX ORDER — ATORVASTATIN CALCIUM 80 MG/1
TABLET, FILM COATED ORAL
Qty: 90 TABLET | Refills: 0 | Status: SHIPPED | OUTPATIENT
Start: 2018-11-29 | End: 2019-02-28

## 2018-11-29 RX ORDER — CYCLOBENZAPRINE HCL 10 MG
TABLET ORAL
Qty: 30 TABLET | Refills: 0 | Status: SHIPPED | OUTPATIENT
Start: 2018-11-29 | End: 2019-01-07

## 2018-11-29 NOTE — TELEPHONE ENCOUNTER
For cyclobenzaprine:  Routing refill request to provider for review/approval because:  Drug not on the [facility] refill protocol       For atorvastatin:  Prescription approved per [facility] Refill Protocol.          Mary Juarez RN, BSN

## 2018-12-14 ENCOUNTER — OFFICE VISIT (OUTPATIENT)
Dept: FAMILY MEDICINE | Facility: CLINIC | Age: 58
End: 2018-12-14
Payer: COMMERCIAL

## 2018-12-14 VITALS
DIASTOLIC BLOOD PRESSURE: 84 MMHG | SYSTOLIC BLOOD PRESSURE: 126 MMHG | TEMPERATURE: 97.9 F | OXYGEN SATURATION: 95 % | BODY MASS INDEX: 37.57 KG/M2 | HEART RATE: 88 BPM | HEIGHT: 61 IN | WEIGHT: 199 LBS

## 2018-12-14 DIAGNOSIS — Z11.4 SCREENING FOR HIV (HUMAN IMMUNODEFICIENCY VIRUS): ICD-10-CM

## 2018-12-14 DIAGNOSIS — Z23 NEED FOR PROPHYLACTIC VACCINATION AND INOCULATION AGAINST INFLUENZA: ICD-10-CM

## 2018-12-14 DIAGNOSIS — Z12.4 SCREENING FOR MALIGNANT NEOPLASM OF CERVIX: ICD-10-CM

## 2018-12-14 PROCEDURE — 99213 OFFICE O/P EST LOW 20 MIN: CPT | Performed by: NURSE PRACTITIONER

## 2018-12-14 ASSESSMENT — MIFFLIN-ST. JEOR: SCORE: 1430.04

## 2018-12-14 ASSESSMENT — PAIN SCALES - GENERAL: PAINLEVEL: NO PAIN (0)

## 2018-12-14 NOTE — PROGRESS NOTES
PGEN study visit  NEW HYPERTENSION diagnosis visit 7    SUBJECTIVE:  Margo is here for 7th [facility] research study visit. Please refer to research tab in the header and today's flow sheet for further details. Patient had new onset hypertension at enrollment and has a goal of <  140/90(per Problem list target chosen by PCP)     Prior research note reviewed. Patient is on blood pressure medication(s) at this time.  she has been taking Zestirl 10 mg daily and is tolerating the medication well.      Current diet & lifestyle:: GEOVANI, using Mrs Arias, eats organic foods, grass fed meat.  Smokin-7 cigarettes/day, on Chantix.  Her quit date is set for 18.  Alcohol consumption rarely   Other pertinent history:  Pt continues to have allergy issues, has nighttime cough     BP Readings from Last 3 Encounters:   18 127/84   08/10/18 126/70   18 126/78       Current Outpatient Medications   Medication Sig Dispense Refill     acetaminophen (TYLENOL) 500 MG tablet Take 500-1,000 mg by mouth every 6 hours as needed.       atorvastatin (LIPITOR) 80 MG tablet TAKE 1 TABLET(80 MG) BY MOUTH DAILY 90 tablet 0     butalbital-acetaminophen-caffeine (FIORICET/ESGIC) -40 MG per tablet TAKE 1 TABLET BY MOUTH EVERY 4 HOURS AS NEEDED 28 tablet 0     cyclobenzaprine (FLEXERIL) 10 MG tablet TAKE 1 TABLET BY MOUTH AT BEDTIME AS NEEDED FOR MUSCLE SPASMS 30 tablet 0     diclofenac (VOLTAREN) 75 MG EC tablet Take 1 tablet (75 mg) by mouth 2 times daily 60 tablet 11     lisinopril (PRINIVIL/ZESTRIL) 10 MG tablet Take 1 tablet (10 mg) by mouth daily 90 tablet 3     order for DME Equipment being ordered: Blood pressure cuff/machine 1 Device 0     order for DME Equipment being ordered: BP cuff/machine 1 Device 0     varenicline (CHANTIX STARTING MONTH ) 0.5 MG X 11 & 1 MG X 42 tablet Take 0.5 mg tab daily for 3 days, then 0.5 mg tab twice daily for 4 days, then 1 mg twice daily. 53 tablet 0     varenicline (CHANTIX) 1 MG tablet Take  1 tablet (1 mg) by mouth 2 times daily 56 tablet 4     Potassium   Date Value Ref Range Status   05/03/2018 3.9 3.4 - 5.3 mmol/L Final     Creatinine   Date Value Ref Range Status   05/03/2018 0.59 0.52 - 1.04 mg/dL Final     Urea Nitrogen   Date Value Ref Range Status   05/03/2018 13 7 - 30 mg/dL Final     GFR Estimate   Date Value Ref Range Status   05/03/2018 >90 >60 mL/min/1.7m2 Final     Comment:     Non  GFR Calc      A baseline potassium, creatinine, BUN, GFR has been done within past 12 months      OBJECTIVE:  Patient in in no apparent distress and able to provide full history for today's encounter. she denies pain or any current illness   Vitals: LMP 09/16/2011   Today's BP completed using cuff size: regular on right side arm.      Pulse Readings from Last 1 Encounters:   09/21/18 91     Is pulse 55 or greater? - Yes    BMI= There is no height or weight on file to calculate BMI.  See PGEN flow sheet for other exam findings.       ASSESSMENT/PLAN:   Blood pressure reading today is at the provider specified goal of <140/90.      PGEN Flowsheet has been  'filed' ) for this visit (this saves flowsheet data)      1.  Based on PGEN RN HTN MGMT standing order the patient will be advised continue current medication regimen unchanged.     2.  We will not be checking a metabolic lab panel today.      3.  Follow up instructions include:     Next Nurse visit: 16 weeks.    See avs for more details.  Full research packet also provided to patient previously  Our research team will reach out to patient to schedule follow up visit as well.     Patient was counseled regarding the lifestyle changes (listed below)  to help with BP management Yes  Lifestyle changes that can help control high blood pressure:  Even though PGEN is a study to test effectiveness of genetically guided medications for managing high blood pressure, there are several things you can do to ensure your blood pressure stays in good  control:    Maintain a healthy weight (BMI<26). A modest amount of weight loss can be helpful    Limit salt intake to under 2400mg daily    Follow the DASH diet (lean meats, low salt, whole grains, lots of fruits/vegies)    Stay active, try to get in 30 minutes of exercise daily.    Manage your daily stress.    Do not smoke cigarettes (or cut back)    Limit alcohol (2 drinks/day for men, 1 drink/day for women)  Was AVS  provided to patient with the relevant <dot>PGENPI dot phrase pulled into patient instructions Yes    Belem ODELL, CNP

## 2018-12-14 NOTE — PATIENT INSTRUCTIONS
At [facility], we strive to deliver an exceptional experience to you, every time we see you.  If you receive a survey in the mail, please send us back your thoughts. We really do value your feedback.    Your care team:                            Family Medicine Internal Medicine   MD David Wagoner MD Shantel Branch-Fleming, MD Katya Georgiev PA-C Megan Hill, APRN CNP    Los Razo, MD Pediatrics   Desean Francis, OLIVIA Murillo, CNP MD Nan Minor APRN CNP   MD Alexus Alejo MD Deborah Mielke, MD Valentine Dumont, APRN [facility]      Clinic hours: Monday - Thursday 7 am-7 pm; Fridays 7 am-5 pm.   Urgent care: Monday - Friday 11 am-9 pm; Saturday and Sunday 9 am-5 pm.  Pharmacy : Monday -Thursday 8 am-8 pm; Friday 8 am-6 pm; Saturday and Sunday 9 am-5 pm.     Clinic: [phone]   Pharmacy: [phone]    [facility] Hypertension Study  Visit 7     Thank you for your continued participation in the hypertension study!  Please continue taking your hypertension medications as directed. Your next visit will be in four months and will be scheduled for you in the coming days. After it is scheduled, be sure to let the research coordinator know as soon as possible if you cannot make it so that the visit can be rescheduled for you.     Please contact the research coordinator if you receive care for your hypertension outside of your study visits.    If you have any questions, please contact the research coordinator at [phone]. If you experience any symptoms please call the [facility] 24/7 triage number at [phone]. If your phone number, email or address changes please alert the research coordinator.     In the meantime, we ask that you complete the online surveys emailed out to you, if completing online, or mailed out to you, if completing paper surveys, before your next visit.    Lifestyle changes that can help control high blood pressure:  Even  though PGEN is a study to test effectiveness of genetically guided medications for managing high blood pressure, there are several things you can do to ensure your blood pressure stays in good control:    Maintain a healthy weight (BMI<26). A modest amount of weight loss can be helpful    Limit salt intake to under 2400mg daily    Follow the DASH diet (lean meats, low salt, whole grains, lots of fruits/vegies)    Stay active, try to get in 30 minutes of exercise daily.    Manage your daily stress.    Do not smoke cigarettes (or cut back)    Limit alcohol (2 drinks/day for men, 1 drink/day for women)

## 2019-01-01 NOTE — DISCHARGE INSTRUCTIONS
While on narcotic pain medication, to prevent constipation:  1. Drink plenty of water to keep well hydrated   2. May take over the counter Colace or Senna (follow instructions on label)    Call your physician if you experience:  1. Fever greater than 100 degrees with body chills or excessive sweating.  2. Increased redness, localized warmth, tenderness, drainage or swelling at incision site.  Opening or pulling apart of incision site.   3. Pain not controlled with oral pain medications, ice and rest.   4. No bowel movement in 3 days (may use Milk of Magnesia or other over the counter remedy).  5. Chest pain, shortness of breath, and/or calf pain with excessive swelling.  6. Generalized feeling of illness.  7. Any other questions or concerns related to your surgery/recovery.      Thank you for allowing [facility] to participate in your cares!!   No

## 2019-01-07 DIAGNOSIS — Z98.1 S/P CERVICAL SPINAL FUSION: ICD-10-CM

## 2019-01-07 NOTE — TELEPHONE ENCOUNTER
Requested Prescriptions   Pending Prescriptions Disp Refills     cyclobenzaprine (FLEXERIL) 10 MG tablet [Pharmacy Med Name: CYCLOBENZAPRINE 10MG TABLETS]        Last Written Prescription Date:  11/29/18  Last Fill Quantity: 30,   # refills: 0  Last Office Visit: 12/14/18-Thein  Future Office visit:       Routing refill request to provider for review/approval because:  Drug not on the [facility], [facility] or  Health refill protocol or controlled substance 30 tablet 0     Sig: TAKE 1 TABLET BY MOUTH AT BEDTIME AS NEEDED FOR MUSCLE SPASMS    There is no refill protocol information for this order

## 2019-01-07 NOTE — TELEPHONE ENCOUNTER
Routing refill request to provider for review/approval because:  Drug not on the [facility] refill protocol   Aline Mahmood RN

## 2019-01-09 RX ORDER — CYCLOBENZAPRINE HCL 10 MG
TABLET ORAL
Qty: 30 TABLET | Refills: 0 | Status: SHIPPED | OUTPATIENT
Start: 2019-01-09 | End: 2019-02-25

## 2019-02-25 DIAGNOSIS — Z98.1 S/P CERVICAL SPINAL FUSION: ICD-10-CM

## 2019-02-25 RX ORDER — CYCLOBENZAPRINE HCL 10 MG
TABLET ORAL
Qty: 30 TABLET | Refills: 0 | Status: SHIPPED | OUTPATIENT
Start: 2019-02-25 | End: 2019-04-04

## 2019-02-25 NOTE — TELEPHONE ENCOUNTER
Requested Prescriptions   Pending Prescriptions Disp Refills     cyclobenzaprine (FLEXERIL) 10 MG tablet [Pharmacy Med Name: CYCLOBENZAPRINE 10MG TABLETS]          Last Written Prescription Date:  01/09/19  Last Fill Quantity: 30,   # refills: 0  Last Office Visit: 12/14/18-Thein  Future Office visit:       Routing refill request to provider for review/approval because:  Drug not on the [facility], [facility] or  Health refill protocol or controlled substance 30 tablet 0     Sig: TAKE 1 TABLET BY MOUTH AT BEDTIME AS NEEDED FOR MUSCLE SPASMS    There is no refill protocol information for this order

## 2019-02-28 DIAGNOSIS — E78.5 HYPERLIPIDEMIA LDL GOAL <130: ICD-10-CM

## 2019-03-01 RX ORDER — ATORVASTATIN CALCIUM 80 MG/1
TABLET, FILM COATED ORAL
Qty: 90 TABLET | Refills: 0 | Status: SHIPPED | OUTPATIENT
Start: 2019-03-01 | End: 2019-05-30

## 2019-03-01 NOTE — TELEPHONE ENCOUNTER
Prescription approved per [facility] Refill Protocol.  Yaima Frey RN

## 2019-03-01 NOTE — TELEPHONE ENCOUNTER
"Requested Prescriptions   Pending Prescriptions Disp Refills     atorvastatin (LIPITOR) 80 MG tablet [Pharmacy Med Name: ATORVASTATIN 80MG TABLETS] 90 tablet 0    Last Written Prescription Date:  11/29/18  Last Fill Quantity: 90,  # refills: 0   Last Office Visit with [facility], P or [facility] prescribing provider:  12/14/18   Future Office Visit:      Sig: TAKE 1 TABLET(80 MG) BY MOUTH DAILY    Statins Protocol Passed - 2/28/2019  5:22 PM       Passed - LDL on file in past 12 months    Recent Labs   Lab Test 03/26/18  0900   *            Passed - No abnormal creatine kinase in past 12 months    Recent Labs   Lab Test 07/12/12  1418   CKT 61               Passed - Recent (12 mo) or future (30 days) visit within the authorizing provider's specialty    Patient had office visit in the last 12 months or has a visit in the next 30 days with authorizing provider or within the authorizing provider's specialty.  See \"Patient Info\" tab in inbasket, or \"Choose Columns\" in Meds & Orders section of the refill encounter.             Passed - Medication is active on med list       Passed - Patient is age 18 or older       Passed - No active pregnancy on record       Passed - No positive pregnancy test in past 12 months          "

## 2019-04-05 ENCOUNTER — OFFICE VISIT (OUTPATIENT)
Dept: FAMILY MEDICINE | Facility: CLINIC | Age: 59
End: 2019-04-05

## 2019-04-05 VITALS
BODY MASS INDEX: 37.76 KG/M2 | SYSTOLIC BLOOD PRESSURE: 144 MMHG | TEMPERATURE: 97.8 F | HEART RATE: 100 BPM | HEIGHT: 61 IN | WEIGHT: 200 LBS | DIASTOLIC BLOOD PRESSURE: 80 MMHG | OXYGEN SATURATION: 97 %

## 2019-04-05 DIAGNOSIS — I10 ESSENTIAL HYPERTENSION WITH GOAL BLOOD PRESSURE LESS THAN 140/90: Primary | ICD-10-CM

## 2019-04-05 DIAGNOSIS — E66.01 MORBID OBESITY (H): ICD-10-CM

## 2019-04-05 LAB
ANION GAP SERPL CALCULATED.3IONS-SCNC: 5 MMOL/L (ref 3–14)
BUN SERPL-MCNC: 20 MG/DL (ref 7–30)
CALCIUM SERPL-MCNC: 9.8 MG/DL (ref 8.5–10.1)
CHLORIDE SERPL-SCNC: 101 MMOL/L (ref 94–109)
CO2 SERPL-SCNC: 31 MMOL/L (ref 20–32)
CREAT SERPL-MCNC: 0.7 MG/DL (ref 0.52–1.04)
CREAT UR-MCNC: 19 MG/DL
GFR SERPL CREATININE-BSD FRML MDRD: >90 ML/MIN/{1.73_M2}
GLUCOSE SERPL-MCNC: 103 MG/DL (ref 70–99)
MICROALBUMIN UR-MCNC: 8 MG/L
MICROALBUMIN/CREAT UR: 40.37 MG/G CR (ref 0–25)
POTASSIUM SERPL-SCNC: 4.4 MMOL/L (ref 3.4–5.3)
SODIUM SERPL-SCNC: 137 MMOL/L (ref 133–144)

## 2019-04-05 PROCEDURE — 80048 BASIC METABOLIC PNL TOTAL CA: CPT | Performed by: NURSE PRACTITIONER

## 2019-04-05 PROCEDURE — 36415 COLL VENOUS BLD VENIPUNCTURE: CPT | Performed by: NURSE PRACTITIONER

## 2019-04-05 PROCEDURE — 99213 OFFICE O/P EST LOW 20 MIN: CPT | Performed by: NURSE PRACTITIONER

## 2019-04-05 PROCEDURE — 82043 UR ALBUMIN QUANTITATIVE: CPT | Performed by: NURSE PRACTITIONER

## 2019-04-05 ASSESSMENT — MIFFLIN-ST. JEOR: SCORE: 1429.57

## 2019-04-05 NOTE — LETTER
April 18, 2019      Margo Inman  [address] [address] [id number]    Hi Margo,     Your metabolic panel was normal with the exception of your sugar that was slightly elevated at 103.  Keep working on losing some weight, and getting regular exercise to help delay/prevent a full blown diabetes diagnosis.     Your urine microalbumin was also elevated. This is likely due to your high blood pressure.  Keep taking your medication as prescribed and work on weight loss/regular exercise.     Feel free to contact me with any questions or concerns.  Thank you for allowing me to participate in your care.     Belem Dumont APRN, CNP/smj    Resulted Orders   Basic metabolic panel  (Ca, Cl, CO2, Creat, Gluc, K, Na, BUN)   Result Value Ref Range    Sodium 137 133 - 144 mmol/L    Potassium 4.4 3.4 - 5.3 mmol/L    Chloride 101 94 - 109 mmol/L    Carbon Dioxide 31 20 - 32 mmol/L    Anion Gap 5 3 - 14 mmol/L    Glucose 103 (H) 70 - 99 mg/dL    Urea Nitrogen 20 7 - 30 mg/dL    Creatinine 0.70 0.52 - 1.04 mg/dL    GFR Estimate >90 >60 mL/min/[1.73_m2]      Comment:      Non  GFR Calc  Starting 12/18/2018, serum creatinine based estimated GFR (eGFR) will be   calculated using the Chronic Kidney Disease Epidemiology Collaboration   (CKD-EPI) equation.      GFR Estimate If Black >90 >60 mL/min/[1.73_m2]      Comment:       GFR Calc  Starting 12/18/2018, serum creatinine based estimated GFR (eGFR) will be   calculated using the Chronic Kidney Disease Epidemiology Collaboration   (CKD-EPI) equation.      Calcium 9.8 8.5 - 10.1 mg/dL   Albumin Random Urine Quantitative with Creat Ratio   Result Value Ref Range    Creatinine Urine 19 mg/dL    Albumin Urine mg/L 8 mg/L    Albumin Urine mg/g Cr 40.37 (H) 0 - 25 mg/g Cr

## 2019-04-05 NOTE — PROGRESS NOTES
PGEN study visit  NEW HYPERTENSION diagnosis visit 8    SUBJECTIVE:  Margo is here for 8th(Final)  PGEN research study visit. Please refer to research tab in the header and today's flow sheet for further details. Patient had new onset hypertension at enrollment and has a goal of <  140/90 (per Problem list target chosen by PCP)     Prior research note reviewed. Patient is on blood pressure medication(s) at this time.  she has been taking Zestril 10 mg daily and is tolerating the medication well.  She's been eating out for the last week(trip to [place]) so has been consuming more salt than she usually does.    Current diet & lifestyle:: GEOVANI, using Mrs Arias, eats organic foods, grass fed meat.  Smokin-7 cigarettes/day, on Chantix.  Her quit date is set for 18.  Alcohol consumption rarely   Other pertinent history:  Pt continues to have allergy issues, symptoms worse since she spent time in [place] last week.    BP Readings from Last 3 Encounters:   19 144/80   18 126/84   18 127/84       Current Outpatient Medications   Medication Sig Dispense Refill     acetaminophen (TYLENOL) 500 MG tablet Take 500-1,000 mg by mouth every 6 hours as needed.       atorvastatin (LIPITOR) 80 MG tablet TAKE 1 TABLET(80 MG) BY MOUTH DAILY 90 tablet 0     butalbital-acetaminophen-caffeine (FIORICET/ESGIC) -40 MG per tablet TAKE 1 TABLET BY MOUTH EVERY 4 HOURS AS NEEDED 28 tablet 0     cyclobenzaprine (FLEXERIL) 10 MG tablet TAKE 1 TABLET BY MOUTH AT BEDTIME AS NEEDED FOR MUSCLE SPASMS 30 tablet 0     diclofenac (VOLTAREN) 75 MG EC tablet Take 1 tablet (75 mg) by mouth 2 times daily 60 tablet 11     lisinopril (PRINIVIL/ZESTRIL) 10 MG tablet Take 1 tablet (10 mg) by mouth daily 90 tablet 3     order for DME Equipment being ordered: Blood pressure cuff/machine 1 Device 0     order for DME Equipment being ordered: BP cuff/machine 1 Device 0     varenicline (CHANTIX STARTING MONTH EVANGELINA) 0.5 MG X 11 & 1 MG X 42 tablet Take  "0.5 mg tab daily for 3 days, then 0.5 mg tab twice daily for 4 days, then 1 mg twice daily. 53 tablet 0     varenicline (CHANTIX) 1 MG tablet Take 1 tablet (1 mg) by mouth 2 times daily 56 tablet 4     Potassium   Date Value Ref Range Status   05/03/2018 3.9 3.4 - 5.3 mmol/L Final     Creatinine   Date Value Ref Range Status   05/03/2018 0.59 0.52 - 1.04 mg/dL Final     Urea Nitrogen   Date Value Ref Range Status   05/03/2018 13 7 - 30 mg/dL Final     GFR Estimate   Date Value Ref Range Status   05/03/2018 >90 >60 mL/min/1.7m2 Final     Comment:     Non  GFR Calc      A baseline potassium, creatinine, BUN, GFR has not been done within past 12 months      OBJECTIVE:  Patient in in no apparent distress and able to provide full history for today's encounter. she denies pain or any current illness   Vitals: /80   Pulse 100   Temp 97.8  F (36.6  C) (Oral)   Ht 1.549 m (5' 1\")   Wt 90.7 kg (200 lb)   LMP 09/16/2011   SpO2 97%   BMI 37.79 kg/m    Today's BP completed using cuff size: regular on left side  arm.      Pulse Readings from Last 1 Encounters:   04/05/19 100     Is pulse 55 or greater? - Yes    BMI= Body mass index is 37.79 kg/m .  See PGEN flow sheet for other exam findings.       ASSESSMENT/PLAN:   Blood pressure reading today is not at the provider specified goal of <140/90.      PGEN Flowsheet has been  'filed' ) for this visit (this saves flowsheet data)      1.  Based on PGEN RN HTN MGMT standing order the patient will be advised continue current medication regimen unchanged.     2.  We will be checking a metabolic lab panel today.      3.  Follow up instructions include:     Next Nurse visit: 1 weeks. For BP check    See avs for more details.  Full research packet also provided to patient previously  Our research team will reach out to patient to schedule follow up visit as well.     Patient was counseled regarding the lifestyle changes (listed below)  to help with BP " management Yes  Lifestyle changes that can help control high blood pressure:  Even though PGEN is a study to test effectiveness of genetically guided medications for managing high blood pressure, there are several things you can do to ensure your blood pressure stays in good control:    Maintain a healthy weight (BMI<26). A modest amount of weight loss can be helpful    Limit salt intake to under 2400mg daily    Follow the DASH diet (lean meats, low salt, whole grains, lots of fruits/vegies)    Stay active, try to get in 30 minutes of exercise daily.    Manage your daily stress.    Do not smoke cigarettes (or cut back)    Limit alcohol (2 drinks/day for men, 1 drink/day for women)  Was AVS  provided to patient with the relevant <dot>PGENPI dot phrase pulled into patient instructions Yes    Patient was advised to follow up with PCP  to continue ongoing care of HTN since study visits are now complete.        Belem ODELL, CNP

## 2019-04-05 NOTE — PATIENT INSTRUCTIONS
.billie    At [facility], we strive to deliver an exceptional experience to you, every time we see you.  If you receive a survey in the mail, please send us back your thoughts. We really do value your feedback.    Your care team:                            Family Medicine Internal Medicine   MD David Wagoner MD Shantel Branch-Fleming, MD Katya Georgiev PA-C Megan Hill, APRN JAIME Razo, MD Pediatrics   OLIVIA Lucia, MD Nan Hebert APRN CNP   MD Alexus Alejo, MD Brisa Brian, MD Valentine Dumont, APRN [facility]      Clinic hours: Monday - Thursday 7 am-7 pm; Fridays 7 am-5 pm.   Urgent care: Monday - Friday 11 am-9 pm; Saturday and Sunday 9 am-5 pm.  Pharmacy : Monday -Thursday 8 am-8 pm; Friday 8 am-6 pm; Saturday and Sunday 9 am-5 pm.     Clinic: [phone]   Pharmacy: [phone]    [facility] Hypertension Study  Visit 8     Thank you for your participation in the hypertension study!     Please continue taking your hypertension medications as directed and follow up with your physician as directed. If you have any questions, please contact your physician s office.    You have been given the results of your genetic profile for your own records.  Please contact the research coordinator at [phone] if you have any questions related to the study.      Please contact your doctor/provider with any other health related questions.

## 2019-05-03 DIAGNOSIS — Z98.1 S/P CERVICAL SPINAL FUSION: ICD-10-CM

## 2019-05-03 RX ORDER — CYCLOBENZAPRINE HCL 10 MG
TABLET ORAL
Qty: 30 TABLET | Refills: 0 | Status: SHIPPED | OUTPATIENT
Start: 2019-05-03 | End: 2019-06-06

## 2019-05-03 NOTE — TELEPHONE ENCOUNTER
Requested Prescriptions   Pending Prescriptions Disp Refills     cyclobenzaprine (FLEXERIL) 10 MG tablet [Pharmacy Med Name: CYCLOBENZAPRINE 10MG TABLETS] 30 tablet 0     Sig: TAKE 1 TABLET BY MOUTH AT BEDTIME AS NEEDED FOR MUSCLE SPASMS       There is no refill protocol information for this order        Routing refill request to provider for review/approval because:  Drug not on the [facility] refill protocol       Mary Juarez RN, BSN

## 2019-05-14 ENCOUNTER — TRANSFERRED RECORDS (OUTPATIENT)
Dept: HEALTH INFORMATION MANAGEMENT | Facility: CLINIC | Age: 59
End: 2019-05-14

## 2019-05-30 DIAGNOSIS — E78.5 HYPERLIPIDEMIA LDL GOAL <130: ICD-10-CM

## 2019-05-30 RX ORDER — ATORVASTATIN CALCIUM 80 MG/1
TABLET, FILM COATED ORAL
Qty: 90 TABLET | Refills: 0 | Status: SHIPPED | OUTPATIENT
Start: 2019-05-30 | End: 2019-09-11

## 2019-05-30 NOTE — TELEPHONE ENCOUNTER
Routing refill request to provider for review/approval because:  Labs not current:  LDL      Mary Juarez RN, BSN, PHN

## 2019-05-30 NOTE — TELEPHONE ENCOUNTER
"Requested Prescriptions   Pending Prescriptions Disp Refills     atorvastatin (LIPITOR) 80 MG tablet [Pharmacy Med Name: ATORVASTATIN 80MG TABLETS]  Last Written Prescription Date:  03/01/19  Last Fill Quantity: 90,  # refills: 0   Last Office Visit with G, P or [facility] prescribing provider:  04/05/19-Thein   Future Office Visit:    90 tablet 0     Sig: TAKE 1 TABLET(80 MG) BY MOUTH DAILY       Statins Protocol Failed - 5/30/2019 10:13 AM        Failed - LDL on file in past 12 months     Recent Labs   Lab Test 03/26/18  0900   *             Passed - No abnormal creatine kinase in past 12 months     Recent Labs   Lab Test 07/12/12  1418   CKT 61                Passed - Recent (12 mo) or future (30 days) visit within the authorizing provider's specialty     Patient had office visit in the last 12 months or has a visit in the next 30 days with authorizing provider or within the authorizing provider's specialty.  See \"Patient Info\" tab in inbasket, or \"Choose Columns\" in Meds & Orders section of the refill encounter.              Passed - Medication is active on med list        Passed - Patient is age 18 or older        Passed - No active pregnancy on record        Passed - No positive pregnancy test in past 12 months          "

## 2019-06-03 DIAGNOSIS — E78.5 HYPERLIPIDEMIA LDL GOAL <130: ICD-10-CM

## 2019-06-03 LAB
AST SERPL W P-5'-P-CCNC: 20 U/L (ref 0–45)
CHOLEST SERPL-MCNC: 152 MG/DL
HDLC SERPL-MCNC: 37 MG/DL
LDLC SERPL CALC-MCNC: 92 MG/DL
NONHDLC SERPL-MCNC: 115 MG/DL
TRIGL SERPL-MCNC: 113 MG/DL

## 2019-06-03 PROCEDURE — 84450 TRANSFERASE (AST) (SGOT): CPT | Performed by: NURSE PRACTITIONER

## 2019-06-03 PROCEDURE — 80061 LIPID PANEL: CPT | Performed by: NURSE PRACTITIONER

## 2019-06-03 PROCEDURE — 36415 COLL VENOUS BLD VENIPUNCTURE: CPT | Performed by: NURSE PRACTITIONER

## 2019-07-22 DIAGNOSIS — Z98.1 S/P CERVICAL SPINAL FUSION: ICD-10-CM

## 2019-07-22 NOTE — TELEPHONE ENCOUNTER
Requested Prescriptions   Pending Prescriptions Disp Refills     cyclobenzaprine (FLEXERIL) 10 MG tablet [Pharmacy Med Name: CYCLOBENZAPRINE 10MG TABLETS]        Last Written Prescription Date:  06/07/19  Last Fill Quantity: 30,   # refills: 0  Last Office Visit: 04/05/19-Thein  Future Office visit:       Routing refill request to provider for review/approval because:  Drug not on the [facility], P or  Health refill protocol or controlled substance 30 tablet 0     Sig: TAKE 1 TABLET BY MOUTH AT BEDTIME AS NEEDED FOR MUSCLE SPASMS       There is no refill protocol information for this order

## 2019-07-23 RX ORDER — CYCLOBENZAPRINE HCL 10 MG
TABLET ORAL
Qty: 30 TABLET | Refills: 0 | Status: SHIPPED | OUTPATIENT
Start: 2019-07-23 | End: 2019-08-26

## 2019-07-27 DIAGNOSIS — I10 ESSENTIAL HYPERTENSION WITH GOAL BLOOD PRESSURE LESS THAN 140/90: ICD-10-CM

## 2019-07-27 NOTE — TELEPHONE ENCOUNTER
"Requested Prescriptions   Pending Prescriptions Disp Refills     lisinopril (PRINIVIL/ZESTRIL) 10 MG tablet [Pharmacy Med Name: LISINOPRIL 10MG TABLETS]  Last Written Prescription Date:  8/10/18  Last Fill Quantity: 90,  # refills: 3   Last Office Visit with G, P or [facility] prescribing provider:  4/5/19   Future Office Visit:      90 tablet 0     Sig: TAKE 1 TABLET(10 MG) BY MOUTH DAILY       ACE Inhibitors (Including Combos) Protocol Failed - 7/27/2019  5:07 AM        Failed - Blood pressure under 140/90 in past 12 months     BP Readings from Last 3 Encounters:   04/05/19 144/80   12/14/18 126/84   09/21/18 127/84                 Passed - Recent (12 mo) or future (30 days) visit within the authorizing provider's specialty     Patient had office visit in the last 12 months or has a visit in the next 30 days with authorizing provider or within the authorizing provider's specialty.  See \"Patient Info\" tab in inbasket, or \"Choose Columns\" in Meds & Orders section of the refill encounter.              Passed - Medication is active on med list        Passed - Patient is age 18 or older        Passed - No active pregnancy on record        Passed - Normal serum creatinine on file in past 12 months     Recent Labs   Lab Test 04/05/19  1213   CR 0.70             Passed - Normal serum potassium on file in past 12 months     Recent Labs   Lab Test 04/05/19  1213   POTASSIUM 4.4             Passed - No positive pregnancy test within past 12 months          "

## 2019-07-29 NOTE — TELEPHONE ENCOUNTER
Routing refill request to provider for review/approval because:  Blood pressure greater than 140/90 in last family practice OV on 4/5/19    Yaima Frey RN

## 2019-07-30 RX ORDER — LISINOPRIL 10 MG/1
10 TABLET ORAL DAILY
Qty: 30 TABLET | Refills: 0 | Status: SHIPPED | OUTPATIENT
Start: 2019-07-30 | End: 2020-05-06

## 2019-08-26 DIAGNOSIS — Z98.1 S/P CERVICAL SPINAL FUSION: ICD-10-CM

## 2019-08-26 NOTE — TELEPHONE ENCOUNTER
Routing refill request to provider for review/approval because:  Drug not on the [facility] refill protocol         Mary Juarez RN, BSN, PHN

## 2019-08-26 NOTE — TELEPHONE ENCOUNTER
Requested Prescriptions   Pending Prescriptions Disp Refills     cyclobenzaprine (FLEXERIL) 10 MG tablet [Pharmacy Med Name: CYCLOBENZAPRINE 10MG TABLETS]        Last Written Prescription Date:  07/23/19  Last Fill Quantity: 30,   # refills: 0  Last Office Visit: 04/05/19-Thein  Future Office visit:       Routing refill request to provider for review/approval because:  Drug not on the [facility], P or  Health refill protocol or controlled substance 30 tablet 0     Sig: TAKE 1 TABLET BY MOUTH AT BEDTIME AS NEEDED FOR MUSCLE SPASMS       There is no refill protocol information for this order

## 2019-08-28 RX ORDER — CYCLOBENZAPRINE HCL 10 MG
TABLET ORAL
Qty: 30 TABLET | Refills: 0 | Status: SHIPPED | OUTPATIENT
Start: 2019-08-28 | End: 2019-09-25

## 2019-08-29 NOTE — TELEPHONE ENCOUNTER
Called and spoke to the patient and scheduled an appt with Valentine for 9/9/19 at 10:40 am.  Teri Olvera MA/  [facility]

## 2019-09-11 ENCOUNTER — OFFICE VISIT (OUTPATIENT)
Dept: FAMILY MEDICINE | Facility: CLINIC | Age: 59
End: 2019-09-11
Payer: COMMERCIAL

## 2019-09-11 VITALS
WEIGHT: 199.8 LBS | SYSTOLIC BLOOD PRESSURE: 140 MMHG | HEART RATE: 79 BPM | BODY MASS INDEX: 37.72 KG/M2 | DIASTOLIC BLOOD PRESSURE: 84 MMHG | OXYGEN SATURATION: 96 % | HEIGHT: 61 IN | TEMPERATURE: 97.9 F

## 2019-09-11 DIAGNOSIS — Z11.4 SCREENING FOR HIV (HUMAN IMMUNODEFICIENCY VIRUS): ICD-10-CM

## 2019-09-11 DIAGNOSIS — Z96.653 STATUS POST TOTAL BILATERAL KNEE REPLACEMENT: ICD-10-CM

## 2019-09-11 DIAGNOSIS — Z98.1 S/P CERVICAL SPINAL FUSION: ICD-10-CM

## 2019-09-11 DIAGNOSIS — R05.9 COUGH: ICD-10-CM

## 2019-09-11 DIAGNOSIS — E78.5 HYPERLIPIDEMIA LDL GOAL <130: ICD-10-CM

## 2019-09-11 DIAGNOSIS — I10 ESSENTIAL HYPERTENSION WITH GOAL BLOOD PRESSURE LESS THAN 140/90: Primary | ICD-10-CM

## 2019-09-11 DIAGNOSIS — G25.81 RLS (RESTLESS LEGS SYNDROME): ICD-10-CM

## 2019-09-11 DIAGNOSIS — Z12.4 ENCOUNTER FOR SCREENING FOR CERVICAL CANCER: ICD-10-CM

## 2019-09-11 DIAGNOSIS — Z12.4 SCREENING FOR MALIGNANT NEOPLASM OF CERVIX: ICD-10-CM

## 2019-09-11 DIAGNOSIS — F17.200 TOBACCO DEPENDENCE SYNDROME: ICD-10-CM

## 2019-09-11 DIAGNOSIS — Z28.21 INFLUENZA VACCINATION DECLINED BY PATIENT: ICD-10-CM

## 2019-09-11 LAB
ALBUMIN SERPL-MCNC: 4.2 G/DL (ref 3.4–5)
ALP SERPL-CCNC: 96 U/L (ref 40–150)
ALT SERPL W P-5'-P-CCNC: 33 U/L (ref 0–50)
AST SERPL W P-5'-P-CCNC: 17 U/L (ref 0–45)
BILIRUB DIRECT SERPL-MCNC: <0.1 MG/DL (ref 0–0.2)
BILIRUB SERPL-MCNC: 0.2 MG/DL (ref 0.2–1.3)
ERYTHROCYTE [DISTWIDTH] IN BLOOD BY AUTOMATED COUNT: 12.9 % (ref 10–15)
FERRITIN SERPL-MCNC: 118 NG/ML (ref 8–252)
HCT VFR BLD AUTO: 43.4 % (ref 35–47)
HGB BLD-MCNC: 14 G/DL (ref 11.7–15.7)
MCH RBC QN AUTO: 32 PG (ref 26.5–33)
MCHC RBC AUTO-ENTMCNC: 32.3 G/DL (ref 31.5–36.5)
MCV RBC AUTO: 99 FL (ref 78–100)
PLATELET # BLD AUTO: 300 10E9/L (ref 150–450)
PROT SERPL-MCNC: 7.4 G/DL (ref 6.8–8.8)
RBC # BLD AUTO: 4.38 10E12/L (ref 3.8–5.2)
SPECIMEN SOURCE: NORMAL
WBC # BLD AUTO: 8.1 10E9/L (ref 4–11)
WET PREP SPEC: NORMAL

## 2019-09-11 PROCEDURE — 87210 SMEAR WET MOUNT SALINE/INK: CPT | Performed by: NURSE PRACTITIONER

## 2019-09-11 PROCEDURE — 87389 HIV-1 AG W/HIV-1&-2 AB AG IA: CPT | Performed by: NURSE PRACTITIONER

## 2019-09-11 PROCEDURE — 36415 COLL VENOUS BLD VENIPUNCTURE: CPT | Performed by: NURSE PRACTITIONER

## 2019-09-11 PROCEDURE — 87624 HPV HI-RISK TYP POOLED RSLT: CPT | Performed by: NURSE PRACTITIONER

## 2019-09-11 PROCEDURE — 99214 OFFICE O/P EST MOD 30 MIN: CPT | Performed by: NURSE PRACTITIONER

## 2019-09-11 PROCEDURE — 82728 ASSAY OF FERRITIN: CPT | Performed by: NURSE PRACTITIONER

## 2019-09-11 PROCEDURE — 85027 COMPLETE CBC AUTOMATED: CPT | Performed by: NURSE PRACTITIONER

## 2019-09-11 PROCEDURE — 80076 HEPATIC FUNCTION PANEL: CPT | Performed by: NURSE PRACTITIONER

## 2019-09-11 PROCEDURE — G0145 SCR C/V CYTO,THINLAYER,RESCR: HCPCS | Performed by: NURSE PRACTITIONER

## 2019-09-11 RX ORDER — ATORVASTATIN CALCIUM 80 MG/1
TABLET, FILM COATED ORAL
Qty: 90 TABLET | Refills: 2 | Status: SHIPPED | OUTPATIENT
Start: 2019-09-11 | End: 2020-07-15

## 2019-09-11 RX ORDER — LOSARTAN POTASSIUM 25 MG/1
25 TABLET ORAL DAILY
Qty: 90 TABLET | Refills: 3 | Status: SHIPPED | OUTPATIENT
Start: 2019-09-11 | End: 2020-08-21

## 2019-09-11 RX ORDER — DICLOFENAC SODIUM 75 MG/1
75 TABLET, DELAYED RELEASE ORAL 2 TIMES DAILY
Qty: 60 TABLET | Refills: 11 | Status: SHIPPED | OUTPATIENT
Start: 2019-09-11 | End: 2021-04-15

## 2019-09-11 ASSESSMENT — PAIN SCALES - GENERAL: PAINLEVEL: NO PAIN (0)

## 2019-09-11 ASSESSMENT — MIFFLIN-ST. JEOR: SCORE: 1428.67

## 2019-09-11 ASSESSMENT — PATIENT HEALTH QUESTIONNAIRE - PHQ9: SUM OF ALL RESPONSES TO PHQ QUESTIONS 1-9: 4

## 2019-09-11 NOTE — PROGRESS NOTES
Subjective     Margo Inman is a 59 year old female who presents to clinic today for the following health issues:    HPI   Medication Followup of Flexeril     Taking Medication as prescribed: yes    Side Effects:  None    Medication Helping Symptoms:  yes   Patient states she's been taking Flexeril for RLS symptoms a nd it has been working well for her.  Without it, she has severe limb pain/movement. She also takes Gabapentin but doesn't note much improvement in symptoms with it.     Would like to talk to you about her throat- has had persistent nonproductive dry cough for several months.  Symptoms are worse when lying on her back, improved with lying on her side and stretching her neck. She has been taking Lisinopril but states cough preceded the Lisinopril.  No nasal congestion, wheezing, ear pain, sore throat, fever, or chills, no ill contacts, no recent travel, no allergy history. She continues to smoke 1 ppk, 40+ year pk history, is interested in smoking cessation using Chantix which she's used in the psat with good results.    Patient Active Problem List   Diagnosis     Hyperlipidemia LDL goal <130     Pseudogout     OA (OSTEOARTHRITIS) OF KNEE - bilateral     RLS (restless legs syndrome)     S/P total knee arthroplasty juan     Acute posthemorrhagic anemia     Muscle spasm     CTS (carpal tunnel syndrome) - bilateral     S/P carpal tunnel release     Trigger thumb     Essential hypertension with goal blood pressure less than 140/90     Tobacco dependency     Microscopic hematuria     Cervical high risk HPV (human papillomavirus) test positive     Radiculopathy of cervical spine     S/P cervical spinal fusion     Adjustment disorder with mixed anxiety and depressed mood     Papanicolaou smear of cervix with low grade squamous intraepithelial lesion (LGSIL)     Obesity (BMI 35.0-39.9) with comorbidity (H)     Past Surgical History:   Procedure Laterality Date     APPENDECTOMY       ARTHROSCOPY KNEE   9/16/2011    Procedure:ARTHROSCOPY KNEE; left knee arthroscopy with debridement, open lateral patellar spur excision; Surgeon:RG MONTOYA; Location:[facility]     C PART REMV FEMUR/PROX TIB/FIB  9/16/11    left, open lateral patellar bone spur excision     C TOTAL KNEE ARTHROPLASTY  1/17/14    Bilateral     COLONOSCOPY N/A 2/16/2016    Procedure: COLONOSCOPY;  Surgeon: Belem Khalil MD;  Location: [facility]     COLONOSCOPY N/A 2/16/2016    Procedure: COMBINED COLONOSCOPY, SINGLE OR MULTIPLE BIOPSY/POLYPECTOMY BY BIOPSY;  Surgeon: Belem Khalil MD;  Location: [facility]     COLONOSCOPY WITH CO2 INSUFFLATION N/A 2/16/2016    Procedure: COLONOSCOPY WITH CO2 INSUFFLATION;  Surgeon: Belem Khalil MD;  Location: [facility]     CYSTOSCOPY  2016    microscopic hematuria     DECOMPRESSION, FUSION CERVICAL ANTERIOR ONE LEVEL, COMBINED N/A 3/30/2017    Procedure: COMBINED DECOMPRESSION, FUSION CERVICAL ANTERIOR ONE LEVEL;  Surgeon: Joseph Klein MD;  Location: [facility]     ECTOPIC PREGNANCY SURGERY       ENT SURGERY       GYN SURGERY       HC INCISION TENDON SHEATH FINGER Left 7/11/14    Thumb TF release     HC KNEE SCOPE,MED/LAT MENISECTOMY  9/16/11    left, with partial medial menisectomy ONLY     HC REVISE MEDIAN N/CARPAL TUNNEL SURG Left 7/11/14    PRIMARY - not revision     ORTHOPEDIC SURGERY       RELEASE CARPAL TUNNEL  7/11/2014    Procedure: RELEASE CARPAL TUNNEL;  Surgeon: Rg Montoya MD;  Location: [facility]     RELEASE CARPAL TUNNEL Right 11/7/2014    Procedure: RELEASE CARPAL TUNNEL;  Surgeon: Rg Montoya MD;  Location: [facility]     RELEASE TRIGGER FINGER  7/11/2014    Procedure: RELEASE TRIGGER FINGER;  Surgeon: Rg Montoya MD;  Location: [facility]     RELEASE TRIGGER FINGER Right 11/7/2014    Procedure: RELEASE TRIGGER FINGER;  Surgeon: Rg Montoya MD;  Location: [facility]     tonsils         Social History     Tobacco Use     Smoking status:  Current Every Day Smoker     Packs/day: 1.00     Years: 15.00     Pack years: 15.00     Types: Cigarettes     Smokeless tobacco: Never Used     Tobacco comment: Started Chantix   Substance Use Topics     Alcohol use: Yes     Comment: socially     Family History   Problem Relation Age of Onset     Breast Cancer Maternal Grandmother      Ovarian Cancer Maternal Grandmother      Cancer - colorectal Maternal Grandfather      Colon Cancer Maternal Grandfather      Prostate Cancer Maternal Grandfather      C.A.D. Father      C.A.D. Paternal Grandfather      Lung Cancer Sister      Colon Cancer Sister      Brain Cancer Sister          Current Outpatient Medications   Medication Sig Dispense Refill     acetaminophen (TYLENOL) 500 MG tablet Take 500-1,000 mg by mouth every 6 hours as needed.       atorvastatin (LIPITOR) 80 MG tablet TAKE 1 TABLET(80 MG) BY MOUTH DAILY 90 tablet 2     cyclobenzaprine (FLEXERIL) 10 MG tablet TAKE 1 TABLET BY MOUTH AT BEDTIME AS NEEDED FOR MUSCLE SPASMS 30 tablet 0     diclofenac (VOLTAREN) 75 MG EC tablet Take 1 tablet (75 mg) by mouth 2 times daily 60 tablet 11     lisinopril (PRINIVIL/ZESTRIL) 10 MG tablet Take 1 tablet (10 mg) by mouth daily Blood pressure recheck due. 30 tablet 0     losartan (COZAAR) 25 MG tablet Take 1 tablet (25 mg) by mouth daily 90 tablet 3     varenicline (CHANTIX STARTING MONTH EVANGELINA) 0.5 MG X 11 & 1 MG X 42 tablet Take 0.5 mg tab daily for 3 days, then 0.5 mg tab twice daily for 4 days, then 1 mg twice daily. 53 tablet 0     varenicline (CHANTIX) 1 MG tablet Take 1 tablet (1 mg) by mouth 2 times daily 56 tablet 4     butalbital-acetaminophen-caffeine (FIORICET/ESGIC) -40 MG per tablet TAKE 1 TABLET BY MOUTH EVERY 4 HOURS AS NEEDED (Patient not taking: Reported on 9/11/2019) 28 tablet 0     order for DME Equipment being ordered: Blood pressure cuff/machine (Patient not taking: Reported on 9/11/2019) 1 Device 0     order for DME Equipment being ordered: BP  "cuff/machine (Patient not taking: Reported on 9/11/2019) 1 Device 0     BP Readings from Last 3 Encounters:   09/11/19 (!) 140/84   04/05/19 144/80   12/14/18 126/84    Wt Readings from Last 3 Encounters:   09/11/19 90.6 kg (199 lb 12.8 oz)   04/05/19 90.7 kg (200 lb)   12/14/18 90.3 kg (199 lb)                    Annual Wellness Visit    Are you in the first 12 months of your Medicare Part B coverage?  No    Physical Health:    In general, how would you rate your overall physical health? good  If you drink alcohol do you typically have >3 drinks per day or >7 drinks per week? no      Do you usually eat at least 4 servings of fruit and vegetables a day, include whole grains & fiber and avoid regularly eating high fat or \"junk\" foods? Yes    Needs assistance for the following daily activities: no assistance needed    Which of the following safety concerns are present in your home?  none identified     Hearing impairment: No    In the past 6 months, have you been bothered by leaking of urine? no    Mental Health:    In general, how would you rate your overall mental or emotional health? good  PHQ-2 Score:      Do you feel safe in your environment? Yes    Do you have a Health Care Directive? No: Advance care planning reviewed with patient; information given to patient to review.    Fall risk:  Fall Risk Assessment not completed.}    Current providers sharing in care for this patient include:   Patient Care Team:  Belem Dumont APRN CNP as PCP - General (Nurse Practitioner - Family)  Belem Dumont APRN CNP as Assigned PCP        Do you have sleep apnea, excessive snoring or daytime drowsiness?: no  Reviewed and updated as needed this visit by Provider         Review of Systems   ROS COMP: Constitutional, HEENT, cardiovascular, pulmonary, gi and gu systems are negative, except as otherwise noted.      Objective    BP (!) 140/84 (BP Location: Left arm, Patient Position: Chair, Cuff Size: Adult Large)   Pulse 79 " "  Temp 97.9  F (36.6  C) (Oral)   Ht 1.549 m (5' 1\")   Wt 90.6 kg (199 lb 12.8 oz)   LMP 09/16/2011   SpO2 96%   BMI 37.75 kg/m    Body mass index is 37.75 kg/m .  Physical Exam   GENERAL: healthy, alert and no distress  EYES: Eyes grossly normal to inspection, PERRL and conjunctivae and sclerae normal  HENT: ear canals and TM's normal, nose and mouth without ulcers or lesions, no bruits  NECK: no adenopathy, no asymmetry, masses, or scars and thyroid normal to palpation  RESP: lungs clear to auscultation - no rales, rhonchi or wheezes  CV: regular rate and rhythm, normal S1 S2, no S3 or S4, no murmur, click or rub, no peripheral edema and peripheral pulses strong  ABDOMEN: soft, nontender, no hepatosplenomegaly, no masses and bowel sounds normal   (female): normal female external genitalia, normal urethral meatus, vaginal mucosa, normal cervix/adnexa/uterus without masses or discharge, pap, HPV, wet prep obtained  MS: no gross musculoskeletal defects noted, no edema  SKIN: no suspicious lesions or rashes  NEURO: Normal strength and tone, mentation intact and speech normal  BACK: no CVA tenderness, no paralumbar tenderness  PSYCH: mentation appears normal, affect normal/bright  LYMPH: no cervical, supraclavicular, axillary, or inguinal adenopathy    Diagnostic Test Results:  Labs reviewed in Epic  Results for orders placed or performed in visit on 09/11/19 (from the past 24 hour(s))   Wet prep   Result Value Ref Range    Specimen Description Vagina     Wet Prep No Trichomonas seen     Wet Prep No clue cells seen     Wet Prep No yeast seen     Wet Prep Moderate  WBC'S seen      CBC with platelets   Result Value Ref Range    WBC 8.1 4.0 - 11.0 10e9/L    RBC Count 4.38 3.8 - 5.2 10e12/L    Hemoglobin 14.0 11.7 - 15.7 g/dL    Hematocrit 43.4 35.0 - 47.0 %    MCV 99 78 - 100 fl    MCH 32.0 26.5 - 33.0 pg    MCHC 32.3 31.5 - 36.5 g/dL    RDW 12.9 10.0 - 15.0 %    Platelet Count 300 150 - 450 10e9/L       "     Assessment & Plan     1. Essential hypertension with goal blood pressure less than 140/90  Stop Lisinopril and change to Cozaar, recheck BP in 3 weeks in hopes that BP will continue to be normal and cough is improved.  - losartan (COZAAR) 25 MG tablet; Take 1 tablet (25 mg) by mouth daily  Dispense: 90 tablet; Refill: 3    2. Cough  Stop Lisinopril and change to Cozaar, recheck BP in 3 weeks in hopes that BP will continue to be normal and cough is improved.    3. Screening for malignant neoplasm of cervix  Abdominal pain obtained    4. Tobacco dependence syndrome      - TOBACCO CESSATION ORDER FOR HM  - varenicline (CHANTIX STARTING MONTH PAK) 0.5 MG X 11 & 1 MG X 42 tablet; Take 0.5 mg tab daily for 3 days, then 0.5 mg tab twice daily for 4 days, then 1 mg twice daily.  Dispense: 53 tablet; Refill: 0    5. Hyperlipidemia LDL goal <130  Checkcing labs, adjust mediations as indicated. No adverse effects form 80 mg Lipitor daily. Continue with regular exercise, weight loss efforts, low chol diet  - Hepatic panel  - atorvastatin (LIPITOR) 80 MG tablet; TAKE 1 TABLET(80 MG) BY MOUTH DAILY  Dispense: 90 tablet; Refill: 2    6. Status post total bilateral knee replacement  Continuing with diclofenac for knee pain.  - diclofenac (VOLTAREN) 75 MG EC tablet; Take 1 tablet (75 mg) by mouth 2 times daily  Dispense: 60 tablet; Refill: 11    7. S/P cervical spinal fusion  Continue Voltaren  - diclofenac (VOLTAREN) 75 MG EC tablet; Take 1 tablet (75 mg) by mouth 2 times daily  Dispense: 60 tablet; Refill: 11    8. RLS (restless legs syndrome)  Checking labs.  Flexeril is not indicated for RLS management. Will treat anemia if indicated, consider Requip.  - Hepatic panel  - CBC with platelets  - Ferritin    9. Screening for HIV (human immunodeficiency virus)    - HIV Screening    10. Influenza vaccination declined by patient      11. Encounter for screening for cervical cancer   -pap/HPV  - Wet prep  - Pap imaged thin layer  "screen with HPV - recommended age 30 - 65 years (select HPV order below)  - HPV High Risk Types DNA Cervical     Tobacco Cessation:   reports that she has been smoking cigarettes.  She has a 15.00 pack-year smoking history. She has never used smokeless tobacco.  Tobacco Cessation Action Plan: Pharmacotherapies : Chantix  Self help information given to patient      BMI:   Estimated body mass index is 37.75 kg/m  as calculated from the following:    Height as of this encounter: 1.549 m (5' 1\").    Weight as of this encounter: 90.6 kg (199 lb 12.8 oz).   Weight management plan: Discussed healthy diet and exercise guidelines        Work on weight loss  Regular exercise  See Patient Instructions    Return in about 4 weeks (around 10/9/2019), or if symptoms worsen or fail to improve, for BP Recheck.    JOSE R Sarabia [facility]      "

## 2019-09-11 NOTE — PATIENT INSTRUCTIONS
At [facility], we strive to deliver an exceptional experience to you, every time we see you.  If you receive a survey in the mail, please send us back your thoughts. We really do value your feedback.    Your care team:                            Family Medicine Internal Medicine   MD David Wagoner MD Shantel Branch-Fleming, MD Katya Georgiev PA-C Megan Hill, APRN CNP    [place] MD Pediatrics   Desean Francis, OLIVIA Murillo, MD Nan Hebert APRN CNP   MD Alexus Alejo MD Deborah Mielke, MD Valentine Dumont, APRN [facility]      Clinic hours: Monday - Thursday 7 am-7 pm; Fridays 7 am-5 pm.   Urgent care: Monday - Friday 11 am-9 pm; Saturday and Sunday 9 am-5 pm.  Pharmacy : Monday -Thursday 8 am-8 pm; Friday 8 am-6 pm; Saturday and Sunday 9 am-5 pm.     Clinic: [phone]   Pharmacy: [phone]        Patient Education     Understanding Restless Legs Syndrome    Are you ever annoyed by a creeping or itching feeling in your legs? Do you often feel an urge to move your legs while sitting or lying in bed? This can keep you from falling asleep at night. You may then feel tired during the day. If you have these problems, talk to your healthcare provider. He or she can suggest a treatment plan and help you find ways to sleep better.  Restless legs syndrome (RLS)  RLS is a creeping, crawly, or jumpy feeling in the legs with an urge to move them. Symptoms of RLS often occur during periods of inactivity, such as when you sit or lie down at night. This discomfort can keep you from falling asleep. RLS is more common in older people and tends to run in families. Overuse of caffeine or alcohol may make symptoms worse. Iron deficiency, diabetes, or kidney problems can contribute to RLS.  Periodic limb movement syndrome (PLMS)  PLMS is sudden, repetitive leg jerking during sleep. The person you sleep with is often the one who notices it. Your legs  may jerk many times during the night. You and your partner may both have trouble sleeping and feel tired in the morning. PLMS shouldn t be confused with the normal leg or body twitching many people have when first falling asleep.  Treating these problems  If these problems are causing disrupted sleep and daytime symptoms, treatment may be needed. Possible treatments may include:    Avoiding medicines like antidepressants, antinausea medicine, and other medicines that block dopamine    Iron supplements    Prescribed medicines including pramipexole, ropinirole, ritigotine, pregbalin, cabergoline, levodopa, and clonidine    Lifestyle changes, such as regular exercise, controlling caffeine intake, alcohol, and smoking  Date Last Reviewed: 9/1/2017 2000-2018 [employer]. [address] [place] [address], [place] [address]. All rights reserved. This information is not intended as a substitute for professional medical care. Always follow your healthcare professional's instructions.           Patient Education     Established High Blood Pressure    High blood pressure (hypertension) is a chronic disease. Often, healthcare providers don t know what causes it. But it can be caused by certain health conditions and medicines.  If you have high blood pressure, you may not have any symptoms. If you do have symptoms, they may include headache, dizziness, changes in your vision, chest pain, and shortness of breath. But even without symptoms, high blood pressure that s not treated raises your risk for heart attack, heart failure, and stroke. High blood pressure is a serious health risk and shouldn t be ignored.  Blood pressure measurements are given as 2 numbers. Systolic blood pressure is the upper number. This is the pressure when the heart contracts. Diastolic blood pressure is the lower number. This is the pressure when the heart relaxes between beats. You will see your blood pressure readings written together. For  example, a person with a systolic pressure of 118 and a diastolic pressure of 78 will have 118/78 written in the medical record.  Blood pressure is categorized as normal, elevated, or stage 1 or stage 2 high blood pressure:    Normal blood pressure is systolic of less than 120 and diastolic of less than 80 (120/80)    Elevated blood pressure is systolic of 120 to 129 and diastolic less than 80    Stage 1 high blood pressure is systolic is 130 to 139 or diastolic between 80 to 89    Stage 2 high blood pressure is when systolic is 140 or higher or the diastolic is 90 or higher  Home care  If you have high blood pressure, follow these home care guidelines to help lower your blood pressure. If you are taking medicines for high blood pressure, these methods may reduce or end your need for medicines in the future.    Start a weight-loss program if you are overweight.    Cut back on how much salt you get in your diet. Here s how to do this:  ? Don t eat foods that have a lot of salt. These include olives, pickles, smoked meats, and salted potato chips.  ? Don t add salt to your food at the table.  ? Use only small amounts of salt when cooking.    Start an exercise program. Talk with your healthcare provider about the type of exercise program that would be best for you. It doesn't have to be hard. Even brisk walking for 20 minutes 3 times a week is a good form of exercise.    Don t take medicines that stimulate the heart. This includes many over-the-counter cold and sinus decongestant pills and sprays, as well as diet pills. Check the warnings about high blood pressure on the label. Before buying any over-the-counter medicines or supplements, always ask the pharmacist about the product's potential interaction with your high blood pressure and your high blood pressure medicines.    Stimulants such as amphetamine or cocaine could be deadly for someone with high blood pressure. Never take these.    Limit how much caffeine you  get in your diet. Switch to caffeine-free products.    Stop smoking. If you are a long-time smoker, this can be hard. Talk to your healthcare provider about medicines and nicotine replacement options to help you. Also, enroll in a stop-smoking program to make it more likely that you will quit for good.    Learn how to handle stress. This is an important part of any program to lower blood pressure. Learn about relaxation methods like meditation, yoga, or biofeedback.    If your provider prescribed medicines, take them exactly as directed. Missing doses may cause your blood pressure get out of control.    If you miss a dose or doses, check with your healthcare provider or pharmacist about what to do.    Consider buying an automatic blood pressure machine to check your blood pressure at home. Ask your provider for a recommendation. You can get one of these at most pharmacies.     The American Heart Association recommends the following guidelines for home blood pressure monitoring:    Don't smoke or drink coffee for 30 minutes before taking your blood pressure.    Go to the bathroom before the test.    Relax for 5 minutes before taking the measurement.    Sit with your back supported (don't sit on a couch or soft chair); keep your feet on the floor uncrossed. Place your arm on a solid flat surface (like a table) with the upper part of the arm at heart level. Place the middle of the cuff directly above the bend of the elbow. Check the monitor's instruction manual for an illustration.    Take multiple readings. When you measure, take 2 to 3 readings one minute apart and record all of the results.    Take your blood pressure at the same time every day, or as your healthcare provider recommends.    Record the date, time, and blood pressure reading.    Take the record with you to your next medical appointment. If your blood pressure monitor has a built-in memory, simply take the monitor with you to your next  appointment.    Call your provider if you have several high readings. Don't be frightened by a single high blood pressure reading, but if you get several high readings, check in with your healthcare provider.    Note: When blood pressure reaches a systolic (top number) of 180 or higher OR diastolic (bottom number) of 110 or higher, seek emergency medical treatment.  Follow-up care  You will need to see your healthcare provider regularly. This is to check your blood pressure and to make changes to your medicines. Make a follow-up appointment as directed. Bring the record of your home blood pressure readings to the appointment.  When to seek medical advice  Call your healthcare provider right away if any of these occur:    Blood pressure reaches a systolic (upper number) of 180 or higher OR a diastolic (bottom number) of 110 or higher    Chest pain or shortness of breath    Severe headache    Throbbing or rushing sound in the ears    Nosebleed    Sudden severe pain in your belly (abdomen)    Extreme drowsiness, confusion, or fainting    Dizziness or spinning sensation (vertigo)    Weakness of an arm or leg or one side of the face    You have problems speaking or seeing   Date Last Reviewed: 12/1/2016 2000-2018 [employer]. [address] [place] [address], [place] [address]. All rights reserved. This information is not intended as a substitute for professional medical care. Always follow your healthcare professional's instructions.

## 2019-09-11 NOTE — LETTER
September 20, 2019    Margo Inman  [address] [address] [facility] [id number]    Dear ,  This letter is regarding your recent Pap smear (cervical cancer screening) and Human Papillomavirus (HPV) test.  We are happy to inform you that your Pap smear result is normal. Cervical cancer is closely linked with certain types of HPV. Your results showed no evidence of high-risk HPV.  We recommend you have your next PAP smear and HPV test in 3 years.  You will still need to return to the clinic every year for an annual exam and other preventive tests.  If you have additional questions regarding this result, please call our registered nurse, Elidia at [phone].  Sincerely,    JOSE R Sarabia CNP /esh

## 2019-09-12 LAB — HIV 1+2 AB+HIV1 P24 AG SERPL QL IA: NONREACTIVE

## 2019-09-13 LAB
COPATH REPORT: NORMAL
PAP: NORMAL

## 2019-09-16 LAB
FINAL DIAGNOSIS: NORMAL
HPV HR 12 DNA CVX QL NAA+PROBE: NEGATIVE
HPV16 DNA SPEC QL NAA+PROBE: NEGATIVE
HPV18 DNA SPEC QL NAA+PROBE: NEGATIVE
SPECIMEN DESCRIPTION: NORMAL
SPECIMEN SOURCE CVX/VAG CYTO: NORMAL

## 2019-09-25 DIAGNOSIS — Z98.1 S/P CERVICAL SPINAL FUSION: ICD-10-CM

## 2019-09-25 NOTE — TELEPHONE ENCOUNTER
Requested Prescriptions   Pending Prescriptions Disp Refills     cyclobenzaprine (FLEXERIL) 10 MG tablet [Pharmacy Med Name: CYCLOBENZAPRINE 10MG TABLETS]        Last Written Prescription Date:  08/28/19  Last Fill Quantity: 30,   # refills: 0  Last Office Visit: 09/11/19-[facility]  Future Office visit:       Routing refill request to provider for review/approval because:  Drug not on the [facility], [facility] or  Health refill protocol or controlled substance 30 tablet 0     Sig: TAKE 1 TABLET BY MOUTH AT BEDTIME AS NEEDED FOR MUSCLE SPASMS       There is no refill protocol information for this order

## 2019-09-27 NOTE — TELEPHONE ENCOUNTER
Routing refill request to provider for review/approval because:  Drug not on the [facility] refill protocol   Ada Wen RN

## 2019-09-30 RX ORDER — CYCLOBENZAPRINE HCL 10 MG
TABLET ORAL
Qty: 30 TABLET | Refills: 0 | Status: SHIPPED | OUTPATIENT
Start: 2019-09-30 | End: 2019-10-25

## 2019-10-25 DIAGNOSIS — Z98.1 S/P CERVICAL SPINAL FUSION: ICD-10-CM

## 2019-10-25 NOTE — TELEPHONE ENCOUNTER
Routing refill request to provider for review/approval because:  Drug not on the [facility] refill protocol     Michelle Garcia RN

## 2019-10-25 NOTE — TELEPHONE ENCOUNTER
Requested Prescriptions   Pending Prescriptions Disp Refills     cyclobenzaprine (FLEXERIL) 10 MG tablet [Pharmacy Med Name: CYCLOBENZAPRINE 10MG TABLETS]        Last Written Prescription Date:  09/30/19  Last Fill Quantity: 30,   # refills: 0  Last Office Visit: 09/11/19-Thein  Future Office visit:       Routing refill request to provider for review/approval because:  Drug not on the [facility], [facility] or  Health refill protocol or controlled substance 30 tablet 0     Sig: TAKE 1 TABLET BY MOUTH AT BEDTIME AS NEEDED FOR MUSCLE SPASMS       There is no refill protocol information for this order

## 2019-10-28 RX ORDER — CYCLOBENZAPRINE HCL 10 MG
TABLET ORAL
Qty: 30 TABLET | Refills: 0 | Status: SHIPPED | OUTPATIENT
Start: 2019-10-28 | End: 2019-11-24

## 2019-12-24 DIAGNOSIS — Z98.1 S/P CERVICAL SPINAL FUSION: ICD-10-CM

## 2019-12-24 NOTE — TELEPHONE ENCOUNTER
Requested Prescriptions   Pending Prescriptions Disp Refills     cyclobenzaprine (FLEXERIL) 10 MG tablet [Pharmacy Med Name: CYCLOBENZAPRINE 10MG TABLETS] 30 tablet 0     Sig: TAKE 1 TABLET BY MOUTH AT BEDTIME AS NEEDED FOR MUSCLE SPASMS       There is no refill protocol information for this order        Last Written Prescription Date:  11/25/19  Last Fill Quantity: 30,  # refills: 0   Last office visit: 9/11/2019 with prescribing provider:  Belem Dumont     Future Office Visit:

## 2019-12-26 RX ORDER — CYCLOBENZAPRINE HCL 10 MG
TABLET ORAL
Qty: 30 TABLET | Refills: 0 | Status: SHIPPED | OUTPATIENT
Start: 2019-12-26 | End: 2020-01-30

## 2019-12-26 NOTE — TELEPHONE ENCOUNTER
Spoke with pt.  Informed pt that RX was sent to preferred pharmacy and relayed providers message.  Pt understands and had no questions    Carlene Li RT / TC

## 2019-12-26 NOTE — TELEPHONE ENCOUNTER
Please call Margo and tell her that the flexeril is not to be taken long term.  She should not be taking it on a daily basis.  I gave her 30 tablets and they should last for a couple of months.    Belem ODELL, CNP

## 2020-02-05 NOTE — ANESTHESIA POSTPROCEDURE EVALUATION
Patient: Margo Inman    Procedure(s):  Anterior cervical decompression and fusion C6-C7 - Wound Class: I-Clean    Diagnosis:stenosis, upper extremity weakness  Diagnosis Additional Information: Pre op Diagnosis: C6-7 stenosis with right C7 radiculopathy and triceps weakness     Post op Diagnosis: Same    Procedure:   1. C6-7 anterior cervical discectomy with arthrodesis and placement of a 8 mm Nuvasive Corent PEEK interbody graft with DBM p, utty placed centrally  2. Placement of a 24 mm Nuvasive Archon anterior cervical plate with four 15 mm screws  3. Use of C-arm and microscope    Anesthesia Type:  General, ETT    Note:  Anesthesia Post Evaluation    Patient location during evaluation: Phase 2  Patient participation: Able to fully participate in evaluation  Level of consciousness: awake  Pain management: adequate  Airway patency: patent  Cardiovascular status: acceptable  Respiratory status: acceptable  Hydration status: euvolemic  PONV: controlled     Anesthetic complications: None          Last vitals:  Vitals:    03/30/17 1600 03/30/17 1700 03/30/17 1713   BP: 119/56 127/64    Pulse:      Resp: 18 18 16   Temp:      SpO2: 94% 96%          Electronically Signed By: Edy Samuel MD  March 30, 2017  5:35 PM  
Adequate: hears normal conversation without difficulty

## 2020-03-14 ENCOUNTER — NURSE TRIAGE (OUTPATIENT)
Dept: NURSING | Facility: CLINIC | Age: 60
End: 2020-03-14

## 2020-03-14 NOTE — TELEPHONE ENCOUNTER
"\"I have a terrible cough\".  Caller is also reporting headache at times, cough is just turning productive.    Reason for Disposition    Chest pain  (Exception: MILD central chest pain, present only when coughing)    Additional Information    Negative: Severe difficulty breathing (e.g., struggling for each breath, speaks in single words)    Negative: Bluish (or gray) lips or face now    Negative: [1] Difficulty breathing AND [2] exposure to flames, smoke, or fumes    Negative: [1] Stridor AND [2] difficulty breathing    Negative: Sounds like a life-threatening emergency to the triager    Negative: [1] Previous asthma attacks AND [2] this feels like asthma attack    Negative: Dry (non-productive) cough (i.e., no sputum or minimal clear sputum)    Protocols used: COUGH - ACUTE [id number]    Eva Wren RN  [facility]      " Post-Care Instructions: I reviewed with the patient in detail post-care instructions. Patient is not to engage in any heavy lifting, exercise, or swimming for the next 14 days. Should the patient develop any fevers, chills, bleeding, severe pain patient will contact me immediately.  My home and cell contact information given and reviewed.

## 2020-03-19 NOTE — TELEPHONE ENCOUNTER
Routing refill request to provider for review/approval because:  Drug not on the [facility] refill protocol         Mary Juarez RN, BSN          Quality 226: Preventive Care And Screening: Tobacco Use: Screening And Cessation Intervention: Patient screened for tobacco use and is an ex/non-smoker Detail Level: Detailed Quality 111:Pneumonia Vaccination Status For Older Adults: Pneumococcal Vaccination not Administered or Previously Received, Reason not Otherwise Specified Quality 131: Pain Assessment And Follow-Up: Pain assessment using a standardized tool is documented as negative, no follow-up plan required Quality 110: Preventive Care And Screening: Influenza Immunization: Influenza Immunization not Administered because Patient Refused. Quality 130: Documentation Of Current Medications In The Medical Record: Current Medications Documented

## 2020-04-03 DIAGNOSIS — F17.200 TOBACCO DEPENDENCE SYNDROME: ICD-10-CM

## 2020-04-03 NOTE — TELEPHONE ENCOUNTER
Routing refill request to provider for review/approval because:  Labs out of range:  BP  Lianna Graham RN

## 2020-04-03 NOTE — TELEPHONE ENCOUNTER
"Requested Prescriptions   Pending Prescriptions Disp Refills     varenicline (CHANTIX STARTING MONTH EVANGELINA) 0.5 MG X 11 & 1 MG X 42 tablet  Last Written Prescription Date:  09/11/19  Last Fill Quantity: 53,  # refills: 0   Last Office Visit with G, P or [facility] prescribing provider:  09/11/19-thein   Future Office Visit:    53 tablet 0     Sig: Take 0.5 mg tab daily for 3 days, then 0.5 mg tab twice daily for 4 days, then 1 mg twice daily.       Partial Cholinergic Nicotinic Agonist Agents Failed - 4/3/2020  8:34 AM        Failed - Blood pressure under 140/90 in past 12 months     BP Readings from Last 3 Encounters:   09/11/19 (!) 140/84   04/05/19 144/80   12/14/18 126/84                 Passed - Recent (12 mo) or future (30 days) visit within the authorizing provider's specialty     Patient has had an office visit with the authorizing provider or a provider within the authorizing providers department within the previous 12 mos or has a future within next 30 days. See \"Patient Info\" tab in inbasket, or \"Choose Columns\" in Meds & Orders section of the refill encounter.              Passed - Medication is active on med list        Passed - Patient is 18 years of age or older        Passed - Patient is not pregnant        Passed - No positive pregnancy test on file in past 12 months             "

## 2020-04-15 DIAGNOSIS — Z98.1 S/P CERVICAL SPINAL FUSION: ICD-10-CM

## 2020-04-15 NOTE — TELEPHONE ENCOUNTER
Requested Prescriptions   Pending Prescriptions Disp Refills     cyclobenzaprine (FLEXERIL) 10 MG tablet 30 tablet 0       There is no refill protocol information for this order        Routing refill request to provider for review/approval because:  Drug not on the [facility] refill protocol     Mary Juarez RN, BSN, PHN

## 2020-04-15 NOTE — LETTER
[address]  [address]  [phone]    April 20, 2020      Margo Inman  [address] [facility] [address]      Dear Margo,    We have refilled your flexeril.   We will need you to schedule a virtual visit, before any additional refills can be given.  Please call [phone] to schedule this appointment.      Thank you,    [facility]

## 2020-04-19 RX ORDER — CYCLOBENZAPRINE HCL 10 MG
TABLET ORAL
Qty: 30 TABLET | Refills: 0 | Status: SHIPPED | OUTPATIENT
Start: 2020-04-19 | End: 2020-05-06

## 2020-04-20 NOTE — TELEPHONE ENCOUNTER
Refilled but patient needs to schedule virtual visit.  This medication is not for long term use.  We need to figure something else out for  Sleep.  Belem ODELL, CNP

## 2020-05-06 ENCOUNTER — VIRTUAL VISIT (OUTPATIENT)
Dept: FAMILY MEDICINE | Facility: CLINIC | Age: 60
End: 2020-05-06
Payer: COMMERCIAL

## 2020-05-06 DIAGNOSIS — Z71.6 ENCOUNTER FOR SMOKING CESSATION COUNSELING: ICD-10-CM

## 2020-05-06 DIAGNOSIS — E66.01 MORBID OBESITY (H): ICD-10-CM

## 2020-05-06 DIAGNOSIS — Z12.31 VISIT FOR SCREENING MAMMOGRAM: Primary | ICD-10-CM

## 2020-05-06 DIAGNOSIS — Z98.1 S/P CERVICAL SPINAL FUSION: ICD-10-CM

## 2020-05-06 DIAGNOSIS — Z86.0100 HISTORY OF COLONIC POLYPS: ICD-10-CM

## 2020-05-06 DIAGNOSIS — I10 ESSENTIAL HYPERTENSION WITH GOAL BLOOD PRESSURE LESS THAN 140/90: ICD-10-CM

## 2020-05-06 DIAGNOSIS — E78.5 HYPERLIPIDEMIA LDL GOAL <130: ICD-10-CM

## 2020-05-06 DIAGNOSIS — G25.81 RLS (RESTLESS LEGS SYNDROME): ICD-10-CM

## 2020-05-06 PROCEDURE — 99214 OFFICE O/P EST MOD 30 MIN: CPT | Mod: 95 | Performed by: NURSE PRACTITIONER

## 2020-05-06 RX ORDER — CYCLOBENZAPRINE HCL 10 MG
TABLET ORAL
Qty: 30 TABLET | Refills: 3 | Status: SHIPPED | OUTPATIENT
Start: 2020-05-06 | End: 2020-09-11

## 2020-05-06 RX ORDER — VARENICLINE TARTRATE 1 MG/1
1 TABLET, FILM COATED ORAL 2 TIMES DAILY
Qty: 90 TABLET | Refills: 0 | Status: SHIPPED | OUTPATIENT
Start: 2020-05-06 | End: 2020-09-02

## 2020-05-06 NOTE — PATIENT INSTRUCTIONS
At [facility], we strive to deliver an exceptional experience to you, every time we see you. If you receive a survey, please complete it as we do value your feedback.  If you have MyChart, you can expect to receive results automatically within 24 hours of their completion.  Your provider will send a note interpreting your results as well.   If you do not have MyChart, you should receive your results in about a week by mail.    Your care team:                            Family Medicine Internal Medicine   MD David Wagoner MD Shantel Branch-Fleming, MD Katya Georgiev PA-C Megan Hill, APRBAILEE Razo, MD Pediatrics   Desean Francis, PAADEN Murillo, MD Nan Hebert APRN CNP   MD Alexus Alejo MD Deborah Mielke, MD Kim Thein, APRN [facility]      Clinic hours: Monday - Thursday 7 am-7 pm; Fridays 7 am-5 pm.   Urgent care: Monday - Friday 11 am-9 pm; Saturday and Sunday 9 am-5 pm.    Clinic: [phone]       [facility]: Monday - Thursday 8 am - 7 pm; Friday 8 am - 6 pm  [facility]: [phone]     Use [email or web address] for 24/7 diagnosis and treatment of dozens of conditions.

## 2020-05-06 NOTE — PROGRESS NOTES
"Margo Inman is a 60 year old female who is being evaluated via a billable video visit.      The patient has been notified of following:     \"This video visit will be conducted via a call between you and your physician/provider. We have found that certain health care needs can be provided without the need for an in-person physical exam.  This service lets us provide the care you need with a video conversation.  If a prescription is necessary we can send it directly to your pharmacy.  If lab work is needed we can place an order for that and you can then stop by our lab to have the test done at a later time.    Video visits are billed at different rates depending on your insurance coverage.  Please reach out to your insurance provider with any questions.    If during the course of the call the physician/provider feels a video visit is not appropriate, you will not be charged for this service.\"    Patient has given verbal consent for Video visit? Yes    How would you like to obtain your AVS? Mail a copy    Patient would like the video invitation sent by: Text to cell phone: [phone]    Will anyone else be joining your video visit? No        Subjective     Margo Inman is a 60 year old female who presents to clinic today for the following health issues:    HPI  Medication Followup of  FLEXERIL    Taking Medication as prescribed: yes    Side Effects:  None    Medication Helping Symptoms:  yes   Patient uses flexeril for RLS symptoms following Cervical fusion-she states her arms and legs jump at night ever since she had fusion and flexeril helps with symptoms at night.    Also due for screening mammo and requests referral for colonoscopy which is due.  She had polyps (28 per pt) on her last colonosocopy done 2/16/16.  She denies constipation, BRBPR.  MGF had colon cancer diagnosed at  >60 year of age, no other FH of colorectal cancer of colon polyps.     Video Start Time:2:03    Hyperlipidemia " Follow-Up      Are you regularly taking any medication or supplement to lower your cholesterol?   Yes- Lipitor 80 mg daily    Are you having muscle aches or other side effects that you think could be caused by your cholesterol lowering medication?  No    Hypertension Follow-up      Do you check your blood pressure regularly outside of the clinic? Yes     Are you following a low salt diet? Yes    Are your blood pressures ever more than 140 on the top number (systolic) OR more   than 90 on the bottom number (diastolic), for example 140/90? No      Patient Active Problem List   Diagnosis     Hyperlipidemia LDL goal <130     Pseudogout     OA (OSTEOARTHRITIS) OF KNEE - bilateral     RLS (restless legs syndrome)     S/P total knee arthroplasty juan     Acute posthemorrhagic anemia     Muscle spasm     CTS (carpal tunnel syndrome) - bilateral     S/P carpal tunnel release     Trigger thumb     Essential hypertension with goal blood pressure less than 140/90     Tobacco dependency     Microscopic hematuria     Cervical high risk HPV (human papillomavirus) test positive     Radiculopathy of cervical spine     S/P cervical spinal fusion     Adjustment disorder with mixed anxiety and depressed mood     Papanicolaou smear of cervix with low grade squamous intraepithelial lesion (LGSIL)     Obesity (BMI 35.0-39.9) with comorbidity (H)     Past Surgical History:   Procedure Laterality Date     APPENDECTOMY       ARTHROSCOPY KNEE  9/16/2011    Procedure:ARTHROSCOPY KNEE; left knee arthroscopy with debridement, open lateral patellar spur excision; Surgeon:LUIS A MONTOYA; Location:[facility] OR     C PART REMV FEMUR/PROX TIB/FIB  9/16/11    left, open lateral patellar bone spur excision     C TOTAL KNEE ARTHROPLASTY  1/17/14    Bilateral     COLONOSCOPY N/A 2/16/2016    Procedure: COLONOSCOPY;  Surgeon: Belem Khalil MD;  Location: [facility] OR     COLONOSCOPY N/A 2/16/2016    Procedure: COMBINED COLONOSCOPY, SINGLE OR MULTIPLE  BIOPSY/POLYPECTOMY BY BIOPSY;  Surgeon: Belem Khalil MD;  Location: [facility]     COLONOSCOPY WITH CO2 INSUFFLATION N/A 2/16/2016    Procedure: COLONOSCOPY WITH CO2 INSUFFLATION;  Surgeon: Belem Khalil MD;  Location: [facility]     CYSTOSCOPY  2016    microscopic hematuria     DECOMPRESSION, FUSION CERVICAL ANTERIOR ONE LEVEL, COMBINED N/A 3/30/2017    Procedure: COMBINED DECOMPRESSION, FUSION CERVICAL ANTERIOR ONE LEVEL;  Surgeon: Joseph Klein MD;  Location: [facility] OR     ECTOPIC PREGNANCY SURGERY       ENT SURGERY       GYN SURGERY       HC INCISION TENDON SHEATH FINGER Left 7/11/14    Thumb TF release     HC KNEE SCOPE,MED/LAT MENISECTOMY  9/16/11    left, with partial medial menisectomy ONLY     HC REVISE MEDIAN N/CARPAL TUNNEL SURG Left 7/11/14    PRIMARY - not revision     ORTHOPEDIC SURGERY       RELEASE CARPAL TUNNEL  7/11/2014    Procedure: RELEASE CARPAL TUNNEL;  Surgeon: Rg Franco MD;  Location: [facility]     RELEASE CARPAL TUNNEL Right 11/7/2014    Procedure: RELEASE CARPAL TUNNEL;  Surgeon: Rg Franco MD;  Location: [facility]     RELEASE TRIGGER FINGER  7/11/2014    Procedure: RELEASE TRIGGER FINGER;  Surgeon: Rg Franco MD;  Location: [facility]     RELEASE TRIGGER FINGER Right 11/7/2014    Procedure: RELEASE TRIGGER FINGER;  Surgeon: Rg Franco MD;  Location: [facility] OR     tonsils         Social History     Tobacco Use     Smoking status: Current Every Day Smoker     Packs/day: 1.00     Years: 15.00     Pack years: 15.00     Types: Cigarettes     Smokeless tobacco: Never Used     Tobacco comment: Started Chantix   Substance Use Topics     Alcohol use: Yes     Comment: socially     Family History   Problem Relation Age of Onset     Breast Cancer Maternal Grandmother      Ovarian Cancer Maternal Grandmother      Cancer - colorectal Maternal Grandfather      Colon Cancer Maternal Grandfather      Prostate Cancer Maternal Grandfather       "TASHI.A.D. Father      ERICKA. Paternal Grandfather      Lung Cancer Sister      Colon Cancer Sister      Brain Cancer Sister          Current Outpatient Medications   Medication Sig Dispense Refill     acetaminophen (TYLENOL) 500 MG tablet Take 500-1,000 mg by mouth every 6 hours as needed.       atorvastatin (LIPITOR) 80 MG tablet TAKE 1 TABLET(80 MG) BY MOUTH DAILY 90 tablet 2     butalbital-acetaminophen-caffeine (FIORICET/ESGIC) -40 MG per tablet TAKE 1 TABLET BY MOUTH EVERY 4 HOURS AS NEEDED 28 tablet 0     cyclobenzaprine (FLEXERIL) 10 MG tablet TAKE 1 TABLET(10 MG) BY MOUTH EVERY NIGHT AS NEEDED FOR MUSCLE SPASMS 30 tablet 3     diclofenac (VOLTAREN) 75 MG EC tablet Take 1 tablet (75 mg) by mouth 2 times daily 60 tablet 11     losartan (COZAAR) 25 MG tablet Take 1 tablet (25 mg) by mouth daily 90 tablet 3     order for DME Equipment being ordered: Blood pressure cuff/machine 1 Device 0     order for DME Equipment being ordered: BP cuff/machine 1 Device 0     varenicline (CHANTIX STARTING MONTH PAK) 0.5 MG X 11 & 1 MG X 42 tablet Take 0.5 mg tab daily for 3 days, then 0.5 mg tab twice daily for 4 days, then 1 mg twice daily. 53 tablet 0     varenicline (CHANTIX) 1 MG tablet Take 1 tablet (1 mg) by mouth 2 times daily 90 tablet 0     BP Readings from Last 3 Encounters:   09/11/19 (!) 140/84   04/05/19 144/80   12/14/18 126/84    Wt Readings from Last 3 Encounters:   09/11/19 90.6 kg (199 lb 12.8 oz)   04/05/19 90.7 kg (200 lb)   12/14/18 90.3 kg (199 lb)                    Reviewed and updated as needed this visit by Provider         Review of Systems   ROS COMP: Constitutional, HEENT, cardiovascular, pulmonary, gi and gu systems are negative, except as otherwise noted.      Objective    LMP 09/16/2011   Estimated body mass index is 37.75 kg/m  as calculated from the following:    Height as of 9/11/19: 1.549 m (5' 1\").    Weight as of 9/11/19: 90.6 kg (199 lb 12.8 oz).  Physical Exam     GENERAL: healthy, " alert and no distress  EYES: Eyes grossly normal to inspection, conjunctivae and sclerae normal  RESP: no audible wheeze, cough, or visible cyanosis.  No visible retractions or increased work of breathing.  Able to speak fully in complete sentences.  NEURO: Cranial nerves grossly intact, mentation intact and speech normal  PSYCH: mentation appears normal, affect normal/bright, judgement and insight intact, normal speech and appearance well-groomed      Diagnostic Test Results:  Labs reviewed in Epic  none         Assessment & Plan     1. S/P cervical spinal fusion  Doing well on flexeril at HS for muscle spasms continue current  management.  - cyclobenzaprine (FLEXERIL) 10 MG tablet; TAKE 1 TABLET(10 MG) BY MOUTH EVERY NIGHT AS NEEDED FOR MUSCLE SPASMS  Dispense: 30 tablet; Refill: 3    2. Visit for screening mammogram    - MA SCREENING DIGITAL BILAT - Future  (s+30); Future    3. Encounter for smoking cessation counseling  Doing well on Chantix, due for refill of  1 mg dose.  She declines quitpartner program  - varenicline (CHANTIX) 1 MG tablet; Take 1 tablet (1 mg) by mouth 2 times daily  Dispense: 90 tablet; Refill: 0    4. Essential hypertension with goal blood pressure less than 140/90  Due for labs, BP check at next visit  - **Basic metabolic panel FUTURE anytime; Future  - Albumin Random Urine Quantitative with Creat Ratio; Future    5. Obesity (BMI 35.0-39.9) with comorbidity (H)  Benefits of weight loss reviewed in detail, encouraged her to cut back on the carbohydrates in the diet, consume more fruits and vegetables, drink plenty of water, avoid fruit juices, sodas, get 150 min moderate exercise/week.  Recheck weight in 6 months.      6. RLS (restless legs syndrome)  Refilled flexeril which works well for RLS symptoms for her.    7. History of colonic polyps  Due for Colonoscopy.  - GASTROENTEROLOGY ADULT REF PROCEDURE ONLY [facility] [phone]; [facility]; Future    8.  "Hyperlipidemia LDL goal <130    - Lipid panel reflex to direct LDL Fasting; Future  - AST; Future  - ALT; Future     BMI:   Estimated body mass index is 37.75 kg/m  as calculated from the following:    Height as of 9/11/19: 1.549 m (5' 1\").    Weight as of 9/11/19: 90.6 kg (199 lb 12.8 oz).   Weight management plan: Discussed healthy diet and exercise guidelines        Work on weight loss  Regular exercise  See Patient Instructions    No follow-ups on file.    JOSE R Sarabia CNP  [facility]      Video-Visit Details    Type of service:  Video Visit    Video End Time:2:20 PM    Originating Location (pt. Location): Home    Distant Location (provider location):  [facility]     Platform used for Video Visit: Doximity    No follow-ups on file.       JOSE R Sarabia CNP        "

## 2020-05-22 ENCOUNTER — ANCILLARY PROCEDURE (OUTPATIENT)
Dept: MAMMOGRAPHY | Facility: CLINIC | Age: 60
End: 2020-05-22
Attending: NURSE PRACTITIONER
Payer: COMMERCIAL

## 2020-05-22 DIAGNOSIS — I10 ESSENTIAL HYPERTENSION WITH GOAL BLOOD PRESSURE LESS THAN 140/90: ICD-10-CM

## 2020-05-22 DIAGNOSIS — Z12.31 VISIT FOR SCREENING MAMMOGRAM: ICD-10-CM

## 2020-05-22 DIAGNOSIS — E78.5 HYPERLIPIDEMIA LDL GOAL <130: ICD-10-CM

## 2020-05-22 LAB
ALT SERPL W P-5'-P-CCNC: 39 U/L (ref 0–50)
ANION GAP SERPL CALCULATED.3IONS-SCNC: 7 MMOL/L (ref 3–14)
AST SERPL W P-5'-P-CCNC: 18 U/L (ref 0–45)
BUN SERPL-MCNC: 14 MG/DL (ref 7–30)
CALCIUM SERPL-MCNC: 9.5 MG/DL (ref 8.5–10.1)
CHLORIDE SERPL-SCNC: 102 MMOL/L (ref 94–109)
CHOLEST SERPL-MCNC: 187 MG/DL
CO2 SERPL-SCNC: 28 MMOL/L (ref 20–32)
CREAT SERPL-MCNC: 0.76 MG/DL (ref 0.52–1.04)
CREAT UR-MCNC: 144 MG/DL
GFR SERPL CREATININE-BSD FRML MDRD: 87 ML/MIN/{1.73_M2}
GLUCOSE SERPL-MCNC: 104 MG/DL (ref 70–99)
HDLC SERPL-MCNC: 40 MG/DL
LDLC SERPL CALC-MCNC: 113 MG/DL
MICROALBUMIN UR-MCNC: 21 MG/L
MICROALBUMIN/CREAT UR: 14.51 MG/G CR (ref 0–25)
NONHDLC SERPL-MCNC: 147 MG/DL
POTASSIUM SERPL-SCNC: 4.1 MMOL/L (ref 3.4–5.3)
SODIUM SERPL-SCNC: 137 MMOL/L (ref 133–144)
TRIGL SERPL-MCNC: 171 MG/DL

## 2020-05-22 PROCEDURE — 84450 TRANSFERASE (AST) (SGOT): CPT | Performed by: NURSE PRACTITIONER

## 2020-05-22 PROCEDURE — 82043 UR ALBUMIN QUANTITATIVE: CPT | Performed by: NURSE PRACTITIONER

## 2020-05-22 PROCEDURE — 77063 BREAST TOMOSYNTHESIS BI: CPT | Mod: TC

## 2020-05-22 PROCEDURE — 80048 BASIC METABOLIC PNL TOTAL CA: CPT | Performed by: NURSE PRACTITIONER

## 2020-05-22 PROCEDURE — 77067 SCR MAMMO BI INCL CAD: CPT | Mod: TC

## 2020-05-22 PROCEDURE — 80061 LIPID PANEL: CPT | Performed by: NURSE PRACTITIONER

## 2020-05-22 PROCEDURE — 84460 ALANINE AMINO (ALT) (SGPT): CPT | Performed by: NURSE PRACTITIONER

## 2020-05-22 PROCEDURE — 36415 COLL VENOUS BLD VENIPUNCTURE: CPT | Performed by: NURSE PRACTITIONER

## 2020-05-27 NOTE — RESULT ENCOUNTER NOTE
Ms. Inman,    All of your labs were normal for you.    Please contact the clinic if you have additional questions.  Thank you.    Sincerely,    Nadine De Los Santos MD

## 2020-06-04 DIAGNOSIS — Z11.59 ENCOUNTER FOR SCREENING FOR OTHER VIRAL DISEASES: Primary | ICD-10-CM

## 2020-06-08 RX ORDER — BISACODYL 5 MG/1
5 TABLET, DELAYED RELEASE ORAL SEE ADMIN INSTRUCTIONS
Qty: 1 TABLET | Refills: 0 | Status: SHIPPED | OUTPATIENT
Start: 2020-06-08 | End: 2020-11-03

## 2020-06-08 RX ORDER — SODIUM, POTASSIUM,MAG SULFATES 17.5-3.13G
1 SOLUTION, RECONSTITUTED, ORAL ORAL SEE ADMIN INSTRUCTIONS
Qty: 2 BOTTLE | Refills: 0 | Status: SHIPPED | OUTPATIENT
Start: 2020-06-08 | End: 2020-11-03

## 2020-06-12 DIAGNOSIS — Z11.59 ENCOUNTER FOR SCREENING FOR OTHER VIRAL DISEASES: ICD-10-CM

## 2020-06-12 PROCEDURE — 99000 SPECIMEN HANDLING OFFICE-LAB: CPT | Performed by: SURGERY

## 2020-06-12 PROCEDURE — U0003 INFECTIOUS AGENT DETECTION BY NUCLEIC ACID (DNA OR RNA); SEVERE ACUTE RESPIRATORY SYNDROME CORONAVIRUS 2 (SARS-COV-2) (CORONAVIRUS DISEASE [COVID-19]), AMPLIFIED PROBE TECHNIQUE, MAKING USE OF HIGH THROUGHPUT TECHNOLOGIES AS DESCRIBED BY CMS-2020-01-R: HCPCS | Mod: 90 | Performed by: SURGERY

## 2020-06-13 LAB
SARS-COV-2 RNA SPEC QL NAA+PROBE: NOT DETECTED
SPECIMEN SOURCE: NORMAL

## 2020-06-15 ENCOUNTER — HOSPITAL ENCOUNTER (OUTPATIENT)
Facility: AMBULATORY SURGERY CENTER | Age: 60
Discharge: HOME | End: 2020-06-15
Attending: SURGERY | Admitting: SURGERY
Payer: COMMERCIAL

## 2020-06-15 VITALS
SYSTOLIC BLOOD PRESSURE: 109 MMHG | TEMPERATURE: 96.8 F | HEART RATE: 83 BPM | RESPIRATION RATE: 16 BRPM | DIASTOLIC BLOOD PRESSURE: 47 MMHG | OXYGEN SATURATION: 94 %

## 2020-06-15 DIAGNOSIS — Z12.11 SPECIAL SCREENING FOR MALIGNANT NEOPLASMS, COLON: Primary | ICD-10-CM

## 2020-06-15 PROBLEM — K57.30 DIVERTICULOSIS OF SIGMOID COLON: Status: ACTIVE | Noted: 2020-06-15

## 2020-06-15 LAB — COLONOSCOPY: NORMAL

## 2020-06-15 PROCEDURE — 88305 TISSUE EXAM BY PATHOLOGIST: CPT | Performed by: SURGERY

## 2020-06-15 PROCEDURE — 45385 COLONOSCOPY W/LESION REMOVAL: CPT

## 2020-06-15 PROCEDURE — G8918 PT W/O PREOP ORDER IV AB PRO: HCPCS

## 2020-06-15 PROCEDURE — 99152 MOD SED SAME PHYS/QHP 5/>YRS: CPT | Performed by: SURGERY

## 2020-06-15 PROCEDURE — 2894A VOIDCORRECT: CPT | Performed by: SURGERY

## 2020-06-15 PROCEDURE — 45385 COLONOSCOPY W/LESION REMOVAL: CPT | Mod: PT | Performed by: SURGERY

## 2020-06-15 PROCEDURE — G8907 PT DOC NO EVENTS ON DISCHARG: HCPCS

## 2020-06-15 RX ORDER — FENTANYL CITRATE 50 UG/ML
INJECTION, SOLUTION INTRAMUSCULAR; INTRAVENOUS PRN
Status: DISCONTINUED | OUTPATIENT
Start: 2020-06-15 | End: 2020-06-15 | Stop reason: HOSPADM

## 2020-06-15 RX ORDER — FLUMAZENIL 0.1 MG/ML
0.2 INJECTION, SOLUTION INTRAVENOUS
Status: SHIPPED | OUTPATIENT
Start: 2020-06-15 | End: 2020-06-15

## 2020-06-15 RX ORDER — ONDANSETRON 4 MG/1
4 TABLET, ORALLY DISINTEGRATING ORAL EVERY 6 HOURS PRN
Status: DISCONTINUED | OUTPATIENT
Start: 2020-06-15 | End: 2020-06-16 | Stop reason: HOSPADM

## 2020-06-15 RX ORDER — LIDOCAINE 40 MG/G
CREAM TOPICAL
Status: DISCONTINUED | OUTPATIENT
Start: 2020-06-15 | End: 2020-06-16 | Stop reason: HOSPADM

## 2020-06-15 RX ORDER — NALOXONE HYDROCHLORIDE 0.4 MG/ML
.1-.4 INJECTION, SOLUTION INTRAMUSCULAR; INTRAVENOUS; SUBCUTANEOUS
Status: DISCONTINUED | OUTPATIENT
Start: 2020-06-15 | End: 2020-06-16 | Stop reason: HOSPADM

## 2020-06-15 RX ORDER — ONDANSETRON 2 MG/ML
4 INJECTION INTRAMUSCULAR; INTRAVENOUS
Status: DISCONTINUED | OUTPATIENT
Start: 2020-06-15 | End: 2020-06-16 | Stop reason: HOSPADM

## 2020-06-15 RX ORDER — ONDANSETRON 2 MG/ML
4 INJECTION INTRAMUSCULAR; INTRAVENOUS EVERY 6 HOURS PRN
Status: DISCONTINUED | OUTPATIENT
Start: 2020-06-15 | End: 2020-06-16 | Stop reason: HOSPADM

## 2020-06-17 LAB — COPATH REPORT: NORMAL

## 2020-07-09 ENCOUNTER — OFFICE VISIT (OUTPATIENT)
Dept: URGENT CARE | Facility: URGENT CARE | Age: 60
End: 2020-07-09
Payer: COMMERCIAL

## 2020-07-09 ENCOUNTER — TRANSFERRED RECORDS (OUTPATIENT)
Dept: HEALTH INFORMATION MANAGEMENT | Facility: CLINIC | Age: 60
End: 2020-07-09

## 2020-07-09 VITALS
TEMPERATURE: 98.6 F | SYSTOLIC BLOOD PRESSURE: 157 MMHG | WEIGHT: 204 LBS | OXYGEN SATURATION: 94 % | BODY MASS INDEX: 38.51 KG/M2 | DIASTOLIC BLOOD PRESSURE: 95 MMHG | HEART RATE: 93 BPM | HEIGHT: 61 IN

## 2020-07-09 DIAGNOSIS — M54.50 SEVERE LOW BACK PAIN: Primary | ICD-10-CM

## 2020-07-09 DIAGNOSIS — Z98.890 HISTORY OF NECK SURGERY: ICD-10-CM

## 2020-07-09 DIAGNOSIS — M54.16 LUMBAR RADICULOPATHY: ICD-10-CM

## 2020-07-09 DIAGNOSIS — M54.12 CERVICAL RADICULOPATHY: ICD-10-CM

## 2020-07-09 PROCEDURE — 99214 OFFICE O/P EST MOD 30 MIN: CPT | Performed by: PHYSICIAN ASSISTANT

## 2020-07-09 ASSESSMENT — ENCOUNTER SYMPTOMS
BACK PAIN: 1
MYALGIAS: 1
NAUSEA: 0
DYSURIA: 0
ABDOMINAL PAIN: 0
CHILLS: 0
JOINT SWELLING: 0
NECK PAIN: 1
FREQUENCY: 0
FATIGUE: 0
ARTHRALGIAS: 0
VOMITING: 0
FEVER: 0
CONSTITUTIONAL NEGATIVE: 1
HEMATOCHEZIA: 0
NECK STIFFNESS: 0
DIARRHEA: 0
CONSTIPATION: 0
FLANK PAIN: 0
HEMATURIA: 0

## 2020-07-09 ASSESSMENT — MIFFLIN-ST. JEOR: SCORE: 1442.72

## 2020-07-09 ASSESSMENT — PAIN SCALES - GENERAL: PAINLEVEL: EXTREME PAIN (8)

## 2020-07-09 NOTE — PROGRESS NOTES
Subjective   Margo Inman is a 60 year old female who presents to clinic today for the following health issues:  HPI   Back and neck pain     Duration: [id number], neck pain [id number]        Specific cause: none that she can recall    Description:   Location of pain: L sided low back and R sided neck  Character of pain: sharp, dull ache and constant  Pain radiation:radiates back radiates into the left leg and neck radiates into the R arm  New numbness or weakness in legs, not attributed to pain:  Also reports numbness, tingling but no weakness.  No bladder or bowel dysfunction.  No swelling, redness, drainage or fevers.      Intensity: Currently 10/10    History:   Pain interferes with job: YES  History of back problems: recurrent self limited episodes of low back pain in the past.  S/p neck surgery.  Any previous MRI or X-rays: Yes--of her neck  Sees a specialist for back pain:  No  Therapies tried without relief: acetaminophen (Tylenol), cold and rest    Alleviating factors:   Improved by: none      Precipitating factors:  Worsened by: Lifting, Bending, Standing, Sitting, Walking and Coughing    Accompanying Signs & Symptoms:  Risk of Fracture:  None  Risk of Cauda Equina:  None  Risk of Infection:  None  Risk of Cancer:  None  Risk of Ankylosing Spondylitis:  Onset at age <35, male, AND morning back stiffness. no     Patient Active Problem List   Diagnosis     Hyperlipidemia LDL goal <130     Pseudogout     OA (OSTEOARTHRITIS) OF KNEE - bilateral     RLS (restless legs syndrome)     S/P total knee arthroplasty juan     Acute posthemorrhagic anemia     Muscle spasm     CTS (carpal tunnel syndrome) - bilateral     S/P carpal tunnel release     Trigger thumb     Essential hypertension with goal blood pressure less than 140/90     Tobacco dependency     Microscopic hematuria     Cervical high risk HPV (human papillomavirus) test positive     Radiculopathy of cervical spine     S/P cervical spinal fusion      Adjustment disorder with mixed anxiety and depressed mood     Papanicolaou smear of cervix with low grade squamous intraepithelial lesion (LGSIL)     Obesity (BMI 35.0-39.9) with comorbidity (H)     Diverticulosis of sigmoid colon     Past Surgical History:   Procedure Laterality Date     APPENDECTOMY       ARTHROSCOPY KNEE  9/16/2011    Procedure:ARTHROSCOPY KNEE; left knee arthroscopy with debridement, open lateral patellar spur excision; Surgeon:LUIS A MONTOYA; [facility]     C PART REMV FEMUR/PROX TIB/FIB  9/16/11    left, open lateral patellar bone spur excision     C TOTAL KNEE ARTHROPLASTY  1/17/14    Bilateral     COLONOSCOPY N/A 2/16/2016    Procedure: COLONOSCOPY;  Surgeon: Belem Khalil MD;  [facility]     COLONOSCOPY N/A 2/16/2016    Procedure: COMBINED COLONOSCOPY, SINGLE OR MULTIPLE BIOPSY/POLYPECTOMY BY BIOPSY;  Surgeon: Belem Khalil MD;  [facility]     COLONOSCOPY N/A 6/15/2020    Procedure: Colonoscopy, With Polypectomy And Biopsy;  Surgeon: Torres Gallegos DO;  [facility]     COLONOSCOPY WITH CO2 INSUFFLATION N/A 2/16/2016    Procedure: COLONOSCOPY WITH CO2 INSUFFLATION;  Surgeon: Belem Khalil MD;  [facility]     COLONOSCOPY WITH CO2 INSUFFLATION N/A 6/15/2020    Procedure: COLONOSCOPY, WITH CO2 INSUFFLATION;  Surgeon: Torres Gallegos DO;  [facility]     CYSTOSCOPY  2016    microscopic hematuria     DECOMPRESSION, FUSION CERVICAL ANTERIOR ONE LEVEL, COMBINED N/A 3/30/2017    Procedure: COMBINED DECOMPRESSION, FUSION CERVICAL ANTERIOR ONE LEVEL;  Surgeon: Joseph Klein MD;  [facility]: [facility]     ECTOPIC PREGNANCY SURGERY       ENT SURGERY       GYN SURGERY       HC INCISION TENDON SHEATH FINGER Left 7/11/14    Thumb TF release     HC KNEE SCOPE,MED/LAT MENISECTOMY  9/16/11    left, with partial medial menisectomy ONLY     HC REVISE MEDIAN N/CARPAL TUNNEL SURG Left 7/11/14    PRIMARY - not revision      ORTHOPEDIC SURGERY       RELEASE CARPAL TUNNEL  7/11/2014    Procedure: RELEASE CARPAL TUNNEL;  Surgeon: Rg Franco MD;  [facility]     RELEASE CARPAL TUNNEL Right 11/7/2014    Procedure: RELEASE CARPAL TUNNEL;  Surgeon: Rg Franco MD;  [facility]     RELEASE TRIGGER FINGER  7/11/2014    Procedure: RELEASE TRIGGER FINGER;  Surgeon: Rg Franco MD;  [facility]     RELEASE TRIGGER FINGER Right 11/7/2014    Procedure: RELEASE TRIGGER FINGER;  Surgeon: Rg Franco MD;  [facility]     tonsils         Social History     Tobacco Use     Smoking status: Current Every Day Smoker     Packs/day: 1.00     Years: 15.00     Pack years: 15.00     Types: Cigarettes     Smokeless tobacco: Never Used     Tobacco comment: Started Chantix   Substance Use Topics     Alcohol use: Yes     Comment: socially     Family History   Problem Relation Age of Onset     Breast Cancer Maternal Grandmother      Ovarian Cancer Maternal Grandmother      Cancer - colorectal Maternal Grandfather      Colon Cancer Maternal Grandfather      Prostate Cancer Maternal Grandfather      C.A.D. Father      C.A.D. Paternal Grandfather      Lung Cancer Sister      Colon Cancer Sister      Brain Cancer Sister          Current Outpatient Medications   Medication Sig Dispense Refill     acetaminophen (TYLENOL) 500 MG tablet Take 500-1,000 mg by mouth every 6 hours as needed.       atorvastatin (LIPITOR) 80 MG tablet TAKE 1 TABLET(80 MG) BY MOUTH DAILY 90 tablet 2     bisacodyl (DULCOLAX) 5 MG EC tablet Take 1 tablet (5 mg) by mouth See Admin Instructions -Day prior to procedure take 1 tablet bisacodyl at 12:00. 1 tablet 0     butalbital-acetaminophen-caffeine (FIORICET/ESGIC) -40 MG per tablet TAKE 1 TABLET BY MOUTH EVERY 4 HOURS AS NEEDED 28 tablet 0     cyclobenzaprine (FLEXERIL) 10 MG tablet TAKE 1 TABLET(10 MG) BY MOUTH EVERY NIGHT AS NEEDED FOR MUSCLE SPASMS 30 tablet 3     diclofenac (VOLTAREN)  75 MG EC tablet Take 1 tablet (75 mg) by mouth 2 times daily 60 tablet 11     losartan (COZAAR) 25 MG tablet Take 1 tablet (25 mg) by mouth daily 90 tablet 3     Na Sulfate-K Sulfate-Mg Sulf (SUPREP BOWEL PREP KIT) solution Take 177 mLs (1 Bottle) by mouth See Admin Instructions -Evening before procedure complete steps 1 through 4 (see package) using one 6 ounce bottle before bed.  Morning of procedure, repeat steps 1 through 4 using the other 6 ounce bottle. 2 Bottle 0     order for DME Equipment being ordered: Blood pressure cuff/machine 1 Device 0     order for DME Equipment being ordered: BP cuff/machine 1 Device 0     polyethylene glycol (GOLYTELY) 236 g suspension Take 4,000 mLs (4 L) by mouth See Admin Instructions -Mix GoLytely as directed on container.  At 6:00 PM, drink an 8 Oz glass of solution and continue drinking 8 Oz glass every 15 minutes until bottle is empty. 4 L 0     Simethicone 125 MG TABS Take 125 mg by mouth See Admin Instructions 1 tablet 0     Simethicone 125 MG TABS Take 125 mg by mouth See Admin Instructions -Take tablet after finishing second half of Suprep with half a glass of water. 1 tablet 0     varenicline (CHANTIX STARTING MONTH EVANGELINA) 0.5 MG X 11 & 1 MG X 42 tablet Take 0.5 mg tab daily for 3 days, then 0.5 mg tab twice daily for 4 days, then 1 mg twice daily. 53 tablet 0     varenicline (CHANTIX) 1 MG tablet Take 1 tablet (1 mg) by mouth 2 times daily 90 tablet 0     Allergies   Allergen Reactions     Wasp Venom Protein Anaphylaxis     Bee Anaphylaxis     Erythromycin Hives     Wasps [Hornets] Anaphylaxis     Reviewed and updated as needed this visit by Provider       Review of Systems   Constitutional: Negative.  Negative for chills, fatigue and fever.   Gastrointestinal: Negative for abdominal pain, constipation, diarrhea, hematochezia, nausea and vomiting.   Genitourinary: Negative.  Negative for dysuria, flank pain, frequency, hematuria and urgency.   Musculoskeletal: Positive  "for back pain, gait problem, myalgias and neck pain. Negative for arthralgias, joint swelling and neck stiffness.   Skin: Negative.    All other systems reviewed and are negative.           Objective    BP (!) 157/95 (BP Location: Left arm, Patient Position: Chair, Cuff Size: Adult Large)   Pulse 93   Temp 98.6  F (37  C) (Tympanic)   Ht 1.549 m (5' 1\")   Wt 92.5 kg (204 lb)   LMP 09/16/2011   SpO2 94%   Breastfeeding No   BMI 38.55 kg/m    Body mass index is 38.55 kg/m .  Physical Exam  Vitals signs and nursing note reviewed.   Constitutional:       General: She is in acute distress.      Appearance: Normal appearance. She is obese.   Neck:      Musculoskeletal: Normal range of motion. Pain with movement and muscular tenderness (R side) present. No erythema, neck rigidity or spinous process tenderness.   Cardiovascular:      Rate and Rhythm: Normal rate and regular rhythm.      Pulses: Normal pulses.      Heart sounds: Normal heart sounds, S1 normal and S2 normal. No murmur. No friction rub. No gallop.    Pulmonary:      Effort: Pulmonary effort is normal. No accessory muscle usage, respiratory distress or retractions.      Breath sounds: Normal breath sounds. No decreased breath sounds, wheezing, rhonchi or rales.   Abdominal:      General: Abdomen is flat. Bowel sounds are normal.      Palpations: Abdomen is soft. There is no mass.      Tenderness: There is no abdominal tenderness. There is no right CVA tenderness, left CVA tenderness, guarding or rebound. Negative signs include Dukes's sign and McBurney's sign.      Hernia: No hernia is present.   Musculoskeletal:      Lumbar back: She exhibits decreased range of motion, tenderness, bony tenderness and spasm (L side). She exhibits no swelling, no edema, no deformity, no laceration and normal pulse.   Skin:     General: Skin is warm and dry.      Capillary Refill: Capillary refill takes less than 2 seconds.      Comments: Distal pulses are 2+ and " symmetric.  No peripheral edema.   Neurological:      General: No focal deficit present.      Mental Status: She is alert and oriented to person, place, and time.      Sensory: Sensation is intact.      Motor: Motor function is intact.      Gait: Gait abnormal.      Deep Tendon Reflexes: Reflexes are normal and symmetric.      Comments: Positive SLR test over the LLE at about 40degrees of elevation.   Psychiatric:         Mood and Affect: Mood normal.         Behavior: Behavior normal.         Thought Content: Thought content normal.         Judgment: Judgment normal.              Assessment & Plan   Severe low back pain:  Along with lumbar and cervical radiculopathy and hx of neck surgery.  Due to the severity of her pain,   recommend further evaluation and management in the ER.  Will most likely need further workup with labs, imaging and pain control.  Patient has declined transportation via ambulance and will have family drive her.  Understands risks and benefits of ambulance transfer and she has declined.  Call 911 if worsening symptoms.  She plans to go to [place] ER.  She left in stable condition with AVS in hand.  F/u with PCP after ER visit.     Lumbar radiculopathy    Cervical radiculopathy    History of neck surgery        Ingris See OLIVIA Chery  [facility]

## 2020-07-09 NOTE — PROGRESS NOTES
"Subjective   Margo Inman is a 60 year old female who presents to clinic today for the following health issues:  HPI   Musculoskeletal problem/pain    Duration: ***    Description  Location: ***    Intensity:  {mild,moderate,severe:790824}    Accompanying signs and symptoms: {OTHER MS SYMPTOMS:956024::\"none\"}    History  Previous similar problem: { :348595}  Previous evaluation:  {PREVIOUS ms evaluation:357889::\"none\"}    Precipitating or alleviating factors:  Trauma or overuse: { :908271}  Aggravating factors include: {AGGRAVATING MS FACTORS CHRONIC PROB:441964::\"none\"}    Therapies tried and outcome: {MS RELIEF ITEMS:425342::\"nothing\"}    Patient Active Problem List   Diagnosis     Hyperlipidemia LDL goal <130     Pseudogout     OA (OSTEOARTHRITIS) OF KNEE - bilateral     RLS (restless legs syndrome)     S/P total knee arthroplasty juan     Acute posthemorrhagic anemia     Muscle spasm     CTS (carpal tunnel syndrome) - bilateral     S/P carpal tunnel release     Trigger thumb     Essential hypertension with goal blood pressure less than 140/90     Tobacco dependency     Microscopic hematuria     Cervical high risk HPV (human papillomavirus) test positive     Radiculopathy of cervical spine     S/P cervical spinal fusion     Adjustment disorder with mixed anxiety and depressed mood     Papanicolaou smear of cervix with low grade squamous intraepithelial lesion (LGSIL)     Obesity (BMI 35.0-39.9) with comorbidity (H)     Diverticulosis of sigmoid colon     Past Surgical History:   Procedure Laterality Date     APPENDECTOMY       ARTHROSCOPY KNEE  9/16/2011    Procedure:ARTHROSCOPY KNEE; left knee arthroscopy with debridement, open lateral patellar spur excision; Surgeon:LUIS A MONTOYA; [facility]     C PART REMV FEMUR/PROX TIB/FIB  9/16/11    left, open lateral patellar bone spur excision     C TOTAL KNEE ARTHROPLASTY  1/17/14    Bilateral     COLONOSCOPY N/A 2/16/2016    Procedure: COLONOSCOPY;  " Surgeon: Belem Khalil MD;  [facility]     COLONOSCOPY N/A 2/16/2016    Procedure: COMBINED COLONOSCOPY, SINGLE OR MULTIPLE BIOPSY/POLYPECTOMY BY BIOPSY;  Surgeon: Belem Khalil MD;  [facility]     COLONOSCOPY N/A 6/15/2020    Procedure: Colonoscopy, With Polypectomy And Biopsy;  Surgeon: Torres Gallegos DO;  [facility]     COLONOSCOPY WITH CO2 INSUFFLATION N/A 2/16/2016    Procedure: COLONOSCOPY WITH CO2 INSUFFLATION;  Surgeon: Belem Khalil MD;  [facility]     COLONOSCOPY WITH CO2 INSUFFLATION N/A 6/15/2020    Procedure: COLONOSCOPY, WITH CO2 INSUFFLATION;  Surgeon: Torres Gallegos DO;  [facility]     CYSTOSCOPY  2016    microscopic hematuria     DECOMPRESSION, FUSION CERVICAL ANTERIOR ONE LEVEL, COMBINED N/A 3/30/2017    Procedure: COMBINED DECOMPRESSION, FUSION CERVICAL ANTERIOR ONE LEVEL;  Surgeon: Joseph Klein MD;  [facility]: [facility]     ECTOPIC PREGNANCY SURGERY       ENT SURGERY       GYN SURGERY       HC INCISION TENDON SHEATH FINGER Left 7/11/14    Thumb TF release     HC KNEE SCOPE,MED/LAT MENISECTOMY  9/16/11    left, with partial medial menisectomy ONLY     HC REVISE MEDIAN N/CARPAL TUNNEL SURG Left 7/11/14    PRIMARY - not revision     ORTHOPEDIC SURGERY       RELEASE CARPAL TUNNEL  7/11/2014    Procedure: RELEASE CARPAL TUNNEL;  Surgeon: Rg Franco MD;  [facility]     RELEASE CARPAL TUNNEL Right 11/7/2014    Procedure: RELEASE CARPAL TUNNEL;  Surgeon: Rg Franco MD;  [facility]     RELEASE TRIGGER FINGER  7/11/2014    Procedure: RELEASE TRIGGER FINGER;  Surgeon: Rg Franco MD;  [facility]     RELEASE TRIGGER FINGER Right 11/7/2014    Procedure: RELEASE TRIGGER FINGER;  Surgeon: Rg Franco MD;  [facility]: [facility]     tonsils         Social History     Tobacco Use     Smoking status: Current Every Day Smoker     Packs/day: 1.00     Years: 15.00     Pack years:  15.00     Types: Cigarettes     Smokeless tobacco: Never Used     Tobacco comment: Started Chantix   Substance Use Topics     Alcohol use: Yes     Comment: socially     Family History   Problem Relation Age of Onset     Breast Cancer Maternal Grandmother      Ovarian Cancer Maternal Grandmother      Cancer - colorectal Maternal Grandfather      Colon Cancer Maternal Grandfather      Prostate Cancer Maternal Grandfather      C.A.D. Father      C.A.D. Paternal Grandfather      Lung Cancer Sister      Colon Cancer Sister      Brain Cancer Sister          Current Outpatient Medications   Medication Sig Dispense Refill     acetaminophen (TYLENOL) 500 MG tablet Take 500-1,000 mg by mouth every 6 hours as needed.       atorvastatin (LIPITOR) 80 MG tablet TAKE 1 TABLET(80 MG) BY MOUTH DAILY 90 tablet 2     bisacodyl (DULCOLAX) 5 MG EC tablet Take 1 tablet (5 mg) by mouth See Admin Instructions -Day prior to procedure take 1 tablet bisacodyl at 12:00. 1 tablet 0     butalbital-acetaminophen-caffeine (FIORICET/ESGIC) -40 MG per tablet TAKE 1 TABLET BY MOUTH EVERY 4 HOURS AS NEEDED 28 tablet 0     cyclobenzaprine (FLEXERIL) 10 MG tablet TAKE 1 TABLET(10 MG) BY MOUTH EVERY NIGHT AS NEEDED FOR MUSCLE SPASMS 30 tablet 3     diclofenac (VOLTAREN) 75 MG EC tablet Take 1 tablet (75 mg) by mouth 2 times daily 60 tablet 11     losartan (COZAAR) 25 MG tablet Take 1 tablet (25 mg) by mouth daily 90 tablet 3     Na Sulfate-K Sulfate-Mg Sulf (SUPREP BOWEL PREP KIT) solution Take 177 mLs (1 Bottle) by mouth See Admin Instructions -Evening before procedure complete steps 1 through 4 (see package) using one 6 ounce bottle before bed.  Morning of procedure, repeat steps 1 through 4 using the other 6 ounce bottle. 2 Bottle 0     order for DME Equipment being ordered: Blood pressure cuff/machine 1 Device 0     order for DME Equipment being ordered: BP cuff/machine 1 Device 0     polyethylene glycol (GOLYTELY) 236 g suspension Take 4,000  "mLs (4 L) by mouth See Admin Instructions -Mix GoLytely as directed on container.  At 6:00 PM, drink an 8 Oz glass of solution and continue drinking 8 Oz glass every 15 minutes until bottle is empty. 4 L 0     Simethicone 125 MG TABS Take 125 mg by mouth See Admin Instructions 1 tablet 0     Simethicone 125 MG TABS Take 125 mg by mouth See Admin Instructions -Take tablet after finishing second half of Suprep with half a glass of water. 1 tablet 0     varenicline (CHANTIX STARTING MONTH EVANGELINA) 0.5 MG X 11 & 1 MG X 42 tablet Take 0.5 mg tab daily for 3 days, then 0.5 mg tab twice daily for 4 days, then 1 mg twice daily. 53 tablet 0     varenicline (CHANTIX) 1 MG tablet Take 1 tablet (1 mg) by mouth 2 times daily 90 tablet 0     Allergies   Allergen Reactions     Wasp Venom Protein Anaphylaxis     Bee Anaphylaxis     Erythromycin Hives     Wasps [Hornets] Anaphylaxis     Reviewed and updated as needed this visit by Provider         Review of Systems         Objective    BP (!) 157/95 (BP Location: Left arm, Patient Position: Chair, Cuff Size: Adult Large)   Pulse 93   Temp 98.6  F (37  C) (Tympanic)   Ht 1.549 m (5' 1\")   Wt 92.5 kg (204 lb)   LMP 09/16/2011   SpO2 94%   Breastfeeding No   BMI 38.55 kg/m    Body mass index is 38.55 kg/m .  Physical Exam       {Diagnostic Test Results (Optional):506147::\"Diagnostic Test Results:\",\"Labs reviewed in Epic\"}        {PROVIDER CHARTING PREFERENCE:003736}      "

## 2020-07-12 DIAGNOSIS — E78.5 HYPERLIPIDEMIA LDL GOAL <130: ICD-10-CM

## 2020-07-15 RX ORDER — ATORVASTATIN CALCIUM 80 MG/1
TABLET, FILM COATED ORAL
Qty: 90 TABLET | Refills: 1 | Status: SHIPPED | OUTPATIENT
Start: 2020-07-15 | End: 2021-03-03

## 2020-07-15 NOTE — TELEPHONE ENCOUNTER
Prescription approved per [facility] Refill Protocol.   atorvastatin.    Dana Begum RN, [facility] Triage

## 2020-07-22 ENCOUNTER — TRANSFERRED RECORDS (OUTPATIENT)
Dept: HEALTH INFORMATION MANAGEMENT | Facility: CLINIC | Age: 60
End: 2020-07-22

## 2020-08-12 DIAGNOSIS — Z71.6 ENCOUNTER FOR SMOKING CESSATION COUNSELING: ICD-10-CM

## 2020-08-17 NOTE — TELEPHONE ENCOUNTER
Routing refill request to provider for review/approval because:    Blood pressure not under 140/90 in past 12 mos    Yamia Frey RN

## 2020-09-02 RX ORDER — VARENICLINE TARTRATE 1 MG/1
1 TABLET, FILM COATED ORAL 2 TIMES DAILY
Qty: 60 TABLET | Refills: 0 | Status: SHIPPED | OUTPATIENT
Start: 2020-09-02 | End: 2022-01-12

## 2020-09-02 NOTE — TELEPHONE ENCOUNTER
Called and scheduled a Ancillary blood pressure appt on 9/30/2020.  Teri Olvera [facility]  2nd Floor  Primary Care

## 2020-09-02 NOTE — TELEPHONE ENCOUNTER
Refilled but due for BP check.  Pls have her come within the month for this.    Belem ODELL, CNP

## 2020-09-10 DIAGNOSIS — Z98.1 S/P CERVICAL SPINAL FUSION: ICD-10-CM

## 2020-09-11 RX ORDER — CYCLOBENZAPRINE HCL 10 MG
TABLET ORAL
Qty: 30 TABLET | Refills: 3 | Status: SHIPPED | OUTPATIENT
Start: 2020-09-11 | End: 2021-01-20

## 2020-09-16 ENCOUNTER — THERAPY VISIT (OUTPATIENT)
Dept: PHYSICAL THERAPY | Facility: CLINIC | Age: 60
End: 2020-09-16
Payer: COMMERCIAL

## 2020-09-16 DIAGNOSIS — M25.511 ACUTE PAIN OF RIGHT SHOULDER: ICD-10-CM

## 2020-09-16 DIAGNOSIS — M54.2 CERVICAL PAIN: ICD-10-CM

## 2020-09-16 PROCEDURE — 97112 NEUROMUSCULAR REEDUCATION: CPT | Mod: GP | Performed by: PHYSICAL THERAPIST

## 2020-09-16 PROCEDURE — 97110 THERAPEUTIC EXERCISES: CPT | Mod: GP | Performed by: PHYSICAL THERAPIST

## 2020-09-16 PROCEDURE — 97161 PT EVAL LOW COMPLEX 20 MIN: CPT | Mod: GP | Performed by: PHYSICAL THERAPIST
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
## 2020-10-01 ENCOUNTER — THERAPY VISIT (OUTPATIENT)
Dept: PHYSICAL THERAPY | Facility: CLINIC | Age: 60
End: 2020-10-01
Payer: COMMERCIAL

## 2020-10-01 DIAGNOSIS — M54.2 CERVICAL PAIN: ICD-10-CM

## 2020-10-01 DIAGNOSIS — M25.511 ACUTE PAIN OF RIGHT SHOULDER: ICD-10-CM

## 2020-10-01 PROCEDURE — 97140 MANUAL THERAPY 1/> REGIONS: CPT | Mod: GP | Performed by: PHYSICAL THERAPIST

## 2020-10-01 PROCEDURE — 97112 NEUROMUSCULAR REEDUCATION: CPT | Mod: GP | Performed by: PHYSICAL THERAPIST

## 2020-10-01 PROCEDURE — 97110 THERAPEUTIC EXERCISES: CPT | Mod: GP | Performed by: PHYSICAL THERAPIST

## 2020-11-03 ENCOUNTER — OFFICE VISIT (OUTPATIENT)
Dept: FAMILY MEDICINE | Facility: CLINIC | Age: 60
End: 2020-11-03
Payer: COMMERCIAL

## 2020-11-03 VITALS
RESPIRATION RATE: 16 BRPM | OXYGEN SATURATION: 95 % | SYSTOLIC BLOOD PRESSURE: 131 MMHG | HEART RATE: 87 BPM | DIASTOLIC BLOOD PRESSURE: 86 MMHG | WEIGHT: 211.4 LBS | BODY MASS INDEX: 39.94 KG/M2 | TEMPERATURE: 97.8 F

## 2020-11-03 DIAGNOSIS — S49.91XD INJURY OF RIGHT SHOULDER, SUBSEQUENT ENCOUNTER: ICD-10-CM

## 2020-11-03 DIAGNOSIS — Z01.818 PRE-OPERATIVE EXAMINATION: Primary | ICD-10-CM

## 2020-11-03 DIAGNOSIS — E66.01 MORBID OBESITY (H): ICD-10-CM

## 2020-11-03 DIAGNOSIS — E78.5 HYPERLIPIDEMIA LDL GOAL <130: ICD-10-CM

## 2020-11-03 DIAGNOSIS — I10 ESSENTIAL HYPERTENSION WITH GOAL BLOOD PRESSURE LESS THAN 140/90: ICD-10-CM

## 2020-11-03 DIAGNOSIS — F17.200 TOBACCO DEPENDENCY: ICD-10-CM

## 2020-11-03 LAB
ANION GAP SERPL CALCULATED.3IONS-SCNC: 8 MMOL/L (ref 3–14)
BUN SERPL-MCNC: 14 MG/DL (ref 7–30)
CALCIUM SERPL-MCNC: 9.5 MG/DL (ref 8.5–10.1)
CHLORIDE SERPL-SCNC: 106 MMOL/L (ref 94–109)
CO2 SERPL-SCNC: 26 MMOL/L (ref 20–32)
CREAT SERPL-MCNC: 0.5 MG/DL (ref 0.52–1.04)
GFR SERPL CREATININE-BSD FRML MDRD: >90 ML/MIN/{1.73_M2}
GLUCOSE SERPL-MCNC: 101 MG/DL (ref 70–99)
HGB BLD-MCNC: 13.1 G/DL (ref 11.7–15.7)
POTASSIUM SERPL-SCNC: 3.8 MMOL/L (ref 3.4–5.3)
SODIUM SERPL-SCNC: 140 MMOL/L (ref 133–144)

## 2020-11-03 PROCEDURE — 93000 ELECTROCARDIOGRAM COMPLETE: CPT | Performed by: NURSE PRACTITIONER

## 2020-11-03 PROCEDURE — 36415 COLL VENOUS BLD VENIPUNCTURE: CPT | Performed by: NURSE PRACTITIONER

## 2020-11-03 PROCEDURE — 80048 BASIC METABOLIC PNL TOTAL CA: CPT | Performed by: NURSE PRACTITIONER

## 2020-11-03 PROCEDURE — 85018 HEMOGLOBIN: CPT | Performed by: NURSE PRACTITIONER

## 2020-11-03 PROCEDURE — 99214 OFFICE O/P EST MOD 30 MIN: CPT | Performed by: NURSE PRACTITIONER

## 2020-11-03 RX ORDER — ERGOCALCIFEROL 1.25 MG/1
CAPSULE ORAL
COMMUNITY
Start: 2020-07-20 | End: 2022-01-12

## 2020-11-03 RX ORDER — TIZANIDINE 2 MG/1
2-4 TABLET ORAL
COMMUNITY
Start: 2020-10-30 | End: 2023-10-10

## 2020-11-03 RX ORDER — ALBUTEROL SULFATE 90 UG/1
2 AEROSOL, METERED RESPIRATORY (INHALATION)
COMMUNITY
End: 2022-01-12

## 2020-11-03 RX ORDER — OXYCODONE HYDROCHLORIDE 5 MG/1
5 TABLET ORAL
COMMUNITY
Start: 2020-10-30 | End: 2021-05-28

## 2020-11-03 RX ORDER — IBUPROFEN 200 MG
CAPSULE ORAL
COMMUNITY
Start: 2020-07-20 | End: 2022-01-12

## 2020-11-03 RX ORDER — CALCIUM CARBONATE 160(400)MG
TABLET,CHEWABLE ORAL
COMMUNITY
Start: 2020-07-11 | End: 2023-10-23

## 2020-11-03 RX ORDER — DOCUSATE SODIUM 100 MG/1
CAPSULE, LIQUID FILLED ORAL
COMMUNITY
Start: 2020-07-20 | End: 2021-05-28

## 2020-11-03 ASSESSMENT — PAIN SCALES - GENERAL: PAINLEVEL: SEVERE PAIN (7)

## 2020-11-03 NOTE — LETTER
November 4, 2020      Margo Inman  [address] [facility] [id number]        Dear ,    Your lab results were normal and at your baseline. Please let us know if you have any questions.     Thank you for allowing us to participate in your care.     Best of luck with your surgery,       JOSE R Condon CNP     Results for orders placed or performed in visit on 11/03/20   Basic metabolic panel  (Ca, Cl, CO2, Creat, Gluc, K, Na, BUN)     Status: Abnormal   Result Value Ref Range    Sodium 140 133 - 144 mmol/L    Potassium 3.8 3.4 - 5.3 mmol/L    Chloride 106 94 - 109 mmol/L    Carbon Dioxide 26 20 - 32 mmol/L    Anion Gap 8 3 - 14 mmol/L    Glucose 101 (H) 70 - 99 mg/dL    Urea Nitrogen 14 7 - 30 mg/dL    Creatinine 0.50 (L) 0.52 - 1.04 mg/dL    GFR Estimate >90 >60 mL/min/[1.73_m2]    GFR Estimate If Black >90 >60 mL/min/[1.73_m2]    Calcium 9.5 8.5 - 10.1 mg/dL   Hemoglobin     Status: None   Result Value Ref Range    Hemoglobin 13.1 11.7 - 15.7 g/dL        Patient
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
## 2020-11-04 NOTE — RESULT ENCOUNTER NOTE
Please send letter:    Margo,  Your lab results were normal and at your baseline. Please let us know if you have any questions.  Thank you for allowing us to participate in your care.  Best of luck with your surgery,  JOSE R Condon CNP

## 2020-11-04 NOTE — PROGRESS NOTES
Pre op note, EKG, labs faxed to [facility] [place], Dr Smart, [phone], on November 4, 2020

## 2020-12-08 PROBLEM — M54.2 CERVICAL PAIN: Status: RESOLVED | Noted: 2020-09-16 | Resolved: 2020-12-08

## 2020-12-08 PROBLEM — M25.511 ACUTE PAIN OF RIGHT SHOULDER: Status: RESOLVED | Noted: 2020-09-16 | Resolved: 2020-12-08

## 2020-12-08 NOTE — PROGRESS NOTES
Discharge Note    Progress reporting period is from initial evaluation date (please see noted date below) to Oct 1, 2020.  Linked Episodes   Type: Episode: Status: Noted: Resolved: Last update: Updated by:   PHYSICAL THERAPY R shoulder, neck 9/16/2020 Active 9/16/2020  10/1/2020  3:42 PM Shaquille Mendez, PT      Comments:       Margo failed to follow up and current status is unknown.  Please see information below for last relevant information on current status.  Patient seen for 2 visits.    SUBJECTIVE  Subjective changes noted by patient:  Pt had MD appointment with Dr. Smart on 9/24/2020. Dr Smart recommends rotator cuff repair. Pt is scheduled for R shoulder surgery on 11/6/2020. Pt also had cortisone injection for shoulder pain with minimal change in pain. Pt complains of burning into R hand. Noticing improvement in cervical ROM. Continues to sleep in reclining chair.   .  Current pain level is 5/10(5/10 cervical, 7/10 R shoulder).     Previous pain level was  6/10.   Changes in function:  Yes (See Goal flowsheet attached for changes in current functional level)  Adverse reaction to treatment or activity: None    OBJECTIVE  Changes noted in objective findings: Cervical AROM: extension 25%, flexion 75%, L rotation 40 deg, R rotation 45 deg. R shoulder AROM: flexion 65 deg, abduction 40 deg, ER 20 deg     ASSESSMENT/PLAN  Diagnosis: Cervical pain   Updated problem list and treatment plan:   Pain - HEP  Decreased ROM/flexibility - HEP  Decreased strength - HEP  Impaired muscle performance - HEP  Impaired posture - HEP  STG/LTGs have been met or progress has been made towards goals:  Yes, please see goal flowsheet for most current information  Assessment of Progress: current status is unknown.    Last current status: Pt is progressing as expected   Self Management Plans:  HEP  I have re-evaluated this patient and find that the nature, scope, duration and intensity of the therapy is appropriate for the medical condition  of the patient.  Margo continues to require the following intervention to meet STG and LTG's:  HEP.    Recommendations:  Discharge with current home program.  Patient to follow up with MD as needed.    Please refer to the daily flowsheet for treatment today, total treatment time and time spent performing 1:1 timed codes.

## 2021-01-17 DIAGNOSIS — Z98.1 S/P CERVICAL SPINAL FUSION: ICD-10-CM

## 2021-01-17 NOTE — LETTER
January 20, 2021      Margo Inman  [address] [address] [address]        Dear ,    At [facility] we care about your health and are committed to providing quality patient care. Regular appointments are a vital part of the care and management of your health and can help prevent many of the complications that can occur.       It has come to our attention that you have been given a one time refill of cyclobenzaprine (FLEXERIL) 10 MG tablet and that you are due for a visit with your provider for further refills.  Please call [facility] at [phone] or use [employer] to schedule your appointment.       Sincerely,   [facility] Care Team

## 2021-01-20 RX ORDER — CYCLOBENZAPRINE HCL 10 MG
TABLET ORAL
Qty: 30 TABLET | Refills: 1 | Status: SHIPPED | OUTPATIENT
Start: 2021-01-20 | End: 2021-03-18

## 2021-02-16 DIAGNOSIS — I10 ESSENTIAL HYPERTENSION WITH GOAL BLOOD PRESSURE LESS THAN 140/90: ICD-10-CM

## 2021-02-16 NOTE — TELEPHONE ENCOUNTER
Routing refill request to provider for review/approval because:  Labs out of range:    Creatinine   Date Value Ref Range Status   11/03/2020 0.50 (L) 0.52 - 1.04 mg/dL Final     Alisa Mariscal RN

## 2021-02-17 RX ORDER — LOSARTAN POTASSIUM 25 MG/1
TABLET ORAL
Qty: 90 TABLET | Refills: 1 | Status: SHIPPED | OUTPATIENT
Start: 2021-02-17 | End: 2021-08-18

## 2021-03-03 DIAGNOSIS — E78.5 HYPERLIPIDEMIA LDL GOAL <130: ICD-10-CM

## 2021-03-03 RX ORDER — ATORVASTATIN CALCIUM 80 MG/1
TABLET, FILM COATED ORAL
Qty: 90 TABLET | Refills: 0 | Status: SHIPPED | OUTPATIENT
Start: 2021-03-03 | End: 2021-05-28

## 2021-03-03 NOTE — TELEPHONE ENCOUNTER
Prescription approved per [facility] Refill Protocol.  Due for fasting labs, May.  Alisa Mariscal RN

## 2021-03-18 DIAGNOSIS — Z98.1 S/P CERVICAL SPINAL FUSION: ICD-10-CM

## 2021-03-18 RX ORDER — CYCLOBENZAPRINE HCL 10 MG
TABLET ORAL
Qty: 30 TABLET | Refills: 1 | Status: SHIPPED | OUTPATIENT
Start: 2021-03-18 | End: 2021-05-21

## 2021-03-18 NOTE — TELEPHONE ENCOUNTER
Routing refill request to provider for review/approval because:  Drug not on the [facility] refill protocol     Faye NULLN, RN

## 2021-04-14 DIAGNOSIS — Z98.1 S/P CERVICAL SPINAL FUSION: ICD-10-CM

## 2021-04-14 DIAGNOSIS — Z96.653 STATUS POST TOTAL BILATERAL KNEE REPLACEMENT: ICD-10-CM

## 2021-04-14 NOTE — TELEPHONE ENCOUNTER
Routing refill request to provider for review/approval because:  Labs not current:  CBC, creatinine    Faye Campoverde BSN, RN

## 2021-04-15 ENCOUNTER — THERAPY VISIT (OUTPATIENT)
Dept: PHYSICAL THERAPY | Facility: CLINIC | Age: 61
End: 2021-04-15
Payer: COMMERCIAL

## 2021-04-15 DIAGNOSIS — M25.511 CHRONIC RIGHT SHOULDER PAIN: ICD-10-CM

## 2021-04-15 DIAGNOSIS — Z47.89 AFTERCARE FOLLOWING SURGERY OF THE MUSCULOSKELETAL SYSTEM, NEC: ICD-10-CM

## 2021-04-15 DIAGNOSIS — G89.29 CHRONIC RIGHT SHOULDER PAIN: ICD-10-CM

## 2021-04-15 PROCEDURE — 97161 PT EVAL LOW COMPLEX 20 MIN: CPT | Mod: GP | Performed by: PHYSICAL THERAPIST

## 2021-04-15 PROCEDURE — 97110 THERAPEUTIC EXERCISES: CPT | Mod: GP | Performed by: PHYSICAL THERAPIST

## 2021-04-15 RX ORDER — DICLOFENAC SODIUM 75 MG/1
TABLET, DELAYED RELEASE ORAL
Qty: 180 TABLET | Refills: 0 | Status: SHIPPED | OUTPATIENT
Start: 2021-04-15 | End: 2021-10-29
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
## 2021-04-15 NOTE — TELEPHONE ENCOUNTER
Called and talk to pt and got a lab appointment scheduled. Please make sure orders for labs are placed for 4/22 lab appointment

## 2021-04-20 ENCOUNTER — DOCUMENTATION ONLY (OUTPATIENT)
Dept: LAB | Facility: CLINIC | Age: 61
End: 2021-04-20

## 2021-04-20 DIAGNOSIS — E55.9 VITAMIN D DEFICIENCY: ICD-10-CM

## 2021-04-20 DIAGNOSIS — E78.5 HYPERLIPIDEMIA LDL GOAL <130: ICD-10-CM

## 2021-04-20 DIAGNOSIS — I10 ESSENTIAL HYPERTENSION WITH GOAL BLOOD PRESSURE LESS THAN 140/90: Primary | ICD-10-CM

## 2021-04-20 DIAGNOSIS — G25.81 RLS (RESTLESS LEGS SYNDROME): ICD-10-CM

## 2021-04-20 NOTE — PROGRESS NOTES
Patient has an up coming lab appointment on 4.21.2021. Please review and place future orders that may be needed.     Thank you  Mandi Mistry MLT ([facility])

## 2021-04-27 ENCOUNTER — THERAPY VISIT (OUTPATIENT)
Dept: PHYSICAL THERAPY | Facility: CLINIC | Age: 61
End: 2021-04-27
Payer: COMMERCIAL

## 2021-04-27 DIAGNOSIS — G89.29 CHRONIC RIGHT SHOULDER PAIN: ICD-10-CM

## 2021-04-27 DIAGNOSIS — Z47.89 AFTERCARE FOLLOWING SURGERY OF THE MUSCULOSKELETAL SYSTEM, NEC: ICD-10-CM

## 2021-04-27 DIAGNOSIS — I10 ESSENTIAL HYPERTENSION WITH GOAL BLOOD PRESSURE LESS THAN 140/90: ICD-10-CM

## 2021-04-27 DIAGNOSIS — E55.9 VITAMIN D DEFICIENCY: ICD-10-CM

## 2021-04-27 DIAGNOSIS — M25.511 CHRONIC RIGHT SHOULDER PAIN: ICD-10-CM

## 2021-04-27 DIAGNOSIS — G25.81 RLS (RESTLESS LEGS SYNDROME): ICD-10-CM

## 2021-04-27 DIAGNOSIS — E78.5 HYPERLIPIDEMIA LDL GOAL <130: ICD-10-CM

## 2021-04-27 LAB
ALBUMIN SERPL-MCNC: 4.1 G/DL (ref 3.4–5)
ALP SERPL-CCNC: 106 U/L (ref 40–150)
ALT SERPL W P-5'-P-CCNC: 49 U/L (ref 0–50)
ANION GAP SERPL CALCULATED.3IONS-SCNC: 5 MMOL/L (ref 3–14)
AST SERPL W P-5'-P-CCNC: 21 U/L (ref 0–45)
BILIRUB SERPL-MCNC: 0.4 MG/DL (ref 0.2–1.3)
BUN SERPL-MCNC: 11 MG/DL (ref 7–30)
CALCIUM SERPL-MCNC: 9.3 MG/DL (ref 8.5–10.1)
CHLORIDE SERPL-SCNC: 102 MMOL/L (ref 94–109)
CHOLEST SERPL-MCNC: 257 MG/DL
CO2 SERPL-SCNC: 30 MMOL/L (ref 20–32)
CREAT SERPL-MCNC: 0.72 MG/DL (ref 0.52–1.04)
GFR SERPL CREATININE-BSD FRML MDRD: >90 ML/MIN/{1.73_M2}
GLUCOSE SERPL-MCNC: 108 MG/DL (ref 70–99)
HDLC SERPL-MCNC: 36 MG/DL
HGB BLD-MCNC: 14.3 G/DL (ref 11.7–15.7)
LDLC SERPL CALC-MCNC: 169 MG/DL
NONHDLC SERPL-MCNC: 221 MG/DL
POTASSIUM SERPL-SCNC: 4.2 MMOL/L (ref 3.4–5.3)
PROT SERPL-MCNC: 7.7 G/DL (ref 6.8–8.8)
SODIUM SERPL-SCNC: 137 MMOL/L (ref 133–144)
TRIGL SERPL-MCNC: 261 MG/DL

## 2021-04-27 PROCEDURE — 80061 LIPID PANEL: CPT | Performed by: NURSE PRACTITIONER

## 2021-04-27 PROCEDURE — 82306 VITAMIN D 25 HYDROXY: CPT | Performed by: NURSE PRACTITIONER

## 2021-04-27 PROCEDURE — 36415 COLL VENOUS BLD VENIPUNCTURE: CPT | Performed by: NURSE PRACTITIONER

## 2021-04-27 PROCEDURE — 97110 THERAPEUTIC EXERCISES: CPT | Mod: GP | Performed by: PHYSICAL THERAPIST

## 2021-04-27 PROCEDURE — 80053 COMPREHEN METABOLIC PANEL: CPT | Performed by: NURSE PRACTITIONER

## 2021-04-27 PROCEDURE — 85018 HEMOGLOBIN: CPT | Performed by: NURSE PRACTITIONER

## 2021-04-27 PROCEDURE — 97140 MANUAL THERAPY 1/> REGIONS: CPT | Mod: GP | Performed by: PHYSICAL THERAPIST

## 2021-04-28 LAB — DEPRECATED CALCIDIOL+CALCIFEROL SERPL-MC: 22 UG/L (ref 20–75)

## 2021-04-29 ENCOUNTER — THERAPY VISIT (OUTPATIENT)
Dept: PHYSICAL THERAPY | Facility: CLINIC | Age: 61
End: 2021-04-29
Payer: COMMERCIAL

## 2021-04-29 DIAGNOSIS — M25.511 CHRONIC RIGHT SHOULDER PAIN: ICD-10-CM

## 2021-04-29 DIAGNOSIS — Z47.89 AFTERCARE FOLLOWING SURGERY OF THE MUSCULOSKELETAL SYSTEM, NEC: ICD-10-CM

## 2021-04-29 DIAGNOSIS — G89.29 CHRONIC RIGHT SHOULDER PAIN: ICD-10-CM

## 2021-04-29 PROCEDURE — 97140 MANUAL THERAPY 1/> REGIONS: CPT | Mod: GP | Performed by: PHYSICAL THERAPIST

## 2021-04-29 PROCEDURE — 97110 THERAPEUTIC EXERCISES: CPT | Mod: GP | Performed by: PHYSICAL THERAPIST

## 2021-05-05 ENCOUNTER — THERAPY VISIT (OUTPATIENT)
Dept: PHYSICAL THERAPY | Facility: CLINIC | Age: 61
End: 2021-05-05
Payer: COMMERCIAL

## 2021-05-05 DIAGNOSIS — G89.29 CHRONIC RIGHT SHOULDER PAIN: ICD-10-CM

## 2021-05-05 DIAGNOSIS — M25.511 CHRONIC RIGHT SHOULDER PAIN: ICD-10-CM

## 2021-05-05 DIAGNOSIS — Z47.89 AFTERCARE FOLLOWING SURGERY OF THE MUSCULOSKELETAL SYSTEM, NEC: ICD-10-CM

## 2021-05-05 PROCEDURE — 97110 THERAPEUTIC EXERCISES: CPT | Mod: GP

## 2021-05-05 PROCEDURE — 97140 MANUAL THERAPY 1/> REGIONS: CPT | Mod: GP

## 2021-05-07 ENCOUNTER — THERAPY VISIT (OUTPATIENT)
Dept: PHYSICAL THERAPY | Facility: CLINIC | Age: 61
End: 2021-05-07
Payer: COMMERCIAL

## 2021-05-07 DIAGNOSIS — G89.29 CHRONIC RIGHT SHOULDER PAIN: ICD-10-CM

## 2021-05-07 DIAGNOSIS — Z47.89 AFTERCARE FOLLOWING SURGERY OF THE MUSCULOSKELETAL SYSTEM, NEC: ICD-10-CM

## 2021-05-07 DIAGNOSIS — M25.511 CHRONIC RIGHT SHOULDER PAIN: ICD-10-CM

## 2021-05-07 PROCEDURE — 97140 MANUAL THERAPY 1/> REGIONS: CPT | Mod: GP | Performed by: PHYSICAL THERAPIST

## 2021-05-07 PROCEDURE — 97112 NEUROMUSCULAR REEDUCATION: CPT | Mod: GP | Performed by: PHYSICAL THERAPIST

## 2021-05-07 PROCEDURE — 97110 THERAPEUTIC EXERCISES: CPT | Mod: GP | Performed by: PHYSICAL THERAPIST

## 2021-05-14 ENCOUNTER — THERAPY VISIT (OUTPATIENT)
Dept: PHYSICAL THERAPY | Facility: CLINIC | Age: 61
End: 2021-05-14
Payer: COMMERCIAL

## 2021-05-14 DIAGNOSIS — Z47.89 AFTERCARE FOLLOWING SURGERY OF THE MUSCULOSKELETAL SYSTEM, NEC: ICD-10-CM

## 2021-05-14 DIAGNOSIS — M25.511 CHRONIC RIGHT SHOULDER PAIN: ICD-10-CM

## 2021-05-14 DIAGNOSIS — G89.29 CHRONIC RIGHT SHOULDER PAIN: ICD-10-CM

## 2021-05-14 PROCEDURE — 97140 MANUAL THERAPY 1/> REGIONS: CPT | Mod: GP

## 2021-05-14 PROCEDURE — 97110 THERAPEUTIC EXERCISES: CPT | Mod: GP

## 2021-05-17 DIAGNOSIS — Z98.1 S/P CERVICAL SPINAL FUSION: ICD-10-CM

## 2021-05-17 NOTE — TELEPHONE ENCOUNTER
Routing refill request to provider for review/approval because:  Drug not on the [facility] refill protocol   Laura Palomo BSN, RN

## 2021-05-21 ENCOUNTER — THERAPY VISIT (OUTPATIENT)
Dept: PHYSICAL THERAPY | Facility: CLINIC | Age: 61
End: 2021-05-21
Payer: COMMERCIAL

## 2021-05-21 DIAGNOSIS — Z47.89 AFTERCARE FOLLOWING SURGERY OF THE MUSCULOSKELETAL SYSTEM, NEC: ICD-10-CM

## 2021-05-21 DIAGNOSIS — M25.511 CHRONIC RIGHT SHOULDER PAIN: ICD-10-CM

## 2021-05-21 DIAGNOSIS — G89.29 CHRONIC RIGHT SHOULDER PAIN: ICD-10-CM

## 2021-05-21 PROCEDURE — 97110 THERAPEUTIC EXERCISES: CPT | Mod: GP

## 2021-05-21 PROCEDURE — 97112 NEUROMUSCULAR REEDUCATION: CPT | Mod: GP

## 2021-05-21 RX ORDER — CYCLOBENZAPRINE HCL 10 MG
TABLET ORAL
Qty: 30 TABLET | Refills: 0 | Status: SHIPPED | OUTPATIENT
Start: 2021-05-21 | End: 2021-05-28

## 2021-05-28 ENCOUNTER — OFFICE VISIT (OUTPATIENT)
Dept: FAMILY MEDICINE | Facility: CLINIC | Age: 61
End: 2021-05-28
Payer: COMMERCIAL

## 2021-05-28 ENCOUNTER — THERAPY VISIT (OUTPATIENT)
Dept: PHYSICAL THERAPY | Facility: CLINIC | Age: 61
End: 2021-05-28
Payer: COMMERCIAL

## 2021-05-28 VITALS
SYSTOLIC BLOOD PRESSURE: 148 MMHG | OXYGEN SATURATION: 95 % | TEMPERATURE: 99.2 F | WEIGHT: 209 LBS | BODY MASS INDEX: 39.49 KG/M2 | HEART RATE: 86 BPM | DIASTOLIC BLOOD PRESSURE: 94 MMHG

## 2021-05-28 DIAGNOSIS — E66.01 MORBID OBESITY (H): ICD-10-CM

## 2021-05-28 DIAGNOSIS — G89.29 CHRONIC RIGHT SHOULDER PAIN: ICD-10-CM

## 2021-05-28 DIAGNOSIS — Z47.89 AFTERCARE FOLLOWING SURGERY OF THE MUSCULOSKELETAL SYSTEM, NEC: ICD-10-CM

## 2021-05-28 DIAGNOSIS — Z98.1 S/P CERVICAL SPINAL FUSION: Primary | ICD-10-CM

## 2021-05-28 DIAGNOSIS — M25.511 CHRONIC RIGHT SHOULDER PAIN: ICD-10-CM

## 2021-05-28 DIAGNOSIS — E78.5 HYPERLIPIDEMIA LDL GOAL <130: ICD-10-CM

## 2021-05-28 DIAGNOSIS — Z71.6 ENCOUNTER FOR SMOKING CESSATION COUNSELING: ICD-10-CM

## 2021-05-28 PROCEDURE — 99214 OFFICE O/P EST MOD 30 MIN: CPT | Performed by: NURSE PRACTITIONER

## 2021-05-28 PROCEDURE — 97112 NEUROMUSCULAR REEDUCATION: CPT | Mod: GP | Performed by: PHYSICAL THERAPIST

## 2021-05-28 PROCEDURE — 97110 THERAPEUTIC EXERCISES: CPT | Mod: GP | Performed by: PHYSICAL THERAPIST

## 2021-05-28 PROCEDURE — 97140 MANUAL THERAPY 1/> REGIONS: CPT | Mod: GP | Performed by: PHYSICAL THERAPIST

## 2021-05-28 RX ORDER — VARENICLINE TARTRATE 1 MG/1
1 TABLET, FILM COATED ORAL 2 TIMES DAILY
Qty: 120 TABLET | Refills: 1 | Status: SHIPPED | OUTPATIENT
Start: 2021-05-28 | End: 2022-01-12

## 2021-05-28 RX ORDER — VARENICLINE TARTRATE 1 MG/1
1 TABLET, FILM COATED ORAL 2 TIMES DAILY
Qty: 90 TABLET | Refills: 0 | Status: CANCELLED | OUTPATIENT
Start: 2021-05-28

## 2021-05-28 RX ORDER — ATORVASTATIN CALCIUM 80 MG/1
TABLET, FILM COATED ORAL
Qty: 90 TABLET | Refills: 3 | Status: SHIPPED | OUTPATIENT
Start: 2021-05-28 | End: 2022-06-03

## 2021-05-28 RX ORDER — CYCLOBENZAPRINE HCL 10 MG
TABLET ORAL
Qty: 90 TABLET | Refills: 3 | Status: SHIPPED | OUTPATIENT
Start: 2021-05-28 | End: 2022-06-13

## 2021-05-28 NOTE — PATIENT INSTRUCTIONS
At [facility], we strive to deliver an exceptional experience to you, every time we see you. If you receive a survey, please complete it as we do value your feedback.  If you have MyChart, you can expect to receive results automatically within 24 hours of their completion.  Your provider will send a note interpreting your results as well.   If you do not have MyChart, you should receive your results in about a week by mail.    Your care team:                            Family Medicine Internal Medicine   MD David Wagoner MD Shantel Branch-Fleming, MD Srinivasa Vaka, MD Katya Belousova, PAZaheerC  Elham Garcia, APRN CNP    [place], MD Pediatrics   Desean Francis, PAZaheerC  Rita Murillo, CNP MD Nan Minor APRN CNP   MD Alexus Alejo MD Deborah Mielke, MD Valentine Dumont, APRN [facility]      Clinic hours: Monday - Thursday 7 am-6 pm; Fridays 7 am-5 pm.   Urgent care: Monday - Friday 10 am- 8 pm; Saturday and Sunday 9 am-5 pm.    Clinic: [phone]       [facility]: Monday - Thursday 8 am - 7 pm; Friday 8 am - 6 pm  [facility]: [phone]     Use [email or web address] for 24/7 diagnosis and treatment of dozens of conditions.    Patient Education     Patient Education    Cyclobenzaprine Hydrochloride Oral capsule, extended-release    Cyclobenzaprine Hydrochloride Oral tablet  Cyclobenzaprine Hydrochloride Oral tablet  What is this medicine?  CYCLOBENZAPRINE (sye kloe CHARLENE za preen) is a muscle relaxer. It is used to treat muscle pain, spasms, and stiffness.  This medicine may be used for other purposes; ask your health care provider or pharmacist if you have questions.  What should I tell my health care provider before I take this medicine?  They need to know if you have any of these conditions:    heart disease, irregular heartbeat, or previous heart attack    liver  disease    thyroid problem    an unusual or allergic reaction to cyclobenzaprine, tricyclic antidepressants, lactose, other medicines, foods, dyes, or preservatives    pregnant or trying to get pregnant    breast-feeding  How should I use this medicine?  Take this medicine by mouth with a glass of water. Follow the directions on the prescription label. If this medicine upsets your stomach, take it with food or milk. Take your medicine at regular intervals. Do not take it more often than directed.  Talk to your pediatrician regarding the use of this medicine in children. Special care may be needed.  Overdosage: If you think you have taken too much of this medicine contact a poison control center or emergency room at once.  NOTE: This medicine is only for you. Do not share this medicine with others.  What if I miss a dose?  If you miss a dose, take it as soon as you can. If it is almost time for your next dose, take only that dose. Do not take double or extra doses.  What may interact with this medicine?  Do not take this medicine with any of the following medications:    certain medicines for fungal infections like fluconazole, itraconazole, ketoconazole, posaconazole, voriconazole    cisapride    dofetilide    dronedarone    droperidol    flecainide    grepafloxacin    halofantrine    levomethadyl    MAOIs like Carbex, Eldepryl, Marplan, Nardil, and Parnate    nilotinib    pimozide    probucol    sertindole    thioridazine    ziprasidone  This medicine may also interact with the following medications:    abarelix    alcohol    certain medicines for cancer    certain medicines for depression, anxiety, or psychotic disturbances    certain medicines for infection like alfuzosin, chloroquine, clarithromycin, levofloxacin, mefloquine, pentamidine, troleandomycin    certain medicines for an irregular heart beat    certain medicines used for sleep or numbness during surgery or procedure    contrast  dyes    dolasetron    guanethidine    methadone    octreotide    ondansetron    other medicines that prolong the QT interval (cause an abnormal heart rhythm)    palonosetron    phenothiazines like chlorpromazine, mesoridazine, prochlorperazine, thioridazine    tramadol    vardenafil  This list may not describe all possible interactions. Give your health care provider a list of all the medicines, herbs, non-prescription drugs, or dietary supplements you use. Also tell them if you smoke, drink alcohol, or use illegal drugs. Some items may interact with your medicine.  What should I watch for while using this medicine?  Check with your doctor or health care professional if your condition does not improve within 1 to 3 weeks.  You may get drowsy or dizzy when you first start taking the medicine or change doses. Do not drive, use machinery, or do anything that may be dangerous until you know how the medicine affects you. Stand or sit up slowly.  Your mouth may get dry. Drinking water, chewing sugarless gum, or sucking on hard candy may help.  What side effects may I notice from receiving this medicine?  Side effects that you should report to your doctor or health care professional as soon as possible:    allergic reactions like skin rash, itching or hives, swelling of the face, lips, or tongue    chest pain    fast heartbeat    hallucinations    seizures    vomiting  Side effects that usually do not require medical attention (report to your doctor or health care professional if they continue or are bothersome):    headache  This list may not describe all possible side effects. Call your doctor for medical advice about side effects. You may report side effects to FDA at 9-126-FDA-3366.  Where should I keep my medicine?  Keep out of the reach of children.  Store at room temperature between 15 and 30 degrees C (59 and 86 degrees F). Keep container tightly closed. Throw away any unused medicine after the expiration  date.  NOTE:This sheet is a summary. It may not cover all possible information. If you have questions about this medicine, talk to your doctor, pharmacist, or health care provider. Copyright  2016 Gold Standard           Patient Education     Controlling High Blood Pressure   High blood pressure (hypertension) is often called the silent killer. This is because many people who have it, don t know it. It can be very dangerous. High blood pressure can raise your risk of heart attack, stroke, heart disease, and heart failure. Controlling your blood pressure can decrease your risk of these problems. It's important to know the appropriate blood pressure range and remember to check your blood pressure regularly. Doing so can save your life.   Blood pressure measurements are given as 2 numbers. Systolic blood pressure is the upper number. This is the pressure when the heart contracts. Diastolic blood pressure is the lower number. This is the pressure when the heart relaxes between beats.   Blood pressure is categorized as normal, elevated, or stage 1 or stage 2 high blood pressure:     Normal blood pressure is systolic of less than 120 and diastolic of less than 80 (120/80)    Elevated blood pressure is systolic of 120 to 129 and diastolic less than 80    Stage 1 high blood pressure is systolic of 130 to 139 or diastolic between 80 to 89    Stage 2 high blood pressure is when systolic is 140 or higher or the diastolic is 90 or higher  A heart-healthy lifestyle can help you control your blood pressure without medicines. Here are some things you can do to pursue a heart-healthy lifestyle:     Choose heart-healthy foods     Select low-salt, low-fat foods. Limit sodium intake to 2,400 mg per day or the amount suggested by your healthcare provider.    Limit canned, dried, cured, packaged, and fast foods. These can contain a lot of salt.    Eat 8 to 10 servings of fruits and vegetables every day.    Choose lean meats, fish, or  chicken.    Eat whole-grain pasta, brown rice, and beans.    Eat 2 to 3 servings of low-fat or fat-free dairy products.    Ask your doctor about the DASH eating plan. This plan helps reduce blood pressure.    When you go to a restaurant, ask that your meal be prepared with no added salt.    Stay at a healthy weight     Ask your healthcare provider how many calories to eat a day. Then stick to that number.    Ask your healthcare provider what weight range is healthiest for you. If you are overweight, a weight loss of only 3% to 5% of your body weight can help lower blood pressure. Generally, a good weight loss goal is to lose 10% of your body weight in a year.    Limit snacks and sweets.    Get regular exercise.    Get up and get active     Find activities you enjoy that can be done alone or with friends or family. Such activities might include bicycling, dancing, walking, or jogging.    Park farther away from building entrances to walk more.    Use stairs instead of the elevator.    When you can, walk or bike instead of driving.    [place] leaves, garden, or do household repairs.    Be active at a moderate to vigorous level of physical activity for at least 40 minutes for a minimum of 3 to 4 days a week.     Manage stress     Make time to relax and enjoy life. Find time to laugh.    Communicate your concerns with your loved ones and your healthcare provider.    Visit with family and friends, and keep up with hobbies.    Limit alcohol and quit smoking     Men should have no more than 2 drinks per day.    Women should have no more than 1 drink per day.    Talk with your healthcare provider about quitting smoking. Smoking significantly increases your risk for heart disease and stroke. Ask your healthcare provider about community smoking cessation programs and other options.    Medicines  If lifestyle changes aren t enough, your healthcare provider may prescribe high blood pressure medicine. Take all medicines as  prescribed. If you have any questions about your medicines, ask your healthcare provider before stopping or changing them.   [employer] last reviewed this educational content on 6/1/2019 2000-2021 [employer]. All rights reserved. This information is not intended as a substitute for professional medical care. Always follow your healthcare professional's instructions.

## 2021-05-28 NOTE — PROGRESS NOTES
"  Assessment & Plan     S/P cervical spinal fusion  Refilled Flexeril, she continues with physical therapy for her shoulder pain as well.  - cyclobenzaprine (FLEXERIL) 10 MG tablet  Dispense: 90 tablet; Refill: 3    Encounter for smoking cessation counseling  [facility] is for any [place] looking for free support to quit smoking, vaping or chewing.   [facility] [facility] will offer many quit support options and resources so [place] residents can continue to find the way to quit that works best for them.   Free support includes personalized coaching, email and text support, educational materials, and quit medication (nicotine patches, gum or lozenges) delivered by mail. Refilled chantix 1 mg tabs.    Contact [employer] [facility] at [employer] or online at [employer] to receive support on your quit journey.    Hyperlipidemia LDL goal <130  Refilled Lipitor, lipids well controlled  - atorvastatin (LIPITOR) 80 MG tablet  Dispense: 90 tablet; Refill: 3    Obesity (BMI 35.0-39.9) with comorbidity (H)  Benefits of weight loss reviewed in detail, encouraged her to cut back on the carbohydrates in the diet, consume more fruits and vegetables, drink plenty of water, avoid fruit juices, sodas, get 150 min moderate exercise/week.  Recheck weight in 6 months.             Tobacco Cessation:   reports that she has been smoking cigarettes. She has a 15.00 pack-year smoking history. She has never used smokeless tobacco.  Tobacco Cessation Action Plan: Pharmacotherapies : Chantix  Self help information given to patient    BMI:   Estimated body mass index is 39.49 kg/m  as calculated from the following:    Height as of 7/9/20: 1.549 m (5' 1\").    Weight as of this encounter: 94.8 kg (209 lb).   Weight management plan: Discussed healthy diet and exercise guidelines    Work on weight loss  Regular exercise  See Patient Instructions    Return in about 6 months (around 11/28/2021), or if symptoms worsen or fail to improve, for Follow " up.    JOSE R Sarabia CNP  [facility] FEMI Aragon is a 61 year old who presents for the following health issues   HPI     Medication Followup of Flexeril    Taking Medication as prescribed: yes    Side Effects:  None    Medication Helping Symptoms:  yes   She is taking Flexeril at  for chronic muscle spasm follow ing spinal fusion.  She has tried several meds in the past and Flexeril works the best for her.     She is interested in getting back on Chantix for smoking cessation, has 45 year pk history, currently smoking about 1 ppd. She has a prior prescription for Chantix starter pack and started it about a week ago, wants refill of the 1 mg tabs for use after she completes the starter pack.        Hyperlipidemia Follow-Up      Are you regularly taking any medication or supplement to lower your cholesterol?   Yes- Lipitor 80 mg daily    Are you having muscle aches or other side effects that you think could be caused by your cholesterol lowering medication?  No      How many servings of fruits and vegetables do you eat daily?  2-3    On average, how many sweetened beverages do you drink each day (Examples: soda, juice, sweet tea, etc.  Do NOT count diet or artificially sweetened beverages)?   1    How many days per week do you exercise enough to make your heart beat faster? 3 or less    How many minutes a day do you exercise enough to make your heart beat faster? 10 - 19    How many days per week do you miss taking your medication? 0      Review of Systems   Constitutional, HEENT, cardiovascular, pulmonary, gi and gu systems are negative, except as otherwise noted.      Objective    BP (!) 148/94 (BP Location: Left arm, Patient Position: Sitting, Cuff Size: Adult Large)   Pulse 86   Temp 99.2  F (37.3  C) (Tympanic)   Wt 94.8 kg (209 lb)   LMP 09/16/2011   SpO2 95%   BMI 39.49 kg/m    Body mass index is 39.49 kg/m .  Physical Exam   GENERAL: healthy, alert and no  distress  EYES: Eyes grossly normal to inspection, PERRL and conjunctivae and sclerae normal  HENT: ear canals and TM's normal, nose and mouth without ulcers or lesions  NECK: no adenopathy, no asymmetry, masses, or scars and thyroid normal to palpation  RESP: lungs clear to auscultation - no rales, rhonchi or wheezes  CV: regular rate and rhythm, normal S1 S2, no S3 or S4, no murmur, click or rub, no peripheral edema and peripheral pulses strong  ABDOMEN: soft, nontender, no hepatosplenomegaly, no masses and bowel sounds normal  MS: no gross musculoskeletal defects noted, no edema  SKIN: no suspicious lesions or rashes  NEURO: Normal strength and tone, mentation intact and speech normal  BACK: no CVA tenderness, no paralumbar tenderness  PSYCH: mentation appears normal, affect normal/bright  LYMPH: no cervical adenopathy    Patient declined vaccines today

## 2021-05-28 NOTE — PROGRESS NOTES
Subjective:  HPI  Physical Exam                    Objective:  System    Physical Exam    General     ROS    Assessment/Plan:    PROGRESS  REPORT    Progress reporting period is from 4/15/21 to 5/28/21.       SUBJECTIVE  Subjective changes noted by patient:   Pt notes that her shoulder was a little more painful the past couple of days, thinks it is due to the weather because her knees were giving her problems, too.    Current pain level is  2/10.     Previous pain level was  0/10 Initial Pain level: 10/10(at worst).   Changes in function:  Yes (See Goal flowsheet attached for changes in current functional level)  Adverse reaction to treatment or activity: None    OBJECTIVE  Changes noted in objective findings:    R shoulder AROM: flex 155, abd 148, ER 30 (passively gets >50 deg no pain), IR/ext gets thumb to T12. Strength R: flex and abd 3+/5, ER 4/5.      ASSESSMENT/PLAN  Updated problem list and treatment plan: Diagnosis 1:  S/p R shoulder RCR  Pain -  hot/cold therapy, self management, education and home program  Decreased ROM/flexibility - manual therapy and therapeutic exercise  Decreased strength - therapeutic exercise and therapeutic activities  Impaired muscle performance - neuro re-education  Decreased function - therapeutic activities  STG/LTGs have been met or progress has been made towards goals:  Yes (See Goal flow sheet completed today.)  Assessment of Progress: The patient's condition is improving.  The patient's condition has potential to improve.  Patient is meeting short term goals and is progressing towards long term goals.  Self Management Plans:  Patient has been instructed in a home treatment program.  Patient  has been instructed in self management of symptoms.  I have re-evaluated this patient and find that the nature, scope, duration and intensity of the therapy is appropriate for the medical condition of the patient.  Margo continues to require the following intervention to meet STG and  LTG's:  PT    Recommendations:  This patient would benefit from continued therapy.     Frequency:  1 X week, once daily  Duration:  for 8 weeks        Please refer to the daily flowsheet for treatment today, total treatment time and time spent performing 1:1 timed codes.

## 2021-07-08 PROBLEM — M25.511 CHRONIC RIGHT SHOULDER PAIN: Status: RESOLVED | Noted: 2021-04-15 | Resolved: 2021-07-08

## 2021-07-08 PROBLEM — G89.29 CHRONIC RIGHT SHOULDER PAIN: Status: RESOLVED | Noted: 2021-04-15 | Resolved: 2021-07-08

## 2021-07-08 PROBLEM — Z47.89 AFTERCARE FOLLOWING SURGERY OF THE MUSCULOSKELETAL SYSTEM, NEC: Status: RESOLVED | Noted: 2021-04-15 | Resolved: 2021-07-08

## 2021-07-08 NOTE — PROGRESS NOTES
Patient did not return for further treatment and no additional progress was noted.  Please refer to the progress note and goal flowsheet completed on 05/28/21 for discharge information.

## 2021-08-15 DIAGNOSIS — I10 ESSENTIAL HYPERTENSION WITH GOAL BLOOD PRESSURE LESS THAN 140/90: ICD-10-CM

## 2021-08-16 NOTE — TELEPHONE ENCOUNTER
Routing refill request to provider for review/approval because:  BP Readings from Last 3 Encounters:   05/28/21 (!) 148/94   11/03/20 131/86   07/09/20 (!) 157/95     Laura UNLLN, RN

## 2021-08-18 RX ORDER — LOSARTAN POTASSIUM 25 MG/1
TABLET ORAL
Qty: 90 TABLET | Refills: 0 | Status: SHIPPED | OUTPATIENT
Start: 2021-08-18 | End: 2021-11-16

## 2021-08-28 NOTE — TELEPHONE ENCOUNTER
Left voicemail message with pt to call and schedule an appointment.    Anne Cuenca, Patient Representative

## 2021-09-21 NOTE — TELEPHONE ENCOUNTER
Left voicemail message with pt to call and schedule an appointment.    Clearing from queue, per Breanna on 9/21/21.    Anne Cuenca,   [facility]

## 2021-10-26 DIAGNOSIS — Z96.653 STATUS POST TOTAL BILATERAL KNEE REPLACEMENT: ICD-10-CM

## 2021-10-26 DIAGNOSIS — Z98.1 S/P CERVICAL SPINAL FUSION: ICD-10-CM

## 2021-10-27 NOTE — TELEPHONE ENCOUNTER
"Routing refill request to provider for review/approval because:  Requested Prescriptions   Pending Prescriptions Disp Refills    diclofenac (VOLTAREN) 75 MG EC tablet [Pharmacy Med Name: DICLOFENAC SODIUM 75MG DR TABLETS] 180 tablet 0     Sig: TAKE 1 TABLET(75 MG) BY MOUTH TWICE DAILY        NSAID Medications Failed - 10/26/2021  1:47 AM        Failed - Blood pressure under 140/90 in past 12 months       BP Readings from Last 3 Encounters:   05/28/21 (!) 148/94   11/03/20 131/86   07/09/20 (!) 157/95                 Failed - Normal CBC on file in past 12 months     Recent Labs   Lab Test 04/27/21  1134 11/03/20  1218 09/11/19  1205   WBC  --   --  8.1   RBC  --   --  4.38   HGB 14.3   < > 14.0   HCT  --   --  43.4   PLT  --   --  300    < > = values in this interval not displayed.                 Passed - Normal ALT on file in past 12 months     Recent Labs   Lab Test 04/27/21  1134   ALT 49             Passed - Normal AST on file in past 12 months       Recent Labs   Lab Test 04/27/21  1134   AST 21             Passed - Recent (12 mo) or future (30 days) visit within the authorizing provider's specialty     Patient has had an office visit with the authorizing provider or a provider within the authorizing providers department within the previous 12 mos or has a future within next 30 days. See \"Patient Info\" tab in inbasket, or \"Choose Columns\" in Meds & Orders section of the refill encounter.              Passed - Patient is age 6-64 years        Passed - Medication is active on med list        Passed - No active pregnancy on record        Passed - Normal serum creatinine on file in past 12 months     Recent Labs   Lab Test 04/27/21  1134   CR 0.72       Ok to refill medication if creatinine is low          Passed - No positive pregnancy test in past 12 months            Dana NULLN, RN        "

## 2021-10-29 RX ORDER — DICLOFENAC SODIUM 75 MG/1
TABLET, DELAYED RELEASE ORAL
Qty: 180 TABLET | Refills: 0 | Status: SHIPPED | OUTPATIENT
Start: 2021-10-29 | End: 2022-01-12

## 2021-12-07 ENCOUNTER — TRANSFERRED RECORDS (OUTPATIENT)
Dept: HEALTH INFORMATION MANAGEMENT | Facility: CLINIC | Age: 61
End: 2021-12-07

## 2022-01-10 ENCOUNTER — THERAPY VISIT (OUTPATIENT)
Dept: PHYSICAL THERAPY | Facility: CLINIC | Age: 62
End: 2022-01-10
Payer: COMMERCIAL

## 2022-01-10 DIAGNOSIS — M54.42 CHRONIC BILATERAL LOW BACK PAIN WITH LEFT-SIDED SCIATICA: ICD-10-CM

## 2022-01-10 DIAGNOSIS — G89.29 CHRONIC BILATERAL LOW BACK PAIN WITH LEFT-SIDED SCIATICA: ICD-10-CM

## 2022-01-10 PROCEDURE — 97161 PT EVAL LOW COMPLEX 20 MIN: CPT | Mod: GP | Performed by: PHYSICAL THERAPIST

## 2022-01-10 PROCEDURE — 97110 THERAPEUTIC EXERCISES: CPT | Mod: GP | Performed by: PHYSICAL THERAPIST
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
## 2022-01-10 NOTE — PROGRESS NOTES
[facility]    OUTPATIENT Physical Therapy ORTHOPEDIC EVALUATION  PLAN OF TREATMENT FOR OUTPATIENT REHABILITATION  (COMPLETE FOR INITIAL CLAIMS ONLY)  Patient's Last Name, First Name, M.I.  YOB: 1960  Margo Inman    Provider s Name:  [facility]   Medical Record No.  [id number]   Start of Care Date:  01/10/22   Onset Date:   12/06/19   Type:     _X__PT   ___OT Medical Diagnosis:    Encounter Diagnosis   Name Primary?     Chronic bilateral low back pain with left-sided sciatica         Treatment Diagnosis:  chronic LBP        Goals:     01/10/22 0500   Body Part   Goals listed below are for Low back   Goal #1   Goal #1 standing   Previous Functional Level No restrictions   Current Functional Level Minutes patient can stand   Performance level 10, PLs of 7-8/10   STG Target Performance Minutes patient will be able to stand   Performance level 10-15, PLs of 5-6/10   Rationale for housekeeping tasks such as vacuuming, bed making, mowing, gardening   Due date 01/31/22   LTG Target Performance Minutes patient will be able to stand   Performance Level 20+, PLs of 4-5/10   Rationale for housekeeping tasks such as vacuuming, bed making, mowing;for safe community ambulation   Due date 02/21/22       Therapy Frequency:     Predicted Duration of Therapy Intervention:       Roya Doyle, PT                 I CERTIFY THE NEED FOR THESE SERVICES FURNISHED UNDER        THIS PLAN OF TREATMENT AND WHILE UNDER MY CARE .             Physician Signature               Date    X_____________________________________________________                             Certification Date From:  01/10/22   Certification Date To:  02/28/22    Referring Provider:  Jey Barba    Initial Assessment        See Epic Evaluation SOC Date: 01/10/22

## 2022-01-12 ENCOUNTER — OFFICE VISIT (OUTPATIENT)
Dept: FAMILY MEDICINE | Facility: CLINIC | Age: 62
End: 2022-01-12
Payer: COMMERCIAL

## 2022-01-12 VITALS
SYSTOLIC BLOOD PRESSURE: 146 MMHG | HEART RATE: 88 BPM | HEIGHT: 61 IN | TEMPERATURE: 98.5 F | WEIGHT: 208.8 LBS | DIASTOLIC BLOOD PRESSURE: 79 MMHG | OXYGEN SATURATION: 96 % | BODY MASS INDEX: 39.42 KG/M2

## 2022-01-12 DIAGNOSIS — M65.30 TRIGGER FINGER, ACQUIRED: ICD-10-CM

## 2022-01-12 DIAGNOSIS — I10 ESSENTIAL HYPERTENSION WITH GOAL BLOOD PRESSURE LESS THAN 140/90: ICD-10-CM

## 2022-01-12 DIAGNOSIS — F17.200 TOBACCO DEPENDENCY: ICD-10-CM

## 2022-01-12 DIAGNOSIS — Z98.1 S/P CERVICAL SPINAL FUSION: ICD-10-CM

## 2022-01-12 DIAGNOSIS — Z12.31 VISIT FOR SCREENING MAMMOGRAM: ICD-10-CM

## 2022-01-12 DIAGNOSIS — E78.5 HYPERLIPIDEMIA LDL GOAL <130: ICD-10-CM

## 2022-01-12 DIAGNOSIS — Z00.00 ROUTINE HISTORY AND PHYSICAL EXAMINATION OF ADULT: Primary | ICD-10-CM

## 2022-01-12 DIAGNOSIS — Z96.653 STATUS POST TOTAL BILATERAL KNEE REPLACEMENT: ICD-10-CM

## 2022-01-12 DIAGNOSIS — Z13.1 SCREENING FOR DIABETES MELLITUS: ICD-10-CM

## 2022-01-12 DIAGNOSIS — J45.20 MILD INTERMITTENT ASTHMA WITHOUT COMPLICATION: ICD-10-CM

## 2022-01-12 DIAGNOSIS — E66.01 MORBID OBESITY (H): ICD-10-CM

## 2022-01-12 LAB
ANION GAP SERPL CALCULATED.3IONS-SCNC: 8 MMOL/L (ref 3–14)
BUN SERPL-MCNC: 13 MG/DL (ref 7–30)
CALCIUM SERPL-MCNC: 10 MG/DL (ref 8.5–10.1)
CHLORIDE BLD-SCNC: 104 MMOL/L (ref 94–109)
CHOLEST SERPL-MCNC: 263 MG/DL
CO2 SERPL-SCNC: 26 MMOL/L (ref 20–32)
CREAT SERPL-MCNC: 0.7 MG/DL (ref 0.52–1.04)
FASTING STATUS PATIENT QL REPORTED: NO
GFR SERPL CREATININE-BSD FRML MDRD: >90 ML/MIN/1.73M2
GLUCOSE BLD-MCNC: 94 MG/DL (ref 70–99)
HDLC SERPL-MCNC: 48 MG/DL
LDLC SERPL CALC-MCNC: 159 MG/DL
NONHDLC SERPL-MCNC: 215 MG/DL
POTASSIUM BLD-SCNC: 4.1 MMOL/L (ref 3.4–5.3)
SODIUM SERPL-SCNC: 138 MMOL/L (ref 133–144)
TRIGL SERPL-MCNC: 278 MG/DL

## 2022-01-12 PROCEDURE — 99396 PREV VISIT EST AGE 40-64: CPT | Performed by: NURSE PRACTITIONER

## 2022-01-12 PROCEDURE — 36415 COLL VENOUS BLD VENIPUNCTURE: CPT | Performed by: NURSE PRACTITIONER

## 2022-01-12 PROCEDURE — 99214 OFFICE O/P EST MOD 30 MIN: CPT | Mod: 25 | Performed by: NURSE PRACTITIONER

## 2022-01-12 PROCEDURE — 80061 LIPID PANEL: CPT | Performed by: NURSE PRACTITIONER

## 2022-01-12 PROCEDURE — 80048 BASIC METABOLIC PNL TOTAL CA: CPT | Performed by: NURSE PRACTITIONER

## 2022-01-12 RX ORDER — VARENICLINE TARTRATE 1 MG/1
1 TABLET, FILM COATED ORAL 2 TIMES DAILY
Qty: 60 TABLET | Refills: 1 | Status: SHIPPED | OUTPATIENT
Start: 2022-02-12 | End: 2022-08-25

## 2022-01-12 RX ORDER — ALBUTEROL SULFATE 90 UG/1
2 AEROSOL, METERED RESPIRATORY (INHALATION) EVERY 4 HOURS PRN
Qty: 18 G | Refills: 3 | Status: SHIPPED | OUTPATIENT
Start: 2022-01-12

## 2022-01-12 RX ORDER — HYDROCODONE BITARTRATE AND ACETAMINOPHEN 5; 325 MG/1; MG/1
TABLET ORAL
COMMUNITY
Start: 2022-01-11 | End: 2023-01-11

## 2022-01-12 RX ORDER — LOSARTAN POTASSIUM 25 MG/1
25 TABLET ORAL DAILY
Qty: 90 TABLET | Refills: 3 | Status: SHIPPED | OUTPATIENT
Start: 2022-01-12 | End: 2023-02-06

## 2022-01-12 RX ORDER — DICLOFENAC SODIUM 75 MG/1
TABLET, DELAYED RELEASE ORAL
Qty: 180 TABLET | Refills: 0 | Status: SHIPPED | OUTPATIENT
Start: 2022-01-12 | End: 2022-09-21

## 2022-01-12 ASSESSMENT — ENCOUNTER SYMPTOMS
FREQUENCY: 0
JOINT SWELLING: 1
PARESTHESIAS: 0
NAUSEA: 0
DYSURIA: 0
SHORTNESS OF BREATH: 0
MYALGIAS: 1
SORE THROAT: 0
ARTHRALGIAS: 1
BREAST MASS: 0
NERVOUS/ANXIOUS: 0
DIZZINESS: 0
CHILLS: 0
HEMATURIA: 0
HEMATOCHEZIA: 0
DIARRHEA: 0
CONSTIPATION: 0
PALPITATIONS: 0
COUGH: 0
ABDOMINAL PAIN: 0
WEAKNESS: 1
HEARTBURN: 0
EYE PAIN: 0
HEADACHES: 1
FEVER: 0

## 2022-01-12 ASSESSMENT — PATIENT HEALTH QUESTIONNAIRE - PHQ9
SUM OF ALL RESPONSES TO PHQ QUESTIONS 1-9: 7
SUM OF ALL RESPONSES TO PHQ QUESTIONS 1-9: 7
10. IF YOU CHECKED OFF ANY PROBLEMS, HOW DIFFICULT HAVE THESE PROBLEMS MADE IT FOR YOU TO DO YOUR WORK, TAKE CARE OF THINGS AT HOME, OR GET ALONG WITH OTHER PEOPLE: NOT DIFFICULT AT ALL

## 2022-01-12 ASSESSMENT — ASTHMA QUESTIONNAIRES
QUESTION_5 LAST FOUR WEEKS HOW WOULD YOU RATE YOUR ASTHMA CONTROL: WELL CONTROLLED
QUESTION_4 LAST FOUR WEEKS HOW OFTEN HAVE YOU USED YOUR RESCUE INHALER OR NEBULIZER MEDICATION (SUCH AS ALBUTEROL): ONCE A WEEK OR LESS
QUESTION_3 LAST FOUR WEEKS HOW OFTEN DID YOUR ASTHMA SYMPTOMS (WHEEZING, COUGHING, SHORTNESS OF BREATH, CHEST TIGHTNESS OR PAIN) WAKE YOU UP AT NIGHT OR EARLIER THAN USUAL IN THE MORNING: FOUR OR MORE NIGHTS A WEEK
ACT_TOTALSCORE: 15
QUESTION_2 LAST FOUR WEEKS HOW OFTEN HAVE YOU HAD SHORTNESS OF BREATH: THREE TO SIX TIMES A WEEK
QUESTION_1 LAST FOUR WEEKS HOW MUCH OF THE TIME DID YOUR ASTHMA KEEP YOU FROM GETTING AS MUCH DONE AT WORK, SCHOOL OR AT HOME: SOME OF THE TIME
ACUTE_EXACERBATION_TODAY: NO

## 2022-01-12 ASSESSMENT — MIFFLIN-ST. JEOR: SCORE: 1461.1

## 2022-01-12 NOTE — PATIENT INSTRUCTIONS
Patient Education     Prevention Guidelines, Women Ages 50 to 64  Screening tests and vaccines are an important part of managing your health. A screening test is done to find possible disorders or diseases in people who don't have any symptoms. The goal is to find a disease early so lifestyle changes can be made and you can be watched more closely to reduce the risk of disease, or to detect it early enough to treat it most effectively. Screening tests are not considered diagnostic, but are used to determine if more testing is needed. Health counseling is essential, too. Below are guidelines for these, for women ages 50 to 64. Keep in mind that screening advice varies among expert groups. Talk with your healthcare provider about which tests are best for you and to make sure you re up to date on what you need.   Screening  Who needs it  How often    Type 2 diabetes or prediabetes  All women beginning at age 45 and women without symptoms at any age who are overweight or obese and have 1 or more additional risk factors for diabetes.  At  least every 3 years    Type 2 diabetes or prediabetes  All women diagnosed with gestational diabetes  Lifelong testing at least every 3 years    Type 2 diabetes All women with prediabetes  Every year   Unhealthy alcohol use  All women in this age group  At routine exams   Blood pressure All women in this age group  Yearly checkup if your blood pressure is normal   Normal blood pressure is less than 120/80 mm Hg   If your blood pressure reading is higher than normal, follow the advice of your healthcare provider    Breast cancer All women at average risk in this age group  Yearly mammogram should be done until age 54. At age 55, you can switch to every other year or choose to continue yearly.   All women should know how their breasts normally look and feel and know the possible benefits and risks of breast cancer screening with mammograms.    Cervical cancer All women in this age  group, except women who have had a complete hysterectomy  Pap test every 3 years or Pap test with human papillomavirus (HPV) test every 5 years    Chlamydia Women who are sexually active and at increased risk for infection  At yearly routine exams    Colorectal cancer All women at average risk in this age group  Multiple tests are available and are used at different times. Possible tests include:     Flexible sigmoidoscopy every 5 years, or    Colonoscopy every 10 years, or    CT colonography (virtual colonoscopy) every 5 years, or    Yearly fecal occult blood test, or    Yearly fecal immunochemical test every year, or    Stool DNA test, every 3 years  If you choose a test other than a colonoscopy and have an abnormal test result, you will need to follow up with a colonoscopy. Screening advice varies among expert groups. Talk with your healthcare provider about which tests are best for you.   Some people should be screened using a different schedule because of their personal or family health history. Talk with your healthcare provider about your health history.    Depression All women in this age group  At routine exams   Gonorrhea Sexually active women at increased risk for infection  At yearly routine exams    Hepatitis C Anyone at increased risk; 1 time for those born between 1945 and 1965  At routine exams   High cholesterol or triglycerides  All women in this age group who are at risk for coronary artery disease  At least every 5 years; talk with your healthcare provider about your risk    HIV All women At least once during your lifetime; yearly if at high risk    Lung cancer Women between the ages of 55 to 74 who are in fairly good health and are at higher risk for lung cancer         Currently smoke or have  quit within past 15 years         30-pack-year smoking history  , Eligibility criteria and age limit (possibly up to age 80) may vary across major organizations  Yearly lung cancer screening with a  low-dose CT scan (LDCT) Talk with your healthcare provider for more information.    Obesity All women in this age group  At yearly routine exams    Osteoporosis Women who are postmenopausal  Talk with your healthcare provider    Syphilis Women at increased risk for infection  At routine exams; talk with your healthcare provider    Tuberculosis Women at increased risk for infection  Talk with your healthcare provider    Vision All women in this age group  Talk with your healthcare provider    Vaccine Who needs it How often   Chickenpox (varicella)  All women in this age group who have no record of this infection or vaccine  2 doses; the second dose should be given at least 4 weeks after the first dose    Hepatitis A Women at increased risk for infection  2 or 3 doses (depending on the vaccine) given at least 6 months apart; talk with your healthcare provider    Hepatitis B Women at increased risk for infection  2 or 3 doses (depending on the vaccine) ; second dose should be given 1 month after the first dose; if a third dose, it should be given at least 2 months after the second dose and at least 4 months after the first dose; talk with your healthcare provider    Haemophilus influenzae Type B (HIB)  Women at increased risk for infection  1 or 3 doses; talk with your healthcare provider    Influenza (flu) All women in this age group  Once a year   Measles, mumps, rubella (MMR)  Women in this age group born in 1957 or later who have no record of these infections or vaccines  1 or 2doses   Meningococcal Women at increased risk for infection  1 or more doses; talk with your healthcare provider    Pneumococcal conjugate vaccine (PCV13) and pneumococcal polysaccharide vaccine (PPSV23)  Women at increased risk for infection  PCV13: 1 dose ages 19 to 64 (protects against 13 types of pneumococcal bacteria)   PPSV23: 1 or 2 doses through age 64(protects against 23 types of pneumococcal bacteria)   Talk with your healthcare  provider   Tetanus/diphtheria/pertussis (Td/Tdap) booster All women in this age group  Td every 10 years, or a 1-time dose of Tdap instead of a Td booster after age 18, then Td every 10 years    Recombinant zoster vaccine (RZV)  All women ages 50 and older  If 2 doses; the 2nd dose is given 2 to 6 months after the first. This is given even if you've had shingles before or had a previous zoster live vaccine.    Counseling Who needs it How often   BRCA gene mutation testing for breast and ovarian cancer susceptibility  Women with increased risk for having gene mutation  When your risk is known; talk with your healthcare provider    Breast cancer and chemoprevention  Women at high risk for breast cancer  When your risk is known; talk with your healthcare provider    Diet and exercise Women who are overweight or obese  When diagnosed, and then at routine exams    Sexually transmitted infection prevention  Women at increased risk for infection  At routine exams; talk with your healthcare provider    Use of daily aspirin  Women ages 50 and up who are at high risk for cardiovascular health problems and not at increased risk for bleeding as identified by their healthcare provider  When your risk is known; talk with your healthcare provider    Use of tobacco and the health effects it can cause  All women in this age group  Every exam   [employer] last reviewed this educational content on 6/1/2020 2000-2021 [employer]. All rights reserved. This information is not intended as a substitute for professional medical care. Always follow your healthcare professional's instructions.           Patient Education     Established High Blood Pressure    High blood pressure (hypertension) is a long-term (chronic) disease. Often healthcare providers don t know what causes it. But it can be caused by certain health conditions and medicines.  If you have high blood pressure, you may not have any symptoms. If you do have  symptoms, they may include:    Headache    Dizziness    Changes in your vision    Chest pain    Shortness of breath  But even without symptoms, high blood pressure that s not treated raises your risk for heart attack, heart failure, kidney disease, and stroke. High blood pressure is a serious health risk and shouldn t be ignored.  Blood pressure measurements are given as 2 numbers. Systolic blood pressure is the upper number. This is the pressure when the heart contracts. Diastolic blood pressure is the lower number. This is the pressure when the heart relaxes between beats. You will see your blood pressure readings written together. For example, a person with a systolic pressure of 118 and a diastolic pressure of 78 will have 118/78 written in the medical record.  Blood pressure is classified as normal, raised (elevated) or stage 1 or stage 2 high blood pressure:    Normal blood pressure. Systolic of less than 120 and diastolic of less than 80 (120/80).    Elevated blood pressure. Systolic of 120 to 129 and diastolic less than 80.    Stage 1 high blood pressure. Systolic is 130 to 139 or diastolic between 80 to 89.    Stage 2 high blood pressure. Systolic is 140 or higher or the diastolic is 90 or higher.  Home care  If you have high blood pressure, follow these home care guidelines to help lower your blood pressure. If you are taking medicines for high blood pressure, these methods may reduce or end your need for medicines in the future.    Start a weight-loss program if you are overweight.    Cut back on how much salt you get in your diet. Here s how to do this:  ? Don t eat foods that have a lot of salt. These include olives, pickles, smoked meats, and salted potato chips.  ? Don t add salt to your food at the table.  ? Use only small amounts of salt when cooking.    Start an exercise program. Talk with your healthcare provider about the type of exercise program that would be best for you. It doesn't have to be  hard. Even brisk walking for 20 minutes 3 times a week is a good form of exercise.    Don t take medicines that stimulate the heart. This includes many over-the-counter cold and sinus decongestant pills and sprays, as well as diet pills. Check the warnings about high blood pressure on the label. Before buying any over-the-counter medicines or supplements, always ask the pharmacist about the product's possible interaction with your high blood pressure and your high blood pressure medicines.    Stimulants such as amphetamine or cocaine could be deadly for someone with high blood pressure. Never take these.    Limit how much caffeine you get in your diet. Switch to caffeine-free products.    Stop smoking. If you are a long-time smoker, this can be hard. Talk with your healthcare provider about medicines and nicotine replacement options to help you. Also join a stop-smoking program . This makes it more likely that you will quit for good.    Learn how to handle stress. This is an important part of any program to lower blood pressure. Learn about relaxation methods such as meditation, yoga, or biofeedback.    If your provider prescribed medicines, take them exactly as directed. Missing doses may cause your blood pressure get out of control.    If you miss a dose, check with your healthcare provider or pharmacist about what to do.    Think about buying an automatic blood pressure machine to check your blood pressure at home. Ask your provider for a recommendation. You can get one of these at most pharmacies.  Using a home blood pressure monitor  The American Heart Association advises the following guidelines for home blood pressure monitoring:    Don't smoke or drink coffee for 30 minutes before taking your blood pressure.    Go to the bathroom before the test.    Relax for 5 minutes before taking the measurement.    Sit with your back supported (don't sit on a couch or soft chair). Keep your feet on the floor uncrossed.  Place your arm on a solid flat surface (such as a table) with the upper part of the arm at heart level. Place the middle of the cuff directly above the bend of the elbow. Check the monitor's instruction manual for an illustration.    Take multiple readings. When you measure, take 2 to 3 readings one minute apart and record all of the results.    Take your blood pressure at the same time every day, or as your healthcare provider advises.    Record the date, time, and blood pressure reading.    Take the record with you to your next healthcare appointment. If your blood pressure monitor has a built-in memory, just take the monitor with you to your next appointment.    Call your provider if you have several high readings. Don't be frightened by one high blood pressure reading. But if you get a few high readings, check in with your healthcare provider.  Follow-up care  You will need to see your healthcare provider regularly. This is to check your blood pressure and to make changes to your medicines. Make a follow-up appointment as directed. Bring the record of your home blood pressure readings to the appointment.  Call 911  Call 911 if you have any of these:    Blood pressure of 180/120 or higher    Chest pain or shortness of breath    Weakness of an arm or leg or one side of the face    Problems speaking or seeing     When to get medical advice  Call your healthcare provider right away if any of these occur:    Severe headache    Throbbing or rushing sound in the ears    Nosebleed    Sudden severe pain in your belly (abdomen)    Extreme drowsiness, confusion, or fainting    Dizziness or spinning feeling (vertigo)  [employer] last reviewed this educational content on 7/1/2019 2000-2021 [employer]. All rights reserved. This information is not intended as a substitute for professional medical care. Always follow your healthcare professional's instructions.           Patient Education     Weight Management:  Healthy Eating  Food is your body s fuel. You can t live without it. The key is to give your body enough nutrients and energy without eating too much. Reading food labels can help you make healthy choices. Also, learn new eating habits to manage your weight. Nutrition labels are being redesigned by the FDA to emphasize the number of calories being consumed as well as the amount of more nutrients, such as added sugars, vitamin D, and potassium.     All the values on the label are based on one serving. The serving size is the average portion. Remember to multiply the values on the label by the number of servings you eat.   Eat less fat  A gram of fat has almost 2.5 times the calories of a gram of protein or carbohydrates. Try to balance your food choices so that only 20% to 35% of your calories comes from total fat. This means an average of 2  to 3  grams of fat for each 100 calories you eat.  Eat more fiber  High-fiber foods are digested more slowly than low-fiber foods, so you feel full longer. Try to get at least 25 grams of fiber each day for a 2000 calorie diet. Foods high in fiber include:    Vegetables and fruits    Whole-grain or bran breads, pastas, and cereals    Legumes (beans) and peas  As you start to eat more fiber, be sure to drink plenty of water. It will help keep your digestive system working smoothly.  Tips  Do's and don'ts include:     Eat a variety of foods, not just a few favorites.    If you find yourself eating when you re not hungry, ask yourself why. Many of us eat when we re bored, stressed, or just to be polite. Listen to your body. If you re not hungry, get busy doing something else instead of eating.    Eat slower, shooting for 20 to 30 minutes for each meal. It takes 20 minutes for your stomach to tell your brain that it s full. Slow eaters tend to eat less and are still satisfied, while fast eaters may tend to be overeaters.     Pay attention to what you eat. Don t read or watch TV  during your meal.  [employer] last reviewed this educational content on 11/1/2020 2000-2021 [employer]. All rights reserved. This information is not intended as a substitute for professional medical care. Always follow your healthcare professional's instructions.

## 2022-01-12 NOTE — PROGRESS NOTES
SUBJECTIVE:   CC: Margo Inman is an 62 year old woman who presents for preventive health visit.       Patient has been advised of split billing requirements and indicates understanding: Yes  HPI      Hyperlipidemia Follow-Up      Are you regularly taking any medication or supplement to lower your cholesterol?   Yes- Lipitior 80 mg daily    Are you having muscle aches or other side effects that you think could be caused by your cholesterol lowering medication?  No    Hypertension Follow-up      Do you check your blood pressure regularly outside of the clinic? Yes     Are you following a low salt diet? Yes    Are your blood pressures ever more than 140 on the top number (systolic) OR more   than 90 on the bottom number (diastolic), for example 140/90? No    Asthma Follow-Up    Was ACT completed today?  Yes  No flowsheet data found.       How many days per week do you miss taking your asthma controller medication?  I do not have an asthma controller medication    Please describe any recent triggers for your asthma: dust mites, animal dander, humidity, exercise or sports and cold air    Have you had any Emergency Room Visits, Urgent Care Visits, or Hospital Admissions since your last office visit?  No      Today's PHQ-2 Score:   PHQ-2 ( 1999 Pfizer) 1/12/2022   Q1: Little interest or pleasure in doing things 2   Q2: Feeling down, depressed or hopeless 1   PHQ-2 Score 3   PHQ-2 Total Score (12-17 Years)- Positive if 3 or more points; Administer PHQ-A if positive -   Q1: Little interest or pleasure in doing things More than half the days   Q2: Feeling down, depressed or hopeless Several days   PHQ-2 Score 3     She declines Influenza, COVID vaccines, and Shingrix vaccines- conscientious objector.    Abuse: Current or Past (Physical, Sexual or Emotional) - No  Do you feel safe in your environment? Yes    Have you ever done Advance Care Planning? (For example, a Health Directive, POLST, or a discussion with a  medical provider or your loved ones about your wishes): No, advance care planning information given to patient to review.  Advanced care planning was discussed at today's visit.    Social History     Tobacco Use     Smoking status: Current Every Day Smoker     Packs/day: 1.00     Years: 15.00     Pack years: 15.00     Types: Cigarettes     Smokeless tobacco: Never Used     Tobacco comment: Started Chantix   Substance Use Topics     Alcohol use: Yes     Comment: socially         Alcohol Use 1/12/2022   Prescreen: >3 drinks/day or >7 drinks/week? No   Prescreen: >3 drinks/day or >7 drinks/week? -     Reviewed orders with patient.  Reviewed health maintenance and updated orders accordingly - Yes  Labs reviewed in EPIC  BP Readings from Last 3 Encounters:   01/12/22 (!) 146/79   05/28/21 (!) 148/94   11/03/20 131/86    Wt Readings from Last 3 Encounters:   01/12/22 94.7 kg (208 lb 12.8 oz)   05/28/21 94.8 kg (209 lb)   11/03/20 95.9 kg (211 lb 6.4 oz)                  Patient Active Problem List   Diagnosis     Hyperlipidemia LDL goal <130     Pseudogout     OA (OSTEOARTHRITIS) OF KNEE - bilateral     RLS (restless legs syndrome)     S/P total knee arthroplasty juan     Acute posthemorrhagic anemia     Muscle spasm     CTS (carpal tunnel syndrome) - bilateral     S/P carpal tunnel release     Trigger thumb     Essential hypertension with goal blood pressure less than 140/90     Tobacco dependency     Microscopic hematuria     Cervical high risk HPV (human papillomavirus) test positive     Radiculopathy of cervical spine     S/P cervical spinal fusion     Adjustment disorder with mixed anxiety and depressed mood     Papanicolaou smear of cervix with low grade squamous intraepithelial lesion (LGSIL)     Obesity (BMI 35.0-39.9) with comorbidity (H)     Diverticulosis of sigmoid colon     Chronic bilateral low back pain with left-sided sciatica     Migraine     Asthma     Past Surgical History:   Procedure Laterality Date      APPENDECTOMY       ARTHROSCOPY KNEE  9/16/2011    Procedure:ARTHROSCOPY KNEE; left knee arthroscopy with debridement, open lateral patellar spur excision; Surgeon:LUIS A MONTOYA; [facility]     COLONOSCOPY N/A 2/16/2016    Procedure: COLONOSCOPY;  Surgeon: Belem Khalil MD;  [facility]     COLONOSCOPY N/A 2/16/2016    Procedure: COMBINED COLONOSCOPY, SINGLE OR MULTIPLE BIOPSY/POLYPECTOMY BY BIOPSY;  Surgeon: Belem Khalil MD;  [facility]     COLONOSCOPY N/A 6/15/2020    Procedure: Colonoscopy, With Polypectomy And Biopsy;  Surgeon: Torres Gallegos DO;  [facility]     COLONOSCOPY WITH CO2 INSUFFLATION N/A 2/16/2016    Procedure: COLONOSCOPY WITH CO2 INSUFFLATION;  Surgeon: Belem Khalil MD;  [facility]     COLONOSCOPY WITH CO2 INSUFFLATION N/A 6/15/2020    Procedure: COLONOSCOPY, WITH CO2 INSUFFLATION;  Surgeon: Torres Gallegos DO;  [facility]     CYSTOSCOPY  2016    microscopic hematuria     DECOMPRESSION, FUSION CERVICAL ANTERIOR ONE LEVEL, COMBINED N/A 3/30/2017    Procedure: COMBINED DECOMPRESSION, FUSION CERVICAL ANTERIOR ONE LEVEL;  Surgeon: Joseph Klein MD;  Location: [facility]     ECTOPIC PREGNANCY SURGERY       ENT SURGERY       GYN SURGERY       HC INCISION TENDON SHEATH FINGER Left 7/11/14    Thumb TF release     HC KNEE SCOPE,MED/LAT MENISECTOMY  9/16/11    left, with partial medial menisectomy ONLY     HC REVISE MEDIAN N/CARPAL TUNNEL SURG Left 7/11/14    PRIMARY - not revision     ORTHOPEDIC SURGERY       RELEASE CARPAL TUNNEL  7/11/2014    Procedure: RELEASE CARPAL TUNNEL;  Surgeon: Luis A Montoya MD;  [facility]     RELEASE CARPAL TUNNEL Right 11/7/2014    Procedure: RELEASE CARPAL TUNNEL;  Surgeon: Lui sA Montoya MD;  [facility]     RELEASE TRIGGER FINGER  7/11/2014    Procedure: RELEASE TRIGGER FINGER;  Surgeon: Luis A Montoya MD;  [facility]     RELEASE TRIGGER  FINGER Right 11/7/2014    Procedure: RELEASE TRIGGER FINGER;  Surgeon: Rg Franco MD;  Location: [facility]     tonsils       ZZC PART REMV FEMUR/PROX TIB/FIB  9/16/11    left, open lateral patellar bone spur excision     ZZC TOTAL KNEE ARTHROPLASTY  1/17/14    Bilateral       Social History     Tobacco Use     Smoking status: Current Every Day Smoker     Packs/day: 1.00     Years: 15.00     Pack years: 15.00     Types: Cigarettes     Smokeless tobacco: Never Used     Tobacco comment: Started Chantix   Substance Use Topics     Alcohol use: Yes     Comment: socially     Family History   Problem Relation Age of Onset     Breast Cancer Maternal Grandmother      Ovarian Cancer Maternal Grandmother      Cancer - colorectal Maternal Grandfather      Colon Cancer Maternal Grandfather      Prostate Cancer Maternal Grandfather      C.A.D. Father      C.A.D. Paternal Grandfather      Lung Cancer Sister      Colon Cancer Sister      Brain Cancer Sister            Breast Cancer Screening:    FHS-7:   Breast CA Risk Assessment (FHS-7) 1/12/2022   Did any of your first-degree relatives have breast or ovarian cancer? Yes   Did any of your relatives have bilateral breast cancer? Yes   Did any man in your family have breast cancer? No   Did any woman in your family have breast and ovarian cancer? Yes   Did any woman in your family have breast cancer before age 50 y? Yes   Do you have 2 or more relatives with breast and/or ovarian cancer? Yes   Do you have 2 or more relatives with breast and/or bowel cancer? Yes     click delete button to remove this line now  Mammogram Screening: Recommended mammography every 1-2 years with patient discussion and risk factor consideration  Pertinent mammograms are reviewed under the imaging tab.    History of abnormal Pap smear: NO - age 30- 65 PAP every 3 years recommended  PAP / HPV Latest Ref Rng & Units 9/11/2019 3/29/2018 1/10/2017   PAP (Historical) - NIL LSIL(A) NIL   HPV16 NEG:Negative  Negative Negative Negative   HPV18 NEG:Negative Negative Negative Negative   HRHPV NEG:Negative Negative Positive(A) Positive(A)     Reviewed and updated as needed this visit by clinical staff                Reviewed and updated as needed this visit by Provider               Past Medical History:   Diagnosis Date     Arthritis      Cervical high risk HPV (human papillomavirus) test positive 1/10/2017    1/10/17 NIL pap/+ HR HPV (not 16 or 18). Plan: cotest in 1 year, due by 1/10/18      Chronic infection     HX of MRSA. 2 neg swabs at [facility] in 2006 and 2007.     History of blood transfusion      Numbness and tingling     Right arm     PONV (postoperative nausea and vomiting)       Past Surgical History:   Procedure Laterality Date     APPENDECTOMY       ARTHROSCOPY KNEE  9/16/2011    Procedure:ARTHROSCOPY KNEE; left knee arthroscopy with debridement, open lateral patellar spur excision; Surgeon:LUIS A MONTOYA; [facility]     COLONOSCOPY N/A 2/16/2016    Procedure: COLONOSCOPY;  Surgeon: Belem Khalil MD;  [facility]     COLONOSCOPY N/A 2/16/2016    Procedure: COMBINED COLONOSCOPY, SINGLE OR MULTIPLE BIOPSY/POLYPECTOMY BY BIOPSY;  Surgeon: Belem Khalil MD;  [facility]     COLONOSCOPY N/A 6/15/2020    Procedure: Colonoscopy, With Polypectomy And Biopsy;  Surgeon: Torres Gallegos DO;  [facility]     COLONOSCOPY WITH CO2 INSUFFLATION N/A 2/16/2016    Procedure: COLONOSCOPY WITH CO2 INSUFFLATION;  Surgeon: Belem Khalil MD;  [facility]     COLONOSCOPY WITH CO2 INSUFFLATION N/A 6/15/2020    Procedure: COLONOSCOPY, WITH CO2 INSUFFLATION;  Surgeon: Torres Gallegos DO;  [facility]     CYSTOSCOPY  2016    microscopic hematuria     DECOMPRESSION, FUSION CERVICAL ANTERIOR ONE LEVEL, COMBINED N/A 3/30/2017    Procedure: COMBINED DECOMPRESSION, FUSION CERVICAL ANTERIOR ONE LEVEL;  Surgeon: Joseph Klein MD;  Location:  OR      ECTOPIC PREGNANCY SURGERY       ENT SURGERY       GYN SURGERY       HC INCISION TENDON SHEATH FINGER Left 7/11/14    Thumb TF release     HC KNEE SCOPE,MED/LAT MENISECTOMY  9/16/11    left, with partial medial menisectomy ONLY     HC REVISE MEDIAN N/CARPAL TUNNEL SURG Left 7/11/14    PRIMARY - not revision     ORTHOPEDIC SURGERY       RELEASE CARPAL TUNNEL  7/11/2014    Procedure: RELEASE CARPAL TUNNEL;  Surgeon: Rg Franco MD;  [facility]     RELEASE CARPAL TUNNEL Right 11/7/2014    Procedure: RELEASE CARPAL TUNNEL;  Surgeon: Rg Franco MD;  [facility]     RELEASE TRIGGER FINGER  7/11/2014    Procedure: RELEASE TRIGGER FINGER;  Surgeon: Rg Franco MD;  [facility]     RELEASE TRIGGER FINGER Right 11/7/2014    Procedure: RELEASE TRIGGER FINGER;  Surgeon: Rg Franco MD;  [facility]     tonsils       ZZC PART REMV FEMUR/PROX TIB/FIB  9/16/11    left, open lateral patellar bone spur excision     ZZC TOTAL KNEE ARTHROPLASTY  1/17/14    Bilateral       Review of Systems  CONSTITUTIONAL: NEGATIVE for fever, chills, change in weight  INTEGUMENTARY/SKIN: NEGATIVE for worrisome rashes, moles or lesions  EYES: NEGATIVE for vision changes or irritation  ENT: NEGATIVE for ear, mouth and throat problems  RESP: NEGATIVE for significant cough or SOB  BREAST: NEGATIVE for masses, tenderness or discharge  CV: NEGATIVE for chest pain, palpitations or peripheral edema  GI: NEGATIVE for nausea, abdominal pain, heartburn, or change in bowel habits  : NEGATIVE for unusual urinary or vaginal symptoms. No vaginal bleeding.  MUSCULOSKELETAL: NEGATIVE for significant arthralgias or myalgia  NEURO: NEGATIVE for weakness, dizziness or paresthesias  ENDOCRINE: NEGATIVE for temperature intolerance, skin/hair changes  HEME/ALLERGY/IMMUNE: NEGATIVE for bleeding problems  PSYCHIATRIC: NEGATIVE for changes in mood or affect      OBJECTIVE:   BP (!) 146/79 (BP Location: Left arm,  "Patient Position: Sitting, Cuff Size: Adult Large)   Pulse 88   Temp 98.5  F (36.9  C) (Tympanic)   Ht 1.56 m (5' 1.42\")   Wt 94.7 kg (208 lb 12.8 oz)   LMP 09/16/2011   SpO2 96%   BMI 38.92 kg/m    Physical Exam  GENERAL: healthy, alert and no distress  EYES: Eyes grossly normal to inspection, PERRL and conjunctivae and sclerae normal  HENT: ear canals and TM's normal, nose and mouth without ulcers or lesions  NECK: no adenopathy, no asymmetry, masses, or scars and thyroid normal to palpation  RESP: lungs clear to auscultation - no rales, rhonchi or wheezes  BREAST: normal without masses, tenderness or nipple discharge and no palpable axillary masses or adenopathy  CV: regular rate and rhythm, normal S1 S2, no S3 or S4, no murmur, click or rub, no peripheral edema and peripheral pulses strong  ABDOMEN: soft, nontender, no hepatosplenomegaly, no masses and bowel sounds normal   (female): normal female external genitalia, speculum exam deferrred  MS: left index finger- triggering noted-loss of smooth motion, no swelling but has painful ROM, otherwise, no gross musculoskeletal defects noted, no edema  SKIN: no suspicious lesions or rashes  NEURO: Normal strength and tone, mentation intact and speech normal  PSYCH: mentation appears normal, affect normal/bright  LYMPH: no cervical, supraclavicular, axillary, or inguinal adenopathy    Diagnostic Test Results:  Labs reviewed in Epic  No results found for this or any previous visit (from the past 24 hour(s)).    ASSESSMENT/PLAN:   (Z00.00) Routine history and physical examination of adult  (primary encounter diagnosis)  Comment:   Plan:     (E66.01) Obesity (BMI 35.0-39.9) with comorbidity (H)  Comment: Benefits of weight loss reviewed in detail, encouraged him to cut back on the carbohydrates in the diet, consume more fruits and vegetables, drink plenty of water, avoid fruit juices, sodas, get 150 min moderate exercise/week.   Plan Recheck weight in 6 " months.    (E78.5) Hyperlipidemia LDL goal <130  Comment: Low chol diet reviewed, checking lipids as she's been on Lipitor 80 mg daily for 6 months.  Plan: Lipid panel reflex to direct LDL Non-fasting,         CANCELED: Lipid panel reflex to direct LDL         Fasting            (F17.200) Tobacco dependency  Comment: Restarting Chantix for smoking cessation, cut out one cigarette/day until she has quit completely, follow back 1 month (virtual visit is fine).  Plan: varenicline (CHANTIX EVANGELINA) 0.5 MG X 11 & 1 MG X         42 tablet, varenicline (CHANTIX) 1 MG tablet            (M65.30) Trigger finger, acquired  Comment: Referring to Orthopedics for evaluation  Plan: Orthopedic  Referral           (Z96.653) Status post total bilateral knee replacement  Comment:uses Voltaren prn pain  Plan: diclofenac (VOLTAREN) 75 MG EC tablet            (Z98.1) S/P cervical spinal fusion  Comment: Uses voltaren prn pain  Plan: diclofenac (VOLTAREN) 75 MG EC tablet            (I10) Essential hypertension with goal blood pressure less than 140/90  Comment: BP elevated today but she had rhizotomy yesterday , continue to check [facility] BP's call for BP > 140/90.  Plan: losartan (COZAAR) 25 MG tablet            (J45.20) Mild intermittent asthma without complication  Comment: Refilled albuterol, stable  Plan: albuterol (PROAIR HFA/PROVENTIL HFA/VENTOLIN         HFA) 108 (90 Base) MCG/ACT inhaler            (Z13.1) Screening for diabetes mellitus  Comment:   Plan: Basic metabolic panel  (Ca, Cl, CO2, Creat,         Gluc, K, Na, BUN)            (Z12.31) Visit for screening mammogram  Comment:   Plan: *MA Screening Digital Bilateral              Patient has been advised of split billing requirements and indicates understanding: Yes  COUNSELING:  Reviewed preventive health counseling, as reflected in patient instructions    Estimated body mass index is 38.92 kg/m  as calculated from the following:    Height as of this encounter: 1.56 m (5'  "1.42\").    Weight as of this encounter: 94.7 kg (208 lb 12.8 oz).    Weight management plan: Discussed healthy diet and exercise guidelines    She reports that she has been smoking cigarettes. She has a 15.00 pack-year smoking history. She has never used smokeless tobacco.  Tobacco Cessation Action Plan:   Pharmacotherapies : Chantix and support groups      Counseling Resources:  ATP IV Guidelines  Pooled Cohorts Equation Calculator  Breast Cancer Risk Calculator  BRCA-Related Cancer Risk Assessment: FHS-7 Tool  FRAX Risk Assessment  ICSI Preventive Guidelines  Dietary Guidelines for Americans, 2010  [employer]'s MyPlate  ASA Prophylaxis  Lung CA Screening    [facility]  Answers for HPI/ROS submitted by the patient on 1/12/2022  If you checked off any problems, how difficult have these problems made it for you to do your work, take care of things at home, or get along with other people?: Not difficult at all  PHQ9 TOTAL SCORE: 7      "

## 2022-01-13 ASSESSMENT — PATIENT HEALTH QUESTIONNAIRE - PHQ9: SUM OF ALL RESPONSES TO PHQ QUESTIONS 1-9: 7

## 2022-01-13 ASSESSMENT — ASTHMA QUESTIONNAIRES: ACT_TOTALSCORE: 15

## 2022-01-17 NOTE — RESULT ENCOUNTER NOTE
Ephraim Aragon,    The results of your recent lipid (cholesterol) profile were abnormal.    Here are the results:  Lab Results       Component                Value               Date                       CHOL                     263                 01/12/2022                 CHOL                     257                 04/27/2021            Lab Results       Component                Value               Date                       HDL                      48                  01/12/2022                 HDL                      36                  04/27/2021            Lab Results       Component                Value               Date                       LDL                      159                 01/12/2022                 LDL                      169                 04/27/2021            Lab Results       Component                Value               Date                       TRIG                     278                 01/12/2022                 TRIG                     261                 04/27/2021            Lab Results       Component                Value               Date                       CHOLHDLRATIO             3.2                 05/20/2015              Desired or goal levels are:  CHOLESTEROL: Desirable is less than 200.   HDL (Good Cholesterol): Desirable is greater than 40 (for men) greater than 50 (for women).  LDL (Bad Cholesterol): Desirable is less than 130 (or less than 100 if you have heart disease or diabetes). Borderline 130-160.  TRIGLYCERIDES: Desirable is less than 150.  Borderline is 150-200.    For high triglycerides, reduce the intake of jam, jelly, honey, alcohol, and/or coffee creamers  Your HDL (the good cholesterol) level is low, no medication is required at this point. Reducing your total fat intake, increasing exercise level, reducing weight, adding olive oil to your diet, etc, may help to increase this level.  Please continue the same dose of your cholesterol medication(s)   Have  you been taking the Lipitor on a regular basis?  Let me know. I might want to change you to a different statin.    As you may know, an elevated cholesterol is one factor that increases your risk for heart disease and stroke. You can improve your cholesterol by controlling the amount and type of fat you eat and by increasing your daily activity level.    Here are some ways to improve your nutrition:  Eat less fat (especially butter, Crisco and other saturated fats)  Buy lean cuts of meat, reduce your portions of red meat or substitute poultry or fish  Use skim milk and low-fat dairy products  Eat no more than 4 egg yolks per week  Avoid fried or fast foods that are high in fat  Eat more fruits and vegetables      Also consider starting or increasing your aerobic activity. Aerobic activity is the best way to improve HDL (good) cholesterol. If this would be new to you, please talk with me first about what activities are safe for you.      Other lab results BMP  was  normal - normal blood sugar, kidney function and electrolytes..      Please feel free to contact us with any questions or if you would like more information.      Belem ODELL, CNP

## 2022-01-26 ENCOUNTER — THERAPY VISIT (OUTPATIENT)
Dept: PHYSICAL THERAPY | Facility: CLINIC | Age: 62
End: 2022-01-26
Payer: COMMERCIAL

## 2022-01-26 DIAGNOSIS — G89.29 CHRONIC BILATERAL LOW BACK PAIN WITH LEFT-SIDED SCIATICA: ICD-10-CM

## 2022-01-26 DIAGNOSIS — M54.42 CHRONIC BILATERAL LOW BACK PAIN WITH LEFT-SIDED SCIATICA: ICD-10-CM

## 2022-01-26 PROCEDURE — 97110 THERAPEUTIC EXERCISES: CPT | Mod: GP | Performed by: PHYSICAL THERAPIST

## 2022-02-02 ENCOUNTER — THERAPY VISIT (OUTPATIENT)
Dept: PHYSICAL THERAPY | Facility: CLINIC | Age: 62
End: 2022-02-02
Payer: COMMERCIAL

## 2022-02-02 DIAGNOSIS — G89.29 CHRONIC BILATERAL LOW BACK PAIN WITH LEFT-SIDED SCIATICA: ICD-10-CM

## 2022-02-02 DIAGNOSIS — M54.42 CHRONIC BILATERAL LOW BACK PAIN WITH LEFT-SIDED SCIATICA: ICD-10-CM

## 2022-02-02 PROCEDURE — 97110 THERAPEUTIC EXERCISES: CPT | Mod: GP | Performed by: PHYSICAL THERAPIST

## 2022-02-09 ENCOUNTER — THERAPY VISIT (OUTPATIENT)
Dept: PHYSICAL THERAPY | Facility: CLINIC | Age: 62
End: 2022-02-09
Payer: COMMERCIAL

## 2022-02-09 DIAGNOSIS — G89.29 CHRONIC BILATERAL LOW BACK PAIN WITH LEFT-SIDED SCIATICA: ICD-10-CM

## 2022-02-09 DIAGNOSIS — M54.42 CHRONIC BILATERAL LOW BACK PAIN WITH LEFT-SIDED SCIATICA: ICD-10-CM

## 2022-02-09 PROCEDURE — 97110 THERAPEUTIC EXERCISES: CPT | Mod: GP | Performed by: PHYSICAL THERAPIST

## 2022-02-09 PROCEDURE — 97530 THERAPEUTIC ACTIVITIES: CPT | Mod: GP | Performed by: PHYSICAL THERAPIST

## 2022-02-09 NOTE — PROGRESS NOTES
Subjective:  HPI  Physical Exam  Oswestry Score: 64 %                 Objective:  System    Physical Exam    General     ROS    Assessment/Plan:    DISCHARGE REPORT    Progress reporting period is from 1/10/22 to 2/9/22.       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 She made it 15 min and felt so bad that she couldn't hardly move for the rest of the day.    Current pain level is  9/10.     Previous pain level was  7/10  .   Changes in function:  None  Adverse reaction to treatment or activity: None    OBJECTIVE  Changes noted in objective findings:    LROM: flex 75% feels good until she has to come back up, ext 33% w/sharp jab of pain, SB B'ly 50% w/pain on R for both dir's. Pt walking w/a cane today, with more trunk flexion.     ASSESSMENT/PLAN  Updated problem list and treatment plan: Diagnosis 1:  B SIJ DJD  Pain -  self management and home program  Decreased ROM/flexibility - home program  Decreased joint mobility - home program  Decreased strength - home program  Impaired muscle performance - home program  Decreased function - home program  Impaired posture - home program  STG/LTGs have been met or progress has been made towards goals:  None  Assessment of Progress: The patient's condition is unchanged.  Patient is worsening and has not benefited from PT for restoring function.  Self Management Plans:  Patient has been instructed in a home treatment program.  Patient is independent in a home treatment program.  Patient  has been instructed in self management of symptoms.  Patient is independent in self management of symptoms.  I have re-evaluated this patient and find that the nature, scope, duration and intensity of the therapy is appropriate for the medical condition of the patient.  Margo continues to require the following intervention to meet STG and LTG's:  PT  intervention is no longer required to meet STG/LTG.    Recommendations:  This patient is ready to be discharged from therapy and continue their home treatment program. Patient advised to consult with the orthopedist regarding other options and next steps.    Please refer to the daily flowsheet for treatment today, total treatment time and time spent performing 1:1 timed codes.

## 2022-02-09 NOTE — LETTER
[facility]  [address]  [facility] [address]  [phone]    2022    Re: Margo Inman   :   1960  MRN:  [id number]   REFERRING PHYSICIAN:   MD ELIZABETH [facility]  Date of Initial Evaluation:  1/10/2022  Visits:  Rxs Used: 4  Reason for Referral:  Chronic bilateral low back pain with left-sided sciatica    DISCHARGE REPORT  Progress reporting period is from 1/10/22 to 22.       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 She made it 15 min and felt so bad that she couldn't hardly move for the rest of the day.    Current pain level is  9/10.     Previous pain level was  7/10  .   Changes in function:  None  Adverse reaction to treatment or activity: None    OBJECTIVE  Changes noted in objective findings:    LROM: flex 75% feels good until she has to come back up, ext 33% w/sharp jab of pain, SB B'ly 50% w/pain on R for both dir's. Pt walking w/a cane today, with more trunk flexion.     ASSESSMENT/PLAN  Updated problem list and treatment plan: Diagnosis 1:  B SIJ DJD  Pain -  self management and home program  Decreased ROM/flexibility - home program  Decreased joint mobility - home program  Decreased strength - home program  Impaired muscle performance - home program  Decreased function - home program  Impaired posture - home program  STG/LTGs have been met or progress has been made towards goals:  None  Assessment of Progress: The patient's condition is unchanged.  Patient is worsening and has not benefited from PT for restoring function.  Self Management Plans:  Patient has been instructed in a home treatment program.  Patient is independent in a home treatment program.  Patient  has been instructed in self management of symptoms.  Patient is  independent in self management of symptoms.  I have re-evaluated this patient and find that the nature, scope, duration and intensity of the therapy is appropriate for the medical condition of the patient.  Margo continues to require the following intervention to meet STG and LTG's:  PT intervention is no longer required to meet STG/LTG.  Re: Margo Inman   :   1960    Recommendations:  This patient is ready to be discharged from therapy and continue their home treatment program. Patient advised to consult with the orthopedist regarding other options and next steps.    Please refer to the daily flowsheet for treatment today, total treatment time and time spent performing 1:1 timed codes.        Thank you for your referral.    INQUIRIES  Therapist: Roya Doyle [facility]  [address]  [facility] [address]  Phone: [phone]  Fax: [phone]

## 2022-02-25 ENCOUNTER — ANCILLARY PROCEDURE (OUTPATIENT)
Dept: MAMMOGRAPHY | Facility: CLINIC | Age: 62
End: 2022-02-25
Attending: NURSE PRACTITIONER
Payer: COMMERCIAL

## 2022-02-25 DIAGNOSIS — Z12.31 VISIT FOR SCREENING MAMMOGRAM: ICD-10-CM

## 2022-02-25 PROCEDURE — 77067 SCR MAMMO BI INCL CAD: CPT | Mod: TC | Performed by: RADIOLOGY

## 2022-02-25 PROCEDURE — 77063 BREAST TOMOSYNTHESIS BI: CPT | Mod: TC | Performed by: RADIOLOGY

## 2022-04-09 ENCOUNTER — HEALTH MAINTENANCE LETTER (OUTPATIENT)
Age: 62
End: 2022-04-09

## 2022-06-11 DIAGNOSIS — Z98.1 S/P CERVICAL SPINAL FUSION: ICD-10-CM

## 2022-06-11 NOTE — TELEPHONE ENCOUNTER
Prescription approved per [facility] Refill Protocol.    Eli Bella RN  [facility]

## 2022-06-11 NOTE — TELEPHONE ENCOUNTER
Routing refill request to provider for review/approval because:  Drug not on the [facility] or [facility] refill protocol or controlled substance

## 2022-06-13 RX ORDER — CYCLOBENZAPRINE HCL 10 MG
TABLET ORAL
Qty: 90 TABLET | Refills: 3 | Status: SHIPPED | OUTPATIENT
Start: 2022-06-13 | End: 2023-06-22

## 2022-06-17 ENCOUNTER — TELEPHONE (OUTPATIENT)
Dept: FAMILY MEDICINE | Facility: CLINIC | Age: 62
End: 2022-06-17
Payer: COMMERCIAL

## 2022-06-17 NOTE — TELEPHONE ENCOUNTER
Routing to Valentine Dumont to see if this can be worked in. Please advise.  Teri Olvera MA  [facility]  2nd Floor  Primary Care

## 2022-06-17 NOTE — TELEPHONE ENCOUNTER
Reason for Call:  Same Day Appointment, Requested Provider:  Julia    PCP: Belme Dumont    Reason for visit: Pt needs pre op for surgery on 06/28/22 for getting pins inserted into her hip, Dr. Barba, pt requests appt on 06/24/2022 w/ her PCP Julia, please call pt to advise or work in    Duration of symptoms: N/A    Have you been treated for this in the past? No    Additional comments: N/A    Can we leave a detailed message on this number? YES    Phone number patient can be reached at: Cell number on file:    Telephone Information:   Mobile [phone]       Best Time: ANY    Call taken on 6/17/2022 at 2:05 PM by Sarina Haley

## 2022-06-17 NOTE — TELEPHONE ENCOUNTER
Valentine Dumont is not in the clinic on 6/24/2022 and I scanned schedules for the [facility] and the [facility] and there aren't any pre op appointments available before patient's surgery date. Transferred her to the [place] location to see if they have any availability.  Teri Olvera MA  [facility]  2nd Floor  Primary Care

## 2022-06-24 ENCOUNTER — LAB (OUTPATIENT)
Dept: URGENT CARE | Facility: URGENT CARE | Age: 62
End: 2022-06-24
Payer: COMMERCIAL

## 2022-06-24 DIAGNOSIS — Z20.822 ENCOUNTER FOR LABORATORY TESTING FOR COVID-19 VIRUS: ICD-10-CM

## 2022-06-24 PROCEDURE — U0005 INFEC AGEN DETEC AMPLI PROBE: HCPCS

## 2022-06-24 PROCEDURE — 99207 PR NO CHARGE LOS: CPT

## 2022-06-24 PROCEDURE — U0003 INFECTIOUS AGENT DETECTION BY NUCLEIC ACID (DNA OR RNA); SEVERE ACUTE RESPIRATORY SYNDROME CORONAVIRUS 2 (SARS-COV-2) (CORONAVIRUS DISEASE [COVID-19]), AMPLIFIED PROBE TECHNIQUE, MAKING USE OF HIGH THROUGHPUT TECHNOLOGIES AS DESCRIBED BY CMS-2020-01-R: HCPCS

## 2022-06-25 LAB — SARS-COV-2 RNA RESP QL NAA+PROBE: NEGATIVE

## 2022-06-27 ENCOUNTER — OFFICE VISIT (OUTPATIENT)
Dept: FAMILY MEDICINE | Facility: CLINIC | Age: 62
End: 2022-06-27
Payer: COMMERCIAL

## 2022-06-27 VITALS
BODY MASS INDEX: 38.51 KG/M2 | TEMPERATURE: 98 F | WEIGHT: 206.6 LBS | OXYGEN SATURATION: 96 % | HEART RATE: 92 BPM | DIASTOLIC BLOOD PRESSURE: 88 MMHG | SYSTOLIC BLOOD PRESSURE: 162 MMHG

## 2022-06-27 DIAGNOSIS — M53.3 SACROILIAC JOINT PAIN: ICD-10-CM

## 2022-06-27 DIAGNOSIS — I10 UNCONTROLLED HYPERTENSION: ICD-10-CM

## 2022-06-27 DIAGNOSIS — Z01.818 PREOP GENERAL PHYSICAL EXAM: Primary | ICD-10-CM

## 2022-06-27 LAB
ALBUMIN SERPL-MCNC: 4.1 G/DL (ref 3.4–5)
ALP SERPL-CCNC: 99 U/L (ref 40–150)
ALT SERPL W P-5'-P-CCNC: 44 U/L (ref 0–50)
ANION GAP SERPL CALCULATED.3IONS-SCNC: 5 MMOL/L (ref 3–14)
AST SERPL W P-5'-P-CCNC: 22 U/L (ref 0–45)
BASOPHILS # BLD AUTO: 0.1 10E3/UL (ref 0–0.2)
BASOPHILS NFR BLD AUTO: 1 %
BILIRUB SERPL-MCNC: 0.4 MG/DL (ref 0.2–1.3)
BUN SERPL-MCNC: 9 MG/DL (ref 7–30)
CALCIUM SERPL-MCNC: 9.5 MG/DL (ref 8.5–10.1)
CHLORIDE BLD-SCNC: 105 MMOL/L (ref 94–109)
CO2 SERPL-SCNC: 28 MMOL/L (ref 20–32)
CREAT SERPL-MCNC: 0.72 MG/DL (ref 0.52–1.04)
EOSINOPHIL # BLD AUTO: 0.1 10E3/UL (ref 0–0.7)
EOSINOPHIL NFR BLD AUTO: 1 %
ERYTHROCYTE [DISTWIDTH] IN BLOOD BY AUTOMATED COUNT: 13 % (ref 10–15)
GFR SERPL CREATININE-BSD FRML MDRD: >90 ML/MIN/1.73M2
GLUCOSE BLD-MCNC: 114 MG/DL (ref 70–99)
HCT VFR BLD AUTO: 44.5 % (ref 35–47)
HGB BLD-MCNC: 15 G/DL (ref 11.7–15.7)
INR PPP: 1.04 (ref 0.85–1.15)
LYMPHOCYTES # BLD AUTO: 2.9 10E3/UL (ref 0.8–5.3)
LYMPHOCYTES NFR BLD AUTO: 22 %
MCH RBC QN AUTO: 32.6 PG (ref 26.5–33)
MCHC RBC AUTO-ENTMCNC: 33.7 G/DL (ref 31.5–36.5)
MCV RBC AUTO: 97 FL (ref 78–100)
MONOCYTES # BLD AUTO: 0.9 10E3/UL (ref 0–1.3)
MONOCYTES NFR BLD AUTO: 7 %
NEUTROPHILS # BLD AUTO: 9.1 10E3/UL (ref 1.6–8.3)
NEUTROPHILS NFR BLD AUTO: 70 %
PLATELET # BLD AUTO: 339 10E3/UL (ref 150–450)
POTASSIUM BLD-SCNC: 3.8 MMOL/L (ref 3.4–5.3)
PROT SERPL-MCNC: 7.7 G/DL (ref 6.8–8.8)
RBC # BLD AUTO: 4.6 10E6/UL (ref 3.8–5.2)
SODIUM SERPL-SCNC: 138 MMOL/L (ref 133–144)
WBC # BLD AUTO: 13 10E3/UL (ref 4–11)

## 2022-06-27 PROCEDURE — 85610 PROTHROMBIN TIME: CPT | Performed by: FAMILY MEDICINE

## 2022-06-27 PROCEDURE — 93000 ELECTROCARDIOGRAM COMPLETE: CPT | Performed by: FAMILY MEDICINE

## 2022-06-27 PROCEDURE — 80053 COMPREHEN METABOLIC PANEL: CPT | Performed by: FAMILY MEDICINE

## 2022-06-27 PROCEDURE — 99215 OFFICE O/P EST HI 40 MIN: CPT | Performed by: FAMILY MEDICINE

## 2022-06-27 PROCEDURE — 36415 COLL VENOUS BLD VENIPUNCTURE: CPT | Performed by: FAMILY MEDICINE

## 2022-06-27 PROCEDURE — 85025 COMPLETE CBC W/AUTO DIFF WBC: CPT | Performed by: FAMILY MEDICINE

## 2022-06-27 ASSESSMENT — ASTHMA QUESTIONNAIRES
QUESTION_1 LAST FOUR WEEKS HOW MUCH OF THE TIME DID YOUR ASTHMA KEEP YOU FROM GETTING AS MUCH DONE AT WORK, SCHOOL OR AT HOME: A LITTLE OF THE TIME
QUESTION_3 LAST FOUR WEEKS HOW OFTEN DID YOUR ASTHMA SYMPTOMS (WHEEZING, COUGHING, SHORTNESS OF BREATH, CHEST TIGHTNESS OR PAIN) WAKE YOU UP AT NIGHT OR EARLIER THAN USUAL IN THE MORNING: NOT AT ALL
ACT_TOTALSCORE: 22
ACT_TOTALSCORE: 22
QUESTION_5 LAST FOUR WEEKS HOW WOULD YOU RATE YOUR ASTHMA CONTROL: COMPLETELY CONTROLLED
QUESTION_4 LAST FOUR WEEKS HOW OFTEN HAVE YOU USED YOUR RESCUE INHALER OR NEBULIZER MEDICATION (SUCH AS ALBUTEROL): ONCE A WEEK OR LESS
QUESTION_2 LAST FOUR WEEKS HOW OFTEN HAVE YOU HAD SHORTNESS OF BREATH: ONCE OR TWICE A WEEK

## 2022-06-27 NOTE — PATIENT INSTRUCTIONS
Preparing for Your Surgery  Getting started  A nurse will call you to review your health history and instructions. They will give you an arrival time based on your scheduled surgery time. Please be ready to share:    Your doctor's clinic name and phone number    Your medical, surgical and anesthesia history    A list of allergies and sensitivities    A list of medicines, including herbal treatments and over-the-counter drugs    Whether the patient has a legal guardian (ask how to send us the papers in advance)  Please tell us if you're pregnant--or if there's any chance you might be pregnant. Some surgeries may injure a fetus (unborn baby), so they require a pregnancy test. Surgeries that are safe for a fetus don't always need a test, and you can choose whether to have one.   If you have a child who's having surgery, please ask for a copy of Preparing for Your Child's Surgery.    Preparing for surgery    Within 30 days of surgery: Have a pre-op exam (sometimes called an H&P, or History and Physical). This can be done at a clinic or pre-operative center.  ? If you're having a , you may not need this exam. Talk to your care team.    At your pre-op exam, talk to your care team about all medicines you take. If you need to stop any medicines before surgery, ask when to start taking them again.  ? We do this for your safety. Many medicines can make you bleed too much during surgery. Some change how well surgery (anesthesia) drugs work.    Call your insurance company to let them know you're having surgery. (If you don't have insurance, call [phone].)    Call your clinic if there's any change in your health. This includes signs of a cold or flu (sore throat, runny nose, cough, rash, fever). It also includes a scrape or scratch near the surgery site.    If you have questions on the day of surgery, call your hospital or surgery center.  COVID testing  You may need to be tested for COVID-19 before having  surgery. If so, we will give you instructions.  Eating and drinking guidelines  For your safety: Unless your surgeon tells you otherwise, follow the guidelines below.    Eat and drink as usual until 8 hours before surgery. After that, no food or milk.    Drink clear liquids until 2 hours before surgery. These are liquids you can see through, like water, Gatorade and Propel Water. You may also have black coffee and tea (no cream or milk).    Nothing by mouth within 2 hours of surgery. This includes gum, candy and breath mints.    If you drink alcohol: Stop drinking it the night before surgery.    If your care team tells you to take medicine on the morning of surgery, it's okay to take it with a sip of water.  Preventing infection    Shower or bathe the night before and morning of your surgery. Follow the instructions your clinic gave you. (If no instructions, use regular soap.)    Don't shave or clip hair near your surgery site. We'll remove the hair if needed.    Don't smoke or vape the morning of surgery. You may chew nicotine gum up to 2 hours before surgery. A nicotine patch is okay.  ? Note: Some surgeries require you to completely quit smoking and nicotine. Check with your surgeon.    Your care team will make every effort to keep you safe from infection. We will:  ? Clean our hands often with soap and water (or an alcohol-based hand rub).  ? Clean the skin at your surgery site with a special soap that kills germs.  ? Give you a special gown to keep you warm. (Cold raises the risk of infection.)  ? Wear special hair covers, masks, gowns and gloves during surgery.  ? Give antibiotic medicine, if prescribed. Not all surgeries need antibiotics.  What to bring on the day of surgery    Photo ID and insurance card    Copy of your health care directive, if you have one    Glasses and hearing aides (bring cases)  ? You can't wear contacts during surgery    Inhaler and eye drops, if you use them (tell us about these when  you arrive)    CPAP machine or breathing device, if you use them    A few personal items, if spending the night    If you have . . .  ? A pacemaker, ICD (cardiac defibrillator) or other implant: Bring the ID card.  ? An implanted stimulator: Bring the remote control.  ? A legal guardian: Bring a copy of the certified (court-stamped) guardianship papers.  Please remove any jewelry, including body piercings. Leave jewelry and other valuables at home.  If you're going home the day of surgery    You must have a responsible adult drive you home. They should stay with you overnight as well.    If you don't have someone to stay with you, and you aren't safe to go home alone, we may keep you overnight. Insurance often won't pay for this.  After surgery  If it's hard to control your pain or you need more pain medicine, please call your surgeon's office.  Questions?   If you have any questions for your care team, list them here: _________________________________________________________________________________________________________________________________________________________________________ ____________________________________ ____________________________________ ____________________________________  For informational purposes only. Not to replace the advice of your health care provider. Copyright   2003, 2019 [facility]. All rights reserved. Clinically reviewed by Breonna Rawls MD. [employer] [id number] - REV 07/21.

## 2022-06-27 NOTE — PROGRESS NOTES
[facility]  [address]  [place] [address]  Phone: [phone]  Primary Provider: Belem Dumont  Pre-op Performing Provider: MADHAVI MEDINA      PREOPERATIVE EVALUATION:  Today's date: 6/27/2022    Margo Inman is a 62 year old female who presents for a preoperative evaluation.    Surgical Information:  Surgery/Procedure: Sacroiliac Joint Infusion  Surgery Location: [facility]  Surgeon: Dr. Barba  Surgery Date: 06/28/2022  Time of Surgery: 11:15am  Where patient plans to recover: At home with family  Fax number for surgical facility:   [phone]  Fax to both    Type of Anesthesia Anticipated: to be determined    Assessment & Plan     The proposed surgical procedure is considered INTERMEDIATE risk.      ICD-10-CM    1. Preop general physical exam  Z01.818 Comprehensive metabolic panel     CBC with Platelets & Differential     INR     EKG 12-lead complete w/read - Clinics   2. Uncontrolled hypertension  I10 Comprehensive metabolic panel   3. Sacroiliac joint pain  M53.3      Patient presents for preop evaluation in anticipation for SI joint fusion.  Procedure is tomorrow.  Per surgical documentation she requires the above lab test as well as an EKG.       Risks and Recommendations:  The patient has the following additional risks and recommendations for perioperative complications:   - No identified additional risk factors other than previously addressed    Medication Instructions:  Patient is to take all scheduled medications on the day of surgery    RECOMMENDATION:  APPROVAL GIVEN to proceed with proposed procedure, without further diagnostic evaluation.    Review of external notes as documented above                   Subjective     HPI related to upcoming procedure: Patient presents for preop evaluation in anticipation for SI joint fusion.  Procedure is tomorrow.  Per surgical documentation she requires the above lab  test as well as an EKG.    Patient has a history of uncontrolled hypertension.  He states her normal breath blood pressures are systolically 140s to 150s over 80s to 90s she takes losartan 25 mg once daily.    She has a long history of tobacco use.  She is trying to quit smoking with Chantix.    Preop Questions 6/24/2022   1. Have you ever had a heart attack or stroke? No   2. Have you ever had surgery on your heart or blood vessels, such as a stent placement, a coronary artery bypass, or surgery on an artery in your head, neck, heart, or legs? No   3. Do you have chest pain with activity? No   4. Do you have a history of  heart failure? No   5. Do you currently have a cold, bronchitis or symptoms of other infection? No   6. Do you have a cough, shortness of breath, or wheezing? No   7. Do you or anyone in your family have previous history of blood clots? UNKNOWN -    8. Do you or does anyone in your family have a serious bleeding problem such as prolonged bleeding following surgeries or cuts? No   9. Have you ever had problems with anemia or been told to take iron pills? YES -    10. Have you had any abnormal blood loss such as black, tarry or bloody stools, or abnormal vaginal bleeding? No   11. Have you ever had a blood transfusion? YES -    11a. Have you ever had a transfusion reaction? No   12. Are you willing to have a blood transfusion if it is medically needed before, during, or after your surgery? Yes   13. Have you or any of your relatives ever had problems with anesthesia? YES -    14. Do you have sleep apnea, excessive snoring or daytime drowsiness? No   15. Do you have any artifical heart valves or other implanted medical devices like a pacemaker, defibrillator, or continuous glucose monitor? No   16. Do you have artificial joints? YES -    17. Are you allergic to latex? No   18. Is there any chance that you may be pregnant? No       Health Care Directive:  Patient does not have a Health Care Directive  or Living Will: Discussed advance care planning with patient; information given to patient to review.    Preoperative Review of :   reviewed - controlled substances reflected in medication list.      Status of Chronic Conditions:  ASTHMA - Patient has a longstanding history of moderate-severe Asthma . Patient has been doing well overall noting NO SYMPTOMS and continues on medication regimen consisting of prn albuterol without adverse reactions or side effects.     HYPERTENSION - Patient has longstanding history of HTN , currently denies any symptoms referable to elevated blood pressure. Specifically denies chest pain, palpitations, dyspnea, orthopnea, PND or peripheral edema. Blood pressure readings have been in normal range. Current medication regimen is as listed below. Patient denies any side effects of medication.       Review of Systems  Constitutional, neuro, ENT, endocrine, pulmonary, cardiac, gastrointestinal, genitourinary, musculoskeletal, integument and psychiatric systems are negative, except as otherwise noted.    Patient Active Problem List    Diagnosis Date Noted     Diverticulosis of sigmoid colon 06/15/2020     Priority: Medium     Obesity (BMI 35.0-39.9) with comorbidity (H) 04/05/2019     Priority: Medium     Papanicolaou smear of cervix with low grade squamous intraepithelial lesion (LGSIL) 04/18/2018     Priority: Medium     Essential hypertension with goal blood pressure less than 140/90 03/29/2018     Priority: Medium     Adjustment disorder with mixed anxiety and depressed mood 03/29/2018     Priority: Medium     S/P cervical spinal fusion 03/30/2017     Priority: Medium     Radiculopathy of cervical spine 03/20/2017     Priority: Medium     Cervical high risk HPV (human papillomavirus) test positive 01/10/2017     Priority: Medium     2000, 2003, and 2009 NIL paps. in Care Everywhere.  9/5/13 NIL pap  1/10/17 NIL pap, + HR HPV (not 16 or 18). Plan: cotest in 1 year, due by  1/10/18  3/29/18 LSIL pap, + HR HPV (not 16 or 18). Plan: colp bef 6/29/18 4/18/18 [place] bx: negative. Plan: cotest in 6 mo, per provider.   11/9/18 Lost to follow-up for pap tracking  9/11/2019 Pap: NIL/neg HR HPV. Plan cotest in 3 years.         Microscopic hematuria 06/02/2016     Priority: Medium     Recommend f/u with primary MD for UA, blood pressure, and urine cytology at 6, 12, 24, and 36 months from this time.  If negative for 3 years then no further monitoring needed.      Refer back to urology for any of the following:              -cytology is suspicious or positive              -gross hematuria              -irritative voiding symptoms with evidence of infection/ worsening dysuria or urgency.     If persistent microscopic hematuria WITH: hypertension, proteinuria, or glomerular/dysmorphic RBCs in urine then evaluate for primary renal disease/refer instead to nephrology.        Tobacco dependency 03/06/2015     Priority: Medium     S/P carpal tunnel release 07/22/2014     Priority: Medium     Trigger thumb 07/22/2014     Priority: Medium     CTS (carpal tunnel syndrome) - bilateral 05/27/2014     Priority: Medium     S/P total knee arthroplasty juan 01/23/2014     Priority: Medium     Acute posthemorrhagic anemia 01/23/2014     Priority: Medium     Muscle spasm 01/23/2014     Priority: Medium     RLS (restless legs syndrome) 01/22/2012     Priority: Medium     Migraine 12/06/2011     Priority: Medium     Pseudogout 05/05/2011     Priority: Medium     OA (OSTEOARTHRITIS) OF KNEE - bilateral 05/05/2011     Priority: Medium     Hyperlipidemia LDL goal <130 10/31/2010     Priority: Medium     Asthma 12/16/1996     Priority: Medium      Past Medical History:   Diagnosis Date     Arthritis      Cervical high risk HPV (human papillomavirus) test positive 1/10/2017    1/10/17 NIL pap/+ HR HPV (not 16 or 18). Plan: cotest in 1 year, due by 1/10/18      Chronic infection     HX of MRSA. 2 neg swabs at [facility] in 2006 and 2007.     History of blood transfusion      Numbness and tingling     Right arm     PONV (postoperative nausea and vomiting)      Past Surgical History:   Procedure Laterality Date     APPENDECTOMY       ARTHROSCOPY KNEE  9/16/2011    Procedure:ARTHROSCOPY KNEE; left knee arthroscopy with debridement, open lateral patellar spur excision; Surgeon:LUIS A MONTOYA; Location:[facility]     COLONOSCOPY N/A 2/16/2016    Procedure: COLONOSCOPY;  Surgeon: Belem Khalil MD;  Location: [facility]     COLONOSCOPY N/A 2/16/2016    Procedure: COMBINED COLONOSCOPY, SINGLE OR MULTIPLE BIOPSY/POLYPECTOMY BY BIOPSY;  Surgeon: Belem Khalil MD;  Location: [facility]     COLONOSCOPY N/A 6/15/2020    Procedure: Colonoscopy, With Polypectomy And Biopsy;  Surgeon: Torres Gallegos DO;  Location: [facility]     COLONOSCOPY WITH CO2 INSUFFLATION N/A 2/16/2016    Procedure: COLONOSCOPY WITH CO2 INSUFFLATION;  Surgeon: Belem Khalil MD;  Location: [facility]     COLONOSCOPY WITH CO2 INSUFFLATION N/A 6/15/2020    Procedure: COLONOSCOPY, WITH CO2 INSUFFLATION;  Surgeon: Torres Gallegos DO;  Location: [facility]     CYSTOSCOPY  2016    microscopic hematuria     DECOMPRESSION, FUSION CERVICAL ANTERIOR ONE LEVEL, COMBINED N/A 3/30/2017    Procedure: COMBINED DECOMPRESSION, FUSION CERVICAL ANTERIOR ONE LEVEL;  Surgeon: Joseph Klein MD;  Location: [facility] OR     ECTOPIC PREGNANCY SURGERY       ENT SURGERY       GYN SURGERY       HC INCISION TENDON SHEATH FINGER Left 7/11/14    Thumb TF release     HC KNEE SCOPE,MED/LAT MENISECTOMY  9/16/11    left, with partial medial menisectomy ONLY     HC REVISE MEDIAN N/CARPAL TUNNEL SURG Left 7/11/14    PRIMARY - not revision     ORTHOPEDIC SURGERY       RELEASE CARPAL TUNNEL  7/11/2014    Procedure: RELEASE CARPAL TUNNEL;  Surgeon: Luis A Montoya MD;  Location: [facility]     RELEASE CARPAL TUNNEL Right 11/7/2014    Procedure: RELEASE CARPAL TUNNEL;   Surgeon: Rg Franco MD;  Location: [facility]     RELEASE TRIGGER FINGER  7/11/2014    Procedure: RELEASE TRIGGER FINGER;  Surgeon: Rg Franco MD;  Location: [facility]     RELEASE TRIGGER FINGER Right 11/7/2014    Procedure: RELEASE TRIGGER FINGER;  Surgeon: Rg Franco MD;  Location: [facility]     tonsils       [facility] PART REMV FEMUR/PROX TIB/FIB  9/16/11    left, open lateral patellar bone spur excision     ZZC TOTAL KNEE ARTHROPLASTY  1/17/14    Bilateral     Current Outpatient Medications   Medication Sig Dispense Refill     acetaminophen (TYLENOL) 500 MG tablet Take 500-1,000 mg by mouth every 6 hours as needed.       albuterol (PROAIR HFA/PROVENTIL HFA/VENTOLIN HFA) 108 (90 Base) MCG/ACT inhaler Inhale 2 puffs into the lungs every 4 hours as needed for shortness of breath / dyspnea or wheezing 18 g 3     atorvastatin (LIPITOR) 80 MG tablet TAKE 1 TABLET(80 MG) BY MOUTH DAILY 90 tablet 1     cyclobenzaprine (FLEXERIL) 10 MG tablet TAKE 1 TABLET(10 MG) BY MOUTH EVERY NIGHT AS NEEDED FOR MUSCLE SPASMS 90 tablet 3     diclofenac (VOLTAREN) 75 MG EC tablet TAKE 1 TABLET(75 MG) BY MOUTH TWICE DAILY 180 tablet 0     HYDROcodone-acetaminophen (NORCO) 5-325 MG tablet        losartan (COZAAR) 25 MG tablet Take 1 tablet (25 mg) by mouth daily 90 tablet 3     Misc. Devices (ROLLATOR ULTRA-LIGHT) MISC Walker with front wheels for home use.       Omega-3 Fatty Acids (FISH OIL PO)        tiZANidine (ZANAFLEX) 2 MG tablet Take 2-4 mg by mouth       varenicline (CHANTIX EVANGELINA) 0.5 MG X 11 & 1 MG X 42 tablet Take 0.5 mg tab daily for 3 days, THEN 0.5 mg tab twice daily for 4 days, THEN 1 mg twice daily. 53 tablet 0     varenicline (CHANTIX) 1 MG tablet Take 1 tablet (1 mg) by mouth 2 times daily 60 tablet 1       Allergies   Allergen Reactions     Wasp Venom Protein Anaphylaxis     Bee Anaphylaxis     Erythromycin Hives     Wasps [Hornets] Anaphylaxis     Shellfish Allergy Nausea and Vomiting and Rash      Shellfish-Derived Products Rash and Nausea and Vomiting        Social History     Tobacco Use     Smoking status: Current Every Day Smoker     Packs/day: 1.00     Years: 15.00     Pack years: 15.00     Types: Cigarettes     Smokeless tobacco: Never Used     Tobacco comment: Started Chantix   Substance Use Topics     Alcohol use: Yes     Comment: socially       History   Drug Use No         Objective     BP (!) 162/88   Pulse 92   Temp 98  F (36.7  C) (Oral)   Wt 93.7 kg (206 lb 9.6 oz)   LMP 09/16/2011   SpO2 96%   BMI 38.51 kg/m      Physical Exam    GENERAL APPEARANCE: healthy, alert and no distress     EYES: EOMI, PERRL     HENT: ear canals and TM's normal and nose and mouth without ulcers or lesions     NECK: no adenopathy, no asymmetry, masses, or scars and thyroid normal to palpation     RESP: lungs clear to auscultation - no rales, rhonchi or wheezes     CV: regular rates and rhythm, normal S1 S2, no S3 or S4 and no murmur, click or rub     ABDOMEN:  soft, nontender, no HSM or masses and bowel sounds normal     MS: extremities normal- no gross deformities noted, no evidence of inflammation in joints, FROM in all extremities.     SKIN: no suspicious lesions or rashes     NEURO: Normal strength and tone, sensory exam grossly normal, mentation intact and speech normal     PSYCH: mentation appears normal. and affect normal/bright     LYMPHATICS: No cervical adenopathy    Recent Labs   Lab Test 01/12/22  1632 04/27/21  1134 11/03/20  1218   HGB  --  14.3 13.1    137 140   POTASSIUM 4.1 4.2 3.8   CR 0.70 0.72 0.50*        Diagnostics:  Labs pending at this time.  Results will be reviewed when available.   EKG required for Per surgical team request and not completed in the last 90 days.     Revised Cardiac Risk Index (RCRI):  The patient has the following serious cardiovascular risks for perioperative complications:   - No serious cardiac risks = 0 points     RCRI Interpretation: 0 points: Class I (very  low risk - 0.4% complication rate)           Signed Electronically by: Oli Sweet MD  Copy of this evaluation report is provided to requesting physician.

## 2022-08-24 ENCOUNTER — TELEPHONE (OUTPATIENT)
Dept: FAMILY MEDICINE | Facility: CLINIC | Age: 62
End: 2022-08-24

## 2022-08-24 DIAGNOSIS — F17.200 TOBACCO DEPENDENCY: ICD-10-CM

## 2022-08-24 NOTE — LETTER
August 25, 2022          Margo Inman  [address] [facility] [address]            Dear Margo Inman,      At [facility] we care about your health and are committed to providing quality patient care. Regular appointments are a vital part of the care and management of your health and can help prevent many of the complications that can occur.      It has come to our attention that you are due for Blood pressure follow up .  Please call [facility] at [phone] soon to schedule your follow up appointment.        Sincerely,      [facility] Care Team

## 2022-08-24 NOTE — TELEPHONE ENCOUNTER
Patient Quality Outreach    Patient is due for the following:   Hypertension -  BP check    Next Steps:   Patient has upcoming appointment, these items will be addressed at that time.    Type of outreach:    none      Questions for provider review:    None     Diane L. Schoenherr, RN

## 2022-08-25 RX ORDER — VARENICLINE TARTRATE 1 MG/1
TABLET, FILM COATED ORAL
Qty: 180 TABLET | Refills: 0 | Status: SHIPPED | OUTPATIENT
Start: 2022-08-25 | End: 2022-09-12

## 2022-08-25 NOTE — TELEPHONE ENCOUNTER
LVM for pt to schedule appointment / sent letter. If/ when patient calls back please assist with scheduling blood pressure re check appointment.

## 2022-09-12 ENCOUNTER — ANCILLARY PROCEDURE (OUTPATIENT)
Dept: GENERAL RADIOLOGY | Facility: CLINIC | Age: 62
End: 2022-09-12
Attending: NURSE PRACTITIONER
Payer: COMMERCIAL

## 2022-09-12 ENCOUNTER — OFFICE VISIT (OUTPATIENT)
Dept: FAMILY MEDICINE | Facility: CLINIC | Age: 62
End: 2022-09-12
Payer: COMMERCIAL

## 2022-09-12 VITALS
HEIGHT: 62 IN | DIASTOLIC BLOOD PRESSURE: 80 MMHG | BODY MASS INDEX: 39.01 KG/M2 | WEIGHT: 212 LBS | SYSTOLIC BLOOD PRESSURE: 138 MMHG | HEART RATE: 87 BPM | OXYGEN SATURATION: 94 % | RESPIRATION RATE: 20 BRPM | TEMPERATURE: 97.7 F

## 2022-09-12 DIAGNOSIS — F17.200 NICOTINE DEPENDENCE, UNCOMPLICATED, UNSPECIFIED NICOTINE PRODUCT TYPE: ICD-10-CM

## 2022-09-12 DIAGNOSIS — Z79.899 ENCOUNTER FOR LONG-TERM (CURRENT) USE OF MEDICATIONS: ICD-10-CM

## 2022-09-12 DIAGNOSIS — E78.5 HYPERLIPIDEMIA LDL GOAL <130: ICD-10-CM

## 2022-09-12 DIAGNOSIS — I10 ESSENTIAL HYPERTENSION WITH GOAL BLOOD PRESSURE LESS THAN 140/90: ICD-10-CM

## 2022-09-12 DIAGNOSIS — E83.52 HYPERCALCEMIA: ICD-10-CM

## 2022-09-12 DIAGNOSIS — J45.20 MILD INTERMITTENT ASTHMA WITHOUT COMPLICATION: ICD-10-CM

## 2022-09-12 DIAGNOSIS — Z01.818 PREOP GENERAL PHYSICAL EXAM: Primary | ICD-10-CM

## 2022-09-12 DIAGNOSIS — E66.01 MORBID OBESITY (H): ICD-10-CM

## 2022-09-12 DIAGNOSIS — M54.16 LUMBAR RADICULOPATHY: ICD-10-CM

## 2022-09-12 LAB
ANION GAP SERPL CALCULATED.3IONS-SCNC: 4 MMOL/L (ref 3–14)
BUN SERPL-MCNC: 22 MG/DL (ref 7–30)
CALCIUM SERPL-MCNC: 10.5 MG/DL (ref 8.5–10.1)
CHLORIDE BLD-SCNC: 104 MMOL/L (ref 94–109)
CO2 SERPL-SCNC: 31 MMOL/L (ref 20–32)
CREAT SERPL-MCNC: 0.59 MG/DL (ref 0.52–1.04)
ERYTHROCYTE [DISTWIDTH] IN BLOOD BY AUTOMATED COUNT: 11.9 % (ref 10–15)
GFR SERPL CREATININE-BSD FRML MDRD: >90 ML/MIN/1.73M2
GLUCOSE BLD-MCNC: 101 MG/DL (ref 70–99)
HCT VFR BLD AUTO: 43.4 % (ref 35–47)
HGB BLD-MCNC: 14.4 G/DL (ref 11.7–15.7)
MCH RBC QN AUTO: 32.2 PG (ref 26.5–33)
MCHC RBC AUTO-ENTMCNC: 33.2 G/DL (ref 31.5–36.5)
MCV RBC AUTO: 97 FL (ref 78–100)
PLATELET # BLD AUTO: 318 10E3/UL (ref 150–450)
POTASSIUM BLD-SCNC: 4.5 MMOL/L (ref 3.4–5.3)
RBC # BLD AUTO: 4.47 10E6/UL (ref 3.8–5.2)
SODIUM SERPL-SCNC: 139 MMOL/L (ref 133–144)
WBC # BLD AUTO: 11.9 10E3/UL (ref 4–11)

## 2022-09-12 PROCEDURE — 71046 X-RAY EXAM CHEST 2 VIEWS: CPT | Mod: TC | Performed by: RADIOLOGY

## 2022-09-12 PROCEDURE — 99214 OFFICE O/P EST MOD 30 MIN: CPT | Performed by: NURSE PRACTITIONER

## 2022-09-12 PROCEDURE — 36415 COLL VENOUS BLD VENIPUNCTURE: CPT | Performed by: NURSE PRACTITIONER

## 2022-09-12 PROCEDURE — 80307 DRUG TEST PRSMV CHEM ANLYZR: CPT | Performed by: NURSE PRACTITIONER

## 2022-09-12 PROCEDURE — 93000 ELECTROCARDIOGRAM COMPLETE: CPT | Performed by: NURSE PRACTITIONER

## 2022-09-12 PROCEDURE — 80048 BASIC METABOLIC PNL TOTAL CA: CPT | Performed by: NURSE PRACTITIONER

## 2022-09-12 PROCEDURE — 85027 COMPLETE CBC AUTOMATED: CPT | Performed by: NURSE PRACTITIONER

## 2022-09-12 RX ORDER — ATORVASTATIN CALCIUM 80 MG/1
TABLET, FILM COATED ORAL
Qty: 90 TABLET | Refills: 0 | Status: SHIPPED | OUTPATIENT
Start: 2022-09-12 | End: 2023-03-30

## 2022-09-12 RX ORDER — VARENICLINE TARTRATE 1 MG/1
TABLET, FILM COATED ORAL
Qty: 180 TABLET | Refills: 0 | Status: SHIPPED | OUTPATIENT
Start: 2022-09-12 | End: 2023-10-10

## 2022-09-12 ASSESSMENT — ASTHMA QUESTIONNAIRES
QUESTION_5 LAST FOUR WEEKS HOW WOULD YOU RATE YOUR ASTHMA CONTROL: COMPLETELY CONTROLLED
QUESTION_2 LAST FOUR WEEKS HOW OFTEN HAVE YOU HAD SHORTNESS OF BREATH: ONCE OR TWICE A WEEK
QUESTION_1 LAST FOUR WEEKS HOW MUCH OF THE TIME DID YOUR ASTHMA KEEP YOU FROM GETTING AS MUCH DONE AT WORK, SCHOOL OR AT HOME: A LITTLE OF THE TIME
QUESTION_4 LAST FOUR WEEKS HOW OFTEN HAVE YOU USED YOUR RESCUE INHALER OR NEBULIZER MEDICATION (SUCH AS ALBUTEROL): ONCE A WEEK OR LESS
ACT_TOTALSCORE: 21
ACT_TOTALSCORE: 21
QUESTION_3 LAST FOUR WEEKS HOW OFTEN DID YOUR ASTHMA SYMPTOMS (WHEEZING, COUGHING, SHORTNESS OF BREATH, CHEST TIGHTNESS OR PAIN) WAKE YOU UP AT NIGHT OR EARLIER THAN USUAL IN THE MORNING: ONCE OR TWICE

## 2022-09-12 ASSESSMENT — PAIN SCALES - GENERAL: PAINLEVEL: SEVERE PAIN (6)

## 2022-09-12 NOTE — PROGRESS NOTES
[address] [address]  Phone: [phone]  Primary Provider: Belem Zavala  Pre-op Performing Provider: BELEM ZAVALA      PREOPERATIVE EVALUATION:  Today's date: 9/12/2022    Margo Inman is a 62 year old female who presents for a preoperative evaluation.    Surgical Information:  Surgery/Procedure: Lower Lumbar Fusion   Surgery Location: [facility]   Surgeon: Dr. Jey Barba  Surgery Date: 09/19/2022  Time of Surgery: 7:30AM  Where patient plans to recover: Other: At home with family  Fax number for surgical facility: [facility] Fax # [phone] and [facility] fax # [phone] attn to Dr. Barba    Type of Anesthesia Anticipated: General    Assessment & Plan     The proposed surgical procedure is considered HIGH risk.    Preop general physical exam      Lumbar radiculopathy  EKG and chest film, labs per surgeon request  - [id number] - Urine Drug Confirmation Panel (Comprehensive)  - EKG 12-lead complete w/read - Clinics  - CBC with platelets  - Basic metabolic panel  (Ca, Cl, CO2, Creat, Gluc, K, Na, BUN)  - XR Chest 2 Views    Obesity (BMI 35.0-39.9) with comorbidity (H)  Benefits of weight loss reviewed in detail, encouraged her to cut back on the carbohydrates in the diet, consume more fruits and vegetables, drink plenty of water, avoid fruit juices, sodas, get 150 min moderate exercise/week once safe to exercise following surgery.  Recheck weight in 6 months.      Mild intermittent asthma without complication  Stable, ACT=21.    Hyperlipidemia LDL goal <130  Continue Lipitor 80 mg daily, work on low chol diet, regular exercise, weight loss  - atorvastatin (LIPITOR) 80 MG tablet  Dispense: 90 tablet; Refill: 0    Essential hypertension with goal blood pressure less than 140/90  Controlled, continue to work on weight loss, regular exercise, low salt diet    Nicotine dependence,  uncomplicated, unspecified nicotine product type  Currently smoking 4-5 cigarettes/day, refilled chantix  - varenicline (CHANTIX) 1 MG tablet  Dispense: 180 tablet; Refill: 0  - [place] Quit Partner Referral    Encounter for long-term (current) use of medications  Getting UDS           Risks and Recommendations:  The patient has the following additional risks and recommendations for perioperative complications:   - No identified additional risk factors other than previously addressed    Medication Instructions:  Patient is to take all scheduled medications on the day of surgery EXCEPT for modifications listed below:   - ACE/ARB: May be continued on the day of surgery.    - diclofenac (Voltaren): HOLD 1 day before surgery.   Hold fish oil starting 7 days prior to your procedure, OK to restart medications after surgery with surgeon approval.  RECOMMENDATION:  APPROVAL GIVEN to proceed with proposed procedure, without further diagnostic evaluation.    35  minutes spent on the date of the encounter doing chart review, history and exam, documentation and further activities per the note        Subjective      HPI related to upcoming procedure: patient has long standing lumbar pain with left radiculopathy and is scheduled for POSTERIOR FUSION WITH TRANSFORAMINAL LUMBAR INTERBODY FUSION L4-5 9/19/22.      Preop Questions 9/12/2022   1. Have you ever had a heart attack or stroke? No   2. Have you ever had surgery on your heart or blood vessels, such as a stent placement, a coronary artery bypass, or surgery on an artery in your head, neck, heart, or legs? No   3. Do you have chest pain with activity? No   4. Do you have a history of  heart failure? No   5. Do you currently have a cold, bronchitis or symptoms of other infection? No   6. Do you have a cough, shortness of breath, or wheezing? No   7. Do you or anyone in your family have previous history of blood clots? UNKNOWN    8. Do you or does anyone in your family have a  "serious bleeding problem such as prolonged bleeding following surgeries or cuts? No   9. Have you ever had problems with anemia or been told to take iron pills? YES - during pregnancies   10. Have you had any abnormal blood loss such as black, tarry or bloody stools, or abnormal vaginal bleeding? No   11. Have you ever had a blood transfusion? YES - with childbirth   11a. Have you ever had a transfusion reaction? No   12. Are you willing to have a blood transfusion if it is medically needed before, during, or after your surgery? Yes   13. Have you or any of your relatives ever had problems with anesthesia? YES - nausea after surgery/difficulty waking up, \"did well with last couple surgeries\"   14. Do you have sleep apnea, excessive snoring or daytime drowsiness? No   15. Do you have any artifical heart valves or other implanted medical devices like a pacemaker, defibrillator, or continuous glucose monitor? No   16. Do you have artificial joints? YES -bilateral knees, right shoulder, cervical fusion, SI joint fusion   17. Are you allergic to latex? No   18. Is there any chance that you may be pregnant? No       Health Care Directive:  Patient does not have a Health Care Directive or Living Will: Discussed advance care planning with patient; information given to patient to review.    Preoperative Review of :   reviewed - controlled substances reflected in medication list.      Status of Chronic Conditions:  See problem list for active medical problems.  Problems all longstanding and stable, except as noted/documented.  See ROS for pertinent symptoms related to these conditions.    ASTHMA - Patient has a longstanding history of moderate-severe Asthma . Patient has been doing well overall noting NO SYMPTOMS and continues on medication regimen consisting of albue without adverse reactions or side effects.     HYPERLIPIDEMIA - Patient has a long history of significant Hyperlipidemia requiring medication for " treatment with recent good control. Patient reports no problems or side effects with the medication.     HYPERTENSION - Patient has longstanding history of HTN , currently denies any symptoms referable to elevated blood pressure. Specifically denies chest pain, palpitations, dyspnea, orthopnea, PND or peripheral edema. Blood pressure readings have not been in normal range. Current medication regimen is as listed below. Patient denies any side effects of medication.       Review of Systems  Constitutional, neuro, ENT, endocrine, pulmonary, cardiac, gastrointestinal, genitourinary, musculoskeletal, integument and psychiatric systems are negative, except as otherwise noted.    Patient Active Problem List    Diagnosis Date Noted     Diverticulosis of sigmoid colon 06/15/2020     Priority: Medium     Obesity (BMI 35.0-39.9) with comorbidity (H) 04/05/2019     Priority: Medium     Papanicolaou smear of cervix with low grade squamous intraepithelial lesion (LGSIL) 04/18/2018     Priority: Medium     Essential hypertension with goal blood pressure less than 140/90 03/29/2018     Priority: Medium     Adjustment disorder with mixed anxiety and depressed mood 03/29/2018     Priority: Medium     S/P cervical spinal fusion 03/30/2017     Priority: Medium     Radiculopathy of cervical spine 03/20/2017     Priority: Medium     Cervical high risk HPV (human papillomavirus) test positive 01/10/2017     Priority: Medium     2000, 2003, and 2009 NIL paps. in Care Everywhere.  9/5/13 NIL pap  1/10/17 NIL pap, + HR HPV (not 16 or 18). Plan: cotest in 1 year, due by 1/10/18  3/29/18 LSIL pap, + HR HPV (not 16 or 18). Plan: colp bef 6/29/18 4/18/18 Lena bx: negative. Plan: cotest in 6 mo, per provider.   11/9/18 Lost to follow-up for pap tracking  9/11/2019 Pap: NIL/neg HR HPV. Plan cotest in 3 years.         Microscopic hematuria 06/02/2016     Priority: Medium     Recommend f/u with primary MD for UA, blood pressure, and urine cytology  at 6, 12, 24, and 36 months from this time.  If negative for 3 years then no further monitoring needed.      Refer back to urology for any of the following:              -cytology is suspicious or positive              -gross hematuria              -irritative voiding symptoms with evidence of infection/ worsening dysuria or urgency.     If persistent microscopic hematuria WITH: hypertension, proteinuria, or glomerular/dysmorphic RBCs in urine then evaluate for primary renal disease/refer instead to nephrology.        Tobacco dependency 03/06/2015     Priority: Medium     S/P carpal tunnel release 07/22/2014     Priority: Medium     Trigger thumb 07/22/2014     Priority: Medium     CTS (carpal tunnel syndrome) - bilateral 05/27/2014     Priority: Medium     S/P total knee arthroplasty juan 01/23/2014     Priority: Medium     Acute posthemorrhagic anemia 01/23/2014     Priority: Medium     Muscle spasm 01/23/2014     Priority: Medium     RLS (restless legs syndrome) 01/22/2012     Priority: Medium     Migraine 12/06/2011     Priority: Medium     Pseudogout 05/05/2011     Priority: Medium     OA (OSTEOARTHRITIS) OF KNEE - bilateral 05/05/2011     Priority: Medium     Hyperlipidemia LDL goal <130 10/31/2010     Priority: Medium     Asthma 12/16/1996     Priority: Medium      Past Medical History:   Diagnosis Date     Arthritis      Cervical high risk HPV (human papillomavirus) test positive 1/10/2017    1/10/17 NIL pap/+ HR HPV (not 16 or 18). Plan: cotest in 1 year, due by 1/10/18      Chronic infection     HX of MRSA. 2 neg swabs at [facility] in 2006 and 2007.     History of blood transfusion      Numbness and tingling     Right arm     PONV (postoperative nausea and vomiting)      Past Surgical History:   Procedure Laterality Date     APPENDECTOMY       ARTHROSCOPY KNEE  9/16/2011    Procedure:ARTHROSCOPY KNEE; left knee arthroscopy with debridement, open lateral patellar spur excision; Surgeon:LUIS A MONTOYA  M; Location:[facility] OR     COLONOSCOPY N/A 2/16/2016    Procedure: COLONOSCOPY;  Surgeon: Belem Khalil MD;  Location: [facility]     COLONOSCOPY N/A 2/16/2016    Procedure: COMBINED COLONOSCOPY, SINGLE OR MULTIPLE BIOPSY/POLYPECTOMY BY BIOPSY;  Surgeon: Belem Khalil MD;  Location: [facility]     COLONOSCOPY N/A 6/15/2020    Procedure: Colonoscopy, With Polypectomy And Biopsy;  Surgeon: Torres Gallegos DO;  Location: [facility]     COLONOSCOPY WITH CO2 INSUFFLATION N/A 2/16/2016    Procedure: COLONOSCOPY WITH CO2 INSUFFLATION;  Surgeon: Belem Khalil MD;  Location: [facility]     COLONOSCOPY WITH CO2 INSUFFLATION N/A 6/15/2020    Procedure: COLONOSCOPY, WITH CO2 INSUFFLATION;  Surgeon: Torres Gallegos DO;  Location: [facility]     CYSTOSCOPY  2016    microscopic hematuria     DECOMPRESSION, FUSION CERVICAL ANTERIOR ONE LEVEL, COMBINED N/A 3/30/2017    Procedure: COMBINED DECOMPRESSION, FUSION CERVICAL ANTERIOR ONE LEVEL;  Surgeon: Joseph Klein MD;  Location: [facility] OR     ECTOPIC PREGNANCY SURGERY       ENT SURGERY       GYN SURGERY       HC INCISION TENDON SHEATH FINGER Left 7/11/14    Thumb TF release     HC KNEE SCOPE,MED/LAT MENISECTOMY  9/16/11    left, with partial medial menisectomy ONLY     HC REVISE MEDIAN N/CARPAL TUNNEL SURG Left 7/11/14    PRIMARY - not revision     ORTHOPEDIC SURGERY       RELEASE CARPAL TUNNEL  7/11/2014    Procedure: RELEASE CARPAL TUNNEL;  Surgeon: Rg Franco MD;  Location: [facility]     RELEASE CARPAL TUNNEL Right 11/7/2014    Procedure: RELEASE CARPAL TUNNEL;  Surgeon: Rg Franco MD;  Location: [facility]     RELEASE TRIGGER FINGER  7/11/2014    Procedure: RELEASE TRIGGER FINGER;  Surgeon: Rg Franco MD;  Location: [facility]     RELEASE TRIGGER FINGER Right 11/7/2014    Procedure: RELEASE TRIGGER FINGER;  Surgeon: Rg Franco MD;  Location: [facility]     tonsils       ZZC PART REMV FEMUR/PROX TIB/FIB  9/16/11    left, open  lateral patellar bone spur excision     ZZC TOTAL KNEE ARTHROPLASTY  1/17/14    Bilateral     Current Outpatient Medications   Medication Sig Dispense Refill     acetaminophen (TYLENOL) 500 MG tablet Take 500-1,000 mg by mouth every 6 hours as needed.       albuterol (PROAIR HFA/PROVENTIL HFA/VENTOLIN HFA) 108 (90 Base) MCG/ACT inhaler Inhale 2 puffs into the lungs every 4 hours as needed for shortness of breath / dyspnea or wheezing 18 g 3     atorvastatin (LIPITOR) 80 MG tablet TAKE 1 TABLET(80 MG) BY MOUTH DAILY 90 tablet 0     cyclobenzaprine (FLEXERIL) 10 MG tablet TAKE 1 TABLET(10 MG) BY MOUTH EVERY NIGHT AS NEEDED FOR MUSCLE SPASMS 90 tablet 3     diclofenac (VOLTAREN) 75 MG EC tablet TAKE 1 TABLET(75 MG) BY MOUTH TWICE DAILY 180 tablet 0     losartan (COZAAR) 25 MG tablet Take 1 tablet (25 mg) by mouth daily 90 tablet 3     Misc. Devices (ROLLATOR ULTRA-LIGHT) MISC Walker with front wheels for home use.       tiZANidine (ZANAFLEX) 2 MG tablet Take 2-4 mg by mouth       varenicline (CHANTIX) 1 MG tablet TAKE 1 TABLET(1 MG) BY MOUTH TWICE DAILY 180 tablet 0     HYDROcodone-acetaminophen (NORCO) 5-325 MG tablet  (Patient not taking: Reported on 9/12/2022)       Omega-3 Fatty Acids (FISH OIL PO)  (Patient not taking: Reported on 9/12/2022)       varenicline (CHANTIX EVANGELINA) 0.5 MG X 11 & 1 MG X 42 tablet Take 0.5 mg tab daily for 3 days, THEN 0.5 mg tab twice daily for 4 days, THEN 1 mg twice daily. (Patient not taking: Reported on 9/12/2022) 53 tablet 0       Allergies   Allergen Reactions     Wasp Venom Protein Anaphylaxis     Bee Anaphylaxis     Erythromycin Hives     Wasps [Hornets] Anaphylaxis     Shellfish Allergy Nausea and Vomiting and Rash     Shellfish-Derived Products Rash and Nausea and Vomiting        Social History     Tobacco Use     Smoking status: Current Every Day Smoker     Packs/day: 1.00     Years: 15.00     Pack years: 15.00     Types: Cigarettes     Smokeless tobacco: Never Used     Tobacco  "comment: Started Chantix   Substance Use Topics     Alcohol use: Yes     Comment: socially     Family History   Problem Relation Age of Onset     Breast Cancer Maternal Grandmother      Ovarian Cancer Maternal Grandmother      Cancer - colorectal Maternal Grandfather      Colon Cancer Maternal Grandfather      Prostate Cancer Maternal Grandfather      C.A.D. Father      C.A.D. Paternal Grandfather      Lung Cancer Sister      Colon Cancer Sister      Brain Cancer Sister      History   Drug Use No         Objective     /80   Pulse 87   Temp 97.7  F (36.5  C) (Temporal)   Resp 20   Ht 1.575 m (5' 2\")   Wt 96.2 kg (212 lb)   LMP 09/16/2011   SpO2 94%   BMI 38.78 kg/m      Physical Exam    GENERAL APPEARANCE: healthy, alert and no distress     EYES: EOMI, PERRL     HENT: ear canals and TM's normal and nose and mouth without ulcers or lesions     NECK: no adenopathy, no asymmetry, masses, or scars and thyroid normal to palpation     RESP: lungs clear to auscultation - no rales, rhonchi or wheezes     CV: regular rates and rhythm, normal S1 S2, no S3 or S4 and no murmur, click or rub     ABDOMEN:  soft, nontender, no HSM or masses and bowel sounds normal     MS: extremities normal- no gross deformities noted, no evidence of inflammation in joints, FROM in all extremities.     SKIN: no suspicious lesions or rashes     NEURO: Normal strength and tone, sensory exam grossly normal, mentation intact and speech normal     PSYCH: mentation appears normal. and affect normal/bright     LYMPHATICS: No cervical adenopathy    Recent Labs   Lab Test 06/27/22  1102 01/12/22  1632 04/27/21  1134   HGB 15.0  --  14.3     --   --    INR 1.04  --   --     138 137   POTASSIUM 3.8 4.1 4.2   CR 0.72 0.70 0.72      Component      Latest Ref Rng & Units 9/12/2022   Sodium      133 - 144 mmol/L 139   Potassium      3.4 - 5.3 mmol/L 4.5   Chloride      94 - 109 mmol/L 104   Carbon Dioxide      20 - 32 mmol/L 31   Anion " Gap      3 - 14 mmol/L 4   Urea Nitrogen      7 - 30 mg/dL 22   Creatinine      0.52 - 1.04 mg/dL 0.59   Calcium      8.5 - 10.1 mg/dL 10.5 (H)   Glucose      70 - 99 mg/dL 101 (H)   GFR Estimate      >60 mL/min/1.73m2 >90   WBC      4.0 - 11.0 10e3/uL 11.9 (H)   RBC Count      3.80 - 5.20 10e6/uL 4.47   Hemoglobin      11.7 - 15.7 g/dL 14.4   Hematocrit      35.0 - 47.0 % 43.4   MCV      78 - 100 fL 97   MCH      26.5 - 33.0 pg 32.2   MCHC      31.5 - 36.5 g/dL 33.2   RDW      10.0 - 15.0 % 11.9   Platelet Count      150 - 450 10e3/uL 318       Diagnostics:  Labs pending at this time.  Results will be reviewed when available.   EKG: appears normal, NSR, normal axis, normal intervals, no acute ST/T changes c/w ischemia, no LVH by voltage criteria, unchanged from previous tracings    Revised Cardiac Risk Index (RCRI):  The patient has the following serious cardiovascular risks for perioperative complications:   - No serious cardiac risks = 0 points     RCRI Interpretation: 0 points: Class I (very low risk - 0.4% complication rate)           Signed Electronically by: JOSE R Sarabia CNP  Copy of this evaluation report is provided to requesting physician.

## 2022-09-12 NOTE — PATIENT INSTRUCTIONS
At [facility], we strive to deliver an exceptional experience to you, every time we see you. If you receive a survey, please complete it as we do value your feedback.  If you have MyChart, you can expect to receive results automatically within 24 hours of their completion.  Your provider will send a note interpreting your results as well.   If you do not have MyChart, you should receive your results in about a week by mail.    Your care team:                            Family Medicine Internal Medicine   MD David Wagoner MD Shantel Branch-Fleming, MD Srinivasa Vaka, MD Katya Belousova, JOSE R Otoole CNP, MD ([facility]   OLIVIA Lucia MD Paula Brito, MD Amelia Massimini APRN CNP   Valentine Dumont APRN JAIME Chavis MD             Clinic hours: Monday - Thursday 7 am-6 pm; Fridays 7 am-5 pm.   Urgent care: Monday - Friday 10 am- 8 pm; Saturday and Sunday 9 am-5 pm.    Clinic: [phone]       [facility]: Monday - Thursday 8 am - 7 pm; Friday 8 am - 6 pm  [facility]: [phone]     Preparing for Your Surgery  Getting started  A nurse will call you to review your health history and instructions. They will give you an arrival time based on your scheduled surgery time. Please be ready to share:    Your doctor's clinic name and phone number    Your medical, surgical and anesthesia history    A list of allergies and sensitivities    A list of medicines, including herbal treatments and over-the-counter drugs    Whether the patient has a legal guardian (ask how to send us the papers in advance)  Please tell us if you're pregnant--or if there's any chance you might be pregnant. Some surgeries may injure a fetus (unborn baby), so they require a pregnancy test. Surgeries that are safe for a fetus don't always need a test, and you can choose whether to  have one.   If you have a child who's having surgery, please ask for a copy of Preparing for Your Child's Surgery.    Preparing for surgery  1. Within 30 days of surgery: Have a pre-op exam (sometimes called an H&P, or History and Physical). This can be done at a clinic or pre-operative center.  ? If you're having a , you may not need this exam. Talk to your care team.  2. At your pre-op exam, talk to your care team about all medicines you take. If you need to stop any medicines before surgery, ask when to start taking them again.  ? We do this for your safety. Many medicines can make you bleed too much during surgery. Some change how well surgery (anesthesia) drugs work.  3. Call your insurance company to let them know you're having surgery. (If you don't have insurance, call [phone].)  4. Call your clinic if there's any change in your health. This includes signs of a cold or flu (sore throat, runny nose, cough, rash, fever). It also includes a scrape or scratch near the surgery site.  5. If you have questions on the day of surgery, call your hospital or surgery center.  COVID testing  You may need to be tested for COVID-19 before having surgery. If so, we will give you instructions.  Eating and drinking guidelines  For your safety: Unless your surgeon tells you otherwise, follow the guidelines below.    Eat and drink as usual until 8 hours before surgery. After that, no food or milk.    Drink clear liquids until 2 hours before surgery. These are liquids you can see through, like water, Gatorade and Propel Water. You may also have black coffee and tea (no cream or milk).    Nothing by mouth within 2 hours of surgery. This includes gum, candy and breath mints.    If you drink alcohol: Stop drinking it the night before surgery.    If your care team tells you to take medicine on the morning of surgery, it's okay to take it with a sip of water.  Preventing infection  1. Shower or bathe the night before  and morning of your surgery. Follow the instructions your clinic gave you. (If no instructions, use regular soap.)  2. Don't shave or clip hair near your surgery site. We'll remove the hair if needed.  3. Don't smoke or vape the morning of surgery. You may chew nicotine gum up to 2 hours before surgery. A nicotine patch is okay.  ? Note: Some surgeries require you to completely quit smoking and nicotine. Check with your surgeon.  4. Your care team will make every effort to keep you safe from infection. We will:  ? Clean our hands often with soap and water (or an alcohol-based hand rub).  ? Clean the skin at your surgery site with a special soap that kills germs.  ? Give you a special gown to keep you warm. (Cold raises the risk of infection.)  ? Wear special hair covers, masks, gowns and gloves during surgery.  ? Give antibiotic medicine, if prescribed. Not all surgeries need antibiotics.  What to bring on the day of surgery  1. Photo ID and insurance card  2. Copy of your health care directive, if you have one  3. Glasses and hearing aides (bring cases)  ? You can't wear contacts during surgery  4. Inhaler and eye drops, if you use them (tell us about these when you arrive)  5. CPAP machine or breathing device, if you use them  6. A few personal items, if spending the night  7. If you have . . .  ? A pacemaker, ICD (cardiac defibrillator) or other implant: Bring the ID card.  ? An implanted stimulator: Bring the remote control.  ? A legal guardian: Bring a copy of the certified (court-stamped) guardianship papers.  Please remove any jewelry, including body piercings. Leave jewelry and other valuables at home.  If you're going home the day of surgery    You must have a responsible adult drive you home. They should stay with you overnight as well.    If you don't have someone to stay with you, and you aren't safe to go home alone, we may keep you overnight. Insurance often won't pay for this.  After surgery  If it's  hard to control your pain or you need more pain medicine, please call your surgeon's office.  Questions?   If you have any questions for your care team, list them here: _________________________________________________________________________________________________________________________________________________________________________ ____________________________________ ____________________________________ ____________________________________  For informational purposes only. Not to replace the advice of your health care provider. Copyright   2003, 2019 [facility]. All rights reserved. Clinically reviewed by Breonna Rawls MD. [employer] 733831 - REV 07/21.            How to Quit Smoking  Smoking is a hard habit to break. About 50% of all people who have ever smoked have been able to quit. Most people who still smoke want to quit. Here are some of the best ways to stop smoking.     Keep in mind the health benefits of quitting  The health benefits of quitting start right away. They keep improving the longer you go without smoking. Knowing this can help inspire you to stay on track. These benefits occur at any age. If you are 17 or 70, quitting is a good choice. Some of the health benefits after your last cigarette include:     After 20 minutes: Your blood pressure and pulse return to normal.    After 8 hours: Your oxygen levels return to normal.    After 2 days: Your ability to smell and taste start to improve as damaged nerves regrow.    After 2 to 3 weeks: Your circulation and lung function improve.    After 1 to 9 months: Your coughing, congestion, and shortness of breath decrease. Your tiredness decreases.    After 1 year: Your risk of heart attack decreases by 50%.    After 5 years: Your risk of lung cancer decreases by 50%. Your risk of stroke becomes the same as a nonsmoker s.  Go cold turkey  Most former smokers quit cold turkey. This means stopping all at once. Trying to cut back slowly often  doesn't work as well. This may be because it continues the habit of smoking. Also you may inhale more smoke while smoking fewer cigarettes. This leads to the same amount of nicotine in your body.   Get support  Support programs can be a big help, especially for heavy smokers. These groups offer lectures, ways to change behavior, and peer support. Here are some ways to find a support program:     Free national quitline 800-QUIT-NOW (871-856-1082)    [facility] quit-smoking programs    American Lung Association 924-198-8970    American Cancer Society 202-403-9655  Support at home is important too. Family and friends can offer praise and reassurance. If the smoker in your life finds it hard to quit, encourage them to keep trying.   Try over-the-counter medicine  Nicotine replacement therapy may make it easier to quit. Some aids are available without a prescription. These include a nicotine patch, gum, and lozenges. But it's best to use these under the care of your healthcare provider. The skin patch gives a steady supply of nicotine. Nicotine gum and lozenges give short-time doses of low levels of nicotine. Both methods reduce the craving for cigarettes. If you have nausea, vomiting, dizziness, weakness, or a fast heartbeat, stop using these products. See your provider.   Ask about prescription medicine  After reviewing your smoking patterns and past attempts to quit, your healthcare provider may offer a prescription medicine such as bupropion, varenicline, a nicotine inhaler, or nasal spray. Each has advantages and side effects. Your provider can review these with you.   Keep trying  Most smokers make many attempts at quitting before they succeed. It s important not to give up.   To learn more  For more on how to quit smoking, try these resources:     www.cdc.gov/tobacco/quit_smoking/ 800-QUIT-NOW (102-664-3463)    www.smokefree.gov 877-44U-QUIT (540-839-9059)    www.lung.org/stop-smoking/ 800-LUNGUSA  (468.764.9971)  [employer] last reviewed this educational content on 12/1/2019 2000-2021 [employer]. All rights reserved. This information is not intended as a substitute for professional medical care. Always follow your healthcare professional's instructions.          Tips for Quitting Smoking (Cardiovascular)  Quitting smoking is a gift to yourself. It's one of the best things you can do to keep your heart disease from getting worse. Smoking reduces oxygen flow to your heart. It does this in 2 ways. It speeds the buildup of plaque along the artery walls. And it changes the health of your blood vessels. This raises your risk for heart attack, also known as acute myocardial infarction (AMI). Quitting helps reduce smoking's harmful effects. You may have tried to quit before, but don t give up. Try again. Many smokers try a few times before they succeed. It's never too early to benefit from quitting smoking. This is especially true if you already have ongoing (chronic) conditions such as high blood pressure and high cholesterol. These put you at higher risk for cardiovascular disease. Quitting smoking is a powerful way to reduce your risk for coronary disease.   Line up help     Ask for the support of your family and friends.    Join a smoking cessation class. Or ask your healthcare provider for a referral to a psychologist who specializes in helping people quit smoking.     Ask your healthcare provider about nicotine replacement products. Also ask about prescription medicines that can help you quit. It may take some time to find a product and schedule that works for you.    Set a quit date    Choose a date in the next 2 to 4 weeks.    After picking a day, hayley it in bold letters on a calendar.    Your quit list  Ideas to stop smoking include:  1. Start by giving up cigarettes at the times you least need them.  2. Keep some fruit close by at the times you are most likely to reach for a cigarette. For  many people, smoking has an oral fixation component. This must be recognized and replaced by a healthy habit.  3. Use a nicotine replacement product instead of a cigarette.  Write down a few more ideas:  ______________________________________________________________________________  ______________________________________________________________________________  ______________________________________________________________________________  Set limits    Limit where you can smoke. Pick one room or a porch. Smoke only in that place.    Make smoking outdoors a house rule. Other smokers won t tempt you as much.    Talk with smokers around you about your intent to stop smoking. Then they can show consideration for you and limit their smoking around you.    Hang a list of  quit benefits  in the spot where you smoke. Put one on the refrigerator and one on your car dashboard.    For more information    National Cancer [place] Smoking Quitlinesmokefree.gov/vzqs-eh-wl-fdjavv345-37G-BTRT (629-331-0314)    [employer] last reviewed this educational content on 11/1/2019 2000-2021 [employer]. All rights reserved. This information is not intended as a substitute for professional medical care. Always follow your healthcare professional's instructions.

## 2022-09-12 NOTE — PROGRESS NOTES
[address] [address]  Phone: [phone]  Primary Provider: Belem Zavala  Pre-op Performing Provider: BELEM ZAVALA    {Provider  Link to PREOP SmartSet  Use this to apply standard patient instructions to AVS; includes medication directions, common orders, guidelines for anemia, warfarin, additional testing   :465094}  PREOPERATIVE EVALUATION:  Today's date: 9/12/2022    Margo Inman is a 62 year old female who presents for a preoperative evaluation.    Surgical Information:  Surgery/Procedure: ***  Surgery Location: ***  Surgeon: ***  Surgery Date: ***  Time of Surgery: ***  Where patient plans to recover: {Preop post recovery plans :345804}  Fax number for surgical facility: {SURGERY FAX NUMBER:[id number]}    Type of Anesthesia Anticipated: {ANESTHESIA:805127}    {2021 Provider Charting Preference for Preop :627124}    Subjective     HPI related to upcoming procedure: ***    {Click here to pull in Questionnaire Data after Qnr completed :021586}  Health Care Directive:  Patient does not have a Health Care Directive or Living Will: {ADVANCE_DIRECTIVE_STATUS:700995}    Preoperative Review of :  {Mnpmpreport:027452}  {Review MNPMP for all patients per ICSI MNPMP Profile:433094}    {Chronic problem details (Optional) :843906}    Review of Systems  {ROS Preop Choices:576037}    Patient Active Problem List    Diagnosis Date Noted     Diverticulosis of sigmoid colon 06/15/2020     Priority: Medium     Obesity (BMI 35.0-39.9) with comorbidity (H) 04/05/2019     Priority: Medium     Papanicolaou smear of cervix with low grade squamous intraepithelial lesion (LGSIL) 04/18/2018     Priority: Medium     Essential hypertension with goal blood pressure less than 140/90 03/29/2018     Priority: Medium     Adjustment disorder with mixed anxiety and depressed mood 03/29/2018     Priority: Medium     S/P cervical spinal fusion 03/30/2017      Priority: Medium     Radiculopathy of cervical spine 03/20/2017     Priority: Medium     Cervical high risk HPV (human papillomavirus) test positive 01/10/2017     Priority: Medium     2000, 2003, and 2009 NIL paps. in Care Everywhere.  9/5/13 NIL pap  1/10/17 NIL pap, + HR HPV (not 16 or 18). Plan: cotest in 1 year, due by 1/10/18  3/29/18 LSIL pap, + HR HPV (not 16 or 18). Plan: colp bef 6/29/18 4/18/18 [place] bx: negative. Plan: cotest in 6 mo, per provider.   11/9/18 Lost to follow-up for pap tracking  9/11/2019 Pap: NIL/neg HR HPV. Plan cotest in 3 years.         Microscopic hematuria 06/02/2016     Priority: Medium     Recommend f/u with primary MD for UA, blood pressure, and urine cytology at 6, 12, 24, and 36 months from this time.  If negative for 3 years then no further monitoring needed.      Refer back to urology for any of the following:              -cytology is suspicious or positive              -gross hematuria              -irritative voiding symptoms with evidence of infection/ worsening dysuria or urgency.     If persistent microscopic hematuria WITH: hypertension, proteinuria, or glomerular/dysmorphic RBCs in urine then evaluate for primary renal disease/refer instead to nephrology.        Tobacco dependency 03/06/2015     Priority: Medium     S/P carpal tunnel release 07/22/2014     Priority: Medium     Trigger thumb 07/22/2014     Priority: Medium     CTS (carpal tunnel syndrome) - bilateral 05/27/2014     Priority: Medium     S/P total knee arthroplasty juan 01/23/2014     Priority: Medium     Acute posthemorrhagic anemia 01/23/2014     Priority: Medium     Muscle spasm 01/23/2014     Priority: Medium     RLS (restless legs syndrome) 01/22/2012     Priority: Medium     Migraine 12/06/2011     Priority: Medium     Pseudogout 05/05/2011     Priority: Medium     OA (OSTEOARTHRITIS) OF KNEE - bilateral 05/05/2011     Priority: Medium     Hyperlipidemia LDL goal <130 10/31/2010     Priority: Medium      Asthma 12/16/1996     Priority: Medium      Past Medical History:   Diagnosis Date     Arthritis      Cervical high risk HPV (human papillomavirus) test positive 1/10/2017    1/10/17 NIL pap/+ HR HPV (not 16 or 18). Plan: cotest in 1 year, due by 1/10/18      Chronic infection     HX of MRSA. 2 neg swabs at [facility] in 2006 and 2007.     History of blood transfusion      Numbness and tingling     Right arm     PONV (postoperative nausea and vomiting)      Past Surgical History:   Procedure Laterality Date     APPENDECTOMY       ARTHROSCOPY KNEE  9/16/2011    Procedure:ARTHROSCOPY KNEE; left knee arthroscopy with debridement, open lateral patellar spur excision; Surgeon:LUIS A MONTOYA; Location:[facility]     COLONOSCOPY N/A 2/16/2016    Procedure: COLONOSCOPY;  Surgeon: Belem Khalil MD;  Location: [facility]     COLONOSCOPY N/A 2/16/2016    Procedure: COMBINED COLONOSCOPY, SINGLE OR MULTIPLE BIOPSY/POLYPECTOMY BY BIOPSY;  Surgeon: Belem Khalil MD;  Location: [facility]     COLONOSCOPY N/A 6/15/2020    Procedure: Colonoscopy, With Polypectomy And Biopsy;  Surgeon: Torres Gallegos DO;  Location: [facility]     COLONOSCOPY WITH CO2 INSUFFLATION N/A 2/16/2016    Procedure: COLONOSCOPY WITH CO2 INSUFFLATION;  Surgeon: Belem Khalil MD;  Location: [facility]     COLONOSCOPY WITH CO2 INSUFFLATION N/A 6/15/2020    Procedure: COLONOSCOPY, WITH CO2 INSUFFLATION;  Surgeon: Torres Gallegos DO;  Location: [facility]     CYSTOSCOPY  2016    microscopic hematuria     DECOMPRESSION, FUSION CERVICAL ANTERIOR ONE LEVEL, COMBINED N/A 3/30/2017    Procedure: COMBINED DECOMPRESSION, FUSION CERVICAL ANTERIOR ONE LEVEL;  Surgeon: Joseph Klein MD;  Location: [facility]     ECTOPIC PREGNANCY SURGERY       ENT SURGERY       GYN SURGERY       HC INCISION TENDON SHEATH FINGER Left 7/11/14    Thumb TF release     HC KNEE SCOPE,MED/LAT MENISECTOMY  9/16/11    left, with partial medial menisectomy  ONLY     HC REVISE MEDIAN N/CARPAL TUNNEL SURG Left 7/11/14    PRIMARY - not revision     ORTHOPEDIC SURGERY       RELEASE CARPAL TUNNEL  7/11/2014    Procedure: RELEASE CARPAL TUNNEL;  Surgeon: Rg Franco MD;  Location: [facility]     RELEASE CARPAL TUNNEL Right 11/7/2014    Procedure: RELEASE CARPAL TUNNEL;  Surgeon: Rg Franco MD;  Location: [facility]     RELEASE TRIGGER FINGER  7/11/2014    Procedure: RELEASE TRIGGER FINGER;  Surgeon: Rg Franco MD;  Location: [facility]     RELEASE TRIGGER FINGER Right 11/7/2014    Procedure: RELEASE TRIGGER FINGER;  Surgeon: Rg Franco MD;  Location: [facility]     tonsils       [facility] PART REMV FEMUR/PROX TIB/FIB  9/16/11    left, open lateral patellar bone spur excision     ZZC TOTAL KNEE ARTHROPLASTY  1/17/14    Bilateral     Current Outpatient Medications   Medication Sig Dispense Refill     acetaminophen (TYLENOL) 500 MG tablet Take 500-1,000 mg by mouth every 6 hours as needed.       albuterol (PROAIR HFA/PROVENTIL HFA/VENTOLIN HFA) 108 (90 Base) MCG/ACT inhaler Inhale 2 puffs into the lungs every 4 hours as needed for shortness of breath / dyspnea or wheezing 18 g 3     atorvastatin (LIPITOR) 80 MG tablet TAKE 1 TABLET(80 MG) BY MOUTH DAILY 90 tablet 1     cyclobenzaprine (FLEXERIL) 10 MG tablet TAKE 1 TABLET(10 MG) BY MOUTH EVERY NIGHT AS NEEDED FOR MUSCLE SPASMS 90 tablet 3     diclofenac (VOLTAREN) 75 MG EC tablet TAKE 1 TABLET(75 MG) BY MOUTH TWICE DAILY 180 tablet 0     HYDROcodone-acetaminophen (NORCO) 5-325 MG tablet        losartan (COZAAR) 25 MG tablet Take 1 tablet (25 mg) by mouth daily 90 tablet 3     Misc. Devices (ROLLATOR ULTRA-LIGHT) MISC Walker with front wheels for home use.       Omega-3 Fatty Acids (FISH OIL PO)        tiZANidine (ZANAFLEX) 2 MG tablet Take 2-4 mg by mouth       varenicline (CHANTIX EVANGELINA) 0.5 MG X 11 & 1 MG X 42 tablet Take 0.5 mg tab daily for 3 days, THEN 0.5 mg tab twice daily for 4 days, THEN 1 mg twice  "daily. 53 tablet 0     varenicline (CHANTIX) 1 MG tablet TAKE 1 TABLET(1 MG) BY MOUTH TWICE DAILY 180 tablet 0       Allergies   Allergen Reactions     Wasp Venom Protein Anaphylaxis     Bee Anaphylaxis     Erythromycin Hives     Wasps [Hornets] Anaphylaxis     Shellfish Allergy Nausea and Vomiting and Rash     Shellfish-Derived Products Rash and Nausea and Vomiting        Social History     Tobacco Use     Smoking status: Current Every Day Smoker     Packs/day: 1.00     Years: 15.00     Pack years: 15.00     Types: Cigarettes     Smokeless tobacco: Never Used     Tobacco comment: Started Chantix   Substance Use Topics     Alcohol use: Yes     Comment: socially     {FAMILY HISTORY (Optional):389309831}  History   Drug Use No         Objective     LMP 2011     Physical Exam  {EXAM Preop Choices:696107}    Recent Labs   Lab Test 22  1102 22  1632 21  1134   HGB 15.0  --  14.3     --   --    INR 1.04  --   --     138 137   POTASSIUM 3.8 4.1 4.2   CR 0.72 0.70 0.72        Diagnostics:  {LABS:019206}   {EK}    Revised Cardiac Risk Index (RCRI):  The patient has the following serious cardiovascular risks for perioperative complications:  {PREOP REVISED CARDIAC RISK INDEX (RCRI) :987544::\" - No serious cardiac risks = 0 points\"}     RCRI Interpretation: {REVISED CARDIAC RISK INTERPRETATION :766079}         Signed Electronically by: JOSE R Sarabia CNP  Copy of this evaluation report is provided to requesting physician.    {Provider Resources  Preop [facility] Preop Guidelines  Revised Cardiac Risk Index :158369}  "

## 2022-09-13 LAB — CREAT UR-MCNC: 123 MG/DL

## 2022-09-15 ENCOUNTER — LAB (OUTPATIENT)
Dept: URGENT CARE | Facility: URGENT CARE | Age: 62
End: 2022-09-15
Payer: COMMERCIAL

## 2022-09-15 DIAGNOSIS — Z20.822 ENCOUNTER FOR LABORATORY TESTING FOR COVID-19 VIRUS: ICD-10-CM

## 2022-09-15 LAB
N-NORTRAMADOL/CREAT UR CFM: ABNORMAL NG/MG{CREAT}
O-NORTRAMADOL UR CFM-MCNC: ABNORMAL NG/ML
TRAMADOL CTO UR CFM-MCNC: ABNORMAL NG/ML
TRAMADOL/CREAT UR: ABNORMAL

## 2022-09-15 PROCEDURE — U0005 INFEC AGEN DETEC AMPLI PROBE: HCPCS

## 2022-09-15 PROCEDURE — U0003 INFECTIOUS AGENT DETECTION BY NUCLEIC ACID (DNA OR RNA); SEVERE ACUTE RESPIRATORY SYNDROME CORONAVIRUS 2 (SARS-COV-2) (CORONAVIRUS DISEASE [COVID-19]), AMPLIFIED PROBE TECHNIQUE, MAKING USE OF HIGH THROUGHPUT TECHNOLOGIES AS DESCRIBED BY CMS-2020-01-R: HCPCS

## 2022-09-16 LAB — SARS-COV-2 RNA RESP QL NAA+PROBE: NEGATIVE

## 2022-09-16 NOTE — PROGRESS NOTES
Pre-op, EKG and labs faxed to [facility] Fax # [phone] and [facility] fax # [phone] attn to Dr. Barba      Postponing message to 9/19/22 to fax covid test results.

## 2022-09-19 DIAGNOSIS — Z96.653 STATUS POST TOTAL BILATERAL KNEE REPLACEMENT: ICD-10-CM

## 2022-09-19 DIAGNOSIS — Z98.1 S/P CERVICAL SPINAL FUSION: ICD-10-CM

## 2022-09-19 NOTE — PROGRESS NOTES
covid test results faxed to [facility] [place] Fax # [phone] and [facility] fax # [phone] attn to Dr. Barba

## 2022-09-21 RX ORDER — DICLOFENAC SODIUM 75 MG/1
TABLET, DELAYED RELEASE ORAL
Qty: 180 TABLET | Refills: 0 | Status: SHIPPED | OUTPATIENT
Start: 2022-09-21 | End: 2023-02-20

## 2022-10-10 ENCOUNTER — HEALTH MAINTENANCE LETTER (OUTPATIENT)
Age: 62
End: 2022-10-10

## 2023-01-11 ENCOUNTER — ANCILLARY PROCEDURE (OUTPATIENT)
Dept: GENERAL RADIOLOGY | Facility: CLINIC | Age: 63
End: 2023-01-11
Attending: NURSE PRACTITIONER
Payer: COMMERCIAL

## 2023-01-11 ENCOUNTER — OFFICE VISIT (OUTPATIENT)
Dept: FAMILY MEDICINE | Facility: CLINIC | Age: 63
End: 2023-01-11
Payer: COMMERCIAL

## 2023-01-11 VITALS
HEART RATE: 87 BPM | SYSTOLIC BLOOD PRESSURE: 165 MMHG | WEIGHT: 213.2 LBS | TEMPERATURE: 98.2 F | HEIGHT: 61 IN | RESPIRATION RATE: 16 BRPM | OXYGEN SATURATION: 98 % | DIASTOLIC BLOOD PRESSURE: 95 MMHG | BODY MASS INDEX: 40.25 KG/M2

## 2023-01-11 DIAGNOSIS — E78.5 HYPERLIPIDEMIA LDL GOAL <130: ICD-10-CM

## 2023-01-11 DIAGNOSIS — M25.532 BILATERAL WRIST PAIN: Primary | ICD-10-CM

## 2023-01-11 DIAGNOSIS — Z98.890 S/P CARPAL TUNNEL RELEASE: ICD-10-CM

## 2023-01-11 DIAGNOSIS — I10 ESSENTIAL HYPERTENSION WITH GOAL BLOOD PRESSURE LESS THAN 140/90: ICD-10-CM

## 2023-01-11 DIAGNOSIS — M25.531 BILATERAL WRIST PAIN: Primary | ICD-10-CM

## 2023-01-11 DIAGNOSIS — M19.041 OSTEOARTHRITIS OF BOTH HANDS, UNSPECIFIED OSTEOARTHRITIS TYPE: ICD-10-CM

## 2023-01-11 DIAGNOSIS — M19.042 OSTEOARTHRITIS OF BOTH HANDS, UNSPECIFIED OSTEOARTHRITIS TYPE: ICD-10-CM

## 2023-01-11 DIAGNOSIS — M77.8 WRIST TENDONITIS: ICD-10-CM

## 2023-01-11 DIAGNOSIS — F17.200 TOBACCO DEPENDENCY: ICD-10-CM

## 2023-01-11 DIAGNOSIS — M81.0 POSTMENOPAUSAL BONE LOSS: ICD-10-CM

## 2023-01-11 DIAGNOSIS — Z12.4 CERVICAL CANCER SCREENING: ICD-10-CM

## 2023-01-11 DIAGNOSIS — M25.532 BILATERAL WRIST PAIN: ICD-10-CM

## 2023-01-11 DIAGNOSIS — M25.531 BILATERAL WRIST PAIN: ICD-10-CM

## 2023-01-11 PROBLEM — M47.816 FACET ARTHRITIS OF LUMBAR REGION: Status: ACTIVE | Noted: 2023-01-11

## 2023-01-11 LAB
ANION GAP SERPL CALCULATED.3IONS-SCNC: 5 MMOL/L (ref 3–14)
BUN SERPL-MCNC: 13 MG/DL (ref 7–30)
CALCIUM SERPL-MCNC: 10.1 MG/DL (ref 8.5–10.1)
CHLORIDE BLD-SCNC: 105 MMOL/L (ref 94–109)
CHOLEST SERPL-MCNC: 221 MG/DL
CLUE CELLS: ABNORMAL
CO2 SERPL-SCNC: 30 MMOL/L (ref 20–32)
CREAT SERPL-MCNC: 0.62 MG/DL (ref 0.52–1.04)
CRP SERPL-MCNC: 12.3 MG/L (ref 0–8)
ERYTHROCYTE [DISTWIDTH] IN BLOOD BY AUTOMATED COUNT: 12.6 % (ref 10–15)
ERYTHROCYTE [SEDIMENTATION RATE] IN BLOOD BY WESTERGREN METHOD: 12 MM/HR (ref 0–30)
FASTING STATUS PATIENT QL REPORTED: NO
GFR SERPL CREATININE-BSD FRML MDRD: >90 ML/MIN/1.73M2
GLUCOSE BLD-MCNC: 108 MG/DL (ref 70–99)
HCT VFR BLD AUTO: 46 % (ref 35–47)
HDLC SERPL-MCNC: 39 MG/DL
HGB BLD-MCNC: 15.1 G/DL (ref 11.7–15.7)
LDLC SERPL CALC-MCNC: 130 MG/DL
MCH RBC QN AUTO: 31.1 PG (ref 26.5–33)
MCHC RBC AUTO-ENTMCNC: 32.8 G/DL (ref 31.5–36.5)
MCV RBC AUTO: 95 FL (ref 78–100)
NONHDLC SERPL-MCNC: 182 MG/DL
PLATELET # BLD AUTO: 349 10E3/UL (ref 150–450)
POTASSIUM BLD-SCNC: 4.6 MMOL/L (ref 3.4–5.3)
RBC # BLD AUTO: 4.86 10E6/UL (ref 3.8–5.2)
SODIUM SERPL-SCNC: 140 MMOL/L (ref 133–144)
TRICHOMONAS, WET PREP: ABNORMAL
TRIGL SERPL-MCNC: 261 MG/DL
URATE SERPL-MCNC: 4.5 MG/DL (ref 2.6–6)
WBC # BLD AUTO: 11.3 10E3/UL (ref 4–11)
WBC'S/HIGH POWER FIELD, WET PREP: ABNORMAL
YEAST, WET PREP: ABNORMAL

## 2023-01-11 PROCEDURE — 99214 OFFICE O/P EST MOD 30 MIN: CPT | Performed by: NURSE PRACTITIONER

## 2023-01-11 PROCEDURE — 86140 C-REACTIVE PROTEIN: CPT | Performed by: NURSE PRACTITIONER

## 2023-01-11 PROCEDURE — 85652 RBC SED RATE AUTOMATED: CPT | Performed by: NURSE PRACTITIONER

## 2023-01-11 PROCEDURE — 87210 SMEAR WET MOUNT SALINE/INK: CPT | Performed by: NURSE PRACTITIONER

## 2023-01-11 PROCEDURE — 87624 HPV HI-RISK TYP POOLED RSLT: CPT | Performed by: NURSE PRACTITIONER

## 2023-01-11 PROCEDURE — 85027 COMPLETE CBC AUTOMATED: CPT | Performed by: NURSE PRACTITIONER

## 2023-01-11 PROCEDURE — 82043 UR ALBUMIN QUANTITATIVE: CPT | Performed by: NURSE PRACTITIONER

## 2023-01-11 PROCEDURE — 80061 LIPID PANEL: CPT | Performed by: NURSE PRACTITIONER

## 2023-01-11 PROCEDURE — 80048 BASIC METABOLIC PNL TOTAL CA: CPT | Performed by: NURSE PRACTITIONER

## 2023-01-11 PROCEDURE — G0145 SCR C/V CYTO,THINLAYER,RESCR: HCPCS | Performed by: NURSE PRACTITIONER

## 2023-01-11 PROCEDURE — 82570 ASSAY OF URINE CREATININE: CPT | Performed by: NURSE PRACTITIONER

## 2023-01-11 PROCEDURE — 73120 X-RAY EXAM OF HAND: CPT | Mod: TC | Performed by: RADIOLOGY

## 2023-01-11 PROCEDURE — 36415 COLL VENOUS BLD VENIPUNCTURE: CPT | Performed by: NURSE PRACTITIONER

## 2023-01-11 PROCEDURE — 84550 ASSAY OF BLOOD/URIC ACID: CPT | Performed by: NURSE PRACTITIONER

## 2023-01-11 RX ORDER — AMMONIUM LACTATE 12 G/100G
LOTION TOPICAL DAILY PRN
COMMUNITY

## 2023-01-11 RX ORDER — CALCIUM CARBONATE 500(1250)
1 TABLET,CHEWABLE ORAL DAILY
COMMUNITY
Start: 2022-08-19 | End: 2023-10-23

## 2023-01-11 RX ORDER — CYCLOBENZAPRINE HCL 10 MG
10 TABLET ORAL DAILY
COMMUNITY
Start: 2022-09-23 | End: 2023-10-23

## 2023-01-11 RX ORDER — CALCIUM CARBONATE 1000 MG/1
TABLET, CHEWABLE ORAL
COMMUNITY
Start: 2022-06-17 | End: 2023-10-23

## 2023-01-11 ASSESSMENT — PAIN SCALES - GENERAL: PAINLEVEL: EXTREME PAIN (8)

## 2023-01-11 NOTE — PROGRESS NOTES
Assessment & Plan     Bilateral wrist pain  checking labs, x ray, consider Orthopedics  referral  - CBC with platelets  - ESR: Erythrocyte sedimentation rate  - CRP, inflammation  - Uric acid  - CBC with platelets  - ESR: Erythrocyte sedimentation rate  - CRP, inflammation  - Uric acid  - XR Hand Bilateral 1 View    Wrist tendonitis  Continue voltaren BId and wrist splints at HS, referring to Ortho    Essential hypertension with goal blood pressure less than 140/90  Goal BP < 140/90. She has home BP monitor and will check her BP and call for persisitently elevated BP > 140/90.  Reviewed low salt diet, benefits of regular exercise, weight loss.  - Basic metabolic panel  (Ca, Cl, CO2, Creat, Gluc, K, Na, BUN)  - Albumin Random Urine Quantitative with Creat Ratio  - Basic metabolic panel  (Ca, Cl, CO2, Creat, Gluc, K, Na, BUN)  - Albumin Random Urine Quantitative with Creat Ratio    Cervical cancer screening    - Pap Screen with HPV - recommended age 30 - 65 years  - Wet prep - Clinic Collect  - HPV Hold (Lab Only)    Hyperlipidemia LDL goal <130    - Lipid panel reflex to direct LDL Non-fasting  - Lipid panel reflex to direct LDL Non-fasting    Osteoarthritis of both hands, unspecified osteoarthritis type    - Orthopedic  Referral    S/P carpal tunnel release  Pertinent hx    Postmenopausal bone loss  Getting DEXA  - DX Hip/Pelvis/Spine    Tobacco dependency  Restarting Chantix for smoking cessation. She declines QuitPlan referral  - varenicline (CHANTIX EVANGELINA) 0.5 MG X 11 & 1 MG X 42 tablet  Dispense: 53 tablet; Refill: 0      Ordering of each unique test  Prescription drug management  20  minutes spent on the date of the encounter doing chart review, history and exam, documentation and further activities per the note       Nicotine/Tobacco Cessation:  She reports that she has been smoking cigarettes. She has a 15.00 pack-year smoking history. She has never used smokeless tobacco.  Nicotine/Tobacco Cessation  "Plan:   Pharmacotherapies : varenicline (Chantix)      BMI:   Estimated body mass index is 39.79 kg/m  as calculated from the following:    Height as of this encounter: 1.559 m (5' 1.38\").    Weight as of this encounter: 96.7 kg (213 lb 3.2 oz).   Weight management plan: Discussed healthy diet and exercise guidelines    Work on weight loss  Regular exercise  See Patient Instructions    Return in about 4 weeks (around 2/8/2023), or if symptoms worsen or fail to improve, for Follow up.    JOSE R Sarabia CNP  [facility] HospitalBAILEE Aragon is a 62 year old , presenting for the following health issues:  Hand Pain      Hand Pain    History of Present Illness       Reason for visit:  Pain and burning in both thumbs and wrist area  Symptom onset:  More than a month  Symptoms include:  Constant burning and pain in these areas. Hard to hold onto things, severe weakness.  Symptom intensity:  Severe  Symptom progression:  Worsening  Had these symptoms before:  No  What makes it worse:  Doing anything with my hands  What makes it better:  Tylenol, lidocaine and wrapping them, but not much better    She eats 2-3 servings of fruits and vegetables daily.She consumes 0 sweetened beverage(s) daily.She exercises with enough effort to increase her heart rate 10 to 19 minutes per day.  She exercises with enough effort to increase her heart rate 4 days per week.   She is taking medications regularly.     Axillary swelling bilaterally X 2 months. No breast concerns but she has had worsening bilateral hand/wrist pain which is limiting her ability to cook, clean, and sometimes dress herself. She complains of loss of hand strength,  strength, pain is worse with driving or any activity that requires use of her hands, no fever, chills, redness, warmth, swelling of the hands/wrists. She has been using splints at  without much improvement, continues on Diclofenac BID for knee pain. She has had " bilateral CTS release in the past.     Hyperlipidemia Follow-Up      Are you regularly taking any medication or supplement to lower your cholesterol?   Yes- Fish oil    Are you having muscle aches or other side effects that you think could be caused by your cholesterol lowering medication?  No    Hypertension Follow-up      Do you check your blood pressure regularly outside of the clinic? No     Are you following a low salt diet? No    Are your blood pressures ever more than 140 on the top number (systolic) OR more   than 90 on the bottom number (diastolic), for example 140/90?   She notes elevated BP today, admits to feelignganxious and is also in pain.        Review of Systems   Constitutional, HEENT, cardiovascular, pulmonary, gi and gu systems are negative, except as otherwise noted.      Objective    LMP 09/16/2011   There is no height or weight on file to calculate BMI.  Physical Exam   GENERAL: healthy, alert and no distress  EYES: Eyes grossly normal to inspection, PERRL and conjunctivae and sclerae normal  HENT: ear canals and TM's normal, nose and mouth without ulcers or lesions  NECK: no adenopathy, no asymmetry, masses, or scars and thyroid normal to palpation  RESP: lungs clear to auscultation - no rales, rhonchi or wheezes  CV: regular rate and rhythm, normal S1 S2, no S3 or S4, no murmur, click or rub, no peripheral edema and peripheral pulses strong  ABDOMEN: soft, nontender, no hepatosplenomegaly, no masses and bowel sounds normal   (female): normal female external genitalia, normal urethral meatus, vaginal mucosa, normal cervix/adnexa/uterus without masses or discharge  MS: bilateral hands TTP at 1st MCM with decreased ROM due to pain, decreased  strength bilaterally, R>L, no swelling, warmth, erythema of hands, + finkelstein on Right, not on left, otherwise, no gross musculoskeletal defects noted, no edema  SKIN: no suspicious lesions or rashes  NEURO: Normal strength and tone, mentation  intact and speech normal  BACK: no CVA tenderness, no paralumbar tenderness  PSYCH: mentation appears normal, affect normal/bright  LYMPH: normal ant/post cervical, supraclavicular nodes  axillary: no adenopathy    Results for orders placed or performed in visit on 01/11/23   XR Hand Bilateral 1 View     Status: None    Narrative    XR HAND BILATERAL 1 VIEW 1/11/2023 2:51 PM     HISTORY: bilateral hand pain worse at 1st MCP bilaterally; Bilateral  wrist pain; Bilateral wrist pain    COMPARISON: None.      Impression    IMPRESSION: Minimal degenerative changes in the first MCP joints.  Moderate to advanced degenerative changes in the right first CMC  joint. Mild-to-moderate degenerative changes in the left first CMC  joint and the IP joint of the left thumb. Mild degenerative changes  and multiple bilateral IP joints.    MARIANO KAUR MD         SYSTEM ID:  [id number]   Results for orders placed or performed in visit on 01/11/23   CBC with platelets     Status: Abnormal   Result Value Ref Range    WBC Count 11.3 (H) 4.0 - 11.0 10e3/uL    RBC Count 4.86 3.80 - 5.20 10e6/uL    Hemoglobin 15.1 11.7 - 15.7 g/dL    Hematocrit 46.0 35.0 - 47.0 %    MCV 95 78 - 100 fL    MCH 31.1 26.5 - 33.0 pg    MCHC 32.8 31.5 - 36.5 g/dL    RDW 12.6 10.0 - 15.0 %    Platelet Count 349 150 - 450 10e3/uL   ESR: Erythrocyte sedimentation rate     Status: Normal   Result Value Ref Range    Erythrocyte Sedimentation Rate 12 0 - 30 mm/hr   CRP, inflammation     Status: Abnormal   Result Value Ref Range    CRP Inflammation 12.3 (H) 0.0 - 8.0 mg/L   Uric acid     Status: Normal   Result Value Ref Range    Uric Acid 4.5 2.6 - 6.0 mg/dL   Basic metabolic panel  (Ca, Cl, CO2, Creat, Gluc, K, Na, BUN)     Status: Abnormal   Result Value Ref Range    Sodium 140 133 - 144 mmol/L    Potassium 4.6 3.4 - 5.3 mmol/L    Chloride 105 94 - 109 mmol/L    Carbon Dioxide (CO2) 30 20 - 32 mmol/L    Anion Gap 5 3 - 14 mmol/L    Urea Nitrogen 13 7 - 30 mg/dL     Creatinine 0.62 0.52 - 1.04 mg/dL    Calcium 10.1 8.5 - 10.1 mg/dL    Glucose 108 (H) 70 - 99 mg/dL    GFR Estimate >90 >60 mL/min/1.73m2   Lipid panel reflex to direct LDL Non-fasting     Status: Abnormal   Result Value Ref Range    Cholesterol 221 (H) <200 mg/dL    Triglycerides 261 (H) <150 mg/dL    Direct Measure HDL 39 (L) >=50 mg/dL    LDL Cholesterol Calculated 130 (H) <=100 mg/dL    Non HDL Cholesterol 182 (H) <130 mg/dL    Patient Fasting > 8hrs? No     Narrative    Cholesterol  Desirable:  <200 mg/dL    Triglycerides  Normal:  Less than 150 mg/dL  Borderline High:  150-199 mg/dL  High:  200-499 mg/dL  Very High:  Greater than or equal to 500 mg/dL    Direct Measure HDL  Female:  Greater than or equal to 50 mg/dL   Male:  Greater than or equal to 40 mg/dL    LDL Cholesterol  Desirable:  <100mg/dL  Above Desirable:  100-129 mg/dL   Borderline High:  130-159 mg/dL   High:  160-189 mg/dL   Very High:  >= 190 mg/dL    Non HDL Cholesterol  Desirable:  130 mg/dL  Above Desirable:  130-159 mg/dL  Borderline High:  160-189 mg/dL  High:  190-219 mg/dL  Very High:  Greater than or equal to 220 mg/dL   Albumin Random Urine Quantitative with Creat Ratio     Status: None   Result Value Ref Range    Creatinine Urine mg/dL 93 mg/dL    Albumin Urine mg/L 11 mg/L    Albumin Urine mg/g Cr 11.83 0.00 - 25.00 mg/g Cr   Wet prep - Clinic Collect     Status: Abnormal    Specimen: Vagina; Swab   Result Value Ref Range    Trichomonas Absent Absent    Yeast Absent Absent    Clue Cells Absent Absent    WBCs/high power field 2+ (A) None

## 2023-01-12 LAB
CREAT UR-MCNC: 93 MG/DL
MICROALBUMIN UR-MCNC: 11 MG/L
MICROALBUMIN/CREAT UR: 11.83 MG/G CR (ref 0–25)

## 2023-01-14 PROBLEM — M19.041 OSTEOARTHRITIS OF BOTH HANDS, UNSPECIFIED OSTEOARTHRITIS TYPE: Status: ACTIVE | Noted: 2023-01-14

## 2023-01-14 PROBLEM — M19.042 OSTEOARTHRITIS OF BOTH HANDS, UNSPECIFIED OSTEOARTHRITIS TYPE: Status: ACTIVE | Noted: 2023-01-14

## 2023-01-14 LAB
BKR LAB AP GYN ADEQUACY: NORMAL
BKR LAB AP GYN INTERPRETATION: NORMAL
BKR LAB AP HPV REFLEX: NORMAL
BKR LAB AP LMP: NORMAL
BKR LAB AP PREVIOUS ABNORMAL: NORMAL
PATH REPORT.COMMENTS IMP SPEC: NORMAL
PATH REPORT.COMMENTS IMP SPEC: NORMAL
PATH REPORT.RELEVANT HX SPEC: NORMAL

## 2023-01-17 LAB
HUMAN PAPILLOMA VIRUS 16 DNA: NEGATIVE
HUMAN PAPILLOMA VIRUS 18 DNA: NEGATIVE
HUMAN PAPILLOMA VIRUS FINAL DIAGNOSIS: NORMAL
HUMAN PAPILLOMA VIRUS OTHER HR: NEGATIVE

## 2023-01-18 ENCOUNTER — ANCILLARY PROCEDURE (OUTPATIENT)
Dept: BONE DENSITY | Facility: CLINIC | Age: 63
End: 2023-01-18
Attending: NURSE PRACTITIONER
Payer: COMMERCIAL

## 2023-01-18 DIAGNOSIS — M81.0 POSTMENOPAUSAL BONE LOSS: ICD-10-CM

## 2023-01-18 PROCEDURE — 77080 DXA BONE DENSITY AXIAL: CPT | Performed by: RADIOLOGY

## 2023-01-18 NOTE — TELEPHONE ENCOUNTER
DIAGNOSIS: Osteoarthritis of both hands    APPOINTMENT DATE: 01/25/2023   NOTES STATUS DETAILS   OFFICE NOTE from referring provider Internal 01/11/2023 Valentine Dumont [facility]    OFFICE NOTE from other specialist N/A    DISCHARGE SUMMARY from hospital N/A    DISCHARGE REPORT from the ER N/A    OPERATIVE REPORT Internal 07/11/2014 1. Left carpal tunnel release.  2. Left trigger thumb release   EMG report N/A    MEDICATION LIST N/A    MRI N/A    DEXA (osteoporosis/bone health) N/A    CT SCAN N/A    XRAYS (IMAGES & REPORTS) Internal 01/11/2023 bilateral hand

## 2023-01-20 ENCOUNTER — OFFICE VISIT (OUTPATIENT)
Dept: OPTOMETRY | Facility: CLINIC | Age: 63
End: 2023-01-20
Payer: COMMERCIAL

## 2023-01-20 ENCOUNTER — OFFICE VISIT (OUTPATIENT)
Dept: OPHTHALMOLOGY | Facility: CLINIC | Age: 63
End: 2023-01-20
Attending: OPHTHALMOLOGY
Payer: COMMERCIAL

## 2023-01-20 ENCOUNTER — TELEPHONE (OUTPATIENT)
Dept: OPHTHALMOLOGY | Facility: CLINIC | Age: 63
End: 2023-01-20

## 2023-01-20 DIAGNOSIS — H52.4 PRESBYOPIA: ICD-10-CM

## 2023-01-20 DIAGNOSIS — H53.40 VISUAL FIELD LOSS: ICD-10-CM

## 2023-01-20 DIAGNOSIS — H52.03 HYPEROPIA, BILATERAL: ICD-10-CM

## 2023-01-20 DIAGNOSIS — H40.052 ELEVATED IOP, LEFT: ICD-10-CM

## 2023-01-20 DIAGNOSIS — Z98.890 HISTORY OF LASER ASSISTED IN SITU KERATOMILEUSIS: ICD-10-CM

## 2023-01-20 DIAGNOSIS — H10.13 ALLERGIC CONJUNCTIVITIS OF BOTH EYES: ICD-10-CM

## 2023-01-20 DIAGNOSIS — H40.212 ACUTE ANGLE-CLOSURE GLAUCOMA OF LEFT EYE: Primary | ICD-10-CM

## 2023-01-20 DIAGNOSIS — H40.031 ANATOMICAL NARROW ANGLE, RIGHT EYE: ICD-10-CM

## 2023-01-20 DIAGNOSIS — H25.13 NUCLEAR SCLEROTIC CATARACT OF BOTH EYES: ICD-10-CM

## 2023-01-20 DIAGNOSIS — H40.052 RAISED INTRAOCULAR PRESSURE OF LEFT EYE: Primary | ICD-10-CM

## 2023-01-20 PROCEDURE — 66761 REVISION OF IRIS: CPT | Mod: LT | Performed by: STUDENT IN AN ORGANIZED HEALTH CARE EDUCATION/TRAINING PROGRAM

## 2023-01-20 PROCEDURE — 92015 DETERMINE REFRACTIVE STATE: CPT | Performed by: OPTOMETRIST

## 2023-01-20 PROCEDURE — 92020 GONIOSCOPY: CPT | Performed by: STUDENT IN AN ORGANIZED HEALTH CARE EDUCATION/TRAINING PROGRAM

## 2023-01-20 PROCEDURE — 92020 GONIOSCOPY: CPT | Mod: 59 | Performed by: STUDENT IN AN ORGANIZED HEALTH CARE EDUCATION/TRAINING PROGRAM

## 2023-01-20 PROCEDURE — 92004 COMPRE OPH EXAM NEW PT 1/>: CPT | Performed by: OPTOMETRIST

## 2023-01-20 PROCEDURE — 66761 REVISION OF IRIS: CPT | Mod: LT | Performed by: OPHTHALMOLOGY

## 2023-01-20 PROCEDURE — G0463 HOSPITAL OUTPT CLINIC VISIT: HCPCS | Mod: 25

## 2023-01-20 PROCEDURE — 99204 OFFICE O/P NEW MOD 45 MIN: CPT | Mod: 25 | Performed by: STUDENT IN AN ORGANIZED HEALTH CARE EDUCATION/TRAINING PROGRAM

## 2023-01-20 RX ORDER — DORZOLAMIDE HCL 20 MG/ML
1 SOLUTION/ DROPS OPHTHALMIC 3 TIMES DAILY
Qty: 10 ML | Refills: 4 | Status: SHIPPED | OUTPATIENT
Start: 2023-01-20 | End: 2023-05-30

## 2023-01-20 RX ORDER — LATANOPROST 50 UG/ML
1 SOLUTION/ DROPS OPHTHALMIC AT BEDTIME
Qty: 7.5 ML | Refills: 4 | Status: SHIPPED | OUTPATIENT
Start: 2023-01-20 | End: 2023-05-30

## 2023-01-20 RX ORDER — PREDNISOLONE ACETATE 10 MG/ML
1 SUSPENSION/ DROPS OPHTHALMIC 4 TIMES DAILY
Qty: 5 ML | Refills: 0 | Status: SHIPPED | OUTPATIENT
Start: 2023-01-20 | End: 2023-01-24

## 2023-01-20 RX ORDER — ACETAZOLAMIDE 250 MG/1
250 TABLET ORAL 2 TIMES DAILY
Qty: 14 TABLET | Refills: 1 | Status: SHIPPED | OUTPATIENT
Start: 2023-01-20 | End: 2023-02-15

## 2023-01-20 RX ORDER — OLOPATADINE HYDROCHLORIDE 2 MG/ML
1 SOLUTION/ DROPS OPHTHALMIC DAILY
Qty: 2.5 ML | Refills: 11 | Status: SHIPPED | OUTPATIENT
Start: 2023-01-20 | End: 2023-05-30

## 2023-01-20 RX ORDER — BRIMONIDINE TARTRATE 2 MG/ML
1 SOLUTION/ DROPS OPHTHALMIC 3 TIMES DAILY
Qty: 5 ML | Refills: 4 | Status: SHIPPED | OUTPATIENT
Start: 2023-01-20 | End: 2023-07-25

## 2023-01-20 ASSESSMENT — SLIT LAMP EXAM - LIDS
COMMENTS: MEIBOMIAN GLAND DYSFUNCTION
COMMENTS: MEIBOMIAN GLAND DYSFUNCTION

## 2023-01-20 ASSESSMENT — CONF VISUAL FIELD
OD_INFERIOR_TEMPORAL_RESTRICTION: 0
OS_INFERIOR_TEMPORAL_RESTRICTION: 0
OS_INFERIOR_NASAL_RESTRICTION: 1
OD_INFERIOR_NASAL_RESTRICTION: 0
OS_SUPERIOR_NASAL_RESTRICTION: 1
OD_NORMAL: 1
OD_SUPERIOR_NASAL_RESTRICTION: 0
OD_SUPERIOR_TEMPORAL_RESTRICTION: 0
OS_SUPERIOR_NASAL_RESTRICTION: 1
OD_NORMAL: 1
OD_SUPERIOR_NASAL_RESTRICTION: 0
OS_INFERIOR_TEMPORAL_RESTRICTION: 3
OS_SUPERIOR_TEMPORAL_RESTRICTION: 3
OS_INFERIOR_NASAL_RESTRICTION: 1
METHOD: COUNTING FINGERS
OD_SUPERIOR_TEMPORAL_RESTRICTION: 0
OS_SUPERIOR_TEMPORAL_RESTRICTION: 0
OD_INFERIOR_TEMPORAL_RESTRICTION: 0
OD_INFERIOR_NASAL_RESTRICTION: 0

## 2023-01-20 ASSESSMENT — TONOMETRY
IOP_METHOD: TONOPEN
OD_IOP_MMHG: 14
OD_IOP_MMHG: 18
IOP_METHOD: APPLANATION
OS_IOP_MMHG: 43
IOP_METHOD: APPLANATION
IOP_METHOD: APPLANATION
OS_IOP_MMHG: 52
OS_IOP_MMHG: 58
OS_IOP_MMHG: 18
OS_IOP_MMHG: 58
IOP_METHOD: ICARE
OD_IOP_MMHG: 19
OS_IOP_MMHG: 27
IOP_METHOD: APPLANATION

## 2023-01-20 ASSESSMENT — VISUAL ACUITY
OD_CC: 20/25
OS_SC: 20/40
OD_CC: 20/20
OS_SC+: -1
OS_CC: 20/200
OS_SC: 20/200
OS_CC: 20/40
METHOD: SNELLEN - LINEAR
OD_PH_SC+: -2
METHOD: SNELLEN - LINEAR
OS_CC: 20/80
CORRECTION_TYPE: GLASSES
OS_PH_SC+: -3
OD_SC: 20/70
OD_SC: 20/200
OS_PH_SC: 20/20
OD_PH_SC: 20/25

## 2023-01-20 ASSESSMENT — CUP TO DISC RATIO
OD_RATIO: 0.4
OS_RATIO: 0.65
OD_RATIO: 0.4
OS_RATIO: 0.6

## 2023-01-20 ASSESSMENT — EXTERNAL EXAM - LEFT EYE
OS_EXAM: NO PROPTOSIS
OS_EXAM: NORMAL

## 2023-01-20 ASSESSMENT — KERATOMETRY
OS_AXISANGLE_DEGREES: 095
OS_K2POWER_DIOPTERS: 45.50
OS_AXISANGLE2_DEGREES: 005
OD_K2POWER_DIOPTERS: 44.25
OD_AXISANGLE2_DEGREES: 012
OS_K1POWER_DIOPTERS: 44.00
OD_K1POWER_DIOPTERS: 43.75
OD_AXISANGLE_DEGREES: 102

## 2023-01-20 ASSESSMENT — REFRACTION_MANIFEST
OS_SPHERE: +1.00
OS_AXIS: 111
OS_ADD: +2.00
OD_ADD: +2.00
OS_CYLINDER: +0.75
OD_CYLINDER: SPHERE
OD_SPHERE: +2.00

## 2023-01-20 ASSESSMENT — EXTERNAL EXAM - RIGHT EYE
OD_EXAM: NO PROPTOSIS
OD_EXAM: NORMAL

## 2023-01-20 NOTE — LETTER
1/20/2023         RE: Margo Inman  [id number] [facility] [id number]        Dear Colleague,    Thank you for referring your patient, Margo Inman, to the [facility]. Please see a copy of my visit note below.    Chief Complaint   Patient presents with     Annual Eye Exam         Last Eye Exam: 3-4 years ago at [facility]   Dilated Previously: Yes, side effects of dilation explained today    What are you currently using to see?  Readers OTC +1.75 - +2.75      Distance Vision Acuity: Noticed gradual change in left eye, also notes she feels a film in left eye.     Near Vision Acuity: Satisfied with vision while reading  with readers    Eye Comfort: itchy  Do you use eye drops? : Yes: systane natural tears   Occupation or Hobbies: refurbish furniture, outdoors     History of Lasik both eyes 2012/13    EVELYNE Reyes       Medical, surgical and family histories reviewed and updated 1/20/2023.       OBJECTIVE: See Ophthalmology exam    ASSESSMENT:    ICD-10-CM    1. Raised intraocular pressure of left eye  H40.052 EYE EXAM (SIMPLE-NONBILLABLE)     Adult Eye  Referral      2. Allergic conjunctivitis of both eyes  H10.13 EYE EXAM (SIMPLE-NONBILLABLE)     olopatadine (PATADAY) 0.2 % ophthalmic solution      3. Visual field loss  H53.40 EYE EXAM (SIMPLE-NONBILLABLE)     Adult Eye  Referral      4. Presbyopia  H52.4 REFRACTION          PLAN:     Patient Instructions   Immediate referral to the [facility] for evaluation of high intraocular pressure left eye.    Pataday to be used once daily for itchy eyes.  Use as needed. Contact your pharmacy for refills.    Tadeo Tsai OD             Again, thank you for allowing me to participate in the care of your patient.        Sincerely,        Tadeo Tsai OD
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
## 2023-01-20 NOTE — PROGRESS NOTES
Chief Complaint   Patient presents with     Annual Eye Exam         Last Eye Exam: 3-4 years ago at [facility]   Dilated Previously: Yes, side effects of dilation explained today    What are you currently using to see?  Readers OTC +1.75 - +2.75      Distance Vision Acuity: Noticed gradual change in left eye, also notes she feels a film in left eye.     Near Vision Acuity: Satisfied with vision while reading  with readers    Eye Comfort: itchy  Do you use eye drops? : Yes: systane natural tears   Occupation or Hobbies: refurbish furniture, outdoors     History of Lasik both eyes 2012/13    EVELYNE Reyes       Medical, surgical and family histories reviewed and updated 1/20/2023.       OBJECTIVE: See Ophthalmology exam    ASSESSMENT:    ICD-10-CM    1. Raised intraocular pressure of left eye  H40.052 EYE EXAM (SIMPLE-NONBILLABLE)     Adult Eye  Referral      2. Visual field loss  H53.40 EYE EXAM (SIMPLE-NONBILLABLE)     Adult Eye  Referral      3. Allergic conjunctivitis of both eyes  H10.13 EYE EXAM (SIMPLE-NONBILLABLE)     olopatadine (PATADAY) 0.2 % ophthalmic solution      4. Presbyopia  H52.4 REFRACTION          PLAN:     Patient Instructions   Immediate referral to the [facility] for evaluation of high intraocular pressure left eye.    Pataday to be used once daily for itchy eyes.  Use as needed. Contact your pharmacy for refills.    Tadeo Tsai, OD

## 2023-01-20 NOTE — PATIENT INSTRUCTIONS
In the LEFT EYE  Use brimonidine (purple top) 3 times a day  Use dorzolamide (orange top) 3 times a day  Use latanoprost (teal top) once at bedtime    Take Diamox 250 mg pill 2 times a day      See us back 01/23/2023 Monday at 830am 9th floor [facility] (same place you were at on Friday, address is [address] [place] [address]; [place] [address])

## 2023-01-20 NOTE — PATIENT INSTRUCTIONS
Immediate referral to the [facility] for evaluation of high intraocular pressure left eye.    Pataday to be used once daily for itchy eyes.  Use as needed. Contact your pharmacy for refills.    Tadeo Tsai, MIRNA

## 2023-01-20 NOTE — TELEPHONE ENCOUNTER
Spoke to pt at 1100    Seen by Dr. Tsai today and intraocular pressure 58 with visual field loss/vision loss    Pt states last eye exam about 5 years ago    Pt having headaches/eye pain for about 5 months    Pt scheduled with on call eye team and will be arriving around 1130    Aubrey Barnard RN 11:07 AM 01/20/23

## 2023-01-22 ASSESSMENT — GONIOSCOPY: METHOD: 4-MIRROR

## 2023-01-23 ENCOUNTER — OFFICE VISIT (OUTPATIENT)
Dept: OPHTHALMOLOGY | Facility: CLINIC | Age: 63
End: 2023-01-23
Attending: STUDENT IN AN ORGANIZED HEALTH CARE EDUCATION/TRAINING PROGRAM
Payer: COMMERCIAL

## 2023-01-23 DIAGNOSIS — H40.033 ANATOMICAL NARROW ANGLE, BILATERAL: Primary | ICD-10-CM

## 2023-01-23 DIAGNOSIS — H40.052 ELEVATED IOP, LEFT: ICD-10-CM

## 2023-01-23 PROCEDURE — 92133 CPTRZD OPH DX IMG PST SGM ON: CPT | Performed by: STUDENT IN AN ORGANIZED HEALTH CARE EDUCATION/TRAINING PROGRAM

## 2023-01-23 PROCEDURE — 66761 REVISION OF IRIS: CPT | Mod: RT | Performed by: STUDENT IN AN ORGANIZED HEALTH CARE EDUCATION/TRAINING PROGRAM

## 2023-01-23 PROCEDURE — G0463 HOSPITAL OUTPT CLINIC VISIT: HCPCS | Mod: 25

## 2023-01-23 PROCEDURE — 66761 REVISION OF IRIS: CPT | Mod: 79 | Performed by: STUDENT IN AN ORGANIZED HEALTH CARE EDUCATION/TRAINING PROGRAM

## 2023-01-23 PROCEDURE — 92133 CPTRZD OPH DX IMG PST SGM ON: CPT | Mod: 26 | Performed by: STUDENT IN AN ORGANIZED HEALTH CARE EDUCATION/TRAINING PROGRAM

## 2023-01-23 PROCEDURE — 99024 POSTOP FOLLOW-UP VISIT: CPT | Mod: 25 | Performed by: STUDENT IN AN ORGANIZED HEALTH CARE EDUCATION/TRAINING PROGRAM

## 2023-01-23 ASSESSMENT — REFRACTION_WEARINGRX
OD_SPHERE: +1.75
OS_SPHERE: +0.75
OS_AXIS: 111
OS_ADD: +2.00
OD_ADD: +2.00
OS_CYLINDER: +0.75
OD_CYLINDER: SPHERE

## 2023-01-23 ASSESSMENT — CONF VISUAL FIELD
OD_SUPERIOR_NASAL_RESTRICTION: 0
OD_INFERIOR_TEMPORAL_RESTRICTION: 0
OD_INFERIOR_NASAL_RESTRICTION: 0
OD_SUPERIOR_TEMPORAL_RESTRICTION: 0
OS_SUPERIOR_NASAL_RESTRICTION: 1
METHOD: COUNTING FINGERS
OS_SUPERIOR_TEMPORAL_RESTRICTION: 3
OS_INFERIOR_TEMPORAL_RESTRICTION: 3
OS_INFERIOR_NASAL_RESTRICTION: 1
OD_NORMAL: 1

## 2023-01-23 ASSESSMENT — TONOMETRY
OD_IOP_MMHG: 10
IOP_METHOD: APPLANATION
IOP_METHOD: TONOPEN
OS_IOP_MMHG: 24
OD_IOP_MMHG: 17

## 2023-01-23 ASSESSMENT — CUP TO DISC RATIO
OD_RATIO: 0.4
OS_RATIO: 0.6

## 2023-01-23 ASSESSMENT — PACHYMETRY
OS_CT(UM): 557
OD_CT(UM): 546

## 2023-01-23 ASSESSMENT — VISUAL ACUITY
OD_CC: 20/20
METHOD: SNELLEN - LINEAR
OD_CC+: -2
OS_CC: 20/30

## 2023-01-23 ASSESSMENT — EXTERNAL EXAM - LEFT EYE: OS_EXAM: NO PROPTOSIS

## 2023-01-23 ASSESSMENT — EXTERNAL EXAM - RIGHT EYE: OD_EXAM: NO PROPTOSIS
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
## 2023-01-23 NOTE — NURSING NOTE
Chief Complaints and History of Present Illnesses   Patient presents with     Follow Up     Angle closure, subacute-chronic, left eye  Anatomic narrow angles, right eye       Chief Complaint(s) and History of Present Illness(es)     Follow Up            Comments: Angle closure, subacute-chronic, left eye  Anatomic narrow angles, right eye            Comments    Pt states no change in VA since last visit  Pt states dryness both eye   Eye pain left eye 3/10 on the pain scale  No flashes, floaters or redness    Shahana Guzman COT 8:22 AM January 23, 2023

## 2023-01-24 ASSESSMENT — SLIT LAMP EXAM - LIDS
COMMENTS: MGD
COMMENTS: MGD

## 2023-01-25 ENCOUNTER — PRE VISIT (OUTPATIENT)
Dept: ORTHOPEDICS | Facility: CLINIC | Age: 63
End: 2023-01-25

## 2023-01-25 ENCOUNTER — OFFICE VISIT (OUTPATIENT)
Dept: ORTHOPEDICS | Facility: CLINIC | Age: 63
End: 2023-01-25
Attending: NURSE PRACTITIONER
Payer: COMMERCIAL

## 2023-01-25 DIAGNOSIS — M19.041 OSTEOARTHRITIS OF BOTH HANDS, UNSPECIFIED OSTEOARTHRITIS TYPE: ICD-10-CM

## 2023-01-25 DIAGNOSIS — R20.0 BILATERAL HAND NUMBNESS: Primary | ICD-10-CM

## 2023-01-25 DIAGNOSIS — M19.042 OSTEOARTHRITIS OF BOTH HANDS, UNSPECIFIED OSTEOARTHRITIS TYPE: ICD-10-CM

## 2023-01-25 PROCEDURE — 99204 OFFICE O/P NEW MOD 45 MIN: CPT | Performed by: FAMILY MEDICINE

## 2023-01-25 NOTE — PROGRESS NOTES
[facility]  SPORTS MEDICINE CLINIC VISIT     Jan 25, 2023        ASSESSMENT & PLAN    63-year-old with bilateral hand pain that is likely multifactorial.  A considerable amount of her pain seems to be emanating from the first CMC joint where she has advanced degenerative disease.  However, particularly on the left, she seems to have some symptoms that are consistent with recurrent carpal tunnel    Reviewed imaging and assessment with patient in detail  She is very interested in steroid injection of the CMC and will be assisted with scheduling.  She was provided with referral to hand therapy for home exercise regimen as well as possible hand-based CMC splints  Lastly, we will pursue EMG for evaluation of her paresthesias    Zeeshan Hobson MD  [facility] SPORTS MEDICINE [facility]    -----  Chief Complaint   Patient presents with     Consult For     Bilateral thumb pain        SUBJECTIVE  Magro Inman is a/an 63 year old female who is seen as a self referral for evaluation of bilateral thumb pain. Has HX of trigger finger release and carpal tunnel release 9+ years ago.     The patient is seen by themselves.  The patient is Right handed    Onset: 3 month(s) ago. Reports insidious onset without acute precipitating event.  Location of Pain: bilateral CMC - R>L  Worsened by: Pinching steering wheel, everything hurts  Better with: lidocaine patches with ace bandage  Treatments tried: ice and massaging it, Lidocaine patches, ace bandage  Associated symptoms: swelling, numbness, tingling and warmth    Orthopedic/Surgical history: YES - Date: Trigger finger thumb 9+ years ago, carpal tunnel 9+ years ago   Social History/Occupation: Not working due to surgeries the last few years      REVIEW OF SYSTEMS:    Do you have fever, chills, weight loss? No    Do you have any vision problems? No    Do you have any chest pain or edema? No    Do you have any shortness of breath or wheezing?  No    Do you  have stomach problems? No    Do you have any numbness or focal weakness? No    Do you have diabetes? No    Do you have problems with bleeding or clotting? No    Do you have an rashes or other skin lesions? No    OBJECTIVE:  [facility] 09/16/2011      General  - alert, pleasant, no distress  CV  - normal radial pulse, cap refill brisk  Musculoskeletal -ibilateral wrist  - inspection: normal joint alignment, no obvious deformity, no swelling  - palpation: TTP of CMC joint bilaterally, no bony or soft tissue tenderness, no tenderness at the anatomical snuffbox    - strength: 5/5  strength, 5/5 flexion, extension, pronation, supination, adduction, abduction  - special tests:  (-) Tinel's  (-) Finkelstein  (+) Phalen  (-) Méndez click test  (-) ulnar impaction  + CMC grind    Neuro  - no sensory or motor deficit, grossly normal coordination, normal muscle tone  Skin  - no ecchymosis, erythema, warmth, or induration, no obvious rash          RADIOLOGY:    1 view xrays of bilateral hands performed 1/11/23 and reviewed independently demonstrating advanced bilateral first cmc djd. See EMR for formal radiology report.

## 2023-01-25 NOTE — PATIENT INSTRUCTIONS
Thanks for coming today.  Ortho/Sports Medicine Clinic  [address] [place] [address]    To schedule future appointments in Ortho Clinic, you may call [phone].    To schedule ordered imaging or an injection ordered by your provider:  Call Central Imaging Injection scheduling line: [phone]    MyChart available online at:  [email or web address]    Please call if any further questions or concerns ([phone]).  Clinic hours 8 am to 5 pm.    Return to clinic (call) if symptoms worsen or fail to improve.

## 2023-01-25 NOTE — LETTER
1/25/2023         RE: Margo Inman  [address] [address] [facility] [address]        Dear Colleague,    Thank you for referring your patient, Margo Inman, to the [facility]. Please see a copy of my visit note below.      [facility]  SPORTS MEDICINE CLINIC VISIT     Jan 25, 2023        ASSESSMENT & PLAN    63-year-old with bilateral hand pain that is likely multifactorial.  A considerable amount of her pain seems to be emanating from the first CMC joint where she has advanced degenerative disease.  However, particularly on the left, she seems to have some symptoms that are consistent with recurrent carpal tunnel    Reviewed imaging and assessment with patient in detail  She is very interested in steroid injection of the CMC and will be assisted with scheduling.  She was provided with referral to hand therapy for home exercise regimen as well as possible hand-based CMC splints  Lastly, we will pursue EMG for evaluation of her paresthesias    Zeeshan Hobson MD  [facility]    -----  Chief Complaint   Patient presents with     Consult For     Bilateral thumb pain        SUBJECTIVE  Margo Inman is a/an 63 year old female who is seen as a self referral for evaluation of bilateral thumb pain. Has HX of trigger finger release and carpal tunnel release 9+ years ago.     The patient is seen by themselves.  The patient is Right handed    Onset: 3 month(s) ago. Reports insidious onset without acute precipitating event.  Location of Pain: bilateral CMC - R>L  Worsened by: Pinching steering wheel, everything hurts  Better with: lidocaine patches with ace bandage  Treatments tried: ice and massaging it, Lidocaine patches, ace bandage  Associated symptoms: swelling, numbness, tingling and warmth    Orthopedic/Surgical history: YES - Date: Trigger finger thumb 9+ years ago, carpal tunnel 9+ years ago    Social History/Occupation: Not working due to surgeries the last few years      REVIEW OF SYSTEMS:    Do you have fever, chills, weight loss? No    Do you have any vision problems? No    Do you have any chest pain or edema? No    Do you have any shortness of breath or wheezing?  No    Do you have stomach problems? No    Do you have any numbness or focal weakness? No    Do you have diabetes? No    Do you have problems with bleeding or clotting? No    Do you have an rashes or other skin lesions? No    OBJECTIVE:  [facility] 09/16/2011      General  - alert, pleasant, no distress  CV  - normal radial pulse, cap refill brisk  Musculoskeletal -ibilateral wrist  - inspection: normal joint alignment, no obvious deformity, no swelling  - palpation: TTP of CMC joint bilaterally, no bony or soft tissue tenderness, no tenderness at the anatomical snuffbox    - strength: 5/5  strength, 5/5 flexion, extension, pronation, supination, adduction, abduction  - special tests:  (-) Tinel's  (-) Finkelstein  (+) Phalen  (-) Méndez click test  (-) ulnar impaction  + CMC grind    Neuro  - no sensory or motor deficit, grossly normal coordination, normal muscle tone  Skin  - no ecchymosis, erythema, warmth, or induration, no obvious rash          RADIOLOGY:    1 view xrays of bilateral hands performed 1/11/23 and reviewed independently demonstrating advanced bilateral first cmc djd. See EMR for formal radiology report.                 Again, thank you for allowing me to participate in the care of your patient.        Sincerely,        Zeeshan Hobson MD

## 2023-01-26 NOTE — PROGRESS NOTES
Hand Therapy Initial Evaluation    Current Date:  1/27/2023  Referring Provider: Zeeshan Hobson MD MD Order Date: 1/25/2023  MD Note: bilateral first CMC OA, possible recurrent carpal tunnel    Diagnosis: Bilateral hand numbness & Osteoarthritis of both hands (left is worse than right)  DOI: About 3 months ago    Subjective:  Margo Inman is a 63 year old female.    Patient reports symptoms of the bilateral hands and thumbs which occurred due to use over time. Since onset symptoms are Gradually getting worse.  General health as reported by patient is good.    Pertinent medical history includes:  Past Medical History:   Diagnosis Date     Arthritis      Cervical high risk HPV (human papillomavirus) test positive 1/10/2017    1/10/17 NIL pap/+ HR HPV (not 16 or 18). Plan: cotest in 1 year, due by 1/10/18      Chronic infection     HX of MRSA. 2 neg swabs at [facility] in 2006 and 2007.     History of blood transfusion      Numbness and tingling     Right arm     PONV (postoperative nausea and vomiting)    Medical allergies:  Allergies   Allergen Reactions     Wasp Venom Protein Anaphylaxis     Bee Anaphylaxis     Erythromycin Hives     Wasps [Hornets] Anaphylaxis     Shellfish Allergy Nausea and Vomiting and Rash     Shellfish-Derived Products Rash and Nausea and Vomiting   Surgical history:   Past Surgical History:   Procedure Laterality Date     APPENDECTOMY       ARTHROSCOPY KNEE  09/16/2011    Procedure:ARTHROSCOPY KNEE; left knee arthroscopy with debridement, open lateral patellar spur excision; Surgeon:LUIS A MONTOYA; Location:[facility]     COLONOSCOPY N/A 02/16/2016    Procedure: COLONOSCOPY;  Surgeon: Beelm Khalil MD;  [facility]: [facility]     COLONOSCOPY N/A 02/16/2016    Procedure: COMBINED COLONOSCOPY, SINGLE OR MULTIPLE BIOPSY/POLYPECTOMY BY BIOPSY;  Surgeon: Belem Khalil MD;  [facility]: [facility]     COLONOSCOPY N/A 06/15/2020    Procedure: Colonoscopy, With Polypectomy And  Biopsy;  Surgeon: Torres Gallegos DO;  Location: [facility]     COLONOSCOPY WITH CO2 INSUFFLATION N/A 02/16/2016    Procedure: COLONOSCOPY WITH CO2 INSUFFLATION;  Surgeon: Belem Khalil MD;  [facility]     COLONOSCOPY WITH CO2 INSUFFLATION N/A 06/15/2020    Procedure: COLONOSCOPY, WITH CO2 INSUFFLATION;  Surgeon: Torres Gallegos DO;  [facility]: [facility]     CYSTOSCOPY  01/01/2016    microscopic hematuria     DECOMPRESSION, FUSION CERVICAL ANTERIOR ONE LEVEL, COMBINED N/A 03/30/2017    Procedure: COMBINED DECOMPRESSION, FUSION CERVICAL ANTERIOR ONE LEVEL;  Surgeon: Joseph Klein MD;  Location: [facility] OR     ECTOPIC PREGNANCY SURGERY       ENT SURGERY       GYN SURGERY       HC INCISION TENDON SHEATH FINGER Left 07/11/2014    Thumb TF release     HC KNEE SCOPE,MED/LAT MENISECTOMY  09/16/2011    left, with partial medial menisectomy ONLY     HC REVISE MEDIAN N/CARPAL TUNNEL SURG Left 07/11/2014    PRIMARY - not revision     LASIK Bilateral     [id number]     ORTHOPEDIC SURGERY       RELEASE CARPAL TUNNEL  07/11/2014    Procedure: RELEASE CARPAL TUNNEL;  Surgeon: Rg Franco MD;  Location[facility]     RELEASE CARPAL TUNNEL Right 11/07/2014    Procedure: RELEASE CARPAL TUNNEL;  Surgeon: Rg Franco MD;  Location[facility]     RELEASE TRIGGER FINGER  07/11/2014    Procedure: RELEASE TRIGGER FINGER;  Surgeon: Rg Franco MD;  Location[facility]     RELEASE TRIGGER FINGER Right 11/07/2014    Procedure: RELEASE TRIGGER FINGER;  Surgeon: Rg Franco MD;  Location: [facility] OR     tonsils       [facility] PART REMV FEMUR/PROX TIB/FIB  09/16/2011    left, open lateral patellar bone spur excision     [facility] TOTAL KNEE ARTHROPLASTY  01/17/2014    Bilateral   Medication history:  Current Outpatient Medications   Medication     acetaminophen (TYLENOL) 500 MG tablet     acetaZOLAMIDE (DIAMOX) 250 MG tablet     albuterol (PROAIR HFA/PROVENTIL HFA/VENTOLIN HFA) 108 (90 Base) MCG/ACT inhaler      ammonium lactate (LAC-HYDRIN) 12 % external lotion     atorvastatin (LIPITOR) 80 MG tablet     brimonidine (ALPHAGAN) 0.2 % ophthalmic solution     calcium carbonate 500 mg, elemental, 1250 (500 Ca) MG tablet chewable     cyclobenzaprine (FLEXERIL) 10 MG tablet     cyclobenzaprine (FLEXERIL) 10 MG tablet     diclofenac (VOLTAREN) 75 MG EC tablet     dorzolamide (TRUSOPT) 2 % ophthalmic solution     latanoprost (XALATAN) 0.005 % ophthalmic solution     losartan (COZAAR) 25 MG tablet     Misc. Devices (ROLLATOR ULTRA-LIGHT) MISC     olopatadine (PATADAY) 0.2 % ophthalmic solution     Omega-3 Fatty Acids (FISH OIL PO)     polyethylene glycol-propylene glycol (SYSTANE ULTRA) 0.4-0.3 % SOLN ophthalmic solution     tiZANidine (ZANAFLEX) 2 MG tablet     TUMS ULTRA 1000 1000 MG CHEW     varenicline (CHANTIX EVANGELINA) 0.5 MG X 11 & 1 MG X 42 tablet     varenicline (CHANTIX EVANGELINA) 0.5 MG X 11 & 1 MG X 42 tablet     varenicline (CHANTIX) 1 MG tablet     No current facility-administered medications for this visit.     Current occupation is retired  Job Tasks: housework, vacuuming, cleaning, dusting, dishes, mopping    Occupational Profile Information:  Right hand dominant  Prior functional level:  independent-shared household chores  Patient reports symptoms of pain, weakness/loss of strength, edema, numbness and tingling   Special tests:  x-ray and EMG is scheduled for 5/15/2023.    Previous treatment: ice and massaging it, Lidocaine patches, ace bandage  Transportation: drives with worsening symptoms  Leisure activities/hobbies: woodworking    Functional Outcome Measure:   Upper Extremity Functional Index Score:  SCORE:   Column Totals: /80: 32   (A lower score indicates greater disability.)    Objective:  Pain Level (Scale 0-10):   1/27/2023   At Rest 4/10   With Use 9/10     Pain Description:  Date 1/27/2023   Location B CMC Joints, volar/central wrist, shooting through the wrist   Pain Quality Numb, Sharp, Shooting, Throbbing and  Tingling   Frequency constant     Pain is worst daytime or nighttime   Exacerbated by Pinching, steering wheel   Relieved by Lidocaine patches   Progression Worsening     Posture  Rounded Forward Shoulders    Edema  Mild    Sensation  Decreased Median Nerve distribution per pt report    ROM  Pain Report: - none  + mild    ++ moderate    +++ severe   Wrist 1/27/2023 1/27/2023   AROM (PROM) R L   Extension 61 58   Flexion 58 63   RD 14 13   UD 35 29     Thumb 1/27/2023 1/27/2023   AROM  (PROM) R L   MP /46 /44   IP /52 /41   RABD 35 33   PABD 30 37   Kapandji Opposition Scale (0-10/10) 8 7     Thumb Observation/Appearance   - none  + mild    ++ moderate    +++ severe     1/27/2023   Shoulder deformity present over CMC R: +  L: +   Edema over the CMC joint R: +  L: +   Noted collapse of MP into hyperextension during pinch R: +  L: +      Provocative Tests  Pain Report:  - none    + mild    ++ moderate    +++ severe     1/27/2023   Crepitus present R: -  L: ++   CMC Adduction Stress Test R: ++  L: +++   CMC Extension Stress Test R: ++  L: +++   Finkelstein's R: +  L: NT due to pain     Special Tests  Pain Report:  - none    + mild    ++ moderate    +++ severe    1/27/2023   Median Nerve Compression at Pronator R: -  L: -   Carpal Compression Test--Durkan Test (30 sec) R: +  L: +++   Tinels at Carpal Tunnel R: -  L: +++   Phalens R: -  L: ++     Neural Tension Testing  MNT: Median Neurodynamic Test (based on MYRANDA Limon's ULNT)   1/27/2023   0-5 Scale R: 3/5  L: 3/5   Position:   0/5: Arm across abdomen in coronal plane  1/5: Depress shoulder, ER to neutral ABD shoulder to 45 degrees  2/5: ER shoulder to end range, keep elbow at 90 degrees  3/5: Extend elbow to 0 degrees  4/5: Fully supinate forearm  5/5: Extend wrist, fingers and thumb  Notes:    (+) indicates beyond grade level but less than long term to next level    (-) indicates over long term to level    S1  onset/change of patient's symptoms    S2 definite stop point  based on patient's discomfort level    Strength   (Measured in pounds)  Pain Report: - none  + mild    ++ moderate    +++ severe    1/27/2023 1/27/2023   Trials R L   1  2  3 27 11   Average 27 11     Lat Pinch 1/27/2023 1/27/2023   Trials R L   1  2  3 4 3   Average 4 3     Palpation   Pain Report:  - none    + mild    ++ moderate    +++ severe    1/27/2023   CMC Joint Line R: +++  L: +++   Thenar Eminence R: ++  L: ++   Web Space R: -  L: ++   1st DC R: ++  L: -   Radial Styloid R: +++  L: +++   FCR R: +  L: -     Assessment:  Patient presents with symptoms consistent with diagnosis of bilateral hand numbness & osteoarthritis of both hands, with conservative intervention.     Patient's limitations or Problem List includes:  Pain, Decreased ROM/motion, Increased edema, Weakness, Sensory disturbance, Decreased stability, Decreased , Decreased pinch and Tightness in musculature of the bilateral hand which interferes with the patient's ability to perform Self Care Tasks (dressing, hygiene/toileting), Sleep Patterns, Recreational Activities, Household Chores and Driving  as compared to previous level of function.    Rehab Potential:  Good - Return to full activity, some limitations    Patient will benefit from skilled Occupational Therapy to increase ROM, flexibility, motion, overall strength,  strength, pinch strength, stability of thumb and sensation and decrease pain and edema to return to previous activity level and resume normal daily tasks and to reach their rehab potential.    Barriers to Learning:  No barrier    Communication Issues:  Patient appears to be able to clearly communicate and understand verbal and written communication and follow directions correctly.    Chart Review: Chart Review and Simple history review with patient    Identified Performance Deficits: bathing/showering, dressing, hygiene and grooming, driving and community mobility, health management and maintenance, home  establishment and management, meal preparation and cleanup, shopping, sleep and leisure activities    Assessment of Occupational Performance:  5 or more Performance Deficits    Clinical Decision Making (Complexity): Low complexity    Treatment Explanation:  The following has been discussed with the patient:  RX ordered/plan of care  Anticipated outcomes  Possible risks and side effects    Plan:  Frequency:  1 X week, once daily  Duration:  for 8 weeks    Treatment Plan:    Modalities:    US and Paraffin   Therapeutic Exercise:    AROM, AAROM, PROM, Tendon Gliding, Place and Hold, Extensor Tracking, Isotonics, Isometrics and Stabilization  Neuromuscular re-ed:   Nerve Gliding and Kinesiotaping  Manual Techniques:   Joint mobilization, Friction massage, Myofascial release and Manual edema mobilization  Orthotic Fabrication:    Static  Self Care:    Self Care Tasks, Ergonomic Considerations and Work Tasks    Discharge Plan:  Achieve all LTG.  Independent in home treatment program.  Reach maximal therapeutic benefit.    Home Exercise Program:  Orthosis Wear and Care  Warmth  Ball Massage to Flexors  [place] Massage to Thumb  Thumb Stabilization Web Space Release Method 1 with Clip  Finger Active Range of Motion Tendon Glides Fist Series  Median Nerve Mobility    Next Visit:  Review HEP  Review post-injection scheduled for next Wednesday  Review orthosis fit  Consider fabricating right hand orthosis  US  MFR  Nerve gliding

## 2023-01-27 ENCOUNTER — THERAPY VISIT (OUTPATIENT)
Dept: OCCUPATIONAL THERAPY | Facility: CLINIC | Age: 63
End: 2023-01-27
Payer: COMMERCIAL

## 2023-01-27 DIAGNOSIS — M19.041 OSTEOARTHRITIS OF BOTH HANDS, UNSPECIFIED OSTEOARTHRITIS TYPE: ICD-10-CM

## 2023-01-27 DIAGNOSIS — M19.042 OSTEOARTHRITIS OF BOTH HANDS, UNSPECIFIED OSTEOARTHRITIS TYPE: ICD-10-CM

## 2023-01-27 DIAGNOSIS — R20.0 BILATERAL HAND NUMBNESS: ICD-10-CM

## 2023-01-27 PROCEDURE — 97110 THERAPEUTIC EXERCISES: CPT | Mod: GO

## 2023-01-27 PROCEDURE — 97760 ORTHOTIC MGMT&TRAING 1ST ENC: CPT | Mod: GO

## 2023-01-27 PROCEDURE — 97165 OT EVAL LOW COMPLEX 30 MIN: CPT | Mod: GO

## 2023-01-27 NOTE — PROGRESS NOTES
[facility]    OUTPATIENT Occupational Therapy ORTHOPEDIC EVALUATION  PLAN OF TREATMENT FOR OUTPATIENT REHABILITATION  (COMPLETE FOR INITIAL CLAIMS ONLY)  Patient's Last Name, First Name, M.I.  YOB: 1960  Margo Inman    Provider s Name:  [facility]   Medical Record No.  [id number]   Start of Care Date:  01/27/23   Onset Date:   01/25/23 (MD Order Date)   Treatment Diagnosis:  Bilateral hand numbness Medical Diagnosis:     Osteoarthritis of both hands, unspecified osteoarthritis type  Bilateral hand numbness       Goals:     01/27/23 0500   Goal #1   Goal #1 household chores   Previous Performance Level Independent   Current Functional Task Reach   Current Performance Level Maximum Difficulty   STG Target Perfomance [facility]   STG Target Perform Level Moderate Difficulty   Due Date 02/24/23   LTG Target Task/Performance No Difficulty   Due Date 03/24/23         Therapy Frequency:  1x weekly  Predicted Duration of Therapy Intervention:  8 weeks    Irene Nolasco OT                 I CERTIFY THE NEED FOR THESE SERVICES FURNISHED UNDER        THIS PLAN OF TREATMENT AND WHILE UNDER MY CARE     (Physician attestation of this document indicates review and certification of the therapy plan).                     Certification Date From:  01/27/23   Certification Date To:  03/24/23    Referring Provider:  Zeeshan Hobson    Initial Assessment        See Epic Evaluation SOC Date: 01/27/23

## 2023-02-01 ENCOUNTER — OFFICE VISIT (OUTPATIENT)
Dept: ORTHOPEDICS | Facility: CLINIC | Age: 63
End: 2023-02-01
Payer: COMMERCIAL

## 2023-02-01 DIAGNOSIS — M19.042 OSTEOARTHRITIS OF BOTH HANDS, UNSPECIFIED OSTEOARTHRITIS TYPE: Primary | ICD-10-CM

## 2023-02-01 DIAGNOSIS — M19.041 OSTEOARTHRITIS OF BOTH HANDS, UNSPECIFIED OSTEOARTHRITIS TYPE: Primary | ICD-10-CM

## 2023-02-01 PROCEDURE — 20604 DRAIN/INJ JOINT/BURSA W/US: CPT | Mod: 50 | Performed by: FAMILY MEDICINE

## 2023-02-01 RX ADMIN — BETAMETHASONE SODIUM PHOSPHATE AND BETAMETHASONE ACETATE 6 MG: 3; 3 INJECTION, SUSPENSION INTRA-ARTICULAR; INTRALESIONAL; INTRAMUSCULAR; SOFT TISSUE at 13:50

## 2023-02-01 NOTE — LETTER
2/1/2023         RE: Margo Inman  [address] [address]        Dear Colleague,    Thank you for referring your patient, Margo Inman, to the [facility] SPORTS MEDICINE CLINIC [place]. Please see a copy of my visit note below.      [facility]  SPORTS MEDICINE CLINIC VISIT     Feb 1, 2023     Ultrasound Guided Bilateral First CMC injections    Date/Time: 2/1/2023 1:50 PM  Performed by: Zeeshan Hobson MD  Authorized by: Zeeshan Hobson MD     Indications:  Pain  Needle Size:  25 G  Guidance: ultrasound    Approach:  Dorsal  Condition: osteoarthritis    Laterality:  Bilateral  Location:  Thumb  Site:  Bilateral thumb CMC    Medications (Right):  6 mg betamethasone acet & sod phos 6 (3-3) MG/ML  Medications (Left):  6 mg betamethasone acet & sod phos 6 (3-3) MG/ML  Outcome:  Tolerated well, no immediate complications  Procedure discussed: discussed risks, benefits, and alternatives    Consent Given by:  Patient  Timeout: timeout called immediately prior to procedure    Prep: patient was prepped and draped in usual sterile fashion     Patient was positioned with the  left hand in a neutral position on exam table.  The area overlying the left first CMC joint was cleaned with chlorhexidine swab.  Next using ultrasound guidance the CMC joint was identified.  Ultrasound was necessary due to the degree of osteoarthritis and the small size of the joint space.  The skin was anesthetized with ethyl chloride spray.  Next using direct and continuous ultrasound guidance a 25-gauge needle was introduced into the joint space.  A solution of 6 mg Celestone and 1 mL 1% lidocaine was injected and seen flowing into the joint space.  Images were captured and saved to the permanent record.  The area was covered with a bandage.  The identical procedure was then repeated on the right side. The patient tolerated the procedure well.  There were no immediate  complications.  Routine postinjection instructions were given to the patient.  Recommended follow-up should any significant increase in pain, redness, swelling or drainage from the injection site develop.    Zeeshan Hobson MD              Again, thank you for allowing me to participate in the care of your patient.        Sincerely,        Zeeshan Hobson MD

## 2023-02-01 NOTE — PROGRESS NOTES
[facility]  SPORTS MEDICINE CLINIC VISIT     Feb 1, 2023     Ultrasound Guided Bilateral First CMC injections    Date/Time: 2/1/2023 1:50 PM  Performed by: Zeeshan Hobson MD  Authorized by: Zeeshan Hobson MD     Indications:  Pain  Needle Size:  25 G  Guidance: ultrasound    Approach:  Dorsal  Condition: osteoarthritis    Laterality:  Bilateral  Location:  Thumb  Site:  Bilateral thumb CMC    Medications (Right):  6 mg betamethasone acet & sod phos 6 (3-3) MG/ML  Medications (Left):  6 mg betamethasone acet & sod phos 6 (3-3) MG/ML  Outcome:  Tolerated well, no immediate complications  Procedure discussed: discussed risks, benefits, and alternatives    Consent Given by:  Patient  Timeout: timeout called immediately prior to procedure    Prep: patient was prepped and draped in usual sterile fashion     Patient was positioned with the  left hand in a neutral position on exam table.  The area overlying the left first CMC joint was cleaned with chlorhexidine swab.  Next using ultrasound guidance the CMC joint was identified.  Ultrasound was necessary due to the degree of osteoarthritis and the small size of the joint space.  The skin was anesthetized with ethyl chloride spray.  Next using direct and continuous ultrasound guidance a 25-gauge needle was introduced into the joint space.  A solution of 6 mg Celestone and 1 mL 1% lidocaine was injected and seen flowing into the joint space.  Images were captured and saved to the permanent record.  The area was covered with a bandage.  The identical procedure was then repeated on the right side. The patient tolerated the procedure well.  There were no immediate complications.  Routine postinjection instructions were given to the patient.  Recommended follow-up should any significant increase in pain, redness, swelling or drainage from the injection site develop.    Zeeshan Hobson MD

## 2023-02-05 RX ORDER — BETAMETHASONE SODIUM PHOSPHATE AND BETAMETHASONE ACETATE 3; 3 MG/ML; MG/ML
6 INJECTION, SUSPENSION INTRA-ARTICULAR; INTRALESIONAL; INTRAMUSCULAR; SOFT TISSUE
Status: SHIPPED | OUTPATIENT
Start: 2023-02-01

## 2023-02-06 DIAGNOSIS — I10 ESSENTIAL HYPERTENSION WITH GOAL BLOOD PRESSURE LESS THAN 140/90: ICD-10-CM

## 2023-02-06 RX ORDER — LOSARTAN POTASSIUM 25 MG/1
TABLET ORAL
Qty: 90 TABLET | Refills: 0 | Status: SHIPPED | OUTPATIENT
Start: 2023-02-06 | End: 2023-05-08

## 2023-02-07 NOTE — TELEPHONE ENCOUNTER
Called patient to inquire re: home BPs. Patient states that she has not checked her blood pressures at home recently (most recent BP check was when she was in for her appt on 1/11/23). Patient states she does not have a way to check her home BPs. Patient requesting auto BP cuff prescription sent to pharmacy so she can check her BPs at home. Patient states no s/s of high BPs (no HAs, change in vision, dizziness, etc).    Will route to provider to review and advise        STEVENSON Bella, RN  [facility]

## 2023-02-07 NOTE — TELEPHONE ENCOUNTER
Pls call patient and find out what her home BP's are running, route back to me.  Belem ODELL, CNP

## 2023-02-09 DIAGNOSIS — H25.13 NUCLEAR SCLEROTIC CATARACT OF BOTH EYES: Primary | ICD-10-CM

## 2023-02-14 ENCOUNTER — THERAPY VISIT (OUTPATIENT)
Dept: OCCUPATIONAL THERAPY | Facility: CLINIC | Age: 63
End: 2023-02-14
Payer: COMMERCIAL

## 2023-02-14 DIAGNOSIS — M19.041 OSTEOARTHRITIS OF BOTH HANDS, UNSPECIFIED OSTEOARTHRITIS TYPE: Primary | ICD-10-CM

## 2023-02-14 DIAGNOSIS — R20.0 BILATERAL HAND NUMBNESS: ICD-10-CM

## 2023-02-14 DIAGNOSIS — M19.042 OSTEOARTHRITIS OF BOTH HANDS, UNSPECIFIED OSTEOARTHRITIS TYPE: Primary | ICD-10-CM

## 2023-02-14 PROCEDURE — 97140 MANUAL THERAPY 1/> REGIONS: CPT | Mod: GO

## 2023-02-14 PROCEDURE — 97110 THERAPEUTIC EXERCISES: CPT | Mod: GO

## 2023-02-15 ENCOUNTER — OFFICE VISIT (OUTPATIENT)
Dept: OPHTHALMOLOGY | Facility: CLINIC | Age: 63
End: 2023-02-15
Attending: STUDENT IN AN ORGANIZED HEALTH CARE EDUCATION/TRAINING PROGRAM
Payer: COMMERCIAL

## 2023-02-15 ENCOUNTER — TELEPHONE (OUTPATIENT)
Dept: OPHTHALMOLOGY | Facility: CLINIC | Age: 63
End: 2023-02-15

## 2023-02-15 DIAGNOSIS — Z98.890 HISTORY OF LASER ASSISTED IN SITU KERATOMILEUSIS: ICD-10-CM

## 2023-02-15 DIAGNOSIS — H40.9 SEVERE STAGE GLAUCOMA: ICD-10-CM

## 2023-02-15 DIAGNOSIS — H40.9 SEVERE STAGE GLAUCOMA: Primary | ICD-10-CM

## 2023-02-15 DIAGNOSIS — H40.033 ANATOMICAL NARROW ANGLE, BILATERAL: ICD-10-CM

## 2023-02-15 DIAGNOSIS — H25.13 NUCLEAR SCLEROTIC CATARACT OF BOTH EYES: ICD-10-CM

## 2023-02-15 DIAGNOSIS — H40.212 ACUTE ANGLE-CLOSURE GLAUCOMA OF LEFT EYE: ICD-10-CM

## 2023-02-15 PROCEDURE — 92025 CPTRIZED CORNEAL TOPOGRAPHY: CPT | Performed by: OPHTHALMOLOGY

## 2023-02-15 PROCEDURE — G0463 HOSPITAL OUTPT CLINIC VISIT: HCPCS | Mod: 25

## 2023-02-15 PROCEDURE — 76519 ECHO EXAM OF EYE: CPT | Performed by: OPHTHALMOLOGY

## 2023-02-15 PROCEDURE — 92020 GONIOSCOPY: CPT | Performed by: OPHTHALMOLOGY

## 2023-02-15 PROCEDURE — 99214 OFFICE O/P EST MOD 30 MIN: CPT | Performed by: OPHTHALMOLOGY

## 2023-02-15 RX ORDER — PILOCARPINE HYDROCHLORIDE 10 MG/ML
1 SOLUTION/ DROPS OPHTHALMIC 3 TIMES DAILY
Qty: 15 ML | Refills: 0 | Status: SHIPPED | OUTPATIENT
Start: 2023-02-15 | End: 2023-05-30

## 2023-02-15 ASSESSMENT — TONOMETRY
OS_IOP_MMHG: 30
IOP_METHOD: APPLANATION
IOP_METHOD: APPLANATION
IOP_METHOD: TONOPEN
OD_IOP_MMHG: 18
OS_IOP_MMHG: 21
OS_IOP_MMHG: 38

## 2023-02-15 ASSESSMENT — REFRACTION_MANIFEST
OS_CYLINDER: +0.75
OD_CYLINDER: SPHERE
OD_ADD: +2.50
OS_SPHERE: +1.50
OS_ADD: +2.50
OD_SPHERE: +2.00
OS_AXIS: 120

## 2023-02-15 ASSESSMENT — SLIT LAMP EXAM - LIDS
COMMENTS: MGD
COMMENTS: MGD

## 2023-02-15 ASSESSMENT — REFRACTION_WEARINGRX
OS_ADD: +2.00
OD_ADD: +2.00
OD_SPHERE: +1.75
OS_AXIS: 111
OD_CYLINDER: SPHERE
OS_SPHERE: +0.75
OS_CYLINDER: +0.75

## 2023-02-15 ASSESSMENT — PACHYMETRY
OS_CT(UM): 557
OD_CT(UM): 546

## 2023-02-15 ASSESSMENT — VISUAL ACUITY
OS_BAT_HIGH: 20/60
OD_CC: 20/25
OD_CC+: -2
METHOD: SNELLEN - LINEAR
OS_CC: 20/50
OD_BAT_HIGH: 20/25
OS_CC+: -2

## 2023-02-15 ASSESSMENT — CUP TO DISC RATIO
OS_RATIO: 0.6
OD_RATIO: 0.4

## 2023-02-15 ASSESSMENT — EXTERNAL EXAM - RIGHT EYE: OD_EXAM: NO PROPTOSIS

## 2023-02-15 ASSESSMENT — EXTERNAL EXAM - LEFT EYE: OS_EXAM: NO PROPTOSIS

## 2023-02-15 NOTE — NURSING NOTE
Chief Complaints and History of Present Illnesses   Patient presents with     Cataract Evaluation     Chief Complaint(s) and History of Present Illness(es)     Cataract Evaluation            Laterality: both eyes    Associated symptoms: blurred vision and a need for brighter lights    Onset: gradual    Treatments tried: eye drops          Comments    Here for cataracts evaluation. Over the year, vision has been gradually decreasing. She requires more light when reading. Glare and night driving is bothersome. Compliant with drops.     Je Chery COT 8:53 AM February 15, 2023

## 2023-02-15 NOTE — TELEPHONE ENCOUNTER
Patient is scheduled for surgery with Dr. Gaston Guzman     Spoke with: Margo     Date of Surgery: 02/20/23     Location: [facility]:  [address] [place] [id number]     H&P was completed in clinic    Post Op scheduled on 03/16/23     Surgery packet was given in clinic     Additional comments: Advised RN will call 1 - 3 business days prior with arrival time and instructions. Informed patient they will need an adult  and responsible adult to stay with for 24 hours following surgery

## 2023-02-15 NOTE — PATIENT INSTRUCTIONS
You have an eye condition called anatomical narrow angles. This condition can cause some people to have a sudden attack of glaucoma which may cause decreased vision, halos around lights, eye or brow area pain, nausea and vomiting. This sudden attack of glaucoma happens very rarely, but may cause irreversible vision loss. It is important to see an ophthalmologist urgently should you develop any of the above symptoms for possible medical or laser treatment in office.  We do offer 24/7 emergency eye care and you may call at any time. Please avoid all over the counter allergy and cold medications (i.e., benadryl) as these can incite a sudden attack of glaucoma.

## 2023-02-15 NOTE — PROGRESS NOTES
Chief Complaint(s) and History of Present Illness(es)     Cataract Evaluation            Laterality: both eyes    Associated symptoms: blurred vision and a need for brighter lights    Onset: gradual    Treatments tried: eye drops          Comments    Here for cataracts evaluation. Over the year, vision has been gradually   decreasing. She requires more light when reading. Glare and night driving   is bothersome. Compliant with drops.     Je Chery COT 8:53 AM February 15, 2023             Review of systems for the eyes was negative other than the pertinent positives/negatives listed in the HPI.      Assessment & Plan      Margo Inman is a 63 year old female with the following diagnoses:   1. Severe stage glaucoma - Left Eye    2. Acute angle-closure glaucoma of left eye    3. Anatomical narrow angle, bilateral    4. Nuclear sclerotic cataract of both eyes    5. History of laser assisted in situ keratomileusis         Referral from Dr. Ackerman for surgery evaluation in the setting of recent acute glaucoma event in the left eye.  Now s/p laser peripheral iridotomy (LPI) both eyes   Maximum intraocular pressure 19/58  laser in situ keratomileusis (LASIK) history in both eyes, unsure pre-operative prescription, but likely hyperopic based on topography and axial length.     Minimal angle structures seen on dynamic gonio in either eye still today, despite good laser peripheral iridotomy (LPI) patency  Given mami left eye with good effect; intraocular pressure 21 mm Hg on dismissal today    Intraocular pressure remains too high left eye on topical treatment   Add pilocarpine 1% three times a day left eye   Continue latanoprost, dorz, and brimonidine as before left eye     RBA to cataract extraction + intraocular lens insertion + ENDOCYLOPHOTOCOAGULATION + goniosynechyialsis + possible goniotomy discussed, consent obtained, will schedule urgently for next week    Special equipment/needs:    Anesthesia:MAC with  block  Dilation:Unknown Dilation  Iris expansion:Not needed  Pseudoexfoliation: No pseudoexfoliation  Trypan Blue: No   Goal emmetropia  Dex/NSAID  Limited visual potential given optic nerve damage reviewed.  Need for glasses and continue glaucoma treatment after surgery discussed    Right eye cataract and goniosynechialysis soon to follow           Attending Physician Attestation:  Complete documentation of historical and exam elements from today's encounter can be found in the full encounter summary report (not reduplicated in this progress note).  I personally obtained the chief complaint(s) and history of present illness.  I confirmed and edited as necessary the review of systems, past medical/surgical history, family history, social history, and examination findings as documented by others; and I examined the patient myself.  I personally reviewed the relevant tests, images, and reports as documented above.  I formulated and edited as necessary the assessment and plan and discussed the findings and management plan with the patient and family. Today with Margo Inman  and her , I reviewed the indications, risks, benefits, and alternatives of the proposed surgical procedure including, but not limited to, failure obtain the desired result  and need for additional surgery, bleeding, infection, loss of vision, loss of the eye, and the remote possibility of permanent damage to any organ system or death with the use of anesthesia.  I provided multiple opportunities for the questions, answered all questions to the best of my ability, and confirmed that my answers and my discussion were understood.       . - Gaston Guzman MD

## 2023-02-17 ENCOUNTER — ANESTHESIA EVENT (OUTPATIENT)
Dept: SURGERY | Facility: AMBULATORY SURGERY CENTER | Age: 63
End: 2023-02-17
Payer: COMMERCIAL

## 2023-02-17 RX ORDER — OXYCODONE HYDROCHLORIDE 5 MG/1
10 TABLET ORAL EVERY 4 HOURS PRN
Status: DISCONTINUED | OUTPATIENT
Start: 2023-02-17 | End: 2023-02-21 | Stop reason: HOSPADM

## 2023-02-17 RX ORDER — OXYCODONE HYDROCHLORIDE 5 MG/1
5 TABLET ORAL EVERY 4 HOURS PRN
Status: DISCONTINUED | OUTPATIENT
Start: 2023-02-17 | End: 2023-02-21 | Stop reason: HOSPADM

## 2023-02-18 ENCOUNTER — HEALTH MAINTENANCE LETTER (OUTPATIENT)
Age: 63
End: 2023-02-18

## 2023-02-19 DIAGNOSIS — Z96.653 STATUS POST TOTAL BILATERAL KNEE REPLACEMENT: ICD-10-CM

## 2023-02-19 DIAGNOSIS — Z98.1 S/P CERVICAL SPINAL FUSION: ICD-10-CM

## 2023-02-20 ENCOUNTER — HOSPITAL ENCOUNTER (OUTPATIENT)
Facility: AMBULATORY SURGERY CENTER | Age: 63
Discharge: HOME | End: 2023-02-20
Attending: OPHTHALMOLOGY
Payer: COMMERCIAL

## 2023-02-20 ENCOUNTER — OFFICE VISIT (OUTPATIENT)
Dept: OPHTHALMOLOGY | Facility: CLINIC | Age: 63
End: 2023-02-20

## 2023-02-20 ENCOUNTER — ANESTHESIA (OUTPATIENT)
Dept: SURGERY | Facility: AMBULATORY SURGERY CENTER | Age: 63
End: 2023-02-20
Payer: COMMERCIAL

## 2023-02-20 VITALS
RESPIRATION RATE: 14 BRPM | DIASTOLIC BLOOD PRESSURE: 90 MMHG | SYSTOLIC BLOOD PRESSURE: 132 MMHG | WEIGHT: 210 LBS | HEART RATE: 80 BPM | BODY MASS INDEX: 38.64 KG/M2 | TEMPERATURE: 97.2 F | HEIGHT: 62 IN | OXYGEN SATURATION: 94 %

## 2023-02-20 DIAGNOSIS — H40.9 SEVERE STAGE GLAUCOMA: ICD-10-CM

## 2023-02-20 DIAGNOSIS — Z53.9 ERRONEOUS ENCOUNTER--DISREGARD: Primary | ICD-10-CM

## 2023-02-20 DIAGNOSIS — H25.13 NUCLEAR AGE-RELATED CATARACT, BOTH EYES: Primary | ICD-10-CM

## 2023-02-20 PROCEDURE — 66988 XCAPSL CTRC RMVL W/ECP: CPT | Mod: LT | Performed by: OPHTHALMOLOGY

## 2023-02-20 PROCEDURE — 66988 XCAPSL CTRC RMVL W/ECP: CPT | Mod: LT

## 2023-02-20 PROCEDURE — 65870 INCISE INNER EYE ADHESIONS: CPT | Mod: 59,LT

## 2023-02-20 PROCEDURE — 65870 INCISE INNER EYE ADHESIONS: CPT | Mod: 59 | Performed by: OPHTHALMOLOGY

## 2023-02-20 DEVICE — LENS CC60WF 24.5 CLAREON UV ASPHERIC BICONVEX IOL: Type: IMPLANTABLE DEVICE | Site: EYE | Status: FUNCTIONAL

## 2023-02-20 RX ORDER — FENTANYL CITRATE 50 UG/ML
50 INJECTION, SOLUTION INTRAMUSCULAR; INTRAVENOUS EVERY 5 MIN PRN
Status: DISCONTINUED | OUTPATIENT
Start: 2023-02-20 | End: 2023-02-20 | Stop reason: HOSPADM

## 2023-02-20 RX ORDER — FENTANYL CITRATE 50 UG/ML
25 INJECTION, SOLUTION INTRAMUSCULAR; INTRAVENOUS EVERY 5 MIN PRN
Status: DISCONTINUED | OUTPATIENT
Start: 2023-02-20 | End: 2023-02-20 | Stop reason: HOSPADM

## 2023-02-20 RX ORDER — CYCLOPENTOLAT/TROPIC/PHENYLEPH 1%-1%-2.5%
1 DROPS (EA) OPHTHALMIC (EYE)
Status: COMPLETED | OUTPATIENT
Start: 2023-02-20 | End: 2023-02-20

## 2023-02-20 RX ORDER — ACETAMINOPHEN 325 MG/1
975 TABLET ORAL ONCE
Status: COMPLETED | OUTPATIENT
Start: 2023-02-20 | End: 2023-02-20

## 2023-02-20 RX ORDER — OXYCODONE HYDROCHLORIDE 5 MG/1
10 TABLET ORAL EVERY 4 HOURS PRN
Status: DISCONTINUED | OUTPATIENT
Start: 2023-02-20 | End: 2023-02-20 | Stop reason: HOSPADM

## 2023-02-20 RX ORDER — TETRACAINE HYDROCHLORIDE 5 MG/ML
SOLUTION OPHTHALMIC PRN
Status: DISCONTINUED | OUTPATIENT
Start: 2023-02-20 | End: 2023-02-20 | Stop reason: HOSPADM

## 2023-02-20 RX ORDER — ONDANSETRON 4 MG/1
4 TABLET, ORALLY DISINTEGRATING ORAL EVERY 30 MIN PRN
Status: DISCONTINUED | OUTPATIENT
Start: 2023-02-20 | End: 2023-02-20 | Stop reason: HOSPADM

## 2023-02-20 RX ORDER — HYDROMORPHONE HYDROCHLORIDE 1 MG/ML
0.2 INJECTION, SOLUTION INTRAMUSCULAR; INTRAVENOUS; SUBCUTANEOUS EVERY 5 MIN PRN
Status: DISCONTINUED | OUTPATIENT
Start: 2023-02-20 | End: 2023-02-20 | Stop reason: HOSPADM

## 2023-02-20 RX ORDER — OXYCODONE HYDROCHLORIDE 5 MG/1
5 TABLET ORAL EVERY 4 HOURS PRN
Status: DISCONTINUED | OUTPATIENT
Start: 2023-02-20 | End: 2023-02-20 | Stop reason: HOSPADM

## 2023-02-20 RX ORDER — SODIUM CHLORIDE, SODIUM LACTATE, POTASSIUM CHLORIDE, CALCIUM CHLORIDE 600; 310; 30; 20 MG/100ML; MG/100ML; MG/100ML; MG/100ML
INJECTION, SOLUTION INTRAVENOUS CONTINUOUS
Status: DISCONTINUED | OUTPATIENT
Start: 2023-02-20 | End: 2023-02-20 | Stop reason: HOSPADM

## 2023-02-20 RX ORDER — LIDOCAINE HYDROCHLORIDE 20 MG/ML
INJECTION, SOLUTION INFILTRATION; PERINEURAL PRN
Status: DISCONTINUED | OUTPATIENT
Start: 2023-02-20 | End: 2023-02-20

## 2023-02-20 RX ORDER — MOXIFLOXACIN 5 MG/ML
1 SOLUTION/ DROPS OPHTHALMIC 3 TIMES DAILY
Qty: 3 ML | Refills: 1 | Status: SHIPPED | OUTPATIENT
Start: 2023-02-20 | End: 2023-05-30

## 2023-02-20 RX ORDER — ONDANSETRON 2 MG/ML
4 INJECTION INTRAMUSCULAR; INTRAVENOUS EVERY 30 MIN PRN
Status: DISCONTINUED | OUTPATIENT
Start: 2023-02-20 | End: 2023-02-20 | Stop reason: HOSPADM

## 2023-02-20 RX ORDER — ACETAZOLAMIDE 500 MG/1
500 CAPSULE, EXTENDED RELEASE ORAL 2 TIMES DAILY
Qty: 4 CAPSULE | Refills: 0 | Status: SHIPPED | OUTPATIENT
Start: 2023-02-20 | End: 2023-09-07

## 2023-02-20 RX ORDER — PREDNISOLONE ACETATE 10 MG/ML
1 SUSPENSION/ DROPS OPHTHALMIC 4 TIMES DAILY
Qty: 10 ML | Refills: 1 | Status: SHIPPED | OUTPATIENT
Start: 2023-02-20 | End: 2023-05-30

## 2023-02-20 RX ORDER — HYDROMORPHONE HYDROCHLORIDE 1 MG/ML
0.4 INJECTION, SOLUTION INTRAMUSCULAR; INTRAVENOUS; SUBCUTANEOUS EVERY 5 MIN PRN
Status: DISCONTINUED | OUTPATIENT
Start: 2023-02-20 | End: 2023-02-20 | Stop reason: HOSPADM

## 2023-02-20 RX ORDER — LIDOCAINE 40 MG/G
CREAM TOPICAL
Status: DISCONTINUED | OUTPATIENT
Start: 2023-02-20 | End: 2023-02-20 | Stop reason: HOSPADM

## 2023-02-20 RX ORDER — KETOROLAC TROMETHAMINE 4 MG/ML
1 SOLUTION/ DROPS OPHTHALMIC 4 TIMES DAILY
Qty: 5 ML | Refills: 1 | Status: SHIPPED | OUTPATIENT
Start: 2023-02-20 | End: 2023-05-30

## 2023-02-20 RX ORDER — DICLOFENAC SODIUM 75 MG/1
TABLET, DELAYED RELEASE ORAL
Qty: 180 TABLET | Refills: 0 | Status: SHIPPED | OUTPATIENT
Start: 2023-02-20 | End: 2023-05-19

## 2023-02-20 RX ORDER — SODIUM CHLORIDE, SODIUM LACTATE, POTASSIUM CHLORIDE, CALCIUM CHLORIDE 600; 310; 30; 20 MG/100ML; MG/100ML; MG/100ML; MG/100ML
INJECTION, SOLUTION INTRAVENOUS CONTINUOUS
Status: DISCONTINUED | OUTPATIENT
Start: 2023-02-20 | End: 2023-02-21 | Stop reason: HOSPADM

## 2023-02-20 RX ORDER — PROPOFOL 10 MG/ML
INJECTION, EMULSION INTRAVENOUS PRN
Status: DISCONTINUED | OUTPATIENT
Start: 2023-02-20 | End: 2023-02-20

## 2023-02-20 RX ORDER — PROPARACAINE HYDROCHLORIDE 5 MG/ML
1 SOLUTION/ DROPS OPHTHALMIC ONCE
Status: COMPLETED | OUTPATIENT
Start: 2023-02-20 | End: 2023-02-20

## 2023-02-20 RX ADMIN — PROPOFOL 50 MG: 10 INJECTION, EMULSION INTRAVENOUS at 14:55

## 2023-02-20 RX ADMIN — PROPARACAINE HYDROCHLORIDE 1 DROP: 5 SOLUTION/ DROPS OPHTHALMIC at 13:33

## 2023-02-20 RX ADMIN — LIDOCAINE HYDROCHLORIDE 50 MG: 20 INJECTION, SOLUTION INFILTRATION; PERINEURAL at 14:55

## 2023-02-20 RX ADMIN — Medication 1 DROP: at 13:33

## 2023-02-20 RX ADMIN — PROPOFOL 20 MG: 10 INJECTION, EMULSION INTRAVENOUS at 14:56

## 2023-02-20 RX ADMIN — SODIUM CHLORIDE, SODIUM LACTATE, POTASSIUM CHLORIDE, CALCIUM CHLORIDE: 600; 310; 30; 20 INJECTION, SOLUTION INTRAVENOUS at 13:00

## 2023-02-20 RX ADMIN — Medication 1 DROP: at 13:38

## 2023-02-20 RX ADMIN — Medication 1 DROP: at 13:50

## 2023-02-20 ASSESSMENT — LIFESTYLE VARIABLES: TOBACCO_USE: 1

## 2023-02-20 NOTE — DISCHARGE INSTRUCTIONS
[facility]  Home Care Following Anesthesia  For 24 hours after surgery:  Get plenty of rest.  A responsible adult must stay with you for at least 24 hours after you leave the surgery center.  Do not drive or use heavy equipment.  If you have weakness or tingling, don't drive or use heavy equipment until this feeling goes away.   Do not drink alcohol.   Avoid strenuous or risky activities.  Ask for help when climbing stairs.  You may feel lightheaded.  IF so, sit for a few minutes before standing.  Have someone help you get up.   If you have nausea (feel sick to your stomach): Drink only clear liquids such as apple juice, ginger ale, broth or 7-Up.  Rest may also help.  Be sure to drink enough fluids.  Move to a regular diet as you feel able.   You may have a slight fever.  Call the doctor if your fever is over 100 F (37.7 C) (taken under the tongue) or lasts longer than 24 hours.  You may have a dry mouth, a sore throat, muscle aches or trouble sleeping. These should go away after 24 hours.  Do not make important or legal decisions.   It is recommended to avoid smoking.               Tips for taking pain medications  To get the best pain relief possible, remember these points:  Take pain medications as directed, before pain becomes severe.  Pain medication can upset your stomach: taking it with food may help.  Constipation is a common side effect of pain medication. Drink plenty of  fluids.  Eat foods high in fiber. Take a stool softener if recommended by your doctor or pharmacist.  Do not drink alcohol, drive or operate machinery while taking pain medications.  Ask about other ways to control pain, such as with heat, ice or relaxation.    Tylenol/Acetaminophen Consumption  To help encourage the safe use of acetaminophen, the makers of TYLENOL  have lowered the maximum daily dose for single-ingredient Extra Strength TYLENOL  (acetaminophen) products sold in the [place] from 8 pills  per day (4,000 mg) to 6 pills per day (3,000 mg). The dosing interval has also changed from 2 pills every 4-6 hours to 2 pills every 6 hours.  If you feel your pain relief is insufficient, you may take Tylenol/Acetaminophen in addition to your narcotic pain medication.   Be careful not to exceed 3,000 mg of Tylenol/Acetaminophen in a 24 hour period from all sources.  If you are taking extra strength Tylenol/acetaminophen (500 mg), the maximum dose is 6 tablets in 24 hours.  If you are taking regular strength acetaminophen (325 mg), the maximum dose is 9 tablets in 24 hours.    Call a doctor for any of the following:  Signs of infection (fever, growing tenderness at the surgery site, a large amount of drainage or bleeding, severe pain, foul-smelling drainage, redness, swelling).  It has been over 8 to 10 hours since surgery and you are still not able to urinate (pass water).  Headache for over 24 hours.  Numbness, tingling or weakness the day after surgery (if you had spinal anesthesia).  Signs of Covid-19 infection (temperature over 100 degrees, shortness of breath, cough, loss of taste/smell, generalized body aches, persistent headache, chills, sore throat, nausea/vomiting/diarrhea)  Your doctor is:       Dr. Gaston Guzman, Ophthalmology: [phone]               Or dial [phone] and ask for the resident on call for:  Ophthalmology  For emergency care, call the:  [facility]:  [phone] (TTY for hearing impaired: [phone])

## 2023-02-20 NOTE — ANESTHESIA POSTPROCEDURE EVALUATION
Patient: Margo Inman    Procedure: Procedure(s):  LEFT EYE COMPLEX PHACOEMULSIFICATION, CATARACT, CLEAR CORNEAL INCISION APPROACH, WITH STANDARD INTRAOCULAR LENS IMPLANT INSERTION AND ENDOSCOPIC CYCLOPHOTOCOAGULATION< GONISYNECHIOLYSIS  goniosynechialysis left       Anesthesia Type:  MAC    Note:  Disposition: Outpatient   Postop Pain Control: Uneventful            Sign Out: Well controlled pain   PONV:    Neuro/Psych: Uneventful            Sign Out: Acceptable/Baseline neuro status   Airway/Respiratory: Uneventful            Sign Out: Acceptable/Baseline resp. status   CV/Hemodynamics: Uneventful            Sign Out: Acceptable CV status; No obvious hypovolemia; No obvious fluid overload   Other NRE:    DID A NON-ROUTINE EVENT OCCUR?            Last vitals:  Vitals Value Taken Time   /77 02/20/23 1544   Temp 36.2  C (97.1  F) 02/20/23 1544   Pulse 81 02/20/23 1544   Resp 14 02/20/23 1544   SpO2         Electronically Signed By: Jose Hawthorne MD  February 20, 2023  3:49 PM

## 2023-02-20 NOTE — OP NOTE
PREOPERATIVE DIAGNOSIS:   1. Nuclear age-related cataract, both eyes    2. Severe stage glaucoma      POSTOPERATIVE DIAGNOSIS: Same   PROCEDURES:   1. Complex cataract extraction with intraocular lens implant left eye   2. ENDOCYLOPHOTOCOAGULATION left eye   3. Goniosynechialysis, left eye   SURGEON: Gaston Guzman M.D.\  Assistant: Janet Bradford MD    INDICATIONS: The patient Margo Inman presented to the eye clinic with decreased vision secondary to cataract and narrow angle glaucoma.  The risks, benefits and alternatives to cataract extraction + laser + synechialysis were discussed. The patient elected to proceed. All questions were answered to the patient's satisfaction.   DESCRIPTION OF PROCEDURE:Prior to the procedure, appropriate cardiac and respiratory monitors were applied to the patient.  In the pre-operative holding area, under monitored anesthesia care, a retrobulbar injection of 2% lidocaine with hyaluronidase was given.  The patient was brought to the operating room where a surgical pause was carried out to identify with all members of the surgical team the correct surgical site.  With adequate anesthesia and akinesia, the left eye was prepped and draped in the usual sterile fashion. A lid speculum was placed, and the operating microscope was rotated into position. A paracentesis was created.  Trypan blue stain was injected under air to stain the anterior lens capsule.  Trypan blue stain was elected due to a poor red reflex in the setting of dense cataract. The anterior chamber was then inflated with dispersive viscoelastic. A temporal wound was created at the limbus using a 2.5 mm blade. A capsulorrhexis was initiated using a bent 25-gauge needle and was completed in continuous and circular fashion using the capsulorrhexis forceps. Diffuse zonular laxity was noted.  The lens nucleus was hydrodissected using balanced salt solution.  The lens nucleus was rotated and removed using  phacoemulsification in a stop and chop technique.  Residual cortical material was removed using irrigation-aspiration.  The capsular bag was reinflated to its maximal extent with cohesive viscoelastic.  A 24.5 diopter [id number] inserted into the capsular bag.  The lens power selected was reviewed using the intraocular lens power measurements that were obtained preoperatively to confirm that the correct lens was selected for the desired post-operative refractive state.  The anterior chamber and sulcus were again inflated with viscoelastic.  The endoscopic probe was positioned in the nasal sulcus.  Laser was administered over 180 degrees of the nasal ciliary body using energy setting of 0.25 W.  Treatment was titrated to a mild blanching and shrinkage of the ciliary body.   The patient and the operating microscope were repositioned to allow for direct gonioscopy.  A McGregor-Shaquille lens was placed to visualize the nasal angle anatomy.  Diffuse broad peripheral anterior synechiae were noted with complete angle closure.  A microforceps were used for goniosynechialysis over the nasal 90 degrees.  Significant bleeding was noted during synechialysis, but exposure to the ciliary body was able to be achieved.  The remaining viscoelastic was removed from the anterior chamber in its entirety, the wounds were hydrated and found to be self-sealing.  Of note, significant bleeding from the temporal sulcus was noted.  Intraocular pressure was maintained high for 1-2 minutes to aid in hemostasis.  The anterior chamber was washed with BSS.  Intracameral moxifloxacin was administered. Tactile pressure was confirmed to be in a normal range.  The lid speculum was removed and a patch and shield were applied.  The patient tolerated the procedure well, and there were no complications.     PLAN: The patient will be discharged to home and will follow up tomorrow morning in the eye clinic.  EBL:  1-2 mL   Complications:  None  Implant Name Type Inv.  Item Serial No.  Lot No. LRB No. Used Action   LENS [id number] [employer] UV ASPHERIC BICONVEX IOL - [id number] Lens/Eye Implant LENS [id number] [employer] UV ASPHERIC BICONVEX IOL [id number] [employer]  Left 1 Implanted         Attending Physician Procedure Attestation: I was present for the entire procedure       Gaston Guzman MD  , Comprehensive Ophthalmology  Department of Ophthalmology and Visual Neurosciences  [facility]

## 2023-02-20 NOTE — ANESTHESIA PREPROCEDURE EVALUATION
Anesthesia Pre-Procedure Evaluation    Patient: Margo Inman   MRN: [id number] : 1960        Procedure : Procedure(s):  LEFT EYE PHACOEMULSIFICATION, CATARACT, CLEAR CORNEAL INCISION APPROACH, WITH STANDARD INTRAOCULAR LENS IMPLANT INSERTION AND ENDOSCOPIC CYCLOPHOTOCOAGULATION  goniosynechialysis left  Trabeculotomy left          Past Medical History:   Diagnosis Date     Arthritis      Cervical high risk HPV (human papillomavirus) test positive 1/10/2017    1/10/17 NIL pap/+ HR HPV (not 16 or 18). Plan: cotest in 1 year, due by 1/10/18      Chronic infection     HX of MRSA. 2 neg swabs at [facility] in  and .     History of blood transfusion      Numbness and tingling     Right arm     PONV (postoperative nausea and vomiting)       Past Surgical History:   Procedure Laterality Date     APPENDECTOMY       ARTHROSCOPY KNEE  2011    Procedure:ARTHROSCOPY KNEE; left knee arthroscopy with debridement, open lateral patellar spur excision; Surgeon:LUIS A MONTOYA; Location:[facility] OR     COLONOSCOPY N/A 2016    Procedure: COLONOSCOPY;  Surgeon: Belem Khalil MD;  Location: [facility]     COLONOSCOPY N/A 2016    Procedure: COMBINED COLONOSCOPY, SINGLE OR MULTIPLE BIOPSY/POLYPECTOMY BY BIOPSY;  Surgeon: Belem Khalil MD;  Location: [facility]     COLONOSCOPY N/A 06/15/2020    Procedure: Colonoscopy, With Polypectomy And Biopsy;  Surgeon: Torres Gallegos DO;  Location: [facility]     COLONOSCOPY WITH CO2 INSUFFLATION N/A 2016    Procedure: COLONOSCOPY WITH CO2 INSUFFLATION;  Surgeon: Belem Khalil MD;  Location: [facility]     COLONOSCOPY WITH CO2 INSUFFLATION N/A 06/15/2020    Procedure: COLONOSCOPY, WITH CO2 INSUFFLATION;  Surgeon: Torres Gallegos DO;  Location: [facility]     CYSTOSCOPY  2016    microscopic hematuria     DECOMPRESSION, FUSION CERVICAL ANTERIOR ONE LEVEL, COMBINED N/A 2017    Procedure: COMBINED DECOMPRESSION, FUSION  CERVICAL ANTERIOR ONE LEVEL;  Surgeon: Joseph Klein MD;  Location: [facility] OR     ECTOPIC PREGNANCY SURGERY       ENT SURGERY       GYN SURGERY       HC INCISION TENDON SHEATH FINGER Left 07/11/2014    Thumb TF release     HC KNEE SCOPE,MED/LAT MENISECTOMY  09/16/2011    left, with partial medial menisectomy ONLY     HC REVISE MEDIAN N/CARPAL TUNNEL SURG Left 07/11/2014    PRIMARY - not revision     LASIK Bilateral     [id number]     ORTHOPEDIC SURGERY       RELEASE CARPAL TUNNEL  07/11/2014    Procedure: RELEASE CARPAL TUNNEL;  Surgeon: Rg Franco MD;  Location: [facility]     RELEASE CARPAL TUNNEL Right 11/07/2014    Procedure: RELEASE CARPAL TUNNEL;  Surgeon: Rg Franco MD;  Location: [facility]     RELEASE TRIGGER FINGER  07/11/2014    Procedure: RELEASE TRIGGER FINGER;  Surgeon: Rg Franco MD;  Location: [facility]     RELEASE TRIGGER FINGER Right 11/07/2014    Procedure: RELEASE TRIGGER FINGER;  Surgeon: Rg Franco MD;  Location: [facility] OR     tonsils       ZZ PART REMV FEMUR/PROX TIB/FIB  09/16/2011    left, open lateral patellar bone spur excision     ZZC TOTAL KNEE ARTHROPLASTY  01/17/2014    Bilateral      Allergies   Allergen Reactions     Wasp Venom Protein Anaphylaxis     Bee Anaphylaxis     Erythromycin Hives     Wasps [Hornets] Anaphylaxis     Shellfish Allergy Nausea and Vomiting and Rash     Shellfish-Derived Products Rash and Nausea and Vomiting      Social History     Tobacco Use     Smoking status: Some Days     Packs/day: 1.00     Years: 15.00     Pack years: 15.00     Types: Cigarettes     Smokeless tobacco: Never     Tobacco comments:     Started Chantix   Substance Use Topics     Alcohol use: Yes     Comment: socially      Wt Readings from Last 1 Encounters:   02/20/23 95.3 kg (210 lb)        Anesthesia Evaluation            ROS/MED HX  ENT/Pulmonary:     (+) tobacco use, asthma     Neurologic:       Cardiovascular:     (+) hypertension-----    METS/Exercise  Tolerance:     Hematologic:       Musculoskeletal: Comment: Cervical radiculopathy  (+) arthritis,     GI/Hepatic:       Renal/Genitourinary:       Endo:     (+) Obesity,     Psychiatric/Substance Use:     (+) psychiatric history depression and anxiety     Infectious Disease:       Malignancy:       Other:               OUTSIDE LABS:  CBC:   Lab Results   Component Value Date    WBC 11.3 (H) 01/11/2023    WBC 11.9 (H) 09/12/2022    HGB 15.1 01/11/2023    HGB 14.4 09/12/2022    HCT 46.0 01/11/2023    HCT 43.4 09/12/2022     01/11/2023     09/12/2022     BMP:   Lab Results   Component Value Date     01/11/2023     09/12/2022    POTASSIUM 4.6 01/11/2023    POTASSIUM 4.5 09/12/2022    CHLORIDE 105 01/11/2023    CHLORIDE 104 09/12/2022    CO2 30 01/11/2023    CO2 31 09/12/2022    BUN 13 01/11/2023    BUN 22 09/12/2022    CR 0.62 01/11/2023    CR 0.59 09/12/2022     (H) 01/11/2023     (H) 09/12/2022     COAGS:   Lab Results   Component Value Date    PTT 34 01/10/2014    INR 1.04 06/27/2022     POC:   Lab Results   Component Value Date    BGM 98 04/07/2017     HEPATIC:   Lab Results   Component Value Date    ALBUMIN 4.1 06/27/2022    PROTTOTAL 7.7 06/27/2022    ALT 44 06/27/2022    AST 22 06/27/2022    ALKPHOS 99 06/27/2022    BILITOTAL 0.4 06/27/2022     OTHER:   Lab Results   Component Value Date    A1C 5.9 04/04/2017    DOMONIQUE 10.1 01/11/2023    MAG 2.3 04/07/2017    TSH 1.21 01/10/2017    CRP 12.3 (H) 01/11/2023    SED 12 01/11/2023       Anesthesia Plan    ASA Status:  3      Anesthesia Type: MAC.   Induction: Intravenous.   Maintenance: TIVA.        Consents    Anesthesia Plan(s) and associated risks, benefits, and realistic alternatives discussed. Questions answered and patient/representative(s) expressed understanding.    - Discussed:     - Discussed with:  Patient         Postoperative Care    Pain management: Multi-modal analgesia.   PONV prophylaxis: Ondansetron (or other  5HT-3)     Comments:                Elsie Owen MD

## 2023-02-20 NOTE — ANESTHESIA CARE TRANSFER NOTE
Patient: Margo Inman    Procedure: Procedure(s):  LEFT EYE COMPLEX PHACOEMULSIFICATION, CATARACT, CLEAR CORNEAL INCISION APPROACH, WITH STANDARD INTRAOCULAR LENS IMPLANT INSERTION AND ENDOSCOPIC CYCLOPHOTOCOAGULATION< GONISYNECHIOLYSIS  goniosynechialysis left       Diagnosis: Severe stage glaucoma [H40.9]  Diagnosis Additional Information: No value filed.    Anesthesia Type:   MAC     Note:    Oropharynx: oropharynx clear of all foreign objects and spontaneously breathing  Level of Consciousness: awake  Oxygen Supplementation: room air    Independent Airway: airway patency satisfactory and stable  Dentition: dentition unchanged  Vital Signs Stable: post-procedure vital signs reviewed and stable  Report to RN Given: handoff report given  Patient transferred to: Phase II    Handoff Report: Identifed the Patient, Identified the Reponsible Provider, Reviewed the pertinent medical history, Discussed the surgical course, Reviewed Intra-OP anesthesia mangement and issues during anesthesia, Set expectations for post-procedure period and Allowed opportunity for questions and acknowledgement of understanding      Vitals:  Vitals Value Taken Time   BP     Temp     Pulse     Resp     SpO2         Electronically Signed By: JOSE R Salazar CRNA  February 20, 2023  3:41 PM

## 2023-02-21 ENCOUNTER — TELEPHONE (OUTPATIENT)
Dept: OPHTHALMOLOGY | Facility: CLINIC | Age: 63
End: 2023-02-21

## 2023-02-21 ENCOUNTER — OFFICE VISIT (OUTPATIENT)
Dept: OPHTHALMOLOGY | Facility: CLINIC | Age: 63
End: 2023-02-21
Attending: OPHTHALMOLOGY
Payer: COMMERCIAL

## 2023-02-21 DIAGNOSIS — Z96.1 PSEUDOPHAKIA, LEFT EYE: ICD-10-CM

## 2023-02-21 DIAGNOSIS — H40.033 ANATOMICAL NARROW ANGLE, BILATERAL: ICD-10-CM

## 2023-02-21 DIAGNOSIS — Z98.890 POSTOPERATIVE EYE STATE: Primary | ICD-10-CM

## 2023-02-21 DIAGNOSIS — H40.212 ACUTE ANGLE-CLOSURE GLAUCOMA OF LEFT EYE: ICD-10-CM

## 2023-02-21 PROCEDURE — 99024 POSTOP FOLLOW-UP VISIT: CPT | Mod: GC | Performed by: OPHTHALMOLOGY

## 2023-02-21 PROCEDURE — G0463 HOSPITAL OUTPT CLINIC VISIT: HCPCS

## 2023-02-21 ASSESSMENT — VISUAL ACUITY
METHOD: SNELLEN - LINEAR
OS_PH_SC: 20/50
OS_SC: 20/60
OD_CC: 20/25

## 2023-02-21 ASSESSMENT — EXTERNAL EXAM - LEFT EYE: OS_EXAM: NORMAL

## 2023-02-21 ASSESSMENT — REFRACTION_WEARINGRX
OD_SPHERE: +1.75
OS_CYLINDER: +0.75
OS_ADD: +2.00
OS_AXIS: 111
OS_SPHERE: +0.75
OD_CYLINDER: SPHERE
OD_ADD: +2.00

## 2023-02-21 ASSESSMENT — TONOMETRY
OD_IOP_MMHG: 13
IOP_METHOD: APPLANATION
OS_IOP_MMHG: 13

## 2023-02-21 ASSESSMENT — SLIT LAMP EXAM - LIDS: COMMENTS: NORMAL

## 2023-02-21 NOTE — PATIENT INSTRUCTIONS
Start Prednisolone (pink/white top) - 1 drop, 4x / day  Start moxifloxacin (tan top) - 1 drop, 3x / day    Restart pilocarpine 3x a day  Restart brimonidine 2x a day  Restart dorzolamide 2x a day  Stop latanoprost  Stop acetazolamide pill  Use artificial tears as needed  Hold all glaucoma drops in the operative eye  Space out drops by ~5 minutes  Post op precautions discussed, call and return precautions discussed    NO heavy lifting, excessive bending over, and heavy exertion for 1 week.    You may bathe or shower but do not get the eye actively wet.    Do not rub or push on the operative eye for 1 week.  You may wipe the lids gently with a warm wet cloth to remove matter from eyelashes.    Continue with your usual diet and medications prescribed by your doctor.    For first week:    Wear your glasses or leave the eye uncovered while awake.    Wear the eye shield every night and during daytime naps.  DO NOT use padding under eye shield.    You may notice blurred or double vision initially, this will gradually clear.     If you experience any severe pain, sudden decrease in vision, or discharge for the eye, contact your doctor immediately.     Minor itching, scratching, burning etc. is normal. Use regular or extra-strength Tylenol for discomfort.    Sunglasses will increase your comfort post-operatively.

## 2023-02-21 NOTE — NURSING NOTE
Chief Complaints and History of Present Illnesses   Patient presents with     Post Op (Ophthalmology) Left Eye     Chief Complaint(s) and History of Present Illness(es)     Post Op (Ophthalmology) Left Eye           Comments    POP one day Left eye CE/IOL.  The patient has some burning in the corner of the eye.   She still has patch on and she hasn't started the drops yet.  She took her pill last night but didn't take it this morning yet.  Marnie Dove, COA, COA 7:51 AM 02/21/2023

## 2023-02-21 NOTE — PROGRESS NOTES
Chief Complaint(s) and History of Present Illness(es)     Post Op (Ophthalmology) Left Eye           Comments    POP one day Left eye CE/IOL.  The patient has some burning in the corner of the eye.   She still has   patch on and she hasn't started the drops yet.  She took her pill last night but didn't take it this morning yet.  Marnie Dove, COA, COA 7:51 AM 02/21/2023        Review of systems for the eyes was negative other than the pertinent positives/negatives listed in the HPI.      Assessment & Plan      Margo Inman is a 63 year old female with the following diagnoses:   1. Postoperative eye state - Left Eye    2. Pseudophakia, left eye    3. Anatomical narrow angle, bilateral    4. Acute angle-closure glaucoma of left eye       -S/P CEIOL left eye with goniosynechialysis and endoscopic CPC (2/20/2023)  For chronic angle glaucoma with history of acute angle    Plan:  - Start Pred forte 4 times a day for 1 month or until bottle is empty  - Start ketorolac 4 times a day for 2 weeks  - Start moxifloxacin 4 times a day for 1 week then stop  -- Stop oral Diamox  -- Hold latanoprost for now  -- Restart pilocarpine three times a day  -- Restart dorzolamide twice a day and brimonidine twice a day     - Wear eye shield at night for 1 week  - No heavy lifting or straining for 1 week  - No water directly into eyes, no swimming, for 1 week  - Return to clinic in 1 week for postop week 1 visit    Patient disposition:   Return in about 1 week (around 2/28/2023) for Follow Up.       Janet Bradford MD  PGY-4 Resident Physician  Department of Ophthalmology    Attending Physician Attestation:  Complete documentation of historical and exam elements from today's encounter can be found in the full encounter summary report (not reduplicated in this progress note).  I personally obtained the chief complaint(s) and history of present illness.  I confirmed and edited as necessary the review of systems, past medical/surgical  history, family history, social history, and examination findings as documented by others; and I examined the patient myself.  I personally reviewed the relevant tests, images, and reports as documented above.  I formulated and edited as necessary the assessment and plan and discussed the findings and management plan with the patient and family. . - Gaston Guzman MD

## 2023-02-21 NOTE — TELEPHONE ENCOUNTER
InmanMargo kent [phone]     Plan:  - Start Pred forte 4 times a day for 1 month or until bottle is empty  - Start ketorolac 4 times a day for 2 weeks  - Start moxifloxacin 4 times a day for 1 week then stop  -- Stop oral Diamox  -- Hold latanoprost for now  -- Restart pilocarpine three times a day  -- Restart dorzolamide twice a day and brimonidine twice a day     Updated pt and confirmed to start the pilocarpine, dorzolamide and brimonidine per signed not above    Pt verbally demonstrated understanding    Aubrey Barnard RN 10:47 AM 02/21/23          [facility]    Phone Message    May a detailed message be left on voicemail: yes     Reason for Call: Medication Question or concern regarding medication   Prescription Clarification  Name of Medication: post op medications  Prescribing Provider: Thomas   Pharmacy: NA   What on the order needs clarification? Pt would like to clarify on the drops that she should still be taking after her procedure yesterday. She said that there are some that she was told to continue but they are not on her after care list. Please contact pt to discuss    Thank you     Action Taken: Message routed to:  [facility]    Travel Screening: Not Applicable

## 2023-02-28 ENCOUNTER — THERAPY VISIT (OUTPATIENT)
Dept: OCCUPATIONAL THERAPY | Facility: CLINIC | Age: 63
End: 2023-02-28
Payer: COMMERCIAL

## 2023-02-28 DIAGNOSIS — M19.041 OSTEOARTHRITIS OF BOTH HANDS, UNSPECIFIED OSTEOARTHRITIS TYPE: Primary | ICD-10-CM

## 2023-02-28 DIAGNOSIS — M19.042 OSTEOARTHRITIS OF BOTH HANDS, UNSPECIFIED OSTEOARTHRITIS TYPE: Primary | ICD-10-CM

## 2023-02-28 DIAGNOSIS — R20.0 BILATERAL HAND NUMBNESS: ICD-10-CM

## 2023-02-28 PROCEDURE — 97112 NEUROMUSCULAR REEDUCATION: CPT | Mod: GO

## 2023-02-28 PROCEDURE — 97140 MANUAL THERAPY 1/> REGIONS: CPT | Mod: GO

## 2023-03-01 ENCOUNTER — OFFICE VISIT (OUTPATIENT)
Dept: OPHTHALMOLOGY | Facility: CLINIC | Age: 63
End: 2023-03-01
Attending: OPHTHALMOLOGY
Payer: COMMERCIAL

## 2023-03-01 DIAGNOSIS — H40.212 ACUTE ANGLE-CLOSURE GLAUCOMA OF LEFT EYE: ICD-10-CM

## 2023-03-01 DIAGNOSIS — Z96.1 PSEUDOPHAKIA, LEFT EYE: Primary | ICD-10-CM

## 2023-03-01 PROCEDURE — 99024 POSTOP FOLLOW-UP VISIT: CPT | Performed by: OPHTHALMOLOGY

## 2023-03-01 PROCEDURE — G0463 HOSPITAL OUTPT CLINIC VISIT: HCPCS

## 2023-03-01 ASSESSMENT — REFRACTION_WEARINGRX
OD_SPHERE: +1.75
OS_ADD: +2.00
OD_ADD: +2.00
OS_SPHERE: +0.75
OS_AXIS: 111
OD_CYLINDER: SPHERE
OS_CYLINDER: +0.75

## 2023-03-01 ASSESSMENT — VISUAL ACUITY
OD_CC: 20/30
METHOD: SNELLEN - LINEAR
CORRECTION_TYPE: GLASSES
OD_CC+: -2
OS_SC: 20/25
OS_SC+: -3

## 2023-03-01 ASSESSMENT — EXTERNAL EXAM - LEFT EYE: OS_EXAM: NORMAL

## 2023-03-01 ASSESSMENT — TONOMETRY
IOP_METHOD: ICARE
OS_IOP_MMHG: 13
OD_IOP_MMHG: 17

## 2023-03-01 ASSESSMENT — SLIT LAMP EXAM - LIDS: COMMENTS: NORMAL

## 2023-03-01 NOTE — PROGRESS NOTES
Chief Complaint(s) and History of Present Illness(es)     Post Op (Ophthalmology) Left Eye            Laterality: left eye    Course: gradually improving    Associated symptoms: dryness and eye pain (occasional tenderness).    Negative for flashes and floaters    Treatments tried: eye drops          Comments    Here for 1 week post op CE/IOL left eye. Vision has been gradually   improving. Some dryness and occasional tenderness. Compliant with drops.   No flashes or floaters.    Je Chery COT 9:36 AM March 1, 2023                Review of systems for the eyes was negative other than the pertinent positives/negatives listed in the HPI.      Assessment & Plan      Margo Inman is a 63 year old female with the following diagnoses:   1. Pseudophakia, left eye    2. Acute angle-closure glaucoma of left eye           S/P CEIOL left eye with goniosynechialysis and endoscopic CPC (2/20/2023)  Doing very well.  Eyes feeling more dry and has been using many preserved artificial tears daily     Plan:  -- Stop ketorolac and pilocarpine  -- Taper Predforte as directed  -- Switch to preservative free artificial tears   -- Cont dorzolamide twice a day and brimonidine twice a day     Ok to resume normal activities  Return precautions reviewed     Patient disposition:   Return in about 2 weeks (around 3/15/2023) for Refraction, DFE left.           Attending Physician Attestation:  Complete documentation of historical and exam elements from today's encounter can be found in the full encounter summary report (not reduplicated in this progress note).  I personally obtained the chief complaint(s) and history of present illness.  I confirmed and edited as necessary the review of systems, past medical/surgical history, family history, social history, and examination findings as documented by others; and I examined the patient myself.  I personally reviewed the relevant tests, images, and reports as documented above.  I formulated  and edited as necessary the assessment and plan and discussed the findings and management plan with the patient and family. . - Gaston Guzman MD

## 2023-03-01 NOTE — PATIENT INSTRUCTIONS
-- Keep tapering Prednisolone per the chart  - Stop ketorolac (gray top)   -- Stop pilocarpine (green top)    -- Continue dorzolamide twice a day and brimonidine twice a day   -- Switch artificial tears to preservative free artificial tears (vials)

## 2023-03-01 NOTE — NURSING NOTE
Chief Complaints and History of Present Illnesses   Patient presents with     Post Op (Ophthalmology) Left Eye     Chief Complaint(s) and History of Present Illness(es)     Post Op (Ophthalmology) Left Eye            Laterality: left eye    Course: gradually improving    Associated symptoms: dryness and eye pain (occasional tenderness).  Negative for flashes and floaters    Treatments tried: eye drops          Comments    Here for 1 week post op CE/IOL left eye. Vision has been gradually improving. Some dryness and occasional tenderness. Compliant with drops. No flashes or floaters.    Je Chery COT 9:36 AM March 1, 2023

## 2023-03-06 ENCOUNTER — THERAPY VISIT (OUTPATIENT)
Dept: OCCUPATIONAL THERAPY | Facility: CLINIC | Age: 63
End: 2023-03-06
Payer: COMMERCIAL

## 2023-03-06 DIAGNOSIS — M19.042 OSTEOARTHRITIS OF BOTH HANDS, UNSPECIFIED OSTEOARTHRITIS TYPE: Primary | ICD-10-CM

## 2023-03-06 DIAGNOSIS — R20.0 BILATERAL HAND NUMBNESS: ICD-10-CM

## 2023-03-06 DIAGNOSIS — M19.041 OSTEOARTHRITIS OF BOTH HANDS, UNSPECIFIED OSTEOARTHRITIS TYPE: Primary | ICD-10-CM

## 2023-03-06 PROCEDURE — 97535 SELF CARE MNGMENT TRAINING: CPT | Mod: GO

## 2023-03-06 PROCEDURE — 97763 ORTHC/PROSTC MGMT SBSQ ENC: CPT | Mod: GO

## 2023-03-06 NOTE — PROGRESS NOTES
Hand Therapy Progress Note    Current Date:  3/6/2023    Diagnosis: juan CMC OA, juan hand numbness/tingling    Reporting period is 1/27/23 to 3/6/2023    Subjective:   See flowsheet      Objective:  Pt reports decrease in pain and increase in functional use of L hand today, which she has been bracing regularly.   Pt has tolerated steady progression of thumb stability and nerve gliding HEP.     Please refer to the daily flowsheet for treatment provided today.     Assessment:  Response to therapy has been improvement to:  Pain and functional activity tolerance    Overall Assessment:  Patient is ready to progress to more complex exercises.  STG/LTG:  STGoals have been reviewed and no progress has been made;  see goal sheet for details and changes.    Plan:  Frequency/Duration:  Recommend continuing to see patient  1 X week, once daily  for 6 weeks  Appropriateness of Rx I have re-evaluated this patient and find that the nature, scope, duration and intensity of the therapy is appropriate for the medical condition of the patient.    Treatment Plan:  Modalities:    US and Paraffin   Therapeutic Exercise:    AROM, AAROM, PROM, Tendon Gliding, Place and Hold, Extensor Tracking, Isotonics, Isometrics and Stabilization  Neuromuscular re-ed:   Nerve Gliding and Kinesiotaping  Manual Techniques:   Joint mobilization, Friction massage, Myofascial release and Manual edema mobilization  Orthotic Fabrication:    Static  Self Care:    Self Care Tasks, Ergonomic Considerations and Work Tasks    Discharge Plan:  Achieve all LTG.  Independent in home treatment program.  Reach maximal therapeutic benefit.    Home Exercise Program:  Orthosis Wear and Care  Warmth  Ball Massage to Flexors  [place] Massage to Thumb  Thumb Stabilization Web Space Release Method 1 with Clip  Finger Active Range of Motion Tendon Glides Fist Series  Median Nerve Mobility  Thumb Stabilization HEP     Next Visit:  Take formal measurements for continued tracking of  progress  Adjust R thumb spica splint as needed  US and manual therapy for improved comfort and activity tolerance

## 2023-03-14 ENCOUNTER — THERAPY VISIT (OUTPATIENT)
Dept: OCCUPATIONAL THERAPY | Facility: CLINIC | Age: 63
End: 2023-03-14
Payer: COMMERCIAL

## 2023-03-14 DIAGNOSIS — M19.041 OSTEOARTHRITIS OF BOTH HANDS, UNSPECIFIED OSTEOARTHRITIS TYPE: ICD-10-CM

## 2023-03-14 DIAGNOSIS — M19.042 OSTEOARTHRITIS OF BOTH HANDS, UNSPECIFIED OSTEOARTHRITIS TYPE: ICD-10-CM

## 2023-03-14 DIAGNOSIS — R20.0 BILATERAL HAND NUMBNESS: Primary | ICD-10-CM

## 2023-03-14 DIAGNOSIS — G56.01 CARPAL TUNNEL SYNDROME OF RIGHT WRIST: ICD-10-CM

## 2023-03-14 PROCEDURE — 97110 THERAPEUTIC EXERCISES: CPT | Mod: GO

## 2023-03-14 NOTE — PROGRESS NOTES
Hand Therapy Progress Note    Current Date:  3/14/2023  Referring Provider: Zeeshan Hobson MD MD Order Date: 1/25/2023  MD Note: bilateral first CMC OA, possible recurrent carpal tunnel    Diagnosis: Bilateral hand numbness & Osteoarthritis of both hands (left is worse than right)  DOI: About 3 months ago    Reporting period is 1/27/2023 to 3/14/2023    Subjective:   Subjective changes noted by patient:  Good. I was sore after I saw you two weeks ago. I am doing good so far today. I took it easy yesterday, so that's probably why.  Functional changes noted by patient:  Improvement in Self Care Tasks (dressing) and Household Chores  Patient has noted adverse reaction to:  None    Functional Outcome Measure:  Upper Extremity Functional Index Score:  SCORE:   Column Totals: /80: 43   (A lower score indicates greater disability.)    Objective:  Pain Level (Scale 0-10):   1/27/2023 3/14/2023   At Rest 4/10 4/10   With Use 9/10 8-9/10     Pain Description:  Date 1/27/2023   Location B CMC Joints, volar/central wrist, shooting through the wrist   Pain Quality Numb, Sharp, Shooting, Throbbing and Tingling   Frequency constant     Pain is worst daytime or nighttime   Exacerbated by Pinching, steering wheel   Relieved by Lidocaine patches   Progression Worsening     Posture  Rounded Forward Shoulders    Edema  Mild    Sensation  Decreased Median Nerve distribution per pt report    ROM  Pain Report: - none  + mild    ++ moderate    +++ severe   Wrist 1/27/2023 1/27/2023   AROM (PROM) R L   Extension 61 58   Flexion 58 63   RD 14 13   UD 35 29     Thumb 1/27/2023 1/27/2023   AROM  (PROM) R L   MP /46 /44   IP /52 /41   RABD 35 33   PABD 30 37   Kapandji Opposition Scale (0-10/10) 8 7     Thumb Observation/Appearance   - none  + mild    ++ moderate    +++ severe     1/27/2023   Shoulder deformity present over CMC R: +  L: +   Edema over the CMC joint R: +  L: +   Noted collapse of MP into hyperextension during pinch R: +  L: +       Provocative Tests  Pain Report:  - none    + mild    ++ moderate    +++ severe     1/27/2023 3/14/2023   Crepitus present R: -  L: ++ L: ++   CMC Adduction Stress Test R: ++  L: +++ R: +  L: +   CMC Extension Stress Test R: ++  L: +++ R: ++  L: +++   Finkelstein's R: +  L: NT due to pain R: +  L: ++     Special Tests  Pain Report:  - none    + mild    ++ moderate    +++ severe    1/27/2023 3/14/2023   Median Nerve Compression at Pronator R: -  L: -    Carpal Compression Test--Durkan Test (30 sec) R: +  L: +++ R: -  L: + @ 5 secs   Tinels at Carpal Tunnel R: -  L: +++ L: -   Phalens R: -  L: ++ L: + @ 15 secs     Neural Tension Testing  MNT: Median Neurodynamic Test (based on MYRANDA Limon's ULNT)   1/27/2023 3/14/2023   0-5 Scale R: 3/5  L: 3/5 R: 2-3/5  L: 3/5   Position:   0/5: Arm across abdomen in coronal plane  1/5: Depress shoulder, ER to neutral ABD shoulder to 45 degrees  2/5: ER shoulder to end range, keep elbow at 90 degrees  3/5: Extend elbow to 0 degrees  4/5: Fully supinate forearm  5/5: Extend wrist, fingers and thumb  Notes:    (+) indicates beyond grade level but less than long-term to next level    (-) indicates over long-term to level    S1  onset/change of patient's symptoms    S2 definite stop point based on patient's discomfort level    Strength   (Measured in pounds)  Pain Report: - none  + mild    ++ moderate    +++ severe    1/27/2023 1/27/2023 3/14/2023 3/14/2023   Trials R L R L   1  2  3 27 11 35 20   Average 27 11 35 20     Lat Pinch 1/27/2023 1/27/2023 3/14/2023 3/14/2023   Trials R L R L   1  2  3 4 3 10 4   Average 4 3 10 4     Palpation   Pain Report:  - none    + mild    ++ moderate    +++ severe    1/27/2023 3/14/2023   CMC Joint Line R: +++  L: +++ R: +++  L: -   Thenar Eminence R: ++  L: ++ R: +  L: +++   Web Space R: -  L: ++ L: -   1st DC R: ++  L: - R: +++   Radial Styloid R: +++  L: +++ R: +++  L: ++   FCR R: +  L: -      Assessment:  Response to therapy has been improvement to:   Strength:   and pinch  Pain:  less tender over affected area  Response to therapy has been lack of progress in:  Pain:  Intensity is unchanged  Paresthesias:  Median nerve - Unchanged    Overall Assessment:  Patient is becoming more independent in home exercise program  Patient would benefit from continued therapy to achieve rehab potential  Patient would benefit from further evaluation of unchanged symptoms with their referring provider.  STG/LTG:  STGoals have been reviewed and no progress has been made;  see goal sheet for details and changes.    Plan:  Frequency/Duration:  Recommend continuing with the current treatment plan. 1 X week, once daily  for 3 weeks  Appropriateness of Rx I have re-evaluated this patient and find that the nature, scope, duration and intensity of the therapy is appropriate for the medical condition of the patient.    Treatment Plan:  Modalities:    US and Paraffin   Therapeutic Exercise:    AROM, AAROM, PROM, Tendon Gliding, Place and Hold, Extensor Tracking, Isotonics, Isometrics and Stabilization  Neuromuscular re-ed:   Nerve Gliding and Kinesiotaping  Manual Techniques:   Joint mobilization, Friction massage, Myofascial release and Manual edema mobilization  Orthotic Fabrication:    Static  Self Care:    Self Care Tasks, Ergonomic Considerations and Work Tasks    Discharge Plan:  Achieve all LTG.  Independent in home treatment program.  Reach maximal therapeutic benefit.    Home Exercise Program:  Orthosis Wear and Care  Warmth  Ball Massage to Flexors  [place] Massage to Thumb  Thumb Stabilization Web Space Release Method 1 with Clip  Finger Active Range of Motion Tendon Glides Fist Series  Median Nerve Mobility  Thumb Stabilization HEP     Next Visit:  US  MFR  Nerve gliding  Joint mobilization

## 2023-03-16 ENCOUNTER — TELEPHONE (OUTPATIENT)
Dept: OPHTHALMOLOGY | Facility: CLINIC | Age: 63
End: 2023-03-16

## 2023-03-16 ENCOUNTER — OFFICE VISIT (OUTPATIENT)
Dept: OPHTHALMOLOGY | Facility: CLINIC | Age: 63
End: 2023-03-16
Attending: OPHTHALMOLOGY
Payer: COMMERCIAL

## 2023-03-16 DIAGNOSIS — H25.811 COMBINED FORM OF AGE-RELATED CATARACT, RIGHT EYE: ICD-10-CM

## 2023-03-16 DIAGNOSIS — H40.031 ANATOMICAL NARROW ANGLE BORDERLINE GLAUCOMA OF RIGHT EYE: ICD-10-CM

## 2023-03-16 DIAGNOSIS — Z98.890 POSTOPERATIVE EYE STATE: Primary | ICD-10-CM

## 2023-03-16 PROCEDURE — 92015 DETERMINE REFRACTIVE STATE: CPT

## 2023-03-16 PROCEDURE — 99024 POSTOP FOLLOW-UP VISIT: CPT | Performed by: OPHTHALMOLOGY

## 2023-03-16 PROCEDURE — G0463 HOSPITAL OUTPT CLINIC VISIT: HCPCS | Performed by: OPHTHALMOLOGY

## 2023-03-16 ASSESSMENT — EXTERNAL EXAM - RIGHT EYE: OD_EXAM: NO PROPTOSIS

## 2023-03-16 ASSESSMENT — REFRACTION_MANIFEST
OD_SPHERE: +1.75
OS_ADD: +2.50
OD_CYLINDER: +1.00
OS_SPHERE: -1.00
OD_AXIS: 175
OD_ADD: +2.50
OS_AXIS: 144
OS_CYLINDER: +1.00

## 2023-03-16 ASSESSMENT — CONF VISUAL FIELD
OD_SUPERIOR_TEMPORAL_RESTRICTION: 0
OS_INFERIOR_TEMPORAL_RESTRICTION: 0
OS_NORMAL: 1
OD_INFERIOR_NASAL_RESTRICTION: 0
METHOD: COUNTING FINGERS
OD_NORMAL: 1
OS_SUPERIOR_TEMPORAL_RESTRICTION: 0
OD_SUPERIOR_NASAL_RESTRICTION: 0
OD_INFERIOR_TEMPORAL_RESTRICTION: 0
OS_INFERIOR_NASAL_RESTRICTION: 0
OS_SUPERIOR_NASAL_RESTRICTION: 0

## 2023-03-16 ASSESSMENT — REFRACTION_WEARINGRX
OS_SPHERE: +0.75
OS_AXIS: 111
OS_CYLINDER: +0.75
OS_ADD: +2.00
OD_ADD: +2.00
OD_SPHERE: +1.75
OD_CYLINDER: SPHERE

## 2023-03-16 ASSESSMENT — VISUAL ACUITY
CORRECTION_TYPE: GLASSES
METHOD: SNELLEN - LINEAR
OD_CC: 20/25
OS_SC: 20/30
OD_CC+: -2
OS_PH_SC+: +2
OS_PH_SC: 20/30

## 2023-03-16 ASSESSMENT — EXTERNAL EXAM - LEFT EYE: OS_EXAM: NORMAL

## 2023-03-16 ASSESSMENT — SLIT LAMP EXAM - LIDS
COMMENTS: NORMAL
COMMENTS: MGD

## 2023-03-16 ASSESSMENT — CUP TO DISC RATIO
OD_RATIO: 0.4
OS_RATIO: 0.6

## 2023-03-16 ASSESSMENT — TONOMETRY
OS_IOP_MMHG: 15
IOP_METHOD: TONOPEN
OD_IOP_MMHG: 21

## 2023-03-16 NOTE — PROGRESS NOTES
Chief Complaint(s) and History of Present Illness(es)     Post Op (Ophthalmology) Left Eye            Comments: 2 week follow up S/P CEIOL left eye with goniosynechialysis and   endoscopic CPC (2/20/2023)          Comments    Pt states vision has been good. No eye pain today. Dryness in BE, better   since starting preservative free eye drops.  No redness. No flashes or floaters.    FATOU Amato March 16, 2023 10:26 AM                   Review of systems for the eyes was negative other than the pertinent positives/negatives listed in the HPI.      Assessment & Plan      Margo Inman is a 63 year old female with the following diagnoses:   1. Postoperative eye state - Left Eye    2. Combined form of age-related cataract, right eye    3. 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         Doing well, ok to reduce Predforte to daily x 2 weeks, then stop  Continue brimonidine + Cosopt twice a day for now  Preservative free artificial tears to continue  Hold on glasses until after right eye cataract extraction       Risks, benefits and alternatives to cataract extraction/IOL implantation right eye discussed; consent obtained.  Will schedule surgery today    Special equipment/needs:    Anesthesia:Topical  Dilation:Moderate  Iris expansion:IFIS  Pseudoexfoliation: No pseudoexfoliation  Trypan Blue: No   Goal -0.25        Patient disposition:   Return in about 4 weeks (around 4/13/2023).           Attending Physician Attestation:  Complete documentation of historical and exam elements from today's encounter can be found in the full encounter summary report (not reduplicated in this progress note).  I personally obtained the chief complaint(s) and history of present illness.  I confirmed and edited as necessary the review of systems, past medical/surgical history, family history, social history, and examination findings as documented by others; and I examined the patient myself.  I personally  reviewed the relevant tests, images, and reports as documented above.  I formulated and edited as necessary the assessment and plan and discussed the findings and management plan with the patient and family.Today with Margo Inman, I reviewed the indications, risks, benefits, and alternatives of the proposed surgical procedure including, but not limited to, failure obtain the desired result  and need for additional surgery, bleeding, infection, loss of vision, loss of the eye, and the remote possibility of permanent damage to any organ system or death with the use of anesthesia.  I provided multiple opportunities for the questions, answered all questions to the best of my ability, and confirmed that my answers and my discussion were understood.       . - Gaston Guzman MD

## 2023-03-16 NOTE — PATIENT INSTRUCTIONS
-- Taper Prednisolone daily for 2 weeks, then stop     -- Continue dorzolamide twice a day and brimonidine twice a day   -- Continue preservative free artificial tears (vials)

## 2023-03-16 NOTE — TELEPHONE ENCOUNTER
Called patient to schedule surgery with Dr. Guzman    Date of Surgery: 5/12    Location of surgery: [facility]    Pre-Op H&P: PCP    Post-Op Appt Date: 5/30    Surgery Packet Mailed: Yes      Additional comments: Spoke with patient, they are aware of above dates, will review packet and reach out with any questions        Anna C. Schoenecker on 3/16/2023 at 2:24 PM

## 2023-03-16 NOTE — NURSING NOTE
Chief Complaints and History of Present Illnesses   Patient presents with     Post Op (Ophthalmology) Left Eye     2 week follow up S/P CEIOL left eye with goniosynechialysis and endoscopic CPC (2/20/2023)     Chief Complaint(s) and History of Present Illness(es)     Post Op (Ophthalmology) Left Eye            Comments: 2 week follow up S/P CEIOL left eye with goniosynechialysis and endoscopic CPC (2/20/2023)          Comments    Pt states vision has been good. No eye pain today. Dryness in BE, better since starting preservative free eye drops.  No redness. No flashes or floaters.    FATOU Amato March 16, 2023 10:26 AM

## 2023-03-20 NOTE — TELEPHONE ENCOUNTER
Reminder letter to schedule needed appt for further refills mailed to pt's home address.      aCrlene ENG (R))     [Time Spent: ___ minutes] : I have spent [unfilled] minutes of time on the encounter. No

## 2023-03-21 ENCOUNTER — THERAPY VISIT (OUTPATIENT)
Dept: OCCUPATIONAL THERAPY | Facility: CLINIC | Age: 63
End: 2023-03-21
Payer: COMMERCIAL

## 2023-03-21 DIAGNOSIS — R20.0 BILATERAL HAND NUMBNESS: Primary | ICD-10-CM

## 2023-03-21 DIAGNOSIS — M19.042 OSTEOARTHRITIS OF BOTH HANDS, UNSPECIFIED OSTEOARTHRITIS TYPE: ICD-10-CM

## 2023-03-21 DIAGNOSIS — G56.01 CARPAL TUNNEL SYNDROME OF RIGHT WRIST: ICD-10-CM

## 2023-03-21 DIAGNOSIS — M19.041 OSTEOARTHRITIS OF BOTH HANDS, UNSPECIFIED OSTEOARTHRITIS TYPE: ICD-10-CM

## 2023-03-21 PROCEDURE — 97140 MANUAL THERAPY 1/> REGIONS: CPT | Mod: GO

## 2023-03-21 PROCEDURE — 97112 NEUROMUSCULAR REEDUCATION: CPT | Mod: GO

## 2023-03-24 ENCOUNTER — OFFICE VISIT (OUTPATIENT)
Dept: ORTHOPEDICS | Facility: CLINIC | Age: 63
End: 2023-03-24
Payer: COMMERCIAL

## 2023-03-24 DIAGNOSIS — M19.041 OSTEOARTHRITIS OF BOTH HANDS, UNSPECIFIED OSTEOARTHRITIS TYPE: Primary | ICD-10-CM

## 2023-03-24 DIAGNOSIS — M19.042 OSTEOARTHRITIS OF BOTH HANDS, UNSPECIFIED OSTEOARTHRITIS TYPE: Primary | ICD-10-CM

## 2023-03-24 PROCEDURE — 99213 OFFICE O/P EST LOW 20 MIN: CPT | Performed by: FAMILY MEDICINE

## 2023-03-24 NOTE — TELEPHONE ENCOUNTER
DIAGNOSIS: bilateral wrist/thumb/index pain   APPOINTMENT DATE: 05/19/2023   NOTES STATUS DETAILS   OFFICE NOTE from referring provider Internal 03/24/2023 Dr Hobson [facility]    OFFICE NOTE from other specialist Internal 03/21/2023 OT [facility]    DISCHARGE SUMMARY from hospital N/A    DISCHARGE REPORT from the ER N/A    OPERATIVE REPORT Internal 07/11/2014 1. Left carpal tunnel release.  2. Left trigger thumb release   MEDICATION LIST N/A    EMG (for Spine) N/A    IMPLANT RECORD/STICKER N/A    LABS     CBC/DIFF N/A    CULTURES N/A    INJECTIONS DONE IN RADIOLOGY N/A    MRI N/A    CT SCAN N/A    XRAYS (IMAGES & REPORTS) Internal 01/11/2023 bilateral hands   TUMOR     PATHOLOGY  Slides & report N/A

## 2023-03-24 NOTE — PATIENT INSTRUCTIONS
Thanks for coming today.  [facility]  [address] [place] [address]    To schedule future appointments in Ortho Clinic, you may call [phone].    To schedule ordered imaging or an injection ordered by your provider:  Call [facility] scheduling line: [phone]    MyChart available online at:  [email or web address]    Please call if any further questions or concerns ([phone]).  Clinic hours 8 am to 5 pm.    Return to clinic (call) if symptoms worsen or fail to improve.

## 2023-03-24 NOTE — LETTER
3/24/2023         RE: Margo Inman  [address] [address] [id number]        Dear Colleague,    Thank you for referring your patient, Margo Inman, to the [facility]. Please see a copy of my visit note below.      [facility] MEDICINE CLINIC VISIT     Mar 24, 2023      ASSESSMENT & PLAN    62 yo with bilateral first cmc djd that had only short lived improvement after steroid injection.     Reviewed imaging and assessment with patient in detail  At this time would recommend consultation with surgeon given that her sx are refractory to both injection and hand therapy  Would recommend she complete her emg first, as previously discussed, as she is also having some elements of carpal tunnel syndrome, but most of her current discomfort seems cmc related    Zeeshan Hobson MD  [facility]    -----  Chief Complaint   Patient presents with     Follow Up     Osteoarthritis of both hands - Follow up after pt - pt said it isn't getting better       SUBJECTIVE  Margo Inman is a/an 63 year old female who is seen for follow up of osteoarthritis of both hands.     The patient is seen by themselves.    Date of injury: Na  Date of Last Visit: 2/1/23. Underwent bilateral first cmc injections. Helped for a few weeks. Pain now as bad as prior to injection.   Symptoms: worsened  Worsened by: stopping what she is doing makes them worse  Better with: massage with hot wax  Treatment to date: physical therapy (6 visits), injection  Associated symptoms: pain        REVIEW OF SYSTEMS:  See HPI     OBJECTIVE:  LMP 09/16/2011      No new exam this visit    RADIOLOGY:    No new imaging this visit         Again, thank you for allowing me to participate in the care of your patient.        Sincerely,        Zeeshan Hobson MD

## 2023-04-18 ENCOUNTER — OFFICE VISIT (OUTPATIENT)
Dept: OPHTHALMOLOGY | Facility: CLINIC | Age: 63
End: 2023-04-18
Attending: OPHTHALMOLOGY
Payer: COMMERCIAL

## 2023-04-18 DIAGNOSIS — Z96.1 PSEUDOPHAKIA, LEFT EYE: Primary | ICD-10-CM

## 2023-04-18 DIAGNOSIS — Z98.890 HISTORY OF LASER ASSISTED IN SITU KERATOMILEUSIS: ICD-10-CM

## 2023-04-18 DIAGNOSIS — H40.031 ANATOMICAL NARROW ANGLE BORDERLINE GLAUCOMA OF RIGHT EYE: ICD-10-CM

## 2023-04-18 DIAGNOSIS — H25.811 COMBINED FORM OF AGE-RELATED CATARACT, RIGHT EYE: ICD-10-CM

## 2023-04-18 PROCEDURE — 99024 POSTOP FOLLOW-UP VISIT: CPT | Performed by: OPHTHALMOLOGY

## 2023-04-18 PROCEDURE — G0463 HOSPITAL OUTPT CLINIC VISIT: HCPCS | Performed by: OPHTHALMOLOGY

## 2023-04-18 ASSESSMENT — VISUAL ACUITY
OD_CC: 20/25
OS_SC: 20/25
OS_SC+: +2
METHOD: SNELLEN - LINEAR
OD_CC+: -2

## 2023-04-18 ASSESSMENT — TONOMETRY
OD_IOP_MMHG: 24
OS_IOP_MMHG: 19
IOP_METHOD: TONOPEN

## 2023-04-18 ASSESSMENT — EXTERNAL EXAM - RIGHT EYE: OD_EXAM: NO PROPTOSIS

## 2023-04-18 ASSESSMENT — EXTERNAL EXAM - LEFT EYE: OS_EXAM: NORMAL

## 2023-04-18 ASSESSMENT — SLIT LAMP EXAM - LIDS
COMMENTS: MGD
COMMENTS: NORMAL

## 2023-04-18 NOTE — PROGRESS NOTES
Chief Complaint(s) and History of Present Illness(es)     Post Op (Ophthalmology) Left Eye            Laterality: left eye    Onset: months ago    Quality: States the va is doing good    Associated symptoms: dryness and photophobia.  Negative for redness,   tearing, flashes and floaters    Treatments tried: eye drops    Pain scale: 0/10          Comments    S/p CEIOL left eye with goniosynechialysis and   endoscopic CPC (2/20/2023)   Prednisolone every day left eye   Brimonidine BID left eye   Cosopt BID left eye   Iqra Fleming COT 8:33 AM April 18, 2023                Review of systems for the eyes was negative other than the pertinent positives/negatives listed in the HPI.      Assessment & Plan      Margo Inman is a 63 year old female with the following diagnoses:   1. Pseudophakia, left eye    2. Combined form of age-related cataract, right eye    3. 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    4. History of laser assisted in situ keratomileusis           S/P Cataract extraction + GSL + ENDOCYLOPHOTOCOAGULATION left eye 2/20/2023  Using brimonidine + Cosopt twice a day   Still on Predforte daily left eye     Intraocular pressure borderline both eyes   Stop Predforte today   Continue brimonidine and Cosopt for now      Patient disposition:   Proceed with right eye surgery as scheduled next month  Return precautions reviewed          Attending Physician Attestation:  Complete documentation of historical and exam elements from today's encounter can be found in the full encounter summary report (not reduplicated in this progress note).  I personally obtained the chief complaint(s) and history of present illness.  I confirmed and edited as necessary the review of systems, past medical/surgical history, family history, social history, and examination findings as documented by others; and I examined the patient myself.  I personally reviewed the relevant tests, images, and reports as  documented above.  I formulated and edited as necessary the assessment and plan and discussed the findings and management plan with the patient and family. . - Gaston Guzman MD

## 2023-04-18 NOTE — NURSING NOTE
Chief Complaints and History of Present Illnesses   Patient presents with     Post Op (Ophthalmology) Left Eye     Chief Complaint(s) and History of Present Illness(es)     Post Op (Ophthalmology) Left Eye            Laterality: left eye    Onset: months ago    Quality: States the va is doing good    Associated symptoms: dryness and photophobia.  Negative for redness, tearing, flashes and floaters    Treatments tried: eye drops    Pain scale: 0/10          Comments    S/p CEIOL left eye with goniosynechialysis and   endoscopic CPC (2/20/2023)   Prednisolone every day left eye   Brimonidine BID left eye   Cosopt BID left eye   Iqra Fleming COT 8:33 AM April 18, 2023

## 2023-04-26 ENCOUNTER — OFFICE VISIT (OUTPATIENT)
Dept: FAMILY MEDICINE | Facility: CLINIC | Age: 63
End: 2023-04-26
Payer: COMMERCIAL

## 2023-04-26 VITALS
HEART RATE: 84 BPM | OXYGEN SATURATION: 91 % | WEIGHT: 218.6 LBS | SYSTOLIC BLOOD PRESSURE: 158 MMHG | RESPIRATION RATE: 22 BRPM | HEIGHT: 61 IN | DIASTOLIC BLOOD PRESSURE: 82 MMHG | BODY MASS INDEX: 41.27 KG/M2 | TEMPERATURE: 98.5 F

## 2023-04-26 DIAGNOSIS — E78.5 HYPERLIPIDEMIA LDL GOAL <130: ICD-10-CM

## 2023-04-26 DIAGNOSIS — H25.811 COMBINED FORM OF AGE-RELATED CATARACT, RIGHT EYE: ICD-10-CM

## 2023-04-26 DIAGNOSIS — H40.9 SEVERE STAGE GLAUCOMA: ICD-10-CM

## 2023-04-26 DIAGNOSIS — Z01.818 PREOP GENERAL PHYSICAL EXAM: Primary | ICD-10-CM

## 2023-04-26 DIAGNOSIS — F17.200 TOBACCO DEPENDENCY: ICD-10-CM

## 2023-04-26 DIAGNOSIS — E66.01 MORBID OBESITY (H): ICD-10-CM

## 2023-04-26 DIAGNOSIS — Z23 NEED FOR PNEUMOCOCCAL 20-VALENT CONJUGATE VACCINATION: ICD-10-CM

## 2023-04-26 DIAGNOSIS — Z98.1 S/P CERVICAL SPINAL FUSION: ICD-10-CM

## 2023-04-26 DIAGNOSIS — I10 ESSENTIAL HYPERTENSION WITH GOAL BLOOD PRESSURE LESS THAN 140/90: ICD-10-CM

## 2023-04-26 PROCEDURE — 90677 PCV20 VACCINE IM: CPT | Performed by: NURSE PRACTITIONER

## 2023-04-26 PROCEDURE — 84132 ASSAY OF SERUM POTASSIUM: CPT | Performed by: NURSE PRACTITIONER

## 2023-04-26 PROCEDURE — 90471 IMMUNIZATION ADMIN: CPT | Performed by: NURSE PRACTITIONER

## 2023-04-26 PROCEDURE — 82565 ASSAY OF CREATININE: CPT | Performed by: NURSE PRACTITIONER

## 2023-04-26 PROCEDURE — 80061 LIPID PANEL: CPT | Performed by: NURSE PRACTITIONER

## 2023-04-26 PROCEDURE — 36415 COLL VENOUS BLD VENIPUNCTURE: CPT | Performed by: NURSE PRACTITIONER

## 2023-04-26 PROCEDURE — 99214 OFFICE O/P EST MOD 30 MIN: CPT | Mod: 25 | Performed by: NURSE PRACTITIONER

## 2023-04-26 ASSESSMENT — PAIN SCALES - GENERAL: PAINLEVEL: MODERATE PAIN (4)

## 2023-04-26 ASSESSMENT — ASTHMA QUESTIONNAIRES
QUESTION_1 LAST FOUR WEEKS HOW MUCH OF THE TIME DID YOUR ASTHMA KEEP YOU FROM GETTING AS MUCH DONE AT WORK, SCHOOL OR AT HOME: NONE OF THE TIME
ACT_TOTALSCORE: 23
QUESTION_3 LAST FOUR WEEKS HOW OFTEN DID YOUR ASTHMA SYMPTOMS (WHEEZING, COUGHING, SHORTNESS OF BREATH, CHEST TIGHTNESS OR PAIN) WAKE YOU UP AT NIGHT OR EARLIER THAN USUAL IN THE MORNING: NOT AT ALL
QUESTION_2 LAST FOUR WEEKS HOW OFTEN HAVE YOU HAD SHORTNESS OF BREATH: ONCE OR TWICE A WEEK
ACT_TOTALSCORE: 23
QUESTION_5 LAST FOUR WEEKS HOW WOULD YOU RATE YOUR ASTHMA CONTROL: COMPLETELY CONTROLLED
QUESTION_4 LAST FOUR WEEKS HOW OFTEN HAVE YOU USED YOUR RESCUE INHALER OR NEBULIZER MEDICATION (SUCH AS ALBUTEROL): ONCE A WEEK OR LESS

## 2023-04-26 NOTE — PROGRESS NOTES
[address] [address]  Phone: [phone]  Primary Provider: Belem Zavala  Pre-op Performing Provider: BELEM ZAVALA      PREOPERATIVE EVALUATION:  Today's date: 4/26/2023    Margo Inman is a 63 year old female who presents for a preoperative evaluation.       View : No data to display.              Surgical Information:  Surgery/Procedure: PHACOEMULSIFICATION, CATARACT, WITH STANDARD INTRAOCULAR LENS IMPLANT INSERTION, right eye  Surgery Location: [facility]   Surgeon: Gaston Guzman   Surgery Date: 5/12/2023  Time of Surgery: 7/15 AM   Where patient plans to recover: At home with family  Fax number for surgical facility: Note does not need to be faxed, will be available electronically in Epic.    Assessment & Plan     The proposed surgical procedure is considered LOW risk.    Preop general physical exam      Combined for m of age-related cataract, right eye  Scheduled forPHACOEMULSIFICATION, CATARACT, WITH STANDARD INTRAOCULAR LENS IMPLANT INSERTION, right eye  5/12/23    Severe stage glaucoma  Followed by Ophthamology    Obesity (BMI 35.0-39.9) with comorbidity (H)  Benefits of weight loss reviewed in detail, encouraged her to cut back on the carbohydrates in the diet, consume more fruits and vegetables, drink plenty of water, avoid fruit juices, sodas, get 150 min moderate exercise/week.  Recheck weight in 6 months.      S/P cervical spinal fusion      Essential hypertension with goal blood pressure less than 140/90  BP elevated today, advised her to cut back on the salt, work on weight loss and getting more regular exercise.  - Potassium  - Creatinine    Hyperlipidemia LDL goal <130    - Lipid panel reflex to direct LDL Fasting    Tobacco dependency  STrongly advised comp,ete smoking cessation.  She is not ready to set a quit date but is aware of the risks or continued smoking and  benefits of quitting.    Need for pneumococcal 20-valent conjugate vaccination    - Pneumococcal 20 Valent Conjugate (Prevnar 20)             Risks and Recommendations:  The patient has the following additional risks and recommendations for perioperative complications:   - Morbid obesity (BMI >40)  Pulmonary:    - Incentive spirometry post-op   - Active nicotine user, advised smoking cessation    Antiplatelet or Anticoagulation Medication Instructions:   - Bleeding risk is low for this procedure (e.g. dental, skin, cataract).    Additional Medication Instructions:  Patient is to take all scheduled medications on the day of surgery EXCEPT for modifications listed below:   - ACE/ARB: HOLD on day of surgery (minimum 11 hours for general anesthesia).   - Statins: Continue taking on the day of surgery.    - diclofenac (Voltaren): HOLD 1 day before surgery.  Hold Fish oil staerrting 7 days prior to your procedure    RECOMMENDATION:  APPROVAL GIVEN to proceed with proposed procedure, without further diagnostic evaluation.    Ordering of each unique test  Prescription drug management  21 minutes spent by me on the date of the encounter doing chart review, history and exam, documentation and further activities per the note      Subjective     HPI related to upcoming procedure: Patient has cataract and will have surgery on 5/12/23 4/26/2023    10:36 AM   Preop Questions   1. Have you ever had a heart attack or stroke? No   2. Have you ever had surgery on your heart or blood vessels, such as a stent placement, a coronary artery bypass, or surgery on an artery in your head, neck, heart, or legs? No   3. Do you have chest pain with activity? No   4. Do you have a history of  heart failure? No   5. Do you currently have a cold, bronchitis or symptoms of other infection? No   6. Do you have a cough, shortness of breath, or wheezing? YES - smokers cough   7. Do you or anyone in your family have previous history of blood  clots? UNKNOWN -    8. Do you or does anyone in your family have a serious bleeding problem such as prolonged bleeding following surgeries or cuts? No   9. Have you ever had problems with anemia or been told to take iron pills? YES - during her pregnancies and after kneee surgery   10. Have you had any abnormal blood loss such as black, tarry or bloody stools, or abnormal vaginal bleeding? No   11. Have you ever had a blood transfusion? YES -   With childbirth   11a. Have you ever had a transfusion reaction? No   12. Are you willing to have a blood transfusion if it is medically needed before, during, or after your surgery? Yes   13. Have you or any of your relatives ever had problems with anesthesia? YES - nausea and vomiting with anesthesia   14. Do you have sleep apnea, excessive snoring or daytime drowsiness? No   15. Do you have any artifical heart valves or other implanted medical devices like a pacemaker, defibrillator, or continuous glucose monitor? No   16. Do you have artificial joints? YES - bilateral knees, screws in left hip, upper neck and lower back fusions   17. Are you allergic to latex? No       Health Care Directive:  Patient does not have a Health Care Directive or Living Will: Discussed advance care planning with patient; information given to patient to review.    Preoperative Review of :   reviewed - controlled substances reflected in medication list.      Status of Chronic Conditions:  See problem list for active medical problems.  Problems all longstanding and stable, except as noted/documented.  See ROS for pertinent symptoms related to these conditions.    HYPERLIPIDEMIA - Patient has a long history of significant Hyperlipidemia requiring medication for treatment with recent fair control. Patient reports no problems or side effects with the medication.     HYPERTENSION - Patient has longstanding history of HTN , currently denies any symptoms referable to elevated blood pressure.  Specifically denies chest pain, palpitations, dyspnea, orthopnea, PND or peripheral edema. Blood pressure readings have not been in normal range. Current medication regimen is as listed below. Patient denies any side effects of medication.       Review of Systems  Constitutional, neuro, ENT, endocrine, pulmonary, cardiac, gastrointestinal, genitourinary, musculoskeletal, integument and psychiatric systems are negative, except as otherwise noted.    Patient Active Problem List    Diagnosis Date Noted     Combined form of age-related cataract, right eye 03/16/2023     Priority: Medium     Added automatically from request for surgery [id number]       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 03/16/2023     Priority: Medium     Added automatically from request for surgery [id number]       Severe stage glaucoma 02/15/2023     Priority: Medium     Added automatically from request for surgery [id number]       Bilateral hand numbness 01/27/2023     Priority: Medium     Osteoarthritis of both hands, unspecified osteoarthritis type 01/14/2023     Priority: Medium     Facet arthritis of lumbar region 01/11/2023     Priority: Medium     Diverticulosis of sigmoid colon 06/15/2020     Priority: Medium     Obesity (BMI 35.0-39.9) with comorbidity (H) 04/05/2019     Priority: Medium     Papanicolaou smear of cervix with low grade squamous intraepithelial lesion (LGSIL) 04/18/2018     Priority: Medium     Essential hypertension with goal blood pressure less than 140/90 03/29/2018     Priority: Medium     Adjustment disorder with mixed anxiety and depressed mood 03/29/2018     Priority: Medium     S/P cervical spinal fusion 03/30/2017     Priority: Medium     Radiculopathy of cervical spine 03/20/2017     Priority: Medium     Cervical high risk HPV (human papillomavirus) test positive 01/10/2017     Priority: Medium     2000, 2003, and 2009 NIL paps. in Care Everywhere.  9/5/13 NIL pap  1/10/17 NIL pap, + HR HPV (not 16 or 18).  Plan: cotest in 1 year, due by 1/10/18  3/29/18 LSIL pap, + HR HPV (not 16 or 18). Plan: colp bef 6/29/18 4/18/18 [place] bx: negative. Plan: cotest in 6 mo, per provider.   11/9/18 Lost to follow-up for pap tracking  9/11/2019 Pap: NIL/neg HR HPV. Plan cotest in 3 years.  1/11/23 NIL pap, Neg HPV. Plan cotest in 3 years.          Microscopic hematuria 06/02/2016     Priority: Medium     Recommend f/u with primary MD for UA, blood pressure, and urine cytology at 6, 12, 24, and 36 months from this time.  If negative for 3 years then no further monitoring needed.      Refer back to urology for any of the following:              -cytology is suspicious or positive              -gross hematuria              -irritative voiding symptoms with evidence of infection/ worsening dysuria or urgency.     If persistent microscopic hematuria WITH: hypertension, proteinuria, or glomerular/dysmorphic RBCs in urine then evaluate for primary renal disease/refer instead to nephrology.        Tobacco dependency 03/06/2015     Priority: Medium     S/P carpal tunnel release 07/22/2014     Priority: Medium     Trigger thumb 07/22/2014     Priority: Medium     CTS (carpal tunnel syndrome) - bilateral 05/27/2014     Priority: Medium     S/P total knee arthroplasty juan 01/23/2014     Priority: Medium     Acute posthemorrhagic anemia 01/23/2014     Priority: Medium     Muscle spasm 01/23/2014     Priority: Medium     RLS (restless legs syndrome) 01/22/2012     Priority: Medium     Migraine 12/06/2011     Priority: Medium     Pseudogout 05/05/2011     Priority: Medium     OA (OSTEOARTHRITIS) OF KNEE - bilateral 05/05/2011     Priority: Medium     Hyperlipidemia LDL goal <130 10/31/2010     Priority: Medium     Asthma 12/16/1996     Priority: Medium      Past Medical History:   Diagnosis Date     Arthritis      Cervical high risk HPV (human papillomavirus) test positive 1/10/2017    1/10/17 NIL pap/+ HR HPV (not 16 or 18). Plan: cotest in 1 year,  due by 1/10/18      Chronic infection     HX of MRSA. 2 neg swabs at [facility] in 2006 and 2007.     History of blood transfusion      Numbness and tingling     Right arm     PONV (postoperative nausea and vomiting)      Past Surgical History:   Procedure Laterality Date     APPENDECTOMY       ARTHROSCOPY KNEE  09/16/2011    Procedure:ARTHROSCOPY KNEE; left knee arthroscopy with debridement, open lateral patellar spur excision; Surgeon:LUIS A MONTOYA; Location:[facility] OR     CATARACT IOL, RT/LT       COLONOSCOPY N/A 02/16/2016    Procedure: COLONOSCOPY;  Surgeon: Belem Khalil MD;  Location: [facility]     COLONOSCOPY N/A 02/16/2016    Procedure: COMBINED COLONOSCOPY, SINGLE OR MULTIPLE BIOPSY/POLYPECTOMY BY BIOPSY;  Surgeon: Belem Khalil MD;  Location: [facility]     COLONOSCOPY N/A 06/15/2020    Procedure: Colonoscopy, With Polypectomy And Biopsy;  Surgeon: Torres Gallegos DO;  Location: [facility] OR     COLONOSCOPY WITH CO2 INSUFFLATION N/A 02/16/2016    Procedure: COLONOSCOPY WITH CO2 INSUFFLATION;  Surgeon: Belem Khalil MD;  Location: [facility]     COLONOSCOPY WITH CO2 INSUFFLATION N/A 06/15/2020    Procedure: COLONOSCOPY, WITH CO2 INSUFFLATION;  Surgeon: Torres Gallegos DO;  Location: [facility]     CYSTOSCOPY  01/01/2016    microscopic hematuria     DECOMPRESSION, FUSION CERVICAL ANTERIOR ONE LEVEL, COMBINED N/A 03/30/2017    Procedure: COMBINED DECOMPRESSION, FUSION CERVICAL ANTERIOR ONE LEVEL;  Surgeon: Joseph Klein MD;  Location: [facility] OR     ECTOPIC PREGNANCY SURGERY       ENT SURGERY       GONIOSYNECHIALYSIS Left 02/20/2023    Procedure: goniosynechialysis left;  Surgeon: Gaston Guzman MD;  Location: [facility] OR     GYN SURGERY       HC INCISION TENDON SHEATH FINGER Left 07/11/2014    Thumb TF release     HC KNEE SCOPE,MED/LAT MENISECTOMY  09/16/2011    left, with partial medial menisectomy ONLY     HC REVISE MEDIAN N/CARPAL TUNNEL SURG Left 07/11/2014     PRIMARY - not revision     LASIK Bilateral     [id number]     ORTHOPEDIC SURGERY       PHACOEMULSIFICATION CLEAR CORNEA W/ STANDARD IOL IMPLANT, ENDOSCOPIC CYCLOPHOTOCOAGULATION, COMBINED Left 02/20/2023    Procedure: LEFT EYE COMPLEX PHACOEMULSIFICATION, CATARACT, CLEAR CORNEAL INCISION APPROACH, WITH STANDARD INTRAOCULAR LENS IMPLANT INSERTION AND ENDOSCOPIC CYCLOPHOTOCOAGULATION< GONISYNECHIOLYSIS;  Surgeon: Gaston Guzman MD;  Location: [facility] OR     RELEASE CARPAL TUNNEL  07/11/2014    Procedure: RELEASE CARPAL TUNNEL;  Surgeon: Rg Franco MD;  Location: [facility]     RELEASE CARPAL TUNNEL Right 11/07/2014    Procedure: RELEASE CARPAL TUNNEL;  Surgeon: Rg Franco MD;  Location: [facility]     RELEASE TRIGGER FINGER  07/11/2014    Procedure: RELEASE TRIGGER FINGER;  Surgeon: Rg Franco MD;  Location: [facility]     RELEASE TRIGGER FINGER Right 11/07/2014    Procedure: RELEASE TRIGGER FINGER;  Surgeon: Rg Franco MD;  Location: [facility] OR     tonsils       [facility] PART REMV FEMUR/PROX TIB/FIB  09/16/2011    left, open lateral patellar bone spur excision     Z TOTAL KNEE ARTHROPLASTY  01/17/2014    Bilateral     Current Outpatient Medications   Medication Sig Dispense Refill     acetaminophen (TYLENOL) 500 MG tablet Take 500-1,000 mg by mouth every 6 hours as needed.       acetaZOLAMIDE (DIAMOX SEQUEL) 500 MG 12 hr capsule Take 1 capsule (500 mg) by mouth 2 times daily 4 capsule 0     albuterol (PROAIR HFA/PROVENTIL HFA/VENTOLIN HFA) 108 (90 Base) MCG/ACT inhaler Inhale 2 puffs into the lungs every 4 hours as needed for shortness of breath / dyspnea or wheezing 18 g 3     ammonium lactate (LAC-HYDRIN) 12 % external lotion        atorvastatin (LIPITOR) 80 MG tablet TAKE 1 TABLET(80 MG) BY MOUTH DAILY 90 tablet 0     brimonidine (ALPHAGAN) 0.2 % ophthalmic solution Place 1 drop Into the left eye 3 times daily 5 mL 4     calcium carbonate 500 mg, elemental, 1250 (500 Ca) MG tablet chewable  Take 500 mg by mouth       cyclobenzaprine (FLEXERIL) 10 MG tablet Take 10 mg by mouth daily       cyclobenzaprine (FLEXERIL) 10 MG tablet TAKE 1 TABLET(10 MG) BY MOUTH EVERY NIGHT AS NEEDED FOR MUSCLE SPASMS 90 tablet 3     diclofenac (VOLTAREN) 75 MG EC tablet TAKE 1 TABLET(75 MG) BY MOUTH TWICE DAILY 180 tablet 0     dorzolamide (TRUSOPT) 2 % ophthalmic solution Place 1 drop Into the left eye 3 times daily 10 mL 4     ketorolac tromethamine (ACULAR-LS) 0.4 % SOLN ophthalmic solution Apply 1 drop to eye 4 times daily To operative eye 5 mL 1     latanoprost (XALATAN) 0.005 % ophthalmic solution Place 1 drop Into the left eye At Bedtime 7.5 mL 4     losartan (COZAAR) 25 MG tablet TAKE 1 TABLET(25 MG) BY MOUTH DAILY 90 tablet 0     Misc. Devices (ROLLATOR ULTRA-LIGHT) MISC Walker with front wheels for home use.       moxifloxacin (VIGAMOX) 0.5 % ophthalmic solution Apply 1 drop to eye 3 times daily To operative eye 3 mL 1     olopatadine (PATADAY) 0.2 % ophthalmic solution Place 0.05 mLs (1 drop) into both eyes daily 2.5 mL 11     Omega-3 Fatty Acids (FISH OIL PO)        pilocarpine (PILOCAR) 1 % ophthalmic solution Place 1 drop Into the left eye 3 times daily 15 mL 0     polyethylene glycol-propylene glycol (SYSTANE ULTRA) 0.4-0.3 % SOLN ophthalmic solution Apply 1 drop to eye       prednisoLONE acetate (PRED FORTE) 1 % ophthalmic suspension Apply 1 drop to eye 4 times daily To operative eye 10 mL 1     tiZANidine (ZANAFLEX) 2 MG tablet Take 2-4 mg by mouth       TUMS ULTRA 1000 1000 MG CHEW CHEW 1 TABLET BY MOUTH TWICE DAILY WITH MEALS       varenicline (CHANTIX EVANGELINA) 0.5 MG X 11 & 1 MG X 42 tablet Take 0.5 mg tab daily for 3 days, THEN 0.5 mg tab twice daily for 4 days, THEN 1 mg twice daily. 53 tablet 0     varenicline (CHANTIX EVANGELINA) 0.5 MG X 11 & 1 MG X 42 tablet Take 0.5 mg tab daily for 3 days, THEN 0.5 mg tab twice daily for 4 days, THEN 1 mg twice daily. 53 tablet 0     varenicline (CHANTIX) 1 MG tablet TAKE 1  "TABLET(1 MG) BY MOUTH TWICE DAILY 180 tablet 0       Allergies   Allergen Reactions     Wasp Venom Protein Anaphylaxis     Bee Anaphylaxis     Erythromycin Hives     Wasps [Hornets] Anaphylaxis     Shellfish Allergy Nausea and Vomiting and Rash     Shellfish-Derived Products Rash and Nausea and Vomiting        Social History     Tobacco Use     Smoking status: Some Days     Packs/day: 1.00     Years: 15.00     Pack years: 15.00     Types: Cigarettes     Smokeless tobacco: Never     Tobacco comments:     Started Chantix   Vaping Use     Vaping status: Never Used     Passive vaping exposure: Yes   Substance Use Topics     Alcohol use: Yes     Comment: socially     Family History   Problem Relation Age of Onset     C.A.D. Father      Breast Cancer Maternal Grandmother      Ovarian Cancer Maternal Grandmother      Cancer - colorectal Maternal Grandfather      Colon Cancer Maternal Grandfather      Prostate Cancer Maternal Grandfather      C.A.D. Paternal Grandfather      Lung Cancer Sister      Colon Cancer Sister      Brain Cancer Sister      Glaucoma No family hx of      Macular Degeneration No family hx of      History   Drug Use No         Objective     BP (!) 158/82 (BP Location: Right arm, Patient Position: Sitting, Cuff Size: Adult Large)   Pulse 84   Temp 98.5  F (36.9  C) (Tympanic)   Resp 22   Ht 1.556 m (5' 1.25\")   Wt 99.2 kg (218 lb 9.6 oz)   LMP 09/16/2011   SpO2 91%   BMI 40.97 kg/m      Physical Exam    GENERAL APPEARANCE: healthy, alert and no distress     EYES: EOMI, PERRL     HENT: ear canals and TM's normal and nose and mouth without ulcers or lesions     NECK: no adenopathy, no asymmetry, masses, or scars and thyroid normal to palpation     RESP: expiratory wheezes bibasilar     CV: regular rates and rhythm, normal S1 S2, no S3 or S4 and no murmur, click or rub     ABDOMEN:  soft, nontender, no HSM or masses and bowel sounds normal     MS: extremities normal- no gross deformities noted, no " evidence of inflammation in joints, FROM in all extremities.     SKIN: no suspicious lesions or rashes     NEURO: Normal strength and tone, sensory exam grossly normal, mentation intact and speech normal     PSYCH: mentation appears normal. and affect normal/bright     LYMPHATICS: No cervical adenopathy    Recent Labs   Lab Test 01/11/23  1439 09/12/22  1422 06/27/22  1102   HGB 15.1 14.4 15.0    318 339   INR  --   --  1.04    139 138   POTASSIUM 4.6 4.5 3.8   CR 0.62 0.59 0.72        Diagnostics:  Labs pending at this time.  Results will be reviewed when available.   No EKG required for low risk surgery (cataract, skin procedure, breast biopsy, etc).    Revised Cardiac Risk Index (RCRI):  The patient has the following serious cardiovascular risks for perioperative complications:   - No serious cardiac risks = 0 points     RCRI Interpretation: 0 points: Class I (very low risk - 0.4% complication rate)           Signed Electronically by: JOSE R Sarabia CNP  Copy of this evaluation report is provided to requesting physician.

## 2023-04-26 NOTE — PATIENT INSTRUCTIONS
For informational purposes only. Not to replace the advice of your health care provider. Copyright   2003,  [facility]. All rights reserved. Clinically reviewed by Breonna Rawls MD. [employer] [id number] - REV .  Preparing for Your Surgery  Getting started  A nurse will call you to review your health history and instructions. They will give you an arrival time based on your scheduled surgery time. Please be ready to share:  Your doctor's clinic name and phone number  Your medical, surgical, and anesthesia history  A list of allergies and sensitivities  A list of medicines, including herbal treatments and over-the-counter drugs  Whether the patient has a legal guardian (ask how to send us the papers in advance)  Please tell us if you're pregnant--or if there's any chance you might be pregnant. Some surgeries may injure a fetus (unborn baby), so they require a pregnancy test. Surgeries that are safe for a fetus don't always need a test, and you can choose whether to have one.   If you have a child who's having surgery, please ask for a copy of Preparing for Your Child's Surgery.    Preparing for surgery  Within 10 to 30 days of surgery: Have a pre-op exam (sometimes called an H&P, or History and Physical). This can be done at a clinic or pre-operative center.  If you're having a , you may not need this exam. Talk to your care team.  At your pre-op exam, talk to your care team about all medicines you take. If you need to stop any medicines before surgery, ask when to start taking them again.  We do this for your safety. Many medicines can make you bleed too much during surgery. Some change how well surgery (anesthesia) drugs work.  Call your insurance company to let them know you're having surgery. (If you don't have insurance, call [phone].)  Call your clinic if there's any change in your health. This includes signs of a cold or flu (sore throat, runny nose, cough, rash, fever). It  also includes a scrape or scratch near the surgery site.  If you have questions on the day of surgery, call your hospital or surgery center.  Eating and drinking guidelines  For your safety: Unless your surgeon tells you otherwise, follow the guidelines below.  Eat and drink as usual until 8 hours before you arrive for surgery. After that, no food or milk.  Drink clear liquids until 2 hours before you arrive. These are liquids you can see through, like water, Gatorade, and Propel Water. They also include plain black coffee and tea (no cream or milk), candy, and breath mints. You can spit out gum when you arrive.  If you drink alcohol: Stop drinking it the night before surgery.  If your care team tells you to take medicine on the morning of surgery, it's okay to take it with a sip of water.  Preventing infection  Shower or bathe the night before and morning of your surgery. Follow the instructions your clinic gave you. (If no instructions, use regular soap.)  Don't shave or clip hair near your surgery site. We'll remove the hair if needed.  Don't smoke or vape the morning of surgery. You may chew nicotine gum up to 2 hours before surgery. A nicotine patch is okay.  Note: Some surgeries require you to completely quit smoking and nicotine. Check with your surgeon.  Your care team will make every effort to keep you safe from infection. We will:  Clean our hands often with soap and water (or an alcohol-based hand rub).  Clean the skin at your surgery site with a special soap that kills germs.  Give you a special gown to keep you warm. (Cold raises the risk of infection.)  Wear special hair covers, masks, gowns and gloves during surgery.  Give antibiotic medicine, if prescribed. Not all surgeries need antibiotics.  What to bring on the day of surgery  Photo ID and insurance card  Copy of your health care directive, if you have one  Glasses and hearing aids (bring cases)  You can't wear contacts during surgery  Inhaler and  eye drops, if you use them (tell us about these when you arrive)  CPAP machine or breathing device, if you use them  A few personal items, if spending the night  If you have . . .  A pacemaker, ICD (cardiac defibrillator) or other implant: Bring the ID card.  An implanted stimulator: Bring the remote control.  A legal guardian: Bring a copy of the certified (court-stamped) guardianship papers.  Please remove any jewelry, including body piercings. Leave jewelry and other valuables at home.  If you're going home the day of surgery  You must have a responsible adult drive you home. They should stay with you overnight as well.  If you don't have someone to stay with you, and you aren't safe to go home alone, we may keep you overnight. Insurance often won't pay for this.  After surgery  If it's hard to control your pain or you need more pain medicine, please call your surgeon's office.  Questions?       How to Take Your Medication Before Surgery  IPatient is to take all scheduled medications on the day of surgery EXCEPT for modifications listed below:   - ACE/ARB: (Cozaar) HOLD on day of surgery (minimum 11 hours for general anesthesia).   - Statins( Lipitor) : Continue taking on the day of surgery.    - diclofenac (Voltaren): HOLD 1 day before surgery.ou have any questions for your care team, list them here:

## 2023-04-27 LAB
CHOLEST SERPL-MCNC: 199 MG/DL
CREAT SERPL-MCNC: 0.59 MG/DL (ref 0.52–1.04)
FASTING STATUS PATIENT QL REPORTED: YES
GFR SERPL CREATININE-BSD FRML MDRD: >90 ML/MIN/1.73M2
HDLC SERPL-MCNC: 45 MG/DL
LDLC SERPL CALC-MCNC: 111 MG/DL
NONHDLC SERPL-MCNC: 154 MG/DL
POTASSIUM BLD-SCNC: 4.4 MMOL/L (ref 3.4–5.3)
TRIGL SERPL-MCNC: 213 MG/DL

## 2023-05-07 DIAGNOSIS — I10 ESSENTIAL HYPERTENSION WITH GOAL BLOOD PRESSURE LESS THAN 140/90: ICD-10-CM

## 2023-05-08 ENCOUNTER — ANESTHESIA EVENT (OUTPATIENT)
Dept: SURGERY | Facility: AMBULATORY SURGERY CENTER | Age: 63
End: 2023-05-08
Payer: COMMERCIAL

## 2023-05-08 ENCOUNTER — OFFICE VISIT (OUTPATIENT)
Dept: NEUROLOGY | Facility: CLINIC | Age: 63
End: 2023-05-08
Payer: COMMERCIAL

## 2023-05-08 DIAGNOSIS — M79.641 PAIN IN BOTH HANDS: Primary | ICD-10-CM

## 2023-05-08 DIAGNOSIS — M79.642 PAIN IN BOTH HANDS: Primary | ICD-10-CM

## 2023-05-08 PROCEDURE — 95910 NRV CNDJ TEST 7-8 STUDIES: CPT | Performed by: PSYCHIATRY & NEUROLOGY

## 2023-05-08 RX ORDER — LOSARTAN POTASSIUM 25 MG/1
TABLET ORAL
Qty: 90 TABLET | Refills: 0 | Status: SHIPPED | OUTPATIENT
Start: 2023-05-08

## 2023-05-08 NOTE — PROGRESS NOTES
[facility]  Electrodiagnostic Laboratory                 Department of Neurology                                                                                                         Test Date:  2023    Patient: Margo Inman : 1960 Physician: Rg Del Toro MD   Sex: Female AGE: 63 year Ref Phys:    ID#: [id number]   Technician: Glenn Krishnamurthy     History and Examination:  Margo Inman is a 63 year old woman with pain in both hands. Past history is notable for bilateral carpal tunnel release. She is referred for evaluation of bilateral median nerves.    Techniques:  Motor conduction studies were done with surface recording electrodes. Sensory conduction studies were performed with surface electrodes, unless indicated otherwise by (n), designating the use of subdermal recording electrodes. Temperature was monitored and recorded throughout the study. Upper extremities were maintained at a temperature of 32 degrees Centigrade or higher.  EMG was deferred as bulk, strength, and compound muscle action potential amplitudes were normal. Ultrasound was performed with a 15 mHz probe.    Results:  Bilateral median and ulnar sensory and motor conduction studies demonstrated mild relative prolongation of median latencies to the second lumbrical muscle and were otherwise normal. The left median cross-sectional area (CSA) was 7.3-7.9 mm2.     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 Note that residual abnormalities can persist despite adequate decompression.       _____________________________  Rg Del Toro MD  Board Certified in Clinical Neurophysiology and Neuromuscular Medicine        Nerve Conduction Studies  Motor Sites      Latency Amplitude Neg. Amp Diff Segment Distance Velocity Neg. Dur Neg Area Diff Temperature Comment   Site (ms) Norm  (mV) Norm %  cm m/s Norm ms %  C    Left Median (APB) Motor   Wrist 3.2  < 4.4 10.2  > 5.0  Wrist-APB 8   4.5  33.1    Elbow 6.5 - 9.7  > 5.0 -4.9 Elbow-Wrist 19.1 58  > 48 4.7 -1.25 33.1    Right Median (APB) Motor   Wrist 3.5  < 4.4 10.6  > 5.0  Wrist-APB 8   4.1  33.3    Elbow 6.6 - 10.2  > 5.0 -3.8 Elbow-Wrist 19.5 63  > 48 4.4 -5.3 33.3    Left Median/Ulnar (Lumb-Dors Int II) Motor        Median (Lumb I)   Wrist 3.3 - 0.81 -  Wrist-Lumb I 10   5.5  33.2         Ulnar (Dorsal Int (manus))   Wrist 2.3 - 3.5 -  Wrist-Dorsal Int (manus) 10   4.2  33.1    Right Median/Ulnar (Lumb-Dors Int II) Motor        Median (Lumb I)   Wrist 3.2 - 1.17 -  Wrist-Lumb I 10   5.5  33.2         Ulnar (Dorsal Int (manus))   Wrist 2.5 - 4.1 -  Wrist-Dorsal Int (manus) 10   4.6  33.2    Left Ulnar (ADM) Motor   Wrist 2.4  < 3.5 7.2  > 5.0  Wrist-ADM 8   4.7  33.4    Bel Elbow 5.3 - 7.1 - -1.39 Bel Elbow-Wrist 17.5 60  > 48 4.8 -2.2 33.4    Abv Elbow 7.4 - 6.6 - -7.0 Abv Elbow-Bel Elbow 13.4 64  > 48 5.1 -3.7 33.5    Right Ulnar (ADM) Motor   Wrist 2.1  < 3.5 9.0  > 5.0  Wrist-ADM 8   4.8  34    Bel Elbow 5.2 - 8.7 - -3.3 Bel Elbow-Wrist 17.8 57  > 48 4.7 -3.1 34    Abv Elbow 7.1 - 8.5 - -2.3 Abv Elbow-Bel Elbow 13 68  > 48 4.8 -3.6 34      Sensory Sites      Onset Lat Peak Lat Amp (O-P) Amp (P-P) Segment Distance Velocity Temperature Comment   Site ms ms  V Norm  V  cm m/s Norm  C    Left Median Sensory   Wrist-Dig II 2.6 3.3 38  > 10 53 Wrist-Dig II 14 54  > 48 34.7    Right Median Sensory   Wrist-Dig II 2.4 3.1 44  > 10 59 Wrist-Dig II 14 58  > 48 32.6    Left Median-Ulnar Palmar Sensory        Median   Palm-Wrist 1.45 1.98 52 - 72 Palm-Wrist 8 55 - 33.4         Ulnar   Palm-Wrist 1.25 1.70 10 - 19 Palm-Wrist 8 64 - 33.3    Right Median-Ulnar Palmar Sensory        Median   Palm-Wrist 1.58 2.1 68 - 82 Palm-Wrist 8 51 - 32.8         Ulnar   Palm-Wrist 1.48 2.1 6 - 14 Palm-Wrist 8 54 - 33.1    Left Ulnar Sensory   Wrist-Dig V 2.1 2.7 23   > 8 24 Wrist-Dig V 12.5 60  > 48 34.2    Right Ulnar Sensory   Wrist-Dig V 2.1 2.7 33  > 8 21 Wrist-Dig V 12.5 60  > 48 32.5      Inter-Nerve Comparisons     Nerve 1 Value 1 Nerve 2 Value 2 Parameter Result Normal   Sensory Sites   R Median Palm-Wrist 2.1 ms R Ulnar Palm-Wrist 2.1 ms Peak Lat Diff 0.00 ms <0.40   L Median Palm-Wrist 2.0 ms L Ulnar Palm-Wrist 1.7 ms Peak Lat Diff 0.28 ms <0.40           NCS Waveforms:    Motor                    Sensory

## 2023-05-08 NOTE — LETTER
2023         RE: Margo Inman  [address] [address] [facility] [address]        Dear Colleague,    Thank you for referring your patient, Margo Inman, to the [facility] [place]. Please see a copy of my visit note below.                        [facility]  Electrodiagnostic Laboratory                 Department of Neurology                                                                                                         Test Date:  2023    Patient: Margo Inman : 1960 Physician: Rg Del Toro MD   Sex: Female AGE: 63 year Ref Phys:    ID#: [id number]   Technician: Glenn Krishnamurthy     History and Examination:  Margo Inman is a 63 year old woman with pain in both hands. Past history is notable for bilateral carpal tunnel release. She is referred for evaluation of bilateral median nerves.    Techniques:  Motor conduction studies were done with surface recording electrodes. Sensory conduction studies were performed with surface electrodes, unless indicated otherwise by (n), designating the use of subdermal recording electrodes. Temperature was monitored and recorded throughout the study. Upper extremities were maintained at a temperature of 32 degrees Centigrade or higher.  EMG was deferred as bulk, strength, and compound muscle action potential amplitudes were normal. Ultrasound was performed with a 15 mHz probe.    Results:  Bilateral median and ulnar sensory and motor conduction studies demonstrated mild relative prolongation of median latencies to the second lumbrical muscle and were otherwise normal. The left median cross-sectional area (CSA) was 7.3-7.9 mm2.     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 Note that residual abnormalities can persist despite adequate decompression.        _____________________________  Rg Del Toro MD  Board Certified in Clinical Neurophysiology and Neuromuscular Medicine        Nerve Conduction Studies  Motor Sites      Latency Amplitude Neg. Amp Diff Segment Distance Velocity Neg. Dur Neg Area Diff Temperature Comment   Site (ms) Norm (mV) Norm %  cm m/s Norm ms %  C    Left Median (APB) Motor   Wrist 3.2  < 4.4 10.2  > 5.0  Wrist-APB 8   4.5  33.1    Elbow 6.5 - 9.7  > 5.0 -4.9 Elbow-Wrist 19.1 58  > 48 4.7 -1.25 33.1    Right Median (APB) Motor   Wrist 3.5  < 4.4 10.6  > 5.0  Wrist-APB 8   4.1  33.3    Elbow 6.6 - 10.2  > 5.0 -3.8 Elbow-Wrist 19.5 63  > 48 4.4 -5.3 33.3    Left Median/Ulnar (Lumb-Dors Int II) Motor        Median (Lumb I)   Wrist 3.3 - 0.81 -  Wrist-Lumb I 10   5.5  33.2         Ulnar (Dorsal Int (manus))   Wrist 2.3 - 3.5 -  Wrist-Dorsal Int (manus) 10   4.2  33.1    Right Median/Ulnar (Lumb-Dors Int II) Motor        Median (Lumb I)   Wrist 3.2 - 1.17 -  Wrist-Lumb I 10   5.5  33.2         Ulnar (Dorsal Int (manus))   Wrist 2.5 - 4.1 -  Wrist-Dorsal Int (manus) 10   4.6  33.2    Left Ulnar (ADM) Motor   Wrist 2.4  < 3.5 7.2  > 5.0  Wrist-ADM 8   4.7  33.4    Bel Elbow 5.3 - 7.1 - -1.39 Bel Elbow-Wrist 17.5 60  > 48 4.8 -2.2 33.4    Abv Elbow 7.4 - 6.6 - -7.0 Abv Elbow-Bel Elbow 13.4 64  > 48 5.1 -3.7 33.5    Right Ulnar (ADM) Motor   Wrist 2.1  < 3.5 9.0  > 5.0  Wrist-ADM 8   4.8  34    Bel Elbow 5.2 - 8.7 - -3.3 Bel Elbow-Wrist 17.8 57  > 48 4.7 -3.1 34    Abv Elbow 7.1 - 8.5 - -2.3 Abv Elbow-Bel Elbow 13 68  > 48 4.8 -3.6 34      Sensory Sites      Onset Lat Peak Lat Amp (O-P) Amp (P-P) Segment Distance Velocity Temperature Comment   Site ms ms  V Norm  V  cm m/s Norm  C    Left Median Sensory   Wrist-Dig II 2.6 3.3 38  > 10 53 Wrist-Dig II 14 54  > 48 34.7    Right Median Sensory   Wrist-Dig II 2.4 3.1 44  > 10 59 Wrist-Dig II 14 58  > 48 32.6    Left Median-Ulnar Palmar Sensory        Median   Palm-Wrist 1.45 1.98 52 - 72 Palm-Wrist 8 55  - 33.4         Ulnar   Palm-Wrist 1.25 1.70 10 - 19 Palm-Wrist 8 64 - 33.3    Right Median-Ulnar Palmar Sensory        Median   Palm-Wrist 1.58 2.1 68 - 82 Palm-Wrist 8 51 - 32.8         Ulnar   Palm-Wrist 1.48 2.1 6 - 14 Palm-Wrist 8 54 - 33.1    Left Ulnar Sensory   Wrist-Dig V 2.1 2.7 23  > 8 24 Wrist-Dig V 12.5 60  > 48 34.2    Right Ulnar Sensory   Wrist-Dig V 2.1 2.7 33  > 8 21 Wrist-Dig V 12.5 60  > 48 32.5      Inter-Nerve Comparisons     Nerve 1 Value 1 Nerve 2 Value 2 Parameter Result Normal   Sensory Sites   R Median Palm-Wrist 2.1 ms R Ulnar Palm-Wrist 2.1 ms Peak Lat Diff 0.00 ms <0.40   L Median Palm-Wrist 2.0 ms L Ulnar Palm-Wrist 1.7 ms Peak Lat Diff 0.28 ms <0.40           NCS Waveforms:    Motor                    Sensory                              Again, thank you for allowing me to participate in the care of your patient.        Sincerely,        Rg Del Toro MD

## 2023-05-12 ENCOUNTER — HOSPITAL ENCOUNTER (OUTPATIENT)
Facility: AMBULATORY SURGERY CENTER | Age: 63
Discharge: HOME | End: 2023-05-12
Attending: OPHTHALMOLOGY
Payer: COMMERCIAL

## 2023-05-12 ENCOUNTER — OFFICE VISIT (OUTPATIENT)
Dept: OPHTHALMOLOGY | Facility: CLINIC | Age: 63
End: 2023-05-12

## 2023-05-12 ENCOUNTER — ANESTHESIA (OUTPATIENT)
Dept: SURGERY | Facility: AMBULATORY SURGERY CENTER | Age: 63
End: 2023-05-12
Payer: COMMERCIAL

## 2023-05-12 VITALS
SYSTOLIC BLOOD PRESSURE: 115 MMHG | RESPIRATION RATE: 18 BRPM | BODY MASS INDEX: 38.64 KG/M2 | WEIGHT: 210 LBS | DIASTOLIC BLOOD PRESSURE: 66 MMHG | HEART RATE: 74 BPM | OXYGEN SATURATION: 95 % | HEIGHT: 62 IN | TEMPERATURE: 96.8 F

## 2023-05-12 DIAGNOSIS — H25.811 COMBINED FORM OF AGE-RELATED CATARACT, RIGHT EYE: ICD-10-CM

## 2023-05-12 DIAGNOSIS — Z96.1 PSEUDOPHAKIA OF BOTH EYES: Primary | ICD-10-CM

## 2023-05-12 DIAGNOSIS — H40.031 ANATOMICAL NARROW ANGLE BORDERLINE GLAUCOMA OF RIGHT EYE: ICD-10-CM

## 2023-05-12 PROCEDURE — 66984 XCAPSL CTRC RMVL W/O ECP: CPT | Mod: 79 | Performed by: OPHTHALMOLOGY

## 2023-05-12 PROCEDURE — 66984 XCAPSL CTRC RMVL W/O ECP: CPT | Mod: RT

## 2023-05-12 PROCEDURE — 99024 POSTOP FOLLOW-UP VISIT: CPT | Performed by: OPHTHALMOLOGY

## 2023-05-12 DEVICE — LENS CC60WF 23.5 CLAREON UV ASPHERIC BICONVEX IOL: Type: IMPLANTABLE DEVICE | Site: EYE | Status: FUNCTIONAL

## 2023-05-12 RX ORDER — ONDANSETRON 2 MG/ML
4 INJECTION INTRAMUSCULAR; INTRAVENOUS EVERY 30 MIN PRN
Status: DISCONTINUED | OUTPATIENT
Start: 2023-05-12 | End: 2023-05-12 | Stop reason: HOSPADM

## 2023-05-12 RX ORDER — SODIUM CHLORIDE, SODIUM LACTATE, POTASSIUM CHLORIDE, CALCIUM CHLORIDE 600; 310; 30; 20 MG/100ML; MG/100ML; MG/100ML; MG/100ML
INJECTION, SOLUTION INTRAVENOUS CONTINUOUS
Status: DISCONTINUED | OUTPATIENT
Start: 2023-05-12 | End: 2023-05-12 | Stop reason: HOSPADM

## 2023-05-12 RX ORDER — ACETAMINOPHEN 325 MG/1
975 TABLET ORAL ONCE
Status: COMPLETED | OUTPATIENT
Start: 2023-05-12 | End: 2023-05-12

## 2023-05-12 RX ORDER — LABETALOL HYDROCHLORIDE 5 MG/ML
10 INJECTION, SOLUTION INTRAVENOUS
Status: DISCONTINUED | OUTPATIENT
Start: 2023-05-12 | End: 2023-05-12 | Stop reason: HOSPADM

## 2023-05-12 RX ORDER — DEXAMETHASONE SODIUM PHOSPHATE 4 MG/ML
INJECTION, SOLUTION INTRA-ARTICULAR; INTRALESIONAL; INTRAMUSCULAR; INTRAVENOUS; SOFT TISSUE PRN
Status: DISCONTINUED | OUTPATIENT
Start: 2023-05-12 | End: 2023-05-12 | Stop reason: HOSPADM

## 2023-05-12 RX ORDER — LIDOCAINE 40 MG/G
CREAM TOPICAL
Status: DISCONTINUED | OUTPATIENT
Start: 2023-05-12 | End: 2023-05-12 | Stop reason: HOSPADM

## 2023-05-12 RX ORDER — FENTANYL CITRATE 50 UG/ML
50 INJECTION, SOLUTION INTRAMUSCULAR; INTRAVENOUS EVERY 5 MIN PRN
Status: DISCONTINUED | OUTPATIENT
Start: 2023-05-12 | End: 2023-05-12 | Stop reason: HOSPADM

## 2023-05-12 RX ORDER — KETOROLAC TROMETHAMINE 4 MG/ML
1 SOLUTION/ DROPS OPHTHALMIC 4 TIMES DAILY
Qty: 5 ML | Refills: 1 | Status: SHIPPED | OUTPATIENT
Start: 2023-05-12 | End: 2023-05-30

## 2023-05-12 RX ORDER — LIDOCAINE HYDROCHLORIDE 10 MG/ML
INJECTION, SOLUTION EPIDURAL; INFILTRATION; INTRACAUDAL; PERINEURAL PRN
Status: DISCONTINUED | OUTPATIENT
Start: 2023-05-12 | End: 2023-05-12 | Stop reason: HOSPADM

## 2023-05-12 RX ORDER — CYCLOPENTOLAT/TROPIC/PHENYLEPH 1%-1%-2.5%
1 DROPS (EA) OPHTHALMIC (EYE)
Status: COMPLETED | OUTPATIENT
Start: 2023-05-12 | End: 2023-05-12

## 2023-05-12 RX ORDER — SODIUM CHLORIDE, SODIUM LACTATE, POTASSIUM CHLORIDE, CALCIUM CHLORIDE 600; 310; 30; 20 MG/100ML; MG/100ML; MG/100ML; MG/100ML
INJECTION, SOLUTION INTRAVENOUS CONTINUOUS
Status: DISCONTINUED | OUTPATIENT
Start: 2023-05-12 | End: 2023-05-13 | Stop reason: HOSPADM

## 2023-05-12 RX ORDER — HYDROMORPHONE HYDROCHLORIDE 1 MG/ML
0.4 INJECTION, SOLUTION INTRAMUSCULAR; INTRAVENOUS; SUBCUTANEOUS EVERY 5 MIN PRN
Status: DISCONTINUED | OUTPATIENT
Start: 2023-05-12 | End: 2023-05-12 | Stop reason: HOSPADM

## 2023-05-12 RX ORDER — HYDROMORPHONE HYDROCHLORIDE 1 MG/ML
0.2 INJECTION, SOLUTION INTRAMUSCULAR; INTRAVENOUS; SUBCUTANEOUS EVERY 5 MIN PRN
Status: DISCONTINUED | OUTPATIENT
Start: 2023-05-12 | End: 2023-05-12 | Stop reason: HOSPADM

## 2023-05-12 RX ORDER — MOXIFLOXACIN 5 MG/ML
1 SOLUTION/ DROPS OPHTHALMIC 3 TIMES DAILY
Qty: 3 ML | Refills: 1 | Status: SHIPPED | OUTPATIENT
Start: 2023-05-12 | End: 2023-05-30

## 2023-05-12 RX ORDER — FENTANYL CITRATE 50 UG/ML
INJECTION, SOLUTION INTRAMUSCULAR; INTRAVENOUS PRN
Status: DISCONTINUED | OUTPATIENT
Start: 2023-05-12 | End: 2023-05-12

## 2023-05-12 RX ORDER — BALANCED SALT SOLUTION 6.4; .75; .48; .3; 3.9; 1.7 MG/ML; MG/ML; MG/ML; MG/ML; MG/ML; MG/ML
SOLUTION OPHTHALMIC PRN
Status: DISCONTINUED | OUTPATIENT
Start: 2023-05-12 | End: 2023-05-12 | Stop reason: HOSPADM

## 2023-05-12 RX ORDER — ONDANSETRON 4 MG/1
4 TABLET, ORALLY DISINTEGRATING ORAL EVERY 30 MIN PRN
Status: DISCONTINUED | OUTPATIENT
Start: 2023-05-12 | End: 2023-05-12 | Stop reason: HOSPADM

## 2023-05-12 RX ORDER — PREDNISOLONE ACETATE 10 MG/ML
1 SUSPENSION/ DROPS OPHTHALMIC 4 TIMES DAILY
Qty: 10 ML | Refills: 1 | Status: SHIPPED | OUTPATIENT
Start: 2023-05-12 | End: 2023-05-30

## 2023-05-12 RX ORDER — PROPARACAINE HYDROCHLORIDE 5 MG/ML
1 SOLUTION/ DROPS OPHTHALMIC ONCE
Status: COMPLETED | OUTPATIENT
Start: 2023-05-12 | End: 2023-05-12

## 2023-05-12 RX ORDER — MOXIFLOXACIN IN NACL,ISO-OS/PF 0.3MG/0.3
SYRINGE (ML) INTRAOCULAR PRN
Status: DISCONTINUED | OUTPATIENT
Start: 2023-05-12 | End: 2023-05-12 | Stop reason: HOSPADM

## 2023-05-12 RX ORDER — TIMOLOL MALEATE 5 MG/ML
SOLUTION/ DROPS OPHTHALMIC PRN
Status: DISCONTINUED | OUTPATIENT
Start: 2023-05-12 | End: 2023-05-12 | Stop reason: HOSPADM

## 2023-05-12 RX ORDER — OXYCODONE HYDROCHLORIDE 5 MG/1
5 TABLET ORAL
Status: DISCONTINUED | OUTPATIENT
Start: 2023-05-12 | End: 2023-05-13 | Stop reason: HOSPADM

## 2023-05-12 RX ORDER — OXYCODONE HYDROCHLORIDE 5 MG/1
10 TABLET ORAL
Status: DISCONTINUED | OUTPATIENT
Start: 2023-05-12 | End: 2023-05-13 | Stop reason: HOSPADM

## 2023-05-12 RX ORDER — TETRACAINE HYDROCHLORIDE 5 MG/ML
SOLUTION OPHTHALMIC PRN
Status: DISCONTINUED | OUTPATIENT
Start: 2023-05-12 | End: 2023-05-12 | Stop reason: HOSPADM

## 2023-05-12 RX ORDER — FENTANYL CITRATE 50 UG/ML
25 INJECTION, SOLUTION INTRAMUSCULAR; INTRAVENOUS EVERY 5 MIN PRN
Status: DISCONTINUED | OUTPATIENT
Start: 2023-05-12 | End: 2023-05-12 | Stop reason: HOSPADM

## 2023-05-12 RX ADMIN — ACETAMINOPHEN 975 MG: 325 TABLET ORAL at 06:19

## 2023-05-12 RX ADMIN — Medication 1 DROP: at 06:34

## 2023-05-12 RX ADMIN — FENTANYL CITRATE 50 MCG: 50 INJECTION, SOLUTION INTRAMUSCULAR; INTRAVENOUS at 07:19

## 2023-05-12 RX ADMIN — SODIUM CHLORIDE, SODIUM LACTATE, POTASSIUM CHLORIDE, CALCIUM CHLORIDE: 600; 310; 30; 20 INJECTION, SOLUTION INTRAVENOUS at 06:35

## 2023-05-12 RX ADMIN — Medication 1 DROP: at 06:21

## 2023-05-12 RX ADMIN — PROPARACAINE HYDROCHLORIDE 1 DROP: 5 SOLUTION/ DROPS OPHTHALMIC at 06:21

## 2023-05-12 RX ADMIN — Medication 1 DROP: at 06:26

## 2023-05-12 ASSESSMENT — EXTERNAL EXAM - RIGHT EYE: OD_EXAM: NORMAL

## 2023-05-12 ASSESSMENT — TONOMETRY
IOP_METHOD: TONOPEN
OD_IOP_MMHG: 20

## 2023-05-12 ASSESSMENT — VISUAL ACUITY
OD_SC: 20/80
METHOD: SNELLEN - LINEAR

## 2023-05-12 ASSESSMENT — SLIT LAMP EXAM - LIDS: COMMENTS: NORMAL

## 2023-05-12 ASSESSMENT — LIFESTYLE VARIABLES: TOBACCO_USE: 1

## 2023-05-12 NOTE — DISCHARGE INSTRUCTIONS
[facility]  Home Care Following Anesthesia  For 24 hours after surgery:  Get plenty of rest.  A responsible adult must stay with you for at least 24 hours after you leave the surgery center.  Do not drive or use heavy equipment.  If you have weakness or tingling, don't drive or use heavy equipment until this feeling goes away.   Do not drink alcohol.   Avoid strenuous or risky activities.  Ask for help when climbing stairs.  You may feel lightheaded.  IF so, sit for a few minutes before standing.  Have someone help you get up.   If you have nausea (feel sick to your stomach): Drink only clear liquids such as apple juice, ginger ale, broth or 7-Up.  Rest may also help.  Be sure to drink enough fluids.  Move to a regular diet as you feel able.   You may have a slight fever.  Call the doctor if your fever is over 100 F (37.7 C) (taken under the tongue) or lasts longer than 24 hours.  You may have a dry mouth, a sore throat, muscle aches or trouble sleeping. These should go away after 24 hours.  Do not make important or legal decisions.   It is recommended to avoid smoking.               Tips for taking pain medications  To get the best pain relief possible, remember these points:  Take pain medications as directed, before pain becomes severe.  Pain medication can upset your stomach: taking it with food may help.  Constipation is a common side effect of pain medication. Drink plenty of  fluids.  Eat foods high in fiber. Take a stool softener if recommended by your doctor or pharmacist.  Do not drink alcohol, drive or operate machinery while taking pain medications.  Ask about other ways to control pain, such as with heat, ice or relaxation.    Tylenol/Acetaminophen Consumption  To help encourage the safe use of acetaminophen, the makers of TYLENOL  have lowered the maximum daily dose for single-ingredient Extra Strength TYLENOL  (acetaminophen) products sold in the [place] from 8 pills  per day (4,000 mg) to 6 pills per day (3,000 mg). The dosing interval has also changed from 2 pills every 4-6 hours to 2 pills every 6 hours.  If you feel your pain relief is insufficient, you may take Tylenol/Acetaminophen in addition to your narcotic pain medication.   Be careful not to exceed 3,000 mg of Tylenol/Acetaminophen in a 24 hour period from all sources.  If you are taking extra strength Tylenol/acetaminophen (500 mg), the maximum dose is 6 tablets in 24 hours.  If you are taking regular strength acetaminophen (325 mg), the maximum dose is 9 tablets in 24 hours.  You had 975mg of tylenol at 615am, do not repeat before 1215pm today.    Call a doctor for any of the following:  Signs of infection (fever, growing tenderness at the surgery site, a large amount of drainage or bleeding, severe pain, foul-smelling drainage, redness, swelling).  It has been over 8 to 10 hours since surgery and you are still not able to urinate (pass water).  Headache for over 24 hours.  Signs of Covid-19 infection (temperature over 100 degrees, shortness of breath, cough, loss of taste/smell, generalized body aches, persistent headache, chills, sore throat, nausea/vomiting/diarrhea)  Your doctor is:       Dr. Gaston Guzman, Ophthalmology: [phone]               Or dial [phone] and ask for the resident on call for:  Ophthalmology  For emergency care, call the:  [facility]:  [phone] (TTY for hearing impaired: [phone])

## 2023-05-12 NOTE — PROGRESS NOTES
PostOp, right eye, day 1    Doing well  Keep patch in place at night for 5 days  Start post-operative drops and taper according to instructions  Post-operative do's and don'ts reviewed, questions answered    Recheck 2-3 weeks with refraction    Attending Physician Attestation:  Complete documentation of historical and exam elements from today's encounter can be found in the full encounter summary report (not reduplicated in this progress note).  I personally obtained the chief complaint(s) and history of present illness.  I confirmed and edited as necessary the review of systems, past medical/surgical history, family history, social history, and examination findings as documented by others; and I examined the patient myself.  I personally reviewed the relevant tests, images, and reports as documented above.  I formulated and edited as necessary the assessment and plan and discussed the findings and management plan with the patient and family. . - Gaston Guzman MD

## 2023-05-12 NOTE — ANESTHESIA PREPROCEDURE EVALUATION
Anesthesia Pre-Procedure Evaluation    Patient: Margo Inman   MRN: [id number] : 1960        Procedure : Procedure(s):  RIGHT EYE PHACOEMULSIFICATION, CATARACT, WITH STANDARD INTRAOCULAR LENS IMPLANT INSERTION          Past Medical History:   Diagnosis Date     Arthritis      Cervical high risk HPV (human papillomavirus) test positive 1/10/2017    1/10/17 NIL pap/+ HR HPV (not 16 or 18). Plan: cotest in 1 year, due by 1/10/18      Chronic infection     HX of MRSA. 2 neg swabs at [facility] in  and .     History of blood transfusion      Numbness and tingling     Right arm     PONV (postoperative nausea and vomiting)       Past Surgical History:   Procedure Laterality Date     APPENDECTOMY       ARTHROSCOPY KNEE  2011    Procedure:ARTHROSCOPY KNEE; left knee arthroscopy with debridement, open lateral patellar spur excision; Surgeon:LUIS A MONTOYA; Location:[facility] OR     CATARACT IOL, RT/LT       COLONOSCOPY N/A 2016    Procedure: COLONOSCOPY;  Surgeon: Belem Khalil MD;  Location: [facility]     COLONOSCOPY N/A 2016    Procedure: COMBINED COLONOSCOPY, SINGLE OR MULTIPLE BIOPSY/POLYPECTOMY BY BIOPSY;  Surgeon: Belem Khalil MD;  Location: [facility]     COLONOSCOPY N/A 06/15/2020    Procedure: Colonoscopy, With Polypectomy And Biopsy;  Surgeon: Torres Gallegos DO;  Location: [facility]     COLONOSCOPY WITH CO2 INSUFFLATION N/A 2016    Procedure: COLONOSCOPY WITH CO2 INSUFFLATION;  Surgeon: Belem Khalil MD;  Location: [facility]     COLONOSCOPY WITH CO2 INSUFFLATION N/A 06/15/2020    Procedure: COLONOSCOPY, WITH CO2 INSUFFLATION;  Surgeon: Torres Gallegos DO;  Location: [facility]     CYSTOSCOPY  2016    microscopic hematuria     DECOMPRESSION, FUSION CERVICAL ANTERIOR ONE LEVEL, COMBINED N/A 2017    Procedure: COMBINED DECOMPRESSION, FUSION CERVICAL ANTERIOR ONE LEVEL;  Surgeon: Joseph Klein MD;  Location: [facility]      ECTOPIC PREGNANCY SURGERY       ENT SURGERY       GONIOSYNECHIALYSIS Left 02/20/2023    Procedure: goniosynechialysis left;  Surgeon: Gaston Guzman MD;  [place] [facility] [place]     GYN SURGERY       HC INCISION TENDON SHEATH FINGER Left 07/11/2014    Thumb TF release     HC KNEE SCOPE,MED/LAT MENISECTOMY  09/16/2011    left, with partial medial menisectomy ONLY     HC REVISE MEDIAN N/CARPAL TUNNEL SURG Left 07/11/2014    PRIMARY - not revision     LASIK Bilateral     [id number]     ORTHOPEDIC SURGERY       PHACOEMULSIFICATION CLEAR CORNEA W/ STANDARD IOL IMPLANT, ENDOSCOPIC CYCLOPHOTOCOAGULATION, COMBINED Left 02/20/2023    Procedure: LEFT EYE COMPLEX PHACOEMULSIFICATION, CATARACT, CLEAR CORNEAL INCISION APPROACH, WITH STANDARD INTRAOCULAR LENS IMPLANT INSERTION AND ENDOSCOPIC CYCLOPHOTOCOAGULATION< GONISYNECHIOLYSIS;  Surgeon: Gaston Guzman MD;  Location: [facility] [place]     RELEASE CARPAL TUNNEL  07/11/2014    Procedure: RELEASE CARPAL TUNNEL;  Surgeon: Rg Franco MD;  Location: [facility]     RELEASE CARPAL TUNNEL Right 11/07/2014    Procedure: RELEASE CARPAL TUNNEL;  Surgeon: Rg Franco MD;  Location: [facility]     RELEASE TRIGGER FINGER  07/11/2014    Procedure: RELEASE TRIGGER FINGER;  Surgeon: Rg Franco MD;  Location: [facility]     RELEASE TRIGGER FINGER Right 11/07/2014    Procedure: RELEASE TRIGGER FINGER;  Surgeon: Rg Franco MD;  Location: [facility] OR     tonsils       [facility] PART REMV FEMUR/PROX TIB/FIB  09/16/2011    left, open lateral patellar bone spur excision     [facility] TOTAL KNEE ARTHROPLASTY  01/17/2014    Bilateral      Allergies   Allergen Reactions     Wasp Venom Protein Anaphylaxis     Bee Anaphylaxis     Erythromycin Hives     Wasps [Hornets] Anaphylaxis     Shellfish Allergy Nausea and Vomiting and Rash     Shellfish-Derived Products Rash and Nausea and Vomiting      Social History     Tobacco Use     Smoking status: Some Days     Packs/day: 1.00     Years: 15.00      Pack years: 15.00     Types: Cigarettes     Smokeless tobacco: Never     Tobacco comments:     Started Chantix   Vaping Use     Vaping status: Never Used     Passive vaping exposure: Yes   Substance Use Topics     Alcohol use: Yes     Comment: socially      Wt Readings from Last 1 Encounters:   05/12/23 95.3 kg (210 lb)        Anesthesia Evaluation            ROS/MED HX  ENT/Pulmonary:     (+) tobacco use, Current use, asthma     Neurologic:       Cardiovascular:     (+) hypertension-----    METS/Exercise Tolerance:     Hematologic:       Musculoskeletal: Comment: Cervical radiculopathy  (+) arthritis,     GI/Hepatic:       Renal/Genitourinary:       Endo:     (+) Obesity,     Psychiatric/Substance Use:     (+) psychiatric history depression and anxiety     Infectious Disease:       Malignancy:       Other:            Physical Exam    Airway  airway exam normal           Respiratory Devices and Support         Dental       (+) Modest Abnormalities - crowns, retainers, 1 or 2 missing teeth      Cardiovascular   cardiovascular exam normal          Pulmonary   pulmonary exam normal                OUTSIDE LABS:  CBC:   Lab Results   Component Value Date    WBC 11.3 (H) 01/11/2023    WBC 11.9 (H) 09/12/2022    HGB 15.1 01/11/2023    HGB 14.4 09/12/2022    HCT 46.0 01/11/2023    HCT 43.4 09/12/2022     01/11/2023     09/12/2022     BMP:   Lab Results   Component Value Date     01/11/2023     09/12/2022    POTASSIUM 4.4 04/26/2023    POTASSIUM 4.6 01/11/2023    CHLORIDE 105 01/11/2023    CHLORIDE 104 09/12/2022    CO2 30 01/11/2023    CO2 31 09/12/2022    BUN 13 01/11/2023    BUN 22 09/12/2022    CR 0.59 04/26/2023    CR 0.62 01/11/2023     (H) 01/11/2023     (H) 09/12/2022     COAGS:   Lab Results   Component Value Date    PTT 34 01/10/2014    INR 1.04 06/27/2022     POC:   Lab Results   Component Value Date    BGM 98 04/07/2017     HEPATIC:   Lab Results   Component Value Date     ALBUMIN 4.1 06/27/2022    PROTTOTAL 7.7 06/27/2022    ALT 44 06/27/2022    AST 22 06/27/2022    ALKPHOS 99 06/27/2022    BILITOTAL 0.4 06/27/2022     OTHER:   Lab Results   Component Value Date    A1C 5.9 04/04/2017    DOMONIQUE 10.1 01/11/2023    MAG 2.3 04/07/2017    TSH 1.21 01/10/2017    CRP 12.3 (H) 01/11/2023    SED 12 01/11/2023       Anesthesia Plan    ASA Status:  3   NPO Status:  NPO Appropriate    Anesthesia Type: MAC.     - Reason for MAC: immobility needed   Induction: Intravenous.   Maintenance: TIVA.        Consents    Anesthesia Plan(s) and associated risks, benefits, and realistic alternatives discussed. Questions answered and patient/representative(s) expressed understanding.    - Discussed:     - Discussed with:  Patient      - Extended Intubation/Ventilatory Support Discussed: No.      - Patient is DNR/DNI Status: No    Use of blood products discussed: No .     Postoperative Care    Pain management: IV analgesics, Multi-modal analgesia.   PONV prophylaxis: Ondansetron (or other 5HT-3)     Comments:                Blair Haley MD

## 2023-05-12 NOTE — ANESTHESIA CARE TRANSFER NOTE
Patient: Margo Inman    Procedure: Procedure(s):  RIGHT EYE PHACOEMULSIFICATION, CATARACT, WITH STANDARD INTRAOCULAR LENS IMPLANT INSERTION       Diagnosis: Combined form of age-related cataract, right eye [H25.811]  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 [H40.031]  Diagnosis Additional Information: No value filed.    Anesthesia Type:   MAC     Note:    Oropharynx: oropharynx clear of all foreign objects and spontaneously breathing  Level of Consciousness: awake  Oxygen Supplementation: room air    Independent Airway: airway patency satisfactory and stable  Dentition: dentition unchanged  Vital Signs Stable: post-procedure vital signs reviewed and stable  Report to RN Given: handoff report given  Patient transferred to: Phase II    Handoff Report: Identifed the Patient, Identified the Reponsible Provider, Reviewed the pertinent medical history, Discussed the surgical course, Reviewed Intra-OP anesthesia mangement and issues during anesthesia, Set expectations for post-procedure period and Allowed opportunity for questions and acknowledgement of understanding      Vitals:  Vitals Value Taken Time   /72 05/12/23 0742   Temp 36  C (96.8  F) 05/12/23 0742   Pulse 72 05/12/23 0742   Resp 18 05/12/23 0742   SpO2 96 % 05/12/23 0742       Electronically Signed By: JOSE R Martin CRNA  May 12, 2023  7:46 AM

## 2023-05-12 NOTE — OP NOTE
PREOPERATIVE DIAGNOSIS:   1. Combined form of age-related cataract, right eye    2. 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      POSTOPERATIVE DIAGNOSIS: Same   PROCEDURES:   1. Cataract extraction with intraocular lens implant Right eye.  SURGEON: Gaston Guzman M.D.  Assistant: Beth Kent MD      INDICATIONS: The patient Margo Inman presented to the eye clinic with decreased vision secondary to cataract in the Right eye. The risks, benefits and alternatives to cataract extraction were discussed. The patient elected to proceed. All questions were answered to the patient's satisfaction.   DESCRIPTION OF PROCEDURE:   Prior to the procedure, appropriate cardiac and respiratory monitors were applied to the patient.  In the pre-operative holding area, a drop of topical tetracaine followed by lidocaine gel followed by povidone iodine.  The patient was brought to the operating room where a surgical pause was carried out to identify with all members of the surgical team the correct surgical site.  With adequate anesthesia, the Right eye was prepped and draped in the usual sterile fashion. A lid speculum was placed, and the operating microscope was rotated into position. A paracentesis was created.  Through this limbal paracentesis, the anterior chamber was filled with preservative-free lidocaine followed by viscoelastic.  A temporal wound was created at the limbus using a 2.6 mm blade. A capsulorrhexis was initiated using a bent 25-gauge needle and was completed in continuous and circular fashion using the capsulorrhexis forceps. The lens nucleus was hydrodissected using balanced salt solution.  The lens nucleus was rotated and removed using phacoemulsification in a stop and chop technique.  Residual cortical material was removed using irrigation-aspiration.  The capsular bag was reinflated to its maximal extent with cohesive viscoelastic.  A 23.5 diopter [id number] was inserted into the  capsular bag.  The lens power selected was reviewed using the intraocular lens power measurements that were obtained preoperatively to confirm that the correct lens was selected for the desired post-operative refractive state. The residual viscoelastic was removed in its entirety, the wound were hydrated and found to be self-sealing.  Intracameral moxifloxacin was administered. Tactile pressure was confirmed to be in a normal range.  Subconjunctival Dexamethasone (2 mg) was injected.  The lid speculum was removed and a patch and shield were applied.  The patient tolerated the procedure well, and there were no complications.    PLAN: The patient will be discharged to home and will follow up tomorrow morning in the eye clinic.  EBL:  None  Complications:  None  Implant Name Type Inv. Item Serial No.  Lot No. LRB No. Used Action   LENS [id number] [employer] UV ASPHERIC BICONVEX IOL - [id number] Lens/Eye Implant LENS [id number] [employer] UV ASPHERIC BICONVEX IOL [id number] [facility]  Right 1 Implanted          Attending Physician Procedure Attestation: I was present for the entire procedure       Gaston Guzman MD  , Comprehensive Ophthalmology  Department of Ophthalmology and Visual Neurosciences  [facility]

## 2023-05-12 NOTE — ANESTHESIA POSTPROCEDURE EVALUATION
Patient: Margo Inman    Procedure: Procedure(s):  RIGHT EYE PHACOEMULSIFICATION, CATARACT, WITH STANDARD INTRAOCULAR LENS IMPLANT INSERTION       Anesthesia Type:  MAC    Note:  Disposition: Outpatient   Postop Pain Control: Uneventful            Sign Out: Well controlled pain   PONV: No   Neuro/Psych: Uneventful            Sign Out: Acceptable/Baseline neuro status   Airway/Respiratory: Uneventful            Sign Out: Acceptable/Baseline resp. status   CV/Hemodynamics: Uneventful            Sign Out: Acceptable CV status; No obvious hypovolemia; No obvious fluid overload   Other NRE: NONE   DID A NON-ROUTINE EVENT OCCUR? No           Last vitals:  Vitals Value Taken Time   /66 05/12/23 0800   Temp 36  C (96.8  F) 05/12/23 0742   Pulse 74 05/12/23 0800   Resp 18 05/12/23 0800   SpO2 95 % 05/12/23 0800       Electronically Signed By: Blair Haley MD  May 12, 2023  9:08 AM

## 2023-05-18 DIAGNOSIS — Z98.1 S/P CERVICAL SPINAL FUSION: ICD-10-CM

## 2023-05-18 DIAGNOSIS — Z96.653 STATUS POST TOTAL BILATERAL KNEE REPLACEMENT: ICD-10-CM

## 2023-05-19 ENCOUNTER — PRE VISIT (OUTPATIENT)
Dept: ORTHOPEDICS | Facility: CLINIC | Age: 63
End: 2023-05-19

## 2023-05-19 ENCOUNTER — OFFICE VISIT (OUTPATIENT)
Dept: ORTHOPEDICS | Facility: CLINIC | Age: 63
End: 2023-05-19
Payer: COMMERCIAL

## 2023-05-19 DIAGNOSIS — M18.0 ARTHRITIS OF CARPOMETACARPAL (CMC) JOINT OF BOTH THUMBS: Primary | ICD-10-CM

## 2023-05-19 PROCEDURE — 20550 NJX 1 TENDON SHEATH/LIGAMENT: CPT | Mod: XS | Performed by: STUDENT IN AN ORGANIZED HEALTH CARE EDUCATION/TRAINING PROGRAM

## 2023-05-19 PROCEDURE — 20600 DRAIN/INJ JOINT/BURSA W/O US: CPT | Mod: 50 | Performed by: STUDENT IN AN ORGANIZED HEALTH CARE EDUCATION/TRAINING PROGRAM

## 2023-05-19 PROCEDURE — 99204 OFFICE O/P NEW MOD 45 MIN: CPT | Mod: 25 | Performed by: STUDENT IN AN ORGANIZED HEALTH CARE EDUCATION/TRAINING PROGRAM

## 2023-05-19 RX ORDER — TRIAMCINOLONE ACETONIDE 40 MG/ML
20 INJECTION, SUSPENSION INTRA-ARTICULAR; INTRAMUSCULAR
Status: SHIPPED | OUTPATIENT
Start: 2023-05-19

## 2023-05-19 RX ORDER — DICLOFENAC SODIUM 75 MG/1
TABLET, DELAYED RELEASE ORAL
Qty: 180 TABLET | Refills: 0 | Status: SHIPPED | OUTPATIENT
Start: 2023-05-19 | End: 2023-09-28

## 2023-05-19 RX ADMIN — TRIAMCINOLONE ACETONIDE 20 MG: 40 INJECTION, SUSPENSION INTRA-ARTICULAR; INTRAMUSCULAR at 14:17

## 2023-05-19 RX ADMIN — TRIAMCINOLONE ACETONIDE 20 MG: 40 INJECTION, SUSPENSION INTRA-ARTICULAR; INTRAMUSCULAR at 14:12

## 2023-05-19 ASSESSMENT — PAIN SCALES - GENERAL: PAINLEVEL: SEVERE PAIN (6)

## 2023-05-19 NOTE — PROGRESS NOTES
Small Joint Injection/Arthrocentesis: bilateral thumb CMC    Date/Time: 5/19/2023 2:12 PM    Performed by: Mat Frye MD  Authorized by: Mat Frye MD  Indications:  Pain  Needle Size:  25 G  Guidance: landmark       Location:  Thumb  Laterality:  Bilateral  Site:  Bilateral thumb CMC    Medications (Right):  20 mg triamcinolone 40 MG/ML        Medications (Left):  20 mg triamcinolone 40 MG/ML                Outcome:  Tolerated well, no immediate complications  Procedure discussed: discussed risks, benefits, and alternatives    Consent Given by:  Patient  Timeout: timeout called immediately prior to procedure    Prep: patient was prepped and draped in usual sterile fashion      Hand / Upper Extremity Injection/Arthrocentesis: bilateral extensor compartment 1    Date/Time: 5/19/2023 2:17 PM    Performed by: Mat Frye MD  Authorized by: Mat Frye MD    Indications:  Pain  Needle Size:  25 G  Guidance: landmark    Condition: de Quervain's    Laterality:  Bilateral    Site:  Bilateral extensor compartment 1  Medications (Right):  20 mg triamcinolone 40 MG/ML  Medications (Left):  20 mg triamcinolone 40 MG/ML  Outcome:  Tolerated well, no immediate complications  Procedure discussed: discussed risks, benefits, and alternatives    Consent Given by:  Patient  Timeout: timeout called immediately prior to procedure    Prep: patient was prepped and draped in usual sterile fashion

## 2023-05-19 NOTE — NURSING NOTE
[facility]   ORTHOPEDICS & SPORTS MEDICINE  [address] [place] [address]  Dept: [phone]  ______________________________________________________________________________    Patient: Margo Inman   : 1960   MRN: [id number]   May 19, 2023    INVASIVE PROCEDURE SAFETY CHECKLIST    Date: 23   Procedure:BL Thumb CMC and BL DeQuervain's Injection   Patient Name: Margo Inman  MRN: [id number]  YOB: 1960    Action: Complete sections as appropriate. Any discrepancy results in a HARD COPY until resolved.     PRE PROCEDURE:  Patient ID verified with 2 identifiers (name and  or MRN): Yes  Procedure and site verified with patient/designee (when able): Yes  Accurate consent documentation in medical record: Yes  H&P (or appropriate assessment) documented in medical record: Yes  H&P must be up to 20 days prior to procedure and updates within 24 hours of procedure as applicable: NA  Relevant diagnostic and radiology test results appropriately labeled and displayed as applicable: NA  Procedure site(s) marked with provider initials: NA    TIMEOUT:  Time-Out performed immediately prior to starting procedure, including verbal and active participation of all team members addressing the following:Yes  * Correct patient identify  * Confirmed that the correct side and site are marked  * An accurate procedure consent form  * Agreement on the procedure to be done  * Correct patient position  * Relevant images and results are properly labeled and appropriately displayed  * The need to administer antibiotics or fluids for irrigation purposes during the procedure as applicable   * Safety precautions based on patient history or medication use    DURING PROCEDURE: Verification of correct person, site, and procedures any time the responsibility for care of the patient is transferred to another member of the care team.       Prior to injection, verified patient identity using  patient's name and date of birth.  Due to injection administration, patient instructed to remain in clinic for 15 minutes  afterwards, and to report any adverse reaction to me immediately.    Joint injection was performed.   and Tendon sheath injection was performed.     Drug Amount Wasted:  None.  Vial/Syringe: Single dose vial  Expiration Date:  12/2024      Kevin Byrnes [facility]  May 19, 2023

## 2023-05-19 NOTE — PROGRESS NOTES
Ortho Hand    HPI: 63-year-old right-hand-dominant active smoker presenting with bilateral thumb base pain and burning sensation.  Patient has had these symptoms for several years.  They have progressed slowly.  She had steroid injections 3.5 months ago with 2 weeks of efficacy.  She is splinted occasionally with no relief.    ROS: Negative, see HPI  Past medical history: Hyperlipidemia, hypertension  Past surgical history: Bilateral carpal tunnel releases bilateral thumb A1 pulley releases in 2014  Medications: Chantix, losartan, statin  Allergies: Erythromycin  Social history: Smokes 0.75 packs/day, but can quit she says  Family history: No bleeding or clotting problems, issues with anesthesia    Examination:  Nonlabored breathing  Not distressed  Bilateral thumb bases with exquisite tenderness at the CMC joints, worse on the left  Bilateral mild positive Finkelstein's tests  Negative bilateral Durkan's tests    NCS/EMG 5/2023: Mild bilateral median neuropathy, but median and sensory latencies are normal    A/P: 63-year-old female presenting with bilateral thumb CMC arthritis, DeQuervain's tenosynovitis    -Patient needs to quit prior to surgery.  Since the last steroid injection was 3.5 months ago, it is not unreasonable to repeat the steroid injections today, including depositing some steroid in the first extensor compartments, and giving her time to quit cigarettes.  Patient is agreeable to plan.  -Offered bilateral thumb carpometacarpal joint and first extensor compartment steroid injections. Patient would like this. Discussed the risks of steroid injections, including but not limited to: infection, bleeding, pain, injury to tendons or nerves, fat atrophy, skin depigmentation. Despite these risks, patient consents to steroid injection. Cleansed the skin with Chlorhexidine and 1:1 mixture of 20 mg Kenalog and 1% lidocaine was injected into each treatment area/joint.  A total of 40 mg Kenalog was injected.  Each  syringe was used for ipsilateral thumb CMC and first extensor compartment.  Patient tolerated the procedure with no issues and experienced immediate relief.   -Encourage patient to quit smoking.  Explained that thumb base surgery can be done after 6 weeks of smoking cessation.  She also understands that she needs to quit smoking for at least 4 weeks postoperatively.  -Return to clinic in 6 weeks for reevaluation  -A total of 45 minutes was devoted to review of chart, direct face-to-face patient counseling and documentation during this encounter, exclusive of any procedure performed.    Mat Frye MD, PhD

## 2023-05-19 NOTE — LETTER
5/19/2023         RE: Margo Inman  [address] [facility] [id number]        Dear Colleague,    Thank you for referring your patient, Margo Inman, to the [facility]. Please see a copy of my visit note below.    Ortho Hand    HPI: 63-year-old right-hand-dominant active smoker presenting with bilateral thumb base pain and burning sensation.  Patient has had these symptoms for several years.  They have progressed slowly.  She had steroid injections 3.5 months ago with 2 weeks of efficacy.  She is splinted occasionally with no relief.    ROS: Negative, see HPI  Past medical history: Hyperlipidemia, hypertension  Past surgical history: Bilateral carpal tunnel releases bilateral thumb A1 pulley releases in 2014  Medications: Chantix, losartan, statin  Allergies: Erythromycin  Social history: Smokes 0.75 packs/day, but can quit she says  Family history: No bleeding or clotting problems, issues with anesthesia    Examination:  Nonlabored breathing  Not distressed  Bilateral thumb bases with exquisite tenderness at the CMC joints, worse on the left  Bilateral mild positive Finkelstein's tests  Negative bilateral Durkan's tests    NCS/EMG 5/2023: Mild bilateral median neuropathy, but median and sensory latencies are normal    A/P: 63-year-old female presenting with bilateral thumb CMC arthritis, DeQuervain's tenosynovitis    -Patient needs to quit prior to surgery.  Since the last steroid injection was 3.5 months ago, it is not unreasonable to repeat the steroid injections today, including depositing some steroid in the first extensor compartments, and giving her time to quit cigarettes.  Patient is agreeable to plan.  -Offered bilateral thumb carpometacarpal joint and first extensor compartment steroid injections. Patient would like this. Discussed the risks of steroid injections, including but not limited to: infection, bleeding, pain, injury to tendons or  nerves, fat atrophy, skin depigmentation. Despite these risks, patient consents to steroid injection. Cleansed the skin with Chlorhexidine and 1:1 mixture of 20 mg Kenalog and 1% lidocaine was injected into each treatment area/joint.  A total of 40 mg Kenalog was injected.  Each syringe was used for ipsilateral thumb CMC and first extensor compartment.  Patient tolerated the procedure with no issues and experienced immediate relief.   -Encourage patient to quit smoking.  Explained that thumb base surgery can be done after 6 weeks of smoking cessation.  She also understands that she needs to quit smoking for at least 4 weeks postoperatively.  -Return to clinic in 6 weeks for reevaluation  -A total of 45 minutes was devoted to review of chart, direct face-to-face patient counseling and documentation during this encounter, exclusive of any procedure performed.    Mat Frye MD, PhD                    Small Joint Injection/Arthrocentesis: bilateral thumb CMC    Date/Time: 5/19/2023 2:12 PM    Performed by: Mat Frye MD  Authorized by: Mat Frye MD  Indications:  Pain  Needle Size:  25 G  Guidance: landmark       Location:  Thumb  Laterality:  Bilateral  Site:  Bilateral thumb CMC    Medications (Right):  20 mg triamcinolone 40 MG/ML        Medications (Left):  20 mg triamcinolone 40 MG/ML                Outcome:  Tolerated well, no immediate complications  Procedure discussed: discussed risks, benefits, and alternatives    Consent Given by:  Patient  Timeout: timeout called immediately prior to procedure    Prep: patient was prepped and draped in usual sterile fashion      Hand / Upper Extremity Injection/Arthrocentesis: bilateral extensor compartment 1    Date/Time: 5/19/2023 2:17 PM    Performed by: Mat Frye MD  Authorized by: Mat Frye MD    Indications:  Pain  Needle Size:  25 G  Guidance: landmark    Condition: de Quervain's    Laterality:  Bilateral    Site:   Bilateral extensor compartment 1  Medications (Right):  20 mg triamcinolone 40 MG/ML  Medications (Left):  20 mg triamcinolone 40 MG/ML  Outcome:  Tolerated well, no immediate complications  Procedure discussed: discussed risks, benefits, and alternatives    Consent Given by:  Patient  Timeout: timeout called immediately prior to procedure    Prep: patient was prepped and draped in usual sterile fashion            Again, thank you for allowing me to participate in the care of your patient.        Sincerely,        Mat Frye MD

## 2023-05-30 ENCOUNTER — OFFICE VISIT (OUTPATIENT)
Dept: OPHTHALMOLOGY | Facility: CLINIC | Age: 63
End: 2023-05-30
Attending: OPHTHALMOLOGY
Payer: COMMERCIAL

## 2023-05-30 DIAGNOSIS — H40.9 SEVERE STAGE GLAUCOMA: ICD-10-CM

## 2023-05-30 DIAGNOSIS — H40.212 ACUTE ANGLE-CLOSURE GLAUCOMA OF LEFT EYE: ICD-10-CM

## 2023-05-30 DIAGNOSIS — Z96.1 PSEUDOPHAKIA OF BOTH EYES: Primary | ICD-10-CM

## 2023-05-30 PROCEDURE — 92015 DETERMINE REFRACTIVE STATE: CPT

## 2023-05-30 PROCEDURE — 99024 POSTOP FOLLOW-UP VISIT: CPT | Performed by: OPHTHALMOLOGY

## 2023-05-30 PROCEDURE — G0463 HOSPITAL OUTPT CLINIC VISIT: HCPCS | Performed by: OPHTHALMOLOGY

## 2023-05-30 RX ORDER — BRIMONIDINE TARTRATE 2 MG/ML
1 SOLUTION/ DROPS OPHTHALMIC 3 TIMES DAILY
Qty: 5 ML | Refills: 4 | Status: CANCELLED | OUTPATIENT
Start: 2023-05-30

## 2023-05-30 RX ORDER — DORZOLAMIDE HYDROCHLORIDE AND TIMOLOL MALEATE 20; 5 MG/ML; MG/ML
1 SOLUTION/ DROPS OPHTHALMIC 2 TIMES DAILY
Qty: 10 ML | Refills: 11 | Status: SHIPPED | OUTPATIENT
Start: 2023-05-30 | End: 2023-07-25

## 2023-05-30 ASSESSMENT — REFRACTION_MANIFEST
OS_SPHERE: PLANO
OS_CYLINDER: +0.50
OS_AXIS: 120
OD_SPHERE: PLANO
OD_AXIS: 155
OD_CYLINDER: +1.00
OS_ADD: +2.50
OD_ADD: +2.50

## 2023-05-30 ASSESSMENT — TONOMETRY
OD_IOP_MMHG: 18
OS_IOP_MMHG: 18
IOP_METHOD: TONOPEN

## 2023-05-30 ASSESSMENT — VISUAL ACUITY
OS_SC+: +3
OD_SC: 20/25-1
METHOD: SNELLEN - LINEAR
OS_SC: 20/25
OD_SC+: +3

## 2023-05-30 ASSESSMENT — SLIT LAMP EXAM - LIDS
COMMENTS: NORMAL
COMMENTS: NORMAL

## 2023-05-30 ASSESSMENT — EXTERNAL EXAM - RIGHT EYE: OD_EXAM: NORMAL

## 2023-05-30 ASSESSMENT — CUP TO DISC RATIO
OS_RATIO: 0.6
OD_RATIO: 0.4

## 2023-05-30 ASSESSMENT — PACHYMETRY
OS_CT(UM): 557
OD_CT(UM): 546

## 2023-05-30 ASSESSMENT — EXTERNAL EXAM - LEFT EYE: OS_EXAM: NORMAL

## 2023-05-30 NOTE — PATIENT INSTRUCTIONS
Complete taper of prednisolone twice a day this week, then reduce to daily for one week, then stop    Stop ketorolac    Continue dorzolamide and brimonidine until the bottle is empty; then can switch to Cosopt (blue top) twice a day left eye

## 2023-05-30 NOTE — NURSING NOTE
Chief Complaints and History of Present Illnesses   Patient presents with     Post Op (Ophthalmology) Right Eye     Post op IOL right eye 5/12/2023.      Chief Complaint(s) and History of Present Illness(es)     Post Op (Ophthalmology) Right Eye            Laterality: right eye    Associated symptoms: Negative for eye pain, flashes and floaters    Treatments tried: eye drops    Pain scale: 0/10    Comments: Post op IOL right eye 5/12/2023.          Comments    Eye meds: prednisolone BID right eye, ketorlac BID right eye,  left eye trusopt? (or alphagan?)  BID left eye, latanaprost left eye at bedtime, AT's each eye as needed    Patent would like glasses for reading.    MIRIAM Santos 5/30/2023 2:49 PM

## 2023-05-30 NOTE — LETTER
5/30/2023       RE: Margo Inman  [address] [address] [address]     Dear Dr. Tsai,    I am referring to you a very pleasant patient, Margo Inman, From the [facility] EYE [facility] for her ongoing glaucoma cares.  Please see a copy of my visit note below.    Chief Complaint(s) and History of Present Illness(es)     Post Op (Ophthalmology) Right Eye            Laterality: right eye    Associated symptoms: Negative for eye pain, flashes and floaters    Treatments tried: eye drops    Pain scale: 0/10    Comments: Post op IOL right eye 5/12/2023.          Comments    Eye meds: prednisolone BID right eye, ketorlac BID right eye,  left eye   trusopt? (or alphagan?)  BID left eye, latanaprost left eye at bedtime,   AT's each eye as needed    Patent would like glasses for reading.    MIRIAM Santos 5/30/2023 2:49 PM            Review of systems for the eyes was negative other than the pertinent positives/negatives listed in the HPI.      Assessment & Plan     Margo Inman is a 63 year old female with the following diagnoses:   1. Pseudophakia of both eyes    2. Acute angle-closure glaucoma of left eye    3. Severe stage glaucoma - Left Eye           Doing well  Ok to resume normal activities  Taper Predforte as directed  Glasses prescription updated  Artificial tears as needed      Continue dorzolamide and brimonidine twice a day left eye for now  Switch to Cosopt twice a day left eye when needing refill    Patient disposition:   Return in about 4 months (around 9/30/2023) for VT only, OCT NFL, 24-2 Dynamic VF. with Dr. Tsai.  Return to [facility] as needed           Attending Physician Attestation:  Complete documentation of historical and exam elements from today's encounter can be found in the full encounter summary report (not reduplicated in this progress note).  I personally obtained the chief complaint(s) and history of present  illness.  I confirmed and edited as necessary the review of systems, past medical/surgical history, family history, social history, and examination findings as documented by others; and I examined the patient myself.  I personally reviewed the relevant tests, images, and reports as documented above.  I formulated and edited as necessary the assessment and plan and discussed the findings and management plan with the patient and family. . - Gaston Guzman MD         Main Ophthalmology Exam       External Exam         Right Left    External Normal Normal              Slit Lamp Exam         Right Left    Lids/Lashes Normal Normal    Conjunctiva/Sclera White and quiet White and quiet    Cornea 1+ PEE, faint LASIK flap, compact stroma, arcus 1+ PEE, faint LASIK flap, compact stroma, arcus    Anterior Chamber Deep and quiet Deep and quiet    Iris dilated Round and reactive    Lens PCIOL PCIOL    Vitreous Normal Normal              Fundus Exam         Right Left    Disc Healthy color, healthy contour, intact NRR ?trace temporal pallor    C/D Ratio 0.4 0.6    Macula drusen drusen    Vessels Normal Normal    Periphery Normal Normal                  Base Eye Exam       Visual Acuity (Snellen - Linear)         Right Left    Dist sc 20/25-1 +3 20/25 +3              Tonometry (Tonopen, 3:03 PM)         Right Left    Pressure 18 18   Left eye pressure gtts AT 10PM and 10AM             Neuro/Psych       Oriented x3: Yes    Mood/Affect: Normal              Dilation       Right eye: 1.0% Mydriacyl, 2.5% Wilbert Synephrine @ 3:06 PM                  Slit Lamp and Fundus Exam       External Exam         Right Left    External Normal Normal              Slit Lamp Exam         Right Left    Lids/Lashes Normal Normal    Conjunctiva/Sclera White and quiet White and quiet    Cornea 1+ PEE, faint LASIK flap, compact stroma, arcus 1+ PEE, faint LASIK flap, compact stroma, arcus    Anterior Chamber Deep and quiet Deep and quiet    Iris dilated  Round and reactive    Lens PCIOL PCIOL    Vitreous Normal Normal              Fundus Exam         Right Left    Disc Healthy color, healthy contour, intact NRR ?trace temporal pallor    C/D Ratio 0.4 0.6    Macula drusen drusen    Vessels Normal Normal    Periphery Normal Normal                  Refraction       Manifest Refraction         Sphere Cylinder [place] Dist VA Add Near VA    Right [place] +1.00 155 20/20 +2.50 J1+    Left [place] +0.50 120 20/20-1 +2.50 J1+              Final Rx         Sphere Cylinder [place] Dist VA Add Near VA    Right [place] +1.00 155 20/20 +2.50 J1+    Left [place] +0.50 120 20/20-1 +2.50 J1+                    Please let me know if there are questions or concerns.     Sincerely,    Gaston Guzman MD

## 2023-05-30 NOTE — PROGRESS NOTES
Chief Complaint(s) and History of Present Illness(es)     Post Op (Ophthalmology) Right Eye            Laterality: right eye    Associated symptoms: Negative for eye pain, flashes and floaters    Treatments tried: eye drops    Pain scale: 0/10    Comments: Post op IOL right eye 5/12/2023.          Comments    Eye meds: prednisolone BID right eye, ketorlac BID right eye,  left eye   trusopt? (or alphagan?)  BID left eye, latanaprost left eye at bedtime,   AT's each eye as needed    Patent would like glasses for reading.    MIRIAM Santos 5/30/2023 2:49 PM            Review of systems for the eyes was negative other than the pertinent positives/negatives listed in the HPI.      Assessment & Plan      Margo Inman is a 63 year old female with the following diagnoses:   1. Pseudophakia of both eyes    2. Acute angle-closure glaucoma of left eye    3. Severe stage glaucoma - Left Eye           Doing well  Ok to resume normal activities  Taper Predforte as directed  Glasses prescription updated  Artificial tears as needed      Continue dorzolamide and brimonidine twice a day left eye for now  Switch to Cosopt twice a day left eye when needing refill    Patient disposition:   Return in about 4 months (around 9/30/2023) for VT only, OCT NFL, 24-2 Dynamic VF. with Dr. Tsai.  Return to [facility] as needed            Attending Physician Attestation:  Complete documentation of historical and exam elements from today's encounter can be found in the full encounter summary report (not reduplicated in this progress note).  I personally obtained the chief complaint(s) and history of present illness.  I confirmed and edited as necessary the review of systems, past medical/surgical history, family history, social history, and examination findings as documented by others; and I examined the patient myself.  I personally reviewed the relevant tests, images, and reports as documented above.  I formulated and edited as necessary  the assessment and plan and discussed the findings and management plan with the patient and family. . - Gaston Guzman MD

## 2023-06-15 ENCOUNTER — TELEPHONE (OUTPATIENT)
Dept: OPTOMETRY | Facility: CLINIC | Age: 63
End: 2023-06-15
Payer: COMMERCIAL

## 2023-06-15 NOTE — TELEPHONE ENCOUNTER
Patient needs to be schedule with Dr Almanza at the [facility] since there is no OCT/VF at [place].    Message  Received: 2 weeks ago  Gaston Guzman MD Royal, Rene L, OD; Sean Almanza MD; Tj Calixto  Good to know!     Tj, can you call Margo to inform her of the need to change location/provider for follow up and help to make the appt?     Thanks           Previous Messages    Attached Report    [facility] OPHTH ENC SUMMARY COMM MGMT  Letter    Gaston Guzman MD on 5/30/2023           [facility] EYE [place] [address]  [address] [address]  [facility] [address]  Phone: [phone]  Fax: [phone]      5/30/2023        RE: Margo Inman  [address] [facility] [address]      Dear Dr. Tsai,     I am referring to you a very pleasant patient, Margo Inman, From the [facility] EYE [facility] for her ongoing glaucoma cares.  Please see a copy of my visit note below.     Chief Complaint(s) and History of Present Illness(es)     Post Op (Ophthalmology) Right Eye            Laterality: right eye    Associated symptoms: Negative for eye pain, flashes and floaters    Treatments tried: eye drops    Pain scale: 0/10    Comments: Post op IOL right eye 5/12/2023.          Comments    Eye meds: prednisolone BID right eye, ketorlac BID right eye,  left eye   trusopt? (or alphagan?)  BID left eye, latanaprost left eye at bedtime,   AT's each eye as needed     Patent would like glasses for reading.     MIRIAM Santos 5/30/2023 2:49 PM              Review of systems for the eyes was negative other than the pertinent positives/negatives listed in the HPI.       Assessment & Plan      Margo Inman is a 63 year old female with the following diagnoses:   1. Pseudophakia of both eyes    2. Acute angle-closure glaucoma of left eye    3. Severe  stage glaucoma - Left Eye             Doing well  Ok to resume normal activities  Taper Predforte as directed  Glasses prescription updated  Artificial tears as needed       Continue dorzolamide and brimonidine twice a day left eye for now  Switch to Cosopt twice a day left eye when needing refill     Patient disposition:   Return in about 4 months (around 9/30/2023) for VT only, OCT NFL, 24-2 Dynamic VF. with Dr. Tsai.  Return to [facility] as needed               Attending Physician Attestation:  Complete documentation of historical and exam elements from today's encounter can be found in the full encounter summary report (not reduplicated in this progress note).  I personally obtained the chief complaint(s) and history of present illness.  I confirmed and edited as necessary the review of systems, past medical/surgical history, family history, social history, and examination findings as documented by others; and I examined the patient myself.  I personally reviewed the relevant tests, images, and reports as documented above.  I formulated and edited as necessary the assessment and plan and discussed the findings and management plan with the patient and family. . - Gaston Guzman MD           Main Ophthalmology Exam         External Exam           Right Left     External Normal Normal                  Slit Lamp Exam           Right Left     Lids/Lashes Normal Normal     Conjunctiva/Sclera White and quiet White and quiet     Cornea 1+ PEE, faint LASIK flap, compact stroma, arcus 1+ PEE, faint LASIK flap, compact stroma, arcus     Anterior Chamber Deep and quiet Deep and quiet     Iris dilated Round and reactive     Lens PCIOL PCIOL     Vitreous Normal Normal                  Fundus Exam           Right Left     Disc Healthy color, healthy contour, intact NRR ?trace temporal pallor     C/D Ratio 0.4 0.6     Macula drusen drusen     Vessels Normal Normal     Periphery Normal Normal                       Base Eye Exam          Visual Acuity (Snellen - Linear)           Right Left     Dist sc 20/25-1 +3 20/25 +3                  Tonometry (Tonopen, 3:03 PM)           Right Left     Pressure 18 18   Left eye pressure gtts AT 10PM and 10AM                 Neuro/Psych         Oriented x3: Yes     Mood/Affect: Normal                  Dilation         Right eye: 1.0% Mydriacyl, 2.5% Wilbert Synephrine @ 3:06 PM                       Slit Lamp and Fundus Exam         External Exam           Right Left     External Normal Normal                  Slit Lamp Exam           Right Left     Lids/Lashes Normal Normal     Conjunctiva/Sclera White and quiet White and quiet     Cornea 1+ PEE, faint LASIK flap, compact stroma, arcus 1+ PEE, faint LASIK flap, compact stroma, arcus     Anterior Chamber Deep and quiet Deep and quiet     Iris dilated Round and reactive     Lens PCIOL PCIOL     Vitreous Normal Normal                  Fundus Exam           Right Left     Disc Healthy color, healthy contour, intact NRR ?trace temporal pallor     C/D Ratio 0.4 0.6     Macula drusen drusen     Vessels Normal Normal     Periphery Normal Normal                       Refraction         Manifest Refraction           Sphere Cylinder [place] Dist VA Add Near VA     Right [place] +1.00 155 20/20 +2.50 J1+     Left [place] +0.50 120 20/20-1 +2.50 J1+                  Final Rx           Sphere Cylinder [place] Dist VA Add Near VA     Right [place] +1.00 155 20/20 +2.50 J1+     Left [place] +0.50 120 20/20-1 +2.50 J1+                          Please let me know if there are questions or concerns.      Sincerely,     Gaston Guzman MD  RE: Margo Inman    MRN: [id number]   : 1960   ENC DATE: May 30, 2023   PAGE:      Progress Notes    Gaston Guzman MD at 2023  3:00 PM    Status: Signed   Expand All Collapse All  Chief Complaint(s) and History of Present Illness(es)     Post Op (Ophthalmology) Right Eye            Laterality: right eye    Associated  symptoms: Negative for eye pain, flashes and floaters    Treatments tried: eye drops    Pain scale: 0/10    Comments: Post op IOL right eye 5/12/2023.          Comments    Eye meds: prednisolone BID right eye, ketorlac BID right eye,  left eye   trusopt? (or alphagan?)  BID left eye, latanaprost left eye at bedtime,   AT's each eye as needed     Patent would like glasses for reading.     MIRIAM Santos 5/30/2023 2:49 PM              Review of systems for the eyes was negative other than the pertinent positives/negatives listed in the HPI.       Assessment & Plan   Assessment & Plan   Margo Inman is a 63 year old female with the following diagnoses:   1. Pseudophakia of both eyes    2. Acute angle-closure glaucoma of left eye    3. Severe stage glaucoma - Left Eye             Doing well  Ok to resume normal activities  Taper Predforte as directed  Glasses prescription updated  Artificial tears as needed       Continue dorzolamide and brimonidine twice a day left eye for now  Switch to Cosopt twice a day left eye when needing refill     Patient disposition:   Return in about 4 months (around 9/30/2023) for VT only, OCT NFL, 24-2 Dynamic VF. with Dr. Tsai.  Return to [facility] as needed            Attending Physician Attestation:  Complete documentation of historical and exam elements from today's encounter can be found in the full encounter summary report (not reduplicated in this progress note).  I personally obtained the chief complaint(s) and history of present illness.  I confirmed and edited as necessary the review of systems, past medical/surgical history, family history, social history, and examination findings as documented by others; and I examined the patient myself.  I personally reviewed the relevant tests, images, and reports as documented above.  I formulated and edited as necessary the assessment and plan and discussed the findings and management plan with the patient and family. . Zaheer SCHMIDT  MD Thomas

## 2023-06-21 DIAGNOSIS — Z98.1 S/P CERVICAL SPINAL FUSION: ICD-10-CM

## 2023-06-22 RX ORDER — CYCLOBENZAPRINE HCL 10 MG
TABLET ORAL
Qty: 90 TABLET | Refills: 3 | Status: SHIPPED | OUTPATIENT
Start: 2023-06-22 | End: 2024-07-16

## 2023-07-14 ENCOUNTER — TELEPHONE (OUTPATIENT)
Dept: ORTHOPEDICS | Facility: CLINIC | Age: 63
End: 2023-07-14

## 2023-07-14 ENCOUNTER — OFFICE VISIT (OUTPATIENT)
Dept: ORTHOPEDICS | Facility: CLINIC | Age: 63
End: 2023-07-14
Payer: COMMERCIAL

## 2023-07-14 DIAGNOSIS — M18.12 ARTHRITIS OF CARPOMETACARPAL (CMC) JOINT OF LEFT THUMB: Primary | ICD-10-CM

## 2023-07-14 PROCEDURE — 99214 OFFICE O/P EST MOD 30 MIN: CPT | Performed by: STUDENT IN AN ORGANIZED HEALTH CARE EDUCATION/TRAINING PROGRAM

## 2023-07-14 ASSESSMENT — PAIN SCALES - GENERAL: PAINLEVEL: MODERATE PAIN (5)

## 2023-07-14 NOTE — LETTER
July 14, 2023      RE: Margo Inman  [address] [facility] [address]       To whom it may concern:    Margo Inman was seen in our clinic today. She is under my professional care for her bilateral thumb arthritis. She has been managed non-operatively up until this point, but it is now recommended that she undergo surgical repair. She will have restrictions and require hand therapy for about three months after surgery.    Sincerely,      Mat Frye MD

## 2023-07-14 NOTE — TELEPHONE ENCOUNTER
Procedure: Left 1st CMC joint arthroplasty with suture suspensionplasty  Facility:  or [facility]  Length: ? minutes  Anesthesia: Regional, MAC  Post-op appointments needed: 2 weeks provider with OT to follow, 6 weeks with provider and OT to follow  Surgery packet/instructions given to patient?  Yes     Lexx Mcgee RN

## 2023-07-14 NOTE — LETTER
7/14/2023         RE: Margo Inman  [address] [facility] [address]        Dear Colleague,    Thank you for referring your patient, Margo Inman, to the [facility]. Please see a copy of my visit note below.    Ortho Hand    Returns after bilateral thumb CMC injections.  She has recurrence of pain after approximately 4 weeks of efficacy.  These were her at least second injections. Left thumb base pain is worse.     On exam, bilateral thumb CMC grind with significant tenderness and no MP hyperextension deformity.     A/P: 63F p/w BL thumb CMC arthritis, refractory to conservative management    -Discussed the options for management.  We can try one more injection or proceed to surgery.  It is unlikely that another injection would be helpful since the last 2 did not work very well.  Patient would like surgery.  -Discussed the risks of surgery, including but not limited to: Infection, bleeding, pain, poor scarring, injury to nerves, neuroma formation, subsidence, recurrence of pain, need for revision surgery, complex regional pain syndrome.  Despite these risks, patient consents to would like to proceed with left thumb carpometacarpal joint suture suspension arthroplasty.  Discussed that if patient were to need revision surgery in the future, we would plan for an internal brace augmentation.  Patient is agreeable to plan.  -Patient is agreeable to quit smoking 4 weeks before and after surgery  -MAC with block  -Case request to be placed  -A total of 30 minutes was devoted to review of chart, direct face-to-face patient counseling and documentation during this encounter, exclusive of any procedure performed.    Mat Frye MD, PhD      Again, thank you for allowing me to participate in the care of your patient.        Sincerely,        Mat Frye MD

## 2023-07-14 NOTE — NURSING NOTE
Pre-Operative Teaching Flowsheet     Person(s) involved in teaching: Patient     Motivation Level:  Receptive (willing/able to accept information) and asks appropriate questions where applicable: Yes  Any cultural factors/Baptism beliefs that may influence understanding or compliance? No     Patient demonstrates understanding of the following:  Pre-operative planning, including the necessary appointments and preparation needed prior to surgery: Yes  Which situations necessitate calling provider and whom to contact: Yes  Pain management techniques pre and post op: Yes  Stoplight tool introduced, questions answered, patient expressed understanding: Yes  How, and when, to access community resources: Yes    Additional Teaching Concerns Addressed:  Post-operative living arrangements and necessary adaptations to living environment.  Instructional Materials Used/Given: Yes, pre-op packet given to patient with additional system forms added as needed depending on type of surgery. Pre-op soap given (if in clinic).     Time spent with patient: 20 minutes.    Lexx Mcgee RN

## 2023-07-14 NOTE — PROGRESS NOTES
Ortho Hand    Returns after bilateral thumb CMC injections.  She has recurrence of pain after approximately 4 weeks of efficacy.  These were her at least second injections. Left thumb base pain is worse.     On exam, bilateral thumb CMC grind with significant tenderness and no MP hyperextension deformity.     A/P: 63F p/w BL thumb CMC arthritis, refractory to conservative management    -Discussed the options for management.  We can try one more injection or proceed to surgery.  It is unlikely that another injection would be helpful since the last 2 did not work very well.  Patient would like surgery.  -Discussed the risks of surgery, including but not limited to: Infection, bleeding, pain, poor scarring, injury to nerves, neuroma formation, subsidence, recurrence of pain, need for revision surgery, complex regional pain syndrome.  Despite these risks, patient consents to would like to proceed with left thumb carpometacarpal joint suture suspension arthroplasty.  Discussed that if patient were to need revision surgery in the future, we would plan for an internal brace augmentation.  Patient is agreeable to plan.  -Patient is agreeable to quit smoking 4 weeks before and after surgery  -MAC with block  -Case request to be placed  -A total of 30 minutes was devoted to review of chart, direct face-to-face patient counseling and documentation during this encounter, exclusive of any procedure performed.    Mat Frye MD, PhD

## 2023-07-17 PROBLEM — M18.12 ARTHRITIS OF CARPOMETACARPAL (CMC) JOINT OF LEFT THUMB: Status: ACTIVE | Noted: 2023-07-14

## 2023-07-17 NOTE — TELEPHONE ENCOUNTER
Scheduled surgery for 9/19 in [place] with Dr. Frye    H&P with PCP within 30 days of the surgery date. Patient verbalized understanding H&P is needed.    Post-op scheduled for 10/9 with VIRGIE Shi in     PT needs OT as well. Will send message to .    Surgery packet given in clinic.    No further questions/concerns at this time.     Jesenia Pinto on 07/17/23 at 10:20 AM  Senior Perioperative Coordinator.  Ph: [phone]

## 2023-07-17 NOTE — TELEPHONE ENCOUNTER
7/17 Called and left voicemail. Provided phone number [phone] to schedule hand therapy after post op appointment.     Mariana wagner Procedure   Orthopedics, Podiatry, Sports Medicine, Ent ,Eye , Audiology, Adult Endocrine & Diabetes, Nutrition & Medication Therapy Management Specialties   [facility]

## 2023-07-21 NOTE — TELEPHONE ENCOUNTER
7/21 Called and spoke to patient, appointment is currently scheduled.     Mariana wagner Procedure   Orthopedics, Podiatry, Sports Medicine, Ent ,Eye , Audiology, Adult Endocrine & Diabetes, Nutrition & Medication Therapy Management Specialties   [facility]

## 2023-07-25 ENCOUNTER — TELEPHONE (OUTPATIENT)
Dept: OPHTHALMOLOGY | Facility: CLINIC | Age: 63
End: 2023-07-25
Payer: COMMERCIAL

## 2023-07-25 DIAGNOSIS — H40.9 SEVERE STAGE GLAUCOMA: Primary | ICD-10-CM

## 2023-07-25 RX ORDER — DORZOLAMIDE HYDROCHLORIDE AND TIMOLOL MALEATE 20; 5 MG/ML; MG/ML
1 SOLUTION/ DROPS OPHTHALMIC 2 TIMES DAILY
Qty: 10 ML | Refills: 5 | Status: SHIPPED | OUTPATIENT
Start: 2023-07-25

## 2023-07-25 NOTE — TELEPHONE ENCOUNTER
Spoke to pt at 1520    Pt to change from Dorzolamide and brimonidine to Cosopt (dorzolamide/timolol) when need next refill    Rx for cosopt sent -- one drop in left eye 2/day  -no asthma/bradycardia per pt    Reviewed to stop the Dorzolamide as the new eye drop has dorzolamide in it.    Pt has f/u in October    - per last note May 30th:  Continue dorzolamide and brimonidine twice a day left eye for now  Switch to Cosopt twice a day left eye when needing refill    Rx previous sent in may (may not be on file)     Aubrey Barnard RN 3:27 PM 07/25/23

## 2023-07-25 NOTE — TELEPHONE ENCOUNTER
[facility] Call Center    Phone Message    May a detailed message be left on voicemail: yes     Reason for Call: Other: Pt said that she is out of her Rx for brimonidine (ALPHAGAN) 0.2 % ophthalmic solution and would like to know what new drops she should start and if Thomas could put in an order for them at   [id number] - [place], [place] - [address] AT [facility]  [address]   Please contact pt to discuss    Thank you     Action Taken: Message routed to:  [facility] ([facility] eye    Travel Screening: Not Applicable

## 2023-08-28 DIAGNOSIS — E78.5 HYPERLIPIDEMIA LDL GOAL <130: ICD-10-CM

## 2023-08-28 RX ORDER — ATORVASTATIN CALCIUM 80 MG/1
TABLET, FILM COATED ORAL
Qty: 90 TABLET | Refills: 2 | Status: SHIPPED | OUTPATIENT
Start: 2023-08-28

## 2023-08-29 ENCOUNTER — OFFICE VISIT (OUTPATIENT)
Dept: OPTOMETRY | Facility: CLINIC | Age: 63
End: 2023-08-29
Payer: COMMERCIAL

## 2023-08-29 DIAGNOSIS — H40.9 SEVERE STAGE GLAUCOMA: Primary | ICD-10-CM

## 2023-08-29 PROCEDURE — 99213 OFFICE O/P EST LOW 20 MIN: CPT | Performed by: OPTOMETRIST

## 2023-08-29 ASSESSMENT — TONOMETRY
OD_IOP_MMHG: 16
IOP_METHOD: APPLANATION
OS_IOP_MMHG: 13

## 2023-08-29 ASSESSMENT — CUP TO DISC RATIO
OD_RATIO: 0.4
OS_RATIO: 0.6

## 2023-08-29 ASSESSMENT — EXTERNAL EXAM - RIGHT EYE: OD_EXAM: NORMAL

## 2023-08-29 ASSESSMENT — REFRACTION_WEARINGRX
OS_SPHERE: PLANO
OD_AXIS: 155
OD_SPHERE: PLANO
OD_CYLINDER: +1.00
OD_ADD: +2.50
OS_ADD: +2.50
OS_AXIS: 120
OS_CYLINDER: +0.50

## 2023-08-29 ASSESSMENT — VISUAL ACUITY
CORRECTION_TYPE: GLASSES
OS_SC: 20/40
METHOD: SNELLEN - LINEAR
OD_CC+: -3
OD_CC: 20/20

## 2023-08-29 ASSESSMENT — SLIT LAMP EXAM - LIDS
COMMENTS: NORMAL
COMMENTS: NORMAL

## 2023-08-29 ASSESSMENT — EXTERNAL EXAM - LEFT EYE: OS_EXAM: NORMAL

## 2023-08-29 NOTE — PATIENT INSTRUCTIONS
Continue using Cosopt- 1 drop left eye 2 x day.    Schedule with Dr. Almanza for continued care.    Tadeo Tsai, OD

## 2023-08-29 NOTE — LETTER
8/29/2023         RE: Margo Inman  [id number] [facility] [id number]        Dear Colleague,    Thank you for referring your patient, Margo Inman, to the [facility]. Please see a copy of my visit note below.    CHIEF COMPLAINT:   Chief Complaint   Patient presents with     RECHECK EYE PRESSURE     4 month iop check    History of angle closure glaucoma left eye with LPI, and preventative right eye LPI- due to narrow angles    History of Lasik both eyes     Cataract surgery both eyes with Dr. Vicente Guzman at the [facility]  Right eye-5/12/2023  Left eye-2/20/2023    Cosopt - 1 gt  left eye 2 x day.    Dr. Guzman had referred her back to me for OCT/visual field.  I let him know that we do not have these instruments at this location.  He is ok if she sees Dr. Almanza at [place].  (The patient was not told this)    Shauna Chowdary, Optometric Assistant, A.B.O.C.    OBJECTIVE:     See ophthalmology exam    ASSESSMENT:         ICD-10-CM    1. Severe stage glaucoma  H40.9           PLAN:      Patient Instructions   Continue using Cosopt- 1 drop left eye 2 x day.    Schedule with Dr. Almanza for continued care.    Tadeo Tsai, OD         Again, thank you for allowing me to participate in the care of your patient.        Sincerely,        Tadeo Tsai, OD

## 2023-08-29 NOTE — PROGRESS NOTES
CHIEF COMPLAINT:   Chief Complaint   Patient presents with    RECHECK EYE PRESSURE     4 month iop check    History of angle closure glaucoma left eye with LPI, and preventative right eye LPI- due to narrow angles    History of Lasik both eyes     Cataract surgery both eyes with Dr. Vicente Guzman at the [facility]  Right eye-5/12/2023  Left eye-2/20/2023    Cosopt - 1 gt  left eye 2 x day.    Dr. Guzman had referred her back to me for OCT/visual field.  I let him know that we do not have these instruments at this location.  He is ok if she sees Dr. Almanza at [place].  (The patient was not told this)    Shauna Chowdary, Optometric Assistant, A.B.O.C.    OBJECTIVE:     See ophthalmology exam    ASSESSMENT:         ICD-10-CM    1. Severe stage glaucoma  H40.9           PLAN:      Patient Instructions   Continue using Cosopt- 1 drop left eye 2 x day.    Schedule with Dr. Almanza for continued care.    Tadeo Tsai, OD

## 2023-09-07 ENCOUNTER — OFFICE VISIT (OUTPATIENT)
Dept: FAMILY MEDICINE | Facility: CLINIC | Age: 63
End: 2023-09-07
Payer: COMMERCIAL

## 2023-09-07 VITALS
HEIGHT: 62 IN | OXYGEN SATURATION: 98 % | RESPIRATION RATE: 19 BRPM | BODY MASS INDEX: 40.52 KG/M2 | TEMPERATURE: 97.2 F | HEART RATE: 78 BPM | DIASTOLIC BLOOD PRESSURE: 79 MMHG | WEIGHT: 220.2 LBS | SYSTOLIC BLOOD PRESSURE: 148 MMHG

## 2023-09-07 DIAGNOSIS — E78.5 HYPERLIPIDEMIA LDL GOAL <130: Chronic | ICD-10-CM

## 2023-09-07 DIAGNOSIS — Z12.11 SCREEN FOR COLON CANCER: ICD-10-CM

## 2023-09-07 DIAGNOSIS — Z23 NEED FOR SHINGLES VACCINE: ICD-10-CM

## 2023-09-07 DIAGNOSIS — I10 ESSENTIAL HYPERTENSION WITH GOAL BLOOD PRESSURE LESS THAN 140/90: ICD-10-CM

## 2023-09-07 DIAGNOSIS — Z87.891 FORMER HEAVY CIGARETTE SMOKER (20-39 PER DAY): ICD-10-CM

## 2023-09-07 DIAGNOSIS — Z01.818 PREOP GENERAL PHYSICAL EXAM: Primary | Chronic | ICD-10-CM

## 2023-09-07 DIAGNOSIS — M18.12 ARTHRITIS OF CARPOMETACARPAL (CMC) JOINT OF LEFT THUMB: ICD-10-CM

## 2023-09-07 DIAGNOSIS — H40.9 SEVERE STAGE GLAUCOMA: ICD-10-CM

## 2023-09-07 DIAGNOSIS — M54.12 RADICULOPATHY OF CERVICAL SPINE: ICD-10-CM

## 2023-09-07 DIAGNOSIS — E66.01 MORBID OBESITY (H): ICD-10-CM

## 2023-09-07 DIAGNOSIS — Z12.31 ENCOUNTER FOR SCREENING MAMMOGRAM FOR BREAST CANCER: ICD-10-CM

## 2023-09-07 DIAGNOSIS — E83.52 HYPERCALCEMIA: ICD-10-CM

## 2023-09-07 DIAGNOSIS — Z23 NEED FOR PNEUMOCOCCAL 20-VALENT CONJUGATE VACCINATION: ICD-10-CM

## 2023-09-07 LAB
CA-I BLD-MCNC: 4.8 MG/DL (ref 4.4–5.2)
CREAT SERPL-MCNC: 0.69 MG/DL (ref 0.51–0.95)
CREAT UR-MCNC: 62 MG/DL
EGFRCR SERPLBLD CKD-EPI 2021: >90 ML/MIN/1.73M2
HOLD SPECIMEN: NORMAL
POTASSIUM SERPL-SCNC: 4.8 MMOL/L (ref 3.4–5.3)

## 2023-09-07 PROCEDURE — 90471 IMMUNIZATION ADMIN: CPT | Performed by: NURSE PRACTITIONER

## 2023-09-07 PROCEDURE — 82330 ASSAY OF CALCIUM: CPT | Performed by: NURSE PRACTITIONER

## 2023-09-07 PROCEDURE — G0480 DRUG TEST DEF 1-7 CLASSES: HCPCS | Performed by: NURSE PRACTITIONER

## 2023-09-07 PROCEDURE — 90472 IMMUNIZATION ADMIN EACH ADD: CPT | Performed by: NURSE PRACTITIONER

## 2023-09-07 PROCEDURE — 82565 ASSAY OF CREATININE: CPT | Performed by: NURSE PRACTITIONER

## 2023-09-07 PROCEDURE — 36415 COLL VENOUS BLD VENIPUNCTURE: CPT | Performed by: NURSE PRACTITIONER

## 2023-09-07 PROCEDURE — 84132 ASSAY OF SERUM POTASSIUM: CPT | Performed by: NURSE PRACTITIONER

## 2023-09-07 PROCEDURE — 90750 HZV VACC RECOMBINANT IM: CPT | Performed by: NURSE PRACTITIONER

## 2023-09-07 PROCEDURE — 99214 OFFICE O/P EST MOD 30 MIN: CPT | Mod: 25 | Performed by: NURSE PRACTITIONER

## 2023-09-07 PROCEDURE — 90677 PCV20 VACCINE IM: CPT | Performed by: NURSE PRACTITIONER

## 2023-09-07 ASSESSMENT — PAIN SCALES - GENERAL: PAINLEVEL: MODERATE PAIN (5)

## 2023-09-07 NOTE — PATIENT INSTRUCTIONS
For informational purposes only. Not to replace the advice of your health care provider. Copyright   2003,  [facility]. All rights reserved. Clinically reviewed by Breonna Rawls MD. [employer] [id number] - REV .  Preparing for Your Surgery  Getting started  A nurse will call you to review your health history and instructions. They will give you an arrival time based on your scheduled surgery time. Please be ready to share:  Your doctor's clinic name and phone number  Your medical, surgical, and anesthesia history  A list of allergies and sensitivities  A list of medicines, including herbal treatments and over-the-counter drugs  Whether the patient has a legal guardian (ask how to send us the papers in advance)  Please tell us if you're pregnant--or if there's any chance you might be pregnant. Some surgeries may injure a fetus (unborn baby), so they require a pregnancy test. Surgeries that are safe for a fetus don't always need a test, and you can choose whether to have one.   If you have a child who's having surgery, please ask for a copy of Preparing for Your Child's Surgery.    Preparing for surgery  Within 10 to 30 days of surgery: Have a pre-op exam (sometimes called an H&P, or History and Physical). This can be done at a clinic or pre-operative center.  If you're having a , you may not need this exam. Talk to your care team.  At your pre-op exam, talk to your care team about all medicines you take. If you need to stop any medicines before surgery, ask when to start taking them again.  We do this for your safety. Many medicines can make you bleed too much during surgery. Some change how well surgery (anesthesia) drugs work.  Call your insurance company to let them know you're having surgery. (If you don't have insurance, call [phone].)  Call your clinic if there's any change in your health. This includes signs of a cold or flu (sore throat, runny nose, cough, rash, fever). It also  includes a scrape or scratch near the surgery site.  If you have questions on the day of surgery, call your hospital or surgery center.  Eating and drinking guidelines  For your safety: Unless your surgeon tells you otherwise, follow the guidelines below.  Eat and drink as usual until 8 hours before you arrive for surgery. After that, no food or milk.  Drink clear liquids until 2 hours before you arrive. These are liquids you can see through, like water, Gatorade, and Propel Water. They also include plain black coffee and tea (no cream or milk), candy, and breath mints. You can spit out gum when you arrive.  If you drink alcohol: Stop drinking it the night before surgery.  If your care team tells you to take medicine on the morning of surgery, it's okay to take it with a sip of water.  Preventing infection  Shower or bathe the night before and morning of your surgery. Follow the instructions your clinic gave you. (If no instructions, use regular soap.)  Don't shave or clip hair near your surgery site. We'll remove the hair if needed.  Don't smoke or vape the morning of surgery. You may chew nicotine gum up to 2 hours before surgery. A nicotine patch is okay.  Note: Some surgeries require you to completely quit smoking and nicotine. Check with your surgeon.  Your care team will make every effort to keep you safe from infection. We will:  Clean our hands often with soap and water (or an alcohol-based hand rub).  Clean the skin at your surgery site with a special soap that kills germs.  Give you a special gown to keep you warm. (Cold raises the risk of infection.)  Wear special hair covers, masks, gowns and gloves during surgery.  Give antibiotic medicine, if prescribed. Not all surgeries need antibiotics.  What to bring on the day of surgery  Photo ID and insurance card  Copy of your health care directive, if you have one  Glasses and hearing aids (bring cases)  You can't wear contacts during surgery  Inhaler and eye  drops, if you use them (tell us about these when you arrive)  CPAP machine or breathing device, if you use them  A few personal items, if spending the night  If you have . . .  A pacemaker, ICD (cardiac defibrillator) or other implant: Bring the ID card.  An implanted stimulator: Bring the remote control.  A legal guardian: Bring a copy of the certified (court-stamped) guardianship papers.  Please remove any jewelry, including body piercings. Leave jewelry and other valuables at home.  If you're going home the day of surgery  You must have a responsible adult drive you home. They should stay with you overnight as well.  If you don't have someone to stay with you, and you aren't safe to go home alone, we may keep you overnight. Insurance often won't pay for this.  After surgery  If it's hard to control your pain or you need more pain medicine, please call your surgeon's office.  Questions?   If you have any questions for your care team, list them here: _________________________________________________________________________________________________________________________________________________________________________ ____________________________________ ____________________________________ ____________________________________    How to Take Your Medication Before Surgery  - Take all of your medications before surgery except as noted below  - HOLD (do not take) your Hydrochlorothiazide and Losartan  on the morning of surgery.   - STOP taking all vitamins and herbal supplements 14 days before surgery.  Hold Diclofenac starting  3 days prior to your procedure

## 2023-09-07 NOTE — PROGRESS NOTES
[address] [address]  Phone: [phone]  Primary Provider: Jeremiah Zavala  Pre-op Performing Provider: JEREMIAH ZAVALA      PREOPERATIVE EVALUATION:  Today's date: 9/7/2023    Margo Inman is a 63 year old female who presents for a preoperative evaluation.      9/7/2023     8:19 AM   Additional Questions   Roomed by Tahira       Surgical Information:  Surgery/Procedure: LEFT THUMB CARPOMETACARPAL JOINT ARTHROPLASTY WITH SUTURE SUSPENSION  Surgery Location: [facility]  Surgeon: Mat Frye MD   Surgery Date: 09/19/2023  Time of Surgery: 2:05 PM  Where patient plans to recover: At home with family  Fax number for surgical facility: Note does not need to be faxed, will be available electronically in Epic.    Assessment & Plan     The proposed surgical procedure is considered INTERMEDIATE risk.    Preop general physical exam  - REVIEW OF HEALTH MAINTENANCE PROTOCOL ORDERS    Arthritis of carpometacarpal (CMC) joint of left thumb  Scheduled for surgery 10/24/23    Radiculopathy of cervical spine    - [id number] - Urine Drug Confirmation Panel (Comprehensive)    Severe stage glaucoma  Followed by Ophthamology    Obesity (BMI 35.0-39.9) with comorbidity (H)  Benefits of weight loss reviewed in detail, encouraged him to cut back on the carbohydrates in the diet, consume more fruits and vegetables, drink plenty of water, avoid fruit juices, sodas, get 150 min moderate exercise/week.Recheck weight in 6 months.      Hyperlipidemia LDL goal <130  , HDL low at 45, Triglycerides 213. Low chol diet, weight loss, regular exercise advised , on Lipitor 80 mg daily, fish oil.    Essential hypertension with goal blood pressure less than 140/90  BP elevated today in clinic but she is in pain. Continue to monitor.  - Creatinine  - Potassium    Former heavy cigarette smoker (20-39 per  jalen)  Congratulated her on quitting smoking.    Encounter for screening mammogram for breast cancer    - *MA Screening Digital Bilateral    Screen for colon cancer    - Colonoscopy Screening  Referral    Need for pneumococcal 20-valent conjugate vaccination    - Pneumococcal 20 Valent Conjugate (Prevnar 20)    Need for shingles vaccine    - ZOSTER RECOMBINANT ADJUVANTED (SHINGRIX)              - No identified additional risk factors other than previously addressed    Antiplatelet or Anticoagulation Medication Instructions:   - Patient is on no antiplatelet or anticoagulation medications.    Additional Medication Instructions:  Patient is to take all scheduled medications on the day of surgery EXCEPT for modifications listed below:   - ACE/ARB: HOLD on day of surgery (minimum 11 hours for general anesthesia).   - Diuretics: HOLD on the day of surgery.   - diclofenac (Voltaren): HOLD for 3 days for high bleeding risk or PRN use.    RECOMMENDATION:  APPROVAL GIVEN to proceed with proposed procedure, without further diagnostic evaluation.    Ordering of each unique test  Prescription drug management  23 minutes spent by me on the date of the encounter doing chart review, history and exam, documentation and further activities per the note      Subjective       HPI related to upcoming procedure: Patient has severe CMC joint arthritis of left thumb and is scheduled for LEFT THUMB CARPOMETACARPAL JOINT ARTHROPLASTY WITH SUTURE SUSPENSION        8/31/2023     4:40 PM   Preop Questions   1. Have you ever had a heart attack or stroke? No   2. Have you ever had surgery on your heart or blood vessels, such as a stent placement, a coronary artery bypass, or surgery on an artery in your head, neck, heart, or legs? No   3. Do you have chest pain with activity? No   4. Do you have a history of  heart failure? No   5. Do you currently have a cold, bronchitis or symptoms of other infection? No   6. Do you have a cough,  shortness of breath, or wheezing? No   7. Do you or anyone in your family have previous history of blood clots? UNKNOWN -    8. Do you or does anyone in your family have a serious bleeding problem such as prolonged bleeding following surgeries or cuts? No   9. Have you ever had problems with anemia or been told to take iron pills? YES - with pregnancy   10. Have you had any abnormal blood loss such as black, tarry or bloody stools, or abnormal vaginal bleeding? UNKNOWN - not since menopause   11. Have you ever had a blood transfusion? YES - with childbirth   11a. Have you ever had a transfusion reaction? No   12. Are you willing to have a blood transfusion if it is medically needed before, during, or after your surgery? Yes   13. Have you or any of your relatives ever had problems with anesthesia? YES - vomiting when coming out of anesthesia, has done fine in the last 3 years   14. Do you have sleep apnea, excessive snoring or daytime drowsiness? No   15. Do you have any artifical heart valves or other implanted medical devices like a pacemaker, defibrillator, or continuous glucose monitor? No   16. Do you have artificial joints? YES - knees, cervical and lumbar fusions   17. Are you allergic to latex? No       Health Care Directive:  Patient does not have a Health Care Directive or Living Will: Discussed advance care planning with patient; information given to patient to review.    Preoperative Review of :   reviewed - controlled substances reflected in medication list.      Status of Chronic Conditions:  See problem list for active medical problems.  Problems all longstanding and stable, except as noted/documented.  See ROS for pertinent symptoms related to these conditions.    HYPERLIPIDEMIA - Patient has a long history of significant Hyperlipidemia requiring medication for treatment with recent good control. Patient reports no problems or side effects with the medication.     HYPERTENSION - Patient has  longstanding history of HTN , currently denies any symptoms referable to elevated blood pressure. Specifically denies chest pain, palpitations, dyspnea, orthopnea, PND or peripheral edema. Blood pressure readings have not been in normal range. Current medication regimen is as listed below. Patient denies any side effects of medication.     Review of Systems  CONSTITUTIONAL: NEGATIVE for fever, chills, change in weight  INTEGUMENTARY/SKIN: NEGATIVE for worrisome rashes, moles or lesions  EYES: NEGATIVE for vision changes or irritation  ENT/MOUTH: NEGATIVE for ear, mouth and throat problems  RESP: NEGATIVE for significant cough or SOB  CV: NEGATIVE for chest pain, palpitations or peripheral edema  GI: NEGATIVE for nausea, abdominal pain, heartburn, or change in bowel habits  : NEGATIVE for frequency, dysuria, or hematuria  MUSCULOSKELETAL: NEGATIVE for significant arthralgias or myalgia  NEURO: NEGATIVE for weakness, dizziness or paresthesias  ENDOCRINE: NEGATIVE for temperature intolerance, skin/hair changes  HEME: NEGATIVE for bleeding problems  PSYCHIATRIC: NEGATIVE for changes in mood or affect    Patient Active Problem List    Diagnosis Date Noted    Arthritis of carpometacarpal (CMC) joint of left thumb 07/14/2023     Priority: Medium    Combined form of age-related cataract, right eye 03/16/2023     Priority: Medium     Added automatically from request for surgery [id number]      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 03/16/2023     Priority: Medium     Added automatically from request for surgery [id number]      Severe stage glaucoma 02/15/2023     Priority: Medium     Added automatically from request for surgery [id number]      Bilateral hand numbness 01/27/2023     Priority: Medium    Osteoarthritis of both hands, unspecified osteoarthritis type 01/14/2023     Priority: Medium    Facet arthritis of lumbar region 01/11/2023     Priority: Medium    Diverticulosis of sigmoid colon 06/15/2020      Priority: Medium    Obesity (BMI 35.0-39.9) with comorbidity (H) 04/05/2019     Priority: Medium    Papanicolaou smear of cervix with low grade squamous intraepithelial lesion (LGSIL) 04/18/2018     Priority: Medium    Essential hypertension with goal blood pressure less than 140/90 03/29/2018     Priority: Medium    Adjustment disorder with mixed anxiety and depressed mood 03/29/2018     Priority: Medium    S/P cervical spinal fusion 03/30/2017     Priority: Medium    Radiculopathy of cervical spine 03/20/2017     Priority: Medium    Cervical high risk HPV (human papillomavirus) test positive 01/10/2017     Priority: Medium     2000, 2003, and 2009 NIL paps. in Care Everywhere.  9/5/13 NIL pap  1/10/17 NIL pap, + HR HPV (not 16 or 18). Plan: cotest in 1 year, due by 1/10/18  3/29/18 LSIL pap, + HR HPV (not 16 or 18). Plan: colp bef 6/29/18 4/18/18 [place] bx: negative. Plan: cotest in 6 mo, per provider.   11/9/18 Lost to follow-up for pap tracking  9/11/2019 Pap: NIL/neg HR HPV. Plan cotest in 3 years.  1/11/23 NIL pap, Neg HPV. Plan cotest in 3 years.         Microscopic hematuria 06/02/2016     Priority: Medium     Recommend f/u with primary MD for UA, blood pressure, and urine cytology at 6, 12, 24, and 36 months from this time.  If negative for 3 years then no further monitoring needed.      Refer back to urology for any of the following:              -cytology is suspicious or positive              -gross hematuria              -irritative voiding symptoms with evidence of infection/ worsening dysuria or urgency.     If persistent microscopic hematuria WITH: hypertension, proteinuria, or glomerular/dysmorphic RBCs in urine then evaluate for primary renal disease/refer instead to nephrology.       Tobacco dependency 03/06/2015     Priority: Medium    S/P carpal tunnel release 07/22/2014     Priority: Medium    Trigger thumb 07/22/2014     Priority: Medium    CTS (carpal tunnel syndrome) - bilateral 05/27/2014      Priority: Medium    S/P total knee arthroplasty juan 01/23/2014     Priority: Medium    Acute posthemorrhagic anemia 01/23/2014     Priority: Medium    Muscle spasm 01/23/2014     Priority: Medium    RLS (restless legs syndrome) 01/22/2012     Priority: Medium    Migraine 12/06/2011     Priority: Medium    Pseudogout 05/05/2011     Priority: Medium    OA (OSTEOARTHRITIS) OF KNEE - bilateral 05/05/2011     Priority: Medium    Hyperlipidemia LDL goal <130 10/31/2010     Priority: Medium    Asthma 12/16/1996     Priority: Medium      Past Medical History:   Diagnosis Date    Arthritis     Cervical high risk HPV (human papillomavirus) test positive 1/10/2017    1/10/17 NIL pap/+ HR HPV (not 16 or 18). Plan: cotest in 1 year, due by 1/10/18     Chronic infection     HX of MRSA. 2 neg swabs at [facility] in 2006 and 2007.    History of blood transfusion     Numbness and tingling     Right arm    PONV (postoperative nausea and vomiting)      Past Surgical History:   Procedure Laterality Date    APPENDECTOMY      ARTHROSCOPY KNEE  09/16/2011    Procedure:ARTHROSCOPY KNEE; left knee arthroscopy with debridement, open lateral patellar spur excision; Surgeon:LUIS A MONTOYA; Location:[facility] OR    CATARACT IOL, RT/LT      COLONOSCOPY N/A 02/16/2016    Procedure: COLONOSCOPY;  Surgeon: Belem Khalil MD;  Location: [facility]    COLONOSCOPY N/A 02/16/2016    Procedure: COMBINED COLONOSCOPY, SINGLE OR MULTIPLE BIOPSY/POLYPECTOMY BY BIOPSY;  Surgeon: Belem Khalil MD;  Location: [facility]    COLONOSCOPY N/A 06/15/2020    Procedure: Colonoscopy, With Polypectomy And Biopsy;  Surgeon: Torres Gallegos DO;  Location: [facility]    COLONOSCOPY WITH CO2 INSUFFLATION N/A 02/16/2016    Procedure: COLONOSCOPY WITH CO2 INSUFFLATION;  Surgeon: Belem Khalil MD;  Location: [facility]    COLONOSCOPY WITH CO2 INSUFFLATION N/A 06/15/2020    Procedure: COLONOSCOPY, WITH CO2 INSUFFLATION;  Surgeon: Torres Gallegos  DO;  Location: [facility]    CYSTOSCOPY  01/01/2016    microscopic hematuria    DECOMPRESSION, FUSION CERVICAL ANTERIOR ONE LEVEL, COMBINED N/A 03/30/2017    Procedure: COMBINED DECOMPRESSION, FUSION CERVICAL ANTERIOR ONE LEVEL;  Surgeon: Joseph Klein MD;  Location: [facility]    ECTOPIC PREGNANCY SURGERY      ENT SURGERY      GONIOSYNECHIALYSIS Left 02/20/2023    Procedure: goniosynechialysis left;  Surgeon: Gaston Guzman MD;  Location: [facility] OR    GYN SURGERY      HC INCISION TENDON SHEATH FINGER Left 07/11/2014    Thumb TF release    HC KNEE SCOPE,MED/LAT MENISECTOMY  09/16/2011    left, with partial medial menisectomy ONLY    HC REVISE MEDIAN N/CARPAL TUNNEL SURG Left 07/11/2014    PRIMARY - not revision    LASIK Bilateral     [id number]    ORTHOPEDIC SURGERY      PHACOEMULSIFICATION CLEAR CORNEA W/ STANDARD IOL IMPLANT, ENDOSCOPIC CYCLOPHOTOCOAGULATION, COMBINED Left 02/20/2023    Procedure: LEFT EYE COMPLEX PHACOEMULSIFICATION, CATARACT, CLEAR CORNEAL INCISION APPROACH, WITH STANDARD INTRAOCULAR LENS IMPLANT INSERTION AND ENDOSCOPIC CYCLOPHOTOCOAGULATION< GONISYNECHIOLYSIS;  Surgeon: Gaston Guzman MD;  Location: [facility] OR    PHACOEMULSIFICATION WITH STANDARD INTRAOCULAR LENS IMPLANT Right 5/12/2023    Procedure: RIGHT EYE PHACOEMULSIFICATION, CATARACT, WITH STANDARD INTRAOCULAR LENS IMPLANT INSERTION;  Surgeon: Gaston Guzman MD;  Location: [facility] OR    RELEASE CARPAL TUNNEL  07/11/2014    Procedure: RELEASE CARPAL TUNNEL;  Surgeon: Rg Franco MD;  Location: [facility]    RELEASE CARPAL TUNNEL Right 11/07/2014    Procedure: RELEASE CARPAL TUNNEL;  Surgeon: Rg Franco MD;  Location: [facility]    RELEASE TRIGGER FINGER  07/11/2014    Procedure: RELEASE TRIGGER FINGER;  Surgeon: Rg Franco MD;  Location: [facility]    RELEASE TRIGGER FINGER Right 11/07/2014    Procedure: RELEASE TRIGGER FINGER;  Surgeon: Rg Franco MD;  Location: [facility]    tonsils      ZZC PART  REMV FEMUR/PROX TIB/FIB  09/16/2011    left, open lateral patellar bone spur excision    ZZC TOTAL KNEE ARTHROPLASTY  01/17/2014    Bilateral     Current Outpatient Medications   Medication Sig Dispense Refill    acetaminophen (TYLENOL) 500 MG tablet Take 500-1,000 mg by mouth every 6 hours as needed.      albuterol (PROAIR HFA/PROVENTIL HFA/VENTOLIN HFA) 108 (90 Base) MCG/ACT inhaler Inhale 2 puffs into the lungs every 4 hours as needed for shortness of breath / dyspnea or wheezing 18 g 3    ammonium lactate (LAC-HYDRIN) 12 % external lotion       atorvastatin (LIPITOR) 80 MG tablet TAKE 1 TABLET(80 MG) BY MOUTH DAILY 90 tablet 2    calcium carbonate 500 mg, elemental, 1250 (500 Ca) MG tablet chewable Take 500 mg by mouth      cyclobenzaprine (FLEXERIL) 10 MG tablet TAKE 1 TABLET(10 MG) BY MOUTH EVERY NIGHT AS NEEDED FOR MUSCLE SPASMS 90 tablet 3    cyclobenzaprine (FLEXERIL) 10 MG tablet Take 10 mg by mouth daily      diclofenac (VOLTAREN) 75 MG EC tablet TAKE 1 TABLET(75 MG) BY MOUTH TWICE DAILY 180 tablet 0    dorzolamide-timolol (COSOPT) 2-0.5 % ophthalmic solution Place 1 drop Into the left eye 2 times daily 10 mL 5    losartan (COZAAR) 25 MG tablet TAKE 1 TABLET(25 MG) BY MOUTH DAILY 90 tablet 0    Misc. Devices (ROLLATOR ULTRA-LIGHT) MISC Walker with front wheels for home use.      Omega-3 Fatty Acids (FISH OIL PO)       tiZANidine (ZANAFLEX) 2 MG tablet Take 2-4 mg by mouth      TUMS ULTRA 1000 1000 MG CHEW CHEW 1 TABLET BY MOUTH TWICE DAILY WITH MEALS      varenicline (CHANTIX EVANGELINA) 0.5 MG X 11 & 1 MG X 42 tablet Take 0.5 mg tab daily for 3 days, THEN 0.5 mg tab twice daily for 4 days, THEN 1 mg twice daily. 53 tablet 0    varenicline (CHANTIX EVANGELINA) 0.5 MG X 11 & 1 MG X 42 tablet Take 0.5 mg tab daily for 3 days, THEN 0.5 mg tab twice daily for 4 days, THEN 1 mg twice daily. 53 tablet 0    varenicline (CHANTIX) 1 MG tablet TAKE 1 TABLET(1 MG) BY MOUTH TWICE DAILY 180 tablet 0    polyethylene glycol-propylene  "glycol (SYSTANE ULTRA) 0.4-0.3 % SOLN ophthalmic solution Apply 1 drop to eye (Patient not taking: Reported on 9/7/2023)         Allergies   Allergen Reactions    Wasp Venom Protein Anaphylaxis    Bee Anaphylaxis    Erythromycin Hives    Wasps [Hornets] Anaphylaxis    Shellfish Allergy Nausea and Vomiting and Rash    Shellfish-Derived Products Rash and Nausea and Vomiting        Social History     Tobacco Use    Smoking status: Former     Packs/day: 1.00     Years: 15.00     Pack years: 15.00     Types: Cigarettes    Smokeless tobacco: Never    Tobacco comments:     Started Chantix   Substance Use Topics    Alcohol use: Yes     Comment: socially     Family History   Problem Relation Age of Onset    C.A.D. Father     Breast Cancer Maternal Grandmother     Ovarian Cancer Maternal Grandmother     Cancer - colorectal Maternal Grandfather     Colon Cancer Maternal Grandfather     Prostate Cancer Maternal Grandfather     C.A.D. Paternal Grandfather     Lung Cancer Sister     Colon Cancer Sister     Brain Cancer Sister     Glaucoma No family hx of     Macular Degeneration No family hx of      History   Drug Use No         Objective     BP (!) 148/79 (BP Location: Left arm, Patient Position: Sitting, Cuff Size: Adult Large)   Pulse 78   Temp 97.2  F (36.2  C) (Oral)   Resp 19   Ht 1.565 m (5' 1.61\")   Wt 99.9 kg (220 lb 3.2 oz)   LMP 09/16/2011   SpO2 98%   BMI 40.78 kg/m      Physical Exam    GENERAL APPEARANCE: healthy, alert and no distress     EYES: EOMI, PERRL     HENT: ear canals and TM's normal and nose and mouth without ulcers or lesions     NECK: no adenopathy, no asymmetry, masses, or scars and thyroid normal to palpation     RESP: lungs clear to auscultation - no rales, rhonchi or wheezes     CV: regular rates and rhythm, normal S1 S2, no S3 or S4 and no murmur, click or rub     ABDOMEN:  soft, nontender, no HSM or masses and bowel sounds normal     MS: extremities normal- no gross deformities noted and " arthritis of the left CMC joint, wearing splint     SKIN: no suspicious lesions or rashes     NEURO: Normal strength and tone, sensory exam grossly normal, mentation intact and speech normal     PSYCH: mentation appears normal. and affect normal/bright     LYMPHATICS: No cervical adenopathy    Recent Labs   Lab Test 04/26/23  1144 01/11/23  1439 09/12/22  1422 06/27/22  1102   HGB  --  15.1 14.4 15.0   PLT  --  349 318 339   INR  --   --   --  1.04   NA  --  140 139 138   POTASSIUM 4.4 4.6 4.5 3.8   CR 0.59 0.62 0.59 0.72      Component      Latest Ref Rng 9/7/2023  9:38 AM   Creatinine      0.51 - 0.95 mg/dL 0.69    GFR Estimate      >60 mL/min/1.73m2 >90    Potassium      3.4 - 5.3 mmol/L 4.8        Diagnostics:  Labs pending at this time.  Results will be reviewed when available.   No EKG required, no history of coronary heart disease, significant arrhythmia, peripheral arterial disease or other structural heart disease.    Revised Cardiac Risk Index (RCRI):  The patient has the following serious cardiovascular risks for perioperative complications:   - No serious cardiac risks = 0 points     RCRI Interpretation: 0 points: Class I (very low risk - 0.4% complication rate)         Signed Electronically by: JOSE R Sarabia CNP  Copy of this evaluation report is provided to requesting physician.

## 2023-09-07 NOTE — NURSING NOTE
"Prior to immunization administration, verified patients identity using patient s name and date of birth. Please see Immunization Activity for additional information.     Screening Questionnaire for Adult Immunization    Are you sick today?   No   Do you have allergies to medications, food, a vaccine component or latex?   Yes- med   Have you ever had a serious reaction after receiving a vaccination?   No   Do you have a long-term health problem with heart, lung, kidney, or metabolic disease (e.g., diabetes), asthma, a blood disorder, no spleen, complement component deficiency, a cochlear implant, or a spinal fluid leak?  Are you on long-term aspirin therapy?   Yes- asthma   Do you have cancer, leukemia, HIV/AIDS, or any other immune system problem?   No   Do you have a parent, brother, or sister with an immune system problem?   No   In the past 3 months, have you taken medications that affect  your immune system, such as prednisone, other steroids, or anticancer drugs; drugs for the treatment of rheumatoid arthritis, Crohn s disease, or psoriasis; or have you had radiation treatments?   Yes   Have you had a seizure, or a brain or other nervous system problem?   No   During the past year, have you received a transfusion of blood or blood    products, or been given immune (gamma) globulin or antiviral drug?   No   For women: Are you pregnant or is there a chance you could become       pregnant during the next month?   No   Have you received any vaccinations in the past 4 weeks?   No     Immunization questionnaire was positive for at least one answer.  Thein aware- okay to give vaccine as these \"yes\" answers don't counter interact. .      Patient instructed to remain in clinic for 15 minutes afterwards, and to report any adverse reactions.     Screening performed by Nishi Man RN on 9/7/2023 at 9:25 AM.        "

## 2023-09-18 ENCOUNTER — ANESTHESIA EVENT (OUTPATIENT)
Dept: SURGERY | Facility: AMBULATORY SURGERY CENTER | Age: 63
End: 2023-09-18
Payer: COMMERCIAL

## 2023-09-19 ENCOUNTER — ANESTHESIA (OUTPATIENT)
Dept: SURGERY | Facility: AMBULATORY SURGERY CENTER | Age: 63
End: 2023-09-19
Payer: COMMERCIAL

## 2023-09-19 ENCOUNTER — HOSPITAL ENCOUNTER (OUTPATIENT)
Facility: AMBULATORY SURGERY CENTER | Age: 63
Discharge: HOME | End: 2023-09-19
Attending: STUDENT IN AN ORGANIZED HEALTH CARE EDUCATION/TRAINING PROGRAM | Admitting: STUDENT IN AN ORGANIZED HEALTH CARE EDUCATION/TRAINING PROGRAM
Payer: COMMERCIAL

## 2023-09-19 VITALS
SYSTOLIC BLOOD PRESSURE: 163 MMHG | TEMPERATURE: 97.5 F | HEIGHT: 61 IN | DIASTOLIC BLOOD PRESSURE: 89 MMHG | RESPIRATION RATE: 18 BRPM | OXYGEN SATURATION: 96 % | BODY MASS INDEX: 41.54 KG/M2 | HEART RATE: 79 BPM | WEIGHT: 220 LBS

## 2023-09-19 DIAGNOSIS — M19.041 OSTEOARTHRITIS OF BOTH HANDS, UNSPECIFIED OSTEOARTHRITIS TYPE: ICD-10-CM

## 2023-09-19 DIAGNOSIS — M18.12 ARTHRITIS OF CARPOMETACARPAL (CMC) JOINT OF LEFT THUMB: Primary | ICD-10-CM

## 2023-09-19 DIAGNOSIS — M19.042 OSTEOARTHRITIS OF BOTH HANDS, UNSPECIFIED OSTEOARTHRITIS TYPE: ICD-10-CM

## 2023-09-19 PROCEDURE — 25447 ARTHRP NTRCRP/CRP/MTCR NTRPS: CPT | Mod: LT | Performed by: STUDENT IN AN ORGANIZED HEALTH CARE EDUCATION/TRAINING PROGRAM

## 2023-09-19 PROCEDURE — 25447 ARTHRP NTRCRP/CRP/MTCR NTRPS: CPT | Mod: LT

## 2023-09-19 PROCEDURE — 25310 TRANSPLANT FOREARM TENDON: CPT | Mod: LT | Performed by: STUDENT IN AN ORGANIZED HEALTH CARE EDUCATION/TRAINING PROGRAM

## 2023-09-19 PROCEDURE — 25310 TRANSPLANT FOREARM TENDON: CPT | Mod: LT

## 2023-09-19 RX ORDER — SODIUM CHLORIDE, SODIUM LACTATE, POTASSIUM CHLORIDE, CALCIUM CHLORIDE 600; 310; 30; 20 MG/100ML; MG/100ML; MG/100ML; MG/100ML
INJECTION, SOLUTION INTRAVENOUS CONTINUOUS
Status: DISCONTINUED | OUTPATIENT
Start: 2023-09-19 | End: 2023-09-20 | Stop reason: HOSPADM

## 2023-09-19 RX ORDER — ONDANSETRON 4 MG/1
4 TABLET, ORALLY DISINTEGRATING ORAL EVERY 30 MIN PRN
Status: DISCONTINUED | OUTPATIENT
Start: 2023-09-19 | End: 2023-09-20 | Stop reason: HOSPADM

## 2023-09-19 RX ORDER — NALOXONE HYDROCHLORIDE 0.4 MG/ML
0.4 INJECTION, SOLUTION INTRAMUSCULAR; INTRAVENOUS; SUBCUTANEOUS
Status: DISCONTINUED | OUTPATIENT
Start: 2023-09-19 | End: 2023-09-20 | Stop reason: HOSPADM

## 2023-09-19 RX ORDER — OXYCODONE HYDROCHLORIDE 5 MG/1
5 TABLET ORAL EVERY 6 HOURS PRN
Qty: 6 TABLET | Refills: 0 | Status: SHIPPED | OUTPATIENT
Start: 2023-09-19 | End: 2023-09-20

## 2023-09-19 RX ORDER — CEFAZOLIN SODIUM 2 G/50ML
2 SOLUTION INTRAVENOUS SEE ADMIN INSTRUCTIONS
Status: DISCONTINUED | OUTPATIENT
Start: 2023-09-19 | End: 2023-09-20 | Stop reason: HOSPADM

## 2023-09-19 RX ORDER — PROPOFOL 10 MG/ML
INJECTION, EMULSION INTRAVENOUS PRN
Status: DISCONTINUED | OUTPATIENT
Start: 2023-09-19 | End: 2023-09-19

## 2023-09-19 RX ORDER — OXYCODONE HYDROCHLORIDE 5 MG/1
5 TABLET ORAL
Status: COMPLETED | OUTPATIENT
Start: 2023-09-19 | End: 2023-09-19

## 2023-09-19 RX ORDER — ONDANSETRON 4 MG/1
4 TABLET, FILM COATED ORAL EVERY 6 HOURS PRN
Qty: 12 TABLET | Refills: 0 | Status: SHIPPED | OUTPATIENT
Start: 2023-09-19 | End: 2023-10-23

## 2023-09-19 RX ORDER — HYDROMORPHONE HYDROCHLORIDE 1 MG/ML
0.2 INJECTION, SOLUTION INTRAMUSCULAR; INTRAVENOUS; SUBCUTANEOUS EVERY 5 MIN PRN
Status: DISCONTINUED | OUTPATIENT
Start: 2023-09-19 | End: 2023-09-20 | Stop reason: HOSPADM

## 2023-09-19 RX ORDER — FENTANYL CITRATE 50 UG/ML
INJECTION, SOLUTION INTRAMUSCULAR; INTRAVENOUS PRN
Status: DISCONTINUED | OUTPATIENT
Start: 2023-09-19 | End: 2023-09-19

## 2023-09-19 RX ORDER — FENTANYL CITRATE 50 UG/ML
25-50 INJECTION, SOLUTION INTRAMUSCULAR; INTRAVENOUS
Status: DISCONTINUED | OUTPATIENT
Start: 2023-09-19 | End: 2023-09-20 | Stop reason: HOSPADM

## 2023-09-19 RX ORDER — CEFAZOLIN SODIUM 2 G/50ML
2 SOLUTION INTRAVENOUS
Status: COMPLETED | OUTPATIENT
Start: 2023-09-19 | End: 2023-09-19

## 2023-09-19 RX ORDER — NALOXONE HYDROCHLORIDE 0.4 MG/ML
0.2 INJECTION, SOLUTION INTRAMUSCULAR; INTRAVENOUS; SUBCUTANEOUS
Status: DISCONTINUED | OUTPATIENT
Start: 2023-09-19 | End: 2023-09-20 | Stop reason: HOSPADM

## 2023-09-19 RX ORDER — FENTANYL CITRATE 50 UG/ML
25 INJECTION, SOLUTION INTRAMUSCULAR; INTRAVENOUS EVERY 5 MIN PRN
Status: DISCONTINUED | OUTPATIENT
Start: 2023-09-19 | End: 2023-09-20 | Stop reason: HOSPADM

## 2023-09-19 RX ORDER — METHOCARBAMOL 500 MG/1
500 TABLET, FILM COATED ORAL 4 TIMES DAILY
Qty: 12 TABLET | Refills: 0 | Status: SHIPPED | OUTPATIENT
Start: 2023-09-19 | End: 2023-10-10

## 2023-09-19 RX ORDER — ONDANSETRON 2 MG/ML
4 INJECTION INTRAMUSCULAR; INTRAVENOUS EVERY 30 MIN PRN
Status: DISCONTINUED | OUTPATIENT
Start: 2023-09-19 | End: 2023-09-20 | Stop reason: HOSPADM

## 2023-09-19 RX ORDER — CEPHALEXIN 500 MG/1
500 CAPSULE ORAL 4 TIMES DAILY
Qty: 12 CAPSULE | Refills: 0 | Status: SHIPPED | OUTPATIENT
Start: 2023-09-19 | End: 2023-10-10

## 2023-09-19 RX ORDER — PROPOFOL 10 MG/ML
INJECTION, EMULSION INTRAVENOUS CONTINUOUS PRN
Status: DISCONTINUED | OUTPATIENT
Start: 2023-09-19 | End: 2023-09-19

## 2023-09-19 RX ORDER — FENTANYL CITRATE 50 UG/ML
50 INJECTION, SOLUTION INTRAMUSCULAR; INTRAVENOUS EVERY 5 MIN PRN
Status: DISCONTINUED | OUTPATIENT
Start: 2023-09-19 | End: 2023-09-20 | Stop reason: HOSPADM

## 2023-09-19 RX ORDER — FENTANYL CITRATE 50 UG/ML
25 INJECTION, SOLUTION INTRAMUSCULAR; INTRAVENOUS
Status: DISCONTINUED | OUTPATIENT
Start: 2023-09-19 | End: 2023-09-20 | Stop reason: HOSPADM

## 2023-09-19 RX ORDER — FLUMAZENIL 0.1 MG/ML
0.2 INJECTION, SOLUTION INTRAVENOUS
Status: DISCONTINUED | OUTPATIENT
Start: 2023-09-19 | End: 2023-09-20 | Stop reason: HOSPADM

## 2023-09-19 RX ORDER — HYDROMORPHONE HYDROCHLORIDE 1 MG/ML
0.4 INJECTION, SOLUTION INTRAMUSCULAR; INTRAVENOUS; SUBCUTANEOUS EVERY 5 MIN PRN
Status: DISCONTINUED | OUTPATIENT
Start: 2023-09-19 | End: 2023-09-20 | Stop reason: HOSPADM

## 2023-09-19 RX ORDER — OXYCODONE HYDROCHLORIDE 5 MG/1
10 TABLET ORAL
Status: DISCONTINUED | OUTPATIENT
Start: 2023-09-19 | End: 2023-09-20 | Stop reason: HOSPADM

## 2023-09-19 RX ORDER — LIDOCAINE HYDROCHLORIDE 20 MG/ML
INJECTION, SOLUTION INFILTRATION; PERINEURAL PRN
Status: DISCONTINUED | OUTPATIENT
Start: 2023-09-19 | End: 2023-09-19

## 2023-09-19 RX ORDER — ACETAMINOPHEN 325 MG/1
975 TABLET ORAL ONCE
Status: COMPLETED | OUTPATIENT
Start: 2023-09-19 | End: 2023-09-19

## 2023-09-19 RX ORDER — LIDOCAINE 40 MG/G
CREAM TOPICAL
Status: DISCONTINUED | OUTPATIENT
Start: 2023-09-19 | End: 2023-09-20 | Stop reason: HOSPADM

## 2023-09-19 RX ORDER — BUPIVACAINE HYDROCHLORIDE 5 MG/ML
INJECTION, SOLUTION EPIDURAL; INTRACAUDAL
Status: COMPLETED | OUTPATIENT
Start: 2023-09-19 | End: 2023-09-19

## 2023-09-19 RX ADMIN — SODIUM CHLORIDE, SODIUM LACTATE, POTASSIUM CHLORIDE, CALCIUM CHLORIDE: 600; 310; 30; 20 INJECTION, SOLUTION INTRAVENOUS at 13:11

## 2023-09-19 RX ADMIN — LIDOCAINE HYDROCHLORIDE 100 MG: 20 INJECTION, SOLUTION INFILTRATION; PERINEURAL at 13:15

## 2023-09-19 RX ADMIN — FENTANYL CITRATE 50 MCG: 50 INJECTION, SOLUTION INTRAMUSCULAR; INTRAVENOUS at 13:37

## 2023-09-19 RX ADMIN — CEFAZOLIN SODIUM 2 G: 2 SOLUTION INTRAVENOUS at 13:11

## 2023-09-19 RX ADMIN — PROPOFOL 50 MG: 10 INJECTION, EMULSION INTRAVENOUS at 13:15

## 2023-09-19 RX ADMIN — PROPOFOL 100 MCG/KG/MIN: 10 INJECTION, EMULSION INTRAVENOUS at 13:15

## 2023-09-19 RX ADMIN — OXYCODONE HYDROCHLORIDE 5 MG: 5 TABLET ORAL at 14:59

## 2023-09-19 RX ADMIN — BUPIVACAINE HYDROCHLORIDE 20 ML: 5 INJECTION, SOLUTION EPIDURAL; INTRACAUDAL at 12:34

## 2023-09-19 RX ADMIN — FENTANYL CITRATE 50 MCG: 50 INJECTION, SOLUTION INTRAMUSCULAR; INTRAVENOUS at 12:26

## 2023-09-19 ASSESSMENT — LIFESTYLE VARIABLES: TOBACCO_USE: 1

## 2023-09-19 NOTE — ANESTHESIA PROCEDURE NOTES
Brachial plexus Procedure Note    Pre-Procedure   Staff -        Anesthesiologist:  Alex Tolentino MD       Performed By: anesthesiologist       Location: pre-op       Pre-Anesthestic Checklist: patient identified, IV checked, site marked, risks and benefits discussed, informed consent, monitors and equipment checked, pre-op evaluation, at physician/surgeon's request and post-op pain management  Timeout:       Correct Patient: Yes        Correct Procedure: Yes        Correct Site: Yes        Correct Position: Yes        Correct Laterality: Yes        Site Marked: Yes  Procedure Documentation  Procedure: Brachial plexus       Diagnosis: POST OPERATIVE PAIN       Laterality: left       Patient Position: sitting       Patient Prep/Sterile Barriers: sterile gloves, mask       Skin prep: Chloraprep (axillary approach).       Needle Type: short bevel       Needle Gauge: 21.        Needle Length (millimeters): 110        Ultrasound guided       1. Ultrasound was used to identify targeted nerve, plexus, vascular marker, or fascial plane and place a needle adjacent to it in real-time.       2. Ultrasound was used to visualize the spread of anesthetic in close proximity to the above referenced structure.       3. A permanent image is entered into the patient's record.    Assessment/Narrative         The placement was negative for: blood aspirated, painful injection and site bleeding       Paresthesias: No.       Bolus given via needle..        Secured via.        Insertion/Infusion Method: Single Shot       Complications: none       Injection made incrementally with aspirations every 5 mL.    Medication(s) Administered   Bupivacaine 0.5% PF (Infiltration) - Infiltration   20 mL - 9/19/2023 12:34:00 PM  Bupivacaine liposome (Exparel) 1.3% LA inj susp (Infiltration) - Infiltration   10 mL - 9/19/2023 12:34:00 PM   Comments:  133mg exparel used      FOR [facility] ([facility]) ONLY:   Pain Team Contact information: please  "page the Pain Team Via [facility]. Search \"Pain\". During daytime hours, please page the attending first. At night please page the resident first.      "

## 2023-09-19 NOTE — DISCHARGE INSTRUCTIONS
[facility]  Home Care Following Anesthesia  For 24 hours after surgery:  Get plenty of rest.  A responsible adult must stay with you for at least 24 hours after you leave the surgery center.  Do not drive or use heavy equipment.  If you have weakness or tingling, don't drive or use heavy equipment until this feeling goes away.   Do not drink alcohol.   Avoid strenuous or risky activities.  Ask for help when climbing stairs.  You may feel lightheaded.  IF so, sit for a few minutes before standing.  Have someone help you get up.   If you have nausea (feel sick to your stomach): Drink only clear liquids such as apple juice, ginger ale, broth or 7-Up.  Rest may also help.  Be sure to drink enough fluids.  Move to a regular diet as you feel able.   You may have a slight fever.  Call the doctor if your fever is over 100 F (37.7 C) (taken under the tongue) or lasts longer than 24 hours.  You may have a dry mouth, a sore throat, muscle aches or trouble sleeping. These should go away after 24 hours.  Do not make important or legal decisions.   It is recommended to avoid smoking.   Post Operative Instructions: Regional Anesthetic for Upper Extremity  General Information:   Regional anesthesia is when local anesthetic or  numbing  medication is injected around the nerves to anesthetize or  numb  the area supplied by that set of nerves.      Types of Regional Blocks:  Interscalene: A block injected into the neck on the operative shoulder/arm of a patient having shoulder surgery  Supraclavicular: A block injected near the clavicle on the operative shoulder of a patient having elbow, forearm, or hand surgery    Procedure:  The type of anesthesia your doctor used to numb your shoulder or arm will usually not wear off for 6-18 hours, but may last as long as 24 hours. You should be careful during that period, since it is possible to injure your arm without being aware of the injury. While your  arm is numb, you should:  Avoid striking or bumping your arm  Avoid extreme hot or cold    Diet:  There are no restrictions on your diet. You should drink plenty of fluids.     Discomfort:  You will have a tingling and prickly sensation in your arm as the feeling begins to return. You can also expect some discomfort. The amount of discomfort is unpredictable, but if you have more pain than can be controlled with pain medication you should notify your physician.     Pain Medicine:   Begin taking your oral pain pills (if you have not already done so) before bedtime and during the night to avoid a sudden onset of pain as the block wears off.  Do not engage in drinking, driving, or hazardous occupations while taking pain medication.     Stitches:   You may have stitches or special skin closures. You doctor will inform you when to return to the office to have them removed.     Activity:  On the day of surgery you should try to stay in bed with your hand elevated on pillows. You may resume your normal activity after that, wearing a sling for comfort. Contact your physician if you have any of the problems:   Continued numbness or tingling in the arm or hand after 24 hours  Swelling of the fingers or fingers that are cold to the touch  Excessive bleeding or drainage  Severe pain      Tips for taking pain medications  To get the best pain relief possible, remember these points:  Take pain medications as directed, before pain becomes severe.  Pain medication can upset your stomach: taking it with food may help.  Constipation is a common side effect of pain medication. Drink plenty of  fluids.  Eat foods high in fiber. Take a stool softener if recommended by your doctor or pharmacist.  Do not drink alcohol, drive or operate machinery while taking pain medications.  Ask about other ways to control pain, such as with heat, ice or relaxation.    Tylenol/Acetaminophen Consumption    If you feel your pain relief is insufficient,  you may take Tylenol/Acetaminophen in addition to your narcotic pain medication.   Be careful not to exceed 4,000 mg of Tylenol/Acetaminophen in a 24 hour period from all sources.  If you are taking extra strength Tylenol/acetaminophen (500 mg), the maximum dose is 8 tablets in 24 hours.  If you are taking regular strength acetaminophen (325 mg), the maximum dose is 12 tablets in 24 hours.    Call a doctor for any of the following:  Signs of infection (fever, growing tenderness at the surgery site, a large amount of drainage or bleeding, severe pain, foul-smelling drainage, redness, swelling).  It has been over 8 to 10 hours since surgery and you are still not able to urinate (pass water).  Headache for over 24 hours.  Numbness, tingling or weakness the day after surgery (if you had spinal anesthesia).  Signs of Covid-19 infection (temperature over 100 degrees, shortness of breath, cough, loss of taste/smell, generalized body aches, persistent headache, chills, sore throat, nausea/vomiting/diarrhea)  Your doctor is:       Dr. Mat Frye, Plastic Surgery: [phone]               Or dial [phone] and ask for the resident on call for:  Orthopaedics  For emergency care, call the:  [facility] Emergency Department: [phone] (TTY for hearing impaired: [phone])                Hand Surgery Discharge Instructions    Patient has been treated for left thumb CMC arthritis with Dr. Frye on 9/19/2023.     Care: Please keep the splint/cast/dressing clean and dry at all times. Should it get wet, please call the clinic to schedule an appointment - as it may need to be replaced. Do not hesitate to use the sling to help protect the affected extremity and in particular in the setting of a nerve block.     Medications: You can take Ibuprofen 400-800 mg and Tylenol 650 mg for pain relief. Please take each every 6 hours, and for optimal pain relief - please stagger the medications so that you are taking one or the  other every 3 hours. If you had a nerve block, the effects may last 8-12 hours. If you have been prescribed additional pain medications, please take as instructed and as needed. If you are taking additional pain medications, please do not exceed 4000 mg of Tylenol daily from all sources. Also, if you are taking narcotic medications, please do not operate heavy machinery or drive. If you have been prescribed antibiotics, please also take as instructed.     Diet: Start with clear liquids and slow down if nauseated. Advance the diet as tolerated.    Weight-bearing/Activities: Move your fingers to prevent stiffness. Elevate the affected extremity to improve throbbing sensation or pain. No strenuous activities and do not raise your heart rate above 100 bpm for the first few weeks. Your extremity is non-weight-bearing.     ER Instructions: Please call the office and/or consider return to the ER if you experience worsening pain not relieved by medications, increased swelling, redness or high fevers >101F or if there are unexpected problems like shortness of breath.    Post-op follow-up: Clinic in 10-14 days with Dr. Frye at the [facility]. Please call our Ortho triage line if you have any questions or concerns before your clinic visit: [phone].    Mat Frye MD, PhD

## 2023-09-19 NOTE — OP NOTE
HAND SURGERY OPERATIVE REPORT     Date of Surgery: 9/19/2023  Surgical Service: Ortho Hand     Preoperative Diagnosis: Left thumb carpometacarpal joint arthritis     Postoperative Diagnosis:   1. Left thumb carpometacarpal joint arthritis  2. Left scaphotrapezoid arthritis     Procedures Performed:   1. Left thumb carpometacarpal joint arthroplasty with suture suspension  2. Left proximal trapezoidectomy     Attending:  SOURAV Frye MD, PhD  Assistant: BAILEE Haynes MD     Complications: None apparent  Specimens: None  Implants: #2 FiberWire  Estimated blood loss: < 5 mL  Tourniquet time: 27 minutes  Wound classification: Clean  Anesthesia: Block with sedation     Indications for Procedure: 63 year-old female right hand dominant non-smoker  presenting with left thumb carpometacarpal joint arthritis that no longer is relieved by steroid injections. Her last steroid injection on the left was >3 months ago with no relief. She quit smoking over 3 months ago. She elects to proceed with left trapeziectomy with thumb carpometacarpal joint arthroplasty using suture suspension, with possible resection scaphotrapezoid arthroplasty.      Intraoperative findings: Arthritic trapezium. Scaphotrapezoid arthritis. Solid suspension. No bleeding on closure.      Description of Procedure: Patient was seen in the preoperative holding area.  Consent was verified.  The left upper extremity was marked.  All additional questions were answered.  The risks of the surgery were reiterated, including (but not limited to): infection, bleeding, scarring, pain, injury to surrounding structures including nerves and tendons, need for additional revision surgery, recurrence of pain, neuroma formation, complex regional pain syndrome, stiffness.  Following discussion of all these risks, the patient again agreed to proceed with surgery.  Patient was then transferred to the operating room and placed supine on the operating table.  All pressure  "points were padded.  Sequential compression devices were placed on bilateral lower extremities and verified to be operational.  IV antibiotic prophylaxis was given.  Preinduction timeout was performed.  Monitored anesthesia care was commenced. The left upper extremity was prepped and draped in usual sterile fashion. At the start of the case, the left upper extremity was exsanguinated with an Esmarch and an arm tourniquet was inflated to 250 mm Hg. Next, a longitudinally-oriented incision was made through the skin. Next, the radial sensory branches were identified, gently dissected off the deep fascia and gently retracted out of the way. The first dorsal extensor compartment was identified. The interval between the abductor pollicis longus and extensor pollicis brevis tendons was identified, and using a #15 blade the first compartment release was done. This was done with care to leave a volar leaflet and to release the EPB subsheath. Next, a self-retaining retractor was placed. The subsheath was incised to expose the dorsal radial artery. This was dissected off the thumb CMC capsule and branches were ligated with cautery. Once done, a longitudinally-oriented capsular incision was made from the distal pole of scaphoid to the thumb metacarpal base. The capsular flaps were raised thick with monopolar cautery. Once the trapezium was exposed, cautery and a blade was used to free up the trapezium. Next, a McGlamry elevator was used to complete the trapeziectomy with care taken not to damage the flexor carpi radialis tendon. A Rongeur was use to remove any additional osteophytes until there were none remaining. At this point, the scaphotrapezoidal joint was examined. It was found to be arthritic. The proximal trapezoid was resected with 1/4\" straight osteotome and removed. Next, the thumb was distracted and slightly pronated. A #2 FiberWire was used to perform a tenodesis between the APL and FCR tendons using several loops " "of suture, in figure-of-eight fashion. Care was taken not to injure or snag radial sensor nerve branches. The wound was irrigated with sterile saline. The dorsoradial capsule was then closed using 2-0 PDS suture in a running pursestring fashion. The tourniquet was taken down. Hemostasis was ensured with cautery. The surgical wound was additionally irrigated. The EPB tendon was divided distally and sewn to the APL tendon using 4-0 PDS suture in figure-of-eight fashion. The skin was closed with 4-0 Nylon suture in interrupted simple and horizontal mattress fashion. The incision was dressed with Xeroform, bacitracin, 4 x 4\" gauze and a thumb spica splint was placed. The patient was woken up and transferred to the postanesthesia care unit with no events.      Postoperative plan: Clinic in 2 weeks with OT splint fabrication.      Mat Frye MD, PhD   "

## 2023-09-19 NOTE — ANESTHESIA PREPROCEDURE EVALUATION
Anesthesia Pre-Procedure Evaluation    Patient: Margo Inman   MRN: [id number] : 1960        Procedure : Procedure(s):  LEFT THUMB CARPOMETACARPAL JOINT ARTHROPLASTY WITH SUTURE SUSPENSION          Past Medical History:   Diagnosis Date    Arthritis     Cervical high risk HPV (human papillomavirus) test positive 1/10/2017    1/10/17 NIL pap/+ HR HPV (not 16 or 18). Plan: cotest in 1 year, due by 1/10/18     Chronic infection     HX of MRSA. 2 neg swabs at [facility] in  and .    History of blood transfusion     Numbness and tingling     Right arm    PONV (postoperative nausea and vomiting)       Past Surgical History:   Procedure Laterality Date    APPENDECTOMY      ARTHROSCOPY KNEE  2011    Procedure:ARTHROSCOPY KNEE; left knee arthroscopy with debridement, open lateral patellar spur excision; Surgeon:LUIS A MONTOYA; Location:[facility] OR    CATARACT IOL, RT/LT      COLONOSCOPY N/A 2016    Procedure: COLONOSCOPY;  Surgeon: Belem Khalil MD;  Location: [facility]    COLONOSCOPY N/A 2016    Procedure: COMBINED COLONOSCOPY, SINGLE OR MULTIPLE BIOPSY/POLYPECTOMY BY BIOPSY;  Surgeon: Belem Khalil MD;  Location: [facility]    COLONOSCOPY N/A 06/15/2020    Procedure: Colonoscopy, With Polypectomy And Biopsy;  Surgeon: Torres Gallegos DO;  Location: [facility]    COLONOSCOPY WITH CO2 INSUFFLATION N/A 2016    Procedure: COLONOSCOPY WITH CO2 INSUFFLATION;  Surgeon: Belem Khalil MD;  Location: [facility]    COLONOSCOPY WITH CO2 INSUFFLATION N/A 06/15/2020    Procedure: COLONOSCOPY, WITH CO2 INSUFFLATION;  Surgeon: Torres Gallegos DO;  Location: [facility]    CYSTOSCOPY  2016    microscopic hematuria    DECOMPRESSION, FUSION CERVICAL ANTERIOR ONE LEVEL, COMBINED N/A 2017    Procedure: COMBINED DECOMPRESSION, FUSION CERVICAL ANTERIOR ONE LEVEL;  Surgeon: Joseph Klein MD;  Location: [facility] OR    ECTOPIC PREGNANCY SURGERY      ENT  SURGERY      GONIOSYNECHIALYSIS Left 02/20/2023    Procedure: goniosynechialysis left;  Surgeon: Gaston Guzman MD;  [facility]    GYN SURGERY      HC INCISION TENDON SHEATH FINGER Left 07/11/2014    Thumb TF release    HC KNEE SCOPE,MED/LAT MENISECTOMY  09/16/2011    left, with partial medial menisectomy ONLY    HC REVISE MEDIAN N/CARPAL TUNNEL SURG Left 07/11/2014    PRIMARY - not revision    LASIK Bilateral     [id number]    ORTHOPEDIC SURGERY      PHACOEMULSIFICATION CLEAR CORNEA W/ STANDARD IOL IMPLANT, ENDOSCOPIC CYCLOPHOTOCOAGULATION, COMBINED Left 02/20/2023    Procedure: LEFT EYE COMPLEX PHACOEMULSIFICATION, CATARACT, CLEAR CORNEAL INCISION APPROACH, WITH STANDARD INTRAOCULAR LENS IMPLANT INSERTION AND ENDOSCOPIC CYCLOPHOTOCOAGULATION< GONISYNECHIOLYSIS;  Surgeon: Gaston Guzman MD;  [facility]    PHACOEMULSIFICATION WITH STANDARD INTRAOCULAR LENS IMPLANT Right 5/12/2023    Procedure: RIGHT EYE PHACOEMULSIFICATION, CATARACT, WITH STANDARD INTRAOCULAR LENS IMPLANT INSERTION;  Surgeon: Gaston Guzman MD;  [facility]    RELEASE CARPAL TUNNEL  07/11/2014    Procedure: RELEASE CARPAL TUNNEL;  Surgeon: Rg Franco MD;  Location: [facility] OR    RELEASE CARPAL TUNNEL Right 11/07/2014    Procedure: RELEASE CARPAL TUNNEL;  Surgeon: Rg Franco MD;  Location: [facility] OR    RELEASE TRIGGER FINGER  07/11/2014    Procedure: RELEASE TRIGGER FINGER;  Surgeon: Rg Franco MD;  Location: [facility] OR    RELEASE TRIGGER FINGER Right 11/07/2014    Procedure: RELEASE TRIGGER FINGER;  Surgeon: Rg Franco MD;  Location: [facility] OR    tonsils      [facility] PART REMV FEMUR/PROX TIB/FIB  09/16/2011    left, open lateral patellar bone spur excision    [facility] TOTAL KNEE ARTHROPLASTY  01/17/2014    Bilateral      Allergies   Allergen Reactions    Wasp Venom Protein Anaphylaxis    Bee Anaphylaxis    Erythromycin Hives    Wasps [Hornets] Anaphylaxis    Shellfish Allergy Nausea and  Vomiting and Rash    Shellfish-Derived Products Rash and Nausea and Vomiting      Social History     Tobacco Use    Smoking status: Former     Packs/day: 1.00     Years: 15.00     Pack years: 15.00     Types: Cigarettes    Smokeless tobacco: Never    Tobacco comments:     Started Chantix   Substance Use Topics    Alcohol use: Yes     Comment: socially      Wt Readings from Last 1 Encounters:   09/19/23 99.8 kg (220 lb)        Anesthesia Evaluation            ROS/MED HX  ENT/Pulmonary:     (+)                tobacco use, Current use,    asthma                  Neurologic:       Cardiovascular:     (+)  hypertension- -   -  - -                                      METS/Exercise Tolerance:     Hematologic:       Musculoskeletal: Comment: Cervical radiculopathy  (+)  arthritis,             GI/Hepatic:       Renal/Genitourinary:       Endo:     (+)               Obesity,       Psychiatric/Substance Use:     (+) psychiatric history depression and anxiety       Infectious Disease:       Malignancy:       Other:            Physical Exam    Airway        Mallampati: III       Respiratory Devices and Support         Dental       (+) Modest Abnormalities - crowns, retainers, 1 or 2 missing teeth      Cardiovascular          Rhythm and rate: regular     Pulmonary           breath sounds clear to auscultation           OUTSIDE LABS:  CBC:   Lab Results   Component Value Date    WBC 11.3 (H) 01/11/2023    WBC 11.9 (H) 09/12/2022    HGB 15.1 01/11/2023    HGB 14.4 09/12/2022    HCT 46.0 01/11/2023    HCT 43.4 09/12/2022     01/11/2023     09/12/2022     BMP:   Lab Results   Component Value Date     01/11/2023     09/12/2022    POTASSIUM 4.8 09/07/2023    POTASSIUM 4.4 04/26/2023    CHLORIDE 105 01/11/2023    CHLORIDE 104 09/12/2022    CO2 30 01/11/2023    CO2 31 09/12/2022    BUN 13 01/11/2023    BUN 22 09/12/2022    CR 0.69 09/07/2023    CR 0.59 04/26/2023     (H) 01/11/2023     (H)  09/12/2022     COAGS:   Lab Results   Component Value Date    PTT 34 01/10/2014    INR 1.04 06/27/2022     POC:   Lab Results   Component Value Date    BGM 98 04/07/2017     HEPATIC:   Lab Results   Component Value Date    ALBUMIN 4.1 06/27/2022    PROTTOTAL 7.7 06/27/2022    ALT 44 06/27/2022    AST 22 06/27/2022    ALKPHOS 99 06/27/2022    BILITOTAL 0.4 06/27/2022     OTHER:   Lab Results   Component Value Date    A1C 5.9 04/04/2017    DOMONIQUE 10.1 01/11/2023    MAG 2.3 04/07/2017    TSH 1.21 01/10/2017    CRP 12.3 (H) 01/11/2023    SED 12 01/11/2023       Anesthesia Plan    ASA Status:  3    NPO Status:  NPO Appropriate    Anesthesia Type: MAC.     - Reason for MAC: immobility needed              Consents    Anesthesia Plan(s) and associated risks, benefits, and realistic alternatives discussed. Questions answered and patient/representative(s) expressed understanding.     - Discussed:     - Discussed with:  Patient            Postoperative Care    Pain management: Oral pain medications, IV analgesics, Peripheral nerve block (Single Shot), Multi-modal analgesia.        Comments:                Alex Tolentino MD

## 2023-09-19 NOTE — ANESTHESIA POSTPROCEDURE EVALUATION
Patient: Margo Inman    Procedure: Procedure(s):  LEFT THUMB CARPOMETACARPAL JOINT ARTHROPLASTY WITH SUTURE SUSPENSION       Anesthesia Type:  MAC    Note:  Disposition: Outpatient   Postop Pain Control: Uneventful            Sign Out: Well controlled pain   PONV: No   Neuro/Psych: Uneventful            Sign Out: Acceptable/Baseline neuro status   Airway/Respiratory: Uneventful            Sign Out: Acceptable/Baseline resp. status   CV/Hemodynamics: Uneventful            Sign Out: Acceptable CV status; No obvious hypovolemia; No obvious fluid overload   Other NRE: NONE   DID A NON-ROUTINE EVENT OCCUR? No           Last vitals:  Vitals Value Taken Time   /77 09/19/23 1515   Temp 36.5  C (97.7  F) 09/19/23 1418   Pulse 79 09/19/23 1418   Resp 18 09/19/23 1515   SpO2 95 % 09/19/23 1515       Electronically Signed By: Nathan Gomez DO  September 19, 2023  3:18 PM

## 2023-09-19 NOTE — PROGRESS NOTES
PRS    No changes in history or exam.  Medically optimized.    OR today for left thumb carpometacarpal joint arthroplasty with suture suspension    Discussed the risks of surgery, including but not limited to: Infection, bleeding, pain, poor scarring, injury to nerves, neuroma formation, subsidence, recurrence of pain, need for revision surgery, complex regional pain syndrome.  Despite these risks, patient consents to would like to proceed with left thumb carpometacarpal joint suture suspension arthroplasty.  Discussed that if patient were to need revision surgery in the future, we would plan for an internal brace augmentation.  Patient is agreeable to plan.     Mat Frye MD, PhD

## 2023-09-19 NOTE — ANESTHESIA CARE TRANSFER NOTE
Patient: Margo Inman    Procedure: Procedure(s):  LEFT THUMB CARPOMETACARPAL JOINT ARTHROPLASTY WITH SUTURE SUSPENSION       Diagnosis: Arthritis of carpometacarpal (CMC) joint of left thumb [M18.12]  Diagnosis Additional Information: No value filed.    Anesthesia Type:   MAC     Note:    Oropharynx: oropharynx clear of all foreign objects  Level of Consciousness: awake  Oxygen Supplementation: room air    Independent Airway: airway patency satisfactory and stable  Dentition: dentition unchanged  Vital Signs Stable: post-procedure vital signs reviewed and stable  Report to RN Given: handoff report given  Patient transferred to: Phase II    Handoff Report: Identifed the Patient, Identified the Reponsible Provider, Reviewed the pertinent medical history, Discussed the surgical course, Reviewed Intra-OP anesthesia mangement and issues during anesthesia, Set expectations for post-procedure period and Allowed opportunity for questions and acknowledgement of understanding      Vitals:  Vitals Value Taken Time   /84 09/19/23 1418   Temp     Pulse 79 09/19/23 1418   Resp 16 09/19/23 1418   SpO2 92 % 09/19/23 1418       Electronically Signed By: JOSE R Kumari CRNA  September 19, 2023  2:20 PM

## 2023-09-19 NOTE — OR NURSING
Patient received left side Brachial Plexus nerve block  with Exparel.  Fentanyl 50mcg and Versed 1mg given. Tolerated procedure well.

## 2023-09-19 NOTE — BRIEF OP NOTE
[facility] Brief Operative Note    Pre-operative diagnosis: Arthritis of carpometacarpal (CMC) joint of left thumb [M18.12]   Post-operative diagnosis Same as preop   Procedure: Procedure(s):  LEFT THUMB CARPOMETACARPAL JOINT ARTHROPLASTY WITH SUTURE SUSPENSION   Surgeon(s): Surgeon(s) and Role:     * Mat Frye MD - Primary     * Philip Haynes MD - Resident - Assisting   Estimated blood loss: 5 mL    Specimens: None   Findings: Left trapezium is arthritic. Proximal trapezoid arthritic, so performed a proximal trapezoidectomy. Solid suspension.

## 2023-09-20 DIAGNOSIS — M19.042 OSTEOARTHRITIS OF BOTH HANDS, UNSPECIFIED OSTEOARTHRITIS TYPE: ICD-10-CM

## 2023-09-20 DIAGNOSIS — M19.041 OSTEOARTHRITIS OF BOTH HANDS, UNSPECIFIED OSTEOARTHRITIS TYPE: ICD-10-CM

## 2023-09-20 RX ORDER — OXYCODONE HYDROCHLORIDE 5 MG/1
5 TABLET ORAL EVERY 6 HOURS PRN
Qty: 6 TABLET | Refills: 0 | Status: SHIPPED | OUTPATIENT
Start: 2023-09-20 | End: 2023-09-22

## 2023-09-20 NOTE — TELEPHONE ENCOUNTER
[facility] Call Center     Phone Message     May a detailed message be left on voicemail: Yes     Reason for Call: Pt needs refill on her oxyCODONE (ROXICODONE) tablet 5 mg.   Send to [id number] - [place], [place] - [address] [place] AT [facility]  & [address]

## 2023-09-20 NOTE — TELEPHONE ENCOUNTER
S/p Left 1st CMC joint arthroplasty with suture suspensionplasty, left proximal trapezoidectomy, DOS: 9/19/23    oxyCODONE (ROXICODONE) 5 MG tablet        Last Written Prescription Date:  9/19/23  Last Fill Quantity: 6,   # refills: 0  Future Office visit:    Next 5 appointments (look out 90 days)      Oct 10, 2023  1:30 PM  (Arrive by 1:10 PM)  Pre-Operative Physical with JOSE R Henson [facility] ([facility] ) [address] [place] [id number]  [phone]           Lexx Mcgee RN

## 2023-09-22 ENCOUNTER — TELEPHONE (OUTPATIENT)
Dept: ORTHOPEDICS | Facility: CLINIC | Age: 63
End: 2023-09-22
Payer: COMMERCIAL

## 2023-09-22 DIAGNOSIS — Z96.653 STATUS POST TOTAL BILATERAL KNEE REPLACEMENT: ICD-10-CM

## 2023-09-22 DIAGNOSIS — M19.042 OSTEOARTHRITIS OF BOTH HANDS, UNSPECIFIED OSTEOARTHRITIS TYPE: ICD-10-CM

## 2023-09-22 DIAGNOSIS — Z98.1 S/P CERVICAL SPINAL FUSION: ICD-10-CM

## 2023-09-22 DIAGNOSIS — M19.041 OSTEOARTHRITIS OF BOTH HANDS, UNSPECIFIED OSTEOARTHRITIS TYPE: ICD-10-CM

## 2023-09-22 RX ORDER — OXYCODONE HYDROCHLORIDE 5 MG/1
5 TABLET ORAL EVERY 6 HOURS PRN
Qty: 6 TABLET | Refills: 0 | Status: SHIPPED | OUTPATIENT
Start: 2023-09-22 | End: 2023-10-10

## 2023-09-22 NOTE — TELEPHONE ENCOUNTER
Prescription refill requested for:   oxyCODONE (ROXICODONE) 5 MG tablet       Last Written Prescription Date:  9/20/23  Last Fill Quantity: 6,   # refills: 0  Last Office Visit: 9/19/23 - Surgery Date  Future Office visit:    Next 5 appointments (look out 90 days)      Oct 10, 2023  1:30 PM  (Arrive by 1:10 PM)  Pre-Operative Physical with JOSE R Henson CNP  [facility] ([facility] ) [address] [place] [id number]  [phone]                 Kevin Byrnes, ATC

## 2023-09-22 NOTE — TELEPHONE ENCOUNTER
[facility] Call Center    Phone Message    May a detailed message be left on voicemail: yes     Reason for Call: Pt is calling about left hand, fingers are swollen and painful would like a call back to discuss    Action Taken: Other: [facility] [place]    Travel Screening: Not Applicable

## 2023-09-22 NOTE — TELEPHONE ENCOUNTER
Called and spoke with Pt is who is currently 3 days s/p Left Thumb CMC arthroplasty with suture suspension. Pt reported very swollen fingers and pain still. I assured the Pt that this is normal and expected following surgery and will be something that should decrease over the next week or two. Pt was thankful for call and expressed understanding of the need to address swelling first to change pain levels. Reconfirmed RICE principles with Pt and the call was ended.    Kevin GARCIA ATC

## 2023-09-22 NOTE — TELEPHONE ENCOUNTER
[facility] Call Center    Phone Message    May a detailed message be left on voicemail: yes     Reason for Call: Medication Refill Request    Has the patient contacted the pharmacy for the refill? Yes   Name of medication being requested: oxycodone 5 mg tablet  Provider who prescribed the medication: Dr Frye  Pharmacy:   [id number] [id number] [place] AT [facility]  [address] (Ph: [phone])     Date medication is needed: 9/22       Action Taken: Other: [facility] [place]    Travel Screening: Not Applicable

## 2023-09-28 RX ORDER — DICLOFENAC SODIUM 75 MG/1
TABLET, DELAYED RELEASE ORAL
Qty: 180 TABLET | Refills: 0 | Status: ON HOLD | OUTPATIENT
Start: 2023-09-28 | End: 2023-10-24

## 2023-10-05 ENCOUNTER — ANCILLARY PROCEDURE (OUTPATIENT)
Dept: MAMMOGRAPHY | Facility: CLINIC | Age: 63
End: 2023-10-05
Attending: NURSE PRACTITIONER
Payer: COMMERCIAL

## 2023-10-05 DIAGNOSIS — Z12.31 ENCOUNTER FOR SCREENING MAMMOGRAM FOR BREAST CANCER: ICD-10-CM

## 2023-10-05 PROCEDURE — 77067 SCR MAMMO BI INCL CAD: CPT | Mod: TC | Performed by: RADIOLOGY

## 2023-10-05 PROCEDURE — 77063 BREAST TOMOSYNTHESIS BI: CPT | Mod: TC | Performed by: RADIOLOGY

## 2023-10-06 DIAGNOSIS — Z01.818 PRE-OP EXAM: Primary | ICD-10-CM

## 2023-10-10 ENCOUNTER — OFFICE VISIT (OUTPATIENT)
Dept: FAMILY MEDICINE | Facility: CLINIC | Age: 63
End: 2023-10-10
Payer: COMMERCIAL

## 2023-10-10 VITALS
SYSTOLIC BLOOD PRESSURE: 173 MMHG | WEIGHT: 226 LBS | RESPIRATION RATE: 20 BRPM | HEART RATE: 85 BPM | OXYGEN SATURATION: 97 % | HEIGHT: 61 IN | TEMPERATURE: 98.4 F | DIASTOLIC BLOOD PRESSURE: 96 MMHG | BODY MASS INDEX: 42.67 KG/M2

## 2023-10-10 DIAGNOSIS — Z01.818 PREOP GENERAL PHYSICAL EXAM: Primary | ICD-10-CM

## 2023-10-10 DIAGNOSIS — I10 ESSENTIAL HYPERTENSION WITH GOAL BLOOD PRESSURE LESS THAN 140/90: ICD-10-CM

## 2023-10-10 DIAGNOSIS — M54.12 RADICULOPATHY OF CERVICAL SPINE: ICD-10-CM

## 2023-10-10 DIAGNOSIS — E87.5 HYPERKALEMIA: ICD-10-CM

## 2023-10-10 DIAGNOSIS — Z12.11 SCREEN FOR COLON CANCER: ICD-10-CM

## 2023-10-10 DIAGNOSIS — G89.29 CHRONIC RIGHT SHOULDER PAIN: ICD-10-CM

## 2023-10-10 DIAGNOSIS — Z87.891 FORMER SMOKER: ICD-10-CM

## 2023-10-10 DIAGNOSIS — E66.01 MORBID OBESITY (H): ICD-10-CM

## 2023-10-10 DIAGNOSIS — Z28.21 COVID-19 VACCINATION REFUSED: ICD-10-CM

## 2023-10-10 DIAGNOSIS — M25.511 CHRONIC RIGHT SHOULDER PAIN: ICD-10-CM

## 2023-10-10 PROCEDURE — 36415 COLL VENOUS BLD VENIPUNCTURE: CPT | Performed by: NURSE PRACTITIONER

## 2023-10-10 PROCEDURE — G0480 DRUG TEST DEF 1-7 CLASSES: HCPCS | Mod: 90 | Performed by: NURSE PRACTITIONER

## 2023-10-10 PROCEDURE — 82565 ASSAY OF CREATININE: CPT | Performed by: NURSE PRACTITIONER

## 2023-10-10 PROCEDURE — 99000 SPECIMEN HANDLING OFFICE-LAB: CPT | Performed by: NURSE PRACTITIONER

## 2023-10-10 PROCEDURE — 99214 OFFICE O/P EST MOD 30 MIN: CPT | Performed by: NURSE PRACTITIONER

## 2023-10-10 PROCEDURE — 84132 ASSAY OF SERUM POTASSIUM: CPT | Performed by: NURSE PRACTITIONER

## 2023-10-10 ASSESSMENT — PAIN SCALES - GENERAL: PAINLEVEL: NO PAIN (0)

## 2023-10-10 NOTE — H&P (VIEW-ONLY)
[address] [address]  Phone: [phone]  Primary Provider: Belem Zavala  Pre-op Performing Provider: BELEM ZAVALA      PREOPERATIVE EVALUATION:  Today's date: 10/10/2023    Margo is a 63 year old female who presents for a preoperative evaluation.      Surgical Information:  Surgery/Procedure: Right reverse total shoulder arthroplasty   Surgery Location: [facility]  Surgeon: Zhang Smart MD   Surgery Date: 10/24/23  Time of Surgery: 10  Where patient plans to recover: At home with family  Fax number for surgical facility: Note does not need to be faxed, will be available electronically in Epic.    Assessment & Plan     The proposed surgical procedure is considered INTERMEDIATE risk.    Preop general physical exam    - Potassium  - Creatinine  - Potassium  - Creatinine    Chronic right shoulder pain  Scheduled for surgery 10/24/23    Essential hypertension with goal blood pressure less than 140/90  BP elevated, she endorses anxiety with coming to the clinic and is also in significant pain. Home blood pressures are in the 140/80's.    Radiculopathy of cervical spine  Pertinent history. Patient is s/p decompression, fusion cervical anterior, one level, combined 3/2017.    Morbid obesity (H)  Benefits of weight loss reviewed in detail, encouraged hier to cut back on the carbohydrates in the diet, consume more fruits and vegetables, drink plenty of water, avoid fruit juices, sodas, get 150 min moderate exercise/week.  Recheck weight in 6 months.      Former smoker  Congratulated her on quitting smoking.    Screen for colon cancer    - Colonoscopy Screening  Referral    COVID-19 vaccination refused  Conscientious objector    Hyperkalemia  K+ 5.4- recheck prior to procedure on 10/24/23.             Risks and Recommendations:  The patient has the following additional risks and recommendations for  perioperative complications:   - Consult Hospitalist / IM to assist with post-op medical management    Antiplatelet or Anticoagulation Medication Instructions:   - Patient is on no antiplatelet or anticoagulation medications.    Additional Medication Instructions:  Patient is to take all scheduled medications on the day of surgery EXCEPT for modifications listed below:   - ACE/ARB: HOLD on day of surgery (minimum 11 hours for general anesthesia).   - Statins: Continue taking on the day of surgery.    - diclofenac (Voltaren): HOLD for 3 days for high bleeding risk or PRN use.  Hold fish oil starting 7 days prior to your procedure    RECOMMENDATION:  APPROVAL GIVEN to proceed with proposed procedure, PLEASE CHECK POTASSIUM PRIOR TO HER PROCEDURE.    Ordering of each unique test  Prescription drug management  34 minutes spent by me on the date of the encounter doing chart review, history and exam, documentation and further activities per the note      Subjective       HPI related to upcoming procedure: Patient is having Right shoulder replaced 10/24/23        10/7/2023     3:10 PM   Preop Questions   1. Have you ever had a heart attack or stroke? No   2. Have you ever had surgery on your heart or blood vessels, such as a stent placement, a coronary artery bypass, or surgery on an artery in your head, neck, heart, or legs? No   3. Do you have chest pain with activity? No   4. Do you have a history of  heart failure? No   5. Do you currently have a cold, bronchitis or symptoms of other infection? No   6. Do you have a cough, shortness of breath, or wheezing? No   7. Do you or anyone in your family have previous history of blood clots? UNKNOWN    8. Do you or does anyone in your family have a serious bleeding problem such as prolonged bleeding following surgeries or cuts? No   9. Have you ever had problems with anemia or been told to take iron pills? YES - with pregnancy   10. Have you had any abnormal blood loss such as  black, tarry or bloody stools, or abnormal vaginal bleeding? No   11. Have you ever had a blood transfusion? YES - with first pregnancy   11a. Have you ever had a transfusion reaction? No   12. Are you willing to have a blood transfusion if it is medically needed before, during, or after your surgery? Yes   13. Have you or any of your relatives ever had problems with anesthesia? None   14. Do you have sleep apnea, excessive snoring or daytime drowsiness? No   15. Do you have any artifical heart valves or other implanted medical devices like a pacemaker, defibrillator, or continuous glucose monitor? No   16. Do you have artificial joints? YES - bilateral knees, upper neck, low back fusion, left hip SI screws   17. Are you allergic to latex? No       Health Care Directive:  Patient does not have a Health Care Directive or Living Will: Patient states has Advance Directive and will bring in a copy to clinic.    Preoperative Review of :   reviewed - controlled substances reflected in medication list.      Status of Chronic Conditions:  See problem list for active medical problems.  Problems all longstanding and stable, except as noted/documented.  See ROS for pertinent symptoms related to these conditions.    HYPERLIPIDEMIA - Patient has a long history of significant Hyperlipidemia requiring medication for treatment with recent good control. Patient reports no problems or side effects with the medication.      HYPERTENSION - Patient has longstanding history of HTN , currently denies any symptoms referable to elevated blood pressure. Specifically denies chest pain, palpitations, dyspnea, orthopnea, PND or peripheral edema. Blood pressure readings have not been in normal range. Current medication regimen is as listed below. Patient denies any side effects of medication.     Review of Systems  CONSTITUTIONAL: NEGATIVE for fever, chills, change in weight  ENT/MOUTH: NEGATIVE for ear, mouth and throat problems  RESP:  NEGATIVE for significant cough or SOB  CV: NEGATIVE for chest pain, palpitations or peripheral edema  ROS otherwise negative    Patient Active Problem List    Diagnosis Date Noted    Pain of left thumb 10/17/2023     Priority: Medium    Arthritis of carpometacarpal (CMC) joint of left thumb 07/14/2023     Priority: Medium    Combined form of age-related cataract, right eye 03/16/2023     Priority: Medium     Added automatically from request for surgery [id number]      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 03/16/2023     Priority: Medium     Added automatically from request for surgery [id number]      Severe stage glaucoma 02/15/2023     Priority: Medium     Added automatically from request for surgery [id number]      Bilateral hand numbness 01/27/2023     Priority: Medium    Osteoarthritis of both hands, unspecified osteoarthritis type 01/14/2023     Priority: Medium    Facet arthritis of lumbar region 01/11/2023     Priority: Medium    Diverticulosis of sigmoid colon 06/15/2020     Priority: Medium    Morbid obesity (H) 04/05/2019     Priority: Medium    Papanicolaou smear of cervix with low grade squamous intraepithelial lesion (LGSIL) 04/18/2018     Priority: Medium    Essential hypertension with goal blood pressure less than 140/90 03/29/2018     Priority: Medium    Adjustment disorder with mixed anxiety and depressed mood 03/29/2018     Priority: Medium    S/P cervical spinal fusion 03/30/2017     Priority: Medium    Radiculopathy of cervical spine 03/20/2017     Priority: Medium    Cervical high risk HPV (human papillomavirus) test positive 01/10/2017     Priority: Medium     2000, 2003, and 2009 NIL paps. in Care Everywhere.  9/5/13 NIL pap  1/10/17 NIL pap, + HR HPV (not 16 or 18). Plan: cotest in 1 year, due by 1/10/18  3/29/18 LSIL pap, + HR HPV (not 16 or 18). Plan: colp bef 6/29/18 4/18/18 [place] bx: negative. Plan: cotest in 6 mo, per provider.   11/9/18 Lost to follow-up for pap  tracking  9/11/2019 Pap: NIL/neg HR HPV. Plan cotest in 3 years.  1/11/23 NIL pap, Neg HPV. Plan cotest in 3 years.         Microscopic hematuria 06/02/2016     Priority: Medium     Recommend f/u with primary MD for UA, blood pressure, and urine cytology at 6, 12, 24, and 36 months from this time.  If negative for 3 years then no further monitoring needed.      Refer back to urology for any of the following:              -cytology is suspicious or positive              -gross hematuria              -irritative voiding symptoms with evidence of infection/ worsening dysuria or urgency.     If persistent microscopic hematuria WITH: hypertension, proteinuria, or glomerular/dysmorphic RBCs in urine then evaluate for primary renal disease/refer instead to nephrology.       Tobacco dependency 03/06/2015     Priority: Medium    S/P carpal tunnel release 07/22/2014     Priority: Medium    Trigger thumb 07/22/2014     Priority: Medium    CTS (carpal tunnel syndrome) - bilateral 05/27/2014     Priority: Medium    S/P total knee arthroplasty juan 01/23/2014     Priority: Medium    Acute posthemorrhagic anemia 01/23/2014     Priority: Medium    Muscle spasm 01/23/2014     Priority: Medium    RLS (restless legs syndrome) 01/22/2012     Priority: Medium    Migraine 12/06/2011     Priority: Medium    Pseudogout 05/05/2011     Priority: Medium    OA (OSTEOARTHRITIS) OF KNEE - bilateral 05/05/2011     Priority: Medium    Hyperlipidemia LDL goal <130 10/31/2010     Priority: Medium    Asthma 12/16/1996     Priority: Medium      Past Medical History:   Diagnosis Date    Arthritis     Cervical high risk HPV (human papillomavirus) test positive 01/10/2017    1/10/17 NIL pap/+ HR HPV (not 16 or 18). Plan: cotest in 1 year, due by 1/10/18     Chronic infection     HX of MRSA. 2 neg swabs at [facility] in 2006 and 2007.    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     Depressive disorder Back in my mid 20's    History of blood transfusion     Hypertension     Numbness and tingling     Right arm    PONV (postoperative nausea and vomiting)     Uncomplicated asthma      Past Surgical History:   Procedure Laterality Date    ABDOMEN SURGERY  1996    hysterectomy    APPENDECTOMY      ARTHROPLASTY CARPOMETACARPAL (THUMB JOINT) Left 09/19/2023    Procedure: LEFT THUMB CARPOMETACARPAL JOINT ARTHROPLASTY WITH SUTURE SUSPENSION;  Surgeon: Mat Frye MD;  Location: [facility] OR    ARTHROSCOPY KNEE  09/16/2011    Procedure:ARTHROSCOPY KNEE; left knee arthroscopy with debridement, open lateral patellar spur excision; Surgeon:LUIS A MONTOYA; Location:[facility]    BIOPSY      CATARACT IOL, RT/LT      COLONOSCOPY N/A 02/16/2016    Procedure: COLONOSCOPY;  Surgeon: Belem Khalil MD;  Location: [facility]    COLONOSCOPY N/A 02/16/2016    Procedure: COMBINED COLONOSCOPY, SINGLE OR MULTIPLE BIOPSY/POLYPECTOMY BY BIOPSY;  Surgeon: Belem Khalil MD;  [facility]    COLONOSCOPY N/A 06/15/2020    Procedure: Colonoscopy, With Polypectomy And Biopsy;  Surgeon: Torres Gallegos DO;  Location: [facility]    COLONOSCOPY WITH CO2 INSUFFLATION N/A 02/16/2016    Procedure: COLONOSCOPY WITH CO2 INSUFFLATION;  Surgeon: Belem Khalil MD;  [facility]    COLONOSCOPY WITH CO2 INSUFFLATION N/A 06/15/2020    Procedure: COLONOSCOPY, WITH CO2 INSUFFLATION;  Surgeon: Torres Gallegos DO;  Location: [facility]    CYSTOSCOPY  01/01/2016    microscopic hematuria    DECOMPRESSION, FUSION CERVICAL ANTERIOR ONE LEVEL, COMBINED N/A 03/30/2017    Procedure: COMBINED DECOMPRESSION, FUSION CERVICAL ANTERIOR ONE LEVEL;  Surgeon: Joseph Klein MD;  Location: [facility] OR    ECTOPIC PREGNANCY SURGERY      ENT SURGERY      GENITOURINARY SURGERY      GONIOSYNECHIALYSIS Left 02/20/2023    Procedure: goniosynechialysis left;  Surgeon: Gaston Guzman MD;  [place] [facility]    GYN  SURGERY      HC INCISION TENDON SHEATH FINGER Left 07/11/2014    Thumb TF release    HC KNEE SCOPE,MED/LAT MENISECTOMY  09/16/2011    left, with partial medial menisectomy ONLY    HC REVISE MEDIAN N/CARPAL TUNNEL SURG Left 07/11/2014    PRIMARY - not revision    HEAD & NECK SURGERY  7/22/2020    4 fusions in neck    LASIK Bilateral     [id number]    ORTHOPEDIC SURGERY      PHACOEMULSIFICATION CLEAR CORNEA W/ STANDARD IOL IMPLANT, ENDOSCOPIC CYCLOPHOTOCOAGULATION, COMBINED Left 02/20/2023    Procedure: LEFT EYE COMPLEX PHACOEMULSIFICATION, CATARACT, CLEAR CORNEAL INCISION APPROACH, WITH STANDARD INTRAOCULAR LENS IMPLANT INSERTION AND ENDOSCOPIC CYCLOPHOTOCOAGULATION< GONISYNECHIOLYSIS;  Surgeon: Gaston Guzman MD;  Location: [facility] OR    PHACOEMULSIFICATION WITH STANDARD INTRAOCULAR LENS IMPLANT Right 05/12/2023    Procedure: RIGHT EYE PHACOEMULSIFICATION, CATARACT, WITH STANDARD INTRAOCULAR LENS IMPLANT INSERTION;  Surgeon: Gaston Guzman MD;  Location: [facility] OR    RELEASE CARPAL TUNNEL  07/11/2014    Procedure: RELEASE CARPAL TUNNEL;  Surgeon: Rg Franco MD;  [facility]    RELEASE CARPAL TUNNEL Right 11/07/2014    Procedure: RELEASE CARPAL TUNNEL;  Surgeon: Rg Franco MD;  [facility]    RELEASE TRIGGER FINGER  07/11/2014    Procedure: RELEASE TRIGGER FINGER;  Surgeon: Rg Franco MD;  [facility]: [facility]    RELEASE TRIGGER FINGER Right 11/07/2014    Procedure: RELEASE TRIGGER FINGER;  Surgeon: Rg Franco MD;  [facility]    SOFT TISSUE SURGERY      tonsils      [facility] PART REMV FEMUR/PROX TIB/FIB  09/16/2011    left, open lateral patellar bone spur excision    [facility] TOTAL KNEE ARTHROPLASTY  01/17/2014    Bilateral     Current Outpatient Medications   Medication Sig Dispense Refill    acetaminophen (TYLENOL) 500 MG tablet Take 500-1,000 mg by mouth every 6 hours as needed.      albuterol (PROAIR HFA/PROVENTIL HFA/VENTOLIN HFA) 108 (90 Base) MCG/ACT inhaler  Inhale 2 puffs into the lungs every 4 hours as needed for shortness of breath / dyspnea or wheezing 18 g 3    ammonium lactate (LAC-HYDRIN) 12 % external lotion       atorvastatin (LIPITOR) 80 MG tablet TAKE 1 TABLET(80 MG) BY MOUTH DAILY 90 tablet 2    calcium carbonate 500 mg, elemental, 1250 (500 Ca) MG tablet chewable Take 500 mg by mouth      cyclobenzaprine (FLEXERIL) 10 MG tablet TAKE 1 TABLET(10 MG) BY MOUTH EVERY NIGHT AS NEEDED FOR MUSCLE SPASMS 90 tablet 3    cyclobenzaprine (FLEXERIL) 10 MG tablet Take 10 mg by mouth daily      diclofenac (VOLTAREN) 75 MG EC tablet TAKE 1 TABLET(75 MG) BY MOUTH TWICE DAILY 180 tablet 0    dorzolamide-timolol (COSOPT) 2-0.5 % ophthalmic solution Place 1 drop Into the left eye 2 times daily 10 mL 5    losartan (COZAAR) 25 MG tablet TAKE 1 TABLET(25 MG) BY MOUTH DAILY 90 tablet 0    Misc. Devices (ROLLATOR ULTRA-LIGHT) MISC Walker with front wheels for home use.      Omega-3 Fatty Acids (FISH OIL PO)       ondansetron (ZOFRAN) 4 MG tablet Take 1 tablet (4 mg) by mouth every 6 hours as needed for nausea 12 tablet 0    polyethylene glycol-propylene glycol (SYSTANE ULTRA) 0.4-0.3 % SOLN ophthalmic solution Apply 1 drop to eye      TUMS ULTRA 1000 1000 MG CHEW CHEW 1 TABLET BY MOUTH TWICE DAILY WITH MEALS         Allergies   Allergen Reactions    Wasp Venom Protein Anaphylaxis    Bee Anaphylaxis    Erythromycin Hives    Wasps [Hornets] Anaphylaxis    Shellfish Allergy Nausea and Vomiting and Rash    Shellfish-Derived Products Rash and Nausea and Vomiting        Social History     Tobacco Use    Smoking status: Former     Packs/day: 0.00     Years: 15.00     Additional pack years: 0.00     Total pack years: 0.00     Types: Cigarettes     Start date: 1976     Quit date: 2023     Years since quittin.7     Passive exposure: Never    Smokeless tobacco: Never    Tobacco comments:     I am currently on Chantix   Substance Use Topics    Alcohol use: Yes     Comment:  "occasionally     Family History   Problem Relation Age of Onset    C.A.D. Father     Hyperlipidemia Father     Breast Cancer Maternal Grandmother     Ovarian Cancer Maternal Grandmother     Cancer - colorectal Maternal Grandfather     Colon Cancer Maternal Grandfather     Prostate Cancer Maternal Grandfather     C.A.D. Paternal Grandfather     Lung Cancer Sister     Colon Cancer Sister     Brain Cancer Sister     Other Cancer Sister         Brain cancer    Hypertension Sister     Anxiety Disorder Daughter     Glaucoma No family hx of     Macular Degeneration No family hx of      History   Drug Use No         Objective     BP (!) 173/96   Pulse 85   Temp 98.4  F (36.9  C) (Tympanic)   Resp 20   Ht 1.549 m (5' 1\")   Wt 102.5 kg (226 lb)   LMP 09/16/2011   SpO2 97%   BMI 42.70 kg/m      Physical Exam    GENERAL APPEARANCE: healthy, alert and no distress     EYES: EOMI, PERRL     HENT: ear canals and TM's normal and nose and mouth without ulcers or lesions     NECK: no adenopathy, no asymmetry, masses, or scars and thyroid normal to palpation     RESP: lungs clear to auscultation - no rales, rhonchi or wheezes     CV: regular rates and rhythm, normal S1 S2, no S3 or S4 and no murmur, click or rub     ABDOMEN:  soft, nontender, no HSM or masses and bowel sounds normal     SKIN: no suspicious lesions or rashes     NEURO: Normal strength and tone, sensory exam grossly normal, mentation intact and speech normal     PSYCH: mentation appears normal. and affect normal/bright     LYMPHATICS: No cervical adenopathy    Recent Labs   Lab Test 09/07/23  0938 04/26/23  1144 01/11/23  1439 09/12/22  1422 06/27/22  1102   HGB  --   --  15.1 14.4 15.0   PLT  --   --  349 318 339   INR  --   --   --   --  1.04   NA  --   --  140 139 138   POTASSIUM 4.8 4.4 4.6 4.5 3.8   CR 0.69 0.59 0.62 0.59 0.72        Diagnostics:  Labs pending at this time.  Results will be reviewed when available.   No EKG required, no history of coronary " heart disease, significant arrhythmia, peripheral arterial disease or other structural heart disease.  Component      Latest Ref Rng 10/10/2023  2:18 PM   Cotinine Confirm      ng/mL <15    Nicotine Confirmation Urine      ng/mL 17    3-OH-Cotinine, Urn, Quant      ng/mL 76    Anabasine, Urn, Quant      ng/mL <5    Creatinine      0.51 - 0.95 mg/dL 0.67    GFR Estimate      >60 mL/min/1.73m2 >90    Calcium Ionized Whole Blood      4.4 - 5.2 mg/dL    Potassium      3.4 - 5.3 mmol/L 5.4 (H)    Creatinine Urine      mg/dL       Legend:  (H) High  Revised Cardiac Risk Index (RCRI):  The patient has the following serious cardiovascular risks for perioperative complications:   - No serious cardiac risks = 0 points     RCRI Interpretation: 0 points: Class I (very low risk - 0.4% complication rate)         Signed Electronically by: JOSE R Sarabia CNP  Copy of this evaluation report is provided to requesting physician.

## 2023-10-10 NOTE — PATIENT INSTRUCTIONS
At [facility], we strive to deliver an exceptional experience to you, every time we see you. If you receive a survey, please complete it as we do value your feedback.  If you have MyChart, you can expect to receive results automatically within 24 hours of their completion.  Your provider will send a note interpreting your results as well.   If you do not have MyChart, you should receive your results in about a week by mail.    Your care team:                            Family Medicine Internal Medicine   MD David Wagoner, MD Nadine Koehler, MD Sandrita Mann, OLIVIA Razo, MD Pediatrics   Desean Francis, PAADEN Dudley, MD Nan Pearl CNP   JOSE R Yuen CNP, MD Charanya Pasupathi, MD Kathleen Widmer, NP coming October 2023 Same-Day (No follow up visit)    OLIVIA Chester PA coming Oct 2023     Clinic hours: Monday - Thursday 7 am-6 pm; Fridays 7 am-5 pm.   Urgent care: Monday - Friday 10 am- 8 pm; Saturday and Sunday 9 am-5 pm.    Clinic: [phone]       [facility]: Monday - Thursday 8 am - 7 pm; Friday 8 am - 6 pm  [facility]: [phone]    Preparing for Your Surgery  Getting started  A nurse will call you to review your health history and instructions. They will give you an arrival time based on your scheduled surgery time. Please be ready to share:  Your doctor's clinic name and phone number  Your medical, surgical, and anesthesia history  A list of allergies and sensitivities  A list of medicines, including herbal treatments and over-the-counter drugs  Whether the patient has a legal guardian (ask how to send us the papers in advance)  Please tell us if you're pregnant--or if there's any chance you might be pregnant. Some surgeries may injure a fetus (unborn baby), so they  require a pregnancy test. Surgeries that are safe for a fetus don't always need a test, and you can choose whether to have one.   If you have a child who's having surgery, please ask for a copy of Preparing for Your Child's Surgery.    Preparing for surgery  Within 10 to 30 days of surgery: Have a pre-op exam (sometimes called an H&P, or History and Physical). This can be done at a clinic or pre-operative center.  If you're having a , you may not need this exam. Talk to your care team.  At your pre-op exam, talk to your care team about all medicines you take. If you need to stop any medicines before surgery, ask when to start taking them again.  We do this for your safety. Many medicines can make you bleed too much during surgery. Some change how well surgery (anesthesia) drugs work.  Call your insurance company to let them know you're having surgery. (If you don't have insurance, call [phone].)  Call your clinic if there's any change in your health. This includes signs of a cold or flu (sore throat, runny nose, cough, rash, fever). It also includes a scrape or scratch near the surgery site.  If you have questions on the day of surgery, call your hospital or surgery center.  Eating and drinking guidelines  For your safety: Unless your surgeon tells you otherwise, follow the guidelines below.  Eat and drink as usual until 8 hours before you arrive for surgery. After that, no food or milk.  Drink clear liquids until 2 hours before you arrive. These are liquids you can see through, like water, Gatorade, and Propel Water. They also include plain black coffee and tea (no cream or milk), candy, and breath mints. You can spit out gum when you arrive.  If you drink alcohol: Stop drinking it the night before surgery.  If your care team tells you to take medicine on the morning of surgery, it's okay to take it with a sip of water.  Preventing infection  Shower or bathe the night before and morning of your  surgery. Follow the instructions your clinic gave you. (If no instructions, use regular soap.)  Don't shave or clip hair near your surgery site. We'll remove the hair if needed.  Don't smoke or vape the morning of surgery. You may chew nicotine gum up to 2 hours before surgery. A nicotine patch is okay.  Note: Some surgeries require you to completely quit smoking and nicotine. Check with your surgeon.  Your care team will make every effort to keep you safe from infection. We will:  Clean our hands often with soap and water (or an alcohol-based hand rub).  Clean the skin at your surgery site with a special soap that kills germs.  Give you a special gown to keep you warm. (Cold raises the risk of infection.)  Wear special hair covers, masks, gowns and gloves during surgery.  Give antibiotic medicine, if prescribed. Not all surgeries need antibiotics.  What to bring on the day of surgery  Photo ID and insurance card  Copy of your health care directive, if you have one  Glasses and hearing aids (bring cases)  You can't wear contacts during surgery  Inhaler and eye drops, if you use them (tell us about these when you arrive)  CPAP machine or breathing device, if you use them  A few personal items, if spending the night  If you have . . .  A pacemaker, ICD (cardiac defibrillator) or other implant: Bring the ID card.  An implanted stimulator: Bring the remote control.  A legal guardian: Bring a copy of the certified (court-stamped) guardianship papers.  Please remove any jewelry, including body piercings. Leave jewelry and other valuables at home.  If you're going home the day of surgery  You must have a responsible adult drive you home. They should stay with you overnight as well.  If you don't have someone to stay with you, and you aren't safe to go home alone, we may keep you overnight. Insurance often won't pay for this.  After surgery  If it's hard to control your pain or you need more pain medicine, please call your  surgeon's office.  Questions?   If you have any questions for your care team, list them here: _________________________________________________________________________________________________________________________________________________________________________ ____________________________________ ____________________________________ ____________________________________  For informational purposes only. Not to replace the advice of your health care provider. Copyright   2003, 2019 [facility]. All rights reserved. Clinically reviewed by Breonna Rawls MD. [employer] 255019 - REV 12/22.    How to Take Your Medication Before Surgery  - Take all of your medications before surgery except as noted below  - ACE/ARB (Cozaar): HOLD on day of surgery (minimum 11 hours for general anesthesia).   - Statins (Lipitor): Continue taking on the day of surgery.    - diclofenac (Voltaren): HOLD for 3 days for high bleeding risk or PRN use.  Hold fish oil starting 7 days prior to your procedure

## 2023-10-11 LAB
CREAT SERPL-MCNC: 0.67 MG/DL (ref 0.51–0.95)
EGFRCR SERPLBLD CKD-EPI 2021: >90 ML/MIN/1.73M2
POTASSIUM SERPL-SCNC: 5.4 MMOL/L (ref 3.4–5.3)

## 2023-10-13 ENCOUNTER — OFFICE VISIT (OUTPATIENT)
Dept: ORTHOPEDICS | Facility: CLINIC | Age: 63
End: 2023-10-13
Payer: COMMERCIAL

## 2023-10-13 DIAGNOSIS — M19.042 OSTEOARTHRITIS OF BOTH HANDS, UNSPECIFIED OSTEOARTHRITIS TYPE: Primary | ICD-10-CM

## 2023-10-13 DIAGNOSIS — M19.041 OSTEOARTHRITIS OF BOTH HANDS, UNSPECIFIED OSTEOARTHRITIS TYPE: Primary | ICD-10-CM

## 2023-10-13 LAB
ANABASINE UR-MCNC: <5 NG/ML
COTININE UR-MCNC: <15 NG/ML
NICOTINE UR-MCNC: 17 NG/ML
TRANS-3-OH-COTININE UR-MCNC: 76 NG/ML

## 2023-10-13 PROCEDURE — 29125 APPL SHORT ARM SPLINT STATIC: CPT | Mod: 58 | Performed by: STUDENT IN AN ORGANIZED HEALTH CARE EDUCATION/TRAINING PROGRAM

## 2023-10-13 PROCEDURE — 99024 POSTOP FOLLOW-UP VISIT: CPT | Performed by: STUDENT IN AN ORGANIZED HEALTH CARE EDUCATION/TRAINING PROGRAM

## 2023-10-13 PROCEDURE — 99207 CAST/SPLINT APPLICATION: CPT

## 2023-10-13 ASSESSMENT — PAIN SCALES - GENERAL: PAINLEVEL: SEVERE PAIN (7)

## 2023-10-13 NOTE — LETTER
10/13/2023         RE: Margo Inman  [id number] [id number]        Dear Colleague,    Thank you for referring your patient, Margo Inman, to the [facility]. Please see a copy of my visit note below.    Ortho Hand    Doing well.  No issues.  Patient has this tingling sensation along the back of the thumb but also thinks that her splint was tight.    On exam, left dorsoradial wrist incision well-healing.  Left radial sensory distributed sensation is intact some paresthesia.  Patient can move the left thumb without tenderness.    A/P: 63-year-old female 3 weeks status post left thumb carpometacarpal joint arthroplasty with suture suspension, left proximal trapezoidectomy    -Sutures removed today  -Steri-Strips placed  -OT today for custom Orthoplast splint fabrication and start of gentle left thumb range of motion  -Reiterated the importance of no loading of the left thumb and to wear the splint at all times like a cast  -Return to clinic in 3-4 weeks for reevaluation    Mat Frye MD, PhD    Cast/splint application    Date/Time: 10/13/2023 2:29 PM    Performed by: Kvein Byrnes ATC  Authorized by: Mat Frye MD    Consent:     Consent obtained:  Verbal    Consent given by:  Patient    Risks discussed:  Discoloration, numbness, pain and swelling    Alternatives discussed:  No treatment  Pre-procedure details:     Sensation:  Normal  Procedure details:     Laterality:  Left    Location:  Hand    Hand:  L hand    Strapping: no      Splint type: thumb spica.    Supplies:  Fiberglass  Post-procedure details:     Pain:  Unchanged    Sensation:  Normal    Patient tolerance of procedure:  Tolerated well, no immediate complications    Patient provided with cast or splint care instructions: Yes           Again, thank you for allowing me to participate in the care of your patient.        Sincerely,        Mat Frye MD

## 2023-10-13 NOTE — PROGRESS NOTES
Ortho Hand    Doing well.  No issues.  Patient has this tingling sensation along the back of the thumb but also thinks that her splint was tight.    On exam, left dorsoradial wrist incision well-healing.  Left radial sensory distributed sensation is intact some paresthesia.  Patient can move the left thumb without tenderness.    A/P: 63-year-old female 3 weeks status post left thumb carpometacarpal joint arthroplasty with suture suspension, left proximal trapezoidectomy    -Sutures removed today  -Steri-Strips placed  -OT today for custom Orthoplast splint fabrication and start of gentle left thumb range of motion  -Reiterated the importance of no loading of the left thumb and to wear the splint at all times like a cast  -Return to clinic in 3-4 weeks for reevaluation    Mat Frye MD, PhD

## 2023-10-13 NOTE — NURSING NOTE
Relevant Diagnosis: LEFT THUMB CARPOMETACARPAL JOINT ARTHROPLASTY WITH SUTURE SUSPENSION     thumb spica application and care    Type of Cast applied: splint   Cast/Splinting material used: orthoglas    Person(s) involved in teaching:   Patient     Motivation Level:  Asks Questions: Yes  Eager to Learn: Yes  Cooperative: Yes  Receptive (willing/able to accept information): Yes     Patient demonstrates understanding of the following:   Reason for the appointment, diagnosis and treatment plan: Yes    Which situations necessitate calling provider and whom to contact: Yes     Teaching Concerns Addressed:   Comments: instructed in short term splinting care     Proper use and care of thumb spica cast: Yes  Pain management techniques: Yes  Wound Care: Yes  How and/when to access community resources: Yes     Cast was applied in standard Manner:  Yes  Cast fit well:  Yes  Patient reports cast to fit comfortably:  Yes    Instructional Materials Used/Given: n/a

## 2023-10-13 NOTE — PROGRESS NOTES
Cast/splint application    Date/Time: 10/13/2023 2:29 PM    Performed by: Kevin Byrnes ATC  Authorized by: Mat Frye MD    Consent:     Consent obtained:  Verbal    Consent given by:  Patient    Risks discussed:  Discoloration, numbness, pain and swelling    Alternatives discussed:  No treatment  Pre-procedure details:     Sensation:  Normal  Procedure details:     Laterality:  Left    Location:  Hand    Hand:  L hand    Strapping: no      Splint type:  Finger (static) (thumb spica)    Supplies:  Fiberglass  Post-procedure details:     Pain:  Unchanged    Sensation:  Normal    Patient tolerance of procedure:  Tolerated well, no immediate complications    Patient provided with cast or splint care instructions: Yes

## 2023-10-13 NOTE — NURSING NOTE
Chief Complaint   Patient presents with    Left Hand - Pain, Surgical Followup       There were no vitals filed for this visit.    There is no height or weight on file to calculate BMI.      ROQUE Gillette NREMT

## 2023-10-17 ENCOUNTER — THERAPY VISIT (OUTPATIENT)
Dept: OCCUPATIONAL THERAPY | Facility: CLINIC | Age: 63
End: 2023-10-17
Payer: COMMERCIAL

## 2023-10-17 DIAGNOSIS — M18.12 ARTHRITIS OF CARPOMETACARPAL (CMC) JOINT OF LEFT THUMB: ICD-10-CM

## 2023-10-17 DIAGNOSIS — M79.645 PAIN OF LEFT THUMB: Primary | ICD-10-CM

## 2023-10-17 PROCEDURE — 97760 ORTHOTIC MGMT&TRAING 1ST ENC: CPT | Mod: GO

## 2023-10-17 PROCEDURE — 97165 OT EVAL LOW COMPLEX 30 MIN: CPT | Mod: GO

## 2023-10-17 NOTE — PROGRESS NOTES
"OCCUPATIONAL THERAPY EVALUATION  Type of Visit: Evaluation    See electronic medical record for Abuse and Falls Screening details.    Subjective      Presenting condition or subjective complaint: Therapy  Date of onset: 09/19/23    Relevant medical history: Arthritis; High blood pressure; Implanted device; Menopause; Migraines or headaches; Neck injury; Overweight; Pain at night or rest; Significant weakness   Dates & types of surgery: Too many  to name    Prior diagnostic imaging/testing results: MRI; CT scan; X-ray     Prior therapy history for the same diagnosis, illness or injury: Yes Hand therapy    Prior Level of Function  Transfers: Independent  Ambulation: Independent  ADL: Independent  IADL: Driving, Finances, Housekeeping, Laundry, Meal preparation, Work, Yard work    Living Environment  Social support: With a significant other or spouse   Type of home: House   Stairs to enter the home: Yes 8 Is there a railing: Yes   Help at home: None  Equipment owned: Straight Cane; Walker with wheels; Crutches; Bath bench     Employment: No    Hobbies/Interests: Walking, gardening, Woodworking    Patient goals for therapy: Use my left hand    Pain assessment: Pain present  See objective evaluation for additional pain details     Objective   ADDITIONAL HISTORY:  Right hand dominant  Patient reports symptoms of pain, stiffness/loss of motion, weakness/loss of strength, edema, numbness, and tingling   Transportation: drives  Currently not working due to present treatment problem    Functional Outcome Measure:   Upper Extremity Functional Index Score:  SCORE:   Column Totals: /80: 5   (A lower score indicates greater disability.)    PAIN:  Pain Level at Rest: 4/10  Pain Level with Use: 9/10  Pain Location: Left radial wrist near incision  Pain Quality: Aching, Dull, Sharp, Shooting, and Throbbing  Pain Frequency: constant  Pain is Worst: daytime or nighttime  Pain is Exacerbated By: \"the pain is just always there\"  Pain is " Relieved By: medicinal marijuana   Pain Progression: Unchanged    POSTURE: Rounded Forward Shoulders     EDEMA: Moderate     SCAR/WOUND:  Incision is covered in steri-strips. It is approximately 2 inches long. There are no concerns - no signs of infection or seepage.    SENSATION: Numbness and tingling surrounding thumb.     ROM:   Thumb ROM  Left AROM Right AROM    MP Joint 19 40   IP Joint  10 35   Radial Abduction 32 30   Palmar Abduction 30 29   Kapandji Opposition Scale (0-10/10) 4 6     STRENGTH: Contraindicated    Assessment & Plan   CLINICAL IMPRESSIONS  Medical Diagnosis: Arthritis of carpometacarpal (CMC) joint of left thumb    Treatment Diagnosis: Left thumb pain    Impression/Assessment: Pt is a 63 year old female presenting to Occupational Therapy due to Arthritis of carpometacarpal (CMC) joint of left thumb.  The following significant findings have been identified: Impaired ROM, Impaired sensation, Impaired strength, Pain, and Post-surgical precautions.  These identified deficits interfere with their ability to perform self care tasks, work tasks, recreational activities, household chores, driving ,  yard work, and meal planning and preparation as compared to previous level of function.   Patient's limitations or Problem List includes: Pain, Decreased ROM/motion, Increased edema, Weakness, Sensory disturbance, Adherent scarring, Decreased , Decreased pinch, and Tightness in musculature of the left thumb which interferes with the patient's ability to perform Self Care Tasks (dressing, hygiene/toileting), Work Tasks, Sleep Patterns, Recreational Activities, Household Chores, and Driving  as compared to previous level of function.    Clinical Decision Making (Complexity):  Assessment of Occupational Performance: 5 or more Performance Deficits  Occupational Performance Limitations: dressing, hygiene and grooming, driving and community mobility, health management and maintenance, home establishment and  management, meal preparation and cleanup, shopping, sleep, work, and leisure activities  Clinical Decision Making (Complexity): Low complexity    PLAN OF CARE  Treatment Interventions:  Modalities:  US and Paraffin  Therapeutic Exercise:  AROM, AAROM, PROM, Tendon Gliding, Blocking, Reverse Blocking, Place and Hold, Contract Relax, Extensor Tracking, Isotonics, Isometrics, and Stabilization  Neuromuscular re-education:  Nerve Gliding and Kinesiotaping  Manual Techniques:  Joint mobilization, Scar mobilization, Friction massage, Myofascial release, and Manual edema mobilization  Orthotic Fabrication:  Static, Hand based, and Forearm based  Self Care:  Self Care Tasks, Ergonomic Considerations, and Work Tasks    Long Term Goals   OT Goal 1  Goal Identifier: Preparing Food  Goal Description: Patient will report no difficulty with gripping needed in order to hold onto a utensil to prepare food.  Goal Progress: Unable  Target Date: 12/12/23      Frequency of Treatment: 1x weekly  Duration of Treatment: 8 weeks     Education Assessment: Learner/Method: (P) Patient;Demonstration;Pictures/Video     Risks and benefits of evaluation/treatment have been explained.   Patient/Family/caregiver agrees with Plan of Care.     Evaluation Time:    OT Eval, Low Complexity Minutes (40232): (P) 23    Signing Clinician: Irene Nolasco OT            [facility]                                                                                   OUTPATIENT OCCUPATIONAL THERAPY    PLAN OF TREATMENT FOR OUTPATIENT REHABILITATION   Patient's Last Name, First Name, Margo Macedo YOB: 1960   Provider's Name   [facility]   Medical Record No.  [id number]     Onset Date: 09/19/23 Start of Care Date: 10/17/23     Medical Diagnosis:  Arthritis of carpometacarpal (CMC) joint of left thumb      OT Treatment Diagnosis:  Left thumb pain Plan of  Treatment  Frequency/Duration:1x weekly/8 weeks    Certification date from 10/17/23   To 12/12/23        See note for plan of treatment details and functional goals     Irene Nolasco OT                         I CERTIFY THE NEED FOR THESE SERVICES FURNISHED UNDER        THIS PLAN OF TREATMENT AND WHILE UNDER MY CARE     (Physician attestation of this document indicates review and certification of the therapy plan).                Referring Provider:  Mat Frye      Initial Assessment  See Epic Evaluation- 10/17/23

## 2023-10-19 ENCOUNTER — TELEPHONE (OUTPATIENT)
Dept: GASTROENTEROLOGY | Facility: CLINIC | Age: 63
End: 2023-10-19
Payer: COMMERCIAL

## 2023-10-19 NOTE — TELEPHONE ENCOUNTER
"Endoscopy Scheduling Screen    Have you had a positive Covid test in the last 14 days?  No      Are you active on MyChart?   Yes      What insurance is in the chart?  Other:  [facility]      Ordering/Referring Provider: JEREMIAH ZAVALA   (If ordering provider performs procedure, schedule with ordering provider unless otherwise instructed. )    BMI: Estimated body mass index is 42.7 kg/m  as calculated from the following:    Height as of 10/10/23: 1.549 m (5' 1\").    Weight as of 10/10/23: 102.5 kg (226 lb).     Sedation   Ordered  moderate sedation.   If patient BMI > 50 do not schedule in [facility].    If patient BMI > 45 do not schedule at [facility].    Are you taking methadone or Suboxone?  No      Are you taking any prescription medications for pain 3 or more times per week?   No      Do you have a history of malignant hyperthermia or adverse reaction to anesthesia?  No      (Females) Are you currently pregnant?   No       Have you been diagnosed or told you have pulmonary hypertension?   No      Do you have an LVAD?  No      Have you been told you have moderate to severe sleep apnea?  No      Have you been told you have COPD, asthma, or any other lung disease?  Yes     What breathing problems do you have?  Asthma - borderline, has inhaler    Do you use home oxygen?  No    Have your breathing problems required an ED visit or hospitalization in the last year?  No      Do you have any heart conditions?  No       Have you ever had an organ transplant?   No      Have you ever had or are you awaiting a heart or lung transplant?   No    Have you had a stroke or transient ischemic attack (TIA aka \"mini stroke\" in the last 6 months?   No      Have you been diagnosed with or been told you have cirrhosis of the liver?   No      Are you currently on dialysis?   No      Do you need assistance transferring?   No    BMI: Estimated body mass index is 42.7 kg/m  as calculated from the following:    Height as of 10/10/23: 1.549 m (5' 1\").    " Weight as of 10/10/23: 102.5 kg (226 lb).       Is patients BMI > 40 and scheduling location [facility]?  No      Do you take an injectable medication for weight loss or diabetes (excluding insulin)?  No      Do you take the medication Naltrexone?  No      Do you take blood thinners?  No       Prep   Are you currently on dialysis or do you have chronic kidney disease?  No      Do you have a diagnosis of diabetes?  No      Do you have a diagnosis of cystic fibrosis (CF)?  No      On a regular basis do you go 3 -5 days between bowel movements?  No      BMI > 40?  Yes (Extended Prep)      Preferred Pharmacy:    [id number] - [place] - [id number] [place] AT [facility]  [address] [place] [address]  Phone: [phone] Fax: [phone]    Final Scheduling Details   Colonoscopy prep sent?  Golytely Extended Prep      Procedure scheduled  Colonoscopy      Surgeon:  RUBEN     Date of procedure:  1/4/24     Pre-OP / PAC:   No - Not required for this site.    Location  [facility] - [facility] - Patient preference.    Sedation   Moderate Sedation - Per order.      Patient Reminders:   You will receive a call from a Nurse to review instructions and health history.  This assessment must be completed prior to your procedure.  Failure to complete the Nurse assessment may result in the procedure being cancelled.      On the day of your procedure, please designate an adult(s) who can drive you home stay with you for the next 24 hours. The medicines used in the exam will make you sleepy. You will not be able to drive.      You cannot take public transportation, ride share services, or non-medical taxi service without a responsible caregiver.  Medical transport services are allowed with the requirement that a responsible caregiver will receive you at your destination.  We require that drivers and caregivers are confirmed prior to your procedure.

## 2023-10-23 RX ORDER — CALCIUM CARBONATE 500 MG/1
1-2 TABLET, CHEWABLE ORAL DAILY
COMMUNITY

## 2023-10-23 RX ORDER — TIZANIDINE 2 MG/1
1 TABLET ORAL 3 TIMES DAILY PRN
COMMUNITY

## 2023-10-23 NOTE — PROGRESS NOTES
PTA medications updated by Medication Scribe prior to surgery via phone call with patient (last doses completed by Nurse)     Medication history sources: Patient, Surescripts, and H&P  In the past week, patient estimated taking medication this percent of the time: Greater than 90%      Significant changes made to the medication list:  Patient reports no longer taking the following meds (med scribe removed from PTA med list): Varenicline, Oxycodone, Methocarbamol      Additional medication history information:   None    Medication reconciliation completed by provider prior to medication history? No    Time spent in this activity: 30 minutes    The information provided in this note is only as accurate as the sources available at the time of update(s)    Prior to Admission medications    Medication Sig Last Dose Taking? Auth Provider Long Term End Date   acetaminophen (TYLENOL) 500 MG tablet Take 500-1,000 mg by mouth every 6 hours as needed. Unknown at prn Yes Reported, Patient Yes    albuterol (PROAIR HFA/PROVENTIL HFA/VENTOLIN HFA) 108 (90 Base) MCG/ACT inhaler Inhale 2 puffs into the lungs every 4 hours as needed for shortness of breath / dyspnea or wheezing Unknown at prn Yes Belem Dumont APRN CNP Yes    ammonium lactate (LAC-HYDRIN) 12 % external lotion Apply topically daily as needed for dry skin Unknown at prn Yes Reported, Patient     atorvastatin (LIPITOR) 80 MG tablet TAKE 1 TABLET(80 MG) BY MOUTH DAILY 10/23/2023 at am Yes Belem Dumont APRN CNP Yes    calcium carbonate (TUMS) 500 MG chewable tablet Take 1-2 chew tab by mouth daily 10/23/2023 at am Yes Reported, Patient     cyclobenzaprine (FLEXERIL) 10 MG tablet TAKE 1 TABLET(10 MG) BY MOUTH EVERY NIGHT AS NEEDED FOR MUSCLE SPASMS Unknown at prn Yes Belem Dumont APRN CNP No    diclofenac (VOLTAREN) 75 MG EC tablet TAKE 1 TABLET(75 MG) BY MOUTH TWICE DAILY 10/19/2023 at pm Yes Belem Dumont APRN CNP Yes    dorzolamide-timolol (COSOPT)  2-0.5 % ophthalmic solution Place 1 drop Into the left eye 2 times daily 10/23/2023 at pm Yes Gaston Guzman MD Yes    losartan (COZAAR) 25 MG tablet TAKE 1 TABLET(25 MG) BY MOUTH DAILY 10/23/2023 at am Yes Belem Dumont, APRN CNP Yes    Omega-3 Fatty Acids (FISH OIL PO) Take 1 capsule by mouth daily 10/10/2023 at am Yes Reported, Patient     polyethylene glycol-propylene glycol (SYSTANE ULTRA) 0.4-0.3 % SOLN ophthalmic solution Place 1 drop into both eyes 2 times daily as needed for dry eyes Unknown at prn Yes Reported, Patient     tiZANidine (ZANAFLEX) 2 MG tablet Take 1 tablet by mouth 3 times daily as needed for muscle spasms Unknown at prn Yes Reported, Patient No      Medication history completed by:    Zhen Ocasio CPhT  Medication [facility]

## 2023-10-24 ENCOUNTER — HOSPITAL ENCOUNTER (OUTPATIENT)
Facility: CLINIC | Age: 63
Discharge: HOME | End: 2023-10-25
Attending: ORTHOPAEDIC SURGERY | Admitting: ORTHOPAEDIC SURGERY
Payer: COMMERCIAL

## 2023-10-24 ENCOUNTER — ANESTHESIA EVENT (OUTPATIENT)
Dept: SURGERY | Facility: CLINIC | Age: 63
End: 2023-10-24
Payer: COMMERCIAL

## 2023-10-24 ENCOUNTER — ANESTHESIA (OUTPATIENT)
Dept: SURGERY | Facility: CLINIC | Age: 63
End: 2023-10-24
Payer: COMMERCIAL

## 2023-10-24 ENCOUNTER — APPOINTMENT (OUTPATIENT)
Dept: GENERAL RADIOLOGY | Facility: CLINIC | Age: 63
End: 2023-10-24
Attending: PHYSICIAN ASSISTANT
Payer: COMMERCIAL

## 2023-10-24 DIAGNOSIS — Z98.1 S/P CERVICAL SPINAL FUSION: ICD-10-CM

## 2023-10-24 DIAGNOSIS — Z98.890 POST-OPERATIVE STATE: Primary | ICD-10-CM

## 2023-10-24 DIAGNOSIS — Z96.653 STATUS POST TOTAL BILATERAL KNEE REPLACEMENT: ICD-10-CM

## 2023-10-24 PROBLEM — Z96.611 STATUS POST REVERSE TOTAL SHOULDER REPLACEMENT, RIGHT: Status: ACTIVE | Noted: 2023-10-24

## 2023-10-24 LAB
FASTING STATUS PATIENT QL REPORTED: YES
GLUCOSE SERPL-MCNC: 123 MG/DL (ref 70–99)
POTASSIUM SERPL-SCNC: 4.3 MMOL/L (ref 3.4–5.3)

## 2023-10-24 PROCEDURE — 999N000141 HC STATISTIC PRE-PROCEDURE NURSING ASSESSMENT: Performed by: ORTHOPAEDIC SURGERY

## 2023-10-24 PROCEDURE — 84132 ASSAY OF SERUM POTASSIUM: CPT | Performed by: ANESTHESIOLOGY

## 2023-10-24 PROCEDURE — 258N000003 HC RX IP 258 OP 636: Performed by: PHYSICIAN ASSISTANT

## 2023-10-24 PROCEDURE — 250N000011 HC RX IP 250 OP 636: Performed by: ORTHOPAEDIC SURGERY

## 2023-10-24 PROCEDURE — 250N000011 HC RX IP 250 OP 636: Performed by: ANESTHESIOLOGY

## 2023-10-24 PROCEDURE — 258N000003 HC RX IP 258 OP 636: Performed by: ANESTHESIOLOGY

## 2023-10-24 PROCEDURE — 250N000011 HC RX IP 250 OP 636: Performed by: NURSE ANESTHETIST, CERTIFIED REGISTERED

## 2023-10-24 PROCEDURE — 82947 ASSAY GLUCOSE BLOOD QUANT: CPT | Performed by: ANESTHESIOLOGY

## 2023-10-24 PROCEDURE — 250N000009 HC RX 250: Performed by: NURSE ANESTHETIST, CERTIFIED REGISTERED

## 2023-10-24 PROCEDURE — C1713 ANCHOR/SCREW BN/BN,TIS/BN: HCPCS | Performed by: ORTHOPAEDIC SURGERY

## 2023-10-24 PROCEDURE — 999N000063 XR SHOULDER RIGHT 2 VIEWS: Mod: RT

## 2023-10-24 PROCEDURE — 258N000003 HC RX IP 258 OP 636: Performed by: NURSE ANESTHETIST, CERTIFIED REGISTERED

## 2023-10-24 PROCEDURE — 360N000077 HC SURGERY LEVEL 4, PER MIN: Performed by: ORTHOPAEDIC SURGERY

## 2023-10-24 PROCEDURE — 250N000011 HC RX IP 250 OP 636: Performed by: PHYSICIAN ASSISTANT

## 2023-10-24 PROCEDURE — 250N000009 HC RX 250: Performed by: ORTHOPAEDIC SURGERY

## 2023-10-24 PROCEDURE — 250N000013 HC RX MED GY IP 250 OP 250 PS 637: Performed by: PHYSICIAN ASSISTANT

## 2023-10-24 PROCEDURE — 370N000017 HC ANESTHESIA TECHNICAL FEE, PER MIN: Performed by: ORTHOPAEDIC SURGERY

## 2023-10-24 PROCEDURE — 258N000001 HC RX 258: Performed by: ORTHOPAEDIC SURGERY

## 2023-10-24 PROCEDURE — 272N000001 HC OR GENERAL SUPPLY STERILE: Performed by: ORTHOPAEDIC SURGERY

## 2023-10-24 PROCEDURE — 710N000009 HC RECOVERY PHASE 1, LEVEL 1, PER MIN: Performed by: ORTHOPAEDIC SURGERY

## 2023-10-24 PROCEDURE — 250N000011 HC RX IP 250 OP 636: Mod: JZ | Performed by: PHYSICIAN ASSISTANT

## 2023-10-24 PROCEDURE — C1776 JOINT DEVICE (IMPLANTABLE): HCPCS | Performed by: ORTHOPAEDIC SURGERY

## 2023-10-24 DEVICE — IMPLANTABLE DEVICE
Type: IMPLANTABLE DEVICE | Site: SHOULDER | Status: FUNCTIONAL
Brand: TORNIER FLEX SHOULDER SYSTEM

## 2023-10-24 DEVICE — IMPLANTABLE DEVICE
Type: IMPLANTABLE DEVICE | Site: SHOULDER | Status: FUNCTIONAL
Brand: AEQUALIS REVERSED II

## 2023-10-24 DEVICE — IMP SCR LOCKING 4.5X29MM AQLS DWD029: Type: IMPLANTABLE DEVICE | Site: SHOULDER | Status: FUNCTIONAL

## 2023-10-24 DEVICE — IMP SCR FIXATION 4.5X35MM AQLS VDV235: Type: IMPLANTABLE DEVICE | Site: SHOULDER | Status: FUNCTIONAL

## 2023-10-24 RX ORDER — PROPOFOL 10 MG/ML
INJECTION, EMULSION INTRAVENOUS PRN
Status: DISCONTINUED | OUTPATIENT
Start: 2023-10-24 | End: 2023-10-24

## 2023-10-24 RX ORDER — HYDROMORPHONE HCL IN WATER/PF 6 MG/30 ML
0.4 PATIENT CONTROLLED ANALGESIA SYRINGE INTRAVENOUS
Status: DISCONTINUED | OUTPATIENT
Start: 2023-10-24 | End: 2023-10-25 | Stop reason: HOSPADM

## 2023-10-24 RX ORDER — ASPIRIN 81 MG/1
162 TABLET ORAL DAILY
Qty: 120 TABLET | Refills: 0 | Status: SHIPPED | OUTPATIENT
Start: 2023-10-24 | End: 2023-10-25

## 2023-10-24 RX ORDER — NALOXONE HYDROCHLORIDE 0.4 MG/ML
0.4 INJECTION, SOLUTION INTRAMUSCULAR; INTRAVENOUS; SUBCUTANEOUS
Status: DISCONTINUED | OUTPATIENT
Start: 2023-10-24 | End: 2023-10-25 | Stop reason: HOSPADM

## 2023-10-24 RX ORDER — SODIUM CHLORIDE, SODIUM LACTATE, POTASSIUM CHLORIDE, CALCIUM CHLORIDE 600; 310; 30; 20 MG/100ML; MG/100ML; MG/100ML; MG/100ML
INJECTION, SOLUTION INTRAVENOUS CONTINUOUS
Status: DISCONTINUED | OUTPATIENT
Start: 2023-10-24 | End: 2023-10-24 | Stop reason: HOSPADM

## 2023-10-24 RX ORDER — HYDROMORPHONE HCL IN WATER/PF 6 MG/30 ML
0.2 PATIENT CONTROLLED ANALGESIA SYRINGE INTRAVENOUS
Status: DISCONTINUED | OUTPATIENT
Start: 2023-10-24 | End: 2023-10-25 | Stop reason: HOSPADM

## 2023-10-24 RX ORDER — HYDROXYZINE HYDROCHLORIDE 25 MG/1
25 TABLET, FILM COATED ORAL EVERY 6 HOURS PRN
Status: DISCONTINUED | OUTPATIENT
Start: 2023-10-24 | End: 2023-10-24 | Stop reason: HOSPADM

## 2023-10-24 RX ORDER — ONDANSETRON 2 MG/ML
INJECTION INTRAMUSCULAR; INTRAVENOUS PRN
Status: DISCONTINUED | OUTPATIENT
Start: 2023-10-24 | End: 2023-10-24

## 2023-10-24 RX ORDER — CEFAZOLIN SODIUM/WATER 2 G/20 ML
2 SYRINGE (ML) INTRAVENOUS
Status: COMPLETED | OUTPATIENT
Start: 2023-10-24 | End: 2023-10-24

## 2023-10-24 RX ORDER — ACETAMINOPHEN 325 MG/1
975 TABLET ORAL ONCE
Status: COMPLETED | OUTPATIENT
Start: 2023-10-24 | End: 2023-10-24

## 2023-10-24 RX ORDER — AMOXICILLIN 250 MG
1-2 CAPSULE ORAL 2 TIMES DAILY
Qty: 100 TABLET | Refills: 0 | Status: SHIPPED | OUTPATIENT
Start: 2023-10-24 | End: 2023-10-25

## 2023-10-24 RX ORDER — OXYCODONE HYDROCHLORIDE 5 MG/1
5 TABLET ORAL EVERY 4 HOURS PRN
Qty: 30 TABLET | Refills: 0 | Status: SHIPPED | OUTPATIENT
Start: 2023-10-24 | End: 2023-10-25

## 2023-10-24 RX ORDER — HYDROMORPHONE HCL IN WATER/PF 6 MG/30 ML
0.2 PATIENT CONTROLLED ANALGESIA SYRINGE INTRAVENOUS EVERY 5 MIN PRN
Status: DISCONTINUED | OUTPATIENT
Start: 2023-10-24 | End: 2023-10-24 | Stop reason: HOSPADM

## 2023-10-24 RX ORDER — ONDANSETRON 4 MG/1
4 TABLET, ORALLY DISINTEGRATING ORAL EVERY 30 MIN PRN
Status: DISCONTINUED | OUTPATIENT
Start: 2023-10-24 | End: 2023-10-24 | Stop reason: HOSPADM

## 2023-10-24 RX ORDER — AMOXICILLIN 250 MG
1 CAPSULE ORAL 2 TIMES DAILY
Status: DISCONTINUED | OUTPATIENT
Start: 2023-10-24 | End: 2023-10-25 | Stop reason: HOSPADM

## 2023-10-24 RX ORDER — ONDANSETRON 4 MG/1
4 TABLET, ORALLY DISINTEGRATING ORAL EVERY 6 HOURS PRN
Status: DISCONTINUED | OUTPATIENT
Start: 2023-10-24 | End: 2023-10-25 | Stop reason: HOSPADM

## 2023-10-24 RX ORDER — CELECOXIB 200 MG/1
200 CAPSULE ORAL DAILY
Qty: 30 CAPSULE | Refills: 0 | Status: SHIPPED | OUTPATIENT
Start: 2023-10-24 | End: 2023-10-25

## 2023-10-24 RX ORDER — CELECOXIB 200 MG/1
400 CAPSULE ORAL ONCE
Status: COMPLETED | OUTPATIENT
Start: 2023-10-24 | End: 2023-10-24

## 2023-10-24 RX ORDER — NALOXONE HYDROCHLORIDE 0.4 MG/ML
0.2 INJECTION, SOLUTION INTRAMUSCULAR; INTRAVENOUS; SUBCUTANEOUS
Status: DISCONTINUED | OUTPATIENT
Start: 2023-10-24 | End: 2023-10-25 | Stop reason: HOSPADM

## 2023-10-24 RX ORDER — FENTANYL CITRATE 50 UG/ML
INJECTION, SOLUTION INTRAMUSCULAR; INTRAVENOUS PRN
Status: DISCONTINUED | OUTPATIENT
Start: 2023-10-24 | End: 2023-10-24

## 2023-10-24 RX ORDER — POLYETHYLENE GLYCOL 3350 17 G/17G
17 POWDER, FOR SOLUTION ORAL DAILY
Status: DISCONTINUED | OUTPATIENT
Start: 2023-10-25 | End: 2023-10-25 | Stop reason: HOSPADM

## 2023-10-24 RX ORDER — CEFAZOLIN SODIUM/WATER 2 G/20 ML
2 SYRINGE (ML) INTRAVENOUS SEE ADMIN INSTRUCTIONS
Status: DISCONTINUED | OUTPATIENT
Start: 2023-10-24 | End: 2023-10-24 | Stop reason: HOSPADM

## 2023-10-24 RX ORDER — DEXMEDETOMIDINE HYDROCHLORIDE 4 UG/ML
INJECTION, SOLUTION INTRAVENOUS PRN
Status: DISCONTINUED | OUTPATIENT
Start: 2023-10-24 | End: 2023-10-24

## 2023-10-24 RX ORDER — ACETAMINOPHEN 325 MG/1
650 TABLET ORAL EVERY 4 HOURS PRN
Qty: 100 TABLET | Refills: 0 | Status: SHIPPED | OUTPATIENT
Start: 2023-10-24 | End: 2023-10-25

## 2023-10-24 RX ORDER — ONDANSETRON 2 MG/ML
4 INJECTION INTRAMUSCULAR; INTRAVENOUS EVERY 6 HOURS PRN
Status: DISCONTINUED | OUTPATIENT
Start: 2023-10-24 | End: 2023-10-25 | Stop reason: HOSPADM

## 2023-10-24 RX ORDER — PROCHLORPERAZINE MALEATE 10 MG
10 TABLET ORAL EVERY 6 HOURS PRN
Status: DISCONTINUED | OUTPATIENT
Start: 2023-10-24 | End: 2023-10-25 | Stop reason: HOSPADM

## 2023-10-24 RX ORDER — LIDOCAINE 40 MG/G
CREAM TOPICAL
Status: DISCONTINUED | OUTPATIENT
Start: 2023-10-24 | End: 2023-10-24 | Stop reason: HOSPADM

## 2023-10-24 RX ORDER — ACETAMINOPHEN 325 MG/1
975 TABLET ORAL EVERY 8 HOURS
Qty: 27 TABLET | Refills: 0 | Status: DISCONTINUED | OUTPATIENT
Start: 2023-10-24 | End: 2023-10-25 | Stop reason: HOSPADM

## 2023-10-24 RX ORDER — ONDANSETRON 2 MG/ML
4 INJECTION INTRAMUSCULAR; INTRAVENOUS EVERY 30 MIN PRN
Status: DISCONTINUED | OUTPATIENT
Start: 2023-10-24 | End: 2023-10-24 | Stop reason: HOSPADM

## 2023-10-24 RX ORDER — CEFAZOLIN SODIUM 2 G/100ML
2 INJECTION, SOLUTION INTRAVENOUS EVERY 8 HOURS
Qty: 200 ML | Refills: 0 | Status: COMPLETED | OUTPATIENT
Start: 2023-10-24 | End: 2023-10-25

## 2023-10-24 RX ORDER — SODIUM CHLORIDE, SODIUM LACTATE, POTASSIUM CHLORIDE, CALCIUM CHLORIDE 600; 310; 30; 20 MG/100ML; MG/100ML; MG/100ML; MG/100ML
INJECTION, SOLUTION INTRAVENOUS CONTINUOUS
Status: DISCONTINUED | OUTPATIENT
Start: 2023-10-24 | End: 2023-10-25 | Stop reason: HOSPADM

## 2023-10-24 RX ORDER — LIDOCAINE 40 MG/G
CREAM TOPICAL
Status: DISCONTINUED | OUTPATIENT
Start: 2023-10-24 | End: 2023-10-25 | Stop reason: HOSPADM

## 2023-10-24 RX ORDER — LABETALOL HYDROCHLORIDE 5 MG/ML
10 INJECTION, SOLUTION INTRAVENOUS
Status: DISCONTINUED | OUTPATIENT
Start: 2023-10-24 | End: 2023-10-24 | Stop reason: HOSPADM

## 2023-10-24 RX ORDER — DIMENHYDRINATE 50 MG/ML
25 INJECTION, SOLUTION INTRAMUSCULAR; INTRAVENOUS
Status: DISCONTINUED | OUTPATIENT
Start: 2023-10-24 | End: 2023-10-24 | Stop reason: HOSPADM

## 2023-10-24 RX ORDER — HYDROXYZINE HYDROCHLORIDE 25 MG/1
25 TABLET, FILM COATED ORAL EVERY 6 HOURS PRN
Status: DISCONTINUED | OUTPATIENT
Start: 2023-10-24 | End: 2023-10-25 | Stop reason: HOSPADM

## 2023-10-24 RX ORDER — DEXAMETHASONE SODIUM PHOSPHATE 4 MG/ML
INJECTION, SOLUTION INTRA-ARTICULAR; INTRALESIONAL; INTRAMUSCULAR; INTRAVENOUS; SOFT TISSUE PRN
Status: DISCONTINUED | OUTPATIENT
Start: 2023-10-24 | End: 2023-10-24

## 2023-10-24 RX ORDER — FENTANYL CITRATE 50 UG/ML
50 INJECTION, SOLUTION INTRAMUSCULAR; INTRAVENOUS EVERY 5 MIN PRN
Status: DISCONTINUED | OUTPATIENT
Start: 2023-10-24 | End: 2023-10-24 | Stop reason: HOSPADM

## 2023-10-24 RX ORDER — OXYCODONE HYDROCHLORIDE 5 MG/1
5 TABLET ORAL EVERY 4 HOURS PRN
Status: DISCONTINUED | OUTPATIENT
Start: 2023-10-24 | End: 2023-10-25 | Stop reason: HOSPADM

## 2023-10-24 RX ORDER — FENTANYL CITRATE 50 UG/ML
25 INJECTION, SOLUTION INTRAMUSCULAR; INTRAVENOUS EVERY 5 MIN PRN
Status: DISCONTINUED | OUTPATIENT
Start: 2023-10-24 | End: 2023-10-24 | Stop reason: HOSPADM

## 2023-10-24 RX ORDER — HYDROMORPHONE HCL IN WATER/PF 6 MG/30 ML
0.4 PATIENT CONTROLLED ANALGESIA SYRINGE INTRAVENOUS EVERY 5 MIN PRN
Status: DISCONTINUED | OUTPATIENT
Start: 2023-10-24 | End: 2023-10-24 | Stop reason: HOSPADM

## 2023-10-24 RX ORDER — TRANEXAMIC ACID 650 MG/1
1950 TABLET ORAL ONCE
Status: COMPLETED | OUTPATIENT
Start: 2023-10-24 | End: 2023-10-24

## 2023-10-24 RX ORDER — ASPIRIN 81 MG/1
162 TABLET ORAL DAILY
Status: DISCONTINUED | OUTPATIENT
Start: 2023-10-24 | End: 2023-10-25 | Stop reason: HOSPADM

## 2023-10-24 RX ORDER — CELECOXIB 200 MG/1
200 CAPSULE ORAL 2 TIMES DAILY
Status: DISCONTINUED | OUTPATIENT
Start: 2023-10-24 | End: 2023-10-25 | Stop reason: HOSPADM

## 2023-10-24 RX ORDER — FENTANYL CITRATE 0.05 MG/ML
50 INJECTION, SOLUTION INTRAMUSCULAR; INTRAVENOUS
Status: DISCONTINUED | OUTPATIENT
Start: 2023-10-24 | End: 2023-10-24 | Stop reason: HOSPADM

## 2023-10-24 RX ORDER — PROPOFOL 10 MG/ML
INJECTION, EMULSION INTRAVENOUS CONTINUOUS PRN
Status: DISCONTINUED | OUTPATIENT
Start: 2023-10-24 | End: 2023-10-24

## 2023-10-24 RX ORDER — VANCOMYCIN HYDROCHLORIDE 1 G/20ML
INJECTION, POWDER, LYOPHILIZED, FOR SOLUTION INTRAVENOUS PRN
Status: DISCONTINUED | OUTPATIENT
Start: 2023-10-24 | End: 2023-10-24 | Stop reason: HOSPADM

## 2023-10-24 RX ORDER — BISACODYL 10 MG
10 SUPPOSITORY, RECTAL RECTAL DAILY PRN
Status: DISCONTINUED | OUTPATIENT
Start: 2023-10-24 | End: 2023-10-25 | Stop reason: HOSPADM

## 2023-10-24 RX ORDER — OXYCODONE HYDROCHLORIDE 5 MG/1
10 TABLET ORAL EVERY 4 HOURS PRN
Status: DISCONTINUED | OUTPATIENT
Start: 2023-10-24 | End: 2023-10-25 | Stop reason: HOSPADM

## 2023-10-24 RX ORDER — HYDRALAZINE HYDROCHLORIDE 20 MG/ML
2.5-5 INJECTION INTRAMUSCULAR; INTRAVENOUS EVERY 10 MIN PRN
Status: DISCONTINUED | OUTPATIENT
Start: 2023-10-24 | End: 2023-10-24 | Stop reason: HOSPADM

## 2023-10-24 RX ORDER — DICLOFENAC SODIUM 75 MG/1
TABLET, DELAYED RELEASE ORAL
COMMUNITY
Start: 2023-10-24 | End: 2024-01-29

## 2023-10-24 RX ADMIN — PROPOFOL 50 MG: 10 INJECTION, EMULSION INTRAVENOUS at 11:17

## 2023-10-24 RX ADMIN — PHENYLEPHRINE HYDROCHLORIDE 200 MCG: 10 INJECTION INTRAVENOUS at 11:33

## 2023-10-24 RX ADMIN — SODIUM CHLORIDE, POTASSIUM CHLORIDE, SODIUM LACTATE AND CALCIUM CHLORIDE: 600; 310; 30; 20 INJECTION, SOLUTION INTRAVENOUS at 12:16

## 2023-10-24 RX ADMIN — PROPOFOL 150 MCG/KG/MIN: 10 INJECTION, EMULSION INTRAVENOUS at 10:32

## 2023-10-24 RX ADMIN — FENTANYL CITRATE 50 MCG: 50 INJECTION INTRAMUSCULAR; INTRAVENOUS at 10:34

## 2023-10-24 RX ADMIN — CEFAZOLIN SODIUM 2 G: 2 INJECTION, SOLUTION INTRAVENOUS at 19:07

## 2023-10-24 RX ADMIN — MIDAZOLAM 1 MG: 1 INJECTION INTRAMUSCULAR; INTRAVENOUS at 10:28

## 2023-10-24 RX ADMIN — CELECOXIB 200 MG: 200 CAPSULE ORAL at 21:24

## 2023-10-24 RX ADMIN — CELECOXIB 400 MG: 200 CAPSULE ORAL at 09:02

## 2023-10-24 RX ADMIN — PROPOFOL 30 MG: 10 INJECTION, EMULSION INTRAVENOUS at 11:23

## 2023-10-24 RX ADMIN — PHENYLEPHRINE HYDROCHLORIDE 150 MCG: 10 INJECTION INTRAVENOUS at 10:47

## 2023-10-24 RX ADMIN — DEXAMETHASONE SODIUM PHOSPHATE 10 MG: 4 INJECTION, SOLUTION INTRA-ARTICULAR; INTRALESIONAL; INTRAMUSCULAR; INTRAVENOUS; SOFT TISSUE at 10:34

## 2023-10-24 RX ADMIN — ASPIRIN 162 MG: 81 TABLET, COATED ORAL at 14:10

## 2023-10-24 RX ADMIN — PHENYLEPHRINE HYDROCHLORIDE 0.5 MCG/KG/MIN: 10 INJECTION INTRAVENOUS at 10:48

## 2023-10-24 RX ADMIN — PHENYLEPHRINE HYDROCHLORIDE 100 MCG: 10 INJECTION INTRAVENOUS at 11:28

## 2023-10-24 RX ADMIN — Medication 2 G: at 10:32

## 2023-10-24 RX ADMIN — SODIUM CHLORIDE, POTASSIUM CHLORIDE, SODIUM LACTATE AND CALCIUM CHLORIDE: 600; 310; 30; 20 INJECTION, SOLUTION INTRAVENOUS at 14:10

## 2023-10-24 RX ADMIN — PHENYLEPHRINE HYDROCHLORIDE 100 MCG: 10 INJECTION INTRAVENOUS at 12:12

## 2023-10-24 RX ADMIN — SENNOSIDES AND DOCUSATE SODIUM 1 TABLET: 50; 8.6 TABLET ORAL at 21:24

## 2023-10-24 RX ADMIN — FENTANYL CITRATE 50 MCG: 50 INJECTION INTRAMUSCULAR; INTRAVENOUS at 10:26

## 2023-10-24 RX ADMIN — MIDAZOLAM 2 MG: 1 INJECTION INTRAMUSCULAR; INTRAVENOUS at 09:11

## 2023-10-24 RX ADMIN — DEXMEDETOMIDINE HYDROCHLORIDE 20 MCG: 200 INJECTION INTRAVENOUS at 10:33

## 2023-10-24 RX ADMIN — SODIUM CHLORIDE, POTASSIUM CHLORIDE, SODIUM LACTATE AND CALCIUM CHLORIDE: 600; 310; 30; 20 INJECTION, SOLUTION INTRAVENOUS at 09:02

## 2023-10-24 RX ADMIN — ACETAMINOPHEN 975 MG: 325 TABLET, FILM COATED ORAL at 09:02

## 2023-10-24 RX ADMIN — TRANEXAMIC ACID 1950 MG: 650 TABLET ORAL at 09:02

## 2023-10-24 RX ADMIN — ACETAMINOPHEN 975 MG: 325 TABLET, FILM COATED ORAL at 18:09

## 2023-10-24 RX ADMIN — ONDANSETRON 4 MG: 2 INJECTION INTRAMUSCULAR; INTRAVENOUS at 11:56

## 2023-10-24 RX ADMIN — MIDAZOLAM 1 MG: 1 INJECTION INTRAMUSCULAR; INTRAVENOUS at 10:33

## 2023-10-24 ASSESSMENT — ACTIVITIES OF DAILY LIVING (ADL)
ADLS_ACUITY_SCORE: 35

## 2023-10-24 ASSESSMENT — COPD QUESTIONNAIRES: COPD: 1

## 2023-10-24 ASSESSMENT — LIFESTYLE VARIABLES: TOBACCO_USE: 1

## 2023-10-24 NOTE — PROGRESS NOTES
Pt alert and oriented X4. Able to make needs known. Denied SOB, CP, N/V. R shoulder in sling. Pt encouraged to move fingers and wrist.  Urinating appropriately but using pure wick.  Per pt does not use CPAP at home. Denied pain.

## 2023-10-24 NOTE — PROGRESS NOTES
Left arm IV placed in AC, c/o burning upon insertion. No infiltration noted. Placed new IV and removed. Continued to burn. Applied hot pack followed by ice pack.

## 2023-10-24 NOTE — ANESTHESIA PREPROCEDURE EVALUATION
Anesthesia Pre-Procedure Evaluation    Patient: Margo Inman   MRN: [id number] : 1960        Procedure : Procedure(s):  Right reverse total shoulder arthroplasty          Past Medical History:   Diagnosis Date    Arthritis     Cervical high risk HPV (human papillomavirus) test positive 01/10/2017    1/10/17 NIL pap/+ HR HPV (not 16 or 18). Plan: cotest in 1 year, due by 1/10/18     Chronic infection     HX of MRSA. 2 neg swabs at [facility] in  and .    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    Depressive disorder Back in my mid 20's    History of blood transfusion     Hypertension     Numbness and tingling     Right arm    PONV (postoperative nausea and vomiting)     Uncomplicated asthma       Past Surgical History:   Procedure Laterality Date    ABDOMEN SURGERY      hysterectomy    APPENDECTOMY      ARTHROPLASTY CARPOMETACARPAL (THUMB JOINT) Left 2023    Procedure: LEFT THUMB CARPOMETACARPAL JOINT ARTHROPLASTY WITH SUTURE SUSPENSION;  Surgeon: Mat Frye MD;  Location: [facility] OR    ARTHROSCOPY KNEE  2011    Procedure:ARTHROSCOPY KNEE; left knee arthroscopy with debridement, open lateral patellar spur excision; Surgeon:LUIS A MONTOYA; Location:[facility]    BIOPSY      CATARACT IOL, RT/LT      COLONOSCOPY N/A 2016    Procedure: COLONOSCOPY;  Surgeon: Belem Khalil MD;  Location: [facility]    COLONOSCOPY N/A 2016    Procedure: COMBINED COLONOSCOPY, SINGLE OR MULTIPLE BIOPSY/POLYPECTOMY BY BIOPSY;  Surgeon: Belem Khalil MD;  Location: [facility]    COLONOSCOPY N/A 06/15/2020    Procedure: Colonoscopy, With Polypectomy And Biopsy;  Surgeon: Torres Gallegos DO;  Location: [facility]    COLONOSCOPY WITH CO2 INSUFFLATION N/A 2016    Procedure: COLONOSCOPY WITH CO2 INSUFFLATION;  Surgeon: Belem Khalil MD;  [facility]: [facility]    COLONOSCOPY WITH CO2  INSUFFLATION N/A 06/15/2020    Procedure: COLONOSCOPY, WITH CO2 INSUFFLATION;  Surgeon: Torres Gallegos DO;  Location: [facility]    CYSTOSCOPY  01/01/2016    microscopic hematuria    DECOMPRESSION, FUSION CERVICAL ANTERIOR ONE LEVEL, COMBINED N/A 03/30/2017    Procedure: COMBINED DECOMPRESSION, FUSION CERVICAL ANTERIOR ONE LEVEL;  Surgeon: Joseph Klein MD;  Location: [facility] OR    ECTOPIC PREGNANCY SURGERY      ENT SURGERY      GENITOURINARY SURGERY      GONIOSYNECHIALYSIS Left 02/20/2023    Procedure: goniosynechialysis left;  Surgeon: Gaston Guzman MD;  Location: [facility] OR    GYN SURGERY      HC INCISION TENDON SHEATH FINGER Left 07/11/2014    Thumb TF release    HC KNEE SCOPE,MED/LAT MENISECTOMY  09/16/2011    left, with partial medial menisectomy ONLY    HC REVISE MEDIAN N/CARPAL TUNNEL SURG Left 07/11/2014    PRIMARY - not revision    HEAD & NECK SURGERY  7/22/2020    4 fusions in neck    LASIK Bilateral     [id number]    ORTHOPEDIC SURGERY      PHACOEMULSIFICATION CLEAR CORNEA W/ STANDARD IOL IMPLANT, ENDOSCOPIC CYCLOPHOTOCOAGULATION, COMBINED Left 02/20/2023    Procedure: LEFT EYE COMPLEX PHACOEMULSIFICATION, CATARACT, CLEAR CORNEAL INCISION APPROACH, WITH STANDARD INTRAOCULAR LENS IMPLANT INSERTION AND ENDOSCOPIC CYCLOPHOTOCOAGULATION< GONISYNECHIOLYSIS;  Surgeon: Gaston Guzman MD;  Location: [facility] OR    PHACOEMULSIFICATION WITH STANDARD INTRAOCULAR LENS IMPLANT Right 05/12/2023    Procedure: RIGHT EYE PHACOEMULSIFICATION, CATARACT, WITH STANDARD INTRAOCULAR LENS IMPLANT INSERTION;  Surgeon: Gaston Guzman MD;  Location: [facility] OR    RELEASE CARPAL TUNNEL  07/11/2014    Procedure: RELEASE CARPAL TUNNEL;  Surgeon: Rg Franco MD;  [facility]    RELEASE CARPAL TUNNEL Right 11/07/2014    Procedure: RELEASE CARPAL TUNNEL;  Surgeon: Rg Franco MD;  [facility]    RELEASE TRIGGER FINGER  07/11/2014    Procedure: RELEASE TRIGGER FINGER;  Surgeon: Rg Franco  MD Aubrey;  Location: [facility]    RELEASE TRIGGER FINGER Right 2014    Procedure: RELEASE TRIGGER FINGER;  Surgeon: Rg Franco MD;  Location: [facility]    SOFT TISSUE SURGERY      tonsils      [facility] PART REMV FEMUR/PROX TIB/FIB  2011    left, open lateral patellar bone spur excision    [facility] TOTAL KNEE ARTHROPLASTY  2014    Bilateral      Allergies   Allergen Reactions    Wasp Venom Protein Anaphylaxis    Bee Anaphylaxis    Erythromycin Hives    Wasps [Hornets] Anaphylaxis    Shellfish Allergy Nausea and Vomiting and Rash    Shellfish-Derived Products Rash and Nausea and Vomiting      Social History     Tobacco Use    Smoking status: Former     Packs/day: 0.00     Years: 15.00     Additional pack years: 0.00     Total pack years: 0.00     Types: Cigarettes     Start date: 1976     Quit date: 2023     Years since quittin.7     Passive exposure: Never    Smokeless tobacco: Never    Tobacco comments:     I am currently on Chantix   Substance Use Topics    Alcohol use: Yes     Comment: occasionally      Wt Readings from Last 1 Encounters:   10/10/23 102.5 kg (226 lb)        Anesthesia Evaluation   Pt has had prior anesthetic.     History of anesthetic complications  - PONV.      ROS/MED HX  ENT/Pulmonary:     (+)                tobacco use (Quit ), Past use,        COPD,              Neurologic: Comment: RLS    (+)    peripheral neuropathy,  migraines,                          Cardiovascular:     (+) Dyslipidemia hypertension- -   -  - -                                      METS/Exercise Tolerance:     Hematologic:       Musculoskeletal: Comment: S/p ACDF  (+)  arthritis,             GI/Hepatic:       Renal/Genitourinary:       Endo:     (+)               Obesity (Class 3),       Psychiatric/Substance Use:     (+) psychiatric history anxiety and depression       Infectious Disease:       Malignancy:       Other:            Physical Exam    Airway        Mallampati: III        Respiratory Devices and Support         Dental       (+) Modest Abnormalities - crowns, retainers, 1 or 2 missing teeth      Cardiovascular          Rhythm and rate: regular     Pulmonary           breath sounds clear to auscultation           OUTSIDE LABS:  CBC:   Lab Results   Component Value Date    WBC 11.3 (H) 01/11/2023    WBC 11.9 (H) 09/12/2022    HGB 15.1 01/11/2023    HGB 14.4 09/12/2022    HCT 46.0 01/11/2023    HCT 43.4 09/12/2022     01/11/2023     09/12/2022     BMP:   Lab Results   Component Value Date     01/11/2023     09/12/2022    POTASSIUM 5.4 (H) 10/10/2023    POTASSIUM 4.8 09/07/2023    CHLORIDE 105 01/11/2023    CHLORIDE 104 09/12/2022    CO2 30 01/11/2023    CO2 31 09/12/2022    BUN 13 01/11/2023    BUN 22 09/12/2022    CR 0.67 10/10/2023    CR 0.69 09/07/2023     (H) 01/11/2023     (H) 09/12/2022     COAGS:   Lab Results   Component Value Date    PTT 34 01/10/2014    INR 1.04 06/27/2022     POC:   Lab Results   Component Value Date    BGM 98 04/07/2017     HEPATIC:   Lab Results   Component Value Date    ALBUMIN 4.1 06/27/2022    PROTTOTAL 7.7 06/27/2022    ALT 44 06/27/2022    AST 22 06/27/2022    ALKPHOS 99 06/27/2022    BILITOTAL 0.4 06/27/2022     OTHER:   Lab Results   Component Value Date    A1C 5.9 04/04/2017    DOMONIQUE 10.1 01/11/2023    MAG 2.3 04/07/2017    TSH 1.21 01/10/2017    CRP 12.3 (H) 01/11/2023    SED 12 01/11/2023       Anesthesia Plan    ASA Status:  3    NPO Status:  NPO Appropriate    Anesthesia Type: Peripheral Nerve Block.              Consents    Anesthesia Plan(s) and associated risks, benefits, and realistic alternatives discussed. Questions answered and patient/representative(s) expressed understanding.     - Discussed:     - Discussed with:  Patient            Postoperative Care    Pain management: IV analgesics, Oral pain medications, Multi-modal analgesia.   PONV prophylaxis: Ondansetron (or other 5HT-3), Dexamethasone  or Solumedrol (Pt w/ severe glaucoma, no scop patch)     Comments:                Mg Avendano MD

## 2023-10-24 NOTE — ANESTHESIA CARE TRANSFER NOTE
Patient: Margo Inman    Procedure: Procedure(s):  Right reverse total shoulder arthroplasty       Diagnosis: Arthritis of right shoulder region [M19.011]  Diagnosis Additional Information: No value filed.    Anesthesia Type:   Peripheral Nerve Block     Note:    Oropharynx: oropharynx clear of all foreign objects  Level of Consciousness: awake  Oxygen Supplementation: face mask    Independent Airway: airway patency satisfactory and stable  Dentition: dentition unchanged  Vital Signs Stable: post-procedure vital signs reviewed and stable    Patient transferred to: PACU    Handoff Report: Identifed the Patient, Identified the Reponsible Provider, Reviewed the pertinent medical history, Discussed the surgical course, Reviewed Intra-OP anesthesia mangement and issues during anesthesia, Set expectations for post-procedure period and Allowed opportunity for questions and acknowledgement of understanding  Vitals:  Vitals Value Taken Time   /70 10/24/23 1229   Temp     Pulse 93 10/24/23 1231   Resp 17 10/24/23 1231   SpO2 92 % 10/24/23 1231   Vitals shown include unfiled device data.    Electronically Signed By: JOSE R Estevez CRNA  October 24, 2023  12:32 PM

## 2023-10-24 NOTE — CARE PLAN
Received page regarding Nephrology consult for this patient. Attempted to call back as she is not admitted to [facility] but number went to [facility] emergency department (patient is at [facility]).     Paged Chelsie Apodaca directly to pass along the above. Did not receive call back.     Please reach out to Nephrology service covering the hospital patient is admitted at for iHD needs while in house.

## 2023-10-24 NOTE — ANESTHESIA POSTPROCEDURE EVALUATION
Patient: Margo Inman    Procedure: Procedure(s):  Right reverse total shoulder arthroplasty       Anesthesia Type:  Peripheral Nerve Block    Note:     Postop Pain Control: Uneventful            Sign Out: Well controlled pain   PONV: No   Neuro/Psych: Uneventful            Sign Out: Acceptable/Baseline neuro status   Airway/Respiratory: Uneventful            Sign Out: Acceptable/Baseline resp. status   CV/Hemodynamics: Uneventful            Sign Out: Acceptable CV status   Other NRE: NONE   DID A NON-ROUTINE EVENT OCCUR?            Last vitals:  Vitals Value Taken Time   /73 10/24/23 1315   Temp 36.4  C (97.5  F) 10/24/23 1315   Pulse 86 10/24/23 1325   Resp 20 10/24/23 1325   SpO2 93 % 10/24/23 1325   Vitals shown include unfiled device data.    Electronically Signed By: Mg Avendano MD  October 24, 2023  5:40 PM

## 2023-10-24 NOTE — ANESTHESIA PROCEDURE NOTES
Brachial plexus (via superior trunk approach) Procedure Note    Pre-Procedure   Staff -        Anesthesiologist:  Mg Avendano MD       Performed By: anesthesiologist       Location: pre-op       Pre-Anesthestic Checklist: patient identified, IV checked, site marked, risks and benefits discussed, informed consent, monitors and equipment checked, pre-op evaluation, at physician/surgeon's request and post-op pain management  Timeout:       Correct Patient: Yes        Correct Procedure: Yes        Correct Site: Yes        Correct Position: Yes        Correct Laterality: Yes        Site Marked: Yes  Procedure Documentation  Procedure: Brachial plexus (via superior trunk approach)       Patient Position: supine       Skin prep: Chloraprep       Local skin infiltrated with 1 mL of 1% lidocaine.  (superior trunk block approach).       Needle Type: insulated       Needle Gauge: 21.        Needle Length (millimeters): 100        Ultrasound guided       1. Ultrasound was used to identify targeted nerve, plexus, vascular marker, or fascial plane and place a needle adjacent to it in real-time.       2. Ultrasound was used to visualize the spread of anesthetic in close proximity to the above referenced structure.       3. A permanent image is entered into the patient's record.       4. The visualized anatomic structures appeared normal.       5. There were no apparent abnormal pathologic findings.    Assessment/Narrative         The placement was negative for: blood aspirated, painful injection and site bleeding       Paresthesias: No.       Bolus given via needle..        Secured via.        Insertion/Infusion Method: Single Shot       Complications: none     Comments:  Bolus via needle, 20 mL of combined 0.5% bupivacaine with 1:400,000 epinephrine with 1.3% Exparel.    Under ultrasound guidance, a 21 gauge needle was inserted and placed in close proximity to the brachial plexus via superior trunk approach.  "Ultrasound was also used to visualize the spread of anesthetic in close proximity to the nerve being blocked. The nerve appeared anatomically normal, and there were no apparent abnormal pathological findings. Patient tolerated well, was mildly sedated but communicative throughout the procedure. A permanent ultrasound image was saved in the patient's record.    The surgeon has given a verbal order transferring care of this patient to me for the performance of regional analgesia block for post op pain control. It is requested of me because I am uniquely trained and qualified to perform this block and the surgeon is neither trained nor qualified to perform this procedure.         FOR [facility] ([facility]) ONLY:   Pain Team Contact information: please page the Pain Team Via [email or web address]. Search \"Pain\". During daytime hours, please page the attending first. At night please page the resident first.      "

## 2023-10-24 NOTE — PROCEDURES
DATE OF SERVICE: 10/24/2023     SURGEON   ALLYSON NEW MD     FIRST ASSISTANT   KOLBY ARMENDARIZ PA-C   Please bill for Mr. Armendariz as first assistant as there was no resident available to assist in this case. Assistants were necessary and performed positioning, draping, retraction, suturing and wound dressing tasks throughout the surgical procedure. The assistant was present for the entire procedure.    ASSISTANTS  JAYLIN Coello    PREOPERATIVE DIAGNOSIS   End-stage right glenohumeral osteoarthritis with rotator cuff insufficency.     POSTOPERATIVE DIAGNOSIS   End-stage right glenohumeral osteoarthritis with rotator cuff insufficency.     PROCEDURE PERFORMED   1. Right  reverse total shoulder arthroplasty using Tornier components, a size 25  glenoid baseplate with 18mm, 26mm, 29mm, and 29mm screws, a size 36 x 21 mm glenosphere, a size 3 stem, standard tray and 6 poylethylene.     INDICATION FOR SURGERY   Margo Inman [id number] is an 63 year old-year-old female, with a long history of increasing right shoulder pain. We have failed all reasonable non-operative options as far as controlling the pain and improving function. The imaging shows advanced glenohumeral arthrosis with narrowing of the joint space and large marginal osteophytes with loss of rotator cuff function. After discussing with the patient, it was decided they would benefit from the above-named procedure. Informed consent was obtained.     ANESTHETIC   Interscalene block with general sedation.     FINDINGS   There was complete eburnation of both the glenoid and the humerus. The rotator cuff was not intact.     DESCRIPTION OF PROCEDURE   Margo Inman was personally identified by me. The right upper extremity was marked. An indwelling interscalene catheter was placed. The patient was then taken to the operating room, where they were placed under MAC anesthetic. They were placed in low beach-chair position. They were then prepped and  draped in the usual fashion. A timeout was then performed. An incision was made from the coracoid down to the insertion of the deltoid after injection with anaesthetic solution. The deltopectoral fascia was identified. The deltopectoral interval was released, taking the cephalic vein medially, and cauterizing all the branches that attached to the deltoid. The deltoid retractor was placed in the subdeltoid space. The coracoacromial ligament was released. The conjoined tendon was palpated. The musculocutaneous nerve and axillary nerve were palpated and were well away from the site. The biceps was unroofed and tenotomized as far proximal as possible. The lesser tuberosity was identified and the inferior capsule with the subscapularis was peeled from the humerus. The arm was then placed in a shotgun position. Two Blu retractors were placed along the inferior and medial humeral borders. There was a very large amount of osteophyte, which was removed using a rongeur and half-inch curved osteotome. The humeral cutting guide was placed and rotated to 20 degrees of external rotation. The humeral head cut was then made. The entry reamer was used to find the canal. We then started with the 1 reamer, then went up to a size 6 with excellent fit. The reamer was left in place. We then used the corresponding punch to hayley the medial cutout. Then the broaches were used until a 3 stem fit with good cortical contact. Then a planer was used on the humeral surface. The trial broach was left in. Attention was then turned to the glenoid. A Fukuda retractor was placed posteriorly and a Bankart retractor placed anteriorly with a small Cher's retractor placed superiorly. A complete labrectomy was performed. The sizing guide was placed and the central hole was drilled. The face of the glenoid was then reamed down to a nice flat bed with bleeding bone. Peripheral reamers were used to clean off any bone and soft tissue around the glenoid.  The central hole was then drilled larger. We thoroughly irrigated and dried the glenoid. The permanent glenoid tray implant was impacted into place. Two compression screws were then implanted by drilling with a drill guide and using a depth gauge to assess screw length in the East and West positions of the glenoid. Screws were hand-tightened to ensure good fixation. Locking screws were implanted in the same fashion in the North and South positions. A peripheral reamer was then used to ensure no bone or soft tissue around the glenoid plate. Again the glenoid was thoroughly irrigated and dried. About half of the joint cocktail was injected around the glenoid. The permanent glenophere was then implanted onto the tray and the screw was hand-tightened. Attention was turned to the humerus again. We placed a trial tray and polyethylene on the humeral trial and reduced the shoulder. We felt that this configuration gave us good stability and proper tension of the deltoid and capsule. We removed the trial stem, tray and polyethylene and irrigated. We placed three #2 Force Fiber sutures through the bicipital groove. We placed about a third of the vancomycin powder down the humeral canal. The permanent stem was impacted into place. The stem was thoroughly dried, and the permanent tray was impacted into place. The permanent polyethylene insert was placed and impacted. The shoulder was then reduced. The subscapularis was repaired back to its bed with the #2 Force Fiber sutures that had been placed around the stem. The rest of the vancomycin power was placed in the subdeltoid space. The rotator interval was closed with interrupted #2 Force Fiber sutures. The deltopectoral interval was closed with interrupted #2 Force Fiber. The deep fatty layer was closed with 0 Stratafix suture, subdermal closed with 2-0 Stratafix suture, and skin closed with 3-0 Stratafix suture. The Prineo dressing was applied, then an ice pack and a shoulder  sling were placed. There were no complications. Sponge and needle counts were correct. The patient taken to [facility] in stable condition.     SPECIMENS  None    COMPLICATIONS  None    ESTIMATED BLOOD LOSS  50 mL    CONDITION ON DISCHARGE FROM OR  Satisfactory    PLAN  1. Antibiotic Prophylaxis was given included Ancef 2 gm. Antibiotics given within 1 hour of surgical incision and discontinued within 24 hrs.   2. DVT prophylaxis ASA given within 24 hours    3. Weight Bearing Non-weight bearing NWB.   4. Discharge anticipated date POD# 1 to Home    5. Pain Medication oxycodone, Tylenol and Celebrex    ALLYSON NEW MD      @C(1)@ [facility] - [phone]

## 2023-10-24 NOTE — INTERVAL H&P NOTE
"I have reviewed the surgical (or preoperative) H&P that is linked to this encounter, and examined the patient. There are no significant changes    Clinical Conditions Present on Arrival:  Clinically Significant Risk Factors Present on Admission        # Hyperkalemia: Highest K = 5.4 mmol/L in last 30 days, will monitor as appropriate           # Severe Obesity: Estimated body mass index is 42.51 kg/m  as calculated from the following:    Height as of this encounter: 1.549 m (5' 1\").    Weight as of this encounter: 102.1 kg (225 lb).       "

## 2023-10-25 ENCOUNTER — APPOINTMENT (OUTPATIENT)
Dept: OCCUPATIONAL THERAPY | Facility: CLINIC | Age: 63
End: 2023-10-25
Attending: PHYSICIAN ASSISTANT
Payer: COMMERCIAL

## 2023-10-25 VITALS
SYSTOLIC BLOOD PRESSURE: 164 MMHG | BODY MASS INDEX: 42.48 KG/M2 | HEIGHT: 61 IN | DIASTOLIC BLOOD PRESSURE: 82 MMHG | TEMPERATURE: 98 F | WEIGHT: 225 LBS | HEART RATE: 97 BPM | OXYGEN SATURATION: 94 % | RESPIRATION RATE: 18 BRPM

## 2023-10-25 LAB
GLUCOSE BLDC GLUCOMTR-MCNC: 154 MG/DL (ref 70–99)
HGB BLD-MCNC: 12 G/DL (ref 11.7–15.7)

## 2023-10-25 PROCEDURE — 82962 GLUCOSE BLOOD TEST: CPT

## 2023-10-25 PROCEDURE — 85018 HEMOGLOBIN: CPT | Performed by: PHYSICIAN ASSISTANT

## 2023-10-25 PROCEDURE — 97165 OT EVAL LOW COMPLEX 30 MIN: CPT | Mod: GO

## 2023-10-25 PROCEDURE — 97535 SELF CARE MNGMENT TRAINING: CPT | Mod: GO

## 2023-10-25 PROCEDURE — 258N000003 HC RX IP 258 OP 636: Performed by: PHYSICIAN ASSISTANT

## 2023-10-25 PROCEDURE — 99207 PR NO BILLABLE SERVICE THIS VISIT: CPT | Performed by: INTERNAL MEDICINE

## 2023-10-25 PROCEDURE — 250N000013 HC RX MED GY IP 250 OP 250 PS 637: Performed by: PHYSICIAN ASSISTANT

## 2023-10-25 PROCEDURE — 250N000011 HC RX IP 250 OP 636: Mod: JZ | Performed by: PHYSICIAN ASSISTANT

## 2023-10-25 PROCEDURE — 36415 COLL VENOUS BLD VENIPUNCTURE: CPT | Performed by: PHYSICIAN ASSISTANT

## 2023-10-25 RX ORDER — AMOXICILLIN 250 MG
1-2 CAPSULE ORAL 2 TIMES DAILY
Qty: 100 TABLET | Refills: 0 | Status: SHIPPED | OUTPATIENT
Start: 2023-10-25

## 2023-10-25 RX ORDER — OXYCODONE HYDROCHLORIDE 5 MG/1
5 TABLET ORAL EVERY 4 HOURS PRN
Qty: 30 TABLET | Refills: 0 | Status: SHIPPED | OUTPATIENT
Start: 2023-10-25

## 2023-10-25 RX ORDER — ACETAMINOPHEN 325 MG/1
975 TABLET ORAL ONCE
Status: DISCONTINUED | OUTPATIENT
Start: 2023-10-25 | End: 2023-10-25 | Stop reason: HOSPADM

## 2023-10-25 RX ORDER — ACETAMINOPHEN 325 MG/1
650 TABLET ORAL EVERY 4 HOURS PRN
Qty: 100 TABLET | Refills: 0 | Status: SHIPPED | OUTPATIENT
Start: 2023-10-25

## 2023-10-25 RX ORDER — CELECOXIB 200 MG/1
200 CAPSULE ORAL DAILY
Qty: 30 CAPSULE | Refills: 0 | Status: SHIPPED | OUTPATIENT
Start: 2023-10-25 | End: 2023-12-07

## 2023-10-25 RX ORDER — ASPIRIN 81 MG/1
162 TABLET ORAL DAILY
Qty: 120 TABLET | Refills: 0 | Status: SHIPPED | OUTPATIENT
Start: 2023-10-25

## 2023-10-25 RX ADMIN — SENNOSIDES AND DOCUSATE SODIUM 1 TABLET: 50; 8.6 TABLET ORAL at 08:31

## 2023-10-25 RX ADMIN — POLYETHYLENE GLYCOL 3350 17 G: 17 POWDER, FOR SOLUTION ORAL at 08:43

## 2023-10-25 RX ADMIN — ACETAMINOPHEN 975 MG: 325 TABLET, FILM COATED ORAL at 08:31

## 2023-10-25 RX ADMIN — HYDROXYZINE HYDROCHLORIDE 25 MG: 25 TABLET, FILM COATED ORAL at 08:43

## 2023-10-25 RX ADMIN — CEFAZOLIN SODIUM 2 G: 2 INJECTION, SOLUTION INTRAVENOUS at 03:29

## 2023-10-25 RX ADMIN — SODIUM CHLORIDE, POTASSIUM CHLORIDE, SODIUM LACTATE AND CALCIUM CHLORIDE: 600; 310; 30; 20 INJECTION, SOLUTION INTRAVENOUS at 01:36

## 2023-10-25 RX ADMIN — ACETAMINOPHEN 975 MG: 325 TABLET, FILM COATED ORAL at 01:14

## 2023-10-25 RX ADMIN — OXYCODONE HYDROCHLORIDE 5 MG: 5 TABLET ORAL at 08:31

## 2023-10-25 RX ADMIN — OXYCODONE HYDROCHLORIDE 5 MG: 5 TABLET ORAL at 12:32

## 2023-10-25 RX ADMIN — ASPIRIN 162 MG: 81 TABLET, COATED ORAL at 08:31

## 2023-10-25 RX ADMIN — CELECOXIB 200 MG: 200 CAPSULE ORAL at 08:31

## 2023-10-25 ASSESSMENT — ACTIVITIES OF DAILY LIVING (ADL)
ADLS_ACUITY_SCORE: 36
PREVIOUS_RESPONSIBILITIES: FINANCES;MEDICATION MANAGEMENT;DRIVING
ADLS_ACUITY_SCORE: 36
ADLS_ACUITY_SCORE: 35
ADLS_ACUITY_SCORE: 36

## 2023-10-25 NOTE — PLAN OF CARE
Goal Outcome Evaluation:         A/O x4. VSS on RA. Up with 1. Regular diet. Oxy given for pain. Mesh dressing CDI. Discharge meds and instructions given. Discharged home today.

## 2023-10-25 NOTE — PLAN OF CARE
Goal Outcome Evaluation:  Summary 10/24/23-10/25/23 0199-2310  A&Ox4, VSS ex O2 on NC 1.5-2L  Up with 1/GB, ambulated to the BR  PIV infusing LR @ 100ml/hr  C/o of pain, scheduled Tylenol given effective per pt  Cool pack applied  NWB on R shoulder sling/ace in place  Able to move finger but still has some numbness  Diet: Reg  B

## 2023-10-25 NOTE — PROGRESS NOTES
S: Pt. seen at [facility]. Female. Chapo HONG RX: Missing strap for ultrasling.  O:A: Pt. did not have the pillow waist strap on her Ultrasling that she acquired 2 years ago. I replaced the pillow waist strap.  P: Pt. to be seen as needed.    Lexx PINEDA,LO

## 2023-10-25 NOTE — PROGRESS NOTES
"ORTHOPEDIC UPPER EXTREMITY PROGRESS NOTE    POD# 1  Patient is a 63 year old female who underwent Procedure(s):  Right reverse total shoulder arthroplasty on 10/24/2023. Pain is well controlled. Tolerating medication well, no nausea or vomiting.  Brought her own sling from home but realized it is missing a strap.  Discharge instructions reviewed.    Vitals:   Blood pressure (!) 164/82, pulse 97, temperature 98  F (36.7  C), temperature source Oral, resp. rate 18, height 1.549 m (5' 1\"), weight 102.1 kg (225 lb), last menstrual period 2011, SpO2 94%, not currently breastfeeding.  Temp (24hrs), Av.7  F (36.5  C), Min:97.3  F (36.3  C), Max:98.3  F (36.8  C)      EXAM   The patient is awake and alert.   Sensation is intact.  Digital Flexion/Extension maintained.   Brisk cap refill.   The incision is covered with prineo dressing .    Labs:   Recent Labs   Lab Test 10/25/23  0732 23  1439 22  1422 22  1102   HGB 12.0 15.1 14.4 15.0   INR  --   --   --  1.04       ASSESSMENT  S/p R rev TSA   PLAN  1. DVT prophylaxis: aspirin  2. Weight Bearing NWB (Non weight bearing).  3. Wound Care leave undisturbed  4. Discharge anticipated date today Home with No Skilled Service  5. Cont Pain Control Oxycodone and Tylenol  6. Continue with shoulder immobilizer.  Okay to remove for ADLs.  Okay for active range of motion for elbow, wrist and digits.  Consult to orthotist placed to help replace strap or new sling if needed    Marbella Cowan PA-C  [facility]      "

## 2023-10-25 NOTE — PLAN OF CARE
Occupational Therapy Discharge Summary    Reason for therapy discharge:    Discharged to home.    Progress towards therapy goal(s). See goals on Care Plan in [facility] electronic health record for goal details.  Goals not met.  Barriers to achieving goals:   discharge from facility.    Therapy recommendation(s):    Assist in all I/ADLs, including moderate assist for UE and LE dressing. Pt will need assist in bathing.

## 2023-10-25 NOTE — PROGRESS NOTES
Patient vital signs are at baseline: Yes  Patient able to ambulate as they were prior to admission or with assist devices provided by therapies during their stay:  Yes  Patient MUST void prior to discharge:  Yes  Patient able to tolerate oral intake:  Yes  Pain has adequate pain control using Oral analgesics:  Yes  Does patient have an identified :  Yes  Has goal D/C date and time been discussed with patient:  Yes    R shoulder X-ray done. Up with 1/Gb to BR ad voids well. LR infusing @ 100ml/hr. On 2L NC. NWB on R shoulder. Sling/ace wrap in place. Rate pain 3/10. Pain managed with schedule Tylenol, and cool pack. Able to move fingers, but still feels some numbness. Pt states that she feels hot/sweaty all the time, table fan provided. Pt states satisfaction with cares given.

## 2023-10-25 NOTE — PROGRESS NOTES
10/25/23 0920   Appointment Info   Signing Clinician's Name / Credentials (OT) Caro Davison OTR/L   Quick Adds   Quick Adds Certification   Living Environment   People in Home spouse   Current Living Arrangements house  (two story home)   Home Accessibility stairs to enter home;stairs within home   Transportation Anticipated family or friend will provide  (Pt typically drives)   Living Environment Comments Spouse will be able to assist upon discharge. RUE dominant.   Self-Care   Usual Activity Tolerance good   Current Activity Tolerance moderate   Equipment Currently Used at Home shower chair;raised toilet seat;walker, standard   Activity/Exercise/Self-Care Comment Independent in all ADLs at baseline. LUE with recent surgery secondary to arthritis, pt is 6 weeks post surgery and is NWB (per pt) and has a splint donned during evaluation.   Instrumental Activities of Daily Living (IADL)   Previous Responsibilities finances;medication management;driving   IADL Comments Pt typically has assist in most IADLs.   General Information   Onset of Illness/Injury or Date of Surgery 10/24/23   Referring Physician Gerald Armendariz PA-C   Patient/Family Therapy Goal Statement (OT) Home   Additional Occupational Profile Info/Pertinent History of Current Problem REVERSE TOTAL SHOULDER ARTHROPLASTY POD #1; NWB. See chart for details.   Right Upper Extremity (Weight-bearing Status) non weight-bearing (NWB)   Cognitive Status Examination   Orientation Status orientation to person, place and time   Safety Deficit at risk behavior observed   Pain Assessment   Patient Currently in Pain Yes, see Vital Sign flowsheet   Transfers   Transfers bed-chair transfer;sit-stand transfer;toilet transfer   Transfer Skill: Bed to Chair/Chair to Bed   Bed-Chair [place] (Transfers) set up;verbal cues  (SBA)   Sit-Stand Transfer   Sit-Stand [place] (Transfers) set up;verbal cues  (SBA)   Toilet Transfer   Type (Toilet Transfer)  sit-stand;stand-sit   [place] Level (Toilet Transfer) set up;verbal cues  (SBA)   Activities of Daily Living   BADL Assessment/Intervention lower body dressing;upper body dressing   Additional Documentation Comment, BADL Assessment/Training (Row)   Upper Body Dressing Assessment/Training   [place] Level (Upper Body Dressing) doff;don;pull-over garment;set up;verbal cues;moderate assist (50% patient effort)   Lower Body Dressing Assessment/Training   [place] Level (Lower Body Dressing) don;pants/bottoms;socks;set up;verbal cues;moderate assist (50% patient effort)   Clinical Impression   Criteria for Skilled Therapeutic Interventions Met (OT) Yes, treatment indicated   OT Diagnosis Decreased independence in I/ADLs   Influenced by the following impairments Decreased independence in I/ADLs   OT Problem List-Impairments impacting ADL problems related to;activity tolerance impaired;strength;pain;range of motion (ROM);post-surgical precautions   Assessment of Occupational Performance 1-3 Performance Deficits   Identified Performance Deficits Decreased independence in I/ADLs (dressing, bathing, toileting)   Planned Therapy Interventions (OT) ADL retraining;IADL retraining;transfer training;ROM;home program guidelines   Clinical Decision Making Complexity (OT) problem focused assessment/low complexity   Risk & Benefits of therapy have been explained evaluation/treatment results reviewed;care plan/treatment goals reviewed;risks/benefits reviewed;patient   OT Total Evaluation Time   OT Eval, Low Complexity Minutes (00294) 7   Therapy Certification   Medical Diagnosis REVERSE TOTAL SHOULDER ARTHROPLASTY POD #1   Start of Care Date 10/25/23   OT Goals   Therapy Frequency (OT) Daily   OT Predicted Duration/Target Date for Goal Attainment 10/27/23   OT Goals Hygiene/Grooming;Upper Body Dressing;Lower Body Dressing;Toilet Transfer/Toileting;OT Goal 1;OT Goal 2   OT: Hygiene/Grooming supervision/stand-by assist;while  standing   OT: Upper Body Dressing Supervision/stand-by assist;within precautions;including orthotic;including set-up/clothing retrieval   OT: Lower Body Dressing Supervision/stand-by assist;within precautions;including set-up/clothing retrieval   OT: Toilet Transfer/Toileting Supervision/stand-by assist;toilet transfer;cleaning and garment management;within precautions   OT: Goal 1 Pt will verbalize understanding of 2 shower transfer techniques, in order to increase IND in I/ADLs.   OT: Goal 2 Pt will verbalize understanding of HEP in order to increase IND in I/ADLs.   Interventions   Interventions Quick Adds Self-Care/Home Management;Therapeutic Procedures/Exercise   Self-Care/Home Management   Self-Care/Home Mgmt/ADL, Compensatory, Meal Prep Minutes (83119) 38   Symptoms Noted During/After Treatment (Meal Preparation/Planning Training) fatigue;increased pain   Treatment Detail/Skilled Intervention Pt ambulated to the bathroom with no AD and SBA for toilet transfer. Transfer to/from the toilet with SBA after education and 2 verbal cues for hand placement. [place] with SBA and set up. Educated on UE and LE dressing with compensatory techniques. While seated/standing pt completed LE dressing (don underwear and pants) with moderate assist and extra time. UE dressing including don/doff sling and don pull over shirt with moderate assist. Observed patient's sling missing strap that goes around the hips and patient's sling wrapped with ACE wrap, RN immediately notified. Educated on correct fit and techniques to don/doff sling and correct positioning. Discussed with RN to attain an appropriate sling for pt.   Therapeutic Procedures/Exercise   Therapeutic Procedure: strength, endurance, ROM, flexibillity minutes (45731) 5   Symptoms Noted During/After Treatment fatigue   Treatment Detail/Skilled Intervention Educated on RUE exercises in order to increase independence in I/ADLs.  Exercises included elbow  flexion/extension, pronation/supination, wrist flexion/extension/deviation, grasp and release. Educated on CodBit[employer]s Pendulum, pt declined to trial reporting familiarity and confidence in completing. Pt verbalized understanding of all exercises. Handout provided. Pt declined to trial exercises, reporting that nerve block has not worn off.   OT Discharge Planning   OT Rationale for DC Rec Assist in all I/ADLs, including moderate assist for UE and LE dressing. Pt will need assist in bathing.   OT Brief overview of current status Moderate A UE dressing   OT Equipment Needed at Discharge raised toilet seat;shower chair   Total Session Time   Timed Code Treatment Minutes 43   Total Session Time (sum of timed and untimed services) 50    [facility]  OUTPATIENT OCCUPATIONAL THERAPY  EVALUATION  PLAN OF TREATMENT FOR OUTPATIENT REHABILITATION  (COMPLETE FOR INITIAL CLAIMS ONLY)  Patient's Last Name, First Name, M.I.  YOB: 1960  Margo Inman                          Provider's Name  [facility] Medical Record No.  [id number]                             Onset Date:  10/24/23   Start of Care Date:  10/25/23   Type:     ___PT   _X_OT   ___SLP Medical Diagnosis:  REVERSE TOTAL SHOULDER ARTHROPLASTY POD #1                    OT Diagnosis:  Decreased independence in I/ADLs Visits from SOC:  1     See note for plan of treatment, functional goals and certification details    I CERTIFY THE NEED FOR THESE SERVICES FURNISHED UNDER        THIS PLAN OF TREATMENT AND WHILE UNDER MY CARE     (Physician co-signature of this document indicates review and certification of the therapy plan).

## 2023-10-25 NOTE — PROGRESS NOTES
"[facility]    Medicine Progress Note - Hospitalist Service    Date of Admission:  10/24/2023    Assessment & Plan   62 yo female s/p right reverse total shoulder replacement on 10/24/23 by Dr. Smart. Internal Medicine asked to see in consultation for review of medications and assistance with co-management of chronic medical problems. Past medical history of hypertension, dyslipidemia, obesity, medical cannabis use, glaucoma, anxiety. Post-operative course reviewed. Anticipated 1 night stay then discharge to home later today 10/25/23.    Medicine consultation recommendations:    Hypertension  Noted mildly elevated BP overnight. PTA med: losartan.   -ok to resume losartan dosing at home. Can resume here from home supply if she brought it.    Dyslipidemia  -Resume statin after discharge    Glaucoma  -Resume eyedrops from home supply.  Advise against using newly ordered hospital eyedrops as these are very expensive to the patient under observation/outpatient status          Diet: Advance Diet as Tolerated: Regular Diet Adult  Discharge Instruction - Regular Diet Adult    DVT Prophylaxis: Defer to primary service  Flynn Catheter: Not present  Lines: None     Cardiac Monitoring: None  Code Status: Full Code      Clinically Significant Risk Factors Present on Admission                  # Hypertension: Noted on problem list      # Severe Obesity: Estimated body mass index is 42.51 kg/m  as calculated from the following:    Height as of this encounter: 1.549 m (5' 1\").    Weight as of this encounter: 102.1 kg (225 lb).       # Asthma: noted on problem list        Disposition Plan      Expected Discharge Date: 10/25/2023                Appears overall stable for discharge home today.    Dana Byrd MD  Hospitalist Service  [facility]  Securely message with [employer] (more info)  Text page via [employer] Paging/Directory "   ______________________________________________________________________    Interval History   Stable overnight.    Physical Exam   Vital Signs: Temp: 97.7  F (36.5  C) Temp src: Oral BP: (!) 147/90 Pulse: 94   Resp: 18 SpO2: 96 % O2 Device: Nasal cannula Oxygen Delivery: 1.5 LPM  Weight: 225 lbs 0 oz    Did not examine    Medical Decision Making       10 MINUTES SPENT BY ME on the date of service doing chart review, history, exam, documentation & further activities per the note.      Data   none

## 2023-11-07 ENCOUNTER — THERAPY VISIT (OUTPATIENT)
Dept: OCCUPATIONAL THERAPY | Facility: CLINIC | Age: 63
End: 2023-11-07
Payer: COMMERCIAL

## 2023-11-07 DIAGNOSIS — M79.645 PAIN OF LEFT THUMB: ICD-10-CM

## 2023-11-07 DIAGNOSIS — M18.12 ARTHRITIS OF CARPOMETACARPAL (CMC) JOINT OF LEFT THUMB: Primary | ICD-10-CM

## 2023-11-07 PROCEDURE — 97110 THERAPEUTIC EXERCISES: CPT | Mod: GO

## 2023-11-07 PROCEDURE — 97035 APP MDLTY 1+ULTRASOUND EA 15: CPT | Mod: GO

## 2023-11-07 PROCEDURE — 97763 ORTHC/PROSTC MGMT SBSQ ENC: CPT | Mod: GO

## 2023-11-10 ENCOUNTER — OFFICE VISIT (OUTPATIENT)
Dept: ORTHOPEDICS | Facility: CLINIC | Age: 63
End: 2023-11-10
Payer: COMMERCIAL

## 2023-11-10 DIAGNOSIS — M19.041 OSTEOARTHRITIS OF BOTH HANDS, UNSPECIFIED OSTEOARTHRITIS TYPE: Primary | ICD-10-CM

## 2023-11-10 DIAGNOSIS — M19.042 OSTEOARTHRITIS OF BOTH HANDS, UNSPECIFIED OSTEOARTHRITIS TYPE: Primary | ICD-10-CM

## 2023-11-10 PROCEDURE — 99024 POSTOP FOLLOW-UP VISIT: CPT | Performed by: STUDENT IN AN ORGANIZED HEALTH CARE EDUCATION/TRAINING PROGRAM

## 2023-11-10 NOTE — PROGRESS NOTES
Ortho Hand    Doing well.  Swelling is improved.  Still has some occasional burning sensation across the dorsum of the left thumb.  She is recovering from right shoulder surgery.  She is happy with the result of the left thumb base reconstruction.    On exam, well remodeling left wrist scar with no Tinel's and intact radial sensory distributed sensation.  She can actively touch the left thumb tip against all fingertips.    A/P: 63-year-old female 6 weeks status post left thumb CMC arthroplasty with suture suspension    -OT to initiate light strengthening  -Continue wearing the protective splint for an additional 6 weeks and no loading of the thumb  -Return to clinic in 6 weeks for reevaluation    Mat Frye MD, PhD

## 2023-11-10 NOTE — LETTER
11/10/2023         RE: Margo Inman  [id number] [facility] [id number]        Dear Colleague,    Thank you for referring your patient, Margo Inman, to the [facility]. Please see a copy of my visit note below.    Ortho Hand    Doing well.  Swelling is improved.  Still has some occasional burning sensation across the dorsum of the left thumb.  She is recovering from right shoulder surgery.  She is happy with the result of the left thumb base reconstruction.    On exam, well remodeling left wrist scar with no Tinel's and intact radial sensory distributed sensation.  She can actively touch the left thumb tip against all fingertips.    A/P: 63-year-old female 6 weeks status post left thumb CMC arthroplasty with suture suspension    -OT to initiate light strengthening  -Continue wearing the protective splint for an additional 6 weeks and no loading of the thumb  -Return to clinic in 6 weeks for reevaluation    Mat Frye MD, PhD      Again, thank you for allowing me to participate in the care of your patient.        Sincerely,        Mat Frye MD

## 2023-11-14 NOTE — TELEPHONE ENCOUNTER
Caller: Margo Inman   Reason for Reschedule/Cancellation (please be detailed, any staff messages or encounters to note?):     PER PT -- CHANGE DATE       Prior to reschedule please review:  Ordering Provider:    Belem Dumont APRN CNP      Sedation per order:MODERATE   Does patient have any ASC Exclusions, please identify?: N       Notes on Cancelled Procedure:  Procedure:Lower Endoscopy [Colonoscopy]   Date: 01/04/2024  Location:[facility]; [address], [place] [address]  Surgeon: RUBEN         Rescheduled: YES   Procedure: Lower Endoscopy [Colonoscopy]  Date: 01/30/2024  Location:[facility]; [address], [place] [address]  Surgeon: KEVIN   Sedation Level Scheduled  MODERATE          Reason for Sedation Level PER ORDER   Prep/Instructions updated and sent: [employer]        Send In - basket message to [employer] - Devin Pool if EUS procedure is canceled or rescheduled: [ N/A, YES or NO] N/A

## 2023-11-17 ENCOUNTER — THERAPY VISIT (OUTPATIENT)
Dept: OCCUPATIONAL THERAPY | Facility: CLINIC | Age: 63
End: 2023-11-17
Payer: COMMERCIAL

## 2023-11-17 DIAGNOSIS — M79.645 PAIN OF LEFT THUMB: ICD-10-CM

## 2023-11-17 DIAGNOSIS — M18.12 ARTHRITIS OF CARPOMETACARPAL (CMC) JOINT OF LEFT THUMB: Primary | ICD-10-CM

## 2023-11-17 PROCEDURE — 97035 APP MDLTY 1+ULTRASOUND EA 15: CPT | Mod: GO

## 2023-11-17 PROCEDURE — 97140 MANUAL THERAPY 1/> REGIONS: CPT | Mod: GO

## 2023-11-17 PROCEDURE — 97110 THERAPEUTIC EXERCISES: CPT | Mod: GO

## 2023-11-21 ENCOUNTER — THERAPY VISIT (OUTPATIENT)
Dept: OCCUPATIONAL THERAPY | Facility: CLINIC | Age: 63
End: 2023-11-21
Payer: COMMERCIAL

## 2023-11-21 DIAGNOSIS — M18.12 ARTHRITIS OF CARPOMETACARPAL (CMC) JOINT OF LEFT THUMB: Primary | ICD-10-CM

## 2023-11-21 DIAGNOSIS — M79.645 PAIN OF LEFT THUMB: ICD-10-CM

## 2023-11-21 PROCEDURE — 97140 MANUAL THERAPY 1/> REGIONS: CPT | Mod: GO

## 2023-11-21 PROCEDURE — 97110 THERAPEUTIC EXERCISES: CPT | Mod: GO

## 2023-11-21 PROCEDURE — 97035 APP MDLTY 1+ULTRASOUND EA 15: CPT | Mod: GO

## 2023-12-06 ENCOUNTER — THERAPY VISIT (OUTPATIENT)
Dept: OCCUPATIONAL THERAPY | Facility: CLINIC | Age: 63
End: 2023-12-06
Payer: COMMERCIAL

## 2023-12-06 DIAGNOSIS — M18.12 ARTHRITIS OF CARPOMETACARPAL (CMC) JOINT OF LEFT THUMB: Primary | ICD-10-CM

## 2023-12-06 DIAGNOSIS — M79.645 PAIN OF LEFT THUMB: ICD-10-CM

## 2023-12-06 PROCEDURE — 97035 APP MDLTY 1+ULTRASOUND EA 15: CPT | Mod: GO

## 2023-12-06 PROCEDURE — 97110 THERAPEUTIC EXERCISES: CPT | Mod: GO

## 2023-12-06 PROCEDURE — 97140 MANUAL THERAPY 1/> REGIONS: CPT | Mod: GO

## 2023-12-06 NOTE — NURSING NOTE
Chief Complaint   Patient presents with     Right Wrist - Pain     Left Wrist - Pain     Left Hand - Pain     Right Hand - Pain       There were no vitals filed for this visit.    There is no height or weight on file to calculate BMI.      ROQUE Gillette NREMT                       Patient expressed no known problems or needs

## 2023-12-07 ENCOUNTER — OFFICE VISIT (OUTPATIENT)
Dept: FAMILY MEDICINE | Facility: CLINIC | Age: 63
End: 2023-12-07
Payer: COMMERCIAL

## 2023-12-07 VITALS
HEIGHT: 62 IN | RESPIRATION RATE: 16 BRPM | OXYGEN SATURATION: 95 % | TEMPERATURE: 97.4 F | SYSTOLIC BLOOD PRESSURE: 156 MMHG | DIASTOLIC BLOOD PRESSURE: 83 MMHG | BODY MASS INDEX: 41.22 KG/M2 | WEIGHT: 224 LBS | HEART RATE: 89 BPM

## 2023-12-07 DIAGNOSIS — M46.1 SACROILIITIS (H): ICD-10-CM

## 2023-12-07 DIAGNOSIS — Z28.20 VACCINE REFUSED BY PATIENT: ICD-10-CM

## 2023-12-07 DIAGNOSIS — E78.5 HYPERLIPIDEMIA LDL GOAL <130: ICD-10-CM

## 2023-12-07 DIAGNOSIS — J45.20 MILD INTERMITTENT ASTHMA WITHOUT COMPLICATION: ICD-10-CM

## 2023-12-07 DIAGNOSIS — Z01.818 PREOP GENERAL PHYSICAL EXAM: Primary | ICD-10-CM

## 2023-12-07 DIAGNOSIS — I10 ESSENTIAL HYPERTENSION WITH GOAL BLOOD PRESSURE LESS THAN 140/90: ICD-10-CM

## 2023-12-07 DIAGNOSIS — Z28.21 COVID-19 VACCINATION REFUSED: ICD-10-CM

## 2023-12-07 DIAGNOSIS — R73.03 PREDIABETES: ICD-10-CM

## 2023-12-07 DIAGNOSIS — R73.09 ELEVATED GLUCOSE: ICD-10-CM

## 2023-12-07 DIAGNOSIS — E66.01 MORBID OBESITY (H): ICD-10-CM

## 2023-12-07 LAB
CREAT SERPL-MCNC: 0.67 MG/DL (ref 0.51–0.95)
EGFRCR SERPLBLD CKD-EPI 2021: >90 ML/MIN/1.73M2
FASTING STATUS PATIENT QL REPORTED: YES
GLUCOSE SERPL-MCNC: 126 MG/DL (ref 70–99)
HBA1C MFR BLD: 5.9 % (ref 0–5.6)
POTASSIUM SERPL-SCNC: 4.8 MMOL/L (ref 3.4–5.3)

## 2023-12-07 PROCEDURE — 82947 ASSAY GLUCOSE BLOOD QUANT: CPT | Performed by: NURSE PRACTITIONER

## 2023-12-07 PROCEDURE — 83036 HEMOGLOBIN GLYCOSYLATED A1C: CPT | Performed by: NURSE PRACTITIONER

## 2023-12-07 PROCEDURE — 84132 ASSAY OF SERUM POTASSIUM: CPT | Performed by: NURSE PRACTITIONER

## 2023-12-07 PROCEDURE — 36415 COLL VENOUS BLD VENIPUNCTURE: CPT | Performed by: NURSE PRACTITIONER

## 2023-12-07 PROCEDURE — 82565 ASSAY OF CREATININE: CPT | Performed by: NURSE PRACTITIONER

## 2023-12-07 PROCEDURE — 99214 OFFICE O/P EST MOD 30 MIN: CPT | Performed by: NURSE PRACTITIONER

## 2023-12-07 RX ORDER — ERGOCALCIFEROL 1.25 MG/1
50000 CAPSULE, LIQUID FILLED ORAL WEEKLY
COMMUNITY
Start: 2023-11-03

## 2023-12-07 ASSESSMENT — PAIN SCALES - GENERAL: PAINLEVEL: NO PAIN (0)

## 2023-12-07 ASSESSMENT — ASTHMA QUESTIONNAIRES: ACT_TOTALSCORE: 24

## 2023-12-07 NOTE — PATIENT INSTRUCTIONS
Preparing for Your Surgery  Getting started  A nurse will call you to review your health history and instructions. They will give you an arrival time based on your scheduled surgery time. Please be ready to share:  Your doctor's clinic name and phone number  Your medical, surgical, and anesthesia history  A list of allergies and sensitivities  A list of medicines, including herbal treatments and over-the-counter drugs  Whether the patient has a legal guardian (ask how to send us the papers in advance)  Please tell us if you're pregnant--or if there's any chance you might be pregnant. Some surgeries may injure a fetus (unborn baby), so they require a pregnancy test. Surgeries that are safe for a fetus don't always need a test, and you can choose whether to have one.   If you have a child who's having surgery, please ask for a copy of Preparing for Your Child's Surgery.    Preparing for surgery  Within 10 to 30 days of surgery: Have a pre-op exam (sometimes called an H&P, or History and Physical). This can be done at a clinic or pre-operative center.  If you're having a , you may not need this exam. Talk to your care team.  At your pre-op exam, talk to your care team about all medicines you take. If you need to stop any medicines before surgery, ask when to start taking them again.  We do this for your safety. Many medicines can make you bleed too much during surgery. Some change how well surgery (anesthesia) drugs work.  Call your insurance company to let them know you're having surgery. (If you don't have insurance, call [phone].)  Call your clinic if there's any change in your health. This includes signs of a cold or flu (sore throat, runny nose, cough, rash, fever). It also includes a scrape or scratch near the surgery site.  If you have questions on the day of surgery, call your hospital or surgery center.  Eating and drinking guidelines  For your safety: Unless your surgeon tells you otherwise,  follow the guidelines below.  Eat and drink as usual until 8 hours before you arrive for surgery. After that, no food or milk.  Drink clear liquids until 2 hours before you arrive. These are liquids you can see through, like water, Gatorade, and Propel Water. They also include plain black coffee and tea (no cream or milk), candy, and breath mints. You can spit out gum when you arrive.  If you drink alcohol: Stop drinking it the night before surgery.  If your care team tells you to take medicine on the morning of surgery, it's okay to take it with a sip of water.  Preventing infection  Shower or bathe the night before and morning of your surgery. Follow the instructions your clinic gave you. (If no instructions, use regular soap.)  Don't shave or clip hair near your surgery site. We'll remove the hair if needed.  Don't smoke or vape the morning of surgery. You may chew nicotine gum up to 2 hours before surgery. A nicotine patch is okay.  Note: Some surgeries require you to completely quit smoking and nicotine. Check with your surgeon.  Your care team will make every effort to keep you safe from infection. We will:  Clean our hands often with soap and water (or an alcohol-based hand rub).  Clean the skin at your surgery site with a special soap that kills germs.  Give you a special gown to keep you warm. (Cold raises the risk of infection.)  Wear special hair covers, masks, gowns and gloves during surgery.  Give antibiotic medicine, if prescribed. Not all surgeries need antibiotics.  What to bring on the day of surgery  Photo ID and insurance card  Copy of your health care directive, if you have one  Glasses and hearing aids (bring cases)  You can't wear contacts during surgery  Inhaler and eye drops, if you use them (tell us about these when you arrive)  CPAP machine or breathing device, if you use them  A few personal items, if spending the night  If you have . . .  A pacemaker, ICD (cardiac defibrillator) or other  implant: Bring the ID card.  An implanted stimulator: Bring the remote control.  A legal guardian: Bring a copy of the certified (court-stamped) guardianship papers.  Please remove any jewelry, including body piercings. Leave jewelry and other valuables at home.  If you're going home the day of surgery  You must have a responsible adult drive you home. They should stay with you overnight as well.  If you don't have someone to stay with you, and you aren't safe to go home alone, we may keep you overnight. Insurance often won't pay for this.  After surgery  If it's hard to control your pain or you need more pain medicine, please call your surgeon's office.  Questions?   If you have any questions for your care team, list them here: _________________________________________________________________________________________________________________________________________________________________________ ____________________________________ ____________________________________ ____________________________________  For informational purposes only. Not to replace the advice of your health care provider. Copyright   2003, 2019 [place] [employer] [facility] [place]. All rights reserved. Clinically reviewed by Breonna Rawls MD. [employer] 647997 - REV 12/22.    How to Take Your Medication Before Surgery  - Take all of your medications before surgery except as noted below  - HOLD (do not take) Diclofenac starting 3 days prior to your procedure.  - STOP taking all vitamins and herbal supplements 14 days before surgery.

## 2023-12-07 NOTE — PROGRESS NOTES
[address] [address]  Phone: [phone]  Primary Provider: Belem Zavala  Pre-op Performing Provider: BELEM ZAVALA      PREOPERATIVE EVALUATION:  Today's date: 12/7/2023    Margo is a 63 year old, presenting for the following:  Pre-Op Exam        12/7/2023     8:13 AM   Additional Questions   Roomed by miracle   Accompanied by self       Surgical Information:  Surgery/Procedure: right minimally invasive sacroiliac joint fusion  Surgery Location: [facility]   Surgeon: Dr. Barba  Surgery Date: 12/12/23  Time of Surgery: 7am   Where patient plans to recover: At home with family  Fax number for surgical facility: [phone] [facility]    Assessment & Plan     The proposed surgical procedure is considered INTERMEDIATE risk.    Preop general physical exam      Sacroiliitis (H24)  Scheduled for  right minimally invasive sacroiliac joint fusion on 12/12/23    Morbid obesity (H)  BMI 41 with prediabetes and asthma so a good candidate for Wegovy which we'll start after her surgery. Reviewed dosing, side effects.  No PH/FH medullary thyroid cancer or MEN-2. Follow back virtually in 6 weeks.Reviewed heart healthy, calorie and carb controlled diet.    Hyperlipidemia LDL goal <130  Stable on 80mg Lipitor daily which she is tolerating well,     Mild intermittent asthma without complication  Has Albuterol inhaler which she has rarely used in the last several months    Essential hypertension with goal blood pressure less than 140/90  She has not taken her BP medication yet today, home BP's < 140/90, no change to  medications at this time, reviewed low salt diet, need for continued regular exercise once she hs recovered from her procedure, recheck BP 1 month  - Potassium  - Creatinine    Elevated glucose    - Glucose  - Hemoglobin A1c    COVID-19 vaccination refused  Conscientious objector    Vaccine  refused by patient  Refused RSV, shingles, and Influenza until after her procedure is completed.              - No identified additional risk factors other than previously addressed    Antiplatelet or Anticoagulation Medication Instructions:   - aspirin: Bleeding risk is low for this procedure and daily aspirin may be continued without modification.     Additional Medication Instructions:  Patient is to take all scheduled medications on the day of surgery EXCEPT for modifications listed below:   - ACE/ARB: Continue without modification (e.g., MAC anesthesia, neurosurgery, spine surgery, heart failure, or labile hypertension with risk of hypertension).   - diclofenac (Voltaren): HOLD for 3 days for high bleeding risk or PRN use.    RECOMMENDATION:  APPROVAL GIVEN to proceed with proposed procedure, without further diagnostic evaluation.    Ordering of each unique test  Prescription drug management  28 minutes spent by me on the date of the encounter doing chart review, history and exam, documentation and further activities per the note      Subjective       HPI related to upcoming procedure: Patient has long history of sacroiliitis and is scheduled for right minimally invasive sacroiliac joint fusion on 12/12/23 12/7/2023     8:04 AM   Preop Questions   1. Have you ever had a heart attack or stroke? No   2. Have you ever had surgery on your heart or blood vessels, such as a stent placement, a coronary artery bypass, or surgery on an artery in your head, neck, heart, or legs? No   3. Do you have chest pain with activity? No   4. Do you have a history of  heart failure? No   5. Do you currently have a cold, bronchitis or symptoms of other infection? No   6. Do you have a cough, shortness of breath, or wheezing? No   7. Do you or anyone in your family have previous history of blood clots? UNKNOWN - in pregnancy   8. Do you or does anyone in your family have a serious bleeding problem such as prolonged bleeding  following surgeries or cuts? No   9. Have you ever had problems with anemia or been told to take iron pills? YES - with pregancy   10. Have you had any abnormal blood loss such as black, tarry or bloody stools, or abnormal vaginal bleeding? No   11. Have you ever had a blood transfusion? YES - with first pregnancy   11a. Have you ever had a transfusion reaction? No   12. Are you willing to have a blood transfusion if it is medically needed before, during, or after your surgery? Yes   13. Have you or any of your relatives ever had problems with anesthesia? YES - vomiting years ago but has done well over the last several procedures   14. Do you have sleep apnea, excessive snoring or daytime drowsiness? No   15. Do you have any artifical heart valves or other implanted medical devices like a pacemaker, defibrillator, or continuous glucose monitor? No   16. Do you have artificial joints? YES - knees, right shoulder   17. Are you allergic to latex? No       Health Care Directive:  Patient does not have a Health Care Directive or Living Will: Patient states has Advance Directive and will bring in a copy to clinic.    Preoperative Review of :   reviewed - controlled substances reflected in medication list.      Status of Chronic Conditions:  See problem list for active medical problems.  Problems all longstanding and stable, except as noted/documented.  See ROS for pertinent symptoms related to these conditions.     HYPERLIPIDEMIA - Patient has a long history of significant Hyperlipidemia requiring medication for treatment with recent good control. Patient reports no problems or side effects with the medication.      HYPERTENSION - Patient has longstanding history of HTN , currently denies any symptoms referable to elevated blood pressure. Specifically denies chest pain, palpitations, dyspnea, orthopnea, PND or peripheral edema. Blood pressure readings have not been in normal range. Current medication regimen is as  listed below. Patient denies any side effects of medication    ASTHMA- intermittent, well controlled, rarely uses albuterol, ACT=24. She is a former smoker and notes symptoms have improved since quitting.    Review of Systems  Constitutional, neuro, ENT, endocrine, pulmonary, cardiac, gastrointestinal, genitourinary, musculoskeletal, integument and psychiatric systems are negative, except as otherwise noted.    Patient Active Problem List    Diagnosis Date Noted    Status post reverse total shoulder replacement, right 10/24/2023     Priority: Medium    Pain of left thumb 10/17/2023     Priority: Medium    Arthritis of carpometacarpal (CMC) joint of left thumb 07/14/2023     Priority: Medium    Combined form of age-related cataract, right eye 03/16/2023     Priority: Medium     Added automatically from request for surgery [id number]      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 03/16/2023     Priority: Medium     Added automatically from request for surgery [id number]      Severe stage glaucoma 02/15/2023     Priority: Medium     Added automatically from request for surgery [id number]      Bilateral hand numbness 01/27/2023     Priority: Medium    Osteoarthritis of both hands, unspecified osteoarthritis type 01/14/2023     Priority: Medium    Facet arthritis of lumbar region 01/11/2023     Priority: Medium    Diverticulosis of sigmoid colon 06/15/2020     Priority: Medium    Morbid obesity (H) 04/05/2019     Priority: Medium    Papanicolaou smear of cervix with low grade squamous intraepithelial lesion (LGSIL) 04/18/2018     Priority: Medium    Essential hypertension with goal blood pressure less than 140/90 03/29/2018     Priority: Medium    Adjustment disorder with mixed anxiety and depressed mood 03/29/2018     Priority: Medium    S/P cervical spinal fusion 03/30/2017     Priority: Medium    Radiculopathy of cervical spine 03/20/2017     Priority: Medium    Cervical high risk HPV (human papillomavirus) test  positive 01/10/2017     Priority: Medium     2000, 2003, and 2009 NIL paps. in Care Everywhere.  9/5/13 NIL pap  1/10/17 NIL pap, + HR HPV (not 16 or 18). Plan: cotest in 1 year, due by 1/10/18  3/29/18 LSIL pap, + HR HPV (not 16 or 18). Plan: colp bef 6/29/18 4/18/18 [facility] bx: negative. Plan: cotest in 6 mo, per provider.   11/9/18 Lost to follow-up for pap tracking  9/11/2019 Pap: NIL/neg HR HPV. Plan cotest in 3 years.  1/11/23 NIL pap, Neg HPV. Plan cotest in 3 years.         Microscopic hematuria 06/02/2016     Priority: Medium     Recommend f/u with primary MD for UA, blood pressure, and urine cytology at 6, 12, 24, and 36 months from this time.  If negative for 3 years then no further monitoring needed.      Refer back to urology for any of the following:              -cytology is suspicious or positive              -gross hematuria              -irritative voiding symptoms with evidence of infection/ worsening dysuria or urgency.     If persistent microscopic hematuria WITH: hypertension, proteinuria, or glomerular/dysmorphic RBCs in urine then evaluate for primary renal disease/refer instead to nephrology.       Tobacco dependency 03/06/2015     Priority: Medium    S/P carpal tunnel release 07/22/2014     Priority: Medium    Trigger thumb 07/22/2014     Priority: Medium    CTS (carpal tunnel syndrome) - bilateral 05/27/2014     Priority: Medium    S/P total knee arthroplasty juan 01/23/2014     Priority: Medium    Acute posthemorrhagic anemia 01/23/2014     Priority: Medium    Muscle spasm 01/23/2014     Priority: Medium    RLS (restless legs syndrome) 01/22/2012     Priority: Medium    Migraine 12/06/2011     Priority: Medium    Pseudogout 05/05/2011     Priority: Medium    OA (OSTEOARTHRITIS) OF KNEE - bilateral 05/05/2011     Priority: Medium    Hyperlipidemia LDL goal <130 10/31/2010     Priority: Medium    Asthma 12/16/1996     Priority: Medium      Past Medical History:   Diagnosis Date    Arthritis      Cervical high risk HPV (human papillomavirus) test positive 01/10/2017    1/10/17 NIL pap/+ HR HPV (not 16 or 18). Plan: cotest in 1 year, due by 1/10/18     Chronic infection     HX of MRSA. 2 neg swabs at [facility] in 2006 and 2007.    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    Depressive disorder Back in my mid 20's    History of blood transfusion     Hypertension     Numbness and tingling     Right arm    PONV (postoperative nausea and vomiting)     Uncomplicated asthma      Past Surgical History:   Procedure Laterality Date    ABDOMEN SURGERY  1996    hysterectomy    APPENDECTOMY      ARTHROPLASTY CARPOMETACARPAL (THUMB JOINT) Left 09/19/2023    Procedure: LEFT THUMB CARPOMETACARPAL JOINT ARTHROPLASTY WITH SUTURE SUSPENSION;  Surgeon: Mat Frye MD;  Location: [facility] OR    ARTHROSCOPY KNEE  09/16/2011    Procedure:ARTHROSCOPY KNEE; left knee arthroscopy with debridement, open lateral patellar spur excision; Surgeon:LUIS A MONTOYA; Location:[facility] OR    BIOPSY      CATARACT IOL, RT/LT      COLONOSCOPY N/A 02/16/2016    Procedure: COLONOSCOPY;  Surgeon: Belem Khalil MD;  Location: [facility]    COLONOSCOPY N/A 02/16/2016    Procedure: COMBINED COLONOSCOPY, SINGLE OR MULTIPLE BIOPSY/POLYPECTOMY BY BIOPSY;  Surgeon: Belem Khalil MD;  Location: [facility]    COLONOSCOPY N/A 06/15/2020    Procedure: Colonoscopy, With Polypectomy And Biopsy;  Surgeon: Torres Gallegos DO;  Location: [facility]    COLONOSCOPY WITH CO2 INSUFFLATION N/A 02/16/2016    Procedure: COLONOSCOPY WITH CO2 INSUFFLATION;  Surgeon: Belem Khalil MD;  Location: [facility]    COLONOSCOPY WITH CO2 INSUFFLATION N/A 06/15/2020    Procedure: COLONOSCOPY, WITH CO2 INSUFFLATION;  Surgeon: Torres Gallegos DO;  Location: [facility]    CYSTOSCOPY  01/01/2016    microscopic hematuria    DECOMPRESSION, FUSION CERVICAL ANTERIOR ONE LEVEL, COMBINED N/A  03/30/2017    Procedure: COMBINED DECOMPRESSION, FUSION CERVICAL ANTERIOR ONE LEVEL;  Surgeon: Joseph Klein MD;  Location: [facility] OR    ECTOPIC PREGNANCY SURGERY      ENT SURGERY      GENITOURINARY SURGERY      GONIOSYNECHIALYSIS Left 02/20/2023    Procedure: goniosynechialysis left;  Surgeon: Gaston Guzman MD;  Location: [facility] OR    GYN SURGERY      HC INCISION TENDON SHEATH FINGER Left 07/11/2014    Thumb TF release    HC KNEE SCOPE,MED/LAT MENISECTOMY  09/16/2011    left, with partial medial menisectomy ONLY    HC REVISE MEDIAN N/CARPAL TUNNEL SURG Left 07/11/2014    PRIMARY - not revision    HEAD & NECK SURGERY  7/22/2020    4 fusions in neck    LASIK Bilateral     [id number]    ORTHOPEDIC SURGERY      PHACOEMULSIFICATION CLEAR CORNEA W/ STANDARD IOL IMPLANT, ENDOSCOPIC CYCLOPHOTOCOAGULATION, COMBINED Left 02/20/2023    Procedure: LEFT EYE COMPLEX PHACOEMULSIFICATION, CATARACT, CLEAR CORNEAL INCISION APPROACH, WITH STANDARD INTRAOCULAR LENS IMPLANT INSERTION AND ENDOSCOPIC CYCLOPHOTOCOAGULATION< GONISYNECHIOLYSIS;  Surgeon: Gaston Guzman MD;  Location: [facility] OR    PHACOEMULSIFICATION WITH STANDARD INTRAOCULAR LENS IMPLANT Right 05/12/2023    Procedure: RIGHT EYE PHACOEMULSIFICATION, CATARACT, WITH STANDARD INTRAOCULAR LENS IMPLANT INSERTION;  Surgeon: Gaston Guzman MD;  Location: [facility] OR    RELEASE CARPAL TUNNEL  07/11/2014    Procedure: RELEASE CARPAL TUNNEL;  Surgeon: Rg Franco MD;  Location: [facility]    RELEASE CARPAL TUNNEL Right 11/07/2014    Procedure: RELEASE CARPAL TUNNEL;  Surgeon: Rg Franco MD;  Location: [facility]    RELEASE TRIGGER FINGER  07/11/2014    Procedure: RELEASE TRIGGER FINGER;  Surgeon: Rg Franco MD;  Location: [facility]    RELEASE TRIGGER FINGER Right 11/07/2014    Procedure: RELEASE TRIGGER FINGER;  Surgeon: Rg Franco MD;  Location: [facility]    REVERSE ARTHROPLASTY SHOULDER Right 10/24/2023    Procedure: Right reverse total  shoulder arthroplasty;  Surgeon: Zhang Smart MD;  Location: [facility] OR    SOFT TISSUE SURGERY      tonsils      [facility] PART REMV FEMUR/PROX TIB/FIB  09/16/2011    left, open lateral patellar bone spur excision    [facility] TOTAL KNEE ARTHROPLASTY  01/17/2014    Bilateral     Current Outpatient Medications   Medication Sig Dispense Refill    ammonium lactate (LAC-HYDRIN) 12 % external lotion Apply topically daily as needed for dry skin      aspirin 81 MG EC tablet Take 2 tablets (162 mg) by mouth daily 120 tablet 0    atorvastatin (LIPITOR) 80 MG tablet TAKE 1 TABLET(80 MG) BY MOUTH DAILY 90 tablet 2    cyclobenzaprine (FLEXERIL) 10 MG tablet TAKE 1 TABLET(10 MG) BY MOUTH EVERY NIGHT AS NEEDED FOR MUSCLE SPASMS 90 tablet 3    dorzolamide-timolol (COSOPT) 2-0.5 % ophthalmic solution Place 1 drop Into the left eye 2 times daily 10 mL 5    losartan (COZAAR) 25 MG tablet TAKE 1 TABLET(25 MG) BY MOUTH DAILY 90 tablet 0    medical cannabis (Patient's own supply) Take 1 Dose by mouth See Admin Instructions Smoking as PRN      Omega-3 Fatty Acids (FISH OIL PO) Take 1 capsule by mouth daily      oxyCODONE (ROXICODONE) 5 MG tablet Take 1 tablet (5 mg) by mouth every 4 hours as needed for moderate to severe pain 30 tablet 0    polyethylene glycol-propylene glycol (SYSTANE ULTRA) 0.4-0.3 % SOLN ophthalmic solution Place 1 drop into both eyes 2 times daily as needed for dry eyes      tiZANidine (ZANAFLEX) 2 MG tablet Take 1 tablet by mouth 3 times daily as needed for muscle spasms      acetaminophen (TYLENOL) 325 MG tablet Take 2 tablets (650 mg) by mouth every 4 hours as needed for other (mild pain) (Patient not taking: Reported on 12/7/2023) 100 tablet 0    albuterol (PROAIR HFA/PROVENTIL HFA/VENTOLIN HFA) 108 (90 Base) MCG/ACT inhaler Inhale 2 puffs into the lungs every 4 hours as needed for shortness of breath / dyspnea or wheezing (Patient not taking: Reported on 12/7/2023) 18 g 3    calcium carbonate (TUMS) 500 MG chewable tablet  Take 1-2 chew tab by mouth daily (Patient not taking: Reported on 2023)      celecoxib (CELEBREX) 200 MG capsule Take 1 capsule (200 mg) by mouth daily Do not take within 6 hours of ibuprofen (MOTRIN, ADVIL) or ketorolac (TORADOL) if prescribed. (Patient not taking: Reported on 2023) 30 capsule 0    diclofenac (VOLTAREN) 75 MG EC tablet TAKE 1 TABLET(75 MG) BY MOUTH TWICE DAILY. Do not take while taking Celebrex (Patient not taking: Reported on 2023)      senna-docusate (SENOKOT-S/PERICOLACE) 8.6-50 MG tablet Take 1-2 tablets by mouth 2 times daily Take while on oral narcotics to prevent or treat constipation. (Patient not taking: Reported on 2023) 100 tablet 0       Allergies   Allergen Reactions    Wasp Venom Protein Anaphylaxis    Bee Anaphylaxis    Erythromycin Hives    Wasps [Hornets] Anaphylaxis    Shellfish Allergy Nausea and Vomiting and Rash    Shellfish-Derived Products Rash and Nausea and Vomiting        Social History     Tobacco Use    Smoking status: Former     Packs/day: 0.00     Years: 15.00     Additional pack years: 0.00     Total pack years: 0.00     Types: Cigarettes     Start date: 1976     Quit date: 2023     Years since quittin.9     Passive exposure: Never    Smokeless tobacco: Never    Tobacco comments:     I am currently on Chantix   Substance Use Topics    Alcohol use: Yes     Comment: occasionally     Family History   Problem Relation Age of Onset    C.A.D. Father     Hyperlipidemia Father     Breast Cancer Maternal Grandmother     Ovarian Cancer Maternal Grandmother     Cancer - colorectal Maternal Grandfather     Colon Cancer Maternal Grandfather     Prostate Cancer Maternal Grandfather     C.A.D. Paternal Grandfather     Lung Cancer Sister     Colon Cancer Sister     Brain Cancer Sister     Other Cancer Sister         Brain cancer    Hypertension Sister     Anxiety Disorder Daughter     Glaucoma No family hx of     Macular Degeneration No family hx of   "    History   Drug use: Unknown    Types: Marijuana         Objective     BP (!) 156/83   Pulse 89   Temp 97.4  F (36.3  C) (Tympanic)   Resp 16   Ht 1.568 m (5' 1.75\")   Wt 101.6 kg (224 lb)   LMP 09/16/2011   SpO2 95%   BMI 41.30 kg/m      Physical Exam    GENERAL APPEARANCE: healthy, alert and no distress     EYES: EOMI, PERRL     HENT: ear canals and TM's normal and nose and mouth without ulcers or lesions     NECK: no adenopathy, no asymmetry, masses, or scars and thyroid normal to palpation     RESP: lungs clear to auscultation - no rales, rhonchi or wheezes     CV: regular rates and rhythm, normal S1 S2, no S3 or S4 and no murmur, click or rub     ABDOMEN:  soft, nontender, no HSM or masses and bowel sounds normal     MS: extremities normal- no gross deformities noted, no evidence of inflammation in joints, FROM in all extremities.     SKIN: no suspicious lesions or rashes     NEURO: Normal strength and tone, sensory exam grossly normal, mentation intact and speech normal     PSYCH: mentation appears normal. and affect normal/bright     LYMPHATICS: No cervical adenopathy    Recent Labs   Lab Test 10/25/23  0732 10/24/23  0835 10/10/23  1418 09/07/23  0938 04/26/23  1144 01/11/23  1439 09/12/22  1422 06/27/22  1102   HGB 12.0  --   --   --   --  15.1 14.4 15.0   PLT  --   --   --   --   --  349 318 339   INR  --   --   --   --   --   --   --  1.04   NA  --   --   --   --   --  140 139 138   POTASSIUM  --  4.3 5.4* 4.8   < > 4.6 4.5 3.8   CR  --   --  0.67 0.69   < > 0.62 0.59 0.72    < > = values in this interval not displayed.      Component      Latest Ref Rng 12/7/2023  9:25 AM   Sodium      133 - 144 mmol/L    Potassium      3.4 - 5.3 mmol/L    Chloride      94 - 109 mmol/L    Carbon Dioxide      20 - 32 mmol/L    Anion Gap      3 - 14 mmol/L    Urea Nitrogen      7 - 30 mg/dL    Creatinine      0.51 - 0.95 mg/dL 0.67    Calcium      8.5 - 10.1 mg/dL    Glucose      70 - 99 mg/dL    GFR Estimate    "   >60 mL/min/1.73m2 >90    Cholesterol      <200 mg/dL    Triglycerides      <150 mg/dL    HDL Cholesterol      >=50 mg/dL    LDL Cholesterol Calculated      <=100 mg/dL    Non HDL Cholesterol      <130 mg/dL    Patient Fasting? Yes    Cotinine Confirm      ng/mL    Nicotine Confirmation Urine      ng/mL    3-OH-Cotinine, Urn, Quant      ng/mL    Anabasine, Urn, Quant      ng/mL    Creatinine Urine      mg/dL    Albumin Urine mg/L      mg/L    Albumin Urine mg/g Cr      0.00 - 25.00 mg/g Cr    Glucose      70 - 99 mg/dL 126 (H)    Calcium Ionized Whole Blood      4.4 - 5.2 mg/dL    Potassium      3.4 - 5.3 mmol/L 4.8    Hemoglobin      11.7 - 15.7 g/dL    GLUCOSE BY METER POCT      70 - 99 mg/dL    Hemoglobin A1C      0.0 - 5.6 % 5.9 (H)       Legend:  (H) High  Diagnostics:  Labs pending at this time.  Results will be reviewed when available.   No EKG required, no history of coronary heart disease, significant arrhythmia, peripheral arterial disease or other structural heart disease.    Revised Cardiac Risk Index (RCRI):  The patient has the following serious cardiovascular risks for perioperative complications:   - No serious cardiac risks = 0 points     RCRI Interpretation: 0 points: Class I (very low risk - 0.4% complication rate)         Signed Electronically by: JOSE R Sarabia CNP  Copy of this evaluation report is provided to requesting physician.

## 2023-12-08 PROBLEM — R73.03 PREDIABETES: Status: ACTIVE | Noted: 2023-12-08

## 2023-12-22 ENCOUNTER — OFFICE VISIT (OUTPATIENT)
Dept: ORTHOPEDICS | Facility: CLINIC | Age: 63
End: 2023-12-22
Payer: COMMERCIAL

## 2023-12-22 DIAGNOSIS — M19.042 OSTEOARTHRITIS OF BOTH HANDS, UNSPECIFIED OSTEOARTHRITIS TYPE: Primary | ICD-10-CM

## 2023-12-22 DIAGNOSIS — M19.041 OSTEOARTHRITIS OF BOTH HANDS, UNSPECIFIED OSTEOARTHRITIS TYPE: Primary | ICD-10-CM

## 2023-12-22 PROCEDURE — 99212 OFFICE O/P EST SF 10 MIN: CPT | Performed by: STUDENT IN AN ORGANIZED HEALTH CARE EDUCATION/TRAINING PROGRAM

## 2023-12-22 ASSESSMENT — PAIN SCALES - GENERAL: PAINLEVEL: MODERATE PAIN (4)

## 2023-12-22 NOTE — PROGRESS NOTES
Ortho Hand    Doing better and better.  Is able to do most activities that do not involve loading of the thumb.  Very happy with the result.  No pain.    On exam, well remodeling left dorsoradial wrist scar with no tenderness with passive motion at the left thumb CMC reconstruction.  Patient can move the left thumb actively to each fingertip with ease.    A/P: 63-year-old female 3-month status post left thumb carpometacarpal joint suture suspension arthroplasty, doing well    -Instructed patient to begin using the hand for strengthening and other loading activities without the splint.  If patient is planning to lift heavy or do something strenuous, it would be advisable to continue to use the splint to help increase the durability of the result.  -Patient should return to clinic for the right thumb CMC joint reevaluation and consideration for surgery when she would like to undergo surgery.  -Return to clinic as needed    Mat Frye MD, PhD

## 2023-12-22 NOTE — LETTER
12/22/2023         RE: Margo Inman  [id number] [address] [facility] [id number]        Dear Colleague,    Thank you for referring your patient, Margo Inman, to the [facility]. Please see a copy of my visit note below.    Ortho Hand    Doing better and better.  Is able to do most activities that do not involve loading of the thumb.  Very happy with the result.  No pain.    On exam, well remodeling left dorsoradial wrist scar with no tenderness with passive motion at the left thumb CMC reconstruction.  Patient can move the left thumb actively to each fingertip with ease.    A/P: 63-year-old female 3-month status post left thumb carpometacarpal joint suture suspension arthroplasty, doing well    -Instructed patient to begin using the hand for strengthening and other loading activities without the splint.  If patient is planning to lift heavy or do something strenuous, it would be advisable to continue to use the splint to help increase the durability of the result.  -Patient should return to clinic for the right thumb CMC joint reevaluation and consideration for surgery when she would like to undergo surgery.  -Return to clinic as needed    Mat Frye MD, PhD      Again, thank you for allowing me to participate in the care of your patient.        Sincerely,        Mat Frye MD

## 2024-01-10 NOTE — PROGRESS NOTES
DISCHARGE  Reason for Discharge: Patient has failed to schedule further appointments.    Discharge Plan: Patient to continue home program.    Referring Provider:  Mat Frye

## 2024-01-15 ENCOUNTER — TELEPHONE (OUTPATIENT)
Dept: GASTROENTEROLOGY | Facility: CLINIC | Age: 64
End: 2024-01-15

## 2024-01-15 DIAGNOSIS — Z12.11 SCREEN FOR COLON CANCER: Primary | ICD-10-CM

## 2024-01-15 RX ORDER — BISACODYL 5 MG/1
TABLET, DELAYED RELEASE ORAL
Qty: 4 TABLET | Refills: 0 | Status: SHIPPED | OUTPATIENT
Start: 2024-01-15

## 2024-01-15 NOTE — TELEPHONE ENCOUNTER
Pre visit planning completed.      Procedure details:    Patient scheduled for Colonoscopy  on 01.30.2024.     Arrival time: 1100. Procedure time 1145    Pre op exam needed? N/A    Facility location: [facility]; [address], [place] [address]    Sedation type: Conscious sedation -Discuss patients oxycodone use-may need to change to MAC    Indication for procedure: Screen for colon cancer       Chart review:     Electronic implanted devices? No    Recent diagnosis of diverticulitis within the last 6 weeks? No    Diabetic? No    Diabetic medication HOLDING recommendations: (if applicable)  Oral diabetic medications: N/A  Diabetic injectables: N/A  Insulin: N/A      Medication review:    Anticoagulants? No    NSAIDS? Yes.  Diclofenac (Voltaren).  Holding interval of 1 day before procedure.    Other medication HOLDING recommendations:  Weight loss injectable medication: Wegovy (Semaglutide). Weekly dosing of medication.  Hold 7 days before procedure.  Follow up with managing provider.   Cannabis/Marijuana: Stop night before procedure.      Prep for procedure:     Bowel prep recommendation: Extended prep Golytely    Due to:  BMI > 40.     Prep instructions sent via [email or web address] Bowel prep script sent to    [id number] - [place], [place] - [address] AT [facility]  [address]        Irene Echeverria RN  Endoscopy Procedure Pre Assessment RN  [phone] option 4

## 2024-01-16 NOTE — TELEPHONE ENCOUNTER
Pre assessment completed for upcoming procedure.   (Please see previous telephone encounter notes for complete details)        Procedure details:    Arrival time and facility location reviewed.    Pre op exam needed? N/A    Designated  policy reviewed. Instructed to have someone stay 6 hours post procedure.     COVID policy reviewed.      Medication review:    Medications reviewed. Please see supporting documentation below. Holding recommendations discussed (if applicable).   NSAID medication(s): Diclofenac (Voltaren): HOLD 1 day before procedure.     Patient reported not taking oxycodone, wegovy or cannabis/marijuana      Prep for procedure:     Procedure prep instructions reviewed.        Additional information needed?  N/A      Patient  verbalized understanding and had no questions or concerns at this time.      Irene Echeverria RN  Endoscopy Procedure Pre Assessment RN  [phone] option 4

## 2024-01-25 ENCOUNTER — OFFICE VISIT (OUTPATIENT)
Dept: OPHTHALMOLOGY | Facility: CLINIC | Age: 64
End: 2024-01-25
Attending: OPHTHALMOLOGY
Payer: COMMERCIAL

## 2024-01-25 DIAGNOSIS — Z98.890 HISTORY OF LASER ASSISTED IN SITU KERATOMILEUSIS: ICD-10-CM

## 2024-01-25 DIAGNOSIS — H40.212 ACUTE ANGLE-CLOSURE GLAUCOMA OF LEFT EYE: Primary | ICD-10-CM

## 2024-01-25 DIAGNOSIS — H40.9 SEVERE STAGE GLAUCOMA: ICD-10-CM

## 2024-01-25 DIAGNOSIS — H40.9 SEVERE STAGE GLAUCOMA: Primary | ICD-10-CM

## 2024-01-25 DIAGNOSIS — Z96.1 PSEUDOPHAKIA OF BOTH EYES: ICD-10-CM

## 2024-01-25 PROCEDURE — 92083 EXTENDED VISUAL FIELD XM: CPT | Performed by: OPHTHALMOLOGY

## 2024-01-25 PROCEDURE — G0463 HOSPITAL OUTPT CLINIC VISIT: HCPCS | Performed by: OPHTHALMOLOGY

## 2024-01-25 PROCEDURE — 92014 COMPRE OPH EXAM EST PT 1/>: CPT | Performed by: OPHTHALMOLOGY

## 2024-01-25 PROCEDURE — 92133 CPTRZD OPH DX IMG PST SGM ON: CPT | Performed by: OPHTHALMOLOGY

## 2024-01-25 ASSESSMENT — TONOMETRY
OD_IOP_MMHG: 14
IOP_METHOD: TONOPEN
OS_IOP_MMHG: 11

## 2024-01-25 ASSESSMENT — REFRACTION_WEARINGRX
OD_SPHERE: PLANO
OS_AXIS: 120
OD_ADD: +2.50
OS_CYLINDER: +0.50
OS_SPHERE: PLANO
OD_AXIS: 155
OS_ADD: +2.50
OD_CYLINDER: +1.00

## 2024-01-25 ASSESSMENT — CONF VISUAL FIELD
METHOD: COUNTING FINGERS
OS_INFERIOR_NASAL_RESTRICTION: 0
OS_SUPERIOR_TEMPORAL_RESTRICTION: 3
OS_INFERIOR_TEMPORAL_RESTRICTION: 3
OD_SUPERIOR_NASAL_RESTRICTION: 0
OD_NORMAL: 1
OD_INFERIOR_TEMPORAL_RESTRICTION: 0
OS_SUPERIOR_NASAL_RESTRICTION: 0
OD_SUPERIOR_TEMPORAL_RESTRICTION: 0
OD_INFERIOR_NASAL_RESTRICTION: 0

## 2024-01-25 ASSESSMENT — EXTERNAL EXAM - LEFT EYE: OS_EXAM: NORMAL

## 2024-01-25 ASSESSMENT — VISUAL ACUITY
CORRECTION_TYPE: GLASSES
OD_CC: 20/25
OS_PH_SC: 20/30
METHOD: SNELLEN - LINEAR
OD_CC+: -2
OS_SC: 20/40

## 2024-01-25 ASSESSMENT — CUP TO DISC RATIO
OS_RATIO: 0.6
OD_RATIO: 0.4

## 2024-01-25 ASSESSMENT — SLIT LAMP EXAM - LIDS
COMMENTS: NORMAL
COMMENTS: NORMAL

## 2024-01-25 ASSESSMENT — EXTERNAL EXAM - RIGHT EYE: OD_EXAM: NORMAL

## 2024-01-25 NOTE — NURSING NOTE
Chief Complaints and History of Present Illnesses   Patient presents with    Glaucoma Follow-Up     Severe stage glaucoma     Chief Complaint(s) and History of Present Illness(es)       Glaucoma Follow-Up              Laterality: both eyes    Associated symptoms: floaters.  Negative for dryness, eye pain, tearing and flashes    Pain scale: 0/10    Comments: Severe stage glaucoma              Comments    Pt states vision is the same.  No pain.  No flashes.  New floaters RE today, LE maybe yesterday.  AT's PRN  Cosopt LE 2x/day.    HUMAIRA Meade January 25, 2024 9:03 AM

## 2024-01-25 NOTE — PROGRESS NOTES
Chief Complaint(s) and History of Present Illness(es)     Glaucoma Follow-Up            Laterality: both eyes    Associated symptoms: floaters.  Negative for dryness, eye pain, tearing   and flashes    Pain scale: 0/10    Comments: Severe stage glaucoma          Comments    Pt states vision is the same.  No pain.  No flashes.  New floaters RE today, LE maybe yesterday.  AT's PRN  Cosopt LE 2x/day.    HUMAIRA Meade January 25, 2024 9:03 AM          Review of systems for the eyes was negative other than the pertinent positives/negatives listed in the HPI.      Assessment & Plan      Margo Inman is a 64 year old female with the following diagnoses:   1. Acute angle-closure glaucoma of left eye    2. Severe stage glaucoma - Left Eye    3. Pseudophakia of both eyes    4. 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  Can stop the month prior to your next visit to determine washout intraocular pressure           Patient disposition:   Return in about 6 months (around 7/25/2024) for VT only, OCT NFL, Refraction.           Attending Physician Attestation:  Complete documentation of historical and exam elements from today's encounter can be found in the full encounter summary report (not reduplicated in this progress note).  I personally obtained the chief complaint(s) and history of present illness.  I confirmed and edited as necessary the review of systems, past medical/surgical history, family history, social history, and examination findings as documented by others; and I examined the patient myself.  I personally reviewed the relevant  tests, images, and reports as documented above.  I formulated and edited as necessary the assessment and plan and discussed the findings and management plan with the patient and family. . - Gaston Guzman MD

## 2024-01-29 ENCOUNTER — MYC REFILL (OUTPATIENT)
Dept: FAMILY MEDICINE | Facility: CLINIC | Age: 64
End: 2024-01-29
Payer: COMMERCIAL

## 2024-01-29 ENCOUNTER — TELEPHONE (OUTPATIENT)
Dept: FAMILY MEDICINE | Facility: CLINIC | Age: 64
End: 2024-01-29
Payer: COMMERCIAL

## 2024-01-29 DIAGNOSIS — Z96.653 STATUS POST TOTAL BILATERAL KNEE REPLACEMENT: ICD-10-CM

## 2024-01-29 DIAGNOSIS — Z98.1 S/P CERVICAL SPINAL FUSION: ICD-10-CM

## 2024-01-29 DIAGNOSIS — E66.01 MORBID OBESITY (H): ICD-10-CM

## 2024-01-29 RX ORDER — DICLOFENAC SODIUM 75 MG/1
TABLET, DELAYED RELEASE ORAL
Qty: 180 TABLET | Refills: 0 | Status: SHIPPED | OUTPATIENT
Start: 2024-01-29 | End: 2024-06-03

## 2024-01-30 ENCOUNTER — HOSPITAL ENCOUNTER (OUTPATIENT)
Facility: AMBULATORY SURGERY CENTER | Age: 64
Discharge: HOME | End: 2024-01-30
Attending: SURGERY | Admitting: SURGERY
Payer: COMMERCIAL

## 2024-01-30 VITALS
RESPIRATION RATE: 18 BRPM | SYSTOLIC BLOOD PRESSURE: 144 MMHG | HEART RATE: 86 BPM | OXYGEN SATURATION: 95 % | TEMPERATURE: 97.9 F | DIASTOLIC BLOOD PRESSURE: 86 MMHG

## 2024-01-30 LAB — COLONOSCOPY: NORMAL

## 2024-01-30 PROCEDURE — G8918 PT W/O PREOP ORDER IV AB PRO: HCPCS

## 2024-01-30 PROCEDURE — G8907 PT DOC NO EVENTS ON DISCHARG: HCPCS

## 2024-01-30 PROCEDURE — 88305 TISSUE EXAM BY PATHOLOGIST: CPT | Performed by: PATHOLOGY

## 2024-01-30 PROCEDURE — 45385 COLONOSCOPY W/LESION REMOVAL: CPT | Mod: PT

## 2024-01-30 RX ORDER — NALOXONE HYDROCHLORIDE 0.4 MG/ML
0.2 INJECTION, SOLUTION INTRAMUSCULAR; INTRAVENOUS; SUBCUTANEOUS
Status: CANCELLED | OUTPATIENT
Start: 2024-01-30

## 2024-01-30 RX ORDER — ONDANSETRON 4 MG/1
4 TABLET, ORALLY DISINTEGRATING ORAL EVERY 6 HOURS PRN
Status: CANCELLED | OUTPATIENT
Start: 2024-01-30

## 2024-01-30 RX ORDER — LIDOCAINE 40 MG/G
CREAM TOPICAL
Status: DISCONTINUED | OUTPATIENT
Start: 2024-01-30 | End: 2024-01-31 | Stop reason: HOSPADM

## 2024-01-30 RX ORDER — ONDANSETRON 2 MG/ML
4 INJECTION INTRAMUSCULAR; INTRAVENOUS EVERY 6 HOURS PRN
Status: CANCELLED | OUTPATIENT
Start: 2024-01-30

## 2024-01-30 RX ORDER — FENTANYL CITRATE 50 UG/ML
INJECTION, SOLUTION INTRAMUSCULAR; INTRAVENOUS PRN
Status: DISCONTINUED | OUTPATIENT
Start: 2024-01-30 | End: 2024-01-30 | Stop reason: HOSPADM

## 2024-01-30 RX ORDER — PROCHLORPERAZINE MALEATE 10 MG
10 TABLET ORAL EVERY 6 HOURS PRN
Status: CANCELLED | OUTPATIENT
Start: 2024-01-30

## 2024-01-30 RX ORDER — FLUMAZENIL 0.1 MG/ML
0.2 INJECTION, SOLUTION INTRAVENOUS
Status: CANCELLED | OUTPATIENT
Start: 2024-01-30 | End: 2024-01-30

## 2024-01-30 RX ORDER — NALOXONE HYDROCHLORIDE 0.4 MG/ML
0.4 INJECTION, SOLUTION INTRAMUSCULAR; INTRAVENOUS; SUBCUTANEOUS
Status: CANCELLED | OUTPATIENT
Start: 2024-01-30

## 2024-01-30 RX ORDER — ONDANSETRON 2 MG/ML
4 INJECTION INTRAMUSCULAR; INTRAVENOUS
Status: DISCONTINUED | OUTPATIENT
Start: 2024-01-30 | End: 2024-01-31 | Stop reason: HOSPADM

## 2024-01-30 NOTE — H&P
Pre-Endoscopy History and Physical     Margo Inman MRN# [id number]   YOB: 1960 Age: 64 year old     Date of Procedure: 1/30/2024  Primary care provider: Belem Dumont  Type of Endoscopy: colonoscopy  Reason for Procedure: history of multiple polyps  Type of Anesthesia Anticipated: Moderate Sedation    HPI:    Margo is a 64 year old female who will be undergoing the above procedure.  History of many polyps on previous colonoscopies  Family history of colon cancer      A history and physical has been performed. The patient's medications and allergies have been reviewed. The risks and benefits of the procedure and the sedation options and risks were discussed with the patient.  All questions were answered and informed consent was obtained.      She denies a personal or family history of anesthesia complications or bleeding disorders.     Allergies   Allergen Reactions    Wasp Venom Protein Anaphylaxis    Bee Anaphylaxis    Erythromycin Hives    Wasps [Hornets] Anaphylaxis    Shellfish Allergy Nausea and Vomiting and Rash    Shellfish-Derived Products Rash and Nausea and Vomiting        Cannot display prior to admission medications because the patient has not been admitted in this contact.     Current Outpatient Medications   Medication    acetaminophen (TYLENOL) 325 MG tablet    albuterol (PROAIR HFA/PROVENTIL HFA/VENTOLIN HFA) 108 (90 Base) MCG/ACT inhaler    aspirin 81 MG EC tablet    atorvastatin (LIPITOR) 80 MG tablet    cyclobenzaprine (FLEXERIL) 10 MG tablet    diclofenac (VOLTAREN) 75 MG EC tablet    dorzolamide-timolol (COSOPT) 2-0.5 % ophthalmic solution    losartan (COZAAR) 25 MG tablet    Omega-3 Fatty Acids (FISH OIL PO)    oxyCODONE (ROXICODONE) 5 MG tablet    tiZANidine (ZANAFLEX) 2 MG tablet    ammonium lactate (LAC-HYDRIN) 12 % external lotion    bisacodyl (DULCOLAX) 5 MG EC tablet    calcium carbonate (TUMS) 500 MG chewable tablet    medical cannabis (Patient's own supply)     polyethylene glycol (GOLYTELY) 236 g suspension    polyethylene glycol-propylene glycol (SYSTANE ULTRA) 0.4-0.3 % SOLN ophthalmic solution    Semaglutide-Weight Management (WEGOVY) 0.25 MG/0.5ML pen    Semaglutide-Weight Management (WEGOVY) 0.5 MG/0.5ML pen    senna-docusate (SENOKOT-S/PERICOLACE) 8.6-50 MG tablet    vitamin D2 (ERGOCALCIFEROL) 77169 units (1250 mcg) capsule     Current Facility-Administered Medications   Medication    betamethasone acet & sod phos (CELESTONE) injection 6 mg    betamethasone acet & sod phos (CELESTONE) injection 6 mg    lidocaine (LMX4) kit    lidocaine 1 % 0.1-1 mL    ondansetron (ZOFRAN) injection 4 mg    sodium chloride (PF) 0.9% PF flush 3 mL    sodium chloride (PF) 0.9% PF flush 3 mL    triamcinolone (KENALOG-40) injection 20 mg    triamcinolone (KENALOG-40) injection 20 mg    triamcinolone (KENALOG-40) injection 20 mg    triamcinolone (KENALOG-40) injection 20 mg         Patient Active Problem List   Diagnosis    Hyperlipidemia LDL goal <130    Pseudogout    OA (OSTEOARTHRITIS) OF KNEE - bilateral    RLS (restless legs syndrome)    S/P total knee arthroplasty juan    Acute posthemorrhagic anemia    Muscle spasm    CTS (carpal tunnel syndrome) - bilateral    S/P carpal tunnel release    Trigger thumb    Essential hypertension with goal blood pressure less than 140/90    Tobacco dependency    Microscopic hematuria    Cervical high risk HPV (human papillomavirus) test positive    Radiculopathy of cervical spine    S/P cervical spinal fusion    Adjustment disorder with mixed anxiety and depressed mood    Papanicolaou smear of cervix with low grade squamous intraepithelial lesion (LGSIL)    Morbid obesity (H)    Diverticulosis of sigmoid colon    Migraine    Asthma    Facet arthritis of lumbar region    Osteoarthritis of both hands, unspecified osteoarthritis type    Bilateral hand numbness    Severe stage glaucoma    Combined form of age-related cataract, right eye    Anatomical  narrow angle borderline glaucoma of right eye    Arthritis of carpometacarpal (CMC) joint of left thumb    Pain of left thumb    Status post reverse total shoulder replacement, right    Prediabetes        Past Medical History:   Diagnosis Date    Arthritis     Cervical high risk HPV (human papillomavirus) test positive 01/10/2017    1/10/17 NIL pap/+ HR HPV (not 16 or 18). Plan: cotest in 1 year, due by 1/10/18     Chronic infection     HX of MRSA. 2 neg swabs at [facility] in 2006 and 2007.    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    Depressive disorder Back in my mid 20's    History of blood transfusion     Hypertension     Numbness and tingling     Right arm    PONV (postoperative nausea and vomiting)     Uncomplicated asthma         Past Surgical History:   Procedure Laterality Date    ABDOMEN SURGERY  1996    hysterectomy    APPENDECTOMY      ARTHROPLASTY CARPOMETACARPAL (THUMB JOINT) Left 09/19/2023    Procedure: LEFT THUMB CARPOMETACARPAL JOINT ARTHROPLASTY WITH SUTURE SUSPENSION;  Surgeon: Mat Frye MD;  Location: [facility] OR    ARTHROSCOPY KNEE  09/16/2011    Procedure:ARTHROSCOPY KNEE; left knee arthroscopy with debridement, open lateral patellar spur excision; Surgeon:LUIS A MONTOYA; Location:[facility] OR    BIOPSY      CATARACT IOL, RT/LT      COLONOSCOPY N/A 02/16/2016    Procedure: COLONOSCOPY;  Surgeon: Belem Khalil MD;  Location: [facility] OR    COLONOSCOPY N/A 02/16/2016    Procedure: COMBINED COLONOSCOPY, SINGLE OR MULTIPLE BIOPSY/POLYPECTOMY BY BIOPSY;  Surgeon: Belem Khalil MD;  Location: [facility] OR    COLONOSCOPY N/A 06/15/2020    Procedure: Colonoscopy, With Polypectomy And Biopsy;  Surgeon: Torres Gallegos DO;  Location: [facility] OR    COLONOSCOPY WITH CO2 INSUFFLATION N/A 02/16/2016    Procedure: COLONOSCOPY WITH CO2 INSUFFLATION;  Surgeon: Belem Khalil MD;  Location: [facility] OR     COLONOSCOPY WITH CO2 INSUFFLATION N/A 06/15/2020    Procedure: COLONOSCOPY, WITH CO2 INSUFFLATION;  Surgeon: Torres Gallegos DO;  Location: [facility] OR    CYSTOSCOPY  01/01/2016    microscopic hematuria    DECOMPRESSION, FUSION CERVICAL ANTERIOR ONE LEVEL, COMBINED N/A 03/30/2017    Procedure: COMBINED DECOMPRESSION, FUSION CERVICAL ANTERIOR ONE LEVEL;  Surgeon: Joseph Klein MD;  Location: [facility] OR    ECTOPIC PREGNANCY SURGERY      ENT SURGERY      GENITOURINARY SURGERY      GONIOSYNECHIALYSIS Left 02/20/2023    Procedure: goniosynechialysis left;  Surgeon: Gaston Guzman MD;  Location: [facility] OR    GYN SURGERY      HC INCISION TENDON SHEATH FINGER Left 07/11/2014    Thumb TF release    HC KNEE SCOPE,MED/LAT MENISECTOMY  09/16/2011    left, with partial medial menisectomy ONLY    HC REVISE MEDIAN N/CARPAL TUNNEL SURG Left 07/11/2014    PRIMARY - not revision    HEAD & NECK SURGERY  7/22/2020    4 fusions in neck    LASIK Bilateral     [id number]    ORTHOPEDIC SURGERY      PHACOEMULSIFICATION CLEAR CORNEA W/ STANDARD IOL IMPLANT, ENDOSCOPIC CYCLOPHOTOCOAGULATION, COMBINED Left 02/20/2023    Procedure: LEFT EYE COMPLEX PHACOEMULSIFICATION, CATARACT, CLEAR CORNEAL INCISION APPROACH, WITH STANDARD INTRAOCULAR LENS IMPLANT INSERTION AND ENDOSCOPIC CYCLOPHOTOCOAGULATION< GONISYNECHIOLYSIS;  Surgeon: Gaston Guzman MD;  Location: [facility] OR    PHACOEMULSIFICATION WITH STANDARD INTRAOCULAR LENS IMPLANT Right 05/12/2023    Procedure: RIGHT EYE PHACOEMULSIFICATION, CATARACT, WITH STANDARD INTRAOCULAR LENS IMPLANT INSERTION;  Surgeon: Gaston Guzman MD;  Location: [facility] OR    RELEASE CARPAL TUNNEL  07/11/2014    Procedure: RELEASE CARPAL TUNNEL;  Surgeon: Rg Franco MD;  Location: [facility] OR    RELEASE CARPAL TUNNEL Right 11/07/2014    Procedure: RELEASE CARPAL TUNNEL;  Surgeon: Rg Franco MD;  Location: [facility] OR    RELEASE TRIGGER FINGER  07/11/2014    Procedure: RELEASE TRIGGER FINGER;   "Surgeon: Rg Franco MD;  Location: [facility] OR    RELEASE TRIGGER FINGER Right 2014    Procedure: RELEASE TRIGGER FINGER;  Surgeon: Rg Franco MD;  Location: [facility] OR    REVERSE ARTHROPLASTY SHOULDER Right 10/24/2023    Procedure: Right reverse total shoulder arthroplasty;  Surgeon: Zhang Smart MD;  Location: [facility] OR    SOFT TISSUE SURGERY      tonsils      ZZC PART REMV FEMUR/PROX TIB/FIB  2011    left, open lateral patellar bone spur excision    ZZC TOTAL KNEE ARTHROPLASTY  2014    Bilateral       Social History     Tobacco Use    Smoking status: Former     Packs/day: 0.00     Years: 15.00     Additional pack years: 0.00     Total pack years: 0.00     Types: Cigarettes     Start date: 1976     Quit date: 2023     Years since quittin.0     Passive exposure: Never    Smokeless tobacco: Never    Tobacco comments:     I am currently on Chantix   Substance Use Topics    Alcohol use: Yes     Comment: occasionally       Family History   Problem Relation Age of Onset    C.A.D. Father     Hyperlipidemia Father     Breast Cancer Maternal Grandmother     Ovarian Cancer Maternal Grandmother     Cancer - colorectal Maternal Grandfather     Colon Cancer Maternal Grandfather     Prostate Cancer Maternal Grandfather     C.A.D. Paternal Grandfather     Lung Cancer Sister     Colon Cancer Sister     Brain Cancer Sister     Other Cancer Sister         Brain cancer    Hypertension Sister     Anxiety Disorder Daughter     Glaucoma No family hx of     Macular Degeneration No family hx of        REVIEW OF SYSTEMS:     5 point ROS negative except as noted above in HPI, including Gen., Resp., CV, GI &  system review.      PHYSICAL EXAM:   BP (!) 155/80   Pulse 83   Temp 97.1  F (36.2  C) (Temporal)   Resp 18   LMP 2011   SpO2 96%  Estimated body mass index is 41.3 kg/m  as calculated from the following:    Height as of 23: 1.568 m (5' 1.75\").    Weight as of 23: 101.6 " kg (224 lb).   GENERAL APPEARANCE: healthy, alert, and no distress  MENTAL STATUS: alert  AIRWAY EXAM: Mallampatti Class III (visualization of the soft palate and base of uvula)  RESP: lungs clear to auscultation - no rales, rhonchi or wheezes  CV: regular rates and rhythm      DIAGNOSTICS:    Not indicated      IMPRESSION   ASA Class 3 - Severe systemic disease, but not incapacitating        PLAN:       Plan for colonoscopy. We discussed the risks, benefits and alternatives and the patient wished to proceed.    The above has been forwarded to the consulting provider.      Signed Electronically by: Aubrey Ingram MD  January 30, 2024

## 2024-02-01 LAB
PATH REPORT.COMMENTS IMP SPEC: NORMAL
PATH REPORT.COMMENTS IMP SPEC: NORMAL
PATH REPORT.FINAL DX SPEC: NORMAL
PATH REPORT.GROSS SPEC: NORMAL
PATH REPORT.MICROSCOPIC SPEC OTHER STN: NORMAL
PATH REPORT.RELEVANT HX SPEC: NORMAL
PHOTO IMAGE: NORMAL

## 2024-03-16 ENCOUNTER — HEALTH MAINTENANCE LETTER (OUTPATIENT)
Age: 64
End: 2024-03-16

## 2024-05-30 DIAGNOSIS — I10 ESSENTIAL HYPERTENSION WITH GOAL BLOOD PRESSURE LESS THAN 140/90: Primary | ICD-10-CM

## 2024-05-30 DIAGNOSIS — Z98.1 S/P CERVICAL SPINAL FUSION: ICD-10-CM

## 2024-05-30 DIAGNOSIS — Z96.653 STATUS POST TOTAL BILATERAL KNEE REPLACEMENT: ICD-10-CM

## 2024-05-30 DIAGNOSIS — E78.5 HYPERLIPIDEMIA LDL GOAL <130: ICD-10-CM

## 2024-06-03 RX ORDER — DICLOFENAC SODIUM 75 MG/1
TABLET, DELAYED RELEASE ORAL
Qty: 180 TABLET | Refills: 0 | Status: SHIPPED | OUTPATIENT
Start: 2024-06-03 | End: 2024-09-09

## 2024-07-16 DIAGNOSIS — Z98.1 S/P CERVICAL SPINAL FUSION: ICD-10-CM

## 2024-07-16 RX ORDER — CYCLOBENZAPRINE HCL 10 MG
TABLET ORAL
Qty: 90 TABLET | Refills: 1 | Status: SHIPPED | OUTPATIENT
Start: 2024-07-16

## 2024-08-10 DIAGNOSIS — H40.212 ACUTE ANGLE-CLOSURE GLAUCOMA OF LEFT EYE: Primary | ICD-10-CM

## 2024-08-13 ENCOUNTER — OFFICE VISIT (OUTPATIENT)
Dept: OPHTHALMOLOGY | Facility: CLINIC | Age: 64
End: 2024-08-13
Attending: OPHTHALMOLOGY
Payer: COMMERCIAL

## 2024-08-13 DIAGNOSIS — H40.212 ACUTE ANGLE-CLOSURE GLAUCOMA OF LEFT EYE: Primary | ICD-10-CM

## 2024-08-13 DIAGNOSIS — Z98.890 HISTORY OF LASER ASSISTED IN SITU KERATOMILEUSIS: ICD-10-CM

## 2024-08-13 DIAGNOSIS — Z96.1 PSEUDOPHAKIA, BOTH EYES: ICD-10-CM

## 2024-08-13 PROCEDURE — G0463 HOSPITAL OUTPT CLINIC VISIT: HCPCS | Performed by: OPHTHALMOLOGY

## 2024-08-13 PROCEDURE — 92015 DETERMINE REFRACTIVE STATE: CPT

## 2024-08-13 PROCEDURE — 92133 CPTRZD OPH DX IMG PST SGM ON: CPT | Performed by: OPHTHALMOLOGY

## 2024-08-13 PROCEDURE — 99213 OFFICE O/P EST LOW 20 MIN: CPT | Performed by: OPHTHALMOLOGY

## 2024-08-13 RX ORDER — MULTIVITAMIN
1 TABLET ORAL DAILY
COMMUNITY

## 2024-08-13 ASSESSMENT — REFRACTION_MANIFEST
OD_CYLINDER: +0.75
OS_AXIS: 020
OD_AXIS: 155
OD_ADD: +2.50
OS_ADD: +2.50
OD_SPHERE: +1.00
OS_CYLINDER: +1.00
OS_SPHERE: -1.25

## 2024-08-13 ASSESSMENT — EXTERNAL EXAM - LEFT EYE: OS_EXAM: NORMAL

## 2024-08-13 ASSESSMENT — SLIT LAMP EXAM - LIDS
COMMENTS: NORMAL
COMMENTS: NORMAL

## 2024-08-13 ASSESSMENT — EXTERNAL EXAM - RIGHT EYE: OD_EXAM: NORMAL

## 2024-08-13 ASSESSMENT — REFRACTION_WEARINGRX
OD_SPHERE: PLANO
OD_ADD: +2.50
OS_CYLINDER: +0.50
OS_SPHERE: PLANO
OD_AXIS: 155
OD_CYLINDER: +1.00
OS_ADD: +2.50
OS_AXIS: 120

## 2024-08-13 ASSESSMENT — TONOMETRY
OS_IOP_MMHG: 20
IOP_METHOD: TONOPEN
IOP_METHOD: APPLANATION
OD_IOP_MMHG: 18
OD_IOP_MMHG: 19
OS_IOP_MMHG: 23

## 2024-08-13 ASSESSMENT — CUP TO DISC RATIO
OS_RATIO: 0.6
OD_RATIO: 0.4

## 2024-08-13 ASSESSMENT — VISUAL ACUITY
METHOD: SNELLEN - LINEAR
OD_CC+: +1
OS_CC+: +1
OS_CC: 20/50
OD_CC: 20/25
CORRECTION_TYPE: GLASSES

## 2024-08-13 NOTE — PROGRESS NOTES
HPI       Follow Up    In left eye.  Since onset it is stable.  Associated symptoms include dryness (infrequent) and photophobia.  Negative for eye pain and tearing.  Treatments tried include artificial tears.  Pain was noted as 0/10. Additional comments: Acute angle-closure glaucoma of left eye                  Comments    She states that her vision has seemed stable in both eyes since her last eye exam.   She stopped her Dorzolamide but continues to use artificial tears as needed (generally once a day).    HUMAIRA Villafana 9:16 AM  August 13, 2024                   Last edited by Jenni Powell COT on 8/13/2024  9:17 AM.          Review of systems for the eyes was negative other than the pertinent positives/negatives listed in the HPI.      Assessment & Plan      Margo Inman is a 64 year old female with the following diagnoses:   1. Acute angle-closure glaucoma of left eye    2. Pseudophakia, both eyes    3. 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           Attending Physician Attestation:  Complete documentation of historical and exam elements from today's encounter can be found in the full encounter summary report (not reduplicated in this progress note).  I personally obtained the chief complaint(s) and history of present illness.  I confirmed and edited as necessary the review of systems, past medical/surgical history, family history, social history, and examination findings as documented by others; and I examined the patient myself.  I personally reviewed the relevant tests, images, and  reports as documented above.  I formulated and edited as necessary the assessment and plan and discussed the findings and management plan with the patient and family. . - Gaston Guzman MD

## 2024-09-07 DIAGNOSIS — Z98.1 S/P CERVICAL SPINAL FUSION: ICD-10-CM

## 2024-09-07 DIAGNOSIS — Z96.653 STATUS POST TOTAL BILATERAL KNEE REPLACEMENT: ICD-10-CM

## 2024-09-09 RX ORDER — DICLOFENAC SODIUM 75 MG/1
TABLET, DELAYED RELEASE ORAL
Qty: 180 TABLET | Refills: 0 | Status: SHIPPED | OUTPATIENT
Start: 2024-09-09

## 2024-09-10 NOTE — TELEPHONE ENCOUNTER
Called and spoke to the patient and gave her Valentine's message. Patient understands.  Teri Olvera MA  [facility]   Primary Care

## 2024-11-08 ENCOUNTER — TELEPHONE (OUTPATIENT)
Dept: FAMILY MEDICINE | Facility: CLINIC | Age: 64
End: 2024-11-08

## 2024-11-08 DIAGNOSIS — H40.9 SEVERE STAGE GLAUCOMA: ICD-10-CM

## 2024-11-08 DIAGNOSIS — J45.20 MILD INTERMITTENT ASTHMA WITHOUT COMPLICATION: ICD-10-CM

## 2024-11-08 DIAGNOSIS — I10 ESSENTIAL HYPERTENSION WITH GOAL BLOOD PRESSURE LESS THAN 140/90: ICD-10-CM

## 2024-11-08 NOTE — TELEPHONE ENCOUNTER
Medication Question or Refill        What medication are you calling about (include dose and sig)?: albuterol (PROAIR HFA/PROVENTIL HFA/VENTOLIN HFA) 108 (90 Base) MCG/ACT inhaler   losartan (COZAAR) 25 MG tablet     Preferred Pharmacy:  [id number]  [place], [place] - [address] AT [facility]  [address] [place] [address]  Phone: [phone] Fax: [phone]        Controlled Substance Agreement on file:   CSA -- Patient Level:    CSA: None found at the patient level.       Who prescribed the medication?: Belem Dumont APRN CNP     Do you need a refill? Yes    Patient offered an appointment? Yes: Patient submitted appointment request as she declines being seen by another provider.    Do you have any questions or concerns?  Yes: first available same day 11/21/2024. Patient requesting a kacie refill until she can get an appointment with Valentine Dumont.      Could we send this information to you in [facility] or would you prefer to receive a phone call?:   Patient would prefer a phone call   Okay to leave a detailed message?: Yes at Cell number on file:    Telephone Information:   Mobile [phone]

## 2024-11-08 NOTE — TELEPHONE ENCOUNTER
Reason for Call:  Appointment Request    Patient requesting this type of appt: Chronic Diease Management/Medication/Follow-Up    Requested provider: Belem Dumont    Reason patient unable to be scheduled: Not with their preferred provider    When does patient want to be seen/preferred time:  First available same day with PCP is 11/21/2024.    Comments: Patient needs a refill for losartan as of 11/8/2024 patient has 3 days left (Separate request submitted)    Could we send this information to you in [employer] or would you prefer to receive a phone call?:   Patient would prefer a phone call   Okay to leave a detailed message?: Yes at Cell number on file:    Telephone Information:   Mobile [phone]       Call taken on 11/8/2024 at 8:58 AM by Tahira Hollis

## 2024-11-09 RX ORDER — DORZOLAMIDE HYDROCHLORIDE AND TIMOLOL MALEATE 20; 5 MG/ML; MG/ML
1 SOLUTION/ DROPS OPHTHALMIC 2 TIMES DAILY
Qty: 10 ML | Refills: 5 | Status: SHIPPED | OUTPATIENT
Start: 2024-11-09

## 2024-11-09 NOTE — TELEPHONE ENCOUNTER
dorzolamide-timolol (COSOPT) 2-0.5 % ophthalmic solution   Disp-10 mL, R-5   Start: 07/25/2023 8/13/2024  [facility]    Gaston Guzman MD  Ophthalmology    Resume Cosopt twice a day left eye

## 2024-11-11 RX ORDER — ALBUTEROL SULFATE 90 UG/1
2 INHALANT RESPIRATORY (INHALATION) EVERY 4 HOURS PRN
Qty: 18 G | Refills: 3 | Status: SHIPPED | OUTPATIENT
Start: 2024-11-11

## 2024-11-11 RX ORDER — LOSARTAN POTASSIUM 25 MG/1
25 TABLET ORAL DAILY
Qty: 90 TABLET | Refills: 0 | Status: SHIPPED | OUTPATIENT
Start: 2024-11-11

## 2024-12-09 DIAGNOSIS — M54.12 RADICULOPATHY OF CERVICAL SPINE: ICD-10-CM

## 2024-12-09 DIAGNOSIS — E78.5 HYPERLIPIDEMIA LDL GOAL <130: ICD-10-CM

## 2024-12-09 DIAGNOSIS — I10 ESSENTIAL HYPERTENSION WITH GOAL BLOOD PRESSURE LESS THAN 140/90: Primary | ICD-10-CM

## 2024-12-10 RX ORDER — ATORVASTATIN CALCIUM 80 MG/1
TABLET, FILM COATED ORAL
Qty: 90 TABLET | Refills: 0 | Status: SHIPPED | OUTPATIENT
Start: 2024-12-10

## 2024-12-12 ENCOUNTER — OFFICE VISIT (OUTPATIENT)
Dept: FAMILY MEDICINE | Facility: CLINIC | Age: 64
End: 2024-12-12
Payer: COMMERCIAL

## 2024-12-12 VITALS
HEIGHT: 61 IN | BODY MASS INDEX: 45.58 KG/M2 | OXYGEN SATURATION: 96 % | HEART RATE: 90 BPM | RESPIRATION RATE: 22 BRPM | TEMPERATURE: 97 F | SYSTOLIC BLOOD PRESSURE: 190 MMHG | WEIGHT: 241.4 LBS | DIASTOLIC BLOOD PRESSURE: 83 MMHG

## 2024-12-12 DIAGNOSIS — Z96.653 STATUS POST TOTAL BILATERAL KNEE REPLACEMENT: ICD-10-CM

## 2024-12-12 DIAGNOSIS — R73.09 ELEVATED GLUCOSE LEVEL: ICD-10-CM

## 2024-12-12 DIAGNOSIS — I10 ESSENTIAL HYPERTENSION WITH GOAL BLOOD PRESSURE LESS THAN 140/90: ICD-10-CM

## 2024-12-12 DIAGNOSIS — E78.5 HYPERLIPIDEMIA LDL GOAL <130: ICD-10-CM

## 2024-12-12 DIAGNOSIS — Z87.891 FORMER SMOKER: ICD-10-CM

## 2024-12-12 DIAGNOSIS — E55.9 VITAMIN D DEFICIENCY: ICD-10-CM

## 2024-12-12 DIAGNOSIS — N95.1 MENOPAUSAL SYNDROME (HOT FLASHES): ICD-10-CM

## 2024-12-12 DIAGNOSIS — G25.81 RLS (RESTLESS LEGS SYNDROME): ICD-10-CM

## 2024-12-12 DIAGNOSIS — L85.3 DRY SKIN: ICD-10-CM

## 2024-12-12 DIAGNOSIS — Z98.1 S/P CERVICAL SPINAL FUSION: ICD-10-CM

## 2024-12-12 DIAGNOSIS — Z00.00 ROUTINE HISTORY AND PHYSICAL EXAMINATION OF ADULT: Primary | ICD-10-CM

## 2024-12-12 DIAGNOSIS — M54.16 LUMBAR RADICULOPATHY: ICD-10-CM

## 2024-12-12 DIAGNOSIS — F43.23 ADJUSTMENT DISORDER WITH MIXED ANXIETY AND DEPRESSED MOOD: ICD-10-CM

## 2024-12-12 DIAGNOSIS — E66.01 MORBID OBESITY (H): ICD-10-CM

## 2024-12-12 DIAGNOSIS — J45.20 MILD INTERMITTENT ASTHMA WITHOUT COMPLICATION: ICD-10-CM

## 2024-12-12 DIAGNOSIS — Z12.31 VISIT FOR SCREENING MAMMOGRAM: ICD-10-CM

## 2024-12-12 DIAGNOSIS — Z12.11 SCREEN FOR COLON CANCER: ICD-10-CM

## 2024-12-12 LAB
AMPHETAMINES UR QL: NOT DETECTED
ANION GAP SERPL CALCULATED.3IONS-SCNC: 14 MMOL/L (ref 7–15)
BARBITURATES UR QL SCN: NOT DETECTED
BENZODIAZ UR QL SCN: NOT DETECTED
BUN SERPL-MCNC: 19.4 MG/DL (ref 8–23)
BUPRENORPHINE UR QL: NOT DETECTED
CALCIUM SERPL-MCNC: 10.1 MG/DL (ref 8.8–10.4)
CANNABINOIDS UR QL: DETECTED
CHLORIDE SERPL-SCNC: 100 MMOL/L (ref 98–107)
CHOLEST SERPL-MCNC: 224 MG/DL
COCAINE UR QL SCN: NOT DETECTED
CREAT SERPL-MCNC: 0.59 MG/DL (ref 0.51–0.95)
CREAT UR-MCNC: 163 MG/DL
D-METHAMPHET UR QL: NOT DETECTED
EGFRCR SERPLBLD CKD-EPI 2021: >90 ML/MIN/1.73M2
FASTING STATUS PATIENT QL REPORTED: YES
FASTING STATUS PATIENT QL REPORTED: YES
GLUCOSE SERPL-MCNC: 137 MG/DL (ref 70–99)
HCO3 SERPL-SCNC: 25 MMOL/L (ref 22–29)
HDLC SERPL-MCNC: 38 MG/DL
HGB BLD-MCNC: 13 G/DL (ref 11.7–15.7)
LDLC SERPL CALC-MCNC: 136 MG/DL
METHADONE UR QL SCN: NOT DETECTED
MICROALBUMIN UR-MCNC: 41.6 MG/L
MICROALBUMIN/CREAT UR: 25.52 MG/G CR (ref 0–25)
NONHDLC SERPL-MCNC: 186 MG/DL
OPIATES UR QL SCN: NOT DETECTED
OXYCODONE UR QL SCN: NOT DETECTED
PCP UR QL SCN: NOT DETECTED
POTASSIUM SERPL-SCNC: 4.5 MMOL/L (ref 3.4–5.3)
SODIUM SERPL-SCNC: 139 MMOL/L (ref 135–145)
TRICYCLICS UR QL SCN: NOT DETECTED
TRIGL SERPL-MCNC: 249 MG/DL
TSH SERPL DL<=0.005 MIU/L-ACNC: 1.18 UIU/ML (ref 0.3–4.2)
VIT D+METAB SERPL-MCNC: 56 NG/ML (ref 20–50)

## 2024-12-12 PROCEDURE — 82570 ASSAY OF URINE CREATININE: CPT | Mod: 59 | Performed by: NURSE PRACTITIONER

## 2024-12-12 PROCEDURE — 99214 OFFICE O/P EST MOD 30 MIN: CPT | Mod: 25 | Performed by: NURSE PRACTITIONER

## 2024-12-12 PROCEDURE — 84443 ASSAY THYROID STIM HORMONE: CPT | Performed by: NURSE PRACTITIONER

## 2024-12-12 PROCEDURE — 99396 PREV VISIT EST AGE 40-64: CPT | Mod: 25 | Performed by: NURSE PRACTITIONER

## 2024-12-12 PROCEDURE — 80306 DRUG TEST PRSMV INSTRMNT: CPT | Performed by: NURSE PRACTITIONER

## 2024-12-12 PROCEDURE — 90471 IMMUNIZATION ADMIN: CPT | Performed by: NURSE PRACTITIONER

## 2024-12-12 PROCEDURE — 80061 LIPID PANEL: CPT | Performed by: NURSE PRACTITIONER

## 2024-12-12 PROCEDURE — 90472 IMMUNIZATION ADMIN EACH ADD: CPT | Performed by: NURSE PRACTITIONER

## 2024-12-12 PROCEDURE — 83036 HEMOGLOBIN GLYCOSYLATED A1C: CPT | Performed by: NURSE PRACTITIONER

## 2024-12-12 PROCEDURE — 80048 BASIC METABOLIC PNL TOTAL CA: CPT | Performed by: NURSE PRACTITIONER

## 2024-12-12 PROCEDURE — 36415 COLL VENOUS BLD VENIPUNCTURE: CPT | Performed by: NURSE PRACTITIONER

## 2024-12-12 PROCEDURE — 82306 VITAMIN D 25 HYDROXY: CPT | Performed by: NURSE PRACTITIONER

## 2024-12-12 PROCEDURE — 85018 HEMOGLOBIN: CPT | Performed by: NURSE PRACTITIONER

## 2024-12-12 PROCEDURE — 90750 HZV VACC RECOMBINANT IM: CPT | Performed by: NURSE PRACTITIONER

## 2024-12-12 PROCEDURE — 90678 RSV VACC PREF BIVALENT IM: CPT | Performed by: NURSE PRACTITIONER

## 2024-12-12 PROCEDURE — 82043 UR ALBUMIN QUANTITATIVE: CPT | Performed by: NURSE PRACTITIONER

## 2024-12-12 RX ORDER — ALBUTEROL SULFATE 90 UG/1
2 INHALANT RESPIRATORY (INHALATION) EVERY 4 HOURS PRN
Qty: 18 G | Refills: 3 | Status: SHIPPED | OUTPATIENT
Start: 2024-12-12

## 2024-12-12 RX ORDER — AMMONIUM LACTATE 12 G/100G
LOTION TOPICAL DAILY PRN
Qty: 225 G | Refills: 1 | Status: SHIPPED | OUTPATIENT
Start: 2024-12-12

## 2024-12-12 RX ORDER — LOSARTAN POTASSIUM 50 MG/1
50 TABLET ORAL DAILY
Qty: 90 TABLET | Refills: 3 | Status: SHIPPED | OUTPATIENT
Start: 2024-12-12

## 2024-12-12 RX ORDER — DICLOFENAC SODIUM 75 MG/1
TABLET, DELAYED RELEASE ORAL
Qty: 180 TABLET | Refills: 0 | Status: SHIPPED | OUTPATIENT
Start: 2024-12-12

## 2024-12-12 SDOH — HEALTH STABILITY: PHYSICAL HEALTH: ON AVERAGE, HOW MANY DAYS PER WEEK DO YOU ENGAGE IN MODERATE TO STRENUOUS EXERCISE (LIKE A BRISK WALK)?: 0 DAYS

## 2024-12-12 ASSESSMENT — ASTHMA QUESTIONNAIRES
QUESTION_1 LAST FOUR WEEKS HOW MUCH OF THE TIME DID YOUR ASTHMA KEEP YOU FROM GETTING AS MUCH DONE AT WORK, SCHOOL OR AT HOME: SOME OF THE TIME
QUESTION_5 LAST FOUR WEEKS HOW WOULD YOU RATE YOUR ASTHMA CONTROL: WELL CONTROLLED
QUESTION_4 LAST FOUR WEEKS HOW OFTEN HAVE YOU USED YOUR RESCUE INHALER OR NEBULIZER MEDICATION (SUCH AS ALBUTEROL): TWO OR THREE TIMES PER WEEK
QUESTION_3 LAST FOUR WEEKS HOW OFTEN DID YOUR ASTHMA SYMPTOMS (WHEEZING, COUGHING, SHORTNESS OF BREATH, CHEST TIGHTNESS OR PAIN) WAKE YOU UP AT NIGHT OR EARLIER THAN USUAL IN THE MORNING: ONCE OR TWICE
QUESTION_2 LAST FOUR WEEKS HOW OFTEN HAVE YOU HAD SHORTNESS OF BREATH: MORE THAN ONCE A DAY
ACT_TOTALSCORE: 15
ACT_TOTALSCORE: 15

## 2024-12-12 ASSESSMENT — SOCIAL DETERMINANTS OF HEALTH (SDOH): HOW OFTEN DO YOU GET TOGETHER WITH FRIENDS OR RELATIVES?: TWICE A WEEK

## 2024-12-12 ASSESSMENT — PAIN SCALES - GENERAL: PAINLEVEL_OUTOF10: SEVERE PAIN (7)

## 2024-12-12 NOTE — NURSING NOTE
Prior to immunization administration, verified patients identity using patient s name and date of birth. Please see Immunization Activity for additional information.     Screening Questionnaire for Adult Immunization    Are you sick today?   No   Do you have allergies to medications, food, a vaccine component or latex?   No   Have you ever had a serious reaction after receiving a vaccination?   No   Do you have a long-term health problem with heart, lung, kidney, or metabolic disease (e.g., diabetes), asthma, a blood disorder, no spleen, complement component deficiency, a cochlear implant, or a spinal fluid leak?  Are you on long-term aspirin therapy?   Yes   Do you have cancer, leukemia, HIV/AIDS, or any other immune system problem?   No   Do you have a parent, brother, or sister with an immune system problem?   No   In the past 3 months, have you taken medications that affect  your immune system, such as prednisone, other steroids, or anticancer drugs; drugs for the treatment of rheumatoid arthritis, Crohn s disease, or psoriasis; or have you had radiation treatments?   Yes   Have you had a seizure, or a brain or other nervous system problem?   No   During the past year, have you received a transfusion of blood or blood    products, or been given immune (gamma) globulin or antiviral drug?   No   For women: Are you pregnant or is there a chance you could become       pregnant during the next month?   No   Have you received any vaccinations in the past 4 weeks?   No     Immunization questionnaire answers were all negative.      Patient instructed to remain in clinic for 15 minutes afterwards, and to report any adverse reactions.     Screening performed by Patsy Bullock MA on 12/12/2024 at 10:31 AM.

## 2024-12-12 NOTE — PROGRESS NOTES
Preventive Care Visit  [facility]  JOSE R Sarabia CNP, Family Medicine  Dec 12, 2024  {Provider  Link to SmartSet :603742}    {PROVIDER CHARTING PREFERENCE:717281}    Davida Aragon is a 64 year old, presenting for the following:  Physical        2024     9:00 AM   Additional Questions   Roomed by jose         2024     9:00 AM   Patient Reported Additional Medications   Patient reports taking the following new medications none          History of Present Illness       Reason for visit:  Physical for perscriptions   She is taking medications regularly.        Hyperlipidemia Follow-Up    Are you regularly taking any medication or supplement to lower your cholesterol?   Yes- Lipitor 80 mg daily  Are you having muscle aches or other side effects that you think could be caused by your cholesterol lowering medication?  No    Hypertension Follow-up    Do you check your blood pressure regularly outside of the clinic? No   Are you following a low salt diet? Yes  Are your blood pressures ever more than 140 on the top number (systolic) OR more   than 90 on the bottom number (diastolic), for example 140/90?     Depression   How are you doing with your depression since your last visit? Improved   Are you having other symptoms that might be associated with depression? No  Have you had a significant life event?  Health Concerns   Are you feeling anxious or having panic attacks?   No  Do you have any concerns with your use of alcohol or other drugs? No    Social History     Tobacco Use    Smoking status: Former     Current packs/day: 0.00     Types: Cigarettes     Start date: 1976     Quit date: 2023     Years since quittin.9     Passive exposure: Never    Smokeless tobacco: Never    Tobacco comments:     I am currently on Chantix   Vaping Use    Vaping status: Never Used   Substance Use Topics    Alcohol use: Yes     Comment: occasionally         2018     2:31  PM 9/11/2019     7:08 PM 1/12/2022     3:11 PM   PHQ   PHQ-9 Total Score 5 4 7   Q9: Thoughts of better off dead/self-harm past 2 weeks Not at all Not at all Not at all        Patient-reported         3/29/2018    10:57 AM   ANITA-7 SCORE   Total Score 12         Suicide Assessment Five-step Evaluation and Treatment (SAFE-T)    Lumbar radiculopathy- waiting to hear on a surgery date hopefully between Christmas and the New Year. Has been taking Flexeril for RLS with fair relief.            Asthma Follow-Up    Was ACT completed today?  Yes        12/12/2024     9:03 AM   ACT Total Scores   ACT TOTAL SCORE (Goal Greater than or Equal to 20) 15    In the past 12 months, how many times did you visit the emergency room for your asthma without being admitted to the hospital? 0    In the past 12 months, how many times were you hospitalized overnight because of your asthma? 0        Patient-reported     {Provider Documentation  Patient had ACT/CACT less than 20- Link to exacerbation documentation :726001}  How many days per week do you miss taking your asthma controller medication?  I do not have an asthma controller medication  Please describe any recent triggers for your asthma: exercise or sports and cold air  Have you had any Emergency Room Visits, Urgent Care Visits, or Hospital Admissions since your last office visit?  No  She tells me that her Albuterol was declined by her insurance- new prescription written (OK to use generic) or similar substitute  Health Care Directive  Patient does not have a Health Care Directive: Discussed advance care planning with patient; information given to patient to review.      12/12/2024   General Health   How would you rate your overall physical health? (!) FAIR   Feel stress (tense, anxious, or unable to sleep) Only a little      (!) STRESS CONCERN      12/12/2024   Nutrition   Three or more servings of calcium each day? Yes   Diet: Low salt   How many servings of fruit and vegetables  per day? (!) 2-3   How many sweetened beverages each day? 0-1            2024   Exercise   Days per week of moderate/strenous exercise 0 days      (!) EXERCISE CONCERN      2024   Social Factors   Frequency of gathering with friends or relatives Twice a week   Worry food won't last until get money to buy more No   Food not last or not have enough money for food? No   Do you have housing? (Housing is defined as stable permanent housing and does not include staying ouside in a car, in a tent, in an abandoned building, in an overnight shelter, or couch-surfing.) Yes   Are you worried about losing your housing? No   Lack of transportation? No   Unable to get utilities (heat,electricity)? No            2024   Fall Risk   Fallen 2 or more times in the past year? No    Trouble with walking or balance? Yes    Gait Speed Test (Document in seconds) 6.44   Gait Speed Test Interpretation Greater than 5.01 seconds - ABNORMAL       Patient-reported          2024   Dental   Dentist two times every year? Yes            2024   TB Screening   Were you born outside of the [place]? No          {Rooming Staff Patient needs a PHQ as part of the AWV.  Use this link to complete and then refresh the note to pull results Link to PHQ2 Assessment :067857}    Today's PHQ-2 Score:       2024     9:03 AM   PHQ-2 ( 1999 Pfizer)   Q1: Little interest or pleasure in doing things 0   Q2: Feeling down, depressed or hopeless 0   PHQ-2 Score 0         2024   Substance Use   Alcohol more than 3/day or more than 7/wk No   Do you use any other substances recreationally? (!) CANNABIS PRODUCTS        Social History     Tobacco Use    Smoking status: Former     Current packs/day: 0.00     Types: Cigarettes     Start date: 1976     Quit date: 2023     Years since quittin.9     Passive exposure: Never    Smokeless tobacco: Never    Tobacco comments:     I am currently on Chantix   Vaping Use    Vaping status:  Never Used   Substance Use Topics    Alcohol use: Yes     Comment: occasionally     {Provider  If there are gaps in the social history shown above, please follow the link to update and then refresh the note Link to Social and Substance History :323102}      12/7/2023   LAST FHS-7 RESULTS   1st degree relative breast or ovarian cancer Yes    Any relative bilateral breast cancer Yes    Any male have breast cancer Unknown    Any ONE woman have BOTH breast AND ovarian cancer Unknown    Any woman with breast cancer before 50yrs Yes    2 or more relatives with breast AND/OR ovarian cancer Yes    2 or more relatives with breast AND/OR bowel cancer Yes        Patient-reported     {If any of the questions to the FHS7 are answered yes, consider referral for genetic counseling.    Additional indications for genetic referral include personal history of breast or ovarian cancer, genetic mutation in 1st degree relative which increases risk of breast cancer including BRCA1, BRCA2, RODNEY, PALB 2, TP53, CHEK2, PTEN, CDH1, STK11 (per ACS) and/or 1st degree relative with history of pancreatic or high-risk prostate cancer (per NCCN):710314}   Mammogram Screening - Mammogram every 1-2 years updated in Health Maintenance based on mutual decision making        12/12/2024   STI Screening   New sexual partner(s) since last STI/HIV test? No        History of abnormal Pap smear: { :144461}        Latest Ref Rng & Units 1/11/2023     2:13 PM 9/11/2019    12:00 PM 9/11/2019    11:50 AM   PAP / HPV   PAP  Negative for Intraepithelial Lesion or Malignancy (NILM)      PAP (Historical)   NIL     HPV 16 DNA Negative Negative   Negative    HPV 18 DNA Negative Negative   Negative    Other HR HPV Negative Negative   Negative      ASCVD Risk   The 10-year ASCVD risk score (Pam ANGEL, et al., 2019) is: 13%    Values used to calculate the score:      Age: 64 years      Sex: Female      Is Non- : No      Diabetic: No       "Tobacco smoker: No      Systolic Blood Pressure: 190 mmHg      Is BP treated: Yes      HDL Cholesterol: 45 mg/dL      Total Cholesterol: 199 mg/dL    {Link to Fracture Risk Assessment Tool (Optional):806472}    {Provider  REQUIRED FOR AWV Use the storyboard to review patient history, after sections have been marked as reviewed, refresh note to capture documentation:581647}   Reviewed and updated as needed this visit by Provider                    {HISTORY OPTIONS (Optional):855106}    {ROS Picklists (Optional):423937}     Objective    Exam  BP (!) 190/83 (BP Location: Left arm, Patient Position: Sitting, Cuff Size: Adult Large)   Pulse 90   Temp 97  F (36.1  C) (Temporal)   Resp 22   Ht 1.549 m (5' 1\")   Wt 109.5 kg (241 lb 6.4 oz)   LMP 09/16/2011   SpO2 96%   BMI 45.61 kg/m     Estimated body mass index is 45.61 kg/m  as calculated from the following:    Height as of this encounter: 1.549 m (5' 1\").    Weight as of this encounter: 109.5 kg (241 lb 6.4 oz).    Physical Exam  {Exam Choices (Optional):895835}        Signed Electronically by: JOSE R Sarabia CNP  {Email feedback regarding this note to [email or web address]   :787287}  " recommended        Latest Ref Rng & Units 1/11/2023     2:13 PM 9/11/2019    12:00 PM 9/11/2019    11:50 AM   PAP / HPV   PAP  Negative for Intraepithelial Lesion or Malignancy (NILM)      PAP (Historical)   NIL     HPV 16 DNA Negative Negative   Negative    HPV 18 DNA Negative Negative   Negative    Other HR HPV Negative Negative   Negative      ASCVD Risk   The 10-year ASCVD risk score (Pam ANGEL, et al., 2019) is: 13%    Values used to calculate the score:      Age: 64 years      Sex: Female      Is Non- : No      Diabetic: No      Tobacco smoker: No      Systolic Blood Pressure: 190 mmHg      Is BP treated: Yes      HDL Cholesterol: 45 mg/dL      Total Cholesterol: 199 mg/dL    DEXA completed 1/18/23 showing osteopenia as below:    Exam Date Exam Time Accession # Performing Department Results    1/18/23  1:07 PM [id number] [facility]      PACS Images     Show images for DX Hip/Pelvis/Spine     Study Result    Narrative & Impression   HISTORY: Postmenopausal bone loss     COMPARISON: None     [age]  Height: 61.0 inches  Weight: 213.0 pounds  Sex: Female  Ethnicity: White     Image quality: Adequate     Lumbar spine T-score in region of L1-L4 (L3) = -1.1      HIPS:  Mean total hip T-score: -1.4  Left femoral neck T-score = -1.4  Right femoral neck T-score= -1.2      FRAX:  10 year probability of major osteoporotic fracture: 7.1%  10 year probability of hip fracture: 0.9%  The 10 year probability of fracture may be lower than reported if the  patient has received treatment. FRAX data should be disregarded in  patient's taking bisphosphonates.     World Health Organization definition of osteoporosis and osteopenia  for  women:   Normal: T-score at or above -1.0  Low Bone Mass (Osteopenia): T-score between -1.0 and -2.5.   Osteoporosis: T-score at or below -2.5   T-scores are reported for postmenopausal women and men over 50  years  of age.                                                                      IMPRESSION:    Osteopenia.     AMI MELO MD         SYSTEM ID:  [id number]       Reviewed and updated as needed this visit by Provider                    Past Medical History:   Diagnosis Date    Arthritis     Cervical high risk HPV (human papillomavirus) test positive 01/10/2017    1/10/17 NIL pap/+ HR HPV (not 16 or 18). Plan: cotest in 1 year, due by 1/10/18     Chronic infection     HX of MRSA. 2 neg swabs at [facility] in 2006 and 2007.    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    Depressive disorder Back in my mid 20's    History of blood transfusion     Hypertension     Numbness and tingling     Right arm    PONV (postoperative nausea and vomiting)     Uncomplicated asthma      Past Surgical History:   Procedure Laterality Date    ABDOMEN SURGERY  1996    hysterectomy    APPENDECTOMY      ARTHROPLASTY CARPOMETACARPAL (THUMB JOINT) Left 09/19/2023    Procedure: LEFT THUMB CARPOMETACARPAL JOINT ARTHROPLASTY WITH SUTURE SUSPENSION;  Surgeon: Mat Frye MD;  [facility] [facility] [place]    ARTHROSCOPY KNEE  09/16/2011    Procedure:ARTHROSCOPY KNEE; left knee arthroscopy with debridement, open lateral patellar spur excision; Surgeon:LUIS A MONTOYA; Location:[facility] OR    BIOPSY      CATARACT IOL, RT/LT      COLONOSCOPY N/A 02/16/2016    Procedure: COLONOSCOPY;  Surgeon: Belem Khalil MD;  Location: [facility] OR    COLONOSCOPY N/A 02/16/2016    Procedure: COMBINED COLONOSCOPY, SINGLE OR MULTIPLE BIOPSY/POLYPECTOMY BY BIOPSY;  Surgeon: Belem Khalil MD;  Location: [facility] OR    COLONOSCOPY N/A 06/15/2020    Procedure: Colonoscopy, With Polypectomy And Biopsy;  Surgeon: Torres Gallegos DO;  Location: [facility] OR    COLONOSCOPY N/A 1/30/2024    Procedure: COLONOSCOPY, FLEXIBLE, WITH LESION REMOVAL USING SNARE;  Surgeon: Aubrey Ingram MD;   Location: [facility] OR    COLONOSCOPY WITH CO2 INSUFFLATION N/A 02/16/2016    Procedure: COLONOSCOPY WITH CO2 INSUFFLATION;  Surgeon: Belem Khalil MD;  Location: [facility] OR    COLONOSCOPY WITH CO2 INSUFFLATION N/A 06/15/2020    Procedure: COLONOSCOPY, WITH CO2 INSUFFLATION;  Surgeon: Torres Gallegos DO;  Location: [facility] OR    COLONOSCOPY WITH CO2 INSUFFLATION N/A 1/30/2024    Procedure: Colonoscopy with CO2 insufflation;  Surgeon: Aubrey Ingram MD;  Location: [facility] OR    CYSTOSCOPY  01/01/2016    microscopic hematuria    DECOMPRESSION, FUSION CERVICAL ANTERIOR ONE LEVEL, COMBINED N/A 03/30/2017    Procedure: COMBINED DECOMPRESSION, FUSION CERVICAL ANTERIOR ONE LEVEL;  Surgeon: Joseph Klein MD;  Location: [facility] OR    ECTOPIC PREGNANCY SURGERY      ENT SURGERY      GENITOURINARY SURGERY      GONIOSYNECHIALYSIS Left 02/20/2023    Procedure: goniosynechialysis left;  Surgeon: Gaston Guzman MD;  Location: [facility] OR    GYN SURGERY      HC INCISION TENDON SHEATH FINGER Left 07/11/2014    Thumb TF release    HC KNEE SCOPE,MED/LAT MENISECTOMY  09/16/2011    left, with partial medial menisectomy ONLY    HC REVISE MEDIAN N/CARPAL TUNNEL SURG Left 07/11/2014    PRIMARY - not revision    HEAD & NECK SURGERY  7/22/2020    4 fusions in neck    LASIK Bilateral     [id number]    ORTHOPEDIC SURGERY      PHACOEMULSIFICATION CLEAR CORNEA W/ STANDARD IOL IMPLANT, ENDOSCOPIC CYCLOPHOTOCOAGULATION, COMBINED Left 02/20/2023    Procedure: LEFT EYE COMPLEX PHACOEMULSIFICATION, CATARACT, CLEAR CORNEAL INCISION APPROACH, WITH STANDARD INTRAOCULAR LENS IMPLANT INSERTION AND ENDOSCOPIC CYCLOPHOTOCOAGULATION< GONISYNECHIOLYSIS;  Surgeon: Gaston Guzman MD;  Location: [facility] OR    PHACOEMULSIFICATION WITH STANDARD INTRAOCULAR LENS IMPLANT Right 05/12/2023    Procedure: RIGHT EYE PHACOEMULSIFICATION, CATARACT, WITH STANDARD INTRAOCULAR LENS IMPLANT INSERTION;  Surgeon: Gaston Guzman MD;  Location: [facility] OR     "RELEASE CARPAL TUNNEL  07/11/2014    Procedure: RELEASE CARPAL TUNNEL;  Surgeon: Rg Franco MD;  Location: [facility] OR    RELEASE CARPAL TUNNEL Right 11/07/2014    Procedure: RELEASE CARPAL TUNNEL;  Surgeon: Rg Franco MD;  Location: [facility] OR    RELEASE TRIGGER FINGER  07/11/2014    Procedure: RELEASE TRIGGER FINGER;  Surgeon: Rg Franco MD;  Location: [facility] OR    RELEASE TRIGGER FINGER Right 11/07/2014    Procedure: RELEASE TRIGGER FINGER;  Surgeon: Rg Franco MD;  Location: [facility] OR    REVERSE ARTHROPLASTY SHOULDER Right 10/24/2023    Procedure: Right reverse total shoulder arthroplasty;  Surgeon: Zhang Smart MD;  Location: [facility] OR    SOFT TISSUE SURGERY      tonsils      [facility] PART REMV FEMUR/PROX TIB/FIB  09/16/2011    left, open lateral patellar bone spur excision    [facility] TOTAL KNEE ARTHROPLASTY  01/17/2014    Bilateral     Lab work is in process  Labs reviewed in EPIC      Review of Systems  Constitutional, HEENT, cardiovascular, pulmonary, gi and gu systems are negative, except as otherwise noted.     Objective    Exam  BP (!) 190/83 (BP Location: Left arm, Patient Position: Sitting, Cuff Size: Adult Large)   Pulse 90   Temp 97  F (36.1  C) (Temporal)   Resp 22   Ht 1.549 m (5' 1\")   Wt 109.5 kg (241 lb 6.4 oz)   LMP 09/16/2011   SpO2 96%   BMI 45.61 kg/m     Estimated body mass index is 45.61 kg/m  as calculated from the following:    Height as of this encounter: 1.549 m (5' 1\").    Weight as of this encounter: 109.5 kg (241 lb 6.4 oz).    Physical Exam  GENERAL: alert and no distress  EYES: Eyes grossly normal to inspection, PERRL and conjunctivae and sclerae normal  HENT: ear canals and TM's normal, nose and mouth without ulcers or lesions  NECK: no adenopathy, no asymmetry, masses, or scars  RESP: lungs clear to auscultation - no rales, rhonchi or wheezes  CV: regular rate and rhythm, normal S1 S2, no S3 or S4, no murmur, click or rub, no peripheral edema  ABDOMEN: " soft, nontender, no hepatosplenomegaly, no masses and bowel sounds normal  MS: no gross musculoskeletal defects noted, no edema  SKIN: no suspicious lesions or rashes  NEURO: Normal strength and tone, mentation intact and speech normal  PSYCH: mentation appears normal, affect normal/bright  LYMPH: normal ant/post cervical, supraclavicular nodes  inguinal: no adenopathy        Signed Electronically by: JOSE R Sarabia CNP

## 2024-12-12 NOTE — CONFIDENTIAL NOTE
Have her schedule next available. It may be a wait and if she needs something sooner, she may need to be seen by same day provider or UC vs other available provider.    Belem ODELL, CNP

## 2024-12-16 ENCOUNTER — TELEPHONE (OUTPATIENT)
Dept: FAMILY MEDICINE | Facility: CLINIC | Age: 64
End: 2024-12-16
Payer: COMMERCIAL

## 2024-12-16 LAB
EST. AVERAGE GLUCOSE BLD GHB EST-MCNC: 143 MG/DL
HBA1C MFR BLD: 6.6 % (ref 0–5.6)

## 2024-12-16 NOTE — TELEPHONE ENCOUNTER
Patient Quality Outreach    Patient is due for the following:   Hypertension -  Hypertension follow-up visit    Action(s) Taken:   Schedule a nurse only visit for blood pressure check    Type of outreach:    Sent letter.    Questions for provider review:    None           Jose Raul Longoria MA

## 2024-12-16 NOTE — LETTER
December 16, 2024    Margo Inman  [address] [address] [id number]    Dear Margo,    At [facility] we care about your health and are committed to providing quality patient care.     Here is a list of Health Maintenance topics that are due now or due soon:  Health Maintenance Due   Topic Date Due    ASTHMA ACTION PLAN  Never done    COVID-19 Vaccine (1) Never done    INFLUENZA VACCINE (1) Never done    LUNG CANCER SCREENING  12/15/2024        We are recommending that you:  Hyptertension: If you have a home blood pressure monitor, please respond to this message with your latest home blood pressure reading. If you do not, please schedule a free blood pressure check with our clinic.    To schedule an appointment or discuss this further, you may contact us by phone at the [facility] at [phone] or online through the patient portal/[email or web address] @ [email or web address]    Thank you for trusting [facility] and we appreciate the opportunity to serve you.  We look forward to supporting your healthcare needs in the future.    Your partners in health,      Quality Committee at [facility]

## 2025-01-14 ENCOUNTER — ANCILLARY PROCEDURE (OUTPATIENT)
Dept: MAMMOGRAPHY | Facility: CLINIC | Age: 65
End: 2025-01-14
Attending: NURSE PRACTITIONER
Payer: COMMERCIAL

## 2025-01-14 DIAGNOSIS — Z12.31 VISIT FOR SCREENING MAMMOGRAM: ICD-10-CM

## 2025-01-14 PROCEDURE — 77063 BREAST TOMOSYNTHESIS BI: CPT | Mod: TC | Performed by: RADIOLOGY

## 2025-01-14 PROCEDURE — 77067 SCR MAMMO BI INCL CAD: CPT | Mod: TC | Performed by: RADIOLOGY

## 2025-02-04 ENCOUNTER — TELEPHONE (OUTPATIENT)
Dept: FAMILY MEDICINE | Facility: CLINIC | Age: 65
End: 2025-02-04
Payer: COMMERCIAL

## 2025-02-04 NOTE — TELEPHONE ENCOUNTER
Reason for Call:  Appointment Request    Patient requesting this type of appt: Pre-op    Requested provider: Belem Dumont    Reason patient unable to be scheduled: Not within requested timeframe    When does patient want to be seen/preferred time: 1-2 days    Comments: Patient needs pre op and was told by Dr Dumont to squeeze her in if possible. Surgery is Monday 2/10/2025.    Could we send this information to you in [facility] or would you prefer to receive a phone call?:   Patient would prefer a phone call   Okay to leave a detailed message?: Yes at Home number on file [phone] (home)    Call taken on 2/4/2025 at 3:24 PM by Jason Flores

## 2025-02-05 NOTE — TELEPHONE ENCOUNTER
Patient called back about this request. Upon review of providers schedule writer can confirm there are no available appointments on Thein's schedule prior to patients surgery date.    Patient agreed to see a different provider and was scheduled with Jordana Ocampo on 02/06/2025.    Tahira Hollis

## 2025-02-06 ENCOUNTER — OFFICE VISIT (OUTPATIENT)
Dept: FAMILY MEDICINE | Facility: CLINIC | Age: 65
End: 2025-02-06
Payer: COMMERCIAL

## 2025-02-06 VITALS
OXYGEN SATURATION: 92 % | DIASTOLIC BLOOD PRESSURE: 85 MMHG | HEART RATE: 86 BPM | RESPIRATION RATE: 20 BRPM | HEIGHT: 61 IN | BODY MASS INDEX: 44.37 KG/M2 | SYSTOLIC BLOOD PRESSURE: 141 MMHG | TEMPERATURE: 97.2 F | WEIGHT: 235 LBS

## 2025-02-06 DIAGNOSIS — E66.01 MORBID OBESITY (H): ICD-10-CM

## 2025-02-06 DIAGNOSIS — M43.16 SPONDYLOLISTHESIS OF LUMBAR REGION: ICD-10-CM

## 2025-02-06 DIAGNOSIS — Z01.818 PREOP GENERAL PHYSICAL EXAM: Primary | ICD-10-CM

## 2025-02-06 DIAGNOSIS — I10 ESSENTIAL HYPERTENSION WITH GOAL BLOOD PRESSURE LESS THAN 140/90: ICD-10-CM

## 2025-02-06 DIAGNOSIS — M54.16 LUMBAR RADICULOPATHY: ICD-10-CM

## 2025-02-06 DIAGNOSIS — R73.03 PREDIABETES: ICD-10-CM

## 2025-02-06 DIAGNOSIS — J45.20 MILD INTERMITTENT ASTHMA WITHOUT COMPLICATION: ICD-10-CM

## 2025-02-06 LAB
AMPHETAMINES UR QL: NOT DETECTED
ANION GAP SERPL CALCULATED.3IONS-SCNC: 13 MMOL/L (ref 7–15)
BARBITURATES UR QL SCN: NOT DETECTED
BENZODIAZ UR QL SCN: NOT DETECTED
BUN SERPL-MCNC: 19.7 MG/DL (ref 8–23)
BUPRENORPHINE UR QL: NOT DETECTED
CALCIUM SERPL-MCNC: 10.6 MG/DL (ref 8.8–10.4)
CANNABINOIDS UR QL: NOT DETECTED
CHLORIDE SERPL-SCNC: 100 MMOL/L (ref 98–107)
CHOLEST SERPL-MCNC: 212 MG/DL
COCAINE UR QL SCN: NOT DETECTED
CREAT SERPL-MCNC: 0.62 MG/DL (ref 0.51–0.95)
CREAT UR-MCNC: 39.5 MG/DL
D-METHAMPHET UR QL: NOT DETECTED
EGFRCR SERPLBLD CKD-EPI 2021: >90 ML/MIN/1.73M2
ERYTHROCYTE [DISTWIDTH] IN BLOOD BY AUTOMATED COUNT: 11.9 % (ref 10–15)
FASTING STATUS PATIENT QL REPORTED: YES
FASTING STATUS PATIENT QL REPORTED: YES
GLUCOSE SERPL-MCNC: 123 MG/DL (ref 70–99)
HCO3 SERPL-SCNC: 26 MMOL/L (ref 22–29)
HCT VFR BLD AUTO: 42 % (ref 35–47)
HDLC SERPL-MCNC: 35 MG/DL
HGB BLD-MCNC: 13.7 G/DL (ref 11.7–15.7)
LDLC SERPL CALC-MCNC: 122 MG/DL
MCH RBC QN AUTO: 31.7 PG (ref 26.5–33)
MCHC RBC AUTO-ENTMCNC: 32.6 G/DL (ref 31.5–36.5)
MCV RBC AUTO: 97 FL (ref 78–100)
METHADONE UR QL SCN: NOT DETECTED
MICROALBUMIN UR-MCNC: 19.9 MG/L
MICROALBUMIN/CREAT UR: 50.38 MG/G CR (ref 0–25)
NONHDLC SERPL-MCNC: 177 MG/DL
OPIATES UR QL SCN: NOT DETECTED
OXYCODONE UR QL SCN: NOT DETECTED
PCP UR QL SCN: NOT DETECTED
PLATELET # BLD AUTO: 341 10E3/UL (ref 150–450)
POTASSIUM SERPL-SCNC: 4.8 MMOL/L (ref 3.4–5.3)
RBC # BLD AUTO: 4.32 10E6/UL (ref 3.8–5.2)
SODIUM SERPL-SCNC: 139 MMOL/L (ref 135–145)
TRICYCLICS UR QL SCN: NOT DETECTED
TRIGL SERPL-MCNC: 277 MG/DL
WBC # BLD AUTO: 9.4 10E3/UL (ref 4–11)

## 2025-02-06 ASSESSMENT — ASTHMA QUESTIONNAIRES
QUESTION_3 LAST FOUR WEEKS HOW OFTEN DID YOUR ASTHMA SYMPTOMS (WHEEZING, COUGHING, SHORTNESS OF BREATH, CHEST TIGHTNESS OR PAIN) WAKE YOU UP AT NIGHT OR EARLIER THAN USUAL IN THE MORNING: NOT AT ALL
QUESTION_2 LAST FOUR WEEKS HOW OFTEN HAVE YOU HAD SHORTNESS OF BREATH: ONCE OR TWICE A WEEK
ACT_TOTALSCORE: 23
QUESTION_4 LAST FOUR WEEKS HOW OFTEN HAVE YOU USED YOUR RESCUE INHALER OR NEBULIZER MEDICATION (SUCH AS ALBUTEROL): ONCE A WEEK OR LESS
QUESTION_1 LAST FOUR WEEKS HOW MUCH OF THE TIME DID YOUR ASTHMA KEEP YOU FROM GETTING AS MUCH DONE AT WORK, SCHOOL OR AT HOME: NONE OF THE TIME
ACT_TOTALSCORE: 23
QUESTION_5 LAST FOUR WEEKS HOW WOULD YOU RATE YOUR ASTHMA CONTROL: COMPLETELY CONTROLLED

## 2025-02-06 ASSESSMENT — PAIN SCALES - GENERAL: PAINLEVEL_OUTOF10: MODERATE PAIN (6)

## 2025-02-06 NOTE — PATIENT INSTRUCTIONS
At [facility], we strive to deliver an exceptional experience to you, every time we see you. If you receive a survey, please let us know what we are doing well and/or what we could improve upon, as we do value your feedback.  If you have MyChart, you can expect to receive results automatically within 24 hours of their completion.  Your provider will send a note interpreting your results as well.   If you do not have MyChart, you should receive your results in about a week by mail.    Your care team:                            Family Medicine Internal Medicine   Torres Wu, MD David Briceño, MD Nadine Koehler, MD Wong Hernandez, MD Sandrita Doyle, PAZaheerC    [place], MD Pediatrics   Nena Dudley, MD Vero Gupta, MD Valentine Dumont, APRN CNP Nan Kovacs APRN CNP   Quan Mccarthy, MD Alexus Chavis, MD Danita Shoemaker, CNP     Sanjeev Walker, CNP Same-Day Provider (No follow-up visits)   Bibi Fink APRBAILEE, DNP Liya Horta, JOSE R Weaver, FNP, BC DEJA HerediaC     Clinic hours: Monday - Thursday 7 am-6 pm; Fridays 7 am-5 pm.   Urgent care: Monday - Friday 10 am- 8 pm; Saturday and Sunday 9 am-5 pm.    Clinic: [phone]       [facility]: Monday - Thursday 8 am - 7 pm; Friday 8 am - 6 pm  [facility]: [phone]     How to Take Your Medication Before Surgery  Preoperative Medication Instructions   Antiplatelet or Anticoagulation Medication Instructions   - Patient is on no antiplatelet or anticoagulation medications.    Additional Medication Instructions   - Herbal medications and vitamins: DO NOT TAKE 14 days prior to surgery.   - ACE/ARB/ARNI (lisinopril, enalapril, losartan, valsartan, olmesartan, sacubritril/valsartan) : DO NOT TAKE on day of surgery (minimum 11 hours for general anesthesia).   - diclofenac (Voltaren):  continue to hold   - cannabis, marijuana:  DO NOT TAKE night before surgery.   - rescue Inhaler: Continue PRN. Bring to hospital on the day of surgery.       Patient Education   Preparing for Your Surgery  For Adults  Getting started  In most cases, a nurse will call to review your health history and instructions. They will give you an arrival time based on your scheduled surgery time. Please be ready to share:  Your doctor's clinic name and phone number  Your medical, surgical, and anesthesia history  A list of allergies and sensitivities  A list of medicines, including herbal treatments and over-the-counter drugs  Whether the patient has a legal guardian (ask how to send us the papers in advance)  Note: You may not receive a call if you were seen at our PAC (Preoperative Assessment Center).  Please tell us if you're pregnant--or if there's any chance you might be pregnant. Some surgeries may injure a fetus (unborn baby), so they require a pregnancy test. Surgeries that are safe for a fetus don't always need a test, and you can choose whether to have one.   Preparing for surgery  Within 10 to 30 days of surgery: Have a pre-op exam (sometimes called an H&P, or History and Physical). This can be done at a clinic or pre-operative center.  If you're having a , you may not need this exam. Talk to your care team.  At your pre-op exam, talk to your care team about all medicines you take. (This includes CBD oil and any drugs, such as THC, marijuana, and other forms of cannabis.) If you need to stop any medicine before surgery, ask when to start taking it again.  This is for your safety. Many medicines and drugs can make you bleed too much during surgery. Some change how well surgery (anesthesia) drugs work.  Call your insurance company to let them know you're having surgery. (If you don't have insurance, call [phone].)  Call your clinic if there's any change in your health. This includes a scrape or scratch near the surgery site, or any signs of a  cold (sore throat, runny nose, cough, rash, fever).  Eating and drinking guidelines  For your safety: Unless your surgeon tells you otherwise, follow the guidelines below.  Eat and drink as normal until 8 hours before you arrive for surgery. After that, no food or milk. You can spit out gum when you arrive.  Drink clear liquids until 2 hours before you arrive. These are liquids you can see through, like water, Gatorade, and Propel Water. They also include plain black coffee and tea (no cream or milk).  No alcohol for 24 hours before you arrive. The night before surgery, stop any drinks that contain THC.  If your care team tells you to take medicine on the morning of surgery, it's okay to take it with a sip of water. No other medicines or drugs are allowed (including CBD oil)--follow your care team's instructions.  If you have questions the day of surgery, call your hospital or surgery center.   Preventing infection  Shower or bathe the night before and the morning of surgery. Follow the instructions your clinic gave you. (If no instructions, use regular soap.)  Don't shave or clip hair near your surgery site. We'll remove the hair if needed.  Don't smoke or vape the morning of surgery. No chewing tobacco for 6 hours before you arrive. A nicotine patch is okay. You may spit out nicotine gum when you arrive.  For some surgeries, the surgeon will tell you to fully quit smoking and nicotine.  We will make every effort to keep you safe from infection. We will:  Clean our hands often with soap and water (or an alcohol-based hand rub).  Clean the skin at your surgery site with a special soap that kills germs.  Give you a special gown to keep you warm. (Cold raises the risk of infection.)  Wear hair covers, masks, gowns, and gloves during surgery.  Give antibiotic medicine, if prescribed. Not all surgeries need this medicine.  What to bring on the day of surgery  Photo ID and insurance card  Copy of your health care  directive, if you have one  Glasses and hearing aids (bring cases)  You can't wear contacts during surgery  Inhaler and eye drops, if you use them (tell us about these when you arrive)  CPAP machine or breathing device, if you use them  A few personal items, if spending the night  If you have . . .  A pacemaker, ICD (cardiac defibrillator), or other implant: Bring the ID card.  An implanted stimulator: Bring the remote control.  A legal guardian: Bring a copy of the certified (court-stamped) guardianship papers.  Please remove any jewelry, including body piercings. Leave jewelry and other valuables at home.  If you're going home the day of surgery  You must have a responsible adult drive you home. They should stay with you overnight as well.  If you don't have someone to stay with you, and you aren't safe to go home alone, we may keep you overnight. Insurance often won't pay for this.  After surgery  If it's hard to control your pain or you need more pain medicine, please call your surgeon's office.  Questions?   If you have any questions for your care team, list them here:   ____________________________________________________________________________________________________________________________________________________________________________________________________________________________________________________________  For informational purposes only. Not to replace the advice of your health care provider. Copyright   2003, 2019 [employer] [facility]. All rights reserved. Clinically reviewed by Tay Corey MD. [employer] [id number] - REV 08/24.

## 2025-02-06 NOTE — PROGRESS NOTES
Preoperative Evaluation  [address] [address]  Phone: [phone]  Primary Provider: JOSE R Sarabia CNP  Pre-op Performing Provider: Jordana Ocampo PA-C  Feb 6, 2025   {Provider  Link to PREOP SmartSet  REQUIRED to apply standard patient instructions and medication directions to the AVS :787578}  {ROOMER review and update patient entered surgical information if needed :507116}        2/6/2025   Surgical Information   What procedure is being done? lower spine fusio   [place] [facility] [place]   Who is doing the procedure / surgery? Dr Barba   Date of surgery / procedure: 2/10/2025   Time of surgery / procedure: 10:40 am   Where do you plan to recover after surgery? at home with family     Fax number for surgical facility: [phone]    Assessment & Plan     The proposed surgical procedure is considered INTERMEDIATE risk.    Preop general physical exam  ***  - Basic metabolic panel  (Ca, Cl, CO2, Creat, Gluc, K, Na, BUN); Future  - EKG 12-lead complete w/read - Clinics  - CBC with platelets; Future  - CBC with platelets    Spondylolisthesis of lumbar region  Lumbar radiculopathy  Surgery as planned.  Has already been holding over-the-counter medications as well as for diclofenac.  Uses marijuana very sparingly only for severe breakthrough pain, no recent use and knows not to take night before surgery.    Mild intermittent asthma without complication  Denies current symptoms.    Morbid obesity (H)  Higher risk for applications.  Hoping that surgery will help her get back into exercise so she can work on weight loss.    Prediabetes  Last A1c and fasting blood sugar in diabetic range, fasting blood sugar today ***.    Essential hypertension with goal blood pressure less than 140/90  Slightly elevated today, rechecking BMP following recent starting of  losartan.  BMP ***  - Albumin Random Urine Quantitative with Creat Ratio  - Basic metabolic panel  (Ca, Cl, CO2, Creat, Gluc, K, Na, BUN)            - No identified additional risk factors other than previously addressed    Preoperative Medication Instructions  Antiplatelet or Anticoagulation Medication Instructions   - Patient is on no antiplatelet or anticoagulation medications.    Additional Medication Instructions   - Herbal medications and vitamins: DO NOT TAKE 14 days prior to surgery.   - ACE/ARB/ARNI (lisinopril, enalapril, losartan, valsartan, olmesartan, sacubritril/valsartan) : DO NOT TAKE on day of surgery (minimum 11 hours for general anesthesia).   - diclofenac (Voltaren):  continue to hold   - cannabis, marijuana: DO NOT TAKE night before surgery.   - rescue Inhaler: Continue PRN. Bring to hospital on the day of surgery.    Recommendation  {IMPORTANT - Approval:[id number]:}    Davida Aragon is a 65 year old, presenting for the following:  Pre-Op Exam          2/6/2025    10:54 AM   Additional Questions   Roomed by Jose Raul   Accompanied by Self     HPI related to upcoming procedure: Has been dealing with back issues for some time, following with neurosurgery.  Currently having back pain, radiating to the right side with associated numbness down her right leg.        2/6/2025   Pre-Op Questionnaire   Have you ever had a heart attack or stroke? No   Have you ever had surgery on your heart or blood vessels, such as a stent placement, a coronary artery bypass, or surgery on an artery in your head, neck, heart, or legs? No   Do you have chest pain with activity? No   Do you have a history of heart failure? No   Do you currently have a cold, bronchitis or symptoms of other infection? No   Do you have a cough, shortness of breath, or wheezing? No   Do you or anyone in your family have previous history of blood clots? (!) UNKNOWN    Do you or does anyone in your family have a serious bleeding problem such as  prolonged bleeding following surgeries or cuts? No   Have you ever had problems with anemia or been told to take iron pills? No   Have you had any abnormal blood loss such as black, tarry or bloody stools, or abnormal vaginal bleeding? No   Have you ever had a blood transfusion? (!) YES, most recent hemoglobin normal.   Have you ever had a transfusion reaction? No   Are you willing to have a blood transfusion if it is medically needed before, during, or after your surgery? Yes   Have you or any of your relatives ever had problems with anesthesia? (!) YES    Do you have sleep apnea, excessive snoring or daytime drowsiness? No   Do you have any artifical heart valves or other implanted medical devices like a pacemaker, defibrillator, or continuous glucose monitor? No   Do you have artificial joints? (!) YES   Are you allergic to latex? No     Health Care Directive  Patient does not have a Health Care Directive: Patient states has Advance Directive and will bring in a copy to clinic.    Preoperative Review of    reviewed - No active prescriptions  {Review MNPMP for all patients per [facility] MNPMP Profile:632837}        Patient Active Problem List    Diagnosis Date Noted    Prediabetes 12/08/2023     Priority: Medium    Status post reverse total shoulder replacement, right 10/24/2023     Priority: Medium    Pain of left thumb 10/17/2023     Priority: Medium    Arthritis of carpometacarpal (CMC) joint of left thumb 07/14/2023     Priority: Medium    Combined form of age-related cataract, right eye 03/16/2023     Priority: Medium     Added automatically from request for surgery [id number]      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 03/16/2023     Priority: Medium     Added automatically from request for surgery [id number]      Severe stage glaucoma 02/15/2023     Priority: Medium     Added automatically from request for surgery [id number]      Bilateral hand numbness 01/27/2023     Priority: Medium     Osteoarthritis of both hands, unspecified osteoarthritis type 01/14/2023     Priority: Medium    Facet arthritis of lumbar region 01/11/2023     Priority: Medium    Diverticulosis of sigmoid colon 06/15/2020     Priority: Medium    Morbid obesity (H) 04/05/2019     Priority: Medium    Papanicolaou smear of cervix with low grade squamous intraepithelial lesion (LGSIL) 04/18/2018     Priority: Medium    Essential hypertension with goal blood pressure less than 140/90 03/29/2018     Priority: Medium    Adjustment disorder with mixed anxiety and depressed mood 03/29/2018     Priority: Medium    S/P cervical spinal fusion 03/30/2017     Priority: Medium    Radiculopathy of cervical spine 03/20/2017     Priority: Medium    Cervical high risk HPV (human papillomavirus) test positive 01/10/2017     Priority: Medium     2000, 2003, and 2009 NIL paps. in Care Everywhere.  9/5/13 NIL pap  1/10/17 NIL pap, + HR HPV (not 16 or 18). Plan: cotest in 1 year, due by 1/10/18  3/29/18 LSIL pap, + HR HPV (not 16 or 18). Plan: colp bef 6/29/18 4/18/18 [facility] bx: negative. Plan: cotest in 6 mo, per provider.   11/9/18 Lost to follow-up for pap tracking  9/11/2019 Pap: NIL/neg HR HPV. Plan cotest in 3 years.  1/11/23 NIL pap, Neg HPV. Plan cotest in 3 years.         Microscopic hematuria 06/02/2016     Priority: Medium     Recommend f/u with primary MD for UA, blood pressure, and urine cytology at 6, 12, 24, and 36 months from this time.  If negative for 3 years then no further monitoring needed.      Refer back to urology for any of the following:              -cytology is suspicious or positive              -gross hematuria              -irritative voiding symptoms with evidence of infection/ worsening dysuria or urgency.     If persistent microscopic hematuria WITH: hypertension, proteinuria, or glomerular/dysmorphic RBCs in urine then evaluate for primary renal disease/refer instead to nephrology.       Former smoker 03/06/2015      Priority: Medium     Last smoked 5/1/2023      S/P carpal tunnel release 07/22/2014     Priority: Medium    Trigger thumb 07/22/2014     Priority: Medium    CTS (carpal tunnel syndrome) - bilateral 05/27/2014     Priority: Medium    S/P total knee arthroplasty juan 01/23/2014     Priority: Medium    Acute posthemorrhagic anemia 01/23/2014     Priority: Medium    Muscle spasm 01/23/2014     Priority: Medium    RLS (restless legs syndrome) 01/22/2012     Priority: Medium    Migraine 12/06/2011     Priority: Medium    Pseudogout 05/05/2011     Priority: Medium    OA (OSTEOARTHRITIS) OF KNEE - bilateral 05/05/2011     Priority: Medium    Hyperlipidemia LDL goal <130 10/31/2010     Priority: Medium    Asthma 12/16/1996     Priority: Medium      Past Medical History:   Diagnosis Date    Arthritis     Cervical high risk HPV (human papillomavirus) test positive 01/10/2017    1/10/17 NIL pap/+ HR HPV (not 16 or 18). Plan: cotest in 1 year, due by 1/10/18     Chronic infection     HX of MRSA. 2 neg swabs at [facility] in 2006 and 2007.    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    Depressive disorder Back in my mid 20's    History of blood transfusion     Hypertension     Numbness and tingling     Right arm    PONV (postoperative nausea and vomiting)     Uncomplicated asthma      Past Surgical History:   Procedure Laterality Date    ABDOMEN SURGERY  1996    hysterectomy    APPENDECTOMY      ARTHROPLASTY CARPOMETACARPAL (THUMB JOINT) Left 09/19/2023    Procedure: LEFT THUMB CARPOMETACARPAL JOINT ARTHROPLASTY WITH SUTURE SUSPENSION;  Surgeon: Mat Frye MD;  Location: [facility] OR    ARTHROSCOPY KNEE  09/16/2011    Procedure:ARTHROSCOPY KNEE; left knee arthroscopy with debridement, open lateral patellar spur excision; Surgeon:LUIS A MONTOYA; Location:[facility]    BIOPSY      CATARACT IOL, RT/LT      COLONOSCOPY N/A 02/16/2016    Procedure: COLONOSCOPY;   Surgeon: Belem Khalil MD;  Location: [facility]    COLONOSCOPY N/A 02/16/2016    Procedure: COMBINED COLONOSCOPY, SINGLE OR MULTIPLE BIOPSY/POLYPECTOMY BY BIOPSY;  Surgeon: Belem Khalil MD;  Location: [facility]    COLONOSCOPY N/A 06/15/2020    Procedure: Colonoscopy, With Polypectomy And Biopsy;  Surgeon: Torres Gallegos DO;  Location: [facility]    COLONOSCOPY N/A 1/30/2024    Procedure: COLONOSCOPY, FLEXIBLE, WITH LESION REMOVAL USING SNARE;  Surgeon: Aubrey Ingram MD;  Location: [facility]    COLONOSCOPY WITH CO2 INSUFFLATION N/A 02/16/2016    Procedure: COLONOSCOPY WITH CO2 INSUFFLATION;  Surgeon: Belem Khalil MD;  [facility]    COLONOSCOPY WITH CO2 INSUFFLATION N/A 06/15/2020    Procedure: COLONOSCOPY, WITH CO2 INSUFFLATION;  Surgeon: Torres Gallegos DO;  [facility]    COLONOSCOPY WITH CO2 INSUFFLATION N/A 1/30/2024    Procedure: Colonoscopy with CO2 insufflation;  Surgeon: Aubrey Ingram MD;  Location: [facility]    CYSTOSCOPY  01/01/2016    microscopic hematuria    DECOMPRESSION, FUSION CERVICAL ANTERIOR ONE LEVEL, COMBINED N/A 03/30/2017    Procedure: COMBINED DECOMPRESSION, FUSION CERVICAL ANTERIOR ONE LEVEL;  Surgeon: Joseph Klein MD;  Location: [facility]    ECTOPIC PREGNANCY SURGERY      ENT SURGERY      GENITOURINARY SURGERY      GONIOSYNECHIALYSIS Left 02/20/2023    Procedure: goniosynechialysis left;  Surgeon: Gaston Guzman MD;  Location: [facility] OR    GYN SURGERY      HC INCISION TENDON SHEATH FINGER Left 07/11/2014    Thumb TF release    HC KNEE SCOPE,MED/LAT MENISECTOMY  09/16/2011    left, with partial medial menisectomy ONLY    HC REVISE MEDIAN N/CARPAL TUNNEL SURG Left 07/11/2014    PRIMARY - not revision    HEAD & NECK SURGERY  7/22/2020    4 fusions in neck    LASIK Bilateral     [id number]    ORTHOPEDIC SURGERY      PHACOEMULSIFICATION CLEAR CORNEA W/ STANDARD IOL IMPLANT, ENDOSCOPIC CYCLOPHOTOCOAGULATION, COMBINED Left  02/20/2023    Procedure: LEFT EYE COMPLEX PHACOEMULSIFICATION, CATARACT, CLEAR CORNEAL INCISION APPROACH, WITH STANDARD INTRAOCULAR LENS IMPLANT INSERTION AND ENDOSCOPIC CYCLOPHOTOCOAGULATION< GONISYNECHIOLYSIS;  Surgeon: Gaston Guzman MD;  Location: [facility] OR    PHACOEMULSIFICATION WITH STANDARD INTRAOCULAR LENS IMPLANT Right 05/12/2023    Procedure: RIGHT EYE PHACOEMULSIFICATION, CATARACT, WITH STANDARD INTRAOCULAR LENS IMPLANT INSERTION;  Surgeon: Gaston Guzman MD;  Location: [facility] OR    RELEASE CARPAL TUNNEL  07/11/2014    Procedure: RELEASE CARPAL TUNNEL;  Surgeon: Rg Franco MD;  [facility]    RELEASE CARPAL TUNNEL Right 11/07/2014    Procedure: RELEASE CARPAL TUNNEL;  Surgeon: Rg Franco MD;  [facility]    RELEASE TRIGGER FINGER  07/11/2014    Procedure: RELEASE TRIGGER FINGER;  Surgeon: Rg Franco MD;  [facility]    RELEASE TRIGGER FINGER Right 11/07/2014    Procedure: RELEASE TRIGGER FINGER;  Surgeon: Rg Franco MD;  [facility]    REVERSE ARTHROPLASTY SHOULDER Right 10/24/2023    Procedure: Right reverse total shoulder arthroplasty;  Surgeon: Zhang Smart MD;  Location: [facility] OR    SOFT TISSUE SURGERY      tonsils      [facility] PART REMV FEMUR/PROX TIB/FIB  09/16/2011    left, open lateral patellar bone spur excision    [facility] TOTAL KNEE ARTHROPLASTY  01/17/2014    Bilateral     Current Outpatient Medications   Medication Sig Dispense Refill    acetaminophen (TYLENOL) 325 MG tablet Take 2 tablets (650 mg) by mouth every 4 hours as needed for other (mild pain) 100 tablet 0    albuterol (PROAIR HFA/PROVENTIL HFA/VENTOLIN HFA) 108 (90 Base) MCG/ACT inhaler Inhale 2 puffs into the lungs every 4 hours as needed for shortness of breath or wheezing. 18 g 3    ammonium lactate (LAC-HYDRIN) 12 % external lotion Apply topically daily as needed for dry skin. 225 g 1    atorvastatin (LIPITOR) 80 MG tablet TAKE 1 TABLET(80 MG) BY MOUTH DAILY 90 tablet  0    calcium carbonate (TUMS) 500 MG chewable tablet Take 1-2 chew tab by mouth daily      cyclobenzaprine (FLEXERIL) 10 MG tablet TAKE 1 TABLET(10 MG) BY MOUTH EVERY NIGHT AS NEEDED FOR MUSCLE SPASMS 90 tablet 1    diclofenac (VOLTAREN) 75 MG EC tablet TAKE 1 TABLET(75 MG) BY MOUTH TWICE DAILY. Do not take while taking Celebrex 180 tablet 0    dorzolamide-timolol (COSOPT) 2-0.5 % ophthalmic solution Place 1 drop Into the left eye 2 times daily. 10 mL 5    losartan (COZAAR) 50 MG tablet Take 1 tablet (50 mg) by mouth daily. 90 tablet 3    medical cannabis (Patient's own supply) Take 1 Dose by mouth See Admin Instructions Smoking as PRN      multivitamin w/minerals (MULTI-VITAMIN) tablet Take 1 tablet by mouth daily      Omega-3 Fatty Acids (FISH OIL PO) Take 1 capsule by mouth daily      polyethylene glycol-propylene glycol (SYSTANE ULTRA) 0.4-0.3 % SOLN ophthalmic solution Place 1 drop into both eyes 2 times daily as needed for dry eyes      senna-docusate (SENOKOT-S/PERICOLACE) 8.6-50 MG tablet Take 1-2 tablets by mouth 2 times daily Take while on oral narcotics to prevent or treat constipation. 100 tablet 0    tiZANidine (ZANAFLEX) 2 MG tablet Take 1 tablet by mouth 3 times daily as needed for muscle spasms         Allergies   Allergen Reactions    Wasp Venom Protein Anaphylaxis    Bee Anaphylaxis    Erythromycin Hives    Wasps [Hornets] Anaphylaxis    Shellfish Allergy Nausea and Vomiting and Rash    Shellfish-Derived Products Rash and Nausea and Vomiting        Social History     Tobacco Use    Smoking status: Former     Current packs/day: 0.00     Types: Cigarettes     Start date: 1976     Quit date: 2023     Years since quittin.0     Passive exposure: Never    Smokeless tobacco: Never    Tobacco comments:     I am currently on Chantix   Substance Use Topics    Alcohol use: Yes     Comment: occasionally       History   Drug use: Unknown    Types: Marijuana             Review of  "Systems  Constitutional, neuro, ENT, endocrine, pulmonary, cardiac, gastrointestinal, genitourinary, musculoskeletal, integument and psychiatric systems are negative, except as otherwise noted.    Objective    BP (!) 141/85   Pulse 86   Temp 97.2  F (36.2  C) (Temporal)   Resp 20   Ht 1.549 m (5' 1\")   Wt 106.6 kg (235 lb)   LMP 09/16/2011   SpO2 92%   BMI 44.40 kg/m     Estimated body mass index is 44.4 kg/m  as calculated from the following:    Height as of this encounter: 1.549 m (5' 1\").    Weight as of this encounter: 106.6 kg (235 lb).  Physical Exam  GENERAL: alert and no distress  EYES: Eyes grossly normal to inspection, PERRL and conjunctivae and sclerae normal  HENT: normal cephalic/atraumatic, nose and mouth without ulcers or lesions, oropharynx clear, and oral mucous membranes moist  NECK: no adenopathy, no asymmetry, masses, or scars  RESP: lungs clear to auscultation - no rales, rhonchi or wheezes  CV: regular rate and rhythm, normal S1 S2, no S3 or S4, no murmur, click or rub, no peripheral edema  MS: no gross musculoskeletal defects noted, no edema  SKIN: no suspicious lesions or rashes  NEURO: Normal strength and tone, mentation intact and speech normal  PSYCH: mentation appears normal, affect normal/bright    Recent Labs   Lab Test 12/12/24  1054   HGB 13.0      POTASSIUM 4.5   CR 0.59   A1C 6.6*        Diagnostics  No results found for this or any previous visit (from the past week).   EKG: appears normal, NSR, normal axis, normal intervals, no acute ST/T changes c/w ischemia    Revised Cardiac Risk Index (RCRI)  The patient has the following serious cardiovascular risks for perioperative complications:   - No serious cardiac risks = 0 points     RCRI Interpretation: 0 points: Class I (very low risk - 0.4% complication rate)         Signed Electronically by: Jordana Ocampo PA-C  A copy of this evaluation report is provided to the requesting physician.    {Provider Resources  " [facility] Guidelines  Revised Cardiac Risk Index :332003}   {Email feedback regarding this note to [email or web address]   :[id number]}

## 2025-02-17 DIAGNOSIS — H40.212 ACUTE ANGLE-CLOSURE GLAUCOMA OF LEFT EYE: Primary | ICD-10-CM

## 2025-02-24 ENCOUNTER — NURSE TRIAGE (OUTPATIENT)
Dept: FAMILY MEDICINE | Facility: CLINIC | Age: 65
End: 2025-02-24

## 2025-02-24 NOTE — TELEPHONE ENCOUNTER
"Nurse Triage SBAR    Is this a 2nd Level Triage? YES, LICENSED PRACTITIONER REVIEW IS REQUIRED    Situation: Writer called to triage re: appt later today, notes states \"possible infection, had back surgery 2/10/2025\". Patient states incision areas are red, very warm, and painful. No aware    Background: Surgery at [facility] on 2/10, extend L4-L5 fusion to L5-S1 with transforaminal lumbar interbody fusion and posterior fusion.     Assessment:   - Patient currently icing area, very red and very warm. Getting stabbing pains from area. Low grade fevers - highest 99.8 F.   - Pain 5-6/10 is at surgical sites. Currently having a burning pain. Took meds about an hour ago - tylenol 1000 mg and tizanidine, hydroxyzine  - Redness mostly present on R side, but does have dressing on the L side of back that is covered with a dressing. Last changed a few days ago and no overt drainage, etc.  - Sees surgeon this Friday. States she mentioned the redness last week but it's worsened over the weekend. States surgeon's office recommended making primary care appt if any concerns for infection      Protocol Recommended Disposition:   Go To Office Now    Recommendation: Patient has appt w/ PCP this afternoon, however PCP recommended going to surgeon's office for concern for infection. Patient states surgeon's office told her to come to PCP office. Did recommend patient call surgeon's office with latest update to see if they have any changes to recommendation, as this is from a surgical site. Patient aware, will call surgeon's office.    Routing to PCP to see if OK to keep appt or if any other recommendations?      Does the patient meet one of the following criteria for ADS visit consideration? 16+ years old, with an [facility] PCP     TIP  Providers, please consider if this condition is appropriate for management at one of our Acute and Diagnostic Services sites.     If patient is a good candidate, please use dotphrase <dot>triageresponse " and select Refer to ADS to document.      Deidre Castle, RN, BSN  [facility]    Reason for Disposition   Incision looks infected (e.g., spreading redness, pus)    Additional Information   Negative: Major abdominal surgical incision and wound gaping open with visible internal organs   Negative: Sounds like a life-threatening emergency to the triager   Negative: Bleeding from incision and won't stop after 10 minutes of direct pressure   Negative: Bleeding (more than a few drops) from incision and after tracheostomy or blood vessel surgery (e.g., carotid endarterectomy, femoral bypass graft, kidney dialysis fistula)   Negative: Bright red, wide-spread, sunburn-like rash   Negative: SEVERE pain in the incision   Negative: Incision gaping open and < 2 days (48 hours) since wound re-opened   Negative: Incision gaping open and length of opening > 2 inches (5 cm)   Negative: Patient sounds very sick or weak to the triager   Negative: Sounds like a serious complication to the triager   Negative: Fever > 100.4 F (38.0 C)    Protocols used: Post-Op Incision Symptoms and [id number]

## 2025-03-05 ENCOUNTER — MYC MEDICAL ADVICE (OUTPATIENT)
Dept: FAMILY MEDICINE | Facility: CLINIC | Age: 65
End: 2025-03-05
Payer: COMMERCIAL

## 2025-03-07 ENCOUNTER — MEDICAL CORRESPONDENCE (OUTPATIENT)
Dept: HEALTH INFORMATION MANAGEMENT | Facility: CLINIC | Age: 65
End: 2025-03-07

## 2025-03-07 NOTE — TELEPHONE ENCOUNTER
Home Care is calling regarding an established patient with [facility].  Requesting orders from: Belem Dumont RN APPROVED: RN able to provide verbal orders.  Home Care will send orders for signature.  RN will close encounter.  Is this a request for a temporary pause in the home care episode?  No    Orders Requested    Physical Therapy  Request for initial certification (first set of orders)   Frequency: every other week for 4 weeks and then once a week for 4 weeks.   RN gave verbal order: Yes    Occupational Therapy  Request for delay in care, service is not able to be provided within 7 days of previously scheduled day.   FYI: Service rescheduled to TBD     PT every other w for 4 weks and then once a week for 4 week  Caller: [place]  Home Care Agency: [facility]   Phone number Home Care can be reached at: [phone]  Okay to leave a detailed message?: Yes      Provider also states that there was a medication alert stating that her Albuterol inhaler is interfering with the Cosopt eyedrop. Routing to provider for review.       Nancy Beasley RN

## 2025-03-11 NOTE — TELEPHONE ENCOUNTER
Belem Dumont, APRN CNP11 hours ago (6:48 PM)     KT  Agree with ome care orders as below.  Belem Dumont APRN, CNP

## 2025-03-11 NOTE — TELEPHONE ENCOUNTER
Routing back to provider to advise this message below from original note:    Provider also states that there was a medication alert stating that her Albuterol inhaler is interfering with the Cosopt eyedrop. Routing to provider for review.     Zahira Winters, CHAKAN, RN   [facility]

## 2025-03-13 ENCOUNTER — OFFICE VISIT (OUTPATIENT)
Dept: FAMILY MEDICINE | Facility: CLINIC | Age: 65
End: 2025-03-13
Payer: COMMERCIAL

## 2025-03-13 VITALS
HEART RATE: 76 BPM | SYSTOLIC BLOOD PRESSURE: 117 MMHG | HEIGHT: 61 IN | RESPIRATION RATE: 20 BRPM | DIASTOLIC BLOOD PRESSURE: 77 MMHG | OXYGEN SATURATION: 97 % | TEMPERATURE: 96.8 F | WEIGHT: 130 LBS | BODY MASS INDEX: 24.55 KG/M2

## 2025-03-13 DIAGNOSIS — T81.41XA SUPERFICIAL INCISIONAL INFECTION OF SURGICAL SITE: Primary | ICD-10-CM

## 2025-03-13 DIAGNOSIS — M54.16 LUMBAR RADICULOPATHY: ICD-10-CM

## 2025-03-13 DIAGNOSIS — E78.5 HYPERLIPIDEMIA LDL GOAL <130: ICD-10-CM

## 2025-03-13 DIAGNOSIS — I10 ESSENTIAL HYPERTENSION WITH GOAL BLOOD PRESSURE LESS THAN 140/90: ICD-10-CM

## 2025-03-13 RX ORDER — ATORVASTATIN CALCIUM 80 MG/1
TABLET, FILM COATED ORAL
Qty: 90 TABLET | Refills: 1 | Status: SHIPPED | OUTPATIENT
Start: 2025-03-13

## 2025-03-13 RX ORDER — GABAPENTIN 300 MG/1
300 CAPSULE ORAL 3 TIMES DAILY
COMMUNITY
Start: 2025-03-11

## 2025-03-13 RX ORDER — METHOCARBAMOL 500 MG/1
500 TABLET, FILM COATED ORAL EVERY 6 HOURS PRN
COMMUNITY

## 2025-03-13 ASSESSMENT — PAIN SCALES - GENERAL: PAINLEVEL_OUTOF10: MODERATE PAIN (6)

## 2025-03-13 NOTE — PROGRESS NOTES
Assessment & Plan     Superficial incisional infection of surgical site  Stable, doing well, finish antibiotics as prescribed, follow with neurosurg as scheduled next week    Lumbar radiculopathy  Still having some radicular pain  that improves with frequent position changes    Essential hypertension with goal blood pressure less than 140/90  Well controlled    Hyperlipidemia LDL goal <130    - atorvastatin (LIPITOR) 80 MG tablet  Dispense: 90 tablet; Refill: 1          MED REC REQUIRED  Post Medication Reconciliation Status:       Work on weight loss  Regular exercise  See Patient Instructions    Davida Aragon is a 65 year old, presenting for the following health issues:  Hospital F/U (Surgical Infection)      3/13/2025     8:13 AM   Additional Questions   Roomed by Mary LOVE   Accompanied by  - Michael         3/13/2025     8:13 AM   Patient Reported Additional Medications   Patient reports taking the following new medications Gabapentin     HPI      She is accompanied by her  today.    Hospital Follow-up Visit:    Hospital/Nursing Home/IP Rehab Facility:  [facility]  Date of Admission: 02/25  Date of Discharge: 03/04  Reason(s) for Admission: Surgical infection  Was the patient in the ICU or did the patient experience delirium during hospitalization?  No  Do you have any other stressors you would like to discuss with your provider? No    Problems taking medications regularly:  None  Medication changes since discharge: Gabapentin and Robaxin  Problems adhering to non-medication therapy:  None    Summary of hospitalization:  CareEverywhere information obtained and reviewed  Diagnostic Tests/Treatments reviewed.  Follow up needed: will see neurosurg next week and have her sutures removed.  Other Healthcare Providers Involved in Patient s Care:         Specialist appointment - neurosurgery next week  Update since discharge: improved.    Plan of care communicated with patient and family     She  continues to have some pain at incision site and there is still some mild erythema along superior incision site but no fluctuance or drainage, no fever or chills.  She has one more day of antibiotic therapy to finish.     Hyperlipidemia Follow-Up    Are you regularly taking any medication or supplement to lower your cholesterol?   Yes- Lipitor 80 mg daily  Are you having muscle aches or other side effects that you think could be caused by your cholesterol lowering medication?  No    Hypertension Follow-up    Do you check your blood pressure regularly outside of the clinic? Yes   Are you following a low salt diet? Yes  Are your blood pressures ever more than 140 on the top number (systolic) OR more   than 90 on the bottom number (diastolic), for example 140/90? No  How many servings of fruits and vegetables do you eat daily?  2-3  On average, how many sweetened beverages do you drink each day (Examples: soda, juice, sweet tea, etc.  Do NOT count diet or artificially sweetened beverages)?   1  How many days per week do you exercise enough to make your heart beat faster? 4  How many minutes a day do you exercise enough to make your heart beat faster? 10 - 19  How many days per week do you miss taking your medication? 0          Review of Systems  Constitutional, HEENT, cardiovascular, pulmonary, gi and gu systems are negative, except as otherwise noted.      Objective    LMP 09/16/2011   There is no height or weight on file to calculate BMI.  Physical Exam   GENERAL: alert and no distress  EYES: Eyes grossly normal to inspection, PERRL and conjunctivae and sclerae normal  HENT: ear canals and TM's normal, nose and mouth without ulcers or lesions  NECK: no adenopathy, no asymmetry, masses, or scars  RESP: lungs clear to auscultation - no rales, rhonchi or wheezes  CV: regular rate and rhythm, normal S1 S2, no S3 or S4, no murmur, click or rub, no peripheral edema  ABDOMEN: soft, nontender, no hepatosplenomegaly, no  masses and bowel sounds normal  MS: no gross musculoskeletal defects noted, no edema  BACK:  midline lumbar incision sutures intact, mild erythema at superior aspect of incision  but without drainage or warmth, no CVA tenderness,     Will defer additional labs to her Neurosurgeon- could get a CBC to follow her anemia.        Signed Electronically by: JOSE R Sarabia CNP

## 2025-03-17 ENCOUNTER — TELEPHONE (OUTPATIENT)
Dept: FAMILY MEDICINE | Facility: CLINIC | Age: 65
End: 2025-03-17
Payer: COMMERCIAL

## 2025-03-17 NOTE — TELEPHONE ENCOUNTER
Home Care is calling regarding an established patient with [facility].  Requesting orders from: Belem Dumont RN APPROVED: RN able to provide verbal orders.  Home Care will send orders for signature.  RN will close encounter.  Is this a request for a temporary pause in the home care episode?  No    Orders Requested    Physical Therapy  Request for continuation of care with increase in frequency  Frequency: 1 additional visit 1 a week for 6 weeks starting 3/17/25  RN gave verbal order: Yes        [facility] Care Agency: [facility]  Phone number Home Care can be reached at: [phone]  Okay to leave a detailed message?: Yes    Rose Gooden RN

## 2025-03-19 ENCOUNTER — TELEPHONE (OUTPATIENT)
Dept: FAMILY MEDICINE | Facility: CLINIC | Age: 65
End: 2025-03-19
Payer: COMMERCIAL

## 2025-03-19 NOTE — TELEPHONE ENCOUNTER
Forms/Letter Request    Type of form/letter: Home Health Certification      Do we have the form/letter: Yes: Placed in Valentine Latashain's box    Who is the form from? Home care    Where did/will the form come from? form was faxed in    When is form/letter needed by: 5-7 business days    How would you like the form/letter returned: Fax : [phone]    Patient Notified form requests are processed in 5-7 business days:Yes

## 2025-03-23 DIAGNOSIS — Z98.1 S/P CERVICAL SPINAL FUSION: ICD-10-CM

## 2025-03-23 DIAGNOSIS — Z96.653 STATUS POST TOTAL BILATERAL KNEE REPLACEMENT: ICD-10-CM

## 2025-03-27 NOTE — TELEPHONE ENCOUNTER
Form found completed in TC bin. Form faxed to [facility] [phone] on 3/27/25 at 8:02am. Copy placed in abstract and original in TC copy bin.

## 2025-03-30 RX ORDER — DICLOFENAC SODIUM 75 MG/1
TABLET, DELAYED RELEASE ORAL
Qty: 180 TABLET | Refills: 0 | Status: SHIPPED | OUTPATIENT
Start: 2025-03-30

## 2025-04-30 ENCOUNTER — TELEPHONE (OUTPATIENT)
Dept: FAMILY MEDICINE | Facility: CLINIC | Age: 65
End: 2025-04-30
Payer: COMMERCIAL

## 2025-04-30 DIAGNOSIS — M54.16 LUMBAR RADICULOPATHY: Primary | ICD-10-CM

## 2025-04-30 NOTE — TELEPHONE ENCOUNTER
Home PT provider calling to advise that he finished home PT and she needs a referral for outpatient PT. Pt is requesting this be sent to [facility].     Routing to PCP for review, order pending if appropriate.     Nancy Beasley RN on 4/30/2025 at 2:00 PM

## 2025-05-05 NOTE — TELEPHONE ENCOUNTER
Pt and [facility] care are calling about this referral. Please get this ordered ASAP so patient can get this scheduled.

## 2025-05-12 ENCOUNTER — RESULTS FOLLOW-UP (OUTPATIENT)
Dept: FAMILY MEDICINE | Facility: CLINIC | Age: 65
End: 2025-05-12

## 2025-05-12 ENCOUNTER — OFFICE VISIT (OUTPATIENT)
Dept: FAMILY MEDICINE | Facility: CLINIC | Age: 65
End: 2025-05-12
Payer: COMMERCIAL

## 2025-05-12 ENCOUNTER — TELEPHONE (OUTPATIENT)
Dept: FAMILY MEDICINE | Facility: CLINIC | Age: 65
End: 2025-05-12

## 2025-05-12 VITALS
OXYGEN SATURATION: 95 % | RESPIRATION RATE: 12 BRPM | WEIGHT: 236 LBS | HEIGHT: 62 IN | SYSTOLIC BLOOD PRESSURE: 126 MMHG | BODY MASS INDEX: 43.43 KG/M2 | HEART RATE: 78 BPM | DIASTOLIC BLOOD PRESSURE: 80 MMHG

## 2025-05-12 DIAGNOSIS — Z01.818 PREOP GENERAL PHYSICAL EXAM: Primary | ICD-10-CM

## 2025-05-12 DIAGNOSIS — M43.16 SPONDYLOLISTHESIS OF LUMBAR REGION: ICD-10-CM

## 2025-05-12 DIAGNOSIS — E66.01 MORBID OBESITY (H): ICD-10-CM

## 2025-05-12 DIAGNOSIS — J45.20 MILD INTERMITTENT ASTHMA WITHOUT COMPLICATION: ICD-10-CM

## 2025-05-12 DIAGNOSIS — M54.16 LUMBAR RADICULOPATHY: ICD-10-CM

## 2025-05-12 DIAGNOSIS — R73.03 PREDIABETES: ICD-10-CM

## 2025-05-12 DIAGNOSIS — E78.5 HYPERLIPIDEMIA LDL GOAL <130: ICD-10-CM

## 2025-05-12 DIAGNOSIS — I10 ESSENTIAL HYPERTENSION WITH GOAL BLOOD PRESSURE LESS THAN 140/90: ICD-10-CM

## 2025-05-12 LAB
ERYTHROCYTE [DISTWIDTH] IN BLOOD BY AUTOMATED COUNT: 12.4 % (ref 10–15)
HCT VFR BLD AUTO: 40.6 % (ref 35–47)
HGB BLD-MCNC: 13 G/DL (ref 11.7–15.7)
MCH RBC QN AUTO: 29.5 PG (ref 26.5–33)
MCHC RBC AUTO-ENTMCNC: 32 G/DL (ref 31.5–36.5)
MCV RBC AUTO: 92 FL (ref 78–100)
PLATELET # BLD AUTO: 277 10E3/UL (ref 150–450)
RBC # BLD AUTO: 4.4 10E6/UL (ref 3.8–5.2)
WBC # BLD AUTO: 7.7 10E3/UL (ref 4–11)

## 2025-05-12 PROCEDURE — 85027 COMPLETE CBC AUTOMATED: CPT

## 2025-05-12 PROCEDURE — 3079F DIAST BP 80-89 MM HG: CPT

## 2025-05-12 PROCEDURE — 99214 OFFICE O/P EST MOD 30 MIN: CPT

## 2025-05-12 PROCEDURE — 36415 COLL VENOUS BLD VENIPUNCTURE: CPT

## 2025-05-12 PROCEDURE — 3074F SYST BP LT 130 MM HG: CPT

## 2025-05-12 RX ORDER — HYDROXYZINE PAMOATE 50 MG/1
50 CAPSULE ORAL EVERY 4 HOURS PRN
COMMUNITY
Start: 2025-04-18

## 2025-05-12 RX ORDER — PREGABALIN 100 MG/1
100 CAPSULE ORAL 3 TIMES DAILY
COMMUNITY
Start: 2025-04-18

## 2025-05-12 ASSESSMENT — ENCOUNTER SYMPTOMS
VOMITING: 0
ABDOMINAL PAIN: 0
DIARRHEA: 0
WHEEZING: 0
CONSTIPATION: 1
EYE ITCHING: 1
PALPITATIONS: 0
SORE THROAT: 0
BACK PAIN: 1
CHILLS: 0
FEVER: 0
RHINORRHEA: 0
SHORTNESS OF BREATH: 0
CHEST TIGHTNESS: 0
FREQUENCY: 0
HEADACHES: 0
NAUSEA: 0
LIGHT-HEADEDNESS: 0
WOUND: 0
COUGH: 0
FATIGUE: 0
WEAKNESS: 1
DYSURIA: 0
NUMBNESS: 1

## 2025-05-12 NOTE — TELEPHONE ENCOUNTER
Forms/Letter Request     Type of form/letter: [facility] - Order   Order NO:[id number]        Do we have the form/letter: Yes: Placed in Valentine Thein's box     Who is the form from? Home care     Where did/will the form come from? form was faxed in     When is form/letter needed by: 5-7 business days     How would you like the form/letter returned: Fax : [phone]     Patient Notified form requests are processed in 5-7 business days:Yes

## 2025-05-12 NOTE — PROGRESS NOTES
Preoperative Evaluation  [address] [address]  Phone: [phone]  Primary Provider: JOSE R Sarabia CNP  Pre-op Performing Provider: JOSE R Quach CNP  May 12, 2025         5/12/2025   Surgical Information   What procedure is being done? lower back cage removal   Facility or Hospital where procedure/surgery will be performed: not sure yet   Who is doing the procedure / surgery? Dr Barba   Date of surgery / procedure: don't know yet   Time of surgery / procedure: don't know yet   Where do you plan to recover after surgery? at home with family     Fax number for surgical facility: Note does not need to be faxed, will be available electronically in Epic.    Assessment & Plan     The proposed surgical procedure is considered INTERMEDIATE risk.    Preop general physical exam  - Preoperative instructions and medication hold times reviewed with patient.  - CBC with platelets    Spondylolisthesis of lumbar region  - Cleared for planned procedure    Lumbar radiculopathy  - Cleared for planned procedure    Essential hypertension with goal blood pressure less than 140/90  - At goal. Continue losartan, hold morning of surgery.    Hyperlipidemia LDL goal <130  - Well controlled. Continue atorvastatin.     Prediabetes  - Noted as surgical risk factor    Morbid obesity (H)  - Noted as surgical risk factor    Mild intermittent asthma without complication  - Controlled. Bring albuterol day of surgery     - No identified additional risk factors other than previously addressed    Antiplatelet or Anticoagulation Medication Instructions   - We reviewed the medication list and the patient is not on an antiplatelet or anticoagulation medications.    Additional Medication Instructions  Take all scheduled medications on the day of surgery EXCEPT for modifications listed below:   - Herbal medications and vitamins: DO NOT TAKE 14 days prior to  surgery.   - ACE/ARB/ARNI (lisinopril, enalapril, losartan, valsartan, olmesartan, sacubritril/valsartan) : DO NOT TAKE on day of surgery (minimum 11 hours for general anesthesia).   - diclofenac (Voltaren): Continue to hold   - cannabis, marijuana: DO NOT TAKE night before surgery.   - rescue Inhaler: Continue PRN. Bring to hospital on the day of surgery.    Recommendation  Approval given to proceed with proposed procedure, without further diagnostic evaluation.    Davida Aragon is a 65 year old, presenting for the following:  Pre-Op Exam        5/12/2025     2:30 PM   Additional Questions   Roomed by marie ZAIDI: Left leg radicular pain, numbness, weakness. Revision of previous back surgery planned.   Stopped medications and supplements, including diclofenac and fish oil 5/9/25. She will continue to hold.        5/12/2025   Pre-Op Questionnaire   Have you ever had a heart attack or stroke? No   Have you ever had surgery on your heart or blood vessels, such as a stent placement, a coronary artery bypass, or surgery on an artery in your head, neck, heart, or legs? No   Do you have chest pain with activity? No   Do you have a history of heart failure? No   Do you currently have a cold, bronchitis or symptoms of other infection? No   Do you have a cough, shortness of breath, or wheezing? (!) YES- mild POWELL since switching gabapentin to pregabalin   Do you or anyone in your family have previous history of blood clots? (!) UNKNOWN    Do you or does anyone in your family have a serious bleeding problem such as prolonged bleeding following surgeries or cuts? No   Have you ever had problems with anemia or been told to take iron pills? (!) YES - last hgb 10.0   Have you had any abnormal blood loss such as black, tarry or bloody stools, or abnormal vaginal bleeding? (!) History of heavy menstrual period, none recently.   Have you ever had a blood transfusion? (!) YES- hemorrhage with first delivery   Have you ever had a  transfusion reaction? No   Are you willing to have a blood transfusion if it is medically needed before, during, or after your surgery? Yes   Have you or any of your relatives ever had problems with anesthesia? (!) YES - history of postoperative nausea and vomiting, not recently    Do you have sleep apnea, excessive snoring or daytime drowsiness? No   Do you have any artifical heart valves or other implanted medical devices like a pacemaker, defibrillator, or continuous glucose monitor? No   Do you have artificial joints? (!) YES- both knees, shoulder   Are you allergic to latex? No     Advance Care Planning  Discussed advance care planning with patient; however, patient declined at this time.    Preoperative Review of    reviewed - controlled substances reflected in medication list.}    Status of Chronic Conditions:    ASTHMA - Patient has a longstanding history of moderate-severe Asthma . Patient has been doing well overall noting POWELL and continues on medication regimen consisting of albuterol PRN without adverse reactions or side effects.     HYPERLIPIDEMIA - Patient has a long history of significant Hyperlipidemia requiring medication for treatment with recent good control. Patient reports no problems or side effects with the medication.     HYPERTENSION - Patient has longstanding history of HTN , currently denies any symptoms referable to elevated blood pressure. Specifically denies chest pain, palpitations, dyspnea, orthopnea, PND or peripheral edema. Blood pressure readings have been in normal range. Current medication regimen is as listed below. Patient denies any side effects of medication.     Patient Active Problem List    Diagnosis Date Noted    Superficial incisional infection of surgical site 02/25/2025     Priority: Medium    Lumbar radiculopathy 02/10/2025     Priority: Medium    Prediabetes 12/08/2023     Priority: Medium    Status post reverse total shoulder replacement, right 10/24/2023      Priority: Medium    Pain of left thumb 10/17/2023     Priority: Medium    Arthritis of carpometacarpal (CMC) joint of left thumb 07/14/2023     Priority: Medium    Combined form of age-related cataract, right eye 03/16/2023     Priority: Medium     Added automatically from request for surgery [id number]      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 03/16/2023     Priority: Medium     Added automatically from request for surgery [id number]      Severe stage glaucoma 02/15/2023     Priority: Medium     Added automatically from request for surgery [id number]      Bilateral hand numbness 01/27/2023     Priority: Medium    Osteoarthritis of both hands, unspecified osteoarthritis type 01/14/2023     Priority: Medium    Facet arthritis of lumbar region 01/11/2023     Priority: Medium    Diverticulosis of sigmoid colon 06/15/2020     Priority: Medium    Morbid obesity (H) 04/05/2019     Priority: Medium    Papanicolaou smear of cervix with low grade squamous intraepithelial lesion (LGSIL) 04/18/2018     Priority: Medium    Essential hypertension with goal blood pressure less than 140/90 03/29/2018     Priority: Medium    Adjustment disorder with mixed anxiety and depressed mood 03/29/2018     Priority: Medium    S/P cervical spinal fusion 03/30/2017     Priority: Medium    Radiculopathy of cervical spine 03/20/2017     Priority: Medium    Cervical high risk HPV (human papillomavirus) test positive 01/10/2017     Priority: Medium     2000, 2003, and 2009 NIL paps. in Care Everywhere.  9/5/13 NIL pap  1/10/17 NIL pap, + HR HPV (not 16 or 18). Plan: cotest in 1 year, due by 1/10/18  3/29/18 LSIL pap, + HR HPV (not 16 or 18). Plan: colp bef 6/29/18 4/18/18 [facility] bx: negative. Plan: cotest in 6 mo, per provider.   11/9/18 Lost to follow-up for pap tracking  9/11/2019 Pap: NIL/neg HR HPV. Plan cotest in 3 years.  1/11/23 NIL pap, Neg HPV. Plan cotest in 3 years.         Microscopic hematuria 06/02/2016     Priority: Medium      Recommend f/u with primary MD for UA, blood pressure, and urine cytology at 6, 12, 24, and 36 months from this time.  If negative for 3 years then no further monitoring needed.      Refer back to urology for any of the following:              -cytology is suspicious or positive              -gross hematuria              -irritative voiding symptoms with evidence of infection/ worsening dysuria or urgency.     If persistent microscopic hematuria WITH: hypertension, proteinuria, or glomerular/dysmorphic RBCs in urine then evaluate for primary renal disease/refer instead to nephrology.       Former smoker 03/06/2015     Priority: Medium     Last smoked 5/1/2023      S/P carpal tunnel release 07/22/2014     Priority: Medium    Trigger thumb 07/22/2014     Priority: Medium    CTS (carpal tunnel syndrome) - bilateral 05/27/2014     Priority: Medium    S/P total knee arthroplasty juan 01/23/2014     Priority: Medium    Acute posthemorrhagic anemia 01/23/2014     Priority: Medium    Muscle spasm 01/23/2014     Priority: Medium    RLS (restless legs syndrome) 01/22/2012     Priority: Medium    Migraine 12/06/2011     Priority: Medium    Pseudogout 05/05/2011     Priority: Medium    OA (OSTEOARTHRITIS) OF KNEE - bilateral 05/05/2011     Priority: Medium    Hyperlipidemia LDL goal <130 10/31/2010     Priority: Medium    Asthma 12/16/1996     Priority: Medium      Past Medical History:   Diagnosis Date    Arthritis     Cervical high risk HPV (human papillomavirus) test positive 01/10/2017    1/10/17 NIL pap/+ HR HPV (not 16 or 18). Plan: cotest in 1 year, due by 1/10/18     Chronic infection     HX of MRSA. 2 neg swabs at [facility] in 2006 and 2007.    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    Depressive disorder Back in my mid 20's    History of blood transfusion     Hypertension     Numbness and tingling     Right arm    PONV (postoperative nausea and  vomiting)     Uncomplicated asthma      Past Surgical History:   Procedure Laterality Date    ABDOMEN SURGERY  1996    hysterectomy    APPENDECTOMY      ARTHROPLASTY CARPOMETACARPAL (THUMB JOINT) Left 09/19/2023    Procedure: LEFT THUMB CARPOMETACARPAL JOINT ARTHROPLASTY WITH SUTURE SUSPENSION;  Surgeon: Mat Frye MD;  Location: [facility] OR    ARTHROSCOPY KNEE  09/16/2011    Procedure:ARTHROSCOPY KNEE; left knee arthroscopy with debridement, open lateral patellar spur excision; Surgeon:LUIS A MONTOYA; Location:[facility] OR    BIOPSY      CATARACT IOL, RT/LT      COLONOSCOPY N/A 02/16/2016    Procedure: COLONOSCOPY;  Surgeon: Belem Khalil MD;  Location: [facility] OR    COLONOSCOPY N/A 02/16/2016    Procedure: COMBINED COLONOSCOPY, SINGLE OR MULTIPLE BIOPSY/POLYPECTOMY BY BIOPSY;  Surgeon: Belem Khalil MD;  Location: [facility]    COLONOSCOPY N/A 06/15/2020    Procedure: Colonoscopy, With Polypectomy And Biopsy;  Surgeon: Torres Gallegos DO;  Location: [facility] OR    COLONOSCOPY N/A 1/30/2024    Procedure: COLONOSCOPY, FLEXIBLE, WITH LESION REMOVAL USING SNARE;  Surgeon: Aubrey Ingram MD;  Location: [facility] OR    COLONOSCOPY WITH CO2 INSUFFLATION N/A 02/16/2016    Procedure: COLONOSCOPY WITH CO2 INSUFFLATION;  Surgeon: Bleem Khalil MD;  Location: [facility]    COLONOSCOPY WITH CO2 INSUFFLATION N/A 06/15/2020    Procedure: COLONOSCOPY, WITH CO2 INSUFFLATION;  Surgeon: Torres Gallegos DO;  Location: [facility]    COLONOSCOPY WITH CO2 INSUFFLATION N/A 1/30/2024    Procedure: Colonoscopy with CO2 insufflation;  Surgeon: Aubrey Ingram MD;  Location: [facility] OR    CYSTOSCOPY  01/01/2016    microscopic hematuria    DECOMPRESSION, FUSION CERVICAL ANTERIOR ONE LEVEL, COMBINED N/A 03/30/2017    Procedure: COMBINED DECOMPRESSION, FUSION CERVICAL ANTERIOR ONE LEVEL;  Surgeon: Joseph Klein MD;  Location: [facility] OR    ECTOPIC PREGNANCY SURGERY      ENT SURGERY      GENITOURINARY  SURGERY      GONIOSYNECHIALYSIS Left 02/20/2023    Procedure: goniosynechialysis left;  Surgeon: Gasotn Guzman MD;  Location: [facility] OR    GYN SURGERY      HC INCISION TENDON SHEATH FINGER Left 07/11/2014    Thumb TF release    HC KNEE SCOPE,MED/LAT MENISECTOMY  09/16/2011    left, with partial medial menisectomy ONLY    HC REVISE MEDIAN N/CARPAL TUNNEL SURG Left 07/11/2014    PRIMARY - not revision    HEAD & NECK SURGERY  7/22/2020    4 fusions in neck    LASIK Bilateral     [id number]    ORTHOPEDIC SURGERY      PHACOEMULSIFICATION CLEAR CORNEA W/ STANDARD IOL IMPLANT, ENDOSCOPIC CYCLOPHOTOCOAGULATION, COMBINED Left 02/20/2023    Procedure: LEFT EYE COMPLEX PHACOEMULSIFICATION, CATARACT, CLEAR CORNEAL INCISION APPROACH, WITH STANDARD INTRAOCULAR LENS IMPLANT INSERTION AND ENDOSCOPIC CYCLOPHOTOCOAGULATION< GONISYNECHIOLYSIS;  Surgeon: Gaston Guzman MD;  [place] [facility] [place]    PHACOEMULSIFICATION WITH STANDARD INTRAOCULAR LENS IMPLANT Right 05/12/2023    Procedure: RIGHT EYE PHACOEMULSIFICATION, CATARACT, WITH STANDARD INTRAOCULAR LENS IMPLANT INSERTION;  Surgeon: Gaston Guzman MD;  Location: [facility] OR    RELEASE CARPAL TUNNEL  07/11/2014    Procedure: RELEASE CARPAL TUNNEL;  Surgeon: Rg Franco MD;  Location: [facility]    RELEASE CARPAL TUNNEL Right 11/07/2014    Procedure: RELEASE CARPAL TUNNEL;  Surgeon: Rg Franco MD;  Location: [facility]    RELEASE TRIGGER FINGER  07/11/2014    Procedure: RELEASE TRIGGER FINGER;  Surgeon: Rg Franco MD;  Location: [facility]    RELEASE TRIGGER FINGER Right 11/07/2014    Procedure: RELEASE TRIGGER FINGER;  Surgeon: Rg Franco MD;  Location: [facility] OR    REVERSE ARTHROPLASTY SHOULDER Right 10/24/2023    Procedure: Right reverse total shoulder arthroplasty;  Surgeon: Zhang Smart MD;  Location:  OR    SOFT TISSUE SURGERY      tonsils      ZZC PART REMV FEMUR/PROX TIB/FIB  09/16/2011    left, open lateral patellar bone spur excision     [facility] TOTAL KNEE ARTHROPLASTY  01/17/2014    Bilateral     Current Outpatient Medications   Medication Sig Dispense Refill    acetaminophen (TYLENOL) 325 MG tablet Take 2 tablets (650 mg) by mouth every 4 hours as needed for other (mild pain) 100 tablet 0    albuterol (PROAIR HFA/PROVENTIL HFA/VENTOLIN HFA) 108 (90 Base) MCG/ACT inhaler Inhale 2 puffs into the lungs every 4 hours as needed for shortness of breath or wheezing. 18 g 3    ammonium lactate (LAC-HYDRIN) 12 % external lotion Apply topically daily as needed for dry skin. 225 g 1    atorvastatin (LIPITOR) 80 MG tablet TAKE 1 TABLET(80 MG) BY MOUTH DAILY 90 tablet 1    calcium carbonate (TUMS) 500 MG chewable tablet Take 1-2 chew tab by mouth daily      cyclobenzaprine (FLEXERIL) 10 MG tablet TAKE 1 TABLET(10 MG) BY MOUTH EVERY NIGHT AS NEEDED FOR MUSCLE SPASMS 90 tablet 1    diclofenac (VOLTAREN) 75 MG EC tablet TAKE 1 TABLET(75 MG) BY MOUTH TWICE DAILY. DO NOT TAKE WHILE TAKING CELEBREX 180 tablet 0    dorzolamide-timolol (COSOPT) 2-0.5 % ophthalmic solution Place 1 drop Into the left eye 2 times daily. 10 mL 5    Ferrous Sulfate (IRON PO) Take 1 tablet by mouth daily.      hydrOXYzine (VISTARIL) 50 MG capsule Take 50 mg by mouth every 4 hours as needed for other (pain managment).      losartan (COZAAR) 50 MG tablet Take 1 tablet (50 mg) by mouth daily. 90 tablet 3    medical cannabis (Patient's own supply) Take 1 Dose by mouth See Admin Instructions Smoking as PRN      methocarbamol (ROBAXIN) 500 MG tablet Take 500 mg by mouth every 6 hours as needed for muscle spasms.      multivitamin w/minerals (MULTI-VITAMIN) tablet Take 1 tablet by mouth daily      Omega-3 Fatty Acids (FISH OIL PO) Take 1 capsule by mouth daily      polyethylene glycol-propylene glycol (SYSTANE ULTRA) 0.4-0.3 % SOLN ophthalmic solution Place 1 drop into both eyes 2 times daily as needed for dry eyes      pregabalin (LYRICA) 100 MG capsule Take 100 mg by mouth 3 times daily.       "senna-docusate (SENOKOT-S/PERICOLACE) 8.6-50 MG tablet Take 1-2 tablets by mouth 2 times daily Take while on oral narcotics to prevent or treat constipation. 100 tablet 0       Allergies   Allergen Reactions    Wasp Venom Protein Anaphylaxis    Bee Anaphylaxis    Erythromycin Hives    Wasps [Hornets] Anaphylaxis    Shellfish Allergy Nausea and Vomiting and Rash    Shellfish-Derived Products Rash and Nausea and Vomiting        Social History     Tobacco Use    Smoking status: Former     Current packs/day: 0.00     Types: Cigarettes     Start date: 1976     Quit date: 2023     Years since quittin.3     Passive exposure: Never    Smokeless tobacco: Never    Tobacco comments:     I am currently on Chantix   Substance Use Topics    Alcohol use: Yes     Comment: occasionally     History   Drug use: Unknown    Types: Marijuana         Review of Systems   Constitutional:  Negative for chills, fatigue and fever.   HENT:  Negative for congestion, rhinorrhea and sore throat.    Eyes:  Positive for itching.   Respiratory:  Negative for cough, chest tightness, shortness of breath and wheezing.    Cardiovascular:  Negative for chest pain, palpitations and leg swelling.   Gastrointestinal:  Positive for constipation. Negative for abdominal pain, diarrhea, nausea and vomiting.   Genitourinary:  Negative for dysuria, frequency and urgency.   Musculoskeletal:  Positive for back pain.   Skin:  Negative for rash and wound.   Allergic/Immunologic: Positive for environmental allergies.   Neurological:  Positive for weakness and numbness. Negative for light-headedness and headaches.        Numbness, weakness in left leg      Objective    /80 (BP Location: Right arm, Patient Position: Sitting, Cuff Size: Adult Large)   Pulse 78   Resp 12   Ht 1.562 m (5' 1.5\")   Wt 107 kg (236 lb)   LMP 2011   SpO2 95%   BMI 43.87 kg/m     Estimated body mass index is 43.87 kg/m  as calculated from the following:    Height " "as of this encounter: 1.562 m (5' 1.5\").    Weight as of this encounter: 107 kg (236 lb).    Physical Exam  GENERAL: alert and no distress  EYES: Eyes grossly normal to inspection, PERRL and conjunctivae and sclerae normal  HENT: ear canals and TM's normal, nose and mouth without ulcers or lesions  NECK: no adenopathy, no asymmetry, masses, or scars  RESP: lungs clear to auscultation - no rales, rhonchi or wheezes  CV: regular rate and rhythm, normal S1 S2, no S3 or S4, no murmur, click or rub, no peripheral edema  ABDOMEN: soft, nontender, no hepatosplenomegaly, no masses and bowel sounds normal  MS: no gross musculoskeletal defects noted, no edema  SKIN: no suspicious lesions or rashes  NEURO: Normal strength and tone, mentation intact and speech normal  PSYCH: mentation appears normal, affect normal/bright    Recent Labs   Lab Test 02/06/25  1137 12/12/24  1054   HGB 13.7 13.0     --     139   POTASSIUM 4.8 4.5   CR 0.62 0.59   A1C  --  6.6*      Diagnostics  Recent Results (from the past 24 hours)   CBC with platelets    Collection Time: 05/12/25  3:36 PM   Result Value Ref Range    WBC Count 7.7 4.0 - 11.0 10e3/uL    RBC Count 4.40 3.80 - 5.20 10e6/uL    Hemoglobin 13.0 11.7 - 15.7 g/dL    Hematocrit 40.6 35.0 - 47.0 %    MCV 92 78 - 100 fL    MCH 29.5 26.5 - 33.0 pg    MCHC 32.0 31.5 - 36.5 g/dL    RDW 12.4 10.0 - 15.0 %    Platelet Count 277 150 - 450 10e3/uL      No EKG this visit, completed in the last 90 days.    Revised Cardiac Risk Index (RCRI)  The patient has the following serious cardiovascular risks for perioperative complications:   - No serious cardiac risks = 0 points     RCRI Interpretation: 0 points: Class I (very low risk - 0.4% complication rate)     Signed Electronically by: JOSE R Quach CNP  A copy of this evaluation report is provided to the requesting physician.    "

## 2025-05-12 NOTE — PATIENT INSTRUCTIONS
At [facility], we strive to deliver an exceptional experience to you, every time we see you. If you receive a survey, please let us know what we are doing well and/or what we could improve upon, as we do value your feedback.  If you have MyChart, you can expect to receive results automatically within 24 hours of their completion.  Your provider will send a note interpreting your results as well.   If you do not have MyChart, you should receive your results in about a week by mail.    Your care team:                            Family Medicine Internal Medicine   Torres Wu, MD David Briceño, MD Nadine Koehler, MD Wong Hernandez, MD Sandrita Doyle, PA-C    Los Razo, MD Pediatrics   Nena Dudley, MD Vero Gupta, MD Valentine Dumont, APRN CNP Nan ODELL CNP   Quan Mccarthy, MD Alexus Chavis, MD Danita Shoemaker, CNP     Saray Tsai, PA-C Same-Day Provider (No follow-up visits)   JOSE R Chacon, TAMEKA Horta, PA-C    Jordana Ocampo PA-C     Clinic hours: Monday - Thursday 7 am-6 pm; Fridays 7 am-5 pm.   Urgent care: Monday - Friday 10 am- 8 pm; Saturday and Sunday 9 am-5 pm.    Clinic: [phone]       [facility]: Monday - Thursday 8 am - 7 pm; Friday 8 am - 6 pm  [facility]: [phone]      Antiplatelet or Anticoagulation Medication Instructions   - We reviewed the medication list and the patient is not on an antiplatelet or anticoagulation medications.    Additional Medication Instructions  Take all scheduled medications on the day of surgery EXCEPT for modifications listed below:   - Herbal medications and vitamins: DO NOT TAKE 14 days prior to surgery.   - ACE/ARB/ARNI (lisinopril, enalapril, losartan, valsartan, olmesartan, sacubritril/valsartan) : DO NOT TAKE on day of surgery (minimum 11 hours for general anesthesia).   - diclofenac (Voltaren): Continue to  hold   - cannabis, marijuana: DO NOT TAKE night before surgery.   - rescue Inhaler: Continue PRN. Bring to hospital on the day of surgery.    Patient Education   Preparing for Your Surgery  For Adults  Getting started  In most cases, a nurse will call to review your health history and instructions. They will give you an arrival time based on your scheduled surgery time. Please be ready to share:  Your doctor's clinic name and phone number  Your medical, surgical, and anesthesia history  A list of allergies and sensitivities  A list of medicines, including herbal treatments and over-the-counter drugs  Whether the patient has a legal guardian (ask how to send us the papers in advance)  Note: You may not receive a call if you were seen at our PAC (Preoperative Assessment Center).  Please tell us if you're pregnant--or if there's any chance you might be pregnant. Some surgeries may injure a fetus (unborn baby), so they require a pregnancy test. Surgeries that are safe for a fetus don't always need a test, and you can choose whether to have one.   Preparing for surgery  Within 10 to 30 days of surgery: Have a pre-op exam (sometimes called an H&P, or History and Physical). This can be done at a clinic or pre-operative center.  If you're having a , you may not need this exam. Talk to your care team.  At your pre-op exam, talk to your care team about all medicines you take. (This includes CBD oil and any drugs, such as THC, marijuana, and other forms of cannabis.) If you need to stop any medicine before surgery, ask when to start taking it again.  This is for your safety. Many medicines and drugs can make you bleed too much during surgery. Some change how well surgery (anesthesia) drugs work.  Call your insurance company to let them know you're having surgery. (If you don't have insurance, call [phone].)  Call your clinic if there's any change in your health. This includes a scrape or scratch near the surgery  site, or any signs of a cold (sore throat, runny nose, cough, rash, fever).  Eating and drinking guidelines  For your safety: Unless your surgeon tells you otherwise, follow the guidelines below.  Eat and drink as normal until 8 hours before you arrive for surgery. After that, no food or milk. You can spit out gum when you arrive.  Drink clear liquids until 2 hours before you arrive. These are liquids you can see through, like water, Gatorade, and Propel Water. They also include plain black coffee and tea (no cream or milk).  No alcohol for 24 hours before you arrive. The night before surgery, stop any drinks that contain THC.  If your care team tells you to take medicine on the morning of surgery, it's okay to take it with a sip of water. No other medicines or drugs are allowed (including CBD oil)--follow your care team's instructions.  If you have questions the day of surgery, call your hospital or surgery center.   Preventing infection  Shower or bathe the night before and the morning of surgery. Follow the instructions your clinic gave you. (If no instructions, use regular soap.)  Don't shave or clip hair near your surgery site. We'll remove the hair if needed.  Don't smoke or vape the morning of surgery. No chewing tobacco for 6 hours before you arrive. A nicotine patch is okay. You may spit out nicotine gum when you arrive.  For some surgeries, the surgeon will tell you to fully quit smoking and nicotine.  We will make every effort to keep you safe from infection. We will:  Clean our hands often with soap and water (or an alcohol-based hand rub).  Clean the skin at your surgery site with a special soap that kills germs.  Give you a special gown to keep you warm. (Cold raises the risk of infection.)  Wear hair covers, masks, gowns, and gloves during surgery.  Give antibiotic medicine, if prescribed. Not all surgeries need this medicine.  What to bring on the day of surgery  Photo ID and insurance card  Copy of  your health care directive, if you have one  Glasses and hearing aids (bring cases)  You can't wear contacts during surgery  Inhaler and eye drops, if you use them (tell us about these when you arrive)  CPAP machine or breathing device, if you use them  A few personal items, if spending the night  If you have . . .  A pacemaker, ICD (cardiac defibrillator), or other implant: Bring the ID card.  An implanted stimulator: Bring the remote control.  A legal guardian: Bring a copy of the certified (court-stamped) guardianship papers.  Please remove any jewelry, including body piercings. Leave jewelry and other valuables at home.  If you're going home the day of surgery  You must have a responsible adult drive you home. They should stay with you overnight as well.  If you don't have someone to stay with you, and you aren't safe to go home alone, we may keep you overnight. Insurance often won't pay for this.  After surgery  If it's hard to control your pain or you need more pain medicine, please call your surgeon's office.  Questions?   If you have any questions for your care team, list them here:   ____________________________________________________________________________________________________________________________________________________________________________________________________________________________________________________________  For informational purposes only. Not to replace the advice of your health care provider. Copyright   2003, 2019 [facility]. All rights reserved. Clinically reviewed by Tay Corey MD. [employer] [id number] - REV 08/24.

## 2025-05-13 ENCOUNTER — MEDICAL CORRESPONDENCE (OUTPATIENT)
Dept: HEALTH INFORMATION MANAGEMENT | Facility: CLINIC | Age: 65
End: 2025-05-13
Payer: COMMERCIAL

## 2025-05-13 NOTE — TELEPHONE ENCOUNTER
Received provider signed Order NO:[id number] form and faxed to [facility], [phone], right fax confirmed at 8:37 am today, 5/13/2025  Sent to abstracting  Teri Olvera MA/  [facility]   Primary Care

## 2025-05-14 ENCOUNTER — PATIENT OUTREACH (OUTPATIENT)
Dept: CARE COORDINATION | Facility: CLINIC | Age: 65
End: 2025-05-14
Payer: COMMERCIAL

## 2025-05-14 NOTE — PROGRESS NOTES
Patient calling with the Hospital name and fax # to fax the pre op notes to. Printed pre op visit notes to [facility], [phone], right fax confirmed at 3:10 pm today, 5/14/2025. Placed in writer's copy basket.  Teri Olvera MA/  [facility]   Primary Care

## 2025-05-26 NOTE — PROGRESS NOTES
Clinic Care Coordination Contact  OUTREACH    Referral Information:  Referral Source: [facility]  Reason for Contact: RN CC call to patient for follow up.  Care Conference: No     Universal Utilization:   ED Visits in last year: 0  Hospital visits in last year: 1  Last PCP appointment: 05/12/17  Missed Appointments: 0     Multiple Providers or Specialists: neurosurgery    Clinical Concerns:  Current Medical Concerns: Patient has completed her antibiotics and reports continued improvement in her shortness of breath, cough and sinus pressure.  Patient reports shortness of breath with moderate exertion such as bending over to plant her flowers or climbing stairs.  Patient remains smoke free.    Current Behavioral Concerns: n/a    Education Provided to patient: RN CC provided ongoing education regarding pacing herself during activities and to call with new, worsening or persistent symptoms.      Clinical Pathway: None    Medication Management:  Patient independent in medication management and verbalizes adherence and understanding of medication regimen.       Functional Status:  Mobility Status: Independent  Equipment Currently Used at Home: none  Transportation: Patient drives.           Psychosocial:  Current living arrangement:: I live in a private home with spouse  Financial/Insurance: no concerns.       Resources and Interventions:  Current Resources:  ;          Advanced Care Plans/Directives on file:: No        Goals:   Goal 1 Statement: I will finish my antibiotics as prescribed.  Goal 1 Progression Percent: 100%  Goal 1 Progression Date: 05/26/17  Goal 2 Statement: I will remain smoke free  Goal 2 Progression Percent: 100%  Goal 2 Progression Date: 05/26/17              Barriers: Long history of smoking.  Strengths: Patient motivated to get better, wants to remain smoke free so she no longer feels short of breath.  Patient/Caregiver understanding: Patient verbalized understanding regarding the importance of rest.   Pt given d/c instructions and education. Pt verbalized understanding and all questions and concerns have been addressed and answered at this time. F/u care reviewed with patient. Pt is stable, alert and oriented X4/4 and ambulatory out of ED with steady gait. IV removed prior to dc. Patient verified they have all personal belongings.     Frequency of Care Coordination: weekly  Upcoming appointment:  (none scheduled)     Plan:   Patient will continue to follow treatment plan as directed and follow up with PCP with concerns ongoing.   Patient will remain smoke free.  Patient will call clinic for new, worsening or persistent symptoms.  RN CC will follow up with patient in 2 weeks.    Melissa Behl BSN, RN, PHN  [facility] Care Coordinator  [phone]  [email or web address]

## 2025-06-02 ENCOUNTER — ENROLLMENT (OUTPATIENT)
Dept: HOME HEALTH SERVICES | Facility: HOME HEALTH | Age: 65
End: 2025-06-02
Payer: COMMERCIAL

## 2025-06-02 DIAGNOSIS — M43.16 SPONDYLOLISTHESIS OF LUMBAR REGION: Primary | ICD-10-CM

## 2025-06-04 ENCOUNTER — MEDICAL CORRESPONDENCE (OUTPATIENT)
Dept: HEALTH INFORMATION MANAGEMENT | Facility: CLINIC | Age: 65
End: 2025-06-04

## 2025-06-04 ENCOUNTER — TELEPHONE (OUTPATIENT)
Dept: FAMILY MEDICINE | Facility: CLINIC | Age: 65
End: 2025-06-04
Payer: COMMERCIAL

## 2025-06-04 NOTE — TELEPHONE ENCOUNTER
Home Care is calling regarding an established patient with [facility].  Requesting orders from: Covering provider or provider willing to sign off on orders. PCP recently retired from practice.  PROVIDER AUTHORIZATION REQUIRED: RN unable to provide verbal approval for all orders.  See below for additional information.  RN will contact Home care with information after provider review.  Is this a request for a temporary pause in the home care episode?  No    Orders Requested    Skilled Nursing  Request for initial certification (first set of orders)   Frequency: 1 x wk x 1 wk; 2 x wk x 2 wks; 1 x wk x 5 wks  RN gave verbal order: No: PCP no longer with clinic, recently retired. Verify who will follow and sign off on orders.      Okay to leave a detailed message?: Yes    Contacts       Contact Date/Time Type Contact Phone/Fax    06/04/2025 01:43 PM CDT Phone (Incoming) Kayla Crain RN with [facility] (Home Care) [phone]     confidential line,okay to leave detailed message            Michelle Garcia RN  Clinical Triage/Primary Care  [facility]

## 2025-06-05 NOTE — TELEPHONE ENCOUNTER
PCP has left the organization.  Patient will need to establish with new provider.  Can see if Danita Shoemaker would be will to sign and follow as well.    Nadine De Los Santos MD

## 2025-06-05 NOTE — TELEPHONE ENCOUNTER
Called [facility]. Relayed note that Danita Shoemaker will be signing home care orders.     Jose Manuel Amador RN, BSN

## 2025-06-09 ENCOUNTER — OFFICE VISIT (OUTPATIENT)
Dept: FAMILY MEDICINE | Facility: CLINIC | Age: 65
End: 2025-06-09
Payer: COMMERCIAL

## 2025-06-09 VITALS
SYSTOLIC BLOOD PRESSURE: 119 MMHG | BODY MASS INDEX: 42.01 KG/M2 | WEIGHT: 226 LBS | TEMPERATURE: 96.8 F | DIASTOLIC BLOOD PRESSURE: 74 MMHG | RESPIRATION RATE: 22 BRPM | OXYGEN SATURATION: 96 % | HEART RATE: 76 BPM

## 2025-06-09 DIAGNOSIS — I10 ESSENTIAL HYPERTENSION WITH GOAL BLOOD PRESSURE LESS THAN 140/90: Primary | ICD-10-CM

## 2025-06-09 DIAGNOSIS — D62 ANEMIA DUE TO BLOOD LOSS, ACUTE: ICD-10-CM

## 2025-06-09 LAB
ERYTHROCYTE [DISTWIDTH] IN BLOOD BY AUTOMATED COUNT: 14 % (ref 10–15)
HCT VFR BLD AUTO: 35 % (ref 35–47)
HGB BLD-MCNC: 11.5 G/DL (ref 11.7–15.7)
MCH RBC QN AUTO: 29 PG (ref 26.5–33)
MCHC RBC AUTO-ENTMCNC: 32.9 G/DL (ref 31.5–36.5)
MCV RBC AUTO: 88 FL (ref 78–100)
PLATELET # BLD AUTO: 269 10E3/UL (ref 150–450)
RBC # BLD AUTO: 3.97 10E6/UL (ref 3.8–5.2)
WBC # BLD AUTO: 8.4 10E3/UL (ref 4–11)

## 2025-06-09 PROCEDURE — 1111F DSCHRG MED/CURRENT MED MERGE: CPT

## 2025-06-09 PROCEDURE — 82728 ASSAY OF FERRITIN: CPT

## 2025-06-09 PROCEDURE — 80048 BASIC METABOLIC PNL TOTAL CA: CPT

## 2025-06-09 PROCEDURE — 83550 IRON BINDING TEST: CPT

## 2025-06-09 PROCEDURE — 3078F DIAST BP <80 MM HG: CPT

## 2025-06-09 PROCEDURE — 1125F AMNT PAIN NOTED PAIN PRSNT: CPT

## 2025-06-09 PROCEDURE — 99214 OFFICE O/P EST MOD 30 MIN: CPT | Mod: 25

## 2025-06-09 PROCEDURE — G2211 COMPLEX E/M VISIT ADD ON: HCPCS

## 2025-06-09 PROCEDURE — 83540 ASSAY OF IRON: CPT

## 2025-06-09 PROCEDURE — 85027 COMPLETE CBC AUTOMATED: CPT

## 2025-06-09 PROCEDURE — 36415 COLL VENOUS BLD VENIPUNCTURE: CPT

## 2025-06-09 PROCEDURE — 3074F SYST BP LT 130 MM HG: CPT

## 2025-06-09 RX ORDER — TIZANIDINE 2 MG/1
2-4 TABLET ORAL 3 TIMES DAILY PRN
COMMUNITY
Start: 2025-05-25

## 2025-06-09 RX ORDER — OXYCODONE HYDROCHLORIDE 5 MG/1
5 TABLET ORAL EVERY 6 HOURS PRN
COMMUNITY
Start: 2025-03-25

## 2025-06-09 RX ORDER — LOSARTAN POTASSIUM 25 MG/1
25 TABLET ORAL DAILY
Qty: 90 TABLET | Refills: 1 | Status: SHIPPED | OUTPATIENT
Start: 2025-06-09

## 2025-06-09 ASSESSMENT — PAIN SCALES - GENERAL: PAINLEVEL_OUTOF10: MODERATE PAIN (6)

## 2025-06-09 NOTE — PATIENT INSTRUCTIONS
At [facility], we strive to deliver an exceptional experience to you, every time we see you. If you receive a survey, please let us know what we are doing well and/or what we could improve upon, as we do value your feedback.  If you have MyChart, you can expect to receive results automatically within 24 hours of their completion.  Your provider will send a note interpreting your results as well.   If you do not have MyChart, you should receive your results in about a week by mail.    Your care team:                            Family Medicine Internal Medicine   MD David Wagoner, MD Nadine Koehler, MD Wong Hernandez, MD Sandrita Doyle, PA-C JOSE R Chacon, TAMEKA Razo, MD Pediatrics   MD Vero Alejo, MD Saray Tsai, PAADEN Kovacs APRN CNP   MD Alexus Richardson, MD Danita Shoemaker, CNP      Same-Day Provider (No follow-up visits)    OLIVIA Chester PA-C     Clinic hours: Monday - Thursday 7 am-6 pm; Fridays 7 am-5 pm.   Urgent care: Monday - Friday 10 am- 8 pm; Saturday and Sunday 9 am-5 pm.    Clinic: [phone]       [facility]: Monday - Thursday 8 am - 7 pm; Friday 8 am - 6 pm  [facility]: [phone]     [facility] Scheduling: Monday - Friday 7 am - 7 pm; Saturday 7 am - 3:30 pm  ([phone]) [place] : [phone]

## 2025-06-09 NOTE — PROGRESS NOTES
"  Assessment & Plan     Essential hypertension with goal blood pressure less than 140/90  - BP labile during hospitalization. BP is well controlled on 25 mg losartan. Continue home monitoring.   - losartan (COZAAR) 25 MG tablet  Dispense: 90 tablet; Refill: 1    Anemia due to blood loss, acute  - S/P tear of left iliac vein during surgery, repaired by vascular surgery. Received 1 unit PRBCs during hospitalization. Hemoglobin 10 at hospital discharge, will recheck today.  - CBC with platelets  - Basic metabolic panel  (Ca, Cl, CO2, Creat, Gluc, K, Na, BUN)  - Ferritin  - Iron and iron binding capacity    MED REC REQUIRED  Post Medication Reconciliation Status: discharge medications reconciled, continue medications without change  BMI  Estimated body mass index is 42.01 kg/m  as calculated from the following:    Height as of 5/12/25: 1.562 m (5' 1.5\").    Weight as of this encounter: 102.5 kg (226 lb).   Weight management plan: Discussed healthy diet and exercise guidelines    Return to care if symptoms worsen or fail to improve.    Davida Aragon is a 65 year old, presenting for the following health issues:  Hospital F/U        6/9/2025     3:15 PM   Additional Questions   Roomed by Bre   Accompanied by Spouse     HPI      Hospital Follow-up Visit:    Hospital/Nursing Home/IP Rehab Facility: [facility]   Date of Admission: 5/15/25  Date of Discharge: 6/3/25  Reason(s) for Admission: Procedure  Do you have any other stressors you would like to discuss with your provider? No    Problems taking medications regularly:  None  Medication changes since discharge: None  Problems adhering to non-medication therapy:  None    Summary of hospitalization:  CareEverywhere information obtained and reviewed  Diagnostic Tests/Treatments reviewed.  Repeat CBC  Other Healthcare Providers Involved in Patient s Care:         . Scheduled with Dr. Barba, follow up with vascular.     From Hospital Discharge Summary:  Ms. Margo CORONA " Storm is a(n) 65 y.o. with a history of lumbar spinal stenosis, history of L3-5 decompression with L5-S1 TLIF -she continued to have subsequent ongoing pain and paresthesias and decision was made to proceed with spine surgery to remove old hardware/revise anterior lumbar interbody fusion.     Patient plan to proceed with L5-S1 anterior lumbar interbody fusion procedure on 5/15/2025 per Dr. Barba but this was aborted, in setting of incidental tear of left iliac vein. Control of bleeding was secured after vascular surgery performed repair and placement of left iliac vein stent/graft. It was not felt safe to proceed with any further attempt to remove the interbody cage at that time therefore spine procedure was stopped.    Post repair of iliac vein tear patient was admitted to the ICU for close monitoring. Started on anticoagulation as per vascular surgery recommendations.    Patient initially had been recovering well though she required blood transfusion 1 unit PRBCs on 5/19 with hemoglobin 7.9 and symptoms of weakness, fatigue and hypotension. Hemoglobin 9.9 posttransfusion on recheck.    Pain service consulted on 5/20 with difficulty with pain control, chronic pain.    Hemoglobin 8.6 on 5/22, previously 10.1 on 5/21. Patient with continued significant pain, discomfort all over. CT abdomen/pelvis ordered to evaluate further and did reveal a large, complex fluid collection along the left pelvic sidewall that appears to be an evolving hematoma.     Patient had an episode of chest pain 5/25 described as a substernal burning with radiation to her jaw and associated shortness of breath. Pain occurred while she was walking back from the bathroom, and lasted approximately 15 minutes and resolved. Patient initially felt it was heartburn, but she has never had severe jaw pain with it. EKG without acute ST changes, repeat unchanged. Troponin 15, repeat 10 . Patient was bradycardic as well, she has a hard time relaying  dizziness symptoms as she states she is sometimes, but seems to be with positional changes. Patient is on high dose of narcotics that may be contributing to bradycardia. TTE ordered and cardiology consulted to address chest pain and bradycardia. TTE revealed normal LV systolic function and no WMA, diastolic dysfunction and mild aortic stenosis identified. Cardiology obtained coronary CTA revealed mild, non-obstructive CAD. Cardiology recommended continuing aspirin and statin and BP control.     Patient developed epistaxis on Xarelto after blowing her nose hard overnight 5/26, SBP in the 140's during bleed, which has resolved.    BP significantly elevated, discussed discontinuing steroids with ortho and steroid discontinued. Patient continued on amlodipine and her home losartan. Discussed checking her blood pressure prior to taking medications and holding amlodipine if SBP <120. She will follow up with her PCP next week.     Patient then became hypotensive in the afternoon of 5/28 and rapid response called. BP as low as 72/37. Patient given a 1L fluid bolus and repeat /59. Oxygen saturation 97% on RA. CBC revealed significantly elevated WBC to 16.7, Hgb stable and 11.3, plt elevated to 505, lactate 1.0. CMP with normal Cr and elevated BUN consistent with probable dehydration. CXR is clear and UA revealed pyuria, mild hematuria and no bacteruria. Blood cultures obtained and patient started on IV vancomycin and piperacillin-tazobactam with concern for sepsis. CT abdomen and pelvis with IV contrast to assess for infection, possible infected hematoma, and did reveal a mild decreased, persistent, complex fluid collection in the left pelvis that is associated with the left iliac vein, stent in place. The gas density is indeterminate and infection could not be excluded.    ID consulted, discussed aspiration of fluid collection with surgery, vascular surgery and ID. US guided fluid aspiration on 5/29 per IR. Cultures  in process -so far no growth. Patient monitor closely on IV antibiotics.    On 6/2 patient was reassessed per ID. At this time given negative cultures and overall stability and improvement. She was recommended to complete an additional 7 days of oral cephalexin 1gm TID x7 days.     On 6/3/2025 Patient was determined to be stable for discharge home with home care and family support       Update since discharge: improved. No fevers. Pain is well managed with oxycodone per surgery. BP stable at home, has been checking daily. Increased fatigue since discharge from hospital. No dizziness, dyspnea, chest pain. Abdominal incision is clean, dry, and intact. Steri-strips in place.     Plan of care communicated with patient    Review of Systems  Constitutional, HEENT, cardiovascular, pulmonary, gi and gu systems are negative, except as otherwise noted.      Objective    /74 (BP Location: Right arm, Patient Position: Sitting, Cuff Size: Adult Large)   Pulse 76   Temp 96.8  F (36  C) (Temporal)   Resp 22   Wt 102.5 kg (226 lb)   LMP 09/16/2011   SpO2 96%   BMI 42.01 kg/m    Body mass index is 42.01 kg/m .    Physical Exam   GENERAL: alert and no distress  NECK: no adenopathy, no asymmetry, masses, or scars  RESP: lungs clear to auscultation - no rales, rhonchi or wheezes  CV: regular rate and rhythm, normal S1 S2, no S3 or S4, no murmur, click or rub, no peripheral edema  ABDOMEN: soft, nontender, no hepatosplenomegaly, no masses and bowel sounds normal  MS: no gross musculoskeletal defects noted, no edema  SKIN: Midline abdominal incision clean, dry, intact. No drainage, induration or erythema. Steri-strips in place.     The longitudinal plan of care for the diagnosis(es)/condition(s) as documented were addressed during this visit. Due to the added complexity in care, I will continue to support Margo in the subsequent management and with ongoing continuity of care.     Signed Electronically by: Danita Shoemaker,  APRN CNP

## 2025-06-10 ENCOUNTER — RESULTS FOLLOW-UP (OUTPATIENT)
Dept: FAMILY MEDICINE | Facility: CLINIC | Age: 65
End: 2025-06-10
Payer: COMMERCIAL

## 2025-06-10 LAB
ANION GAP SERPL CALCULATED.3IONS-SCNC: 12 MMOL/L (ref 7–15)
BUN SERPL-MCNC: 7.9 MG/DL (ref 8–23)
CALCIUM SERPL-MCNC: 10.1 MG/DL (ref 8.8–10.4)
CHLORIDE SERPL-SCNC: 101 MMOL/L (ref 98–107)
CREAT SERPL-MCNC: 0.61 MG/DL (ref 0.51–0.95)
EGFRCR SERPLBLD CKD-EPI 2021: >90 ML/MIN/1.73M2
FERRITIN SERPL-MCNC: 130 NG/ML (ref 11–328)
GLUCOSE SERPL-MCNC: 113 MG/DL (ref 70–99)
HCO3 SERPL-SCNC: 27 MMOL/L (ref 22–29)
IRON BINDING CAPACITY (ROCHE): 377 UG/DL (ref 240–430)
IRON SATN MFR SERPL: 13 % (ref 15–46)
IRON SERPL-MCNC: 50 UG/DL (ref 37–145)
POTASSIUM SERPL-SCNC: 5 MMOL/L (ref 3.4–5.3)
SODIUM SERPL-SCNC: 140 MMOL/L (ref 135–145)

## 2025-06-10 NOTE — LETTER
Luma 10, 2025      Margo Inman  [address] [address]        Dear ,    We are writing to inform you of your test results.    I have reviewed your results. Your electrolytes and kidney function are stable. Hemoglobin is 11.5, which has improved since your hospital discharge. Your iron level is slightly low- because iron tablets can be pretty constipating, I recommend just dietary sources of iron for now- be sure to get some red meat, beans, lentils, and dark, leafy greens as these are all good iron sources.    Resulted Orders   CBC with platelets   Result Value Ref Range    WBC Count 8.4 4.0 - 11.0 10e3/uL    RBC Count 3.97 3.80 - 5.20 10e6/uL    Hemoglobin 11.5 (L) 11.7 - 15.7 g/dL    Hematocrit 35.0 35.0 - 47.0 %    MCV 88 78 - 100 fL    MCH 29.0 26.5 - 33.0 pg    MCHC 32.9 31.5 - 36.5 g/dL    RDW 14.0 10.0 - 15.0 %    Platelet Count 269 150 - 450 10e3/uL   Basic metabolic panel  (Ca, Cl, CO2, Creat, Gluc, K, Na, BUN)   Result Value Ref Range    Sodium 140 135 - 145 mmol/L    Potassium 5.0 3.4 - 5.3 mmol/L    Chloride 101 98 - 107 mmol/L    Carbon Dioxide (CO2) 27 22 - 29 mmol/L    Anion Gap 12 7 - 15 mmol/L    Urea Nitrogen 7.9 (L) 8.0 - 23.0 mg/dL    Creatinine 0.61 0.51 - 0.95 mg/dL    GFR Estimate >90 >60 mL/min/1.73m2      Comment:      eGFR calculated using 2021 CKD-EPI equation.    Calcium 10.1 8.8 - 10.4 mg/dL    Glucose 113 (H) 70 - 99 mg/dL   Ferritin   Result Value Ref Range    Ferritin 130 11 - 328 ng/mL   Iron and iron binding capacity   Result Value Ref Range    Iron 50 37 - 145 ug/dL    Iron Binding Capacity 377 240 - 430 ug/dL    Iron Sat Index 13 (L) 15 - 46 %       If you have any questions or concerns, please call the clinic at the number listed above.       Sincerely,      Danita Shoemaker, JOSE R CNP    Electronically signed

## 2025-06-10 NOTE — TELEPHONE ENCOUNTER
RN spoke with pt and relayed provider message, pt verbalized understanding and is agreeable with plan.     Hayley Astudillo RN    _____________________________________________  Please call patient.      Ephraim Aragon,      I have reviewed your results. Your electrolytes and kidney function are stable. Hemoglobin is 11.5, which has improved since your hospital discharge. Your iron level is slightly low- because iron tablets can be pretty constipating, I recommend just dietary sources of iron for now- be sure to get some red meat, beans, lentils, and dark, leafy greens as these are all good iron sources.      Please let me know if you have any questions or concerns. Have a great day!     JOSE R Quach CNP

## 2025-06-20 ENCOUNTER — TELEPHONE (OUTPATIENT)
Dept: FAMILY MEDICINE | Facility: CLINIC | Age: 65
End: 2025-06-20
Payer: COMMERCIAL

## 2025-06-20 NOTE — TELEPHONE ENCOUNTER
Forms/Letter Request     Type of form/letter: [facility] - Order   Order NO:[id number]        Do we have the form/letter: Yes: Placed in Valentine Thein's box     Who is the form from? Home care     Where did/will the form come from? form was faxed in     When is form/letter needed by: 5-7 business days     How would you like the form/letter returned: Fax : [phone]     Patient Notified form requests are processed in 5-7 business days:Yes         no

## 2025-06-23 NOTE — TELEPHONE ENCOUNTER
Form faxed  Teri Olvera MA/OSBALDO    Received provider signed Order NO:[id number] form and faxed to [facility], [phone], right fax confirmed at 2:11 pm today, 6/23/2025. Copy to TC and abstracting.  Teri Olvera MA/  [facility]   Primary Care

## 2025-06-25 ENCOUNTER — TELEPHONE (OUTPATIENT)
Dept: FAMILY MEDICINE | Facility: CLINIC | Age: 65
End: 2025-06-25
Payer: COMMERCIAL

## 2025-06-25 NOTE — TELEPHONE ENCOUNTER
Forms/Letter Request    Type of form/letter: Home Health Certification and Plan of Care Order #: [id number] Cert Period: 5/4/2025-9/2/2025      Do we have the form/letter: Yes:     Who is the form from? [facility] (if other please explain)    Where did/will the form come from? form was faxed in    When is form/letter needed by: ASAP    How would you like the form/letter returned: Fax : [phone]    Patient Notified form requests are processed in 5-7 business days:Yes    Could we send this information to you in [employer] or would you prefer to receive a phone call?:   Patient would prefer a phone call   Okay to leave a detailed message?: Yes at Cell number on file:    Telephone Information:   Mobile [phone]

## 2025-06-26 ENCOUNTER — VIRTUAL VISIT (OUTPATIENT)
Dept: FAMILY MEDICINE | Facility: CLINIC | Age: 65
End: 2025-06-26
Payer: COMMERCIAL

## 2025-06-26 ENCOUNTER — NURSE TRIAGE (OUTPATIENT)
Dept: FAMILY MEDICINE | Facility: CLINIC | Age: 65
End: 2025-06-26

## 2025-06-26 DIAGNOSIS — I10 ESSENTIAL HYPERTENSION WITH GOAL BLOOD PRESSURE LESS THAN 140/90: ICD-10-CM

## 2025-06-26 RX ORDER — LOSARTAN POTASSIUM 50 MG/1
50 TABLET ORAL DAILY
Qty: 90 TABLET | Refills: 0 | Status: SHIPPED | OUTPATIENT
Start: 2025-06-26

## 2025-06-26 NOTE — PROGRESS NOTES
Margo is a 65 year old who is being evaluated via a billable video visit.    How would you like to obtain your AVS? [employer]  If the video visit is dropped, the invitation should be resent by: Text to cell phone: [phone]  Will anyone else be joining your video visit? No    Assessment & Plan     Essential hypertension with goal blood pressure less than 140/90  - Home BP readings out of goal range. Today she had a reading of 208/102, asymptomatic. Increase losartan to 50 mg daily. Continue home monitoring, follow up with [employer] message in 2-3 weeks.   - losartan (COZAAR) 50 MG tablet  Dispense: 90 tablet; Refill: 0    Follow up in 2-3 weeks with home BP readings.    Subjective   Margo is a 65 year old, presenting for the following health issues:  Hypertension        6/26/2025    12:08 PM   Additional Questions   Roomed by ashli   Accompanied by self         6/26/2025    12:08 PM   Patient Reported Additional Medications   Patient reports taking the following new medications none     History of Present Illness       Hypertension: She presents for follow up of hypertension.  She does check blood pressure  regularly outside of the clinic. Outside blood pressures have been over 140/90. She follows a low salt diet.     She eats 2-3 servings of fruits and vegetables daily.She consumes 0 sweetened beverage(s) daily.She exercises with enough effort to increase her heart rate 9 or less minutes per day.  She exercises with enough effort to increase her heart rate 3 or less days per week.   She is taking medications regularly.      Most recent /87. Home readings between 140s-150s. Reading with home care /102 this morning shortly after taking her losartan.   Asymptomatic. No headache, dizziness, lightheaded. Postoperative pain managed well.     Review of Systems  Constitutional, HEENT, cardiovascular, pulmonary, gi and gu systems are negative, except as otherwise noted.      Objective    Vitals - Patient  "Reported  Weight (Patient Reported): 102.5 kg (226 lb)  Height (Patient Reported): 156.2 cm (5' 1.5\")  BMI (Based on Pt Reported Ht/Wt): 42.01  Pain Score: Moderate Pain (4)  Pain Loc: Low Back    Physical Exam   GENERAL: alert and no distress  EYES: Eyes grossly normal to inspection.  No discharge or erythema, or obvious scleral/conjunctival abnormalities.  RESP: No audible wheeze, cough, or visible cyanosis.    SKIN: Visible skin clear. No significant rash, abnormal pigmentation or lesions.  NEURO: Cranial nerves grossly intact.  Mentation and speech appropriate for age.  PSYCH: Appropriate affect, tone, and pace of words      Video-Visit Details    Type of service:  Video Visit   Originating Location (pt. Location): Home  Distant Location (provider location):  On-site  Platform used for Video Visit: Francine    The longitudinal plan of care for the diagnosis(es)/condition(s) as documented were addressed during this visit. Due to the added complexity in care, I will continue to support Margo in the subsequent management and with ongoing continuity of care.     Signed Electronically by: JOSE R Quach CNP    "

## 2025-06-26 NOTE — TELEPHONE ENCOUNTER
Provider Response to 2nd Level Triage Request    I have reviewed the RN documentation. My recommendation is:  Virtual Visit Same Day: place patient on my schedule, as I have availability. OK for video visit. Patient is on my schedule for 1:30.     JOSE R Quach CNP

## 2025-06-26 NOTE — TELEPHONE ENCOUNTER
Received form via fax again, faxed form from right fax to [phone] on 06/26/2025     Michelle Andre    [facility]

## 2025-06-26 NOTE — TELEPHONE ENCOUNTER
"Nurse Triage SBAR    Is this a 2nd Level Triage? Yes    Situation: Elevated BP    Background: Home Care RN calling with pt present, BP when checked at 8:45 am 208/102, automatic cuff. Has not rechecked. Pt took lisinopril 25 mg at 0800. Pt denies vision changes, chest pain, headache, nausea, SOB.     Assessment: Pt needs potential medication dose adjustment    Protocol Recommended Disposition:   See Today In Office    Recommendation: RN assisted in scheduling virtual appointment with PCP to discuss.  Provider please advise if virtual appointment is ok or if pt should be seen in person at . No in-person appts in primary care today.     Routed to provider    Does the patient meet one of the following criteria for ADS visit consideration? No    Reason for Disposition   Systolic BP >= 180 OR Diastolic >= 110    Additional Information   Negative: Sounds like a life-threatening emergency to the triager   Negative: Pregnant > 20 weeks or postpartum (< 6 weeks after delivery) and new hand or face swelling   Negative: Pregnant > 20 weeks and BP > 140/90   Negative: Systolic BP >= 160 OR Diastolic >= 100, and any cardiac or neurologic symptoms (e.g., chest pain, difficulty breathing, unsteady gait, blurred vision)   Negative: Patient sounds very sick or weak to the triager   Negative: BP Systolic BP >= 140 OR Diastolic >= 90 and postpartum (from 0 to 6 weeks after delivery)   Negative: Systolic BP >= 180 OR Diastolic >= 110, and missed most recent dose of blood pressure medication    Answer Assessment - Initial Assessment Questions  1. BLOOD PRESSURE: \"What is the blood pressure?\" \"Did you take at least two measurements 5 minutes apart?\"      208/102. Has not rechecked  2. ONSET: \"When did you take your blood pressure?\"      8:45 am by home care RN  3. HOW: \"How did you obtain the blood pressure?\" (e.g., visiting nurse, automatic home BP monitor)      automatic  4. HISTORY: \"Do you have a history of high blood pressure?\"    " "  yes  5. MEDICATIONS: \"Are you taking any medications for blood pressure?\" \"Have you missed any doses recently?\"      Lisinopril 25 mg daily, took at 0800 this AM  6. OTHER SYMPTOMS: \"Do you have any symptoms?\" (e.g., headache, chest pain, blurred vision, difficulty breathing, weakness)      none  7. PREGNANCY: \"Is there any chance you are pregnant?\" \"When was your last menstrual period?\"      no    Protocols used: High Blood Pressure-A-OH    "

## 2025-06-27 ENCOUNTER — MEDICAL CORRESPONDENCE (OUTPATIENT)
Dept: HEALTH INFORMATION MANAGEMENT | Facility: CLINIC | Age: 65
End: 2025-06-27
Payer: COMMERCIAL

## 2025-06-30 NOTE — TELEPHONE ENCOUNTER
Received provider signed forms from TC bin and faxed to [facility] at [phone] on 06/30/2025. Right fax confirmed at 9:00AM.    Tahira Hollis

## 2025-07-02 ENCOUNTER — TELEPHONE (OUTPATIENT)
Dept: FAMILY MEDICINE | Facility: CLINIC | Age: 65
End: 2025-07-02
Payer: COMMERCIAL

## 2025-07-02 DIAGNOSIS — Z53.9 DIAGNOSIS NOT YET DEFINED: Primary | ICD-10-CM

## 2025-07-02 PROCEDURE — G0180 MD CERTIFICATION HHA PATIENT: HCPCS

## 2025-07-02 NOTE — TELEPHONE ENCOUNTER
Forms/Letter Request    [facility] - Order NO: [id number] addressed to [employer]    Do we have the form/letter: Yes: placed in [employer] bin    Who is the form from? Home care    Where did/will the form come from? form was faxed in    When is form/letter needed by: asap    How would you like the form/letter returned: Fax : [phone]    Patient Notified form requests are processed in 5-7 business days:Yes    Could we send this information to you in [employer] or would you prefer to receive a phone call?:   Patient would prefer a phone call   Okay to leave a detailed message?: Yes at Cell number on file:    Telephone Information:   Mobile [phone]

## 2025-07-02 NOTE — TELEPHONE ENCOUNTER
Form faxed  Teri Olvera MA/OSBALDO    Received provider signed Order NO: [id number] addressed to [employer] form and faxed to [facility], [phone], right fax confirmed at 1:57 pm today, 7/2/2025.  Copy to TC and abstracting.  Teri Olvera MA/  [facility]   Primary Care

## 2025-07-02 NOTE — TELEPHONE ENCOUNTER
Rex is calling to report that [facility] has been faxing over care plans that need to be signed to switch providers from Belem Dumont CNP to Danita Shoemaker CNP as Belem Dumont has left the company.    Phillipalonzo reports that the Danita Shoemaker CNP forms are not coming back to them and the Belem Pappas forms are the ones that keep getting sent back to them.       TC: She is re faxing the forms that they need signed and faxed back to them, now.  Please look for them, have them signed by Danita Shoemaker CNP and fax back to the number in red below.       Going to fax: [phone] BK   Fax back to : [phone]

## 2025-07-28 DIAGNOSIS — Z96.653 STATUS POST TOTAL BILATERAL KNEE REPLACEMENT: ICD-10-CM

## 2025-07-28 DIAGNOSIS — Z98.1 S/P CERVICAL SPINAL FUSION: ICD-10-CM

## 2025-07-28 RX ORDER — DICLOFENAC SODIUM 75 MG/1
TABLET, DELAYED RELEASE ORAL
Qty: 180 TABLET | Refills: 0 | Status: SHIPPED | OUTPATIENT
Start: 2025-07-28

## 2025-08-04 ENCOUNTER — OFFICE VISIT (OUTPATIENT)
Dept: FAMILY MEDICINE | Facility: CLINIC | Age: 65
End: 2025-08-04
Payer: COMMERCIAL

## 2025-08-04 VITALS
HEART RATE: 80 BPM | TEMPERATURE: 97.8 F | SYSTOLIC BLOOD PRESSURE: 164 MMHG | BODY MASS INDEX: 43.66 KG/M2 | RESPIRATION RATE: 16 BRPM | OXYGEN SATURATION: 96 % | DIASTOLIC BLOOD PRESSURE: 97 MMHG | HEIGHT: 62 IN | WEIGHT: 237.25 LBS

## 2025-08-04 DIAGNOSIS — Z98.890 HISTORY OF LUMBOSACRAL SPINE SURGERY: ICD-10-CM

## 2025-08-04 DIAGNOSIS — I10 ESSENTIAL HYPERTENSION WITH GOAL BLOOD PRESSURE LESS THAN 140/90: ICD-10-CM

## 2025-08-04 DIAGNOSIS — Z01.818 PREOP GENERAL PHYSICAL EXAM: Primary | ICD-10-CM

## 2025-08-04 DIAGNOSIS — M54.42 BILATERAL LOW BACK PAIN WITH LEFT-SIDED SCIATICA, UNSPECIFIED CHRONICITY: ICD-10-CM

## 2025-08-04 DIAGNOSIS — R73.01 IMPAIRED FASTING GLUCOSE: ICD-10-CM

## 2025-08-04 LAB
ANION GAP SERPL CALCULATED.3IONS-SCNC: 11 MMOL/L (ref 7–15)
BASOPHILS # BLD AUTO: 0.1 10E3/UL (ref 0–0.2)
BASOPHILS NFR BLD AUTO: 1 %
BUN SERPL-MCNC: 11.5 MG/DL (ref 8–23)
CALCIUM SERPL-MCNC: 10 MG/DL (ref 8.8–10.4)
CHLORIDE SERPL-SCNC: 101 MMOL/L (ref 98–107)
CREAT SERPL-MCNC: 0.58 MG/DL (ref 0.51–0.95)
EGFRCR SERPLBLD CKD-EPI 2021: >90 ML/MIN/1.73M2
EOSINOPHIL # BLD AUTO: 0.1 10E3/UL (ref 0–0.7)
EOSINOPHIL NFR BLD AUTO: 2 %
ERYTHROCYTE [DISTWIDTH] IN BLOOD BY AUTOMATED COUNT: 14.1 % (ref 10–15)
EST. AVERAGE GLUCOSE BLD GHB EST-MCNC: 128 MG/DL
GLUCOSE SERPL-MCNC: 108 MG/DL (ref 70–99)
HBA1C MFR BLD: 6.1 % (ref 0–5.6)
HCO3 SERPL-SCNC: 28 MMOL/L (ref 22–29)
HCT VFR BLD AUTO: 39.6 % (ref 35–47)
HGB BLD-MCNC: 12.5 G/DL (ref 11.7–15.7)
IMM GRANULOCYTES # BLD: 0 10E3/UL
IMM GRANULOCYTES NFR BLD: 0 %
LYMPHOCYTES # BLD AUTO: 2 10E3/UL (ref 0.8–5.3)
LYMPHOCYTES NFR BLD AUTO: 24 %
MCH RBC QN AUTO: 29.5 PG (ref 26.5–33)
MCHC RBC AUTO-ENTMCNC: 31.6 G/DL (ref 31.5–36.5)
MCV RBC AUTO: 93 FL (ref 78–100)
MONOCYTES # BLD AUTO: 0.9 10E3/UL (ref 0–1.3)
MONOCYTES NFR BLD AUTO: 11 %
NEUTROPHILS # BLD AUTO: 5.2 10E3/UL (ref 1.6–8.3)
NEUTROPHILS NFR BLD AUTO: 63 %
PLATELET # BLD AUTO: 310 10E3/UL (ref 150–450)
POTASSIUM SERPL-SCNC: 5 MMOL/L (ref 3.4–5.3)
RBC # BLD AUTO: 4.24 10E6/UL (ref 3.8–5.2)
SODIUM SERPL-SCNC: 140 MMOL/L (ref 135–145)
WBC # BLD AUTO: 8.3 10E3/UL (ref 4–11)

## 2025-08-04 PROCEDURE — 99214 OFFICE O/P EST MOD 30 MIN: CPT | Performed by: FAMILY MEDICINE

## 2025-08-04 PROCEDURE — 1126F AMNT PAIN NOTED NONE PRSNT: CPT | Performed by: FAMILY MEDICINE

## 2025-08-04 PROCEDURE — 80048 BASIC METABOLIC PNL TOTAL CA: CPT | Performed by: FAMILY MEDICINE

## 2025-08-04 PROCEDURE — 85025 COMPLETE CBC W/AUTO DIFF WBC: CPT | Performed by: FAMILY MEDICINE

## 2025-08-04 PROCEDURE — 3044F HG A1C LEVEL LT 7.0%: CPT | Performed by: FAMILY MEDICINE

## 2025-08-04 PROCEDURE — 3080F DIAST BP >= 90 MM HG: CPT | Performed by: FAMILY MEDICINE

## 2025-08-04 PROCEDURE — 36415 COLL VENOUS BLD VENIPUNCTURE: CPT | Performed by: FAMILY MEDICINE

## 2025-08-04 PROCEDURE — 3077F SYST BP >= 140 MM HG: CPT | Performed by: FAMILY MEDICINE

## 2025-08-04 PROCEDURE — 83036 HEMOGLOBIN GLYCOSYLATED A1C: CPT | Performed by: FAMILY MEDICINE

## 2025-08-04 ASSESSMENT — ASTHMA QUESTIONNAIRES
QUESTION_4 LAST FOUR WEEKS HOW OFTEN HAVE YOU USED YOUR RESCUE INHALER OR NEBULIZER MEDICATION (SUCH AS ALBUTEROL): ONCE A WEEK OR LESS
QUESTION_1 LAST FOUR WEEKS HOW MUCH OF THE TIME DID YOUR ASTHMA KEEP YOU FROM GETTING AS MUCH DONE AT WORK, SCHOOL OR AT HOME: A LITTLE OF THE TIME
QUESTION_3 LAST FOUR WEEKS HOW OFTEN DID YOUR ASTHMA SYMPTOMS (WHEEZING, COUGHING, SHORTNESS OF BREATH, CHEST TIGHTNESS OR PAIN) WAKE YOU UP AT NIGHT OR EARLIER THAN USUAL IN THE MORNING: NOT AT ALL
ACT_TOTALSCORE: 20
QUESTION_2 LAST FOUR WEEKS HOW OFTEN HAVE YOU HAD SHORTNESS OF BREATH: THREE TO SIX TIMES A WEEK
QUESTION_5 LAST FOUR WEEKS HOW WOULD YOU RATE YOUR ASTHMA CONTROL: WELL CONTROLLED

## 2025-08-04 ASSESSMENT — PAIN SCALES - GENERAL: PAINLEVEL_OUTOF10: NO PAIN (0)

## 2025-08-15 ENCOUNTER — MEDICAL CORRESPONDENCE (OUTPATIENT)
Dept: HEALTH INFORMATION MANAGEMENT | Facility: CLINIC | Age: 65
End: 2025-08-15

## 2025-08-21 ENCOUNTER — VIRTUAL VISIT (OUTPATIENT)
Dept: FAMILY MEDICINE | Facility: CLINIC | Age: 65
End: 2025-08-21
Payer: COMMERCIAL

## 2025-08-21 DIAGNOSIS — Z98.890 HISTORY OF LUMBOSACRAL SPINE SURGERY: Primary | ICD-10-CM

## 2025-08-21 DIAGNOSIS — Z77.29 CARBON MONOXIDE EXPOSURE: ICD-10-CM

## 2025-08-21 RX ORDER — METHYLPREDNISOLONE 4 MG/1
TABLET ORAL
COMMUNITY
Start: 2025-08-19

## 2025-09-01 ENCOUNTER — OFFICE VISIT (OUTPATIENT)
Dept: URGENT CARE | Facility: URGENT CARE | Age: 65
End: 2025-09-01
Payer: COMMERCIAL

## 2025-09-01 VITALS
BODY MASS INDEX: 44.75 KG/M2 | TEMPERATURE: 98.7 F | HEIGHT: 61 IN | WEIGHT: 237 LBS | HEART RATE: 85 BPM | DIASTOLIC BLOOD PRESSURE: 75 MMHG | RESPIRATION RATE: 18 BRPM | SYSTOLIC BLOOD PRESSURE: 147 MMHG | OXYGEN SATURATION: 92 %

## 2025-09-01 DIAGNOSIS — R06.00 DYSPNEA, UNSPECIFIED TYPE: ICD-10-CM

## 2025-09-01 DIAGNOSIS — J02.9 SORE THROAT: ICD-10-CM

## 2025-09-01 DIAGNOSIS — I10 ESSENTIAL HYPERTENSION WITH GOAL BLOOD PRESSURE LESS THAN 140/90: ICD-10-CM

## 2025-09-01 DIAGNOSIS — K12.0 APHTHOUS STOMATITIS: Primary | ICD-10-CM

## 2025-09-01 LAB
DEPRECATED S PYO AG THROAT QL EIA: NEGATIVE
S PYO DNA THROAT QL NAA+PROBE: NOT DETECTED

## 2025-09-01 PROCEDURE — 3077F SYST BP >= 140 MM HG: CPT | Performed by: FAMILY MEDICINE

## 2025-09-01 PROCEDURE — 87651 STREP A DNA AMP PROBE: CPT | Performed by: FAMILY MEDICINE

## 2025-09-01 PROCEDURE — 1125F AMNT PAIN NOTED PAIN PRSNT: CPT | Performed by: FAMILY MEDICINE

## 2025-09-01 PROCEDURE — 3078F DIAST BP <80 MM HG: CPT | Performed by: FAMILY MEDICINE

## 2025-09-01 PROCEDURE — 99214 OFFICE O/P EST MOD 30 MIN: CPT | Performed by: FAMILY MEDICINE

## 2025-09-01 RX ORDER — DEXAMETHASONE 0.5 MG/5ML
0.5 ELIXIR ORAL 3 TIMES DAILY
Qty: 237 ML | Refills: 0 | Status: SHIPPED | OUTPATIENT
Start: 2025-09-01

## 2025-09-01 RX ORDER — DEXAMETHASONE 0.5 MG/5ML
0.5 ELIXIR ORAL 3 TIMES DAILY
Qty: 237 ML | Refills: 0 | Status: CANCELLED | OUTPATIENT
Start: 2025-09-01

## 2025-09-01 RX ORDER — FLUCONAZOLE 150 MG/1
150 TABLET ORAL ONCE
Qty: 1 TABLET | Refills: 0 | Status: SHIPPED | OUTPATIENT
Start: 2025-09-01 | End: 2025-09-01

## 2025-09-01 ASSESSMENT — PAIN SCALES - GENERAL: PAINLEVEL_OUTOF10: SEVERE PAIN (8)

## 2025-09-04 ENCOUNTER — NURSE TRIAGE (OUTPATIENT)
Dept: FAMILY MEDICINE | Facility: CLINIC | Age: 65
End: 2025-09-04
Payer: COMMERCIAL

## (undated) DEVICE — LINEN ORTHO PACK 5446

## (undated) DEVICE — SOL WATER IRRIG 1000ML BOTTLE 2F7114

## (undated) DEVICE — SPONGE SURGIFOAM 100 1974

## (undated) DEVICE — PREP CHLORAPREP 26ML TINTED HI-LITE ORANGE 930815

## (undated) DEVICE — EYE KNIFE STILETTO VISITEC 1.1MM ANG 45DEG SIDEPORT 376620

## (undated) DEVICE — DRAPE STERI U 1015

## (undated) DEVICE — EYE TIP IRRIGATION & ASPIRATION POLYMER CVD 0.3MM 8065751512

## (undated) DEVICE — SPONGE LAP 18X18" X8435

## (undated) DEVICE — GOWN REINFORCED XXLG 9071

## (undated) DEVICE — HOOD SURG T7PLUS PEEL AWAY FACE SHIELD STRL LF 0416-801-100

## (undated) DEVICE — DECANTER BAG 2002S

## (undated) DEVICE — NDL 19GA 1.5"

## (undated) DEVICE — PACK CATARACT CUSTOM ASC SEY15CPUMC

## (undated) DEVICE — DRAPE C-ARM OEC MINI VIEW 6800   00-901917-01

## (undated) DEVICE — LINEN TOWEL PACK X5 5464

## (undated) DEVICE — SOL WATER IRRIG 1000ML BOTTLE 07139-09

## (undated) DEVICE — TAPE DURAPORE 3" SILK 1538-3

## (undated) DEVICE — LINEN ORTHO ACL PACK 5447

## (undated) DEVICE — SOL WATER IRRIG 500ML BOTTLE 2F7113

## (undated) DEVICE — GLOVE SENSICARE PI MICRO PF 8.0 LATEX FREE MSG9680

## (undated) DEVICE — DRSG STERI STRIP 1/2X4" R1547

## (undated) DEVICE — DRAPE MAYO STAND 23X54 8337

## (undated) DEVICE — ESU GROUND PAD ADULT W/CORD E7507

## (undated) DEVICE — IMM COLLAR CERVICAL MED UNIVERSAL 3X24" 79-83500

## (undated) DEVICE — GLOVE BIOGEL PI SZ 7.5 40875

## (undated) DEVICE — TOURNIQUET SGL BLADDER 18"X4" RED 5921-218-135

## (undated) DEVICE — SU MONOCRYL 3-0 PS-2 27" Y427H

## (undated) DEVICE — KIT SHOULDER POSITIONING BEACH CHAIR ARM HOLDER AR-1644

## (undated) DEVICE — EYE CANN IRR 27GA ANTERIOR CHAMBER 581280

## (undated) DEVICE — PACK OPEN SHOULDER SOP15OCFSC

## (undated) DEVICE — ESU CLEANER TIP 31142717

## (undated) DEVICE — GLOVE BIOGEL PI ULTRATOUCH G SZ 7.5 42175

## (undated) DEVICE — ESU ELEC BLADE 6" COATED E1450-6

## (undated) DEVICE — Device

## (undated) DEVICE — SOL NACL 0.9% INJ 250ML BAG 2B1322Q

## (undated) DEVICE — SOL NACL 0.9% IRRIG 500ML BOTTLE 2F7123

## (undated) DEVICE — SPONGE KITTNER 30-101

## (undated) DEVICE — SU DERMABOND PRINEO 22CM CLR222US

## (undated) DEVICE — SU DERMABOND ADVANCED .7ML DNX12

## (undated) DEVICE — SU NDL MAYO TROCAR MED 217003

## (undated) DEVICE — SU FIBERWIRE 2 38" T-8 NDL  AR-7206

## (undated) DEVICE — SU VICRYL 3-0 SH CR 8X18" J774

## (undated) DEVICE — SUCTION IRR SYSTEM W/O TIP INTERPULSE HANDPIECE 0210-100-000

## (undated) DEVICE — SOLUTION WOUND CLEANSING 3/4OZ 10% PVP EA-L3011FB-50

## (undated) DEVICE — ESU GROUND PAD UNIVERSAL W/O CORD

## (undated) DEVICE — SU STRATAFIX MONOCRYL 2-0 SPIRAL SH 20CM SXMP1B409

## (undated) DEVICE — DRAPE IOBAN INCISE 23X17" 6650EZ

## (undated) DEVICE — TUBING SUCTION MEDI-VAC SOFT 3/16"X20' N520A

## (undated) DEVICE — EYE KNIFE SLIT XSTAR VISITEC 2.6MM 45DEG 373726

## (undated) DEVICE — EYE SHIELD PLASTIC

## (undated) DEVICE — PILOT REMOVABLE FOR CANNULATED REAMER

## (undated) DEVICE — LINEN POUCH DBL 5427

## (undated) DEVICE — DRSG TELFA ISLAND 4X10"

## (undated) DEVICE — SUTURE STRATAFIX SPIRAL 3-0 SXMD2B412

## (undated) DEVICE — PEN MARKING SKIN W/LABELS 31145884

## (undated) DEVICE — SUCTION MANIFOLD NEPTUNE 2 SYS 4 PORT 0702-020-000

## (undated) DEVICE — PREP DURAPREP 26ML APL 8630

## (undated) DEVICE — GLOVE BIOGEL PI ORTHOPRO SZ 8.5 47685

## (undated) DEVICE — SUCTION MANIFOLD NEPTUNE 2 SYS 1 PORT 702-025-000

## (undated) DEVICE — SPONGE PACK VAGINAL 2X36"

## (undated) DEVICE — DRSG ABDOMINAL 07 1/2X8" 7197D

## (undated) DEVICE — DRAPE STERI TOWEL LG 1010

## (undated) DEVICE — MANIFOLD NEPTUNE 4 PORT 700-20

## (undated) DEVICE — EYE CANN IRR 25GA CYSTOTOME 581610

## (undated) DEVICE — MIDAS REX DISSECTING TOOL  14MH30

## (undated) DEVICE — GLOVE PROTEXIS W/NEU-THERA 7.5  2D73TE75

## (undated) DEVICE — ESU ELEC BLADE 2.75" COATED/INSULATED E1455

## (undated) DEVICE — GLOVE BIOGEL PI MICRO INDICATOR UNDERGLOVE SZ 8.0 48980

## (undated) DEVICE — DRILL BIT TORNIER 3MM REVERSE STERILE DWD055

## (undated) DEVICE — PACK SET-UP STD 9102

## (undated) DEVICE — EYE PACK CUSTOM ANTERIOR 30DEG TIP CENTURION PPK6682-04

## (undated) DEVICE — COVER CAMERA IN-LIGHT DISP LT-C02

## (undated) DEVICE — DRAPE X-RAY TUBE 00-901169-01-OEC

## (undated) DEVICE — DRAPE MICROSCOPE OPMI ZEISS 48X118" 306071-0000-000

## (undated) DEVICE — PREP CHLORAPREP 26ML TINTED ORANGE  260815

## (undated) DEVICE — DRSG KERLIX FLUFFS X5

## (undated) DEVICE — PACK HAND CUSTOM ASC

## (undated) DEVICE — PREP SKIN SCRUB TRAY 4461A

## (undated) DEVICE — SOL NACL 0.9% IRRIG 1000ML BOTTLE 2F7124

## (undated) DEVICE — SU STRATAFIX PDS PLUS 0 CT 45CM SXPP1A406

## (undated) DEVICE — NDL SPINAL 22GA 3.5" QUINCKE 405181

## (undated) DEVICE — IMP SCR FIXATION 4.5X18MM AQLS VDV218: Type: IMPLANTABLE DEVICE | Site: SHOULDER | Status: NON-FUNCTIONAL

## (undated) DEVICE — SOL NACL 0.9% IRRIG 3000ML BAG 2B7477

## (undated) DEVICE — SPONGE COTTONOID 1/2X1/2" 80-1400

## (undated) DEVICE — GLOVE BIOGEL PI MICRO INDICATOR UNDERGLOVE SZ 7.5 48975

## (undated) DEVICE — GLOVE PROTEXIS BLUE W/NEU-THERA 8.5  2D73EB85

## (undated) DEVICE — CLOSURE SYS SKIN PREMIERPRO EXOFIN FUSION 4X22CM STRL 3472

## (undated) DEVICE — SU NDL MAYO LG 216701

## (undated) DEVICE — RX SURGIFLO HEMOSTATIC MATRIX 8ML 2991

## (undated) DEVICE — NDL 22GA 1.5"

## (undated) DEVICE — SU VICRYL 2-0 CT-2 CR 8X18" J726D

## (undated) DEVICE — GLOVE PROTEXIS W/NEU-THERA 8.5  2D73TE85

## (undated) DEVICE — SUCTION TIP YANKAUER STR K87

## (undated) DEVICE — PACK SMALL SPINE RIDGES

## (undated) DEVICE — GLOVE BIOGEL PI MICRO SZ 7.5 48575

## (undated) DEVICE — BLADE KNIFE SURG 11 371111

## (undated) DEVICE — DRAPE ARTHROSCOPY SHOULDER BEACHCHAIR 29369

## (undated) RX ORDER — PROPOFOL 10 MG/ML
INJECTION, EMULSION INTRAVENOUS
Status: DISPENSED
Start: 2023-10-24

## (undated) RX ORDER — SIMETHICONE 40MG/0.6ML
SUSPENSION, DROPS(FINAL DOSAGE FORM)(ML) ORAL
Status: DISPENSED
Start: 2020-06-15

## (undated) RX ORDER — FENTANYL CITRATE 50 UG/ML
INJECTION, SOLUTION INTRAMUSCULAR; INTRAVENOUS
Status: DISPENSED
Start: 2017-03-30

## (undated) RX ORDER — GLYCOPYRROLATE 0.2 MG/ML
INJECTION INTRAMUSCULAR; INTRAVENOUS
Status: DISPENSED
Start: 2017-03-30

## (undated) RX ORDER — HEPARIN SODIUM 5000 [USP'U]/.5ML
INJECTION, SOLUTION INTRAVENOUS; SUBCUTANEOUS
Status: DISPENSED
Start: 2023-10-24

## (undated) RX ORDER — ACETAMINOPHEN 325 MG/1
TABLET ORAL
Status: DISPENSED
Start: 2023-05-12

## (undated) RX ORDER — FENTANYL CITRATE 50 UG/ML
INJECTION, SOLUTION INTRAMUSCULAR; INTRAVENOUS
Status: DISPENSED
Start: 2024-01-30

## (undated) RX ORDER — FAMOTIDINE 20 MG/1
TABLET, FILM COATED ORAL
Status: DISPENSED
Start: 2023-10-24

## (undated) RX ORDER — ACETAMINOPHEN 325 MG/1
TABLET ORAL
Status: DISPENSED
Start: 2023-10-24

## (undated) RX ORDER — FENTANYL CITRATE 50 UG/ML
INJECTION, SOLUTION INTRAMUSCULAR; INTRAVENOUS
Status: DISPENSED
Start: 2020-06-15

## (undated) RX ORDER — BUPIVACAINE HYDROCHLORIDE AND EPINEPHRINE 5; 5 MG/ML; UG/ML
INJECTION, SOLUTION EPIDURAL; INTRACAUDAL; PERINEURAL
Status: DISPENSED
Start: 2017-03-30

## (undated) RX ORDER — ONDANSETRON 2 MG/ML
INJECTION INTRAMUSCULAR; INTRAVENOUS
Status: DISPENSED
Start: 2017-03-30

## (undated) RX ORDER — ACETAMINOPHEN 325 MG/1
TABLET ORAL
Status: DISPENSED
Start: 2023-09-19

## (undated) RX ORDER — PROPOFOL 10 MG/ML
INJECTION, EMULSION INTRAVENOUS
Status: DISPENSED
Start: 2017-03-30

## (undated) RX ORDER — ACETAMINOPHEN 325 MG/1
TABLET ORAL
Status: DISPENSED
Start: 2023-02-20

## (undated) RX ORDER — PROPOFOL 10 MG/ML
INJECTION, EMULSION INTRAVENOUS
Status: DISPENSED
Start: 2023-09-19

## (undated) RX ORDER — FENTANYL CITRATE 50 UG/ML
INJECTION, SOLUTION INTRAMUSCULAR; INTRAVENOUS
Status: DISPENSED
Start: 2023-09-19

## (undated) RX ORDER — OXYCODONE HYDROCHLORIDE 5 MG/1
TABLET ORAL
Status: DISPENSED
Start: 2023-09-19

## (undated) RX ORDER — FENTANYL CITRATE 50 UG/ML
INJECTION, SOLUTION INTRAMUSCULAR; INTRAVENOUS
Status: DISPENSED
Start: 2023-05-12

## (undated) RX ORDER — DEXAMETHASONE SODIUM PHOSPHATE 4 MG/ML
INJECTION, SOLUTION INTRA-ARTICULAR; INTRALESIONAL; INTRAMUSCULAR; INTRAVENOUS; SOFT TISSUE
Status: DISPENSED
Start: 2023-10-24

## (undated) RX ORDER — CEFAZOLIN SODIUM 2 G/100ML
INJECTION, SOLUTION INTRAVENOUS
Status: DISPENSED
Start: 2017-03-30

## (undated) RX ORDER — DEXAMETHASONE SODIUM PHOSPHATE 4 MG/ML
INJECTION, SOLUTION INTRA-ARTICULAR; INTRALESIONAL; INTRAMUSCULAR; INTRAVENOUS; SOFT TISSUE
Status: DISPENSED
Start: 2017-03-30

## (undated) RX ORDER — TRANEXAMIC ACID 650 MG/1
TABLET ORAL
Status: DISPENSED
Start: 2023-10-24

## (undated) RX ORDER — CELECOXIB 200 MG/1
CAPSULE ORAL
Status: DISPENSED
Start: 2023-10-24

## (undated) RX ORDER — PROPOFOL 10 MG/ML
INJECTION, EMULSION INTRAVENOUS
Status: DISPENSED
Start: 2023-02-20

## (undated) RX ORDER — VANCOMYCIN HYDROCHLORIDE 1 G/20ML
INJECTION, POWDER, LYOPHILIZED, FOR SOLUTION INTRAVENOUS
Status: DISPENSED
Start: 2023-10-24

## (undated) RX ORDER — NEOSTIGMINE METHYLSULFATE 5 MG/5 ML
SYRINGE (ML) INTRAVENOUS
Status: DISPENSED
Start: 2017-03-30

## (undated) RX ORDER — FENTANYL CITRATE 50 UG/ML
INJECTION, SOLUTION INTRAMUSCULAR; INTRAVENOUS
Status: DISPENSED
Start: 2023-10-24

## (undated) RX ORDER — ACETAMINOPHEN 10 MG/ML
INJECTION, SOLUTION INTRAVENOUS
Status: DISPENSED
Start: 2017-03-30

## (undated) RX ORDER — CEFAZOLIN SODIUM 2 G/50ML
SOLUTION INTRAVENOUS
Status: DISPENSED
Start: 2023-09-19

## (undated) RX ORDER — CEFAZOLIN SODIUM/WATER 2 G/20 ML
SYRINGE (ML) INTRAVENOUS
Status: DISPENSED
Start: 2023-10-23

## (undated) RX ORDER — LIDOCAINE HYDROCHLORIDE 10 MG/ML
INJECTION, SOLUTION EPIDURAL; INFILTRATION; INTRACAUDAL; PERINEURAL
Status: DISPENSED
Start: 2017-03-30

## (undated) RX ORDER — ONDANSETRON 2 MG/ML
INJECTION INTRAMUSCULAR; INTRAVENOUS
Status: DISPENSED
Start: 2023-10-24